# Patient Record
Sex: MALE | Race: WHITE | NOT HISPANIC OR LATINO | Employment: OTHER | URBAN - METROPOLITAN AREA
[De-identification: names, ages, dates, MRNs, and addresses within clinical notes are randomized per-mention and may not be internally consistent; named-entity substitution may affect disease eponyms.]

---

## 2017-04-21 ENCOUNTER — PRE VISIT (OUTPATIENT)
Dept: CARDIOLOGY | Facility: CLINIC | Age: 67
End: 2017-04-21

## 2017-04-21 NOTE — TELEPHONE ENCOUNTER
6 month follow up for HTN and cardiomypathy. ECHO sched 4-24-17.    Chart prep complete. All requested testing/labs completed.  Arabella Davis CMA.

## 2017-04-24 ENCOUNTER — OFFICE VISIT (OUTPATIENT)
Dept: URGENT CARE | Facility: URGENT CARE | Age: 67
End: 2017-04-24
Payer: COMMERCIAL

## 2017-04-24 ENCOUNTER — RADIANT APPOINTMENT (OUTPATIENT)
Dept: CARDIOLOGY | Facility: CLINIC | Age: 67
End: 2017-04-24
Attending: INTERNAL MEDICINE
Payer: COMMERCIAL

## 2017-04-24 VITALS
BODY MASS INDEX: 38.37 KG/M2 | DIASTOLIC BLOOD PRESSURE: 93 MMHG | TEMPERATURE: 98.7 F | SYSTOLIC BLOOD PRESSURE: 137 MMHG | HEART RATE: 87 BPM | OXYGEN SATURATION: 95 % | WEIGHT: 245 LBS

## 2017-04-24 DIAGNOSIS — I42.9 CARDIOMYOPATHY (H): ICD-10-CM

## 2017-04-24 DIAGNOSIS — J20.9 ACUTE BRONCHITIS WITH SYMPTOMS > 10 DAYS: Primary | ICD-10-CM

## 2017-04-24 PROCEDURE — 99213 OFFICE O/P EST LOW 20 MIN: CPT | Performed by: FAMILY MEDICINE

## 2017-04-24 PROCEDURE — 93306 TTE W/DOPPLER COMPLETE: CPT | Performed by: INTERNAL MEDICINE

## 2017-04-24 PROCEDURE — 40000264 ZZHC STATISTIC IV PUSH SINGLE INITIAL SUBSTANCE: Performed by: INTERNAL MEDICINE

## 2017-04-24 RX ORDER — AZITHROMYCIN 250 MG/1
TABLET, FILM COATED ORAL
Qty: 6 TABLET | Refills: 0 | Status: SHIPPED | OUTPATIENT
Start: 2017-04-24 | End: 2017-05-10

## 2017-04-24 RX ORDER — METHYLPREDNISOLONE 4 MG
TABLET, DOSE PACK ORAL
Qty: 21 TABLET | Refills: 0 | Status: SHIPPED | OUTPATIENT
Start: 2017-04-24 | End: 2017-05-10

## 2017-04-24 RX ADMIN — Medication 3 ML: at 16:30

## 2017-04-24 NOTE — PATIENT INSTRUCTIONS
Bronchitis, Antibiotic Treatment (Adult)    Bronchitis is an infection of the air passages (bronchial tubes) in your lungs. It often occurs when you have a cold. This illness is contagious during the first few days and is spread through the air by coughing and sneezing, or by direct contact (touching the sick person and then touching your own eyes, nose, or mouth).  Symptoms of bronchitis include cough with mucus (phlegm) and low-grade fever. Bronchitis usually lasts 7 to 14 days. Mild cases can be treated with simple home remedies. More severe infection is treated with an antibiotic.  Home care  Follow these guidelines when caring for yourself at home:    If your symptoms are severe, rest at home for the first 2 to 3 days. When you go back to your usual activities, don't let yourself get too tired.    Do not smoke. Also avoid being exposed to secondhand smoke.    You may use over-the-counter medicines to control fever or pain, unless another medicine was prescribed. (Note: If you have chronic liver or kidney disease or have ever had a stomach ulcer or gastrointestinal bleeding, talk with your healthcare provider before using these medicines. Also talk to your provider if you are taking medicine to prevent blood clots.) Aspirin should never be given to anyone younger than 18 years of age who is ill with a viral infection or fever. It may cause severe liver or brain damage.    Your appetite may be poor, so a light diet is fine. Avoid dehydration by drinking 6 to 8 glasses of fluids per day (such as water, soft drinks, sports drinks, juices, tea, or soup). Extra fluids will help loosen secretions in the nose and lungs.    Over-the-counter cough, cold, and sore-throat medicines will not shorten the length of the illness, but they may be helpful to reduce symptoms. (Note: Do not use decongestants if you have high blood pressure.)    Finish all antibiotic medicine. Do this even if you are feeling better after only a  few days.  Follow-up care  Follow up with your healthcare provider, or as advised. If you had an X-ray or ECG (electrocardiogram), a specialist will review it. You will be notified of any new findings that may affect your care.  Note: If you are age 65 or older, or if you have a chronic lung disease or condition that affects your immune system, or you smoke, talk to your healthcare provider about having pneumococcal vaccinations and a yearly influenza vaccination (flu shot).  When to seek medical advice  Call your healthcare provider right away if any of these occur:    Fever of 100.4 F (38 C) or higher    Coughing up increased amounts of colored sputum    Weakness, drowsiness, headache, facial pain, ear pain, or a stiff neck   Call 911, or get immediate medical care  Contact emergency services right away if any of these occur.    Coughing up blood    Worsening weakness, drowsiness, headache, or stiff neck    Trouble breathing, wheezing, or pain with breathing    9792-0820 [employer]. [address] [place] [address], [place] [address]. All rights reserved. This information is not intended as a substitute for professional medical care. Always follow your healthcare professional's instructions.

## 2017-04-24 NOTE — NURSING NOTE
Patient presents today for resting echo ordered by MD.     Echo Tech provided patient education about resting echo. IV started in right hand.   IV start documented in  [employer].  Echo technician completed resting echo. Patient monitored according to Optison protocol. Data transferred to [facility] for final interpretation through [employer].     Optison medication:  Amount used 3ml Optison mixed with 6ml Saline - [id number].  6ml Wasted.   After completion of resting echo, IV removed.    Patient education provided about cardiology interpretation and primary provider will be notified of results.    Deidra Nickerson RDCS

## 2017-04-24 NOTE — PROGRESS NOTES
SUBJECTIVE:   Ricky Brown is a 66 year old male presenting with a chief complaint of cough  and sputum.  Onset of symptoms was 2 week(s) ago.  Course of illness is same.    Severity moderate  Current and Associated symptoms: cough   Treatment measures tried include OTC meds.  Predisposing factors include None.    Also c/o wheezing.    Past Medical History:   Diagnosis Date     Arthritis      BCC (basal cell carcinoma)     right shoulder      Cardiomyopathy (H)      Colon adenomas 9/20/11     ED (erectile dysfunction)      HTN (hypertension)      Obesity      JOSE JUAN (obstructive sleep apnea)      Current Outpatient Prescriptions   Medication Sig Dispense Refill     diphenoxylate-atropine (LOMOTIL) 2.5-0.025 MG per tablet Take 1 tablet by mouth daily as needed for diarrhea Patient only takes 1 tablet as needed 30 tablet 5     baclofen (LIORESAL) 10 MG tablet Take 1/2 tab (5 mg) three times daily for 7 days, then  Take 1 tab (10 mg) three times daily for 7 days, then  Take 1 and 1/2 tab (15 mg) three times daily. 116 tablet 1     metoprolol (TOPROL XL) 50 MG 24 hr tablet Take 1 tablet (50 mg) by mouth daily 90 tablet 3     atorvastatin (LIPITOR) 10 MG tablet Take 1 tablet (10 mg) by mouth daily 90 tablet 3     losartan (COZAAR) 25 MG tablet Take 1 tablet (25 mg) by mouth 2 times daily 180 tablet 3     ipratropium (ATROVENT) 0.06 % nasal spray Spray 2 sprays into both nostrils 4 times daily as needed for rhinitis Refill when requested 3 Box 11     aspirin 81 MG tablet Take 1 tablet (81 mg) by mouth daily 90 tablet 3     sildenafil (VIAGRA) 50 MG tablet Take 0.5 tablets by mouth daily as needed for erectile dysfunction. Take 30 min to 4 hours before intercourse.  Never use with nitroglycerin, terazosin or doxazosin. 12 tablet 11     Calcium Carbonate Antacid (TUMS CALCIUM FOR LIFE BONE PO) Take  by mouth.       Multiple Vitamin (MULTIVITAMINS PO) Take  by mouth daily.       Social History   Substance Use Topics      Smoking status: Former Smoker     Packs/day: 0.50     Years: 2.00     Types: Cigarettes     Quit date: 12/12/1996     Smokeless tobacco: Never Used     Alcohol use 5.0 oz/week      Comment: 1-2 seth/ gin per 5-6 night per week       ROS:  Review of systems negative except as stated above.    OBJECTIVE  :BP (!) 137/93  Pulse 87  Temp 98.7  F (37.1  C) (Oral)  Wt 245 lb (111.1 kg)  SpO2 95%  BMI 38.37 kg/m2  GENERAL APPEARANCE: healthy, alert and no distress  EYES: EOMI,  PERRL, conjunctiva clear  HENT: ear canals and TM's normal.  Nose and mouth without ulcers, erythema or lesions  NECK: supple, nontender, no lymphadenopathy  RESP: lungs clear to auscultation - no rales, rhonchi or wheezes  CV: regular rates and rhythm, normal S1 S2, no murmur noted  NEURO: Normal strength and tone, sensory exam grossly normal,  normal speech and mentation  SKIN: no suspicious lesions or rashes    ASSESSMENT:  Bronchitis    PLAN:  Rx abx, steroids  See orders in Epic

## 2017-04-24 NOTE — NURSING NOTE
"Chief Complaint   Patient presents with     Cough     Started about 2 weeks ago. Coughing out bloody mucus. No runny nose. stomache, body ache, confusion.       Initial BP (!) 137/93  Pulse 87  Temp 98.7  F (37.1  C) (Oral)  Wt 245 lb (111.1 kg)  SpO2 95%  BMI 38.37 kg/m2 Estimated body mass index is 38.37 kg/(m^2) as calculated from the following:    Height as of 11/29/16: 5' 7\" (1.702 m).    Weight as of this encounter: 245 lb (111.1 kg).  Medication Reconciliation: complete   Iraida Carrillo MA        "

## 2017-04-24 NOTE — MR AVS SNAPSHOT
After Visit Summary   4/24/2017    Ricky Brown    MRN: [id number]           Patient Information     Date Of Birth          1950        Visit Information        Provider Department      4/24/2017 12:25 PM Suman Joyner MD [facility]        Today's Diagnoses     Acute bronchitis with symptoms > 10 days    -  1      Care Instructions      Bronchitis, Antibiotic Treatment (Adult)    Bronchitis is an infection of the air passages (bronchial tubes) in your lungs. It often occurs when you have a cold. This illness is contagious during the first few days and is spread through the air by coughing and sneezing, or by direct contact (touching the sick person and then touching your own eyes, nose, or mouth).  Symptoms of bronchitis include cough with mucus (phlegm) and low-grade fever. Bronchitis usually lasts 7 to 14 days. Mild cases can be treated with simple home remedies. More severe infection is treated with an antibiotic.  Home care  Follow these guidelines when caring for yourself at home:    If your symptoms are severe, rest at home for the first 2 to 3 days. When you go back to your usual activities, don't let yourself get too tired.    Do not smoke. Also avoid being exposed to secondhand smoke.    You may use over-the-counter medicines to control fever or pain, unless another medicine was prescribed. (Note: If you have chronic liver or kidney disease or have ever had a stomach ulcer or gastrointestinal bleeding, talk with your healthcare provider before using these medicines. Also talk to your provider if you are taking medicine to prevent blood clots.) Aspirin should never be given to anyone younger than 18 years of age who is ill with a viral infection or fever. It may cause severe liver or brain damage.    Your appetite may be poor, so a light diet is fine. Avoid dehydration by drinking 6 to 8 glasses of fluids per day (such as water, soft drinks, sports  drinks, juices, tea, or soup). Extra fluids will help loosen secretions in the nose and lungs.    Over-the-counter cough, cold, and sore-throat medicines will not shorten the length of the illness, but they may be helpful to reduce symptoms. (Note: Do not use decongestants if you have high blood pressure.)    Finish all antibiotic medicine. Do this even if you are feeling better after only a few days.  Follow-up care  Follow up with your healthcare provider, or as advised. If you had an X-ray or ECG (electrocardiogram), a specialist will review it. You will be notified of any new findings that may affect your care.  Note: If you are age 65 or older, or if you have a chronic lung disease or condition that affects your immune system, or you smoke, talk to your healthcare provider about having pneumococcal vaccinations and a yearly influenza vaccination (flu shot).  When to seek medical advice  Call your healthcare provider right away if any of these occur:    Fever of 100.4 F (38 C) or higher    Coughing up increased amounts of colored sputum    Weakness, drowsiness, headache, facial pain, ear pain, or a stiff neck   Call 911, or get immediate medical care  Contact emergency services right away if any of these occur.    Coughing up blood    Worsening weakness, drowsiness, headache, or stiff neck    Trouble breathing, wheezing, or pain with breathing    [id number] [employer] [address] [place] [address]. All rights reserved. This information is not intended as a substitute for professional medical care. Always follow your healthcare professional's instructions.              Follow-ups after your visit        Follow-up notes from your care team     Return if symptoms worsen or fail to improve.      Your next 10 appointments already scheduled     Apr 24, 2017  4:00 PM CDT   Ech Complete with [id number]   [facility] [facility]    [address] [place] [address]  [address] [facility]  [place] [address]   [phone]           1.  Please bring or wear a comfortable two-piece outfit. 2.  You may eat, drink and take your normal medicines. 3.  For any questions that cannot be answered, please contact the ordering physician            Apr 26, 2017  8:00 AM CDT   Return Visit with ZEN Bustillo MD   [facility] ([facility])    [address] [address]   [phone]              Who to contact     If you have questions or need follow up information about today's clinic visit or your schedule please contact [facility] directly at [phone].  Normal or non-critical lab and imaging results will be communicated to you by MyChart, letter or phone within 4 business days after the clinic has received the results. If you do not hear from us within 7 days, please contact the clinic through [facility] or phone. If you have a critical or abnormal lab result, we will notify you by phone as soon as possible.  Submit refill requests through [facility] or call your pharmacy and they will forward the refill request to us. Please allow 3 business days for your refill to be completed.          Additional Information About Your Visit        [facility] Information     [facility] gives you secure access to your electronic health record. If you see a primary care provider, you can also send messages to your care team and make appointments. If you have questions, please call your primary care clinic.  If you do not have a primary care provider, please call [phone] and they will assist you.        Care EveryWhere ID     This is your Care EveryWhere ID. This could be used by other organizations to access your [facility] medical records  [id number]        Your Vitals Were     Pulse Temperature Pulse Oximetry BMI (Body Mass Index)          87 98.7  F (37.1  C) (Oral) 95% 38.37 kg/m2         Blood Pressure  from Last 3 Encounters:   04/24/17 (!) 137/93   10/26/16 (!) 160/95   08/18/16 124/78    Weight from Last 3 Encounters:   04/24/17 245 lb (111.1 kg)   11/29/16 249 lb 9.6 oz (113.2 kg)   10/26/16 239 lb (108.4 kg)              Today, you had the following     No orders found for display         Today's Medication Changes          These changes are accurate as of: 4/24/17  1:14 PM.  If you have any questions, ask your nurse or doctor.               Start taking these medicines.        Dose/Directions    azithromycin 250 MG tablet   Commonly known as:  ZITHROMAX   Used for:  Acute bronchitis with symptoms > 10 days   Started by:  Suman Joyner MD        Two tablets first day, then one tablet daily for four days.   Quantity:  6 tablet   Refills:  0       methylPREDNISolone 4 MG tablet   Commonly known as:  MEDROL DOSEPAK   Used for:  Acute bronchitis with symptoms > 10 days   Started by:  Suman Joyner MD        Follow package instructions   Quantity:  21 tablet   Refills:  0            Where to get your medicines      These medications were sent to [facility] [address] [place] [address], [facility] [address]     Phone:  [phone]     azithromycin 250 MG tablet    methylPREDNISolone 4 MG tablet                Primary Care Provider Office Phone # Fax #    Armen Chavez [phone]       [address] [address]        Thank you!     Thank you for choosing [facility]  for your care. Our goal is always to provide you with excellent care. Hearing back from our patients is one way we can continue to improve our services. Please take a few minutes to complete the written survey that you may receive in the mail after your visit with us. Thank you!             Your Updated Medication List - Protect others around you: Learn how to safely use, store and throw away  your medicines at www.disposemymeds.org.          This list is accurate as of: 4/24/17  1:14 PM.  Always use your most recent med list.                   Brand Name Dispense Instructions for use    aspirin 81 MG tablet     90 tablet    Take 1 tablet (81 mg) by mouth daily       atorvastatin 10 MG tablet    LIPITOR    90 tablet    Take 1 tablet (10 mg) by mouth daily       azithromycin 250 MG tablet    ZITHROMAX    6 tablet    Two tablets first day, then one tablet daily for four days.       baclofen 10 MG tablet    LIORESAL    116 tablet    Take 1/2 tab (5 mg) three times daily for 7 days, then Take 1 tab (10 mg) three times daily for 7 days, then Take 1 and 1/2 tab (15 mg) three times daily.       diphenoxylate-atropine 2.5-0.025 MG per tablet    LOMOTIL    30 tablet    Take 1 tablet by mouth daily as needed for diarrhea Patient only takes 1 tablet as needed       ipratropium 0.06 % spray    ATROVENT    3 Box    Spray 2 sprays into both nostrils 4 times daily as needed for rhinitis Refill when requested       losartan 25 MG tablet    COZAAR    180 tablet    Take 1 tablet (25 mg) by mouth 2 times daily       methylPREDNISolone 4 MG tablet    MEDROL DOSEPAK    21 tablet    Follow package instructions       metoprolol 50 MG 24 hr tablet    TOPROL XL    90 tablet    Take 1 tablet (50 mg) by mouth daily       MULTIVITAMINS PO      Take  by mouth daily.       sildenafil 50 MG cap/tab    REVATIO/VIAGRA    12 tablet    Take 0.5 tablets by mouth daily as needed for erectile dysfunction. Take 30 min to 4 hours before intercourse.  Never use with nitroglycerin, terazosin or doxazosin.       TUMS CALCIUM FOR LIFE BONE PO      Take  by mouth.

## 2017-04-26 ENCOUNTER — OFFICE VISIT (OUTPATIENT)
Dept: CARDIOLOGY | Facility: CLINIC | Age: 67
End: 2017-04-26
Payer: COMMERCIAL

## 2017-04-26 VITALS
WEIGHT: 252 LBS | HEART RATE: 88 BPM | OXYGEN SATURATION: 95 % | BODY MASS INDEX: 39.55 KG/M2 | RESPIRATION RATE: 18 BRPM | DIASTOLIC BLOOD PRESSURE: 83 MMHG | HEIGHT: 67 IN | SYSTOLIC BLOOD PRESSURE: 154 MMHG

## 2017-04-26 DIAGNOSIS — I42.8 NON-ISCHEMIC CARDIOMYOPATHY (H): Primary | ICD-10-CM

## 2017-04-26 PROCEDURE — 99214 OFFICE O/P EST MOD 30 MIN: CPT | Performed by: INTERNAL MEDICINE

## 2017-04-26 RX ORDER — LOSARTAN POTASSIUM 50 MG/1
50 TABLET ORAL DAILY
Qty: 90 TABLET | Refills: 3 | Status: SHIPPED | OUTPATIENT
Start: 2017-04-26 | End: 2017-09-19

## 2017-04-26 RX ORDER — LOSARTAN POTASSIUM 50 MG/1
50 TABLET ORAL 2 TIMES DAILY
Qty: 180 TABLET | Refills: 3 | Status: SHIPPED | OUTPATIENT
Start: 2017-04-26 | End: 2017-04-26

## 2017-04-26 NOTE — LETTER
"4/26/2017      RE: Ricky Brown  [address]  [facility] [address]       Dear Colleague,    Thank you for the opportunity to participate in the care of your patient, Ricky Brown, at the [facility] HEART AT [facility]. Please see a copy of my visit note below.       SUBJECTIVE:  Ricky Brown is a 66 year old male who presents for follow up.  Non ischemic cardiomyopathy. EF ~45-50%. HTN+On  Statin.  No complaints. On stroid and antibiotics for bronchitis.      Patient Active Problem List    Diagnosis Date Noted     Hyperlipidemia LDL goal <70 08/18/2016     Priority: Medium     overwieght BMI > 35 08/18/2016     Priority: Medium     Essential hypertension with goal blood pressure less than 140/90 08/10/2016     Priority: Medium     Cramp of limb 04/14/2015     Priority: Medium     Iritis, os 05/03/2014     Priority: Medium     Disorder of bursae and tendons in shoulder region 04/18/2014     Priority: Medium     Problem list name updated by automated process. Provider to review       Other postprocedural status(V45.89) 04/18/2014     Priority: Medium     RCT (rotator cuff tear) 02/12/2014     Priority: Medium     Near syncope 02/10/2014     Priority: Medium     Obstructive sleep apnea 12/11/2013     Priority: Medium     Cardiomyopathy (H) 05/21/2013     Priority: Medium     BCC (basal cell carcinoma)      Priority: Medium     right shoulder        JOSE JUAN (obstructive sleep apnea)-severe (AHI 32)      Priority: Medium     Indications for Polysomnography 6/28/2013: The patient is a 62 y year old Male who is 5' 6\" and weighs 230.0 lbs.  His BMI equals 37.4.  The patients Sutherland sleepiness scale was 4.0 and neck size was 19.5.  A diagnostic polysomnogram was performed to evaluate for re-evaluation of JOSE JUAN after development of a cardiomyopathy.  After 123.0 minutes of sleep time the patient exhibited sufficient " respiratory events qualifying him for a CPAP trial which was then initiated.      Polysomnogram Data:  A full night polysomnogram was performed recording the standard physiologic parameters including EEG, EOG, EMG, EKG, nasal and oral airflow.  Respiratory parameters of chest and abdominal movements are recorded with respiratory inductance plethysmography.  Oxygen saturation was recorded by pulse oximetry.      Diagnostic PSG  Sleep Architecture:  The total recording time of the diagnostic portion of the study was 142.1 minutes.  The total sleep time was 123.0 minutes.  During the diagnostic portion of the study the sleep latency was 4.8 minutes.  REM latency was N/A.  Sleep Efficiency was 86.6%.  Wake after sleep onset was 10.5.   The patient spent 13.0% of total sleep time in Stage N1, 81.3% in Stage N2, 5.7% in Stages N3 and 0.0% in REM.  No REM sleep noted.       Respiration:     Sustained Sleep Associated Hypoventilation -was not monitored.    Sleep Associated Hypoxemia - was present.  Baseline oxygen saturation was 95.5%.  The lowest oxygen saturation was 85.0%.  Snoring was reported as loud.     Events - During the diagnostic portion of the study, the polysomnogram revealed a presence of 20 obstructive, 0 central, and 0 mixed apneas resulting in an Apnea index of 9.8 events per hour.  There were 45 hypopneas resulting in a Hypopnea index of 22.0 events per hour.  The combined Apnea/Hypopnea Index was 31.7 events per hour.  The REM AHI was N/A.  The RERA index was 26.3 per hour.   The RDI was 58.    26.3 31.7     Treatment PSG  Sleep Architecture:  At 12:08 am the patient was placed on CPAP treatment and was titrated at pressures ranging from 5 cm/H20 up to 13 cm/H20.  The total recording time of the treatment portion of the study was 380.1 minutes.  The total sleep time was 264.5 minutes.  During the treatment portion of the study the sleep latency was 89.0 minutes.  REM latency was 55.5 minutes.  Sleep  Efficiency was 69.6%.  Sleep Maintenance Efficiency was 91.0%.  Total wake time was 116.0 minutes for a total wake percentage of 8.8%. the patient spent 9.1% of total sleep time in Stage N1, 41.0% in Stage N2, 13.6% in Stages N3 and N, and 36.3% in REM.       Respiration:  The optimal pressure was 13.0 with an AHI of 0 including REM lateral.    Movement Activity:      Limb - During the diagnostic portion of the study, there were 80 limb movements recorded.  Of this total, 57 were classified as PLMs.  Of the PLMs, 2 were associated with arousals.  The Limb Movement index was 39.0 per hour while the PLM index was 27.8 per hour.  During the treatment portion of the study, there were 67 limb movements recorded.  Of this total, 49 were classified as PLMs.  Of the PLMs, 7 were associated with arousals.  The Limb Movement index was 15.2 per hour while the PLM index was 11.1 per hour.     Behavior - none    Bruxism - none    Seizure - none      CARDIAC SUMMARY:   No cardiac arrhythmias noted.       Assessment:    Severe JOSE JUAN (AHI 32) with adequate positive airway pressure titration.  Recommendations:    CPAP pressure 13 cmH2O with clinical follow-up within 3 - 4 weeks including compliance measures.    If symptoms not controlled, consider full night titration study.     Advise regarding the risks of drowsy driving    Suggest optimizing sleep hygiene and avoiding sleep deprivation    Weight management  Dopaminergic therapy should be used for management of restless leg syndrome (RLS) and not based on the presence of periodic limb movements alone.       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  Current Outpatient Prescriptions   Medication Sig     azithromycin (ZITHROMAX) 250 MG tablet Two tablets first day, then one tablet daily for four days.     methylPREDNISolone (MEDROL DOSEPAK) 4 MG tablet Follow package instructions     diphenoxylate-atropine (LOMOTIL) 2.5-0.025 MG per tablet Take 1 tablet by mouth daily as needed for diarrhea Patient only takes 1 tablet as needed     baclofen (LIORESAL) 10 MG tablet Take 1/2 tab (5 mg) three times daily for 7 days, then  Take 1 tab (10 mg) three times daily for 7 days, then  Take 1 and 1/2 tab (15 mg) three times daily.     metoprolol (TOPROL XL) 50 MG 24 hr tablet Take 1 tablet (50 mg) by mouth daily     atorvastatin (LIPITOR) 10 MG tablet Take 1 tablet (10 mg) by mouth daily     losartan (COZAAR) 25 MG tablet Take 1 tablet (25 mg) by mouth 2 times daily     ipratropium (ATROVENT) 0.06 % nasal spray Spray 2 sprays into both nostrils 4 times daily as needed for rhinitis Refill when requested     aspirin 81 MG tablet Take 1 tablet (81 mg) by mouth daily     sildenafil (VIAGRA) 50 MG tablet Take 0.5 tablets by mouth daily as needed for erectile dysfunction. Take 30 min to 4 hours before intercourse.  Never use with nitroglycerin, terazosin or doxazosin.     Calcium Carbonate Antacid (TUMS CALCIUM FOR LIFE BONE PO) Take  by mouth.     Multiple Vitamin (MULTIVITAMINS PO) Take  by mouth daily.     No current facility-administered medications for this visit.      Past Medical History:   Diagnosis Date     Arthritis      BCC (basal cell carcinoma)     right shoulder      Cardiomyopathy (H)      Colon adenomas 9/20/11     ED (erectile dysfunction)      HTN (hypertension)      Obesity      JOSE JUAN (obstructive sleep apnea)      Past Surgical History:   Procedure Laterality Date     ARTHROSCOPY SHOULDER BICEPS TENODESIS REPAIR  2/27/2014    Procedure: ARTHROSCOPY SHOULDER BICEPS  TENODESIS REPAIR;;  Surgeon: Michael Hagen MD;  [facility]     ARTHROSCOPY SHOULDER ROTATOR CUFF REPAIR  2/27/2014    Procedure: ARTHROSCOPY SHOULDER ROTATOR CUFF REPAIR;  Right Shoulder Arthroscopic Rotator Cuff Repair,  Subacromial Decompression, Biceps Tenodesis, Distal Clavicle Excision   ;  Surgeon: Michael Hagen MD;  Location: [facility]     BIOPSY       CARDIAC SURGERY       COLONOSCOPY       EXCHANGE INTRAOCULAR LENS IMPLANT  2005    left eye - first, recentered PCL with iris fixation; then IOLX with ACL     EXTRACAPSULAR CATARACT EXTRATION WITH INTRAOCULAR LENS IMPLANT  2005    both eyes (elsewhere)     TURBINOPLASTY  12/11/2013    Procedure: TURBINOPLASTY;;  Surgeon: Lisset Parsons MD;  [facility]     UVULOPALATOPHARYNGOPLASTY  12/11/2013    Procedure: UVULOPALATOPHARYNGOPLASTY;  Uvulopalatopharyngoplasty, Turbiante Reduction;  Surgeon: Lisset Parsons MD;  Location: [facility] OR     Allergies   Allergen Reactions     Lisinopril Cough     Social History     Social History     Marital status:      Spouse name: Kyung     Number of children: 0     Years of education: 14     Occupational History     industrial electronics  Self     Social History Main Topics     Smoking status: Former Smoker     Packs/day: 0.50     Years: 2.00     Types: Cigarettes     Quit date: 12/12/1996     Smokeless tobacco: Never Used     Alcohol use 5.0 oz/week      Comment: 1-2 seth/ gin per 5-6 night per week     Drug use: No     Sexual activity: Yes     Partners: Female     Birth control/ protection: Male Surgical     Other Topics Concern     Parent/Sibling W/ Cabg, Mi Or Angioplasty Before 65f 55m? No     Social History Narrative     Family History   Problem Relation Age of Onset     DIABETES Father      CEREBROVASCULAR DISEASE Father      Obesity Father      HEART DISEASE Father      C.A.D. No family hx of      CANCER No family hx of      Lipids Sister      Coronary Artery Disease  "Father      Hypertension Father           REVIEW OF SYSTEMS:  General: negative, fever, chills, night sweats  Skin: negative, acne, rash and scaling  Eyes: negative, double vision, eye pain and photophobia  Ears/Nose/Throat: negative, nasal congestion and purulent rhinorrhea  Respiratory: No dyspnea on exertion, No cough, No hemoptysis and negative  Cardiovascular: negative, palpitations, tachycardia, irregular heart beat, chest pain, exertional chest pain or pressure, paroxysmal nocturnal dyspnea, dyspnea on exertion and orthopnea         OBJECTIVE:  Blood pressure 154/83, pulse 88, resp. rate 18, height 1.702 m (5' 7\"), weight 114.3 kg (252 lb), SpO2 95 %.  General Appearance: alert, active and no distress  Head: Normocephalic. No masses, lesions, tenderness or abnormalities  Eyes: conjuctiva clear, PERRL, EOM intact  Ears: External ears normal. Canals clear. TM's normal.  Nose: Nares normal  Mouth: normal  Neck: Supple, no cervical adenopathy, no thyromegaly  Lungs: clear to auscultation  Cardiac: regular rate and rhythm, normal S1 and S2, no murmur       ASSESSMENT/PLAN:  Non ischemic cardiomyopathy,probably HTN.  No complaints.  Reviewed recent echo. EF 45-50%. Unchanges. Mod LVE by ESV of 96ml/ Was 106 before. Normal LVIDd. Result discussed with patiient.  BP elevated. Patient reluctant to take meds as he passed ot once due to ?low BP.  Will increase Cozaar to 50mg daily.  Per orders.   Return to Clinic 1yr with echo.    Please do not hesitate to contact me if you have any questions/concerns.     Sincerely,     ZEN Bustillo MD    "

## 2017-04-26 NOTE — NURSING NOTE
Cardiac Testing: Patient given instructions regarding  echocardiogram (in 1 year prior to f/u - April 2018). Discussed purpose, preparation, procedure and when to expect results reported back to the patient. Patient demonstrated understanding of this information and agreed to call with further questions or concerns.    Med Reconcile: Reviewed and verified all current medications with the patient. The updated medication list was printed and given to the patient.    Return Appointment: Patient given instructions regarding scheduling next clinic visit, in 1 year (April 2018) with echo prior. Patient demonstrated understanding of this information and agreed to call with further questions or concerns.    Medication Change: Patient was educated regarding prescribed medication change, increasing Cozaar to 50mg BID, including discussion of the indication, administration, side effects, and when to report to MD or RN. Patient demonstrated understanding of this information and agreed to call with further questions or concerns.    Patient stated he understood all health information given and agreed to call with further questions or concerns.    Sancho Graham RN

## 2017-04-26 NOTE — PATIENT INSTRUCTIONS
1. Please increase your Cozaar to 50mg/day.    2. Dr. ERNESTO Amaral would like you to return for a cardiac follow up in 1 year  (April 2018) with an echo prior.  We will contact you regarding your appointment when the time draws closer or you may call [phone] to arrange an appointment.  Mean while, if you should have any questions or concerns regarding your heart health, please contact us.  Thank you for choosing [facility] for your care.    [facility] Physicians Cardiology - [place] Location    If you have any questions regarding your visit please contact your care team:     Cardiology  Telephone Number   Sancho De La Rosa  Cardiology RN's   Graciela Sharma MA [phone]    After hours: [phone]   For scheduling appts:     [phone] or  [phone]    After hours: [phone]   For the Device Clinic (Pacemakers and ICD's)  RN's :  Vandana King   During business hours: [phone]  After business hours:  [phone]- select option 4 and ask for job code [id number].     Normal test result notifications will be released via [employer] or mailed within 7 business days.  All other test results, will be communicated via telephone once reviewed by your cardiologist.    If you need a medication refill please contact your pharmacy.  Please allow 3 business days for your refill to be completed.    As always, thank you for trusting us with your health care needs!

## 2017-04-26 NOTE — MR AVS SNAPSHOT
After Visit Summary   4/26/2017    Ricky Brown    MRN: [id number]           Patient Information     Date Of Birth          1950        Visit Information        Provider Department      4/26/2017 8:00 AM ZEN Bustillo MD [facility] PHYSICIANS HEART AT [facility]        Today's Diagnoses     Cardiomyopathy (H)          Care Instructions    1. Please increase your Cozaar to 50mg/twice a day.    2. Dr. ERNESTO Amaral would like you to return for a cardiac follow up in 1 year  (April 2018) with an echo prior.  We will contact you regarding your appointment when the time draws closer or you may call [phone] to arrange an appointment.  Mean while, if you should have any questions or concerns regarding your heart health, please contact us.  Thank you for choosing [facility] for your care.    [facility] Physicians Cardiology - [place] Location    If you have any questions regarding your visit please contact your care team:     Cardiology  Telephone Number   Sancho De La Rosa  Cardiology RN's   Graciela Sharma MA [phone]    After hours: [phone]   For scheduling appts:     [phone] or  [phone]    After hours: [phone]   For the Device Clinic (Pacemakers and ICD's)  RN's :  Vandana King   During business hours: [phone]  After business hours:  [phone]- select option 4 and ask for job code [id number].     Normal test result notifications will be released via [employer] or mailed within 7 business days.  All other test results, will be communicated via telephone once reviewed by your cardiologist.    If you need a medication refill please contact your pharmacy.  Please allow 3 business days for your refill to be completed.    As always, thank you for trusting us with your health care needs!                Follow-ups after your visit        Who to contact     If you have questions or need follow up information about  "today's clinic visit or your schedule please contact [facility] PHYSICIANS HEART AT [facility] directly at [phone].  Normal or non-critical lab and imaging results will be communicated to you by [employer], letter or phone within 4 business days after the clinic has received the results. If you do not hear from us within 7 days, please contact the clinic through [employer] or phone. If you have a critical or abnormal lab result, we will notify you by phone as soon as possible.  Submit refill requests through [employer] or call your pharmacy and they will forward the refill request to us. Please allow 3 business days for your refill to be completed.          Additional Information About Your Visit        [employer] Information     [employer] gives you secure access to your electronic health record. If you see a primary care provider, you can also send messages to your care team and make appointments. If you have questions, please call your primary care clinic.  If you do not have a primary care provider, please call [phone] and they will assist you.        Care EveryWhere ID     This is your Care EveryWhere ID. This could be used by other organizations to access your [facility] medical records  [id number]        Your Vitals Were     Pulse Respirations Height Pulse Oximetry BMI (Body Mass Index)       88 18 1.702 m (5' 7\") 95% 39.47 kg/m2        Blood Pressure from Last 3 Encounters:   04/26/17 154/83   04/24/17 (!) 137/93   10/26/16 (!) 160/95    Weight from Last 3 Encounters:   04/26/17 114.3 kg (252 lb)   04/24/17 111.1 kg (245 lb)   11/29/16 113.2 kg (249 lb 9.6 oz)              Today, you had the following     No orders found for display         Today's Medication Changes          These changes are accurate as of: 4/26/17  8:23 AM.  If you have any questions, ask your nurse or doctor.               These medicines have changed or have updated prescriptions.        Dose/Directions    losartan 50 MG " tablet   Commonly known as:  COZAAR   This may have changed:    - medication strength  - how much to take   Used for:  Cardiomyopathy (H)        Dose:  50 mg   Take 1 tablet (50 mg) by mouth 2 times daily   Quantity:  180 tablet   Refills:  3            Where to get your medicines      These medications were sent to [facility] [address] [facility], [place] - [address]  [address], [facility] [address]     Phone:  [phone]     losartan 50 MG tablet                Primary Care Provider Office Phone # Fax #    Armen Chavez [phone]       [facility] [address] [address]        Thank you!     Thank you for choosing [facility] PHYSICIANS HEART AT [facility]  for your care. Our goal is always to provide you with excellent care. Hearing back from our patients is one way we can continue to improve our services. Please take a few minutes to complete the written survey that you may receive in the mail after your visit with us. Thank you!             Your Updated Medication List - Protect others around you: Learn how to safely use, store and throw away your medicines at www.disposemymeds.org.          This list is accurate as of: 4/26/17  8:23 AM.  Always use your most recent med list.                   Brand Name Dispense Instructions for use    aspirin 81 MG tablet     90 tablet    Take 1 tablet (81 mg) by mouth daily       atorvastatin 10 MG tablet    LIPITOR    90 tablet    Take 1 tablet (10 mg) by mouth daily       azithromycin 250 MG tablet    ZITHROMAX    6 tablet    Two tablets first day, then one tablet daily for four days.       baclofen 10 MG tablet    LIORESAL    116 tablet    Take 1/2 tab (5 mg) three times daily for 7 days, then Take 1 tab (10 mg) three times daily for 7 days, then Take 1 and 1/2 tab (15 mg) three times daily.       diphenoxylate-atropine 2.5-0.025 MG per tablet    LOMOTIL    30 tablet     Take 1 tablet by mouth daily as needed for diarrhea Patient only takes 1 tablet as needed       ipratropium 0.06 % spray    ATROVENT    3 Box    Spray 2 sprays into both nostrils 4 times daily as needed for rhinitis Refill when requested       losartan 50 MG tablet    COZAAR    180 tablet    Take 1 tablet (50 mg) by mouth 2 times daily       methylPREDNISolone 4 MG tablet    MEDROL DOSEPAK    21 tablet    Follow package instructions       metoprolol 50 MG 24 hr tablet    TOPROL XL    90 tablet    Take 1 tablet (50 mg) by mouth daily       MULTIVITAMINS PO      Take  by mouth daily.       sildenafil 50 MG cap/tab    REVATIO/VIAGRA    12 tablet    Take 0.5 tablets by mouth daily as needed for erectile dysfunction. Take 30 min to 4 hours before intercourse.  Never use with nitroglycerin, terazosin or doxazosin.       TUMS CALCIUM FOR LIFE BONE PO      Take  by mouth.

## 2017-04-26 NOTE — PROGRESS NOTES
"   SUBJECTIVE:  Ricky Brown is a 66 year old male who presents for follow up.  Non ischemic cardiomyopathy. EF ~45-50%. HTN+On  Statin.  No complaints. On stroid and antibiotics for bronchitis.      Patient Active Problem List    Diagnosis Date Noted     Hyperlipidemia LDL goal <70 08/18/2016     Priority: Medium     overwieght BMI > 35 08/18/2016     Priority: Medium     Essential hypertension with goal blood pressure less than 140/90 08/10/2016     Priority: Medium     Cramp of limb 04/14/2015     Priority: Medium     Iritis, os 05/03/2014     Priority: Medium     Disorder of bursae and tendons in shoulder region 04/18/2014     Priority: Medium     Problem list name updated by automated process. Provider to review       Other postprocedural status(V45.89) 04/18/2014     Priority: Medium     RCT (rotator cuff tear) 02/12/2014     Priority: Medium     Near syncope 02/10/2014     Priority: Medium     Obstructive sleep apnea 12/11/2013     Priority: Medium     Cardiomyopathy (H) 05/21/2013     Priority: Medium     BCC (basal cell carcinoma)      Priority: Medium     right shoulder        JOSE JUAN (obstructive sleep apnea)-severe (AHI 32)      Priority: Medium     Indications for Polysomnography 6/28/2013: The patient is a 62 y year old Male who is 5' 6\" and weighs 230.0 lbs.  His BMI equals 37.4.  The patients Randlett sleepiness scale was 4.0 and neck size was 19.5.  A diagnostic polysomnogram was performed to evaluate for re-evaluation of JOSE JUAN after development of a cardiomyopathy.  After 123.0 minutes of sleep time the patient exhibited sufficient respiratory events qualifying him for a CPAP trial which was then initiated.      Polysomnogram Data:  A full night polysomnogram was performed recording the standard physiologic parameters including EEG, EOG, EMG, EKG, nasal and oral airflow.  Respiratory parameters of chest and abdominal movements are recorded with respiratory inductance plethysmography.  Oxygen " saturation was recorded by pulse oximetry.      Diagnostic PSG  Sleep Architecture:  The total recording time of the diagnostic portion of the study was 142.1 minutes.  The total sleep time was 123.0 minutes.  During the diagnostic portion of the study the sleep latency was 4.8 minutes.  REM latency was N/A.  Sleep Efficiency was 86.6%.  Wake after sleep onset was 10.5.   The patient spent 13.0% of total sleep time in Stage N1, 81.3% in Stage N2, 5.7% in Stages N3 and 0.0% in REM.  No REM sleep noted.       Respiration:     Sustained Sleep Associated Hypoventilation -was not monitored.    Sleep Associated Hypoxemia - was present.  Baseline oxygen saturation was 95.5%.  The lowest oxygen saturation was 85.0%.  Snoring was reported as loud.     Events - During the diagnostic portion of the study, the polysomnogram revealed a presence of 20 obstructive, 0 central, and 0 mixed apneas resulting in an Apnea index of 9.8 events per hour.  There were 45 hypopneas resulting in a Hypopnea index of 22.0 events per hour.  The combined Apnea/Hypopnea Index was 31.7 events per hour.  The REM AHI was N/A.  The RERA index was 26.3 per hour.   The RDI was 58.    26.3 31.7     Treatment PSG  Sleep Architecture:  At 12:08 am the patient was placed on CPAP treatment and was titrated at pressures ranging from 5 cm/H20 up to 13 cm/H20.  The total recording time of the treatment portion of the study was 380.1 minutes.  The total sleep time was 264.5 minutes.  During the treatment portion of the study the sleep latency was 89.0 minutes.  REM latency was 55.5 minutes.  Sleep Efficiency was 69.6%.  Sleep Maintenance Efficiency was 91.0%.  Total wake time was 116.0 minutes for a total wake percentage of 8.8%. the patient spent 9.1% of total sleep time in Stage N1, 41.0% in Stage N2, 13.6% in Stages N3 and N, and 36.3% in REM.       Respiration:  The optimal pressure was 13.0 with an AHI of 0 including REM lateral.    Movement Activity:       Limb - During the diagnostic portion of the study, there were 80 limb movements recorded.  Of this total, 57 were classified as PLMs.  Of the PLMs, 2 were associated with arousals.  The Limb Movement index was 39.0 per hour while the PLM index was 27.8 per hour.  During the treatment portion of the study, there were 67 limb movements recorded.  Of this total, 49 were classified as PLMs.  Of the PLMs, 7 were associated with arousals.  The Limb Movement index was 15.2 per hour while the PLM index was 11.1 per hour.     Behavior - none    Bruxism - none    Seizure - none      CARDIAC SUMMARY:   No cardiac arrhythmias noted.       Assessment:    Severe JOSE JUAN (AHI 32) with adequate positive airway pressure titration.  Recommendations:    CPAP pressure 13 cmH2O with clinical follow-up within 3 - 4 weeks including compliance measures.    If symptoms not controlled, consider full night titration study.     Advise regarding the risks of drowsy driving    Suggest optimizing sleep hygiene and avoiding sleep deprivation    Weight management  Dopaminergic therapy should be used for management of restless leg syndrome (RLS) and not based on the presence of periodic limb movements alone.       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  Current Outpatient Prescriptions   Medication Sig     azithromycin (ZITHROMAX) 250 MG tablet Two tablets first day, then one tablet daily for four days.     methylPREDNISolone (MEDROL DOSEPAK) 4 MG tablet Follow package instructions     diphenoxylate-atropine (LOMOTIL)  2.5-0.025 MG per tablet Take 1 tablet by mouth daily as needed for diarrhea Patient only takes 1 tablet as needed     baclofen (LIORESAL) 10 MG tablet Take 1/2 tab (5 mg) three times daily for 7 days, then  Take 1 tab (10 mg) three times daily for 7 days, then  Take 1 and 1/2 tab (15 mg) three times daily.     metoprolol (TOPROL XL) 50 MG 24 hr tablet Take 1 tablet (50 mg) by mouth daily     atorvastatin (LIPITOR) 10 MG tablet Take 1 tablet (10 mg) by mouth daily     losartan (COZAAR) 25 MG tablet Take 1 tablet (25 mg) by mouth 2 times daily     ipratropium (ATROVENT) 0.06 % nasal spray Spray 2 sprays into both nostrils 4 times daily as needed for rhinitis Refill when requested     aspirin 81 MG tablet Take 1 tablet (81 mg) by mouth daily     sildenafil (VIAGRA) 50 MG tablet Take 0.5 tablets by mouth daily as needed for erectile dysfunction. Take 30 min to 4 hours before intercourse.  Never use with nitroglycerin, terazosin or doxazosin.     Calcium Carbonate Antacid (TUMS CALCIUM FOR LIFE BONE PO) Take  by mouth.     Multiple Vitamin (MULTIVITAMINS PO) Take  by mouth daily.     No current facility-administered medications for this visit.      Past Medical History:   Diagnosis Date     Arthritis      BCC (basal cell carcinoma)     right shoulder      Cardiomyopathy (H)      Colon adenomas 9/20/11     ED (erectile dysfunction)      HTN (hypertension)      Obesity      JOSE JUAN (obstructive sleep apnea)      Past Surgical History:   Procedure Laterality Date     ARTHROSCOPY SHOULDER BICEPS TENODESIS REPAIR  2/27/2014    Procedure: ARTHROSCOPY SHOULDER BICEPS TENODESIS REPAIR;;  Surgeon: Michael Hagen MD;  [facility]     ARTHROSCOPY SHOULDER ROTATOR CUFF REPAIR  2/27/2014    Procedure: ARTHROSCOPY SHOULDER ROTATOR CUFF REPAIR;  Right Shoulder Arthroscopic Rotator Cuff Repair,  Subacromial Decompression, Biceps Tenodesis, Distal Clavicle Excision   ;  Surgeon: Michael Hagen MD;  Location: [facility]      BIOPSY       CARDIAC SURGERY       COLONOSCOPY       EXCHANGE INTRAOCULAR LENS IMPLANT  2005    left eye - first, recentered PCL with iris fixation; then IOLX with ACL     EXTRACAPSULAR CATARACT EXTRATION WITH INTRAOCULAR LENS IMPLANT  2005    both eyes (elsewhere)     TURBINOPLASTY  12/11/2013    Procedure: TURBINOPLASTY;;  Surgeon: Lisset Parsons MD;  Location: [facility]     UVULOPALATOPHARYNGOPLASTY  12/11/2013    Procedure: UVULOPALATOPHARYNGOPLASTY;  Uvulopalatopharyngoplasty, Turbiante Reduction;  Surgeon: Lisset Parsons MD;  Location: [facility] OR     Allergies   Allergen Reactions     Lisinopril Cough     Social History     Social History     Marital status:      Spouse name: Kyung     Number of children: 0     Years of education: 14     Occupational History     industrial electronics  Self     Social History Main Topics     Smoking status: Former Smoker     Packs/day: 0.50     Years: 2.00     Types: Cigarettes     Quit date: 12/12/1996     Smokeless tobacco: Never Used     Alcohol use 5.0 oz/week      Comment: 1-2 seth/ gin per 5-6 night per week     Drug use: No     Sexual activity: Yes     Partners: Female     Birth control/ protection: Male Surgical     Other Topics Concern     Parent/Sibling W/ Cabg, Mi Or Angioplasty Before 65f 55m? No     Social History Narrative     Family History   Problem Relation Age of Onset     DIABETES Father      CEREBROVASCULAR DISEASE Father      Obesity Father      HEART DISEASE Father      C.A.D. No family hx of      CANCER No family hx of      Lipids Sister      Coronary Artery Disease Father      Hypertension Father           REVIEW OF SYSTEMS:  General: negative, fever, chills, night sweats  Skin: negative, acne, rash and scaling  Eyes: negative, double vision, eye pain and photophobia  Ears/Nose/Throat: negative, nasal congestion and purulent rhinorrhea  Respiratory: No dyspnea on exertion, No cough, No hemoptysis and negative  Cardiovascular:  "negative, palpitations, tachycardia, irregular heart beat, chest pain, exertional chest pain or pressure, paroxysmal nocturnal dyspnea, dyspnea on exertion and orthopnea         OBJECTIVE:  Blood pressure 154/83, pulse 88, resp. rate 18, height 1.702 m (5' 7\"), weight 114.3 kg (252 lb), SpO2 95 %.  General Appearance: alert, active and no distress  Head: Normocephalic. No masses, lesions, tenderness or abnormalities  Eyes: conjuctiva clear, PERRL, EOM intact  Ears: External ears normal. Canals clear. TM's normal.  Nose: Nares normal  Mouth: normal  Neck: Supple, no cervical adenopathy, no thyromegaly  Lungs: clear to auscultation  Cardiac: regular rate and rhythm, normal S1 and S2, no murmur       ASSESSMENT/PLAN:  Non ischemic cardiomyopathy,probably HTN.  No complaints.  Reviewed recent echo. EF 45-50%. Unchanges. Mod LVE by ESV of 96ml/ Was 106 before. Normal LVIDd. Result discussed with patiient.  BP elevated. Patient reluctant to take meds as he passed ot once due to ?low BP.  Will increase Cozaar to 50mg daily.  Per orders.   Return to Clinic 1yr with echo.  "

## 2017-05-09 ENCOUNTER — DOCUMENTATION ONLY (OUTPATIENT)
Dept: LAB | Facility: CLINIC | Age: 67
End: 2017-05-09

## 2017-05-09 DIAGNOSIS — I10 ESSENTIAL HYPERTENSION WITH GOAL BLOOD PRESSURE LESS THAN 140/90: ICD-10-CM

## 2017-05-09 DIAGNOSIS — I42.9 CARDIOMYOPATHY (H): Primary | ICD-10-CM

## 2017-05-09 NOTE — PROGRESS NOTES
This patient has a future lab only appointment on 05/23/2017 and needs orders. Please sign the pended labs taken from the overdue  and make any needed changes. Thanks chinyere

## 2017-05-10 ENCOUNTER — OFFICE VISIT (OUTPATIENT)
Dept: FAMILY MEDICINE | Facility: CLINIC | Age: 67
End: 2017-05-10
Payer: COMMERCIAL

## 2017-05-10 ENCOUNTER — RADIANT APPOINTMENT (OUTPATIENT)
Dept: GENERAL RADIOLOGY | Facility: CLINIC | Age: 67
End: 2017-05-10
Attending: FAMILY MEDICINE
Payer: COMMERCIAL

## 2017-05-10 VITALS
DIASTOLIC BLOOD PRESSURE: 80 MMHG | SYSTOLIC BLOOD PRESSURE: 126 MMHG | TEMPERATURE: 98.7 F | BODY MASS INDEX: 38.31 KG/M2 | OXYGEN SATURATION: 96 % | HEART RATE: 76 BPM | WEIGHT: 244.1 LBS | HEIGHT: 67 IN

## 2017-05-10 DIAGNOSIS — I10 ESSENTIAL HYPERTENSION WITH GOAL BLOOD PRESSURE LESS THAN 140/90: ICD-10-CM

## 2017-05-10 DIAGNOSIS — R05.9 COUGH: Primary | ICD-10-CM

## 2017-05-10 DIAGNOSIS — R25.2 CRAMP OF LIMB: ICD-10-CM

## 2017-05-10 DIAGNOSIS — I42.9 CARDIOMYOPATHY (H): ICD-10-CM

## 2017-05-10 DIAGNOSIS — E78.5 HYPERLIPIDEMIA LDL GOAL <70: ICD-10-CM

## 2017-05-10 DIAGNOSIS — R05.9 COUGH: ICD-10-CM

## 2017-05-10 LAB
ALT SERPL W P-5'-P-CCNC: 38 U/L (ref 0–70)
ANION GAP SERPL CALCULATED.3IONS-SCNC: 8 MMOL/L (ref 3–14)
BUN SERPL-MCNC: 19 MG/DL (ref 7–30)
CALCIUM SERPL-MCNC: 8.8 MG/DL (ref 8.5–10.1)
CHLORIDE SERPL-SCNC: 110 MMOL/L (ref 94–109)
CHOLEST SERPL-MCNC: 116 MG/DL
CO2 SERPL-SCNC: 25 MMOL/L (ref 20–32)
CREAT SERPL-MCNC: 0.98 MG/DL (ref 0.66–1.25)
GFR SERPL CREATININE-BSD FRML MDRD: 77 ML/MIN/1.7M2
GLUCOSE SERPL-MCNC: 118 MG/DL (ref 70–99)
HDLC SERPL-MCNC: 41 MG/DL
LDLC SERPL CALC-MCNC: 22 MG/DL
NONHDLC SERPL-MCNC: 75 MG/DL
POTASSIUM SERPL-SCNC: 4.3 MMOL/L (ref 3.4–5.3)
SODIUM SERPL-SCNC: 143 MMOL/L (ref 133–144)
TRIGL SERPL-MCNC: 267 MG/DL

## 2017-05-10 PROCEDURE — 84460 ALANINE AMINO (ALT) (SGPT): CPT | Performed by: FAMILY MEDICINE

## 2017-05-10 PROCEDURE — 80048 BASIC METABOLIC PNL TOTAL CA: CPT | Performed by: FAMILY MEDICINE

## 2017-05-10 PROCEDURE — 99214 OFFICE O/P EST MOD 30 MIN: CPT | Performed by: FAMILY MEDICINE

## 2017-05-10 PROCEDURE — 71020 XR CHEST 2 VW: CPT

## 2017-05-10 PROCEDURE — 36415 COLL VENOUS BLD VENIPUNCTURE: CPT | Performed by: FAMILY MEDICINE

## 2017-05-10 PROCEDURE — 80061 LIPID PANEL: CPT | Performed by: FAMILY MEDICINE

## 2017-05-10 RX ORDER — PREDNISONE 20 MG/1
TABLET ORAL
Qty: 11 TABLET | Refills: 0 | Status: SHIPPED | OUTPATIENT
Start: 2017-05-10 | End: 2017-05-24

## 2017-05-10 NOTE — MR AVS SNAPSHOT
After Visit Summary   5/10/2017    Ricky Brown    MRN: [id number]           Patient Information     Date Of Birth          1950        Visit Information        Provider Department      5/10/2017 10:00 AM Sheridan Danielle MD [facility]        Today's Diagnoses     Cough    -  1    Cramp of limb        Hyperlipidemia LDL goal <70        Cardiomyopathy (H)        Essential hypertension with goal blood pressure less than 140/90           Follow-ups after your visit        Your next 10 appointments already scheduled     May 24, 2017  2:00 PM CDT   PHYSICAL with Armen Chavez MD   [facility] ([facility]    [address] [place] [id number] [phone]              Who to contact     If you have questions or need follow up information about today's clinic visit or your schedule please contact [facility] directly at [phone].  Normal or non-critical lab and imaging results will be communicated to you by MyChart, letter or phone within 4 business days after the clinic has received the results. If you do not hear from us within 7 days, please contact the clinic through [facility] or phone. If you have a critical or abnormal lab result, we will notify you by phone as soon as possible.  Submit refill requests through [employer] or call your pharmacy and they will forward the refill request to us. Please allow 3 business days for your refill to be completed.          Additional Information About Your Visit        MyChart Information     [employer] gives you secure access to your electronic health record. If you see a primary care provider, you can also send messages to your care team and make appointments. If you have questions, please call your primary care clinic.  If you do not have a primary care provider, please call [phone] and they will assist you.        Care EveryWhere ID     This is  "your Care EveryWhere ID. This could be used by other organizations to access your [facility] medical records  [id number]        Your Vitals Were     Pulse Temperature Height Pulse Oximetry BMI (Body Mass Index)       76 98.7  F (37.1  C) (Oral) 5' 7\" (1.702 m) 96% 38.23 kg/m2        Blood Pressure from Last 3 Encounters:   05/10/17 126/80   04/26/17 154/83   04/24/17 (!) 137/93    Weight from Last 3 Encounters:   05/10/17 244 lb 1.6 oz (110.7 kg)   04/26/17 252 lb (114.3 kg)   04/24/17 245 lb (111.1 kg)              We Performed the Following     **ALT FUTURE anytime     BASIC METABOLIC PANEL     Lipid panel reflex to direct LDL          Today's Medication Changes          These changes are accurate as of: 5/10/17 11:59 PM.  If you have any questions, ask your nurse or doctor.               Start taking these medicines.        Dose/Directions    predniSONE 20 MG tablet   Commonly known as:  DELTASONE   Used for:  Cough   Started by:  Sheridan Danielle MD        2 tabs (40 mg) daily x 3 days, 1 tab (20 mg) daily x 3 days, then 1/2 tab (10 mg) x 3 days.   Quantity:  11 tablet   Refills:  0            Where to get your medicines      These medications were sent to [facility] [address].  [address] [place] [address], [place] [address]     Phone:  [phone]     predniSONE 20 MG tablet                Primary Care Provider Office Phone # Fax #    Armen Chavez [phone]       [place] [id number]        Thank you!     Thank you for choosing [facility]  for your care. Our goal is always to provide you with excellent care. Hearing back from our patients is one way we can continue to improve our services. Please take a few minutes to complete the written survey that you may receive in the mail after your visit with us. Thank you!             Your Updated Medication List - " Protect others around you: Learn how to safely use, store and throw away your medicines at www.disposemymeds.org.          This list is accurate as of: 5/10/17 11:59 PM.  Always use your most recent med list.                   Brand Name Dispense Instructions for use    aspirin 81 MG tablet     90 tablet    Take 1 tablet (81 mg) by mouth daily       atorvastatin 10 MG tablet    LIPITOR    90 tablet    Take 1 tablet (10 mg) by mouth daily       baclofen 10 MG tablet    LIORESAL    116 tablet    Take 1/2 tab (5 mg) three times daily for 7 days, then Take 1 tab (10 mg) three times daily for 7 days, then Take 1 and 1/2 tab (15 mg) three times daily.       diphenoxylate-atropine 2.5-0.025 MG per tablet    LOMOTIL    30 tablet    Take 1 tablet by mouth daily as needed for diarrhea Patient only takes 1 tablet as needed       ipratropium 0.06 % spray    ATROVENT    3 Box    Spray 2 sprays into both nostrils 4 times daily as needed for rhinitis Refill when requested       losartan 50 MG tablet    COZAAR    90 tablet    Take 1 tablet (50 mg) by mouth daily       metoprolol 50 MG 24 hr tablet    TOPROL XL    90 tablet    Take 1 tablet (50 mg) by mouth daily       MULTIVITAMINS PO      Take  by mouth daily.       predniSONE 20 MG tablet    DELTASONE    11 tablet    2 tabs (40 mg) daily x 3 days, 1 tab (20 mg) daily x 3 days, then 1/2 tab (10 mg) x 3 days.       sildenafil 50 MG cap/tab    REVATIO/VIAGRA    12 tablet    Take 0.5 tablets by mouth daily as needed for erectile dysfunction. Take 30 min to 4 hours before intercourse.  Never use with nitroglycerin, terazosin or doxazosin.       TUMS CALCIUM FOR LIFE BONE PO      Take  by mouth.

## 2017-05-10 NOTE — PROGRESS NOTES
"  SUBJECTIVE:                                                    Ricky Brown is a 66 year old male who presents to clinic today for the following health issues:    ENT Symptoms             Symptoms: cc Present Absent Comment   Fever/Chills   X    Fatigue  X     Muscle Aches  X     Eye Irritation  X     Sneezing   X    Nasal Galo/Drg  X     Sinus Pressure/Pain  X     Loss of smell   X    Dental pain   X    Sore Throat  X     Swollen Glands   X    Ear Pain/Fullness   X    Cough  X     Wheeze  X     Chest Pain  X     Shortness of breath  X     Rash   X    Other         Symptom duration:  about 2 weeks ago   Symptom severity:  severe   Treatments tried:  z pack and prednisone   Contacts:  none   Additional ENT comments:    Seen in urgent care on 4/24     Diagnosed with bronchitis - put on z pack and prednisone     Reports that his symptoms did improve on medication    However, continues to have a cough - can get so bad that he becomes out of breath, field of vision narrows, and feels like he could pass out as well as tingling feeling up and down his arms and legs    Cardiology    Saw cardiology 4/26/17    Increased Cozaar    Muscle Cramping     Onset: about a week    Description:   Location: \"all over \" severe in ribs, worse on right side  Character: Cramping    Intensity: severe    Progression of Symptoms: worse    Accompanying Signs & Symptoms:  Other symptoms:    History:   Previous similar pain: YES - with dehydration. However, he feels well hydrated over the course of these symptoms.     Precipitating factors:   Trauma or overuse: no     Alleviating factors:  Improved by: nothing       Therapies Tried and outcome: none      Problem list and histories reviewed & adjusted, as indicated.  Additional history: as documented    Patient Active Problem List   Diagnosis     HL (hearing loss)     Erectile dysfunction     Keratoglobus, ou     Pseudophakia, PCL, od; ACL, os     Posterior vitreous detachment, od     " Epiretinal membrane, mild, od     Advanced directives, counseling/discussion     HDL deficiency     JOSE JUAN (obstructive sleep apnea)-severe (AHI 32)     BCC (basal cell carcinoma)     Cardiomyopathy (H)     Obstructive sleep apnea     Near syncope     RCT (rotator cuff tear)     Disorder of bursae and tendons in shoulder region     Other postprocedural status(V45.89)     Iritis, os     Cramp of limb     Essential hypertension with goal blood pressure less than 140/90     Hyperlipidemia LDL goal <70     overwieght BMI > 35     Past Surgical History:   Procedure Laterality Date     ARTHROSCOPY SHOULDER BICEPS TENODESIS REPAIR  2/27/2014    Procedure: ARTHROSCOPY SHOULDER BICEPS TENODESIS REPAIR;;  Surgeon: Michael Hagen MD;  [facility]     ARTHROSCOPY SHOULDER ROTATOR CUFF REPAIR  2/27/2014    Procedure: ARTHROSCOPY SHOULDER ROTATOR CUFF REPAIR;  Right Shoulder Arthroscopic Rotator Cuff Repair,  Subacromial Decompression, Biceps Tenodesis, Distal Clavicle Excision   ;  Surgeon: Michael Hagen MD;  [facility]     BIOPSY       CARDIAC SURGERY       COLONOSCOPY       EXCHANGE INTRAOCULAR LENS IMPLANT  2005    left eye - first, recentered PCL with iris fixation; then IOLX with ACL     EXTRACAPSULAR CATARACT EXTRATION WITH INTRAOCULAR LENS IMPLANT  2005    both eyes (elsewhere)     TURBINOPLASTY  12/11/2013    Procedure: TURBINOPLASTY;;  Surgeon: Lisset Parsons MD;  Location:  OR     UVULOPALATOPHARYNGOPLASTY  12/11/2013    Procedure: UVULOPALATOPHARYNGOPLASTY;  Uvulopalatopharyngoplasty, Turbiante Reduction;  Surgeon: Lisset Parsons MD;  Location:  OR       Social History   Substance Use Topics     Smoking status: Former Smoker     Packs/day: 0.50     Years: 2.00     Types: Cigarettes     Quit date: 12/12/1996     Smokeless tobacco: Never Used     Alcohol use 5.0 oz/week      Comment: 1-2 seth/ gin per 5-6 night per week     Family History   Problem Relation Age of  "Onset     DIABETES Father      CEREBROVASCULAR DISEASE Father      Obesity Father      HEART DISEASE Father      Coronary Artery Disease Father      Hypertension Father      Lipids Sister      C.A.D. No family hx of      CANCER No family hx of          BP Readings from Last 3 Encounters:   05/10/17 126/80   04/26/17 154/83   04/24/17 (!) 137/93    Wt Readings from Last 3 Encounters:   05/10/17 244 lb 1.6 oz (110.7 kg)   04/26/17 252 lb (114.3 kg)   04/24/17 245 lb (111.1 kg)        Reviewed and updated as needed this visit by clinical staff  Tobacco  Allergies  Med Hx  Surg Hx  Fam Hx  Soc Hx      Reviewed and updated as needed this visit by Provider         ROS:  Constitutional, cardiovascular, pulmonary, GI, , neuro, skin, endocrine and psych systems are negative, except as otherwise noted.  Musculoskeletal - see above.  HEENT - see above.    This document serves as a record of the services and decisions personally performed and made by Sheridan Danielle MD. It was created on her/his behalf by Jennifer Robles, a trained medical scribe. The creation of this document is based the provider's statements to the medical scribe.  Jennifer Robles May 10, 2017 10:07 AM    OBJECTIVE:                                                    /80 (BP Location: Right arm, Patient Position: Chair, Cuff Size: Adult Large)  Pulse 76  Temp 98.7  F (37.1  C) (Oral)  Ht 5' 7\" (1.702 m)  Wt 244 lb 1.6 oz (110.7 kg)  SpO2 96%  BMI 38.23 kg/m2  Body mass index is 38.23 kg/(m^2).  GENERAL: healthy, alert and no distress, coughing.  Talking in full sentences, no respiratory distress  HENT: ear canals and TM's normal, nose and mouth without ulcers or lesions  NECK: no adenopathy, no asymmetry, masses, or scars and thyroid normal to palpation  RESP: lungs clear to auscultation - no rales, rhonchi or wheezes  CV: regular rate and rhythm, normal S1 S2, no S3 or S4, no murmur, click or rub, no peripheral edema and peripheral pulses " strong  MS: rib pain on right, anterior chest, and lateral, no gross musculoskeletal defects noted, no edema  PSYCH: mentation appears normal, affect normal/bright    Diagnostic Test Results:  No results found for this or any previous visit (from the past 24 hour(s)).     ASSESSMENT/PLAN:                                                      Rciky was seen today for musculoskeletal problem.    Diagnoses and all orders for this visit:    Cough  -     XR Chest 2 Views; Future  -     predniSONE (DELTASONE) 20 MG tablet; 2 tabs (40 mg) daily x 3 days, 1 tab (20 mg) daily x 3 days, then 1/2 tab (10 mg) x 3 days.    I don't believe there is any further need for antibiotics  Will try slower taper of prednisone  No wheezing on exam so didn't prescribe bronchdilator    Common side effects of medications prescribed at this visit were discussed with the patient. Severe side effects, including current applicable black box warnings, were discussed. We discussed options for dealing with these possible side effects and allergic reactions, based on their severity.    Reviewed signs and symptoms of when to be seen sooner due to worsening or no improvement.  Is seeing PCP in a few weeks.    Cramp of limb  -     BASIC METABOLIC PANEL  Unclear etiology.  He has appt with PCP in a few weeks    Hyperlipidemia LDL goal <70  -     Lipid panel reflex to direct LDL    Cardiomyopathy (H)  -     **ALT FUTURE anytime  -     Cancel: **Basic metabolic panel FUTURE anytime  Followed by cardiology, had recent increase in losartan    Essential hypertension with goal blood pressure less than 140/90  -     BASIC METABOLIC PANEL  -     **ALT FUTURE anytime  -     Cancel: **Basic metabolic panel FUTURE anytime  Chronic, stable, well controlled, continue current medication, refill done if needed      Sheridan Danielle MD  [facility]    This document serves as a record of the services and decisions personally performed and made by  Sheridan Danielle MD. It was created on her behalf by Jennifer Robles, a trained medical scribe. The creation of this document is based the provider's statements to the medical scribe.  Jennifer Robles May 10, 2017 10:06 AM

## 2017-05-10 NOTE — NURSING NOTE
"Chief Complaint   Patient presents with     Musculoskeletal Problem     muscle cramps       Initial /80 (BP Location: Right arm, Patient Position: Chair, Cuff Size: Adult Large)  Temp 98.7  F (37.1  C) (Oral)  Ht 5' 7\" (1.702 m)  Wt 244 lb 1.6 oz (110.7 kg)  BMI 38.23 kg/m2 Estimated body mass index is 38.23 kg/(m^2) as calculated from the following:    Height as of this encounter: 5' 7\" (1.702 m).    Weight as of this encounter: 244 lb 1.6 oz (110.7 kg).  Medication Reconciliation: complete    "

## 2017-05-24 ENCOUNTER — OFFICE VISIT (OUTPATIENT)
Dept: FAMILY MEDICINE | Facility: CLINIC | Age: 67
End: 2017-05-24
Payer: COMMERCIAL

## 2017-05-24 VITALS
HEART RATE: 89 BPM | DIASTOLIC BLOOD PRESSURE: 78 MMHG | SYSTOLIC BLOOD PRESSURE: 132 MMHG | BODY MASS INDEX: 38.16 KG/M2 | TEMPERATURE: 99 F | WEIGHT: 243.13 LBS | HEIGHT: 67 IN | OXYGEN SATURATION: 95 %

## 2017-05-24 DIAGNOSIS — R73.9 ELEVATED BLOOD SUGAR: ICD-10-CM

## 2017-05-24 DIAGNOSIS — K21.9 GASTROESOPHAGEAL REFLUX DISEASE WITHOUT ESOPHAGITIS: Primary | ICD-10-CM

## 2017-05-24 DIAGNOSIS — R05.9 COUGH: ICD-10-CM

## 2017-05-24 DIAGNOSIS — J31.0 CHRONIC RHINITIS: ICD-10-CM

## 2017-05-24 LAB — HBA1C MFR BLD: 5.7 % (ref 4.3–6)

## 2017-05-24 PROCEDURE — 36415 COLL VENOUS BLD VENIPUNCTURE: CPT | Performed by: FAMILY MEDICINE

## 2017-05-24 PROCEDURE — 99213 OFFICE O/P EST LOW 20 MIN: CPT | Performed by: FAMILY MEDICINE

## 2017-05-24 PROCEDURE — 83036 HEMOGLOBIN GLYCOSYLATED A1C: CPT | Performed by: FAMILY MEDICINE

## 2017-05-24 RX ORDER — FLUTICASONE PROPIONATE 50 MCG
1-2 SPRAY, SUSPENSION (ML) NASAL DAILY
Qty: 3 BOTTLE | Refills: 3 | COMMUNITY
Start: 2017-05-24 | End: 2018-04-11

## 2017-05-24 NOTE — MR AVS SNAPSHOT
After Visit Summary   5/24/2017    Ricky Brown    MRN: [id number]           Patient Information     Date Of Birth          1950        Visit Information        Provider Department      5/24/2017 2:00 PM Armen Chavez MD [facility]        Today's Diagnoses     Gastroesophageal reflux disease without esophagitis    -  1    Cough        Chronic rhinitis        Elevated blood sugar          Care Instructions    Use Flonase 1 puff in each nostril twice a day           Follow-ups after your visit        Who to contact     If you have questions or need follow up information about today's clinic visit or your schedule please contact [facility] directly at [phone].  Normal or non-critical lab and imaging results will be communicated to you by MyChart, letter or phone within 4 business days after the clinic has received the results. If you do not hear from us within 7 days, please contact the clinic through [employer] or phone. If you have a critical or abnormal lab result, we will notify you by phone as soon as possible.  Submit refill requests through [employer] or call your pharmacy and they will forward the refill request to us. Please allow 3 business days for your refill to be completed.          Additional Information About Your Visit        MyChart Information     [employer] gives you secure access to your electronic health record. If you see a primary care provider, you can also send messages to your care team and make appointments. If you have questions, please call your primary care clinic.  If you do not have a primary care provider, please call [phone] and they will assist you.        Care EveryWhere ID     This is your Care EveryWhere ID. This could be used by other organizations to access your [facility] medical records  [id number]        Your Vitals Were     Pulse Temperature Height Pulse Oximetry BMI (Body Mass Index)       89 99  F  "(37.2  C) (Oral) 5' 7\" (1.702 m) 95% 38.08 kg/m2        Blood Pressure from Last 3 Encounters:   05/24/17 132/78   05/10/17 126/80   04/26/17 154/83    Weight from Last 3 Encounters:   05/24/17 243 lb 2 oz (110.3 kg)   05/10/17 244 lb 1.6 oz (110.7 kg)   04/26/17 252 lb (114.3 kg)              We Performed the Following     Hemoglobin A1c          Today's Medication Changes          These changes are accurate as of: 5/24/17  3:09 PM.  If you have any questions, ask your nurse or doctor.               Start taking these medicines.        Dose/Directions    omeprazole 20 MG CR capsule   Commonly known as:  priLOSEC   Used for:  Gastroesophageal reflux disease without esophagitis   Started by:  Armen Chavez MD        Dose:  20 mg   Take 1 capsule (20 mg) by mouth 2 times daily   Quantity:  120 capsule   Refills:  3         Stop taking these medicines if you haven't already. Please contact your care team if you have questions.     baclofen 10 MG tablet   Commonly known as:  LIORESAL   Stopped by:  Armen Chavez MD                Where to get your medicines      These medications were sent to [facility] [address] [place] [id number]     Phone:  [phone]     omeprazole 20 MG CR capsule                Primary Care Provider Office Phone # Fax #    Armen Chavez [phone]       [address] [address]        Thank you!     Thank you for choosing [facility]  for your care. Our goal is always to provide you with excellent care. Hearing back from our patients is one way we can continue to improve our services. Please take a few minutes to complete the written survey that you may receive in the mail after your visit with us. Thank you!             Your Updated Medication List - Protect others around you: Learn how to safely use, store and throw " away your medicines at www.disposemymeds.org.          This list is accurate as of: 5/24/17  3:09 PM.  Always use your most recent med list.                   Brand Name Dispense Instructions for use    aspirin 81 MG tablet     90 tablet    Take 1 tablet (81 mg) by mouth daily       atorvastatin 10 MG tablet    LIPITOR    90 tablet    Take 1 tablet (10 mg) by mouth daily       diphenoxylate-atropine 2.5-0.025 MG per tablet    LOMOTIL    30 tablet    Take 1 tablet by mouth daily as needed for diarrhea Patient only takes 1 tablet as needed       fluticasone 50 MCG/ACT spray    FLONASE    3 Bottle    Spray 1-2 sprays into both nostrils daily       ipratropium 0.06 % spray    ATROVENT    3 Box    Spray 2 sprays into both nostrils 4 times daily as needed for rhinitis Refill when requested       losartan 50 MG tablet    COZAAR    90 tablet    Take 1 tablet (50 mg) by mouth daily       metoprolol 50 MG 24 hr tablet    TOPROL XL    90 tablet    Take 1 tablet (50 mg) by mouth daily       MULTIVITAMINS PO      Take  by mouth daily.       omeprazole 20 MG CR capsule    priLOSEC    120 capsule    Take 1 capsule (20 mg) by mouth 2 times daily       sildenafil 50 MG cap/tab    REVATIO/VIAGRA    12 tablet    Take 0.5 tablets by mouth daily as needed for erectile dysfunction. Take 30 min to 4 hours before intercourse.  Never use with nitroglycerin, terazosin or doxazosin.       TUMS CALCIUM FOR LIFE BONE PO      Take  by mouth.

## 2017-05-24 NOTE — NURSING NOTE
"Chief Complaint   Patient presents with     Cough     follow up     Derm Problem     2 bumps; 1 on back of neck and 1 on right side of face       Initial Temp 99  F (37.2  C) (Oral)  Wt 243 lb 2 oz (110.3 kg)  BMI 38.08 kg/m2 Estimated body mass index is 38.08 kg/(m^2) as calculated from the following:    Height as of 5/10/17: 5' 7\" (1.702 m).    Weight as of this encounter: 243 lb 2 oz (110.3 kg).  Medication Reconciliation: complete   Shari Becerra CMA      "

## 2017-05-24 NOTE — PROGRESS NOTES
SUBJECTIVE:                                                    Ricky Brown is a 66 year old male who presents to clinic today for the following health issues:    Chronic cough. Reviewed ENT notes dated 11/29/2016.  History of long-sanding chronic non-productive cough. He notes he had an extensive work-up at [facility] several years and was ultimately diagnosed with neurogenic cough due to lack of obvious cause to ailment. Underwent turbinate reduction and UPPP in 2013. Seen by ENT on 11/29/2016. Hypersensitive reflex appear to be contributory to patient's chronic cough. He was recommended baclofen for hypersensitive reflex but patient didn't tolerate medication. More recently, his cough was treated as an airway infection and started on antibiotics, but this didn't bring about any improvement. Patient had also developed some right inframammary tenderness that had transpired due to constant cough. Discomfort has now greatly improved after he found out that sleeping on his right lateral side ameliorates condition. He notes reflux symptoms twice a month and some voice hoarseness/muffled voice in the mornings. Feels cough worsens when he has nasal congestion, and use of nasal spray has helped.     Nonischemic cardiopathy. Reviewed cardiology notes dated 4/26/2017. EF is unchanged at 45-50% and cozaar was increased to 50mg daily.     Past/recent records reviewed and discussed for -- medications and labs dated 5/10 (mildy elevated triglycerides and elevated blood sugars at 118).    Problem list and histories reviewed & adjusted, as indicated.  Additional history: as documented    Labs reviewed in EPIC    Reviewed and updated as needed this visit by clinical staff       Reviewed and updated as needed this visit by Provider         ROS:  Constitutional, HEENT, cardiovascular, pulmonary, GI, , musculoskeletal, neuro, skin, endocrine and psych systems are negative, except as otherwise noted.    This document  "serves as a record of the services and decisions personally performed and made by Joey Chavez MD. It was created on their behalf by Dav Box, a trained medical scribe. The creation of this document is based the provider's statements to the medical scribe.  Dav Box May 24, 2017 3:52 PM         OBJECTIVE:                                                    /78 (BP Location: Right arm, Cuff Size: Adult Large)  Pulse 89  Temp 99  F (37.2  C) (Oral)  Ht 1.702 m (5' 7\")  Wt 110.3 kg (243 lb 2 oz)  SpO2 95%  BMI 38.08 kg/m2  Body mass index is 38.08 kg/(m^2).  GENERAL: healthy, alert and no distress  HENT: ear canals and TM's normal, mouth without ulcers or lesions -- Mild swelling of nasal passages bilaterally with mild nasal congestion   NECK: no adenopathy, no asymmetry, masses, or scars and thyroid normal to palpation  RESP: lungs clear to auscultation - no rales, rhonchi or wheezes  CV: regular rate and rhythm, normal S1 S2, no S3 or S4, no murmur, click or rub, no peripheral edema   MS: no gross musculoskeletal defects noted, no edema  SKIN: no suspicious lesions or rashes  PSYCH: mentation appears normal, affect normal/bright    Diagnostic Test Results:  none      ASSESSMENT/PLAN:                                                    (K21.9) Gastroesophageal reflux disease without esophagitis  (primary encounter diagnosis)  Comment: reflux symptoms possible causative agents of chronic cough - we'll treat as such.   Plan: omeprazole (PRILOSEC) 20 MG CR capsule        -Follow up in a month to recheck symptoms. Upper endoscopy may be necessary.    (R05) Cough  Comment: believed to be due to hypersensitive reflexes (gag reflex), but possibility of reflux symptoms precipitating cough shouldn't be discounted.   Plan: see plans above    (J31.0) Chronic rhinitis  Comment: using Flonase for nasal congestion  Plan: fluticasone (FLONASE) 50 MCG/ACT spray            (R73.9) Elevated blood sugar  Comment: " previous labs showed elevated glucose levels at 118.   Plan: Hemoglobin A1c        -Follow up, pending results    Patient Instructions     Use Flonase 1 puff in each nostril twice a day     Orders Placed This Encounter     Hemoglobin A1c     Last Lab Result: Hemoglobin A1C (%)       Date                     Value                 08/18/2016               5.6              ----------     fluticasone (FLONASE) 50 MCG/ACT spray     Sig: Spray 1-2 sprays into both nostrils daily     Dispense:  3 Bottle     Refill:  3     omeprazole (PRILOSEC) 20 MG CR capsule     Sig: Take 1 capsule (20 mg) by mouth 2 times daily     Dispense:  120 capsule     Refill:  3       The information in this document, created by the medical scribe for me, accurately reflects the services I personally performed and the decisions made by me. I have reviewed and approved this document for accuracy prior to leaving the patient care area.    Armen Chavez MD, MD  [facility]

## 2017-05-24 NOTE — PATIENT INSTRUCTIONS
Use Flonase 1 puff in each nostril twice a day     Orders Placed This Encounter     Hemoglobin A1c     Last Lab Result: Hemoglobin A1C (%)       Date                     Value                 08/18/2016               5.6              ----------     fluticasone (FLONASE) 50 MCG/ACT spray     Sig: Spray 1-2 sprays into both nostrils daily     Dispense:  3 Bottle     Refill:  3     omeprazole (PRILOSEC) 20 MG CR capsule     Sig: Take 1 capsule (20 mg) by mouth 2 times daily     Dispense:  120 capsule     Refill:  3

## 2017-06-22 ENCOUNTER — OFFICE VISIT (OUTPATIENT)
Dept: FAMILY MEDICINE | Facility: CLINIC | Age: 67
End: 2017-06-22
Payer: COMMERCIAL

## 2017-06-22 VITALS
SYSTOLIC BLOOD PRESSURE: 138 MMHG | TEMPERATURE: 98.1 F | OXYGEN SATURATION: 95 % | DIASTOLIC BLOOD PRESSURE: 78 MMHG | WEIGHT: 248.38 LBS | HEART RATE: 84 BPM | HEIGHT: 67 IN | BODY MASS INDEX: 38.98 KG/M2

## 2017-06-22 DIAGNOSIS — D22.9 ATYPICAL NEVI: ICD-10-CM

## 2017-06-22 DIAGNOSIS — J39.2 HYPERACTIVE GAG REFLEX: ICD-10-CM

## 2017-06-22 DIAGNOSIS — L72.0 EPIDERMAL CYST: ICD-10-CM

## 2017-06-22 DIAGNOSIS — R05.9 COUGH: Primary | ICD-10-CM

## 2017-06-22 PROCEDURE — 99213 OFFICE O/P EST LOW 20 MIN: CPT | Performed by: FAMILY MEDICINE

## 2017-06-22 NOTE — PROGRESS NOTES
SUBJECTIVE:                                                    Ricky Brown is a 66 year old male who presents to clinic today for the following health issues:  This patient is accompanied in the office by his wife.      Follow-up of cough - The patient has a history of a longstanding chronic non-productive cough and a more recently noted hypersensitive reflex. One month ago, he was started on PPI therapy for possible reflux component. He has not noticed any significant improvement since starting omeprazole. The patient's wife notes that she has noticed some improvement in the frequency of his cough. He continues to experience coughing fits where he feels he may pass out. He does note a sensation in his throat that seems to trigger his cough. He reports he recently figured out that this became more apparent when he was brushing his teeth with a certain toothpaste that he now believes was possibly . He no longer has been gagging when brushing his teeth since discontinuing use of this toothpaste. Upon reflection, he thinks that his chronic cough began after he began treatment for sleep apnea.       Cyst on neck - Patient reports the cyst on his back is about the same since it was last evaluated. Size fluctuates. He reports he occasionally pokes at the cyst.      Past/recent records also reviewed and discussed for - Nonischemic cardiomyopathy - Cardiology recommended repeat echo in 1 year.        Problem list and histories reviewed & adjusted, as indicated.  Additional history: as documented      Reviewed and updated as needed this visit by clinical staff  Tobacco  Allergies  Med Hx  Surg Hx  Fam Hx  Soc Hx      Reviewed and updated as needed this visit by Provider         ROS:  SKIN: POSITIVE for cyst on neck as above and multiple moles on back.    Constitutional, HEENT, cardiovascular, pulmonary, gi and neuro systems are negative, except as otherwise noted.        This document serves as a record  "of the services and decisions personally performed and made by Sidney Chavez MD. It was created on his behalf by Vero Krause, a trained medical scribe. The creation of this document is based the provider's statements to the medical scribe. 3:38 PM June 22, 2017    OBJECTIVE:                                                    /78 (BP Location: Right arm, Cuff Size: Adult Large)  Pulse 84  Temp 98.1  F (36.7  C) (Oral)  Ht 5' 7\" (1.702 m)  Wt 248 lb 6 oz (112.7 kg)  SpO2 95%  BMI 38.9 kg/m2  Body mass index is 38.9 kg/(m^2).     GENERAL: healthy, alert and no distress  EYES: Eyes grossly normal to inspection, conjunctivae and sclerae normal  NECK: no adenopathy  RESP: lungs clear to auscultation - no rales, rhonchi or wheezes  CV: regular rate and rhythm, normal S1 S2, no S3 or S4, no murmur, click or rub, no peripheral edema and peripheral pulses strong  MS: no gross musculoskeletal defects noted, no edema  SKIN: 4 mm epidermal cyst at the nape of the neck. 5 mm hyperpigmented lesion on mid back. Numerous seborrheic keratoses on back.  NEURO: Normal strength and tone, mentation intact and speech normal  PSYCH: mentation appears normal, affect normal/bright       ASSESSMENT/PLAN:                                                      (R05) Cough  (primary encounter diagnosis)  Comment: Unclear etiology. Chronic. Extensive workup negative. Will refer to ENT for further evaluation.  Plan: OTOLARYNGOLOGY REFERRAL    (J39.2) Hyperactive gag reflex  Comment: Possible trigger for cough.  Plan: OTOLARYNGOLOGY REFERRAL    (L72.0) Epidermal cyst  Comment: Patient to return to clinic for removal.    (D22.9) Atypical nevi  Comment: Patient to return to clinic for removal.       There are no Patient Instructions on file for this visit.    The information in this document, created by a scribe for me, accurately reflects the services I personally performed and the decisions made by me. I have reviewed and approved this " document for accuracy.  3:57 PM June 22, 2017    Armen Chavez MD, MD  [facility]

## 2017-06-22 NOTE — NURSING NOTE
"Chief Complaint   Patient presents with     Cough     follow up     Derm Problem     follow up on cyst on back of neck       Initial There were no vitals taken for this visit. Estimated body mass index is 38.08 kg/(m^2) as calculated from the following:    Height as of 5/24/17: 5' 7\" (1.702 m).    Weight as of 5/24/17: 243 lb 2 oz (110.3 kg).  Medication Reconciliation: complete   Shari Becerra CMA      "

## 2017-06-22 NOTE — MR AVS SNAPSHOT
After Visit Summary   6/22/2017    Ricky Brown    MRN: [id number]           Patient Information     Date Of Birth          1950        Visit Information        Provider Department      6/22/2017 3:20 PM Armen Chavez MD [facility]        Today's Diagnoses     Cough    -  1    Hyperactive gag reflex        Epidermal cyst        Atypical nevi           Follow-ups after your visit        Additional Services     OTOLARYNGOLOGY REFERRAL       Your provider has referred you to: [facility]: [facility] [phone]   [email or web address]    Please be aware that coverage of these services is subject to the terms and limitations of your health insurance plan.  Call member services at your health plan with any benefit or coverage questions.      Please bring the following with you to your appointment:    (1) Any X-Rays, CTs or MRIs which have been performed.  Contact the facility where they were done to arrange for  prior to your scheduled appointment.   (2) List of current medications  (3) This referral request   (4) Any documents/labs given to you for this referral                  Who to contact     If you have questions or need follow up information about today's clinic visit or your schedule please contact [facility] directly at [phone].  Normal or non-critical lab and imaging results will be communicated to you by MyChart, letter or phone within 4 business days after the clinic has received the results. If you do not hear from us within 7 days, please contact the clinic through MyChart or phone. If you have a critical or abnormal lab result, we will notify you by phone as soon as possible.  Submit refill requests through [employer] or call your pharmacy and they will forward the refill request to us. Please allow 3 business days for your refill to be completed.          Additional Information About  "Your Visit        MyChart Information     [employer] gives you secure access to your electronic health record. If you see a primary care provider, you can also send messages to your care team and make appointments. If you have questions, please call your primary care clinic.  If you do not have a primary care provider, please call [phone] and they will assist you.        Care EveryWhere ID     This is your Care EveryWhere ID. This could be used by other organizations to access your [place] medical records  [id number]        Your Vitals Were     Pulse Temperature Height Pulse Oximetry BMI (Body Mass Index)       84 98.1  F (36.7  C) (Oral) 5' 7\" (1.702 m) 95% 38.9 kg/m2        Blood Pressure from Last 3 Encounters:   06/22/17 138/78   05/24/17 132/78   05/10/17 126/80    Weight from Last 3 Encounters:   06/22/17 248 lb 6 oz (112.7 kg)   05/24/17 243 lb 2 oz (110.3 kg)   05/10/17 244 lb 1.6 oz (110.7 kg)              We Performed the Following     OTOLARYNGOLOGY REFERRAL        Primary Care Provider Office Phone # Fax #    Armen Chavez [phone]       [place] [address] [facility] [address]        Equal Access to Services     NIKHIL ABBOTT : Hadii aad ku hadasho Soomaali, waaxda luqadaha, qaybta kaalmada adeegyada, waxay idiin hayaan flip phillips . So [facility] [phone].    ATENCIÓN: Si habla español, tiene a hope disposición servicios gratuitos de asistencia lingüística. Llame al [phone].    We comply with applicable federal civil rights laws and [place] laws. We do not discriminate on the basis of race, color, national origin, age, disability sex, sexual orientation or gender identity.            Thank you!     Thank you for choosing [facility]  for your care. Our goal is always to provide you with excellent care. Hearing back from our patients is one way we can continue to improve our services. Please take a few minutes " to complete the written survey that you may receive in the mail after your visit with us. Thank you!             Your Updated Medication List - Protect others around you: Learn how to safely use, store and throw away your medicines at www.disposemymeds.org.          This list is accurate as of: 6/22/17  3:54 PM.  Always use your most recent med list.                   Brand Name Dispense Instructions for use Diagnosis    aspirin 81 MG tablet     90 tablet    Take 1 tablet (81 mg) by mouth daily    Cardiomyopathy, idiopathic (H)       atorvastatin 10 MG tablet    LIPITOR    90 tablet    Take 1 tablet (10 mg) by mouth daily    Cardiomyopathy (H)       diphenoxylate-atropine 2.5-0.025 MG per tablet    LOMOTIL    30 tablet    Take 1 tablet by mouth daily as needed for diarrhea Patient only takes 1 tablet as needed    Loose stools       fluticasone 50 MCG/ACT spray    FLONASE    3 Bottle    Spray 1-2 sprays into both nostrils daily    Chronic rhinitis       ipratropium 0.06 % spray    ATROVENT    3 Box    Spray 2 sprays into both nostrils 4 times daily as needed for rhinitis Refill when requested    Cough       losartan 50 MG tablet    COZAAR    90 tablet    Take 1 tablet (50 mg) by mouth daily    Non-ischemic cardiomyopathy (H)       metoprolol 50 MG 24 hr tablet    TOPROL XL    90 tablet    Take 1 tablet (50 mg) by mouth daily    Essential hypertension with goal blood pressure less than 140/90, Cardiomyopathy (H)       MULTIVITAMINS PO      Take  by mouth daily.    Routine general medical examination at a health care facility, Erectile dysfunction, Rectal itching, HTN (hypertension)       omeprazole 20 MG CR capsule    priLOSEC    120 capsule    Take 1 capsule (20 mg) by mouth 2 times daily    Gastroesophageal reflux disease without esophagitis       sildenafil 50 MG cap/tab    REVATIO/VIAGRA    12 tablet    Take 0.5 tablets by mouth daily as needed for erectile dysfunction. Take 30 min to 4 hours before intercourse.   Never use with nitroglycerin, terazosin or doxazosin.    Erectile dysfunction       TUMS CALCIUM FOR LIFE BONE PO      Take  by mouth.

## 2017-07-05 ENCOUNTER — OFFICE VISIT (OUTPATIENT)
Dept: OTOLARYNGOLOGY | Facility: CLINIC | Age: 67
End: 2017-07-05
Payer: COMMERCIAL

## 2017-07-05 DIAGNOSIS — J39.2 HYPERACTIVE GAG REFLEX: ICD-10-CM

## 2017-07-05 DIAGNOSIS — R05.3 CHRONIC COUGH: Primary | ICD-10-CM

## 2017-07-05 PROCEDURE — 99213 OFFICE O/P EST LOW 20 MIN: CPT | Performed by: OTOLARYNGOLOGY

## 2017-07-05 NOTE — PATIENT INSTRUCTIONS
General Scheduling Information  To schedule your CT/MRI scan, please contact [place] [facility] at [phone]   [address] [place] [address]    To schedule your Surgery, please contact our Specialty Schedulers at [phone]    ENT Clinic Locations Clinic Hours Telephone Number     [facility]  [address] [place] [address]   Tuesday:       8:00am -- 4:00pm    Wednesday:  8:00am - 4:00pm   To schedule an appointment with   Dr. Parson,   please contact our   Specialty Scheduling Department at:     [phone]       [facility] Kaiser  [address] [place] [address]   Friday:          8:00am - 4:00pm         Urgent Care Locations Clinic Hours Telephone Numbers     [facility] Richards  [address] [place] [address]     Monday-Friday:     11:00pm - 9:00pm    Saturday-Sunday:  9:00am - 5:00pm   [phone]     [facility] Kaiser  [address] [place] [address]     Monday-Friday:      5:00pm - 9:00pm     Saturday-Sunday:  9:00am - 5:00pm   [phone]

## 2017-07-05 NOTE — MR AVS SNAPSHOT
After Visit Summary   7/5/2017    Ricky Brown    MRN: [id number]           Patient Information     Date Of Birth          1950        Visit Information        Provider Department      7/5/2017 8:30 AM Huseyin Parson MD [facility]        Today's Diagnoses     Chronic cough    -  1    Hyperactive gag reflex          Care Instructions    General Scheduling Information  To schedule your CT/MRI scan, please contact [place] [facility] at [phone]   [address] [place] [address]    To schedule your Surgery, please contact our Specialty Schedulers at [phone]    ENT Clinic Locations Clinic Hours Telephone Number     [place] [facility]  [address]  [place] [address]   Tuesday:       8:00am -- 4:00pm    Wednesday:  8:00am - 4:00pm   To schedule an appointment with   Dr. Parson,   please contact our   Specialty Scheduling Department at:     [phone]       [facility]  [address] [place] [address]   Friday:          8:00am - 4:00pm         Urgent Care Locations Clinic Hours Telephone Numbers     [facility]  [address] [place] [address]     Monday-Friday:     11:00pm - 9:00pm    Saturday-Sunday:  9:00am - 5:00pm   [phone]     [facility]  [address] [place] [address]     Monday-Friday:      5:00pm - 9:00pm     Saturday-Sunday:  9:00am - 5:00pm   [phone]               Follow-ups after your visit        Additional Services     SPEECH THERAPY REFERRAL       *This therapy referral will be filtered to a centralized scheduling office at [facility] and the patient will receive a call to schedule an appointment at a [place] location most convenient for them. *     [facility] provides Speech Therapy evaluation and treatment and many specialty services across the [place] system.  If requesting a specialty program, please choose from the list  "below.  If you have not heard from the scheduling office within 2 business days, please call [phone] for all locations, with the exception of [place], please call [phone].       Treatment: Evaluation & Treatment  Speech Treatment Diagnosis: chronic neurogenic cough and prominent gag reflex.   Special Instructions: voice and throat therapy  Special Programs: Voice     Please be aware that coverage of these services is subject to the terms and limitations of your health insurance plan.  Call member services at your health plan with any benefit or coverage questions.      **Note to Provider:  If you are referring outside of [place] for the therapy appointment, please list the name of the location in the \"special instructions\" above, print the referral and give to the patient to schedule the appointment.                  Your next 10 appointments already scheduled     Jul 13, 2017 12:00 PM CDT   Office Visit with [facility]avez MD   [facility]    [address] [place] [id number] [phone]           Bring a current list of meds and any records pertaining to this visit.  For Physicals, please bring immunization records and any forms needing to be filled out.  Please arrive 10 minutes early to complete paperwork.              Who to contact     If you have questions or need follow up information about today's clinic visit or your schedule please contact [facility] directly at [phone].  Normal or non-critical lab and imaging results will be communicated to you by MyChart, letter or phone within 4 business days after the clinic has received the results. If you do not hear from us within 7 days, please contact the clinic through MyChart or phone. If you have a critical or abnormal lab result, we will notify you by phone as soon as possible.  Submit refill requests through [employer] or call your pharmacy and they will " forward the refill request to us. Please allow 3 business days for your refill to be completed.          Additional Information About Your Visit        [employer] Information     [employer] gives you secure access to your electronic health record. If you see a primary care provider, you can also send messages to your care team and make appointments. If you have questions, please call your primary care clinic.  If you do not have a primary care provider, please call [phone] and they will assist you.        Care EveryWhere ID     This is your Care EveryWhere ID. This could be used by other organizations to access your [place] medical records  [id number]         Blood Pressure from Last 3 Encounters:   06/22/17 138/78   05/24/17 132/78   05/10/17 126/80    Weight from Last 3 Encounters:   06/22/17 112.7 kg (248 lb 6 oz)   05/24/17 110.3 kg (243 lb 2 oz)   05/10/17 110.7 kg (244 lb 1.6 oz)              We Performed the Following     SPEECH THERAPY REFERRAL        Primary Care Provider Office Phone # Fax #    Armen Chavez [phone]       [address] [address]        Equal Access to Services     NIKHIL ABBOTT : Hadii steven carter hadasho Soomaali, waaxda luqadaha, qaybta kaalmada adeegyada, scottie galindo. So [facility] [phone].    ATENCIÓN: Si habla español, tiene a hope disposición servicios gratuitos de asistencia lingüística. Josefa al [phone].    We comply with applicable federal civil rights laws and [place] laws. We do not discriminate on the basis of race, color, national origin, age, disability sex, sexual orientation or gender identity.            Thank you!     Thank you for choosing [facility]  for your care. Our goal is always to provide you with excellent care. Hearing back from our patients is one way we can continue to improve our services. Please take a few minutes to complete the written  survey that you may receive in the mail after your visit with us. Thank you!             Your Updated Medication List - Protect others around you: Learn how to safely use, store and throw away your medicines at www.disposemymeds.org.          This list is accurate as of: 7/5/17  9:03 AM.  Always use your most recent med list.                   Brand Name Dispense Instructions for use Diagnosis    aspirin 81 MG tablet     90 tablet    Take 1 tablet (81 mg) by mouth daily    Cardiomyopathy, idiopathic (H)       atorvastatin 10 MG tablet    LIPITOR    90 tablet    Take 1 tablet (10 mg) by mouth daily    Cardiomyopathy (H)       diphenoxylate-atropine 2.5-0.025 MG per tablet    LOMOTIL    30 tablet    Take 1 tablet by mouth daily as needed for diarrhea Patient only takes 1 tablet as needed    Loose stools       fluticasone 50 MCG/ACT spray    FLONASE    3 Bottle    Spray 1-2 sprays into both nostrils daily    Chronic rhinitis       ipratropium 0.06 % spray    ATROVENT    3 Box    Spray 2 sprays into both nostrils 4 times daily as needed for rhinitis Refill when requested    Cough       losartan 50 MG tablet    COZAAR    90 tablet    Take 1 tablet (50 mg) by mouth daily    Non-ischemic cardiomyopathy (H)       metoprolol 50 MG 24 hr tablet    TOPROL XL    90 tablet    Take 1 tablet (50 mg) by mouth daily    Essential hypertension with goal blood pressure less than 140/90, Cardiomyopathy (H)       MULTIVITAMINS PO      Take  by mouth daily.    Routine general medical examination at a health care facility, Erectile dysfunction, Rectal itching, HTN (hypertension)       omeprazole 20 MG CR capsule    priLOSEC    120 capsule    Take 1 capsule (20 mg) by mouth 2 times daily    Gastroesophageal reflux disease without esophagitis       sildenafil 50 MG cap/tab    REVATIO/VIAGRA    12 tablet    Take 0.5 tablets by mouth daily as needed for erectile dysfunction. Take 30 min to 4 hours before intercourse.  Never use with  nitroglycerin, terazosin or doxazosin.    Erectile dysfunction       TUMS CALCIUM FOR LIFE BONE PO      Take  by mouth.

## 2017-07-05 NOTE — PROGRESS NOTES
Chief Complaint - chronic cough rehceck    History of Present Illness - Ricky Brown is a 66 year old male who returns with a long-standing history of chronic cough and throat irritation. I saw him a year ago or more. At that time he had an extensive work-up including at the [facility] in [place]. He has had a bronchoscopy, CT face and chest, EGD, without obvious cause of the cough. Therefore, he was diagnosed with neurogenic cough. He also notes some hoarseness. The patient describes the throat as intermittently irritated with a cough, and hoarseness, but otherwise no aphonia. Allergy testing negative. He has had a UPPP and turbinate reduction by Dr. Parsons at the [facility] in 2013. However, he never went back for a post-op sleep study. The patient denies any recent intubations or throat procedures. No pain, hemoptysis, but some globus, or fever. No lumps in the head or neck. Nonsmoker. The patient notes only rare reflux symptoms, and he has tried reflux medication without help. He uses 3 nasal sprays. He has tried elavil and baclofen. However, the baclofen made him sleepy. He still has the cough. He said a certain toothpaste was making him gag. He swallows fine. He is overweight. He just feels there is something wrong with his throat. Gravely voice in the morning. Has coughing fits still.       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 NEC     Iritis, os     Cramp of limb     Essential hypertension with  goal blood pressure less than 140/90     Hyperlipidemia LDL goal <70     overwieght BMI > 35       Current Medications -   Current Outpatient Prescriptions:      fluticasone (FLONASE) 50 MCG/ACT spray, Spray 1-2 sprays into both nostrils daily, Disp: 3 Bottle, Rfl: 3     omeprazole (PRILOSEC) 20 MG CR capsule, Take 1 capsule (20 mg) by mouth 2 times daily, Disp: 120 capsule, Rfl: 3     losartan (COZAAR) 50 MG tablet, Take 1 tablet (50 mg) by mouth daily, Disp: 90 tablet, Rfl: 3     diphenoxylate-atropine (LOMOTIL) 2.5-0.025 MG per tablet, Take 1 tablet by mouth daily as needed for diarrhea Patient only takes 1 tablet as needed, Disp: 30 tablet, Rfl: 5     metoprolol (TOPROL XL) 50 MG 24 hr tablet, Take 1 tablet (50 mg) by mouth daily, Disp: 90 tablet, Rfl: 3     atorvastatin (LIPITOR) 10 MG tablet, Take 1 tablet (10 mg) by mouth daily, Disp: 90 tablet, Rfl: 3     ipratropium (ATROVENT) 0.06 % nasal spray, Spray 2 sprays into both nostrils 4 times daily as needed for rhinitis Refill when requested, Disp: 3 Box, Rfl: 11     aspirin 81 MG tablet, Take 1 tablet (81 mg) by mouth daily, Disp: 90 tablet, Rfl: 3     sildenafil (VIAGRA) 50 MG tablet, Take 0.5 tablets by mouth daily as needed for erectile dysfunction. Take 30 min to 4 hours before intercourse.  Never use with nitroglycerin, terazosin or doxazosin., Disp: 12 tablet, Rfl: 11     Calcium Carbonate Antacid (TUMS CALCIUM FOR LIFE BONE PO), Take  by mouth., Disp: , Rfl:      Multiple Vitamin (MULTIVITAMINS PO), Take  by mouth daily., Disp: , Rfl:     Allergies -   Allergies   Allergen Reactions     Lisinopril Cough       Social History -   History     Social History     Marital Status:      Spouse Name: Kyung     Number of Children: 0     Years of Education: 14     Occupational History     industrial electronics  Self     Social History Main Topics     Smoking status: Former Smoker -- 0.50 packs/day for 2 years     Types: Cigarettes     Quit date: 12/12/1996      Smokeless tobacco: Never Used     Alcohol Use: 5.0 oz/week      Comment: 1-2 seth/ gin per 5-6 night per week     Drug Use: No     Sexual Activity:     Partners: Female     Birth Control/ Protection: Surgical     Other Topics Concern     Parent/Sibling W/ Cabg, Mi Or Angioplasty Before 65f 55m? No     Social History Narrative       Family History -   Family History   Problem Relation Age of Onset     DIABETES Father      CEREBROVASCULAR DISEASE Father      Obesity Father      HEART DISEASE Father      Coronary Artery Disease Father      Hypertension Father      Lipids Sister      C.A.D. No family hx of      CANCER No family hx of        Review of Systems - As per HPI and PMHx, uses CPAP for JOSE JUAN, otherwise 7 system review of the head and neck negative.    Physical Exam  General - The patient is nontoxic. Obese.  Alert and oriented to person and place, answers questions and cooperates with examination appropriately.   Head and Face - Normocephalic and atraumatic, with no gross asymmetry noted of the contour of the facial features.  The facial nerve is intact, with strong symmetric movements.  Voice and Breathing - The patient was breathing comfortably without the use of accessory muscles. There was no wheezing, stridor, or stertor.  The patients voice was mostly normal. Not overly raspy or hoarse.  Eyes - Extraocular movements intact.  Sclera were not icteric or injected, conjunctiva were pink and moist.  Mouth - Examination of the oral cavity showed pink, healthy oral mucosa. No lesions or ulcerations noted.  The tongue was mobile and midline, and the dentition were in good condition.    Throat - The walls of the oropharynx were smooth, pink, moist, symmetric, and had no lesions or ulcerations.  The tonsillar pillars and soft palate were symmetric with a scar on his soft palate from his UPPP.  Interestingly, he still had a uvula and it was midline on elevation.   Neck -  Palpation of the occipital, submental,  submandibular, internal jugular chain, and supraclavicular nodes did not demonstrate any abnormal lymph nodes or masses. Palpation of the thyroid was soft and smooth, with no nodules or goiter appreciated.  The trachea was mobile and midline. No pain of the anterior neck.  Nose - External contour is symmetric, no gross deflection or scars. He has external nasal valve collapse. Nasal mucosa is pink and moist with no abnormal mucus.  The septum was midline and non-obstructive, turbinates somewhat congested.  No polyps, masses, or purulence noted on examination.    A/P - Ricky Brown is a 66 year old male with chronic cough but no evidence of infection, inflammation, or neoplasm on exam to explain the symptoms. Prior endoscopy was negative, and extensive work-up at [facility] in the past has been negative. Overall with his extensive negative work-up and hyper cough and gag reflexes, I believe this is hypersensitive reflexes. He has failed baclofen and elavil, reflux medication. I recommend speech and voice therapy to try and work on desensitization of his hyperactive cough and gag reflex.         Dr. Huseyin Parson  Otolaryngology  [facility]

## 2017-07-07 ENCOUNTER — ALLIED HEALTH/NURSE VISIT (OUTPATIENT)
Dept: FAMILY MEDICINE | Facility: CLINIC | Age: 67
End: 2017-07-07
Payer: COMMERCIAL

## 2017-07-07 VITALS — DIASTOLIC BLOOD PRESSURE: 88 MMHG | SYSTOLIC BLOOD PRESSURE: 132 MMHG

## 2017-07-07 DIAGNOSIS — Z01.30 BP CHECK: Primary | ICD-10-CM

## 2017-07-07 PROCEDURE — 99207 ZZC NO CHARGE NURSE ONLY: CPT | Performed by: FAMILY MEDICINE

## 2017-07-07 NOTE — PROGRESS NOTES
Ricky Brown is enrolled/participating in the retail pharmacy Blood Pressure Goals Achievement Program (BPGAP).  Ricky Brown was evaluated at [facility] on July 7, 2017 at which time his blood pressure was:    BP Readings from Last 3 Encounters:   07/07/17 132/88   06/22/17 138/78   05/24/17 132/78     Reviewed lifestyle modifications for blood pressure control and reduction: including making healthy food choices, managing weight, getting regular exercise, smoking cessation, reducing alcohol consumption, monitoring blood pressure regularly.     Ricky Brown is not experiencing symptoms.    Follow-Up: BP is at goal of < 150/90 mmHg (patient 60+ years of age without diabetes).  Recommended follow-up at pharmacy in 6 months.     Recommendation to Provider: none    Ricky Brown was evaluated for enrollment into the PGEN study today.    Patient eligible for enrollment:  No  Patient interested in enrollment:  No    Completed by: Walter Carlton, PharmD  [facility] Pharmacist    [facility] pharmacist on behalf of: [facility].

## 2017-07-07 NOTE — MR AVS SNAPSHOT
After Visit Summary   7/7/2017    Ricky Brown    MRN: [id number]           Patient Information     Date Of Birth          1950        Visit Information        Provider Department      7/7/2017 6:01 PM Armen Chavez MD [facility]        Today's Diagnoses     BP check    -  1       Follow-ups after your visit        Your next 10 appointments already scheduled     Jul 13, 2017 12:00 PM CDT   [facility]   [facility]    [address] [facility] [address] [phone]           Bring a current list of meds and any records pertaining to this visit.  For Physicals, please bring immunization records and any forms needing to be filled out.  Please arrive 10 minutes early to complete paperwork.            Aug 04, 2017  8:30 AM CDT   Voice Eval with Allison E Alpers, SLP   [facility] ([facility])    [address] [facility] [address] [phone]              Who to contact     If you have questions or need follow up information about today's clinic visit or your schedule please contact [facility] directly at [phone].  Normal or non-critical lab and imaging results will be communicated to you by [employer], letter or phone within 4 business days after the clinic has received the results. If you do not hear from us within 7 days, please contact the clinic through [employer] or phone. If you have a critical or abnormal lab result, we will notify you by phone as soon as possible.  Submit refill requests through [employer] or call your pharmacy and they will forward the refill request to us. Please allow 3 business days for your refill to be completed.          Additional Information About Your Visit        [employer] Information     [employer] gives you secure access to your electronic health record. If you see a primary care  provider, you can also send messages to your care team and make appointments. If you have questions, please call your primary care clinic.  If you do not have a primary care provider, please call [phone] and they will assist you.        Care EveryWhere ID     This is your Care EveryWhere ID. This could be used by other organizations to access your [place] medical records  [id number]         Blood Pressure from Last 3 Encounters:   07/07/17 132/88   06/22/17 138/78   05/24/17 132/78    Weight from Last 3 Encounters:   06/22/17 248 lb 6 oz (112.7 kg)   05/24/17 243 lb 2 oz (110.3 kg)   05/10/17 244 lb 1.6 oz (110.7 kg)              Today, you had the following     No orders found for display       Primary Care Provider Office Phone # Fax #    Armen Wei Chavez [phone]       [address] [address]        Equal Access to Services     NIKHIL ABBOTT : Hadii aad ku hadasho Soomaali, waaxda luqadaha, qaybta kaalmada adeegyada, waxay idiin hayaan vincenzoeg claribel phillips . So [facility] [phone].    ATENCIÓN: Si habla español, tiene a hope disposición servicios gratuitos de asistencia lingüística. [facility] [phone].    We comply with applicable federal civil rights laws and [place] laws. We do not discriminate on the basis of race, color, national origin, age, disability sex, sexual orientation or gender identity.            Thank you!     Thank you for choosing [facility]  for your care. Our goal is always to provide you with excellent care. Hearing back from our patients is one way we can continue to improve our services. Please take a few minutes to complete the written survey that you may receive in the mail after your visit with us. Thank you!             Your Updated Medication List - Protect others around you: Learn how to safely use, store and throw away your medicines at www.disposemymeds.org.          This list is  accurate as of: 7/7/17  6:03 PM.  Always use your most recent med list.                   Brand Name Dispense Instructions for use Diagnosis    aspirin 81 MG tablet     90 tablet    Take 1 tablet (81 mg) by mouth daily    Cardiomyopathy, idiopathic (H)       atorvastatin 10 MG tablet    LIPITOR    90 tablet    Take 1 tablet (10 mg) by mouth daily    Cardiomyopathy (H)       diphenoxylate-atropine 2.5-0.025 MG per tablet    LOMOTIL    30 tablet    Take 1 tablet by mouth daily as needed for diarrhea Patient only takes 1 tablet as needed    Loose stools       fluticasone 50 MCG/ACT spray    FLONASE    3 Bottle    Spray 1-2 sprays into both nostrils daily    Chronic rhinitis       ipratropium 0.06 % spray    ATROVENT    3 Box    Spray 2 sprays into both nostrils 4 times daily as needed for rhinitis Refill when requested    Cough       losartan 50 MG tablet    COZAAR    90 tablet    Take 1 tablet (50 mg) by mouth daily    Non-ischemic cardiomyopathy (H)       metoprolol 50 MG 24 hr tablet    TOPROL XL    90 tablet    Take 1 tablet (50 mg) by mouth daily    Essential hypertension with goal blood pressure less than 140/90, Cardiomyopathy (H)       MULTIVITAMINS PO      Take  by mouth daily.    Routine general medical examination at a health care facility, Erectile dysfunction, Rectal itching, HTN (hypertension)       omeprazole 20 MG CR capsule    priLOSEC    120 capsule    Take 1 capsule (20 mg) by mouth 2 times daily    Gastroesophageal reflux disease without esophagitis       sildenafil 50 MG cap/tab    REVATIO/VIAGRA    12 tablet    Take 0.5 tablets by mouth daily as needed for erectile dysfunction. Take 30 min to 4 hours before intercourse.  Never use with nitroglycerin, terazosin or doxazosin.    Erectile dysfunction       TUMS CALCIUM FOR LIFE BONE PO      Take  by mouth.

## 2017-07-13 ENCOUNTER — OFFICE VISIT (OUTPATIENT)
Dept: FAMILY MEDICINE | Facility: CLINIC | Age: 67
End: 2017-07-13
Payer: COMMERCIAL

## 2017-07-13 VITALS
SYSTOLIC BLOOD PRESSURE: 130 MMHG | BODY MASS INDEX: 38.61 KG/M2 | TEMPERATURE: 97.7 F | HEART RATE: 76 BPM | HEIGHT: 67 IN | WEIGHT: 246 LBS | DIASTOLIC BLOOD PRESSURE: 84 MMHG

## 2017-07-13 DIAGNOSIS — L91.8 SKIN TAG: ICD-10-CM

## 2017-07-13 DIAGNOSIS — E78.5 HYPERLIPIDEMIA LDL GOAL <70: ICD-10-CM

## 2017-07-13 DIAGNOSIS — D17.30 LIPOMA OF SKIN AND SUBCUTANEOUS TISSUE: Primary | ICD-10-CM

## 2017-07-13 DIAGNOSIS — L82.1 SEBORRHEIC KERATOSIS: ICD-10-CM

## 2017-07-13 PROCEDURE — 11200 RMVL SKIN TAGS UP TO&INC 15: CPT | Mod: 59 | Performed by: FAMILY MEDICINE

## 2017-07-13 PROCEDURE — 17110 DESTRUCTION B9 LES UP TO 14: CPT | Performed by: FAMILY MEDICINE

## 2017-07-13 PROCEDURE — 11400 EXC TR-EXT B9+MARG 0.5 CM<: CPT | Mod: 59 | Performed by: FAMILY MEDICINE

## 2017-07-13 RX ORDER — LIDOCAINE HYDROCHLORIDE AND EPINEPHRINE 10; 10 MG/ML; UG/ML
3 INJECTION, SOLUTION INFILTRATION; PERINEURAL ONCE
Qty: 3 ML | Refills: 0 | OUTPATIENT
Start: 2017-07-13 | End: 2017-07-13

## 2017-07-13 RX ORDER — ATORVASTATIN CALCIUM 10 MG/1
10 TABLET, FILM COATED ORAL DAILY
Qty: 90 TABLET | Refills: 3 | Status: SHIPPED | OUTPATIENT
Start: 2017-07-13 | End: 2017-09-19

## 2017-07-13 NOTE — MR AVS SNAPSHOT
After Visit Summary   7/13/2017    Ricky Brown    MRN: [id number]           Patient Information     Date Of Birth          1950        Visit Information        Provider Department      7/13/2017 12:00 PM Armen Chavez MD [facility]        Today's Diagnoses     Sebaceous cyst    -  1    Cardiomyopathy (H)           Follow-ups after your visit        Your next 10 appointments already scheduled     Aug 04, 2017  8:30 AM CDT   Voice Eval with Allison E Alpers, SLP   [place] [facility] [place]    [address] [facility] [address] [phone]              Who to contact     If you have questions or need follow up information about today's clinic visit or your schedule please contact [facility] [place] [facility] directly at [phone].  Normal or non-critical lab and imaging results will be communicated to you by MyChart, letter or phone within 4 business days after the clinic has received the results. If you do not hear from us within 7 days, please contact the clinic through MyChart or phone. If you have a critical or abnormal lab result, we will notify you by phone as soon as possible.  Submit refill requests through [employer] or call your pharmacy and they will forward the refill request to us. Please allow 3 business days for your refill to be completed.          Additional Information About Your Visit        MyChart Information     [employer] gives you secure access to your electronic health record. If you see a primary care provider, you can also send messages to your care team and make appointments. If you have questions, please call your primary care clinic.  If you do not have a primary care provider, please call [phone] and they will assist you.        Care EveryWhere ID     This is your Care EveryWhere ID. This could be used by other organizations to access your [place] medical records  [id number]        Your  "Vitals Were     Pulse Temperature Height BMI (Body Mass Index)          76 97.7  F (36.5  C) (Oral) 5' 7\" (1.702 m) 38.53 kg/m2         Blood Pressure from Last 3 Encounters:   07/13/17 130/84   07/07/17 132/88   06/22/17 138/78    Weight from Last 3 Encounters:   07/13/17 246 lb (111.6 kg)   06/22/17 248 lb 6 oz (112.7 kg)   05/24/17 243 lb 2 oz (110.3 kg)              Today, you had the following     No orders found for display       Primary Care Provider Office Phone # Fax #    Armen Chavez [phone]       [address] [address]        Equal Access to Services     NIKHIL ABBOTT : Hadii steven carter hadasho Soomaali, waaxda luqadaha, qaybta kaalmada adeegyada, scottie galindo. So [facility] [phone].    ATENCIÓN: Si habla español, tiene a hope disposición servicios gratuitos de asistencia lingüística. Josefa al [phone].    We comply with applicable federal civil rights laws and [place] laws. We do not discriminate on the basis of race, color, national origin, age, disability sex, sexual orientation or gender identity.            Thank you!     Thank you for choosing [facility]  for your care. Our goal is always to provide you with excellent care. Hearing back from our patients is one way we can continue to improve our services. Please take a few minutes to complete the written survey that you may receive in the mail after your visit with us. Thank you!             Your Updated Medication List - Protect others around you: Learn how to safely use, store and throw away your medicines at www.disposemymeds.org.          This list is accurate as of: 7/13/17 12:52 PM.  Always use your most recent med list.                   Brand Name Dispense Instructions for use Diagnosis    aspirin 81 MG tablet     90 tablet    Take 1 tablet (81 mg) by mouth daily    Cardiomyopathy, idiopathic (H)       atorvastatin 10 " MG tablet    LIPITOR    90 tablet    Take 1 tablet (10 mg) by mouth daily    Cardiomyopathy (H)       diphenoxylate-atropine 2.5-0.025 MG per tablet    LOMOTIL    30 tablet    Take 1 tablet by mouth daily as needed for diarrhea Patient only takes 1 tablet as needed    Loose stools       fluticasone 50 MCG/ACT spray    FLONASE    3 Bottle    Spray 1-2 sprays into both nostrils daily    Chronic rhinitis       ipratropium 0.06 % spray    ATROVENT    3 Box    Spray 2 sprays into both nostrils 4 times daily as needed for rhinitis Refill when requested    Cough       losartan 50 MG tablet    COZAAR    90 tablet    Take 1 tablet (50 mg) by mouth daily    Non-ischemic cardiomyopathy (H)       metoprolol 50 MG 24 hr tablet    TOPROL XL    90 tablet    Take 1 tablet (50 mg) by mouth daily    Essential hypertension with goal blood pressure less than 140/90, Cardiomyopathy (H)       MULTIVITAMINS PO      Take  by mouth daily.    Routine general medical examination at a health care facility, Erectile dysfunction, Rectal itching, HTN (hypertension)       omeprazole 20 MG CR capsule    priLOSEC    120 capsule    Take 1 capsule (20 mg) by mouth 2 times daily    Gastroesophageal reflux disease without esophagitis       sildenafil 50 MG cap/tab    REVATIO/VIAGRA    12 tablet    Take 0.5 tablets by mouth daily as needed for erectile dysfunction. Take 30 min to 4 hours before intercourse.  Never use with nitroglycerin, terazosin or doxazosin.    Erectile dysfunction       TUMS CALCIUM FOR LIFE BONE PO      Take  by mouth.

## 2017-07-13 NOTE — NURSING NOTE
"Chief Complaint   Patient presents with     Lesion Removal     on back        Initial /84  Pulse 76  Temp 97.7  F (36.5  C) (Oral)  Ht 5' 7\" (1.702 m)  Wt 246 lb (111.6 kg)  BMI 38.53 kg/m2 Estimated body mass index is 38.53 kg/(m^2) as calculated from the following:    Height as of this encounter: 5' 7\" (1.702 m).    Weight as of this encounter: 246 lb (111.6 kg).  Medication Reconciliation: complete    "

## 2017-07-13 NOTE — PROGRESS NOTES
SUBJECTIVE:                                                    Ricky Brown is a 66 year old male who presents to clinic today for the following health issues:      * patient is here for mole removal on upper back at junction of back and neck    He has a cyst/lipoma upper back  Skin tag central back  7 1cm seb. Keratosis on back.       Needs refill of his lipitor    Problem list and histories reviewed & adjusted, as indicated.  Additional history: as documented    BP Readings from Last 3 Encounters:   07/13/17 130/84   07/07/17 132/88   06/22/17 138/78    Wt Readings from Last 3 Encounters:   07/13/17 246 lb (111.6 kg)   06/22/17 248 lb 6 oz (112.7 kg)   05/24/17 243 lb 2 oz (110.3 kg)                    Reviewed and updated as needed this visit by clinical staff  Tobacco  Allergies  Meds  Med Hx  Surg Hx  Fam Hx  Soc Hx      Reviewed and updated as needed this visit by Provider           OBJECTIVE:   Patient appears well. Vitals are normal.  Skin: see above. All documented     Cyst on neck is 5mm in size    After informed consent was obtained, using Betadine for cleansing and 1% Lidocaine with epinephrine for anesthetic, with sterile technique, 5mm  elliptical excision in total was performed. Lipoma found and removed in its entirety. Closed with 3-40 Ehilon sutures.  Antibiotic dressing is applied, and wound care instructions provided.  Be alert for any signs of cutaneous infection. The procedure was well tolerated without complications. Follow up: Return for suture removal in 5 days..      Skin tab- 4 mm. Injected with novocain with epi. Shave excision with 15 blade. Silver nitrate used for hemostasis.      Seb keratosis # 7 all 1 cm in size treated with liquid nitorgen X2      ASSESSMENT:     Initially thought to be Sebaceous cyst but upon removal found to be lipoma. Excised. No margins needed.   lipoma, skin tag Seb Keratosis    PLAN:         (D17.30) Lipoma of skin and subcutaneous tissue   (primary encounter diagnosis)  Comment: removed  Plan: follow up for removal of 2 4- sutures     (L82.1) Seborrheic keratosis  Comment:   Plan: follow up as needed    (L91.8) Skin tag  Comment:   Plan: follow up as needed.     (E78.5) Hyperlipidemia LDL goal <70  Comment: needs refillPlan: atorvastatin (LIPITOR) 10 MG tablet        refilled

## 2017-07-17 ENCOUNTER — ALLIED HEALTH/NURSE VISIT (OUTPATIENT)
Dept: NURSING | Facility: CLINIC | Age: 67
End: 2017-07-17
Payer: COMMERCIAL

## 2017-07-17 DIAGNOSIS — Z48.02 VISIT FOR SUTURE REMOVAL: Primary | ICD-10-CM

## 2017-07-17 PROCEDURE — 99207 ZZC NO CHARGE NURSE ONLY: CPT

## 2017-07-17 NOTE — PATIENT INSTRUCTIONS
What should I watch for?   Watch the wound for signs of infection over the next few days. See your healthcare provider or go to the emergency department right away if:     The skin is becoming redder or more painful.     The wound becomes swollen.     You have red streaks from the wound.     Pus is draining from the wound.     The wound does not heal.     fever

## 2017-07-17 NOTE — MR AVS SNAPSHOT
After Visit Summary   7/17/2017    Ricky Brown    MRN: [id number]           Patient Information     Date Of Birth          1950        Visit Information        Provider Department      7/17/2017 2:00 PM NE RN [facility]        Today's Diagnoses     Visit for suture removal    -  1      Care Instructions    What should I watch for?   Watch the wound for signs of infection over the next few days. See your healthcare provider or go to the emergency department right away if:     The skin is becoming redder or more painful.     The wound becomes swollen.     You have red streaks from the wound.     Pus is draining from the wound.     The wound does not heal.     fever          Follow-ups after your visit        Your next 10 appointments already scheduled     Aug 04, 2017  8:30 AM CDT   Voice Eval with Allison E Alpers, SLP   [place] [facility] ([facility])    [address] [facility] [address] [phone]              Who to contact     If you have questions or need follow up information about today's clinic visit or your schedule please contact [facility] directly at [phone].  Normal or non-critical lab and imaging results will be communicated to you by MyChart, letter or phone within 4 business days after the clinic has received the results. If you do not hear from us within 7 days, please contact the clinic through [employer] or phone. If you have a critical or abnormal lab result, we will notify you by phone as soon as possible.  Submit refill requests through [employer] or call your pharmacy and they will forward the refill request to us. Please allow 3 business days for your refill to be completed.          Additional Information About Your Visit        MyChart Information     [employer] gives you secure access to your electronic health record. If you see a primary care provider, you can also send messages to your care team  and make appointments. If you have questions, please call your primary care clinic.  If you do not have a primary care provider, please call [phone] and they will assist you.        Care EveryWhere ID     This is your Care EveryWhere ID. This could be used by other organizations to access your [place] medical records  [id number]         Blood Pressure from Last 3 Encounters:   07/13/17 130/84   07/07/17 132/88   06/22/17 138/78    Weight from Last 3 Encounters:   07/13/17 246 lb (111.6 kg)   06/22/17 248 lb 6 oz (112.7 kg)   05/24/17 243 lb 2 oz (110.3 kg)              Today, you had the following     No orders found for display       Primary Care Provider Office Phone # Fax #    Armen Chvaez [phone]       [facility] [address] [place] [address]        Equal Access to Services     NIKHIL ABBOTT : Hadii aad ku hadasho Soomaali, waaxda luqadaha, qaybta kaalmada adeegyada, waxay jigarin hayprettyn flip phillips . So [facility] [phone].    ATENCIÓN: Si habla español, tiene a hope disposición servicios gratuitos de asistencia lingüística. Llame al [phone].    We comply with applicable federal civil rights laws and [place] laws. We do not discriminate on the basis of race, color, national origin, age, disability sex, sexual orientation or gender identity.            Thank you!     Thank you for choosing [facility]  for your care. Our goal is always to provide you with excellent care. Hearing back from our patients is one way we can continue to improve our services. Please take a few minutes to complete the written survey that you may receive in the mail after your visit with us. Thank you!             Your Updated Medication List - Protect others around you: Learn how to safely use, store and throw away your medicines at www.disposemymeds.org.          This list is accurate as of: 7/17/17  2:20 PM.  Always use your most recent  med list.                   Brand Name Dispense Instructions for use Diagnosis    aspirin 81 MG tablet     90 tablet    Take 1 tablet (81 mg) by mouth daily    Cardiomyopathy, idiopathic (H)       atorvastatin 10 MG tablet    LIPITOR    90 tablet    Take 1 tablet (10 mg) by mouth daily    Hyperlipidemia LDL goal <70       diphenoxylate-atropine 2.5-0.025 MG per tablet    LOMOTIL    30 tablet    Take 1 tablet by mouth daily as needed for diarrhea Patient only takes 1 tablet as needed    Loose stools       fluticasone 50 MCG/ACT spray    FLONASE    3 Bottle    Spray 1-2 sprays into both nostrils daily    Chronic rhinitis       ipratropium 0.06 % spray    ATROVENT    3 Box    Spray 2 sprays into both nostrils 4 times daily as needed for rhinitis Refill when requested    Cough       losartan 50 MG tablet    COZAAR    90 tablet    Take 1 tablet (50 mg) by mouth daily    Non-ischemic cardiomyopathy (H)       metoprolol 50 MG 24 hr tablet    TOPROL XL    90 tablet    Take 1 tablet (50 mg) by mouth daily    Essential hypertension with goal blood pressure less than 140/90, Cardiomyopathy (H)       MULTIVITAMINS PO      Take  by mouth daily.    Routine general medical examination at a health care facility, Erectile dysfunction, Rectal itching, HTN (hypertension)       omeprazole 20 MG CR capsule    priLOSEC    120 capsule    Take 1 capsule (20 mg) by mouth 2 times daily    Gastroesophageal reflux disease without esophagitis       sildenafil 50 MG cap/tab    REVATIO/VIAGRA    12 tablet    Take 0.5 tablets by mouth daily as needed for erectile dysfunction. Take 30 min to 4 hours before intercourse.  Never use with nitroglycerin, terazosin or doxazosin.    Erectile dysfunction       TUMS CALCIUM FOR LIFE BONE PO      Take  by mouth.

## 2017-07-17 NOTE — PROGRESS NOTES
Ricky presents to the clinic today for  removal of sutures.  The patient has had the sutures in place for 4 days.    There has been no history of infection or drainage.    O: 3 sutures are seen located on the upper back.  The wound is healing well with no signs of infection.    Tetanus status is up to date.    A: Suture removal.    P:  All sutures were easily removed today.  Routine wound care discussed.  The patient will follow up as needed.  Jaja Yin,Clinic Rn  [place]

## 2017-08-04 ENCOUNTER — HOSPITAL ENCOUNTER (OUTPATIENT)
Dept: SPEECH THERAPY | Facility: CLINIC | Age: 67
Setting detail: THERAPIES SERIES
End: 2017-08-04
Attending: OTOLARYNGOLOGY
Payer: COMMERCIAL

## 2017-08-04 PROCEDURE — 92524 BEHAVRAL QUALIT ANALYS VOICE: CPT | Mod: GN | Performed by: STUDENT IN AN ORGANIZED HEALTH CARE EDUCATION/TRAINING PROGRAM

## 2017-08-04 PROCEDURE — 92507 TX SP LANG VOICE COMM INDIV: CPT | Mod: GN | Performed by: STUDENT IN AN ORGANIZED HEALTH CARE EDUCATION/TRAINING PROGRAM

## 2017-08-04 PROCEDURE — 40000251 ZZH STATISTIC VOICE CENTER VISIT: Performed by: STUDENT IN AN ORGANIZED HEALTH CARE EDUCATION/TRAINING PROGRAM

## 2017-08-04 NOTE — DISCHARGE INSTRUCTIONS
Strategies to Manage Irritable Larynx Syndrome   8/4/2017    Manage any triggers for your cough/throat clear  o Common triggers include:  - Acid reflux  - Post nasal drainage  - Allergens (e.g. tree, mold, pollen, pet dander)  - Cigarette or other kinds of smoke  - Odors (e.g. strong perfume, hairspray)  - Harsh chemicals/  - Food sensitivities  - Strong emotions (e.g. anxiety, stress)  - Cold air  - Humid air  - Hyperfunction of the muscles of the vocal mechanism    Reduce laryngeal irritation by maintaining adequate hydration  o Systemic hydration  - Drink adequate fluids (urine color should be pale yellow)  o Topical (surface) hydration  - Salivary gland stimulation    Sip hydrating fluids throughout the day    Suck on hard candy or cough drops without menthol    Chew gum   - Humidify your environment (especially at night)  - Steam inhalation  - Guaifenesin (e.g. Mucinex) to thin thickened secretions    Reduce laryngeal hypersensitivity by reducing hyperfunction of the laryngeal musculature  o Massage and stretches  o Efficient patterns of voice use    Use alternative behaviors to coughing (* designates strategies that work to clear mucus)  o *Swallow hard  o *Sip water (hot, cold, carbonated, flavored, etc.)  o *Suck on ice chips  o *Gargle  o Gentle hums  o *Gentle, whispered  eh eh eh  (non-irritating throat clear)  o Bite tongue or cheek (but not too hard!)  o Massage under chin    Use breathing strategies to arrest cough  o Inhalation: short, repeated sniffs through the nose OR short, repeated  sips  in through rounded lips  o Exhale on sustained  sh

## 2017-08-15 NOTE — PROGRESS NOTES
"[facility] OP Voice/Cough Evaluation       08/04/17 0830   General Information   Type Of Visit Initial   Start Of Care Date 08/04/17   Referring Physician Huseyin Parson MD  (ENT)   Orders Evaluate And Treat   Medical Diagnosis Chronic cough, Hyperactive gag reflex   Onset Of Illness/injury Or Date Of Surgery 07/05/17  (order date)   Precautions/Limitations no known precautions/limitations   Hearing Pt does have hearing loss, but hearing is adequate for the evaluation   Surgical/Medical history reviewed Yes   Pertinent History Of Current Problem 67yo male presenting with 1-2 year Hx of chronic cough and hyperactive gag reflex.  Cough began without an obvious inciting incident, improved for awhile, and has since worsened again.  Pt describes cough as \"severe\" and forceful.  While each episode is only 2-3 coughs in duration, the intensity is such that he feels lightheaded.  He feels that the cough is debilitating.  Cough is dry.  He has not noticed any obvious triggers for the cough; while perfumes/strong smells trigger the cough, he notes that he has always been sensitive to these smells.  Pt has undergone extensive testing, including a workup at the [facility], without obvious cause for the cough.  He uses nasal sprays for sinus congestion, and notes that this helps to reduce the cough somewhat.  Pt reports no change in cough symptoms with reflux medication.  He has also tried several medications for hypertension, but he has not noticed any obvious correlation with the cough.  Pt has JOSE JUAN and had a UPPP and turbinate reduction in 2013, but has not followed up with sleeping difficulties.  He notes that certain toothpastes will make him gag now, when he previously did not have a strong gag reflex.  PMH significant for HL, JOSE JUAN, HTN, HLD, and cardiomyopathy.   Prior Level of Functioning Same problem relapsing/remitting.   Patient Role/employment History Employed   General Observations Pt reports that he has " "sinus congestion this morning.   Patient/family Goals To reduce the cough as much as possible   Personal Rating / Voice Use Rating   Describe the amount of voice use required for your job Moderate   Describe the intensity of voice use required for your job Moderate   Describe the amount of typical non-work voice use Moderate   Describe the intensity of typical non-work voice use Moderate   Evaluation Results   Voice Observations COUGH/THROAT CLEAR: Intermittent forceful coughing throughout session.  Each episode is 2-3 seconds in duration.  Cough is triggered by deep inspiration and massage of the throat.  Cough is dry and locus of cough is consistent with upper airway.   Voice Profile during conversation, 1 min monologue and paragraph reading   Voice Quality Scratchy   Voice quality comments Mild intermittent roughness with conversational speech today.  Pt reports that his voice quality is frequently \"deeper and gravelly\" in the mornings.   Voice quality severity rating continuum (1=Severe, 7=WNL) 6   Breath control Tense   Breath Control comments Cued deep inspiration triggers a cough.  Deep inhalation characterized by abdominal/thoracic muscle use patterns with clavicular elevation.   Breath control severity rating continuum (1=Severe, 7=WNL) 6   Voice Use / Effort Pinched / squeezed larynx   Voice Use / Effort comments Massage of throat triggers a cough.   Voice use / Effort severity rating continuum (1=Severe, 7=WNL) 6   Resonance Hyponasal   Resonance severity rating continuum (1=Severe, 7=WNL) 6   Resonance comments Hyponasality due to nasal congestion.   Comments Mild-moderate intermittent dry cough that is thought to be neurogenic in nature.  Cough is triggered by deep inspiration, massage of the throat, and perfume/strong smells.  Pt also demonstrating mild dysphonia characterized by roughness.   Videostroboscopy / Endoscopy   Other observations Prior laryngeal endoscopy negative per ENT report.   General " Therapy Interventions   Planned Therapy Interventions Voice   Voice Throat clearing elimination plan;Relaxed breath sequence techniques;Breath flow to sound flow;No larynx effort practice  (Cough suppression techniques)   Impressions and Recommendations   Communication Diagnosis Chronic cough   Summary Mr. Brown presents with mild-moderate intermittent dry cough that is thought to be neurogenic in nature.  Cough is triggered by deep inspiration, massage of the throat, and perfume/strong smells.  Pt also demonstrating mild dysphonia characterized by roughness.  Pt feels that the cough is debilitating, and he is worried that he will pass out because of the cough.  Given negative testing and normal laryngeal endoscopy, cough is likely accounted for by irritable larynx syndrome/neurogenic cough.   Recommendations A course of skilled speech therapy is warranted in order to reduce the cough and laryngeal hypersensitivity/irritation in order to improve patient comfort and quality of life.   Frequency and Duration Three sessions every other week with two monthly follow-ups.   Prognosis  Good with intervention   Risks and Benefits of Treatment have been explained. Yes   Patient & /or Caregiver  in agreement with plan of care Yes   Patient Education SLP provided education regarding irritable larynx syndrome/chronic cough causes, triggers, and treatment.  Therapy initiated today.   Educational Assessment   Barriers to Learning No barriers   Preferred Learning Style Listening;Reading;Demonstration;Pictures / Video   Voice Goals   Voice Goals 1;2   Voice Goal 1   Goal Identifier Cough   Goal Description In order to improve patient comfort and quality of life, patient will report a week of typical activities with no more than 2 instances of daily coughing, each episode lasting no more than 2 seconds in duration.   Target Date 11/04/17   Voice Goal 2   Goal Identifier Hygiene   Goal Description Patient will learn, demonstrate,  and implement use of at least 3 vocal hygiene strategies (e.g. hydration, cough/throat clear reduction), to promote reduced laryngeal irritation.   Target Date 11/04/17   Total Session Time   Total Session Time 50   Total Evaluation Time 25       Thank you for the referral of this patient.    Allison Alpers, B.A. (music), M.A., CCC-SLP  Speech-Language Pathologist  Certificate of Vocology  [facility]  [phone]

## 2017-08-15 NOTE — ADDENDUM NOTE
Encounter addended by: Alpers, Allison E, SLP on: 8/15/2017  2:56 PM<BR>     Actions taken: Flowsheet accepted

## 2017-08-15 NOTE — ADDENDUM NOTE
Encounter addended by: Alpers, Allison E, SLP on: 8/15/2017  4:11 PM<BR>     Actions taken: Sign clinical note, Flowsheet accepted

## 2017-08-25 ENCOUNTER — HOSPITAL ENCOUNTER (OUTPATIENT)
Dept: SPEECH THERAPY | Facility: CLINIC | Age: 67
Setting detail: THERAPIES SERIES
End: 2017-08-25
Attending: OTOLARYNGOLOGY
Payer: COMMERCIAL

## 2017-08-25 PROCEDURE — 40000251 ZZH STATISTIC VOICE CENTER VISIT: Performed by: STUDENT IN AN ORGANIZED HEALTH CARE EDUCATION/TRAINING PROGRAM

## 2017-08-25 PROCEDURE — 92507 TX SP LANG VOICE COMM INDIV: CPT | Mod: GN | Performed by: STUDENT IN AN ORGANIZED HEALTH CARE EDUCATION/TRAINING PROGRAM

## 2017-08-25 NOTE — DISCHARGE INSTRUCTIONS
Voice Therapy 8/25/2017    Breathing to prevent and/or relax out of a cough  o Diaphragmatic breathing pattern  - Abdomen inflates when you breathe in  - Breathe in through nose and out through mouth (preferable) or in through rounded/pursed lips  o Three main patterns:  - Even in and even out, slowing the rate of breathing  - 2:4 (in on a count of 2, out on a count of 4) or 2:5  - Box breathing    Work on slowing your rate of breathing by doing box breathing                       Breathe on equal count for all sides of the box (e.g. count of 4)  o Take sips of water as necessary to make sure throat doesn t get too dry    Use saline irrigation right before nasal meds to make the meds more effective  o Look into trying Mucinex (guaifenesin) to thin secretions  o You might also want to schedule a follow-up with Dr. Parsons about snoring/sinus symptoms

## 2017-09-06 ENCOUNTER — OFFICE VISIT (OUTPATIENT)
Dept: OPHTHALMOLOGY | Facility: CLINIC | Age: 67
End: 2017-09-06
Payer: COMMERCIAL

## 2017-09-06 DIAGNOSIS — H43.811 POSTERIOR VITREOUS DETACHMENT, RIGHT: ICD-10-CM

## 2017-09-06 DIAGNOSIS — H52.223 REGULAR ASTIGMATISM OF BOTH EYES: ICD-10-CM

## 2017-09-06 DIAGNOSIS — Z96.1 PSEUDOPHAKIA: Primary | ICD-10-CM

## 2017-09-06 DIAGNOSIS — H35.371 MACULAR PUCKERING OF RETINA, RIGHT: ICD-10-CM

## 2017-09-06 DIAGNOSIS — H52.4 PRESBYOPIA: ICD-10-CM

## 2017-09-06 PROCEDURE — 92015 DETERMINE REFRACTIVE STATE: CPT | Performed by: STUDENT IN AN ORGANIZED HEALTH CARE EDUCATION/TRAINING PROGRAM

## 2017-09-06 PROCEDURE — 92004 COMPRE OPH EXAM NEW PT 1/>: CPT | Performed by: STUDENT IN AN ORGANIZED HEALTH CARE EDUCATION/TRAINING PROGRAM

## 2017-09-06 ASSESSMENT — EXTERNAL EXAM - LEFT EYE: OS_EXAM: NORMAL

## 2017-09-06 ASSESSMENT — REFRACTION_WEARINGRX
SPECS_TYPE: PAL
OS_AXIS: 142
OD_CYLINDER: +1.75
OS_ADD: +2.75
OS_SPHERE: -0.75
OD_AXIS: 104
OS_CYLINDER: +1.25
OD_SPHERE: PLANO
OD_ADD: +2.75

## 2017-09-06 ASSESSMENT — REFRACTION_MANIFEST
OD_ADD: +2.50
OD_SPHERE: +0.50
OS_SPHERE: -0.75
OD_AXIS: 108
OS_ADD: +2.50
OD_CYLINDER: +1.75
OS_CYLINDER: +1.25
OS_AXIS: 140

## 2017-09-06 ASSESSMENT — VISUAL ACUITY
OS_CC: 20/20
METHOD: SNELLEN - LINEAR
OD_CC+: -1
CORRECTION_TYPE: GLASSES
OS_BAT_MED: 20/40-1
OS_BAT_HIGH: 20/70
OD_BAT_LOW: 20/25-2
OS_BAT_LOW: 20/25-1
OD_CC: 20/25
OD_BAT_HIGH: 20/70-1
OD_BAT_MED: 20/40
OS_CC+: -2

## 2017-09-06 ASSESSMENT — TONOMETRY
IOP_METHOD: APPLANATION
OS_IOP_MMHG: 11
OD_IOP_MMHG: 11

## 2017-09-06 ASSESSMENT — CONF VISUAL FIELD
METHOD: COUNTING FINGERS
OD_NORMAL: 1
OS_NORMAL: 1

## 2017-09-06 ASSESSMENT — SLIT LAMP EXAM - LIDS
COMMENTS: NORMAL
COMMENTS: NORMAL

## 2017-09-06 ASSESSMENT — EXTERNAL EXAM - RIGHT EYE: OD_EXAM: NORMAL

## 2017-09-06 ASSESSMENT — CUP TO DISC RATIO
OD_RATIO: 0.1
OS_RATIO: 0.1

## 2017-09-06 NOTE — MR AVS SNAPSHOT
After Visit Summary   9/6/2017    Ricky Brown    MRN: [id number]           Patient Information     Date Of Birth          1950        Visit Information        Provider Department      9/6/2017 2:30 PM Michelle Garcia MD [facility]        Today's Diagnoses     Pseudophakia, PCL, od; ACL, os    -  1    Posterior vitreous detachment, right        Macular puckering of retina, right        Regular astigmatism of both eyes        Presbyopia          Care Instructions    Continue same glasses    Michelle Garcia MD  [phone]            Follow-ups after your visit        Follow-up notes from your care team     Return in about 1 year (around 9/6/2018) for Complete Exam.      Who to contact     If you have questions or need follow up information about today's clinic visit or your schedule please contact [facility] directly at [phone].  Normal or non-critical lab and imaging results will be communicated to you by MyChart, letter or phone within 4 business days after the clinic has received the results. If you do not hear from us within 7 days, please contact the clinic through [employer] or phone. If you have a critical or abnormal lab result, we will notify you by phone as soon as possible.  Submit refill requests through [employer] or call your pharmacy and they will forward the refill request to us. Please allow 3 business days for your refill to be completed.          Additional Information About Your Visit        MyChart Information     [employer] gives you secure access to your electronic health record. If you see a primary care provider, you can also send messages to your care team and make appointments. If you have questions, please call your primary care clinic.  If you do not have a primary care provider, please call [phone] and they will assist you.        Care EveryWhere ID     This is your Care EveryWhere ID. This could be used by other  organizations to access your [facility] medical records  [id number]         Blood Pressure from Last 3 Encounters:   07/13/17 130/84   07/07/17 132/88   06/22/17 138/78    Weight from Last 3 Encounters:   07/13/17 111.6 kg (246 lb)   06/22/17 112.7 kg (248 lb 6 oz)   05/24/17 110.3 kg (243 lb 2 oz)              We Performed the Following     EYE EXAM (SIMPLE-NONBILLABLE)     REFRACTIVE STATUS        Primary Care Provider Office Phone # Fax #    Armen Chavez [phone]       [address] [address]        Equal Access to Services     NIKHIL ABBOTT : Hadii steven Rebollar, waaxda eliel, qaybta kaalmada ronaldo, scottie galindo. So [facility] [phone].    ATENCIÓN: Si habla español, tiene a hope disposición servicios gratuitos de asistencia lingüística. Llame al [phone].    We comply with applicable federal civil rights laws and [place] laws. We do not discriminate on the basis of race, color, national origin, age, disability sex, sexual orientation or gender identity.            Thank you!     Thank you for choosing [facility]  for your care. Our goal is always to provide you with excellent care. Hearing back from our patients is one way we can continue to improve our services. Please take a few minutes to complete the written survey that you may receive in the mail after your visit with us. Thank you!             Your Updated Medication List - Protect others around you: Learn how to safely use, store and throw away your medicines at www.disposemymeds.org.          This list is accurate as of: 9/6/17  4:00 PM.  Always use your most recent med list.                   Brand Name Dispense Instructions for use Diagnosis    aspirin 81 MG tablet     90 tablet    Take 1 tablet (81 mg) by mouth daily    Cardiomyopathy, idiopathic (H)       atorvastatin 10 MG tablet    LIPITOR    90 tablet    Take 1 tablet (10 mg) by mouth  daily    Hyperlipidemia LDL goal <70       diphenoxylate-atropine 2.5-0.025 MG per tablet    LOMOTIL    30 tablet    Take 1 tablet by mouth daily as needed for diarrhea Patient only takes 1 tablet as needed    Loose stools       fluticasone 50 MCG/ACT spray    FLONASE    3 Bottle    Spray 1-2 sprays into both nostrils daily    Chronic rhinitis       ipratropium 0.06 % spray    ATROVENT    3 Box    Spray 2 sprays into both nostrils 4 times daily as needed for rhinitis Refill when requested    Cough       losartan 50 MG tablet    COZAAR    90 tablet    Take 1 tablet (50 mg) by mouth daily    Non-ischemic cardiomyopathy (H)       metoprolol 50 MG 24 hr tablet    TOPROL XL    90 tablet    Take 1 tablet (50 mg) by mouth daily    Essential hypertension with goal blood pressure less than 140/90, Cardiomyopathy (H)       MULTIVITAMINS PO      Take  by mouth daily.    Routine general medical examination at a health care facility, Erectile dysfunction, Rectal itching, HTN (hypertension)       sildenafil 50 MG cap/tab    REVATIO/VIAGRA    12 tablet    Take 0.5 tablets by mouth daily as needed for erectile dysfunction. Take 30 min to 4 hours before intercourse.  Never use with nitroglycerin, terazosin or doxazosin.    Erectile dysfunction       TUMS CALCIUM FOR LIFE BONE PO      Take  by mouth.

## 2017-09-06 NOTE — PROGRESS NOTES
Current Eye Medications:  none     Subjective:  Complete eye exam. Patient is having to squint when sun is out, because of glare. Patient also has trouble driving at night because of glare. Zero pain both eyes, little fatigued left eye.     Objective:  See Ophthalmology Exam.      Assessment:  Ricky Brown is a 66 year old male who presents with:   Encounter Diagnoses   Name Primary?     Pseudophakia, PCL, od; ACL, os      Posterior vitreous detachment, right      Macular puckering of retina, right        Plan:  Continue same glasses    Michelle Garcia MD  [phone]

## 2017-09-17 ENCOUNTER — HEALTH MAINTENANCE LETTER (OUTPATIENT)
Age: 67
End: 2017-09-17

## 2017-09-19 ENCOUNTER — TELEPHONE (OUTPATIENT)
Dept: FAMILY MEDICINE | Facility: CLINIC | Age: 67
End: 2017-09-19

## 2017-09-19 DIAGNOSIS — I42.9 CARDIOMYOPATHY (H): ICD-10-CM

## 2017-09-19 DIAGNOSIS — I42.8 NON-ISCHEMIC CARDIOMYOPATHY (H): ICD-10-CM

## 2017-09-19 DIAGNOSIS — E78.5 HYPERLIPIDEMIA LDL GOAL <70: ICD-10-CM

## 2017-09-19 DIAGNOSIS — I10 ESSENTIAL HYPERTENSION WITH GOAL BLOOD PRESSURE LESS THAN 140/90: ICD-10-CM

## 2017-09-19 RX ORDER — METOPROLOL SUCCINATE 50 MG/1
50 TABLET, EXTENDED RELEASE ORAL DAILY
Qty: 90 TABLET | Refills: 1 | Status: SHIPPED | OUTPATIENT
Start: 2017-09-19 | End: 2018-04-03

## 2017-09-19 RX ORDER — ATORVASTATIN CALCIUM 10 MG/1
10 TABLET, FILM COATED ORAL DAILY
Qty: 90 TABLET | Refills: 2 | Status: SHIPPED | OUTPATIENT
Start: 2017-09-19 | End: 2018-04-11

## 2017-09-19 RX ORDER — LOSARTAN POTASSIUM 50 MG/1
50 TABLET ORAL DAILY
Qty: 90 TABLET | Refills: 1 | Status: SHIPPED | OUTPATIENT
Start: 2017-09-19 | End: 2018-04-11

## 2017-09-19 NOTE — TELEPHONE ENCOUNTER
[facility] message received, patient is upset as his insurance through [facility] will only fill one month at a time. He will go to our [place] pharmacy to fill them. Sent scripts there now.  Dianne Becerra RN

## 2017-09-19 NOTE — TELEPHONE ENCOUNTER
Reason for Call:  Medication or medication refill:    Do you use a [facility]?  Name of the pharmacy and phone number for the current request:  [facility]    Name of the medication requested: losartan (COZAAR) 50 MG tablet, metoprolol (TOPROL XL) 50 MG 24 hr tablet, and atorvastatin (LIPITOR) 10 MG tablet    Other request: patient states his medications used to be filled 3 Months at a time and now he is only getting 30 days worth. Patient is completely out of some medications.    Can we leave a detailed message on this number? YES    Phone number patient can be reached at: [phone]    Best Time: any    Call taken on 9/19/2017 at 12:24 PM by Maddy Carvajal

## 2017-09-19 NOTE — TELEPHONE ENCOUNTER
In reviewing patient's medication list, we have ordered 90 days with 3 refills. Called to let patient know this, and he will call his pharmacy. He will call back if he has any issues.    Dandre Penn RN

## 2017-10-01 ENCOUNTER — HOSPITAL ENCOUNTER (EMERGENCY)
Facility: CLINIC | Age: 67
Discharge: HOME | End: 2017-10-01
Attending: EMERGENCY MEDICINE | Admitting: EMERGENCY MEDICINE
Payer: COMMERCIAL

## 2017-10-01 ENCOUNTER — OFFICE VISIT (OUTPATIENT)
Dept: URGENT CARE | Facility: URGENT CARE | Age: 67
End: 2017-10-01

## 2017-10-01 VITALS
SYSTOLIC BLOOD PRESSURE: 162 MMHG | BODY MASS INDEX: 39 KG/M2 | DIASTOLIC BLOOD PRESSURE: 92 MMHG | WEIGHT: 249 LBS | OXYGEN SATURATION: 98 % | RESPIRATION RATE: 16 BRPM | HEART RATE: 91 BPM | TEMPERATURE: 98.4 F

## 2017-10-01 VITALS
HEART RATE: 107 BPM | WEIGHT: 248.6 LBS | OXYGEN SATURATION: 94 % | TEMPERATURE: 98.4 F | SYSTOLIC BLOOD PRESSURE: 148 MMHG | BODY MASS INDEX: 38.94 KG/M2 | DIASTOLIC BLOOD PRESSURE: 82 MMHG

## 2017-10-01 DIAGNOSIS — S61.319A LACERATION OF NAIL BED OF FINGER, INITIAL ENCOUNTER: Primary | ICD-10-CM
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
## 2017-10-01 DIAGNOSIS — S61.313A LACERATION OF LEFT MIDDLE FINGER WITHOUT FOREIGN BODY WITH DAMAGE TO NAIL, INITIAL ENCOUNTER: ICD-10-CM

## 2017-10-01 PROCEDURE — 99281 EMR DPT VST MAYX REQ PHY/QHP: CPT | Mod: Z6 | Performed by: EMERGENCY MEDICINE

## 2017-10-01 PROCEDURE — 99281 EMR DPT VST MAYX REQ PHY/QHP: CPT | Performed by: EMERGENCY MEDICINE

## 2017-10-01 ASSESSMENT — ENCOUNTER SYMPTOMS: WOUND: 1

## 2017-10-01 NOTE — PROGRESS NOTES
SUBJECTIVE:   Left 3rd finger nail plate laceration. Send to ER for repair.       Clifford De Luna MD  [facility]

## 2017-10-01 NOTE — NURSING NOTE
"Chief Complaint   Patient presents with     Laceration       Initial /82 (BP Location: Left arm, Patient Position: Sitting, Cuff Size: Adult Large)  Pulse 107  Temp 98.4  F (36.9  C) (Oral)  Wt 248 lb 9.6 oz (112.8 kg)  SpO2 94%  BMI 38.94 kg/m2 Estimated body mass index is 38.94 kg/(m^2) as calculated from the following:    Height as of 7/13/17: 5' 7\" (1.702 m).    Weight as of this encounter: 248 lb 9.6 oz (112.8 kg).  Medication Reconciliation: complete   Silvestre HAMILTON      "

## 2017-10-01 NOTE — ED AVS SNAPSHOT
[facility], Emergency Department    [address] [address]    Phone:  [phone]    Fax:  [phone]                                       Ricky Brown   MRN: [id number]    Department:  [facility], Emergency Department   Date of Visit:  10/1/2017           Patient Information     Date Of Birth          1950        Your diagnoses for this visit were:     Laceration of left middle finger without foreign body with damage to nail, initial encounter        You were seen by Faraz Anglin MD.      Follow-up Information     Follow up with Armen Chavez MD.    Specialty:  Family Practice    Why:  As needed    Contact information:    [address] [facility] [address]  [phone]          Discharge Instructions          * LACERATION (All Closures)  A laceration is a cut through the skin. This will usually require stitches (sutures) or staples if it is deep. Minor cuts may be treated with a tape closure ( Steri-Strips ) or Dermabond skin glue.       HOME CARE:  PAIN MEDICINE: You may use acetaminophen (Tylenol) 650-1000 mg every 6 hours or ibuprofen (Motrin, Advil) 600 mg every 6-8 hours with food to control pain, if you are able to take these medicines. [NOTE: If you have chronic liver or kidney disease or ever had a stomach ulcer or GI bleeding, talk with your doctor before using these medicines.]  EXTREMITY, FACE or TRUNK WOUNDS:    Keep the wound clean and dry. If a bandage was applied and it becomes wet or dirty, replace it. Otherwise, leave it in place for the first 24 hours.    If stitches or staples were used, clean the wound daily. Protect the wound from sunlight and tanning lamps.    After removing the bandage, wash the area with soap and water. Use a wet cotton swab (Q tip) to loosen and remove any blood or crust that forms.    After cleaning, apply a thin layer of Polysporin or Bacitracin ointment. This will keep the wound clean and make it easier to  remove the stitches or staples. Reapply a fresh bandage.    You may remove the bandage to shower as usual after the first 24 hours, but do not soak the area in water (no swimming) until the stitches or staples are removed.    If Steri-Strips were used, keep the area clean and dry. If it becomes wet, blot it dry with a towel. It is okay to take a brief shower, but avoid scrubbing the area.    If Dermabond skin adhesive was used, do not scratch, rub or pick at the adhesive film. Do not place tape directly over the film. Do not apply liquid, ointment or creams to the wound while the film is in place. Do not clean the wound with peroxide and do not apply ointments. Avoid activities that cause heavy sweating until the film has fallen off. Protect the wound from prolonged exposure to sunlight or tanning lamps. You may shower as usual but do not soak the wound in water (no baths or swimming). The film will fall off by itself in 5-10 days.  SCALP WOUNDS: During the first two days, you may carefully rinse your hair in the shower to remove blood, glass or dirt particles. After two days, you may shower and shampoo your hair normally. Do not soak your scalp in the tub or go swimming until the stitches or staples have been removed.  MOUTH WOUNDS: Eat soft foods to reduce pain. If the cut is inside of your mouth, clean by rinsing after each meal and at bedtime with a mixture of equal parts water and Hydrogen Peroxide (do not swallow!). Or, you can use a cotton swab to directly apply Hydrogen Peroxide onto the cut.  After the wound is done healing, use sunscreen over the area whenever exposed for the next 6 minths to avoid a darker scar.     FOLLOW UP: Most skin wounds heal within ten days. Mouth and facial wounds heal within five days. However, even with proper treatment, a wound infection may sometimes occur. Therefore, you should check the wound daily for signs of infection listed below.  Stitches should be removed from the  face within five days; stitches and staples should be removed from other parts of the body within 7-10 days. Unless you are told to come back to the emergency room, you may have your doctor or urgent care remove the stitches. If dissolving stitches were used in the mouth, these will fall out or dissolve without the need for removal. If tape closures ( Steri-Strips ) were used, remove them yourself if they have not fallen off after 7 days. If Dermabond skin glue was used, the film will fall off by itself in 5-10 days.      GET PROMPT MEDICAL ATTENTION  if any of the following occur:    Increasing pain in the wound    Redness, swelling or pus coming from the wound    Fever over 101 F (38.3 C) oral    If stitches or staples come apart or fall out or if Steri-Strips fall off before seven days    If the wound edges re-open    Bleeding not controlled by direct pressure    [address] [place] [address]. All rights reserved. This information is not intended as a substitute for professional medical care. Always follow your healthcare professional's instructions.      Old Laceration: Not Sutured  A laceration is a cut through the skin. This will usually require stitches if it is deep. However, if a laceration remains open for too long, the risk of infection increases. In your case, too much time has passed before coming for treatment. The danger of infection from suturing at this time is too high. That is why your wound was not sutured.  If the wound is spread open, it will heal by filling in from the bottom and sides. A wound that is not sutured may take 1 to 4 weeks to heal, depending on the size of the opening. A visible scar will probably occur. You can discuss revision of the scar with your healthcare provider at a later time.  Home care  The following guidelines will help you care for your laceration at home:    Keep the wound clean and dry. If a bandage was applied and it becomes  wet or dirty, replace it. Otherwise, leave it in place for the first 24 hours, then change it once a day or as directed.    Clean the wound daily:    After removing any bandage, wash the area with soap and water. Use a wet cotton swab to loosen and remove any blood or crust that forms.    Talk with your doctor before applying any antibiotic ointment to the wound. Reapply a fresh bandage.    You may remove the bandage to shower as usual after the first 24 hours, but do not soak the area in water (no tub baths or swimming) for the next five days. Avoid activities that may reinjure your wound.    The healthcare provider may prescribe an antibiotic cream or ointment to prevent infection. Do not stop using this medication until you have finished the prescribed course or the provider tells you to stop.    The healthcare provider may also prescribe medications for pain. Follow instructions for taking these medications.    Check the wound daily for signs of infection listed below.  Follow-up care  Follow up with your healthcare provider as advised.  When to seek medical advice  Call your healthcare provider right away if any of these occur:    Wound bleeding not controlled by direct pressure    Signs of infection, including increasing pain in the wound, increasing wound redness or swelling, or pus or bad odor coming from the wound    Fever of 100.4 F (38. C) or higher or as directed by your healthcare provider    Wound edges re-open    Wound changes colors    Numbness around the wound     Decreased movement around the injured area  Date Last Reviewed: 6/10/2015    9357-2018 [employer]. [address] [place] [address]. All rights reserved. This information is not intended as a substitute for professional medical care. Always follow your healthcare professional's instructions.          24 Hour Appointment Hotline       To make an appointment at any [facility] call [phone]  ([phone]). If you don't have a family doctor or clinic, we will help you find one. [facility] are conveniently located to serve the needs of you and your family.             Review of your medicines      Our records show that you are taking the medicines listed below. If these are incorrect, please call your family doctor or clinic.        Dose / Directions Last dose taken    aspirin 81 MG tablet   Dose:  81 mg   Quantity:  90 tablet        Take 1 tablet (81 mg) by mouth daily   Refills:  3        atorvastatin 10 MG tablet   Commonly known as:  LIPITOR   Dose:  10 mg   Quantity:  90 tablet        Take 1 tablet (10 mg) by mouth daily   Refills:  2        diphenoxylate-atropine 2.5-0.025 MG per tablet   Commonly known as:  LOMOTIL   Dose:  1 tablet   Quantity:  30 tablet        Take 1 tablet by mouth daily as needed for diarrhea Patient only takes 1 tablet as needed   Refills:  5        fluticasone 50 MCG/ACT spray   Commonly known as:  FLONASE   Dose:  1-2 spray   Quantity:  3 Bottle        Spray 1-2 sprays into both nostrils daily   Refills:  3        ipratropium 0.06 % spray   Commonly known as:  ATROVENT   Dose:  2 spray   Quantity:  3 Box        Spray 2 sprays into both nostrils 4 times daily as needed for rhinitis Refill when requested   Refills:  11        losartan 50 MG tablet   Commonly known as:  COZAAR   Dose:  50 mg   Quantity:  90 tablet        Take 1 tablet (50 mg) by mouth daily   Refills:  1        metoprolol 50 MG 24 hr tablet   Commonly known as:  TOPROL XL   Dose:  50 mg   Quantity:  90 tablet        Take 1 tablet (50 mg) by mouth daily   Refills:  1        MULTIVITAMINS PO        Take  by mouth daily.   Refills:  0        sildenafil 50 MG tablet   Commonly known as:  VIAGRA   Dose:  25 mg   Quantity:  12 tablet        Take 0.5 tablets by mouth daily as needed for erectile dysfunction. Take 30 min to 4 hours before intercourse.  Never use with nitroglycerin, terazosin or doxazosin.    Refills:  11        TUMS CALCIUM FOR LIFE BONE PO        Take  by mouth.   Refills:  0                Orders Needing Specimen Collection     None      Pending Results     No orders found from 9/29/2017 to 10/2/2017.            Pending Culture Results     No orders found from 9/29/2017 to 10/2/2017.            Pending Results Instructions     If you had any lab results that were not finalized at the time of your Discharge, you can call the ED Lab Result RN at [phone]. You will be contacted by this team for any positive Lab results or changes in treatment. The nurses are available 7 days a week from 10A to 6:30P.  You can leave a message 24 hours per day and they will return your call.        Thank you for choosing [facility]       Thank you for choosing [facility] for your care. Our goal is always to provide you with excellent care. Hearing back from our patients is one way we can continue to improve our services. Please take a few minutes to complete the written survey that you may receive in the mail after you visit with us. Thank you!        [employer] Information     [facility] gives you secure access to your electronic health record. If you see a primary care provider, you can also send messages to your care team and make appointments. If you have questions, please call your primary care clinic.  If you do not have a primary care provider, please call [phone] and they will assist you.        Care EveryWhere ID     This is your Care EveryWhere ID. This could be used by other organizations to access your [facility] medical records  [id number]        Equal Access to Services     NIKHIL ABBOTT AH: Hadii steven Rebollar, wamarcelinada eliel, qaybta kaalmada adescottie more. So [facility] [phone].    ATENCIÓN: Si habla español, tiene a hope disposición servicios gratuitos de asistencia lingüística. Llame al [phone].    We comply with applicable federal civil rights laws and  [place] laws. We do not discriminate on the basis of race, color, national origin, age, disability, sex, sexual orientation, or gender identity.            After Visit Summary       This is your record. Keep this with you and show to your community pharmacist(s) and doctor(s) at your next visit.

## 2017-10-01 NOTE — MR AVS SNAPSHOT
After Visit Summary   10/1/2017    Ricky Brown    MRN: [id number]           Patient Information     Date Of Birth          1950        Visit Information        Provider Department      10/1/2017 10:35 AM Clifford De Luna MD [facility]        Today's Diagnoses     Laceration of nail bed of finger, initial encounter    -  1       Follow-ups after your visit        Who to contact     If you have questions or need follow up information about today's clinic visit or your schedule please contact [facility] directly at [phone].  Normal or non-critical lab and imaging results will be communicated to you by [employer], letter or phone within 4 business days after the clinic has received the results. If you do not hear from us within 7 days, please contact the clinic through [employer] or phone. If you have a critical or abnormal lab result, we will notify you by phone as soon as possible.  Submit refill requests through [facility] [employer] or call your pharmacy and they will forward the refill request to us. Please allow 3 business days for your refill to be completed.          Additional Information About Your Visit        MyChart Information     [employer] gives you secure access to your electronic health record. If you see a primary care provider, you can also send messages to your care team and make appointments. If you have questions, please call your primary care clinic.  If you do not have a primary care provider, please call [phone] and they will assist you.        Care EveryWhere ID     This is your Care EveryWhere ID. This could be used by other organizations to access your [facility] medical records  [id number]        Your Vitals Were     Pulse Temperature Pulse Oximetry BMI (Body Mass Index)          107 98.4  F (36.9  C) (Oral) 94% 38.94 kg/m2         Blood Pressure from Last 3 Encounters:   10/01/17 148/82   07/13/17 130/84   07/07/17 132/88    Weight  from Last 3 Encounters:   10/01/17 248 lb 9.6 oz (112.8 kg)   07/13/17 246 lb (111.6 kg)   06/22/17 248 lb 6 oz (112.7 kg)              Today, you had the following     No orders found for display       Primary Care Provider Office Phone # Fax #    Armen Wei Chavez [phone] [address] [facility] [address]        Equal Access to Services     NIKHIL ABBOTT : Hadii aad ku hadasho Soomaali, waaxda luqadaha, qaybta kaalmada adeegyada, waxay idiin hayaan adeeg corbyarasalazar la'june . So [facility] [phone].    ATENCIÓN: Si habla español, tiene a hope disposición servicios gratuitos de asistencia lingüística. [facility] [phone].    We comply with applicable federal civil rights laws and [place] laws. We do not discriminate on the basis of race, color, national origin, age, disability, sex, sexual orientation, or gender identity.            Thank you!     Thank you for choosing [facility]  for your care. Our goal is always to provide you with excellent care. Hearing back from our patients is one way we can continue to improve our services. Please take a few minutes to complete the written survey that you may receive in the mail after your visit with us. Thank you!             Your Updated Medication List - Protect others around you: Learn how to safely use, store and throw away your medicines at www.disposemymeds.org.          This list is accurate as of: 10/1/17 11:02 AM.  Always use your most recent med list.                   Brand Name Dispense Instructions for use Diagnosis    aspirin 81 MG tablet     90 tablet    Take 1 tablet (81 mg) by mouth daily    Cardiomyopathy, idiopathic (H)       atorvastatin 10 MG tablet    LIPITOR    90 tablet    Take 1 tablet (10 mg) by mouth daily    Hyperlipidemia LDL goal <70       diphenoxylate-atropine 2.5-0.025 MG per tablet    LOMOTIL    30 tablet    Take 1 tablet by mouth daily as needed for diarrhea Patient only takes 1  tablet as needed    Loose stools       fluticasone 50 MCG/ACT spray    FLONASE    3 Bottle    Spray 1-2 sprays into both nostrils daily    Chronic rhinitis       ipratropium 0.06 % spray    ATROVENT    3 Box    Spray 2 sprays into both nostrils 4 times daily as needed for rhinitis Refill when requested    Cough       losartan 50 MG tablet    COZAAR    90 tablet    Take 1 tablet (50 mg) by mouth daily    Non-ischemic cardiomyopathy (H)       metoprolol 50 MG 24 hr tablet    TOPROL XL    90 tablet    Take 1 tablet (50 mg) by mouth daily    Essential hypertension with goal blood pressure less than 140/90, Cardiomyopathy (H)       MULTIVITAMINS PO      Take  by mouth daily.    Routine general medical examination at a health care facility, Erectile dysfunction, Rectal itching, HTN (hypertension)       sildenafil 50 MG tablet    VIAGRA    12 tablet    Take 0.5 tablets by mouth daily as needed for erectile dysfunction. Take 30 min to 4 hours before intercourse.  Never use with nitroglycerin, terazosin or doxazosin.    Erectile dysfunction       TUMS CALCIUM FOR LIFE BONE PO      Take  by mouth.

## 2017-10-01 NOTE — ED AVS SNAPSHOT
[facility] [place], Emergency Department    [address]    [facility] [address]    Phone:  [phone]    Fax:  [phone]                                       Ricky Brown   MRN: [id number]    Department:  [facility], Emergency Department   Date of Visit:  10/1/2017           After Visit Summary Signature Page     I have received my discharge instructions, and my questions have been answered. I have discussed any challenges I see with this plan with the nurse or doctor.    ..........................................................................................................................................  Patient/Patient Representative Signature      ..........................................................................................................................................  Patient Representative Print Name and Relationship to Patient    ..................................................               ................................................  Date                                            Time    ..........................................................................................................................................  Reviewed by Signature/Title    ...................................................              ..............................................  Date                                                            Time

## 2017-10-01 NOTE — ED PROVIDER NOTES
History     Chief Complaint   Patient presents with     Hand Injury     pt cut middle finger of left hand while cutting food     HPI  Ricky Brown is a 66 year old, left-handed male who presents with a hand injury. The patient reports that he was using a mandoline to cut vegetables, around 4:10 PM yesterday, when he accidentally slipped and cut vertically through his left middle fingernail. He did not clean out the cut. He was evaluated for this in an urgent care this morning, and was referred here to the ED for further evaluation. He reports his tetanus is up-to-date; per [place] Immunization Information Connection, patient's last tetanus immunization was on 3/28/17.     Past Medical History:   Diagnosis Date     Arthritis      BCC (basal cell carcinoma)     right shoulder      Cardiomyopathy (H)      Colon adenomas 9/20/11     ED (erectile dysfunction)      HTN (hypertension)      Obesity      JOSE JUAN (obstructive sleep apnea)        Past Surgical History:   Procedure Laterality Date     ARTHROSCOPY SHOULDER BICEPS TENODESIS REPAIR  2/27/2014    Procedure: ARTHROSCOPY SHOULDER BICEPS TENODESIS REPAIR;;  Surgeon: Michael Hagen MD;  [facility]     ARTHROSCOPY SHOULDER ROTATOR CUFF REPAIR  2/27/2014    Procedure: ARTHROSCOPY SHOULDER ROTATOR CUFF REPAIR;  Right Shoulder Arthroscopic Rotator Cuff Repair,  Subacromial Decompression, Biceps Tenodesis, Distal Clavicle Excision   ;  Surgeon: Michael Hagen MD;  Location: [facility]     BIOPSY       CARDIAC SURGERY       COLONOSCOPY       EXCHANGE INTRAOCULAR LENS IMPLANT  2005    left eye - first, recentered PCL with iris fixation; then IOLX with ACL     EXTRACAPSULAR CATARACT EXTRATION WITH INTRAOCULAR LENS IMPLANT  2005    both eyes (elsewhere)     TURBINOPLASTY  12/11/2013    Procedure: TURBINOPLASTY;;  Surgeon: Lisste Parsons MD;  Location:  OR     UVULOPALATOPHARYNGOPLASTY  12/11/2013    Procedure: UVULOPALATOPHARYNGOPLASTY;   Uvulopalatopharyngoplasty, Turbiante Reduction;  Surgeon: Lisset Parsons MD;  Location: [facility] OR       Family History   Problem Relation Age of Onset     DIABETES Father      CEREBROVASCULAR DISEASE Father      Obesity Father      HEART DISEASE Father      Coronary Artery Disease Father      Hypertension Father      Lipids Sister      C.A.D. No family hx of      CANCER No family hx of      Glaucoma No family hx of      Macular Degeneration No family hx of        Social History   Substance Use Topics     Smoking status: Former Smoker     Packs/day: 0.50     Years: 2.00     Types: Cigarettes     Quit date: 12/12/1996     Smokeless tobacco: Never Used     Alcohol use 5.0 oz/week      Comment: 1-2 seth/ gin per 5-6 night per week     No current facility-administered medications for this encounter.      Current Outpatient Prescriptions   Medication     atorvastatin (LIPITOR) 10 MG tablet     losartan (COZAAR) 50 MG tablet     metoprolol (TOPROL XL) 50 MG 24 hr tablet     ipratropium (ATROVENT) 0.06 % nasal spray     aspirin 81 MG tablet     Calcium Carbonate Antacid (TUMS CALCIUM FOR LIFE BONE PO)     Multiple Vitamin (MULTIVITAMINS PO)     fluticasone (FLONASE) 50 MCG/ACT spray     diphenoxylate-atropine (LOMOTIL) 2.5-0.025 MG per tablet     sildenafil (VIAGRA) 50 MG tablet        Allergies   Allergen Reactions     Lisinopril Cough        I have reviewed the Medications, Allergies, Past Medical and Surgical History, and Social History in the Epic system.    Review of Systems   Skin: Positive for wound (left middle finger).   All other systems reviewed and are negative.      Physical Exam   BP: (!) 162/92  Pulse: 91  Temp: 98.4  F (36.9  C)  Resp: 16  Weight: 112.9 kg (249 lb)  SpO2: 98 %  Physical Exam   Musculoskeletal:        Left hand: He exhibits laceration (distal dorsal left middle finger). Normal sensation noted. Normal strength noted.       ED Course     ED Course     Procedures     12:16 PM  The  patient was seen and examined by Dr. Anglin in Room 8.               Labs Ordered and Resulted from Time of ED Arrival Up to the Time of Departure from the ED - No data to display         Assessments & Plan (with Medical Decision Making)   66-year-old male presents 21 hours after sustaining a laceration to distal left middle finger.  This appears to extend into the nail.  The wound was thoroughly cleaned and given the length of time that has passed since he injury, we discussed risks for infection if attempted to repair at this point.  We will cover with tube gauze and have patient recheck with primary physician in the next several days.    I have reviewed the nursing notes.    I have reviewed the findings, diagnosis, plan and need for follow up with the patient.    New Prescriptions    No medications on file       Final diagnoses:   Laceration of left middle finger without foreign body with damage to nail, initial encounter     I, Elaina Sagastume, am serving as a trained medical scribe to document services personally performed by Alex Anglin MD, based on the provider's statements to me.   IAlex MD, was physically present and have reviewed and verified the accuracy of this note documented by Elaina Sagastume.     10/1/2017   [facility], EMERGENCY DEPARTMENT     Faraz Anglin MD  10/01/17 [id number]

## 2017-11-21 ENCOUNTER — OFFICE VISIT (OUTPATIENT)
Dept: FAMILY MEDICINE | Facility: CLINIC | Age: 67
End: 2017-11-21
Payer: COMMERCIAL

## 2017-11-21 VITALS
OXYGEN SATURATION: 93 % | WEIGHT: 244 LBS | DIASTOLIC BLOOD PRESSURE: 80 MMHG | SYSTOLIC BLOOD PRESSURE: 142 MMHG | TEMPERATURE: 99.4 F | BODY MASS INDEX: 38.22 KG/M2 | HEART RATE: 86 BPM

## 2017-11-21 DIAGNOSIS — Z91.81 AT RISK FOR FALLING: ICD-10-CM

## 2017-11-21 DIAGNOSIS — J20.9 ACUTE BRONCHITIS, UNSPECIFIED ORGANISM: Primary | ICD-10-CM

## 2017-11-21 DIAGNOSIS — Z23 NEED FOR PROPHYLACTIC VACCINATION AGAINST STREPTOCOCCUS PNEUMONIAE (PNEUMOCOCCUS): ICD-10-CM

## 2017-11-21 DIAGNOSIS — Z12.11 SCREEN FOR COLON CANCER: ICD-10-CM

## 2017-11-21 DIAGNOSIS — R07.0 THROAT PAIN: ICD-10-CM

## 2017-11-21 LAB
DEPRECATED S PYO AG THROAT QL EIA: NORMAL
SPECIMEN SOURCE: NORMAL

## 2017-11-21 PROCEDURE — 99213 OFFICE O/P EST LOW 20 MIN: CPT | Performed by: FAMILY MEDICINE

## 2017-11-21 PROCEDURE — 87081 CULTURE SCREEN ONLY: CPT | Performed by: FAMILY MEDICINE

## 2017-11-21 PROCEDURE — 87880 STREP A ASSAY W/OPTIC: CPT | Performed by: FAMILY MEDICINE

## 2017-11-21 RX ORDER — CODEINE PHOSPHATE AND GUAIFENESIN 10; 100 MG/5ML; MG/5ML
1 SOLUTION ORAL EVERY 6 HOURS PRN
Qty: 120 ML | Refills: 0 | Status: SHIPPED | OUTPATIENT
Start: 2017-11-21 | End: 2018-04-11

## 2017-11-21 RX ORDER — AMOXICILLIN 875 MG
875 TABLET ORAL 2 TIMES DAILY
Qty: 20 TABLET | Refills: 0 | Status: SHIPPED | OUTPATIENT
Start: 2017-11-21 | End: 2017-12-04

## 2017-11-21 NOTE — MR AVS SNAPSHOT
After Visit Summary   11/21/2017    Ricky Brown    MRN: [id number]           Patient Information     Date Of Birth          1950        Visit Information        Provider Department      11/21/2017 2:20 PM Luca Baig MD [facility]        Today's Diagnoses     Acute bronchitis, unspecified organism    -  1    Throat pain        Screen for colon cancer        At risk for falling        Need for prophylactic vaccination against Streptococcus pneumoniae (pneumococcus)          Care Instructions    [facility]    If you have any questions regarding to your visit please contact your care team:       Team Purple:   Clinic Hours Telephone Number   Dr. Tosha Vega   7am-7pm  Monday - Thursday   7am-5pm  Fridays  [phone]  (Appointment scheduling available 24/7)    Questions about your Visit?   Team Line:  [phone]   [facility] [place] [facility] - 11am-9pm Monday-Friday Saturday-Sunday- 9am-5pm   [place] - 5pm-9pm Monday-Friday Saturday-Sunday- 9am-5pm  [phone] - [place]   [phone] - [place]       What options do I have for visits at the clinic other than the traditional office visit?  To expand how we care for you, many of our providers are utilizing electronic visits (e-visits) and telephone visits, when medically appropriate, for interactions with their patients rather than a visit in the clinic.   We also offer nurse visits for many medical concerns. Just like any other service, we will bill your insurance company for this type of visit based on time spent on the phone with your provider. Not all insurance companies cover these visits. Please check with your medical insurance if this type of visit is covered. You will be responsible for any charges that are not paid by your insurance.      E-visits via [employer]:  generally incur a $35.00  fee.  Telephone visits:  Time spent on the phone: *charged based on time that is spent on the phone in increments of 10 minutes. Estimated cost:   5-10 mins $30.00   11-20 mins. $59.00   21-30 mins. $85.00     Use "[employer]"hart (secure email communication and access to your chart) to send your primary care provider a message or make an appointment. Ask someone on your Team how to sign up for "[employer]"hart.  For a Price Quote for your services, please call our [place] Line at [phone].  As always, Thank you for trusting us with your health care needs!    Discharge by ARTURO HAYDEN             Follow-ups after your visit        Who to contact     If you have questions or need follow up information about today's clinic visit or your schedule please contact [facility] directly at [phone].  Normal or non-critical lab and imaging results will be communicated to you by MyChart, letter or phone within 4 business days after the clinic has received the results. If you do not hear from us within 7 days, please contact the clinic through "[employer]"hart or phone. If you have a critical or abnormal lab result, we will notify you by phone as soon as possible.  Submit refill requests through [employer] or call your pharmacy and they will forward the refill request to us. Please allow 3 business days for your refill to be completed.          Additional Information About Your Visit        MyChart Information     [employer] gives you secure access to your electronic health record. If you see a primary care provider, you can also send messages to your care team and make appointments. If you have questions, please call your primary care clinic.  If you do not have a primary care provider, please call [phone] and they will assist you.        Care EveryWhere ID     This is your Care EveryWhere ID. This could be used by other organizations to access your [facility] medical records  [id number]        Your Vitals Were     Pulse  Temperature Pulse Oximetry BMI (Body Mass Index)          86 99.4  F (37.4  C) (Oral) 93% 38.22 kg/m2         Blood Pressure from Last 3 Encounters:   11/21/17 142/80   10/01/17 (!) 162/92   10/01/17 148/82    Weight from Last 3 Encounters:   11/21/17 244 lb (110.7 kg)   10/01/17 249 lb (112.9 kg)   10/01/17 248 lb 9.6 oz (112.8 kg)              We Performed the Following     Beta strep group A culture     Rapid strep screen          Today's Medication Changes          These changes are accurate as of: 11/21/17  2:46 PM.  If you have any questions, ask your nurse or doctor.               Start taking these medicines.        Dose/Directions    amoxicillin 875 MG tablet   Commonly known as:  AMOXIL   Used for:  Acute bronchitis, unspecified organism   Started by:  Luca Baig MD        Dose:  875 mg   Take 1 tablet (875 mg) by mouth 2 times daily   Quantity:  20 tablet   Refills:  0       guaiFENesin-codeine 100-10 MG/5ML Soln solution   Commonly known as:  ROBITUSSIN AC   Used for:  Acute bronchitis, unspecified organism   Started by:  Luca Baig MD        Dose:  1 tsp.   Take 5 mLs by mouth every 6 hours as needed for cough   Quantity:  120 mL   Refills:  0            Where to get your medicines      These medications were sent to [id number] [address] [address]     Phone:  [phone]     amoxicillin 875 MG tablet         Some of these will need a paper prescription and others can be bought over the counter.  Ask your nurse if you have questions.     Bring a paper prescription for each of these medications     guaiFENesin-codeine 100-10 MG/5ML Soln solution                Primary Care Provider Office Phone # Fax #    Armen Chavez [phone]       [facility] [address]        Equal Access to Services     NIKHIL ABBOTT AH: Taisha Rebollar, bhavani julian, amador peralta  scottie higginsdavin damonaaxena ah. Brenda [facility] [phone].    ATENCIÓN: Si negro tran, tiene a hope disposición servicios gratuitos de asistencia lingüística. Josefa al [phone].    We comply with applicable federal civil rights laws and [place] laws. We do not discriminate on the basis of race, color, national origin, age, disability, sex, sexual orientation, or gender identity.            Thank you!     Thank you for choosing [facility]  for your care. Our goal is always to provide you with excellent care. Hearing back from our patients is one way we can continue to improve our services. Please take a few minutes to complete the written survey that you may receive in the mail after your visit with us. Thank you!             Your Updated Medication List - Protect others around you: Learn how to safely use, store and throw away your medicines at www.disposemymeds.org.          This list is accurate as of: 11/21/17  2:46 PM.  Always use your most recent med list.                   Brand Name Dispense Instructions for use Diagnosis    amoxicillin 875 MG tablet    AMOXIL    20 tablet    Take 1 tablet (875 mg) by mouth 2 times daily    Acute bronchitis, unspecified organism       aspirin 81 MG tablet     90 tablet    Take 1 tablet (81 mg) by mouth daily    Cardiomyopathy, idiopathic (H)       atorvastatin 10 MG tablet    LIPITOR    90 tablet    Take 1 tablet (10 mg) by mouth daily    Hyperlipidemia LDL goal <70       diphenoxylate-atropine 2.5-0.025 MG per tablet    LOMOTIL    30 tablet    Take 1 tablet by mouth daily as needed for diarrhea Patient only takes 1 tablet as needed    Loose stools       fluticasone 50 MCG/ACT spray    FLONASE    3 Bottle    Spray 1-2 sprays into both nostrils daily    Chronic rhinitis       guaiFENesin-codeine 100-10 MG/5ML Soln solution    ROBITUSSIN AC    120 mL    Take 5 mLs by mouth every 6 hours as needed for cough    Acute bronchitis, unspecified  organism       ipratropium 0.06 % spray    ATROVENT    3 Box    Spray 2 sprays into both nostrils 4 times daily as needed for rhinitis Refill when requested    Cough       losartan 50 MG tablet    COZAAR    90 tablet    Take 1 tablet (50 mg) by mouth daily    Non-ischemic cardiomyopathy (H)       metoprolol 50 MG 24 hr tablet    TOPROL XL    90 tablet    Take 1 tablet (50 mg) by mouth daily    Essential hypertension with goal blood pressure less than 140/90, Cardiomyopathy (H)       MULTIVITAMINS PO      Take  by mouth daily.    Routine general medical examination at a health care facility, Erectile dysfunction, Rectal itching, HTN (hypertension)       sildenafil 50 MG tablet    VIAGRA    12 tablet    Take 0.5 tablets by mouth daily as needed for erectile dysfunction. Take 30 min to 4 hours before intercourse.  Never use with nitroglycerin, terazosin or doxazosin.    Erectile dysfunction       TUMS CALCIUM FOR LIFE BONE PO      Take  by mouth.

## 2017-11-21 NOTE — PROGRESS NOTES
SUBJECTIVE:   Ricky Brown is a 67 year old male who presents to clinic today for the following health issues:    ENT Symptoms             Symptoms: cc Present Absent Comment   Fever/Chills  x     Fatigue  x     Muscle Aches  x     Eye Irritation  x     Sneezing  x     Nasal Galo/Drg  x     Sinus Pressure/Pain  x     Loss of smell   x    Dental pain  x  Not pain but scummier    Sore Throat  x     Swollen Glands  x     Ear Pain/Fullness   x    Cough  x  Mucus with some blood    Wheeze  x     Chest Pain       Shortness of breath  x     Rash       Other         Symptom duration:  4-5 days    Symptom severity:  moderate    Treatments tried:  None   Contacts:  Wife      Wife treated with antibiotic for bronchitis and feeling better.    BMI:    Wt Readings from Last 4 Encounters:   11/21/17 244 lb (110.7 kg)   10/01/17 249 lb (112.9 kg)   10/01/17 248 lb 9.6 oz (112.8 kg)   07/13/17 246 lb (111.6 kg)     HCM:   HFAP, FIT, CBC, PCV, BMP.     Problem list and histories reviewed & adjusted, as indicated.  Additional history: as documented    Patient Active Problem List   Diagnosis     HL (hearing loss)     Erectile dysfunction     Keratoglobus, ou     Pseudophakia, PCL, od; ACL, os     Posterior vitreous detachment, od     Epiretinal membrane, mild, od     Advanced directives, counseling/discussion     HDL deficiency     JOSE JUAN (obstructive sleep apnea)-severe (AHI 32)     BCC (basal cell carcinoma)     Cardiomyopathy (H)     Obstructive sleep apnea     Near syncope     RCT (rotator cuff tear)     Disorder of bursae and tendons in shoulder region     Other postprocedural status(V45.89)     Iritis, os     Cramp of limb     Essential hypertension with goal blood pressure less than 140/90     Hyperlipidemia LDL goal <70     overwieght BMI > 35     Visit for suture removal     Past Surgical History:   Procedure Laterality Date     ARTHROSCOPY SHOULDER BICEPS TENODESIS REPAIR  2/27/2014    Procedure: ARTHROSCOPY SHOULDER  BICEPS TENODESIS REPAIR;;  Surgeon: Michael Hagen MD;  [facility]     ARTHROSCOPY SHOULDER ROTATOR CUFF REPAIR  2/27/2014    Procedure: ARTHROSCOPY SHOULDER ROTATOR CUFF REPAIR;  Right Shoulder Arthroscopic Rotator Cuff Repair,  Subacromial Decompression, Biceps Tenodesis, Distal Clavicle Excision   ;  Surgeon: Michael Hagen MD;  [facility]     BIOPSY       CARDIAC SURGERY       COLONOSCOPY       EXCHANGE INTRAOCULAR LENS IMPLANT  2005    left eye - first, recentered PCL with iris fixation; then IOLX with ACL     EXTRACAPSULAR CATARACT EXTRATION WITH INTRAOCULAR LENS IMPLANT  2005    both eyes (elsewhere)     TURBINOPLASTY  12/11/2013    Procedure: TURBINOPLASTY;;  Surgeon: Lisset Parsons MD;  Location:  OR     UVULOPALATOPHARYNGOPLASTY  12/11/2013    Procedure: UVULOPALATOPHARYNGOPLASTY;  Uvulopalatopharyngoplasty, Turbiante Reduction;  Surgeon: Lisset Parsons MD;  Location:  OR       Social History   Substance Use Topics     Smoking status: Former Smoker     Packs/day: 0.50     Years: 2.00     Types: Cigarettes     Quit date: 12/12/1996     Smokeless tobacco: Never Used     Alcohol use 5.0 oz/week      Comment: 1-2 seth/ gin per 5-6 night per week     Family History   Problem Relation Age of Onset     DIABETES Father      CEREBROVASCULAR DISEASE Father      Obesity Father      HEART DISEASE Father      Coronary Artery Disease Father      Hypertension Father      Lipids Sister      C.A.D. No family hx of      CANCER No family hx of      Glaucoma No family hx of      Macular Degeneration No family hx of          Reviewed and updated as needed this visit by clinical staff  ROS:  Constitutional, cardiovascular, gi and gu systems are negative, except as otherwise noted.      OBJECTIVE:   /80  Pulse 86  Temp 99.4  F (37.4  C) (Oral)  Wt 244 lb (110.7 kg)  SpO2 93%  BMI 38.22 kg/m2  Body mass index is 38.22 kg/(m^2).  GENERAL: healthy, alert and no  distress  NECK: no adenopathy and thyroid normal to palpation  RESP: lungs clear to auscultation - no rales, rhonchi or wheezes  CV: regular rate and rhythm, no murmur, click or rub, no edema.  ABDOMEN: soft, nontender, no masses and bowel sounds normal  MS: no gross musculoskeletal defects noted, no edema    Diagnostic Test Results:  none     ASSESSMENT/PLAN:     (J20.9) Acute bronchitis, unspecified organism  (primary encounter diagnosis)  Comment: Due to exposure and wife improving on antibiotic, discussed doing and antibiotic and a cough suppressant.  Plan: amoxicillin (AMOXIL) 875 MG tablet,         guaiFENesin-codeine (ROBITUSSIN AC) 100-10         MG/5ML SOLN solutio    (R07.0) Throat pain  Comment: RSS negative  Plan: Rapid strep screen, Beta strep group A culture    (Z68.38) BMI 38.0-38.9,adult  Comment: Counseled to make better food choices, exercise as tolerated, and lose weight.     (Z12.11) Screen for colon cancer  (Z91.81) At risk for falling  (Z23) Need for prophylactic vaccination against Streptococcus pneumoniae (pneumococcus)  Comment: Will follow up for physical    Follow up in 1 month or sooner with concerns    Luca Baig MD  [facility]

## 2017-11-21 NOTE — PATIENT INSTRUCTIONS
[facility]    If you have any questions regarding to your visit please contact your care team:       Team Purple:   Clinic Hours Telephone Number   Dr. Tosha Vega   7am-7pm  Monday - Thursday   7am-5pm  Fridays  [phone]  (Appointment scheduling available 24/7)    Questions about your Visit?   Team Line:  [phone]   Urgent Care - [place] and [facility] - 11am-9pm Monday-Friday Saturday-Sunday- 9am-5pm   [place] - 5pm-9pm Monday-Friday Saturday-Sunday- 9am-5pm  [phone] - [facility]  [phone] - [place]       What options do I have for visits at the clinic other than the traditional office visit?  To expand how we care for you, many of our providers are utilizing electronic visits (e-visits) and telephone visits, when medically appropriate, for interactions with their patients rather than a visit in the clinic.   We also offer nurse visits for many medical concerns. Just like any other service, we will bill your insurance company for this type of visit based on time spent on the phone with your provider. Not all insurance companies cover these visits. Please check with your medical insurance if this type of visit is covered. You will be responsible for any charges that are not paid by your insurance.      E-visits via [employer]:  generally incur a $35.00 fee.  Telephone visits:  Time spent on the phone: *charged based on time that is spent on the phone in increments of 10 minutes. Estimated cost:   5-10 mins $30.00   11-20 mins. $59.00   21-30 mins. $85.00     Use [employer] (secure email communication and access to your chart) to send your primary care provider a message or make an appointment. Ask someone on your Team how to sign up for [employer].  For a Price Quote for your services, please call our [employer] Line at [phone].  As always, Thank you for trusting us with your health care  needs!    Discharge by ARTURO HAYDEN

## 2017-11-21 NOTE — NURSING NOTE
"Chief Complaint   Patient presents with     URI       Initial /80  Pulse 86  Temp 99.4  F (37.4  C) (Oral)  Wt 244 lb (110.7 kg)  SpO2 93%  BMI 38.22 kg/m2 Estimated body mass index is 38.22 kg/(m^2) as calculated from the following:    Height as of 7/13/17: 5' 7\" (1.702 m).    Weight as of this encounter: 244 lb (110.7 kg).  Medication Reconciliation: complete     Federica Calvert MA       "

## 2017-11-22 LAB
BACTERIA SPEC CULT: NORMAL
SPECIMEN SOURCE: NORMAL

## 2017-12-04 ENCOUNTER — RADIANT APPOINTMENT (OUTPATIENT)
Dept: GENERAL RADIOLOGY | Facility: CLINIC | Age: 67
End: 2017-12-04
Attending: NURSE PRACTITIONER
Payer: COMMERCIAL

## 2017-12-04 ENCOUNTER — OFFICE VISIT (OUTPATIENT)
Dept: FAMILY MEDICINE | Facility: CLINIC | Age: 67
End: 2017-12-04
Payer: COMMERCIAL

## 2017-12-04 VITALS
HEART RATE: 89 BPM | BODY MASS INDEX: 38.15 KG/M2 | DIASTOLIC BLOOD PRESSURE: 90 MMHG | TEMPERATURE: 98.4 F | SYSTOLIC BLOOD PRESSURE: 144 MMHG | OXYGEN SATURATION: 93 % | WEIGHT: 243.6 LBS

## 2017-12-04 DIAGNOSIS — M25.571 PAIN IN JOINT, ANKLE AND FOOT, RIGHT: ICD-10-CM

## 2017-12-04 DIAGNOSIS — J20.9 ACUTE BRONCHITIS, UNSPECIFIED ORGANISM: ICD-10-CM

## 2017-12-04 DIAGNOSIS — S82.899A AVULSION FRACTURE OF ANKLE: Primary | ICD-10-CM

## 2017-12-04 DIAGNOSIS — M25.571 PAIN IN JOINT, ANKLE AND FOOT, RIGHT: Primary | ICD-10-CM

## 2017-12-04 PROCEDURE — 73610 X-RAY EXAM OF ANKLE: CPT | Mod: RT

## 2017-12-04 PROCEDURE — 99213 OFFICE O/P EST LOW 20 MIN: CPT | Performed by: NURSE PRACTITIONER

## 2017-12-04 RX ORDER — IPRATROPIUM BROMIDE 42 UG/1
2 SPRAY, METERED NASAL 4 TIMES DAILY PRN
Qty: 3 BOX | Refills: 11 | Status: CANCELLED | OUTPATIENT
Start: 2017-12-04

## 2017-12-04 ASSESSMENT — PAIN SCALES - GENERAL: PAINLEVEL: SEVERE PAIN (7)

## 2017-12-04 NOTE — NURSING NOTE
"Chief Complaint   Patient presents with     RECHECK     bronchitis     Musculoskeletal Problem     right ankle pain x3 days, twisted it. Cramping.       Initial /90 (BP Location: Left arm, Cuff Size: Adult Regular)  Pulse 89  Temp 98.4  F (36.9  C) (Oral)  Wt 243 lb 9.6 oz (110.5 kg)  SpO2 93%  BMI 38.15 kg/m2 Estimated body mass index is 38.15 kg/(m^2) as calculated from the following:    Height as of 7/13/17: 5' 7\" (1.702 m).    Weight as of this encounter: 243 lb 9.6 oz (110.5 kg).  Medication Reconciliation: complete     Stefania Carrera CMA      "

## 2017-12-04 NOTE — MR AVS SNAPSHOT
After Visit Summary   12/4/2017    Ricky Brown    MRN: [id number]           Patient Information     Date Of Birth          1950        Visit Information        Provider Department      12/4/2017 2:00 PM Thelma Helms APRN [facility]        Today's Diagnoses     Pain in joint, ankle and foot, right    -  1    Acute bronchitis, unspecified organism          Care Instructions      Treating Ankle Sprains  Treatment will depend on how bad your sprain is. For a severe sprain, healing may take 3 months or more.  Right after your injury: Use R.I.C.E.    Rest: At first, keep weight off the ankle as much as you can. You may be given crutches to help you walk without putting weight on the ankle.    Ice: Put an ice pack on the ankle for 15 minutes. Remove the pack and wait at least 30 minutes. Repeat for up to 3 days. This helps reduce swelling.    Compression: To reduce swelling and keep the joint stable, you may need to wrap the ankle with an elastic bandage. For more severe sprains, you may need an ankle brace or a cast.    Elevation: To reduce swelling, keep your ankle raised above your heart when you sit or lie down.  Medicine  Your healthcare provider may suggest oral non-steroidal anti-inflammatory medicine (NSAIDs), such as ibuprofen. This relieves the pain and helps reduce any swelling. Be sure to take your medicine as directed.  Contrast baths  After 3 days, soak your ankle in warm water for 30 seconds, then in cool water for 30 seconds. Go back and forth for 5 minutes. Doing this every 2 hours will help keep the swelling down.  Exercises    After about 2 to 3 weeks, you may be given exercises to strengthen the ligaments and muscles in the ankle. Doing these exercises will help prevent another ankle sprain. Exercises may include standing on your toes and then on your heels and doing ankle curls.  Ankle curls    Sit on the edge of a sturdy table or lie on your  back.    Pull your toes toward you. Then point them away from you. Repeat for 2 to 3 minutes.   Date Last Reviewed: 9/28/2015 2000-2017 [employer]. [address] [place] [address], [place] [address]. All rights reserved. This information is not intended as a substitute for professional medical care. Always follow your healthcare professional's instructions.      [facility]    If you have any questions regarding to your visit please contact your care team:     Team Pink:   Clinic Hours Telephone Number   Internal Medicine:  Dr. Laura Helms NP       7am-7pm  Monday - Thursday   7am-5pm  Fridays  [phone]  (Appointment scheduling available 24/7)    Questions about your visit?  Team Line  [phone]   Urgent Care - [place] and [facility] - 11am-9pm Monday-Friday Saturday-Sunday- 9am-5pm   [place] - 5pm-9pm Monday-Friday Saturday-Sunday- 9am-5pm  [phone] - [facility]  [phone] - [place]       What options do I have for visits at the clinic other than the traditional office visit?  To expand how we care for you, many of our providers are utilizing electronic visits (e-visits) and telephone visits, when medically appropriate, for interactions with their patients rather than a visit in the clinic.   We also offer nurse visits for many medical concerns. Just like any other service, we will bill your insurance company for this type of visit based on time spent on the phone with your provider. Not all insurance companies cover these visits. Please check with your medical insurance if this type of visit is covered. You will be responsible for any charges that are not paid by your insurance.      E-visits via [employer]:  generally incur a $35.00 fee.  Telephone visits:  Time spent on the phone: *charged based on time that is spent on the phone in increments of 10 minutes. Estimated cost:   5-10 mins $30.00   11-20 mins. $59.00    21-30 mins. $85.00   Use [employer] (secure email communication and access to your chart) to send your primary care provider a message or make an appointment. Ask someone on your Team how to sign up for [employer].    For a Price Quote for your services, please call our Consumer Price Line at [phone].    As always, Thank you for trusting us with your health care needs!    Alysia Torres MA            Follow-ups after your visit        Future tests that were ordered for you today     Open Future Orders        Priority Expected Expires Ordered    XR Ankle Right G/E 3 Views Routine 12/4/2017 12/4/2018 12/4/2017            Who to contact     If you have questions or need follow up information about today's clinic visit or your schedule please contact [facility] directly at [phone].  Normal or non-critical lab and imaging results will be communicated to you by [employer], letter or phone within 4 business days after the clinic has received the results. If you do not hear from us within 7 days, please contact the clinic through [employer] or phone. If you have a critical or abnormal lab result, we will notify you by phone as soon as possible.  Submit refill requests through [employer] or call your pharmacy and they will forward the refill request to us. Please allow 3 business days for your refill to be completed.          Additional Information About Your Visit        [employer] Information     [employer] gives you secure access to your electronic health record. If you see a primary care provider, you can also send messages to your care team and make appointments. If you have questions, please call your primary care clinic.  If you do not have a primary care provider, please call [phone] and they will assist you.        Care EveryWhere ID     This is your Care EveryWhere ID. This could be used by other organizations to access your [place] medical records  [id number]        Your Vitals Were     Pulse  Temperature Pulse Oximetry BMI (Body Mass Index)          89 98.4  F (36.9  C) (Oral) 93% 38.15 kg/m2         Blood Pressure from Last 3 Encounters:   12/04/17 144/90   11/21/17 142/80   10/01/17 (!) 162/92    Weight from Last 3 Encounters:   12/04/17 243 lb 9.6 oz (110.5 kg)   11/21/17 244 lb (110.7 kg)   10/01/17 249 lb (112.9 kg)                 Today's Medication Changes          These changes are accurate as of: 12/4/17  2:38 PM.  If you have any questions, ask your nurse or doctor.               Start taking these medicines.        Dose/Directions    order for DME   Used for:  Pain in joint, ankle and foot, right   Started by:  Thelma Helms APRN CNP        Equipment being ordered: Air splint, ace wrap   Quantity:  1 each   Refills:  0            Where to get your medicines      Some of these will need a paper prescription and others can be bought over the counter.  Ask your nurse if you have questions.     Bring a paper prescription for each of these medications     order for DME                Primary Care Provider Office Phone # Fax #    Armen Chavez [phone]       [address] [place] [address]        Equal Access to Services     NIKHIL ABBOTT AH: Hadii steven ku hadasho Soomaali, waaxda luqadaha, qaybta kaalmada adeegyada, waxay idiin hayjune galindo. So [facility] [phone].    ATENCIÓN: Si habla español, tiene a hope disposición servicios gratuitos de asistencia lingüística. Llame al [phone].    We comply with applicable federal civil rights laws and [place] laws. We do not discriminate on the basis of race, color, national origin, age, disability, sex, sexual orientation, or gender identity.            Thank you!     Thank you for choosing [facility]  for your care. Our goal is always to provide you with excellent care. Hearing back from our patients is one way we can continue to improve our services. Please take a few minutes  to complete the written survey that you may receive in the mail after your visit with us. Thank you!             Your Updated Medication List - Protect others around you: Learn how to safely use, store and throw away your medicines at www.disposemymeds.org.          This list is accurate as of: 12/4/17  2:38 PM.  Always use your most recent med list.                   Brand Name Dispense Instructions for use Diagnosis    aspirin 81 MG tablet     90 tablet    Take 1 tablet (81 mg) by mouth daily    Cardiomyopathy, idiopathic (H)       atorvastatin 10 MG tablet    LIPITOR    90 tablet    Take 1 tablet (10 mg) by mouth daily    Hyperlipidemia LDL goal <70       diphenoxylate-atropine 2.5-0.025 MG per tablet    LOMOTIL    30 tablet    Take 1 tablet by mouth daily as needed for diarrhea Patient only takes 1 tablet as needed    Loose stools       fluticasone 50 MCG/ACT spray    FLONASE    3 Bottle    Spray 1-2 sprays into both nostrils daily    Chronic rhinitis       guaiFENesin-codeine 100-10 MG/5ML Soln solution    ROBITUSSIN AC    120 mL    Take 5 mLs by mouth every 6 hours as needed for cough    Acute bronchitis, unspecified organism       ipratropium 0.06 % spray    ATROVENT    3 Box    Spray 2 sprays into both nostrils 4 times daily as needed for rhinitis Refill when requested    Cough       losartan 50 MG tablet    COZAAR    90 tablet    Take 1 tablet (50 mg) by mouth daily    Non-ischemic cardiomyopathy (H)       metoprolol 50 MG 24 hr tablet    TOPROL XL    90 tablet    Take 1 tablet (50 mg) by mouth daily    Essential hypertension with goal blood pressure less than 140/90, Cardiomyopathy (H)       MULTIVITAMINS PO      Take  by mouth daily.    Routine general medical examination at a health care facility, Erectile dysfunction, Rectal itching, HTN (hypertension)       order for DME     1 each    Equipment being ordered: Air splint, ace wrap    Pain in joint, ankle and foot, right       TUMS CALCIUM FOR LIFE BONE  PO      Take  by mouth.

## 2017-12-04 NOTE — PROGRESS NOTES
Please call patient-    His x-ray shows a possible small avulsion fracture near his right medial malleolus of uncertain age.  Given his pain, I would like him to follow-up with podiatry within the next 1-2 days.  I also would like him to avoid weight bearing on his foot and would like him to use crutches if able.  Please order if he does not have them at home.    Thanks,  Thelma Helms, CNP

## 2017-12-04 NOTE — PATIENT INSTRUCTIONS
Treating Ankle Sprains  Treatment will depend on how bad your sprain is. For a severe sprain, healing may take 3 months or more.  Right after your injury: Use R.I.C.E.    Rest: At first, keep weight off the ankle as much as you can. You may be given crutches to help you walk without putting weight on the ankle.    Ice: Put an ice pack on the ankle for 15 minutes. Remove the pack and wait at least 30 minutes. Repeat for up to 3 days. This helps reduce swelling.    Compression: To reduce swelling and keep the joint stable, you may need to wrap the ankle with an elastic bandage. For more severe sprains, you may need an ankle brace or a cast.    Elevation: To reduce swelling, keep your ankle raised above your heart when you sit or lie down.  Medicine  Your healthcare provider may suggest oral non-steroidal anti-inflammatory medicine (NSAIDs), such as ibuprofen. This relieves the pain and helps reduce any swelling. Be sure to take your medicine as directed.  Contrast baths  After 3 days, soak your ankle in warm water for 30 seconds, then in cool water for 30 seconds. Go back and forth for 5 minutes. Doing this every 2 hours will help keep the swelling down.  Exercises    After about 2 to 3 weeks, you may be given exercises to strengthen the ligaments and muscles in the ankle. Doing these exercises will help prevent another ankle sprain. Exercises may include standing on your toes and then on your heels and doing ankle curls.  Ankle curls    Sit on the edge of a sturdy table or lie on your back.    Pull your toes toward you. Then point them away from you. Repeat for 2 to 3 minutes.   Date Last Reviewed: 9/28/2015 2000-2017 [employer]. [address] [place] [address]. All rights reserved. This information is not intended as a substitute for professional medical care. Always follow your healthcare professional's instructions.      [facility]    If you have any questions regarding  to your visit please contact your care team:     Team Pink:   Clinic Hours Telephone Number   Internal Medicine:  Dr. Laura Helms NP       7am-7pm  Monday - Thursday   7am-5pm  Fridays  [phone]  (Appointment scheduling available 24/7)    Questions about your visit?  Team Line  [phone]   Urgent Care - [place] [facility] - 11am-9pm Monday-Friday Saturday-Sunday- 9am-5pm   [place] - 5pm-9pm Monday-Friday Saturday-Sunday- 9am-5pm  [phone] - [facility]  [phone] - [place]       What options do I have for visits at the clinic other than the traditional office visit?  To expand how we care for you, many of our providers are utilizing electronic visits (e-visits) and telephone visits, when medically appropriate, for interactions with their patients rather than a visit in the clinic.   We also offer nurse visits for many medical concerns. Just like any other service, we will bill your insurance company for this type of visit based on time spent on the phone with your provider. Not all insurance companies cover these visits. Please check with your medical insurance if this type of visit is covered. You will be responsible for any charges that are not paid by your insurance.      E-visits via [employer]:  generally incur a $35.00 fee.  Telephone visits:  Time spent on the phone: *charged based on time that is spent on the phone in increments of 10 minutes. Estimated cost:   5-10 mins $30.00   11-20 mins. $59.00   21-30 mins. $85.00   Use [employer] (secure email communication and access to your chart) to send your primary care provider a message or make an appointment. Ask someone on your Team how to sign up for [employer].    For a Price Quote for your services, please call our Consumer Price Line at [phone].    As always, Thank you for trusting us with your health care needs!    Alysia Torres MA

## 2017-12-04 NOTE — PROGRESS NOTES
SUBJECTIVE:   Ricky Brown is a 67 year old male who presents to clinic today for the following health issues:    Chief Complaint   Patient presents with     RECHECK     bronchitis     Musculoskeletal Problem     right ankle pain x3 days, twisted it. Cramping.       Joint Pain    Onset: x3 days    Description:   Location: right ankle  Character: Sharp    Intensity: 7/10    Progression of Symptoms: worse    Accompanying Signs & Symptoms:  Other symptoms: tingling and swelling    History:   Previous similar pain: no       Precipitating factors:   Trauma or overuse: YES    Alleviating factors:  Improved by: rest/inactivity    Therapies Tried and outcome: see above. Helps a little    Patient complains of pain to his right dorsal foot near his medial mallelolus.  He denies specific injury, but thinks he may have twisted his ankle.  He notes mild swelling as well.  He complains of pain with walking.    Patient was diagnosed with bronchitis several weeks ago and completed course of antibiotics.  He has a history of chronic cough with near syncope.  He has had extensive workup in the past both at [facility] and locally and is thought to have had a sensitive cough and gag reflex.  He wants to ensure his bronchitis is gone as not to exacerbate this.  He denies any symptoms at this time.        Problem list and histories reviewed & adjusted, as indicated.  Additional history: as documented    Patient Active Problem List   Diagnosis     HL (hearing loss)     Erectile dysfunction     Keratoglobus, ou     Pseudophakia, PCL, od; ACL, os     Posterior vitreous detachment, od     Epiretinal membrane, mild, od     Advanced directives, counseling/discussion     HDL deficiency     JOSE JUAN (obstructive sleep apnea)-severe (AHI 32)     BCC (basal cell carcinoma)     Cardiomyopathy (H)     Obstructive sleep apnea     Near syncope     RCT (rotator cuff tear)     Disorder of bursae and tendons in shoulder region     Other postprocedural  status(V45.89)     Iritis, os     Cramp of limb     Essential hypertension with goal blood pressure less than 140/90     Hyperlipidemia LDL goal <70     overwieght BMI > 35     Visit for suture removal     Past Surgical History:   Procedure Laterality Date     ARTHROSCOPY SHOULDER BICEPS TENODESIS REPAIR  2/27/2014    Procedure: ARTHROSCOPY SHOULDER BICEPS TENODESIS REPAIR;;  Surgeon: Michael Hagen MD;  [facility]     ARTHROSCOPY SHOULDER ROTATOR CUFF REPAIR  2/27/2014    Procedure: ARTHROSCOPY SHOULDER ROTATOR CUFF REPAIR;  Right Shoulder Arthroscopic Rotator Cuff Repair,  Subacromial Decompression, Biceps Tenodesis, Distal Clavicle Excision   ;  Surgeon: Michael Hagen MD;  [facility]     BIOPSY       CARDIAC SURGERY       COLONOSCOPY       EXCHANGE INTRAOCULAR LENS IMPLANT  2005    left eye - first, recentered PCL with iris fixation; then IOLX with ACL     EXTRACAPSULAR CATARACT EXTRATION WITH INTRAOCULAR LENS IMPLANT  2005    both eyes (elsewhere)     TURBINOPLASTY  12/11/2013    Procedure: TURBINOPLASTY;;  Surgeon: Lisset Parsons MD;  Location:  OR     UVULOPALATOPHARYNGOPLASTY  12/11/2013    Procedure: UVULOPALATOPHARYNGOPLASTY;  Uvulopalatopharyngoplasty, Turbiante Reduction;  Surgeon: Lisset Parsons MD;  Location:  OR       Social History   Substance Use Topics     Smoking status: Former Smoker     Packs/day: 0.50     Years: 2.00     Types: Cigarettes     Quit date: 12/12/1996     Smokeless tobacco: Never Used     Alcohol use 5.0 oz/week      Comment: 1-2 seth/ gin per 5-6 night per week     Family History   Problem Relation Age of Onset     DIABETES Father      CEREBROVASCULAR DISEASE Father      Obesity Father      HEART DISEASE Father      Coronary Artery Disease Father      Hypertension Father      Lipids Sister      C.A.D. No family hx of      CANCER No family hx of      Glaucoma No family hx of      Macular Degeneration No family hx of           Current Outpatient Prescriptions   Medication Sig Dispense Refill     order for DME Equipment being ordered: Air splint, ace wrap 1 each 0     guaiFENesin-codeine (ROBITUSSIN AC) 100-10 MG/5ML SOLN solution Take 5 mLs by mouth every 6 hours as needed for cough 120 mL 0     atorvastatin (LIPITOR) 10 MG tablet Take 1 tablet (10 mg) by mouth daily 90 tablet 2     losartan (COZAAR) 50 MG tablet Take 1 tablet (50 mg) by mouth daily 90 tablet 1     metoprolol (TOPROL XL) 50 MG 24 hr tablet Take 1 tablet (50 mg) by mouth daily 90 tablet 1     fluticasone (FLONASE) 50 MCG/ACT spray Spray 1-2 sprays into both nostrils daily 3 Bottle 3     diphenoxylate-atropine (LOMOTIL) 2.5-0.025 MG per tablet Take 1 tablet by mouth daily as needed for diarrhea Patient only takes 1 tablet as needed 30 tablet 5     ipratropium (ATROVENT) 0.06 % nasal spray Spray 2 sprays into both nostrils 4 times daily as needed for rhinitis Refill when requested 3 Box 11     aspirin 81 MG tablet Take 1 tablet (81 mg) by mouth daily 90 tablet 3     Calcium Carbonate Antacid (TUMS CALCIUM FOR LIFE BONE PO) Take  by mouth.       Multiple Vitamin (MULTIVITAMINS PO) Take  by mouth daily.       order for DME Equipment being ordered: crutches 1 each 0     Allergies   Allergen Reactions     Lisinopril Cough     BP Readings from Last 3 Encounters:   12/04/17 144/90   11/21/17 142/80   10/01/17 (!) 162/92    Wt Readings from Last 3 Encounters:   12/04/17 243 lb 9.6 oz (110.5 kg)   11/21/17 244 lb (110.7 kg)   10/01/17 249 lb (112.9 kg)                  Labs reviewed in EPIC        Reviewed and updated as needed this visit by clinical staffTobacco  Allergies  Meds  Med Hx  Surg Hx  Fam Hx  Soc Hx      Reviewed and updated as needed this visit by Provider         ROS:  Constitutional, HEENT, cardiovascular, pulmonary, gi and gu systems are negative, except as otherwise noted.      OBJECTIVE:   /90 (BP Location: Left arm, Cuff Size: Adult Regular)   Pulse 89  Temp 98.4  F (36.9  C) (Oral)  Wt 243 lb 9.6 oz (110.5 kg)  SpO2 93%  BMI 38.15 kg/m2  Body mass index is 38.15 kg/(m^2).  GENERAL: healthy, alert and no distress  RESP: lungs clear to auscultation - no rales, rhonchi or wheezes  CV: regular rate and rhythm, normal S1 S2, no S3 or S4, no murmur, click or rub, no peripheral edema and peripheral pulses strong  MS: Right foot: tenderness to dorsal foot anterior to medial malleolus, pain present with range of motion, mild edema present.  Negative Talar tilt, negative anterior/posterior drawer test.    Diagnostic Test Results:  pending    ASSESSMENT/PLAN:     1. Pain in joint, ankle and foot, right  Possible spray, rule out fracture.  - XR Ankle Right G/E 3 Views; Future  - order for DME; Equipment being ordered: Air splint, ace wrap  Dispense: 1 each; Refill: 0    2. Acute bronchitis, unspecified organism  Resolved.      FUTURE APPOINTMENTS:       - Follow-up for annual visit or as needed    MANDO Caruso CNP  [facility]

## 2017-12-05 ENCOUNTER — OFFICE VISIT (OUTPATIENT)
Dept: PODIATRY | Facility: CLINIC | Age: 67
End: 2017-12-05
Payer: COMMERCIAL

## 2017-12-05 ENCOUNTER — RADIANT APPOINTMENT (OUTPATIENT)
Dept: GENERAL RADIOLOGY | Facility: CLINIC | Age: 67
End: 2017-12-05
Attending: PODIATRIST
Payer: COMMERCIAL

## 2017-12-05 VITALS — HEART RATE: 90 BPM | BODY MASS INDEX: 38.06 KG/M2 | WEIGHT: 243 LBS | OXYGEN SATURATION: 94 %

## 2017-12-05 DIAGNOSIS — M79.671 RIGHT FOOT PAIN: Primary | ICD-10-CM

## 2017-12-05 DIAGNOSIS — M79.671 RIGHT FOOT PAIN: ICD-10-CM

## 2017-12-05 PROCEDURE — 73630 X-RAY EXAM OF FOOT: CPT | Mod: RT

## 2017-12-05 PROCEDURE — 99213 OFFICE O/P EST LOW 20 MIN: CPT | Performed by: PODIATRIST

## 2017-12-05 NOTE — LETTER
12/5/2017         RE: Ricky Brown  [address]  [facility] [address]        Dear Colleague,    Thank you for referring your patient, Ricky Brown, to the [facility]. Please see a copy of my visit note below.    S:  Patient seen in consult from Thelma Helms and complains of foot pain.  Points to dorsum of right navicular.  Has had this for 5 days.  Describes it as a burning pain.  Aggrevated by activity and relieved by rest.  Has edema.  No obvious trauma.  Denies  weakness, ecchymosis, numbness,     ROS:  A 10-point review of systems was performed and is positive for that noted in the HPI and as seen below.  All other areas are negative.        Allergies   Allergen Reactions     Lisinopril Cough       Current Outpatient Prescriptions   Medication Sig Dispense Refill     order for DME Equipment being ordered: Air splint, ace wrap 1 each 0     order for DME Equipment being ordered: crutches 1 each 0     guaiFENesin-codeine (ROBITUSSIN AC) 100-10 MG/5ML SOLN solution Take 5 mLs by mouth every 6 hours as needed for cough 120 mL 0     atorvastatin (LIPITOR) 10 MG tablet Take 1 tablet (10 mg) by mouth daily 90 tablet 2     losartan (COZAAR) 50 MG tablet Take 1 tablet (50 mg) by mouth daily 90 tablet 1     metoprolol (TOPROL XL) 50 MG 24 hr tablet Take 1 tablet (50 mg) by mouth daily 90 tablet 1     fluticasone (FLONASE) 50 MCG/ACT spray Spray 1-2 sprays into both nostrils daily 3 Bottle 3     diphenoxylate-atropine (LOMOTIL) 2.5-0.025 MG per tablet Take 1 tablet by mouth daily as needed for diarrhea Patient only takes 1 tablet as needed 30 tablet 5     ipratropium (ATROVENT) 0.06 % nasal spray Spray 2 sprays into both nostrils 4 times daily as needed for rhinitis Refill when requested 3 Box 11     aspirin 81 MG tablet Take 1 tablet (81 mg) by mouth daily 90 tablet 3     Calcium Carbonate Antacid (TUMS CALCIUM FOR LIFE BONE PO) Take  by mouth.       Multiple Vitamin  (MULTIVITAMINS PO) Take  by mouth daily.         Patient Active Problem List   Diagnosis     HL (hearing loss)     Erectile dysfunction     Keratoglobus, ou     Pseudophakia, PCL, od; ACL, os     Posterior vitreous detachment, od     Epiretinal membrane, mild, od     Advanced directives, counseling/discussion     HDL deficiency     JOSE JUAN (obstructive sleep apnea)-severe (AHI 32)     BCC (basal cell carcinoma)     Cardiomyopathy (H)     Obstructive sleep apnea     Near syncope     RCT (rotator cuff tear)     Disorder of bursae and tendons in shoulder region     Other postprocedural status(V45.89)     Iritis, os     Cramp of limb     Essential hypertension with goal blood pressure less than 140/90     Hyperlipidemia LDL goal <70     overwieght BMI > 35     Visit for suture removal       Past Medical History:   Diagnosis Date     Arthritis      BCC (basal cell carcinoma)     right shoulder      Cardiomyopathy (H)      Colon adenomas 9/20/11     ED (erectile dysfunction)      HTN (hypertension)      Obesity      JOSE JUAN (obstructive sleep apnea)        Past Surgical History:   Procedure Laterality Date     ARTHROSCOPY SHOULDER BICEPS TENODESIS REPAIR  2/27/2014    Procedure: ARTHROSCOPY SHOULDER BICEPS TENODESIS REPAIR;;  Surgeon: Michael Hagen MD;  [facility]     ARTHROSCOPY SHOULDER ROTATOR CUFF REPAIR  2/27/2014    Procedure: ARTHROSCOPY SHOULDER ROTATOR CUFF REPAIR;  Right Shoulder Arthroscopic Rotator Cuff Repair,  Subacromial Decompression, Biceps Tenodesis, Distal Clavicle Excision   ;  Surgeon: Michael Hagen MD;  [facility]     BIOPSY       CARDIAC SURGERY       COLONOSCOPY       EXCHANGE INTRAOCULAR LENS IMPLANT  2005    left eye - first, recentered PCL with iris fixation; then IOLX with ACL     EXTRACAPSULAR CATARACT EXTRATION WITH INTRAOCULAR LENS IMPLANT  2005    both eyes (elsewhere)     TURBINOPLASTY  12/11/2013    Procedure: TURBINOPLASTY;;  Surgeon: Lisset Parsons,  MD;  [facility]     UVULOPALATOPHARYNGOPLASTY  12/11/2013    Procedure: UVULOPALATOPHARYNGOPLASTY;  Uvulopalatopharyngoplasty, Turbiante Reduction;  Surgeon: Lisset Parsons MD;  [facility]: [facility]       Family History   Problem Relation Age of Onset     DIABETES Father      CEREBROVASCULAR DISEASE Father      Obesity Father      HEART DISEASE Father      Coronary Artery Disease Father      Hypertension Father      Lipids Sister      C.A.D. No family hx of      CANCER No family hx of      Glaucoma No family hx of      Macular Degeneration No family hx of        Social History   Substance Use Topics     Smoking status: Former Smoker     Packs/day: 0.50     Years: 2.00     Types: Cigarettes     Quit date: 12/12/1996     Smokeless tobacco: Never Used     Alcohol use 5.0 oz/week      Comment: 1-2 seth/ gin per 5-6 night per week         O: Pulse 90  Wt 243 lb (110.2 kg)  SpO2 94%  BMI 38.06 kg/m2.      Constitutional/ general:  Pt is in no apparent distress, appears well-nourished.  Cooperative with history and physical exam.  Seen with his wife.     Psych:  The patient answered questions appropriately.  Normal affect.  Seems to have reasonable expectations, in terms of treatment.     Eyes:  Visual scanning/ tracking without deficit.     Ears:  Response to auditory stimuli is normal.  negative hearing aid devices.  Auricles in proper alignment.     Lymphatic:  Popliteal lymph nodes not enlarged.     Lungs:  Non labored breathing, non labored speech. No cough.  No audible wheezing. Even, quiet breathing.       Vascular:  positive pedal pulses bilaterally for both the DP and PT arteries.  CFT < 3 sec.  positive ankle edema.  positive pedal hair growth.    Neuro:  Alert and oriented x 3. Coordinated gait.  Light touch sensation is intact to the L4, L5, S1 distributions. No obvious deficits.  No evidence of neurological-based weakness, spasticity, or contracture in the lower extremities.      Derm: Normal  texture and turgor.  No erythema, ecchymosis, or cyanosis.      Musculoskeletal:  Normal arch with weightbearing.  No forefoot or rear foot deformities noted.  MS 5/5 all compartments.  Normal ROM all fore foot and rearfoot joints.  No equinus.  Pain with palpation and ROM of the right dorsum of navicular.  Slight edema.  No pain with stressing any muscle compartments.  No pain palpation any tendons.  No erythema or ecchymosis or masses noted.  Xrays normal, slight bossing    A:  Right navicular pain suspicious for stress fracture    P:  X-rays taken today.  Explained pain over navicular and may have stress fracture here.  Will dispense cam walker today.  Patient to wear this at all times while walking.  When not walking patient will take this off and do ROM to prevent blood clot and joint stiffness.  Patient will not sleep with this on.  Will get mri to confirm diagnosis and call with results.  Thank you for allowing me participate in the care of this patient.        Anant Peraza DPM, FACFAS         Again, thank you for allowing me to participate in the care of your patient.        Sincerely,        Anant Peraza DPM

## 2017-12-05 NOTE — PROGRESS NOTES
S:  Patient seen in consult from Thelma Helms and complains of foot pain.  Points to dorsum of right navicular.  Has had this for 5 days.  Describes it as a burning pain.  Aggrevated by activity and relieved by rest.  Has edema.  No obvious trauma.  Denies  weakness, ecchymosis, numbness,     ROS:  A 10-point review of systems was performed and is positive for that noted in the HPI and as seen below.  All other areas are negative.        Allergies   Allergen Reactions     Lisinopril Cough       Current Outpatient Prescriptions   Medication Sig Dispense Refill     order for DME Equipment being ordered: Air splint, ace wrap 1 each 0     order for DME Equipment being ordered: crutches 1 each 0     guaiFENesin-codeine (ROBITUSSIN AC) 100-10 MG/5ML SOLN solution Take 5 mLs by mouth every 6 hours as needed for cough 120 mL 0     atorvastatin (LIPITOR) 10 MG tablet Take 1 tablet (10 mg) by mouth daily 90 tablet 2     losartan (COZAAR) 50 MG tablet Take 1 tablet (50 mg) by mouth daily 90 tablet 1     metoprolol (TOPROL XL) 50 MG 24 hr tablet Take 1 tablet (50 mg) by mouth daily 90 tablet 1     fluticasone (FLONASE) 50 MCG/ACT spray Spray 1-2 sprays into both nostrils daily 3 Bottle 3     diphenoxylate-atropine (LOMOTIL) 2.5-0.025 MG per tablet Take 1 tablet by mouth daily as needed for diarrhea Patient only takes 1 tablet as needed 30 tablet 5     ipratropium (ATROVENT) 0.06 % nasal spray Spray 2 sprays into both nostrils 4 times daily as needed for rhinitis Refill when requested 3 Box 11     aspirin 81 MG tablet Take 1 tablet (81 mg) by mouth daily 90 tablet 3     Calcium Carbonate Antacid (TUMS CALCIUM FOR LIFE BONE PO) Take  by mouth.       Multiple Vitamin (MULTIVITAMINS PO) Take  by mouth daily.         Patient Active Problem List   Diagnosis     HL (hearing loss)     Erectile dysfunction     Keratoglobus, ou     Pseudophakia, PCL, od; ACL, os     Posterior vitreous detachment, od     Epiretinal membrane, mild, od      Advanced directives, counseling/discussion     HDL deficiency     JOSE JUAN (obstructive sleep apnea)-severe (AHI 32)     BCC (basal cell carcinoma)     Cardiomyopathy (H)     Obstructive sleep apnea     Near syncope     RCT (rotator cuff tear)     Disorder of bursae and tendons in shoulder region     Other postprocedural status(V45.89)     Iritis, os     Cramp of limb     Essential hypertension with goal blood pressure less than 140/90     Hyperlipidemia LDL goal <70     overwieght BMI > 35     Visit for suture removal       Past Medical History:   Diagnosis Date     Arthritis      BCC (basal cell carcinoma)     right shoulder      Cardiomyopathy (H)      Colon adenomas 9/20/11     ED (erectile dysfunction)      HTN (hypertension)      Obesity      JOSE JUAN (obstructive sleep apnea)        Past Surgical History:   Procedure Laterality Date     ARTHROSCOPY SHOULDER BICEPS TENODESIS REPAIR  2/27/2014    Procedure: ARTHROSCOPY SHOULDER BICEPS TENODESIS REPAIR;;  Surgeon: Michael Hagen MD;  [facility]     ARTHROSCOPY SHOULDER ROTATOR CUFF REPAIR  2/27/2014    Procedure: ARTHROSCOPY SHOULDER ROTATOR CUFF REPAIR;  Right Shoulder Arthroscopic Rotator Cuff Repair,  Subacromial Decompression, Biceps Tenodesis, Distal Clavicle Excision   ;  Surgeon: Michael Hagen MD;  [facility]     BIOPSY       CARDIAC SURGERY       COLONOSCOPY       EXCHANGE INTRAOCULAR LENS IMPLANT  2005    left eye - first, recentered PCL with iris fixation; then IOLX with ACL     EXTRACAPSULAR CATARACT EXTRATION WITH INTRAOCULAR LENS IMPLANT  2005    both eyes (elsewhere)     TURBINOPLASTY  12/11/2013    Procedure: TURBINOPLASTY;;  Surgeon: Lisset Parsons MD;  Location:  OR     UVULOPALATOPHARYNGOPLASTY  12/11/2013    Procedure: UVULOPALATOPHARYNGOPLASTY;  Uvulopalatopharyngoplasty, Turbiante Reduction;  Surgeon: Lisset Parsons MD;  Location:  OR       Family History   Problem Relation Age of Onset      DIABETES Father      CEREBROVASCULAR DISEASE Father      Obesity Father      HEART DISEASE Father      Coronary Artery Disease Father      Hypertension Father      Lipids Sister      C.A.D. No family hx of      CANCER No family hx of      Glaucoma No family hx of      Macular Degeneration No family hx of        Social History   Substance Use Topics     Smoking status: Former Smoker     Packs/day: 0.50     Years: 2.00     Types: Cigarettes     Quit date: 12/12/1996     Smokeless tobacco: Never Used     Alcohol use 5.0 oz/week      Comment: 1-2 seth/ gin per 5-6 night per week         O: Pulse 90  Wt 243 lb (110.2 kg)  SpO2 94%  BMI 38.06 kg/m2.      Constitutional/ general:  Pt is in no apparent distress, appears well-nourished.  Cooperative with history and physical exam.  Seen with his wife.     Psych:  The patient answered questions appropriately.  Normal affect.  Seems to have reasonable expectations, in terms of treatment.     Eyes:  Visual scanning/ tracking without deficit.     Ears:  Response to auditory stimuli is normal.  negative hearing aid devices.  Auricles in proper alignment.     Lymphatic:  Popliteal lymph nodes not enlarged.     Lungs:  Non labored breathing, non labored speech. No cough.  No audible wheezing. Even, quiet breathing.       Vascular:  positive pedal pulses bilaterally for both the DP and PT arteries.  CFT < 3 sec.  positive ankle edema.  positive pedal hair growth.    Neuro:  Alert and oriented x 3. Coordinated gait.  Light touch sensation is intact to the L4, L5, S1 distributions. No obvious deficits.  No evidence of neurological-based weakness, spasticity, or contracture in the lower extremities.      Derm: Normal texture and turgor.  No erythema, ecchymosis, or cyanosis.      Musculoskeletal:  Normal arch with weightbearing.  No forefoot or rear foot deformities noted.  MS 5/5 all compartments.  Normal ROM all fore foot and rearfoot joints.  No equinus.  Pain with  palpation and ROM of the right dorsum of navicular.  Slight edema.  No pain with stressing any muscle compartments.  No pain palpation any tendons.  No erythema or ecchymosis or masses noted.  Xrays normal, slight bossing    A:  Right navicular pain suspicious for stress fracture    P:  X-rays taken today.  Explained pain over navicular and may have stress fracture here.  Will dispense cam walker today.  Patient to wear this at all times while walking.  When not walking patient will take this off and do ROM to prevent blood clot and joint stiffness.  Patient will not sleep with this on.  Will get mri to confirm diagnosis and call with results.  Thank you for allowing me participate in the care of this patient.        Anant Peraza DPM, FACFAS

## 2017-12-05 NOTE — NURSING NOTE
"Chief Complaint   Patient presents with     Ankle Pain     right injuired 2 days ago no sure how it happened       Initial Pulse 90  Wt 243 lb (110.2 kg)  SpO2 94%  BMI 38.06 kg/m2 Estimated body mass index is 38.06 kg/(m^2) as calculated from the following:    Height as of 7/13/17: 5' 7\" (1.702 m).    Weight as of this encounter: 243 lb (110.2 kg).  Medication Reconciliation: complete  "

## 2017-12-05 NOTE — PATIENT INSTRUCTIONS
We wish you continued good healing. If you have any questions or concerns, please do not hesitate to contact us at [phone]      Please remember to call and schedule a follow up appointment if one was recommended at your earliest convenience.   PODIATRY CLINIC HOURS  TELEPHONE NUMBER    Dr. Anant Peraza D.P.M [facility]    Clinics:  [facility]        Betina Clarke MA  Medical Assistant  Tuesday 1PM-6PM  [facility]  Wednesday 7AM-2PM  [place]  Thursday 10AM-6PM  [place] Friday 7AM-345PM  [place]  Specialty schedulers:   [phone] to make an appointment with any Specialty Provider.        Urgent Care locations:    [facility] Monday-Friday 5 pm - 9 pm. Saturday-Sunday 9 am -5pm    Monday-Friday 11 am - 9 pm Saturday 9 am - 5 pm     Monday-Sunday 12 noon-8PM [phone]     If you need a medication refill, please contact us you may need lab work and/or a follow up visit prior to your refill (i.e. Antifungal medications).    Cubitot (secure e-mail communication and access to your chart) to send a message or to make an appointment.    If MRI needed please call [facility] at [phone]        Weight management plan: Patient was referred to their PCP to discuss a diet and exercise plan.

## 2017-12-05 NOTE — MR AVS SNAPSHOT
After Visit Summary   12/5/2017    Ricky Brown    MRN: [id number]           Patient Information     Date Of Birth          1950        Visit Information        Provider Department      12/5/2017 2:15 PM Anant Peraza, SEBASTIAN [facility]        Today's Diagnoses     Right foot pain    -  1      Care Instructions    We wish you continued good healing. If you have any questions or concerns, please do not hesitate to contact us at [phone]      Please remember to call and schedule a follow up appointment if one was recommended at your earliest convenience.   PODIATRY CLINIC HOURS  TELEPHONE NUMBER    Dr. Anant MORINPJEWELL FAC FAS    Clinics:  [facility]        Betina Clarke MA  Medical Assistant  Tuesday 1PM-6PM  [facility]  Wednesday 7AM-2PM  [place]  Thursday 10AM-6PM  [place]  Friday 7AM-345PM  [place]  Specialty schedulers:   [phone] to make an appointment with any Specialty Provider.        Urgent Care locations:    [facility] Monday-Friday 5 pm - 9 pm. Saturday-Sunday 9 am -5pm    Monday-Friday 11 am - 9 pm Saturday 9 am - 5 pm     Monday-Sunday 12 noon-8PM [phone]     If you need a medication refill, please contact us you may need lab work and/or a follow up visit prior to your refill (i.e. Antifungal medications).    [email or web address] (secure e-mail communication and access to your chart) to send a message or to make an appointment.    If MRI needed please call [facility] at [phone]        Weight management plan: Patient was referred to their PCP to discuss a diet and exercise plan.            Follow-ups after your visit        Future tests that were ordered for you today     Open Future Orders        Priority Expected Expires Ordered    MR Foot Right w/o & w Contrast Routine  12/5/2018 12/5/2017             Who to contact     If you have questions or need follow up information about today's clinic visit or your schedule please contact [facility] directly at [phone].  Normal or non-critical lab and imaging results will be communicated to you by MyChart, letter or phone within 4 business days after the clinic has received the results. If you do not hear from us within 7 days, please contact the clinic through MyChart or phone. If you have a critical or abnormal lab result, we will notify you by phone as soon as possible.  Submit refill requests through Easy-Point or call your pharmacy and they will forward the refill request to us. Please allow 3 business days for your refill to be completed.          Additional Information About Your Visit        RocketBoltharEnjoyor Information     Easy-Point gives you secure access to your electronic health record. If you see a primary care provider, you can also send messages to your care team and make appointments. If you have questions, please call your primary care clinic.  If you do not have a primary care provider, please call [phone] and they will assist you.        Care EveryWhere ID     This is your Care EveryWhere ID. This could be used by other organizations to access your [place] medical records  [id number]        Your Vitals Were     Pulse Pulse Oximetry BMI (Body Mass Index)             90 94% 38.06 kg/m2          Blood Pressure from Last 3 Encounters:   12/04/17 144/90   11/21/17 142/80   10/01/17 (!) 162/92    Weight from Last 3 Encounters:   12/05/17 243 lb (110.2 kg)   12/04/17 243 lb 9.6 oz (110.5 kg)   11/21/17 244 lb (110.7 kg)               Primary Care Provider Office Phone # Fax #    Armen Chavez [phone] [address] [facility] [address]        Equal Access to Services     NIKHIL ABBOTT AH: Taisha Rebollar, wacarolina julian, qaybta kaalscottie rajan. So  [facility] [phone].    ATENCIÓN: Si negro tran, tiene a hope disposición servicios gratuitos de asistencia lingüística. Josefa laurent [phone].    We comply with applicable federal civil rights laws and [place] laws. We do not discriminate on the basis of race, color, national origin, age, disability, sex, sexual orientation, or gender identity.            Thank you!     [facility] you [facility]  [facility]. Our goal is always to provide you with excellent care. Hearing back from our patients is one way we can continue to improve our services. Please take a few minutes to complete the written survey that you may receive in the mail after your visit with us. Thank you!             Your Updated Medication List - Protect others around you: Learn how to safely use, store and throw away your medicines at www.disposemymeds.org.          This list is accurate as of: 12/5/17  3:17 PM.  Always use your most recent med list.                   Brand Name Dispense Instructions for use Diagnosis    aspirin 81 MG tablet     90 tablet    Take 1 tablet (81 mg) by mouth daily    Cardiomyopathy, idiopathic (H)       atorvastatin 10 MG tablet    LIPITOR    90 tablet    Take 1 tablet (10 mg) by mouth daily    Hyperlipidemia LDL goal <70       diphenoxylate-atropine 2.5-0.025 MG per tablet    LOMOTIL    30 tablet    Take 1 tablet by mouth daily as needed for diarrhea Patient only takes 1 tablet as needed    Loose stools       fluticasone 50 MCG/ACT spray    FLONASE    3 Bottle    Spray 1-2 sprays into both nostrils daily    Chronic rhinitis       guaiFENesin-codeine 100-10 MG/5ML Soln solution    ROBITUSSIN AC    120 mL    Take 5 mLs by mouth every 6 hours as needed for cough    Acute bronchitis, unspecified organism       ipratropium 0.06 % spray    ATROVENT    3 Box    Spray 2 sprays into both nostrils 4 times daily as needed for rhinitis Refill when requested    Cough       losartan 50 MG tablet    COZAAR     90 tablet    Take 1 tablet (50 mg) by mouth daily    Non-ischemic cardiomyopathy (H)       metoprolol 50 MG 24 hr tablet    TOPROL XL    90 tablet    Take 1 tablet (50 mg) by mouth daily    Essential hypertension with goal blood pressure less than 140/90, Cardiomyopathy (H)       MULTIVITAMINS PO      Take  by mouth daily.    Routine general medical examination at a health care facility, Erectile dysfunction, Rectal itching, HTN (hypertension)       * order for DME     1 each    Equipment being ordered: Air splint, ace wrap    Pain in joint, ankle and foot, right       * order for DME     1 each    Equipment being ordered: crutches    Pain in joint, ankle and foot, right, Avulsion fracture of ankle       TUMS CALCIUM FOR LIFE BONE PO      Take  by mouth.        * Notice:  This list has 2 medication(s) that are the same as other medications prescribed for you. Read the directions carefully, and ask your doctor or other care provider to review them with you.

## 2017-12-07 ENCOUNTER — HOSPITAL ENCOUNTER (OUTPATIENT)
Dept: MRI IMAGING | Facility: CLINIC | Age: 67
Discharge: HOME | End: 2017-12-07
Attending: PODIATRIST | Admitting: PODIATRIST
Payer: MEDICARE

## 2017-12-07 DIAGNOSIS — M79.671 RIGHT FOOT PAIN: ICD-10-CM

## 2017-12-07 PROCEDURE — 73718 MRI LOWER EXTREMITY W/O DYE: CPT | Mod: RT

## 2017-12-28 ENCOUNTER — OFFICE VISIT (OUTPATIENT)
Dept: PODIATRY | Facility: CLINIC | Age: 67
End: 2017-12-28
Payer: COMMERCIAL

## 2017-12-28 VITALS — HEART RATE: 86 BPM | OXYGEN SATURATION: 97 %

## 2017-12-28 DIAGNOSIS — M79.671 RIGHT FOOT PAIN: Primary | ICD-10-CM

## 2017-12-28 PROCEDURE — 99213 OFFICE O/P EST LOW 20 MIN: CPT | Performed by: PODIATRIST

## 2017-12-28 NOTE — NURSING NOTE
"Chief Complaint   Patient presents with     Follow Up For     Fracture     Right foot still painful in the AM       Initial Pulse 86  SpO2 97% Estimated body mass index is 38.06 kg/(m^2) as calculated from the following:    Height as of 7/13/17: 5' 7\" (1.702 m).    Weight as of 12/5/17: 243 lb (110.2 kg).  Medication Reconciliation: complete  "

## 2017-12-28 NOTE — PATIENT INSTRUCTIONS
We wish you continued good healing. If you have any questions or concerns, please do not hesitate to contact us at [phone]      Please remember to call and schedule a follow up appointment if one was recommended at your earliest convenience.   PODIATRY CLINIC HOURS  TELEPHONE NUMBER    Dr. Anant Peraza D.P.M [facility]    Clinics:  [facility]        Betina Clarke MA  Medical Assistant  Tuesday 1PM-6PM  [facility]  Wednesday 7AM-2PM  [place]  Thursday 10AM-6PM  [place] Friday 7AM-345PM  [place]  Specialty schedulers:   [phone] to make an appointment with any Specialty Provider.        Urgent Care locations:    [facility] Monday-Friday 5 pm - 9 pm. Saturday-Sunday 9 am -5pm    Monday-Friday 11 am - 9 pm Saturday 9 am - 5 pm     Monday-Sunday 12 noon-8PM [phone]     If you need a medication refill, please contact us you may need lab work and/or a follow up visit prior to your refill (i.e. Antifungal medications).    [employer]let (secure e-mail communication and access to your chart) to send a message or to make an appointment.    If MRI needed please call [facility] at [phone]        Weight management plan: Patient was referred to their PCP to discuss a diet and exercise plan.

## 2017-12-28 NOTE — PROGRESS NOTES
S:  12/5/17  Patient seen in consult from Thelma Helms and complains of foot pain.  Points to dorsum of right navicular.  Has had this for 5 days.  Describes it as a burning pain.  Aggrevated by activity and relieved by rest.  Has edema.  No obvious trauma.  Denies  weakness, ecchymosis, numbness,     12/28/17  Patient only wore cam walker for one day then stopped.  Better while he was wearing this.  His pain is unchanged.  He points to medial ankle.  Aggravated by activity and relieved by rest.  Works intermittently.      ROS:   Denies  weakness, edema, erythema, ecchymosis, numbness       Allergies   Allergen Reactions     Lisinopril Cough       Current Outpatient Prescriptions   Medication Sig Dispense Refill     order for DME Equipment being ordered: Air splint, ace wrap 1 each 0     order for DME Equipment being ordered: crutches 1 each 0     guaiFENesin-codeine (ROBITUSSIN AC) 100-10 MG/5ML SOLN solution Take 5 mLs by mouth every 6 hours as needed for cough 120 mL 0     atorvastatin (LIPITOR) 10 MG tablet Take 1 tablet (10 mg) by mouth daily 90 tablet 2     losartan (COZAAR) 50 MG tablet Take 1 tablet (50 mg) by mouth daily 90 tablet 1     metoprolol (TOPROL XL) 50 MG 24 hr tablet Take 1 tablet (50 mg) by mouth daily 90 tablet 1     fluticasone (FLONASE) 50 MCG/ACT spray Spray 1-2 sprays into both nostrils daily 3 Bottle 3     diphenoxylate-atropine (LOMOTIL) 2.5-0.025 MG per tablet Take 1 tablet by mouth daily as needed for diarrhea Patient only takes 1 tablet as needed 30 tablet 5     ipratropium (ATROVENT) 0.06 % nasal spray Spray 2 sprays into both nostrils 4 times daily as needed for rhinitis Refill when requested 3 Box 11     aspirin 81 MG tablet Take 1 tablet (81 mg) by mouth daily 90 tablet 3     Calcium Carbonate Antacid (TUMS CALCIUM FOR LIFE BONE PO) Take  by mouth.       Multiple Vitamin (MULTIVITAMINS PO) Take  by mouth daily.         Patient Active Problem List   Diagnosis     HL (hearing  loss)     Erectile dysfunction     Keratoglobus, ou     Pseudophakia, PCL, od; ACL, os     Posterior vitreous detachment, od     Epiretinal membrane, mild, od     Advanced directives, counseling/discussion     HDL deficiency     JOSE JUAN (obstructive sleep apnea)-severe (AHI 32)     BCC (basal cell carcinoma)     Cardiomyopathy (H)     Obstructive sleep apnea     Near syncope     RCT (rotator cuff tear)     Disorder of bursae and tendons in shoulder region     Other postprocedural status(V45.89)     Iritis, os     Cramp of limb     Essential hypertension with goal blood pressure less than 140/90     Hyperlipidemia LDL goal <70     overwieght BMI > 35     Visit for suture removal       Past Medical History:   Diagnosis Date     Arthritis      BCC (basal cell carcinoma)     right shoulder      Cardiomyopathy (H)      Colon adenomas 9/20/11     ED (erectile dysfunction)      HTN (hypertension)      Obesity      JOSE JUAN (obstructive sleep apnea)        Past Surgical History:   Procedure Laterality Date     ARTHROSCOPY SHOULDER BICEPS TENODESIS REPAIR  2/27/2014    Procedure: ARTHROSCOPY SHOULDER BICEPS TENODESIS REPAIR;;  Surgeon: Michael Hagen MD;  [facility]     ARTHROSCOPY SHOULDER ROTATOR CUFF REPAIR  2/27/2014    Procedure: ARTHROSCOPY SHOULDER ROTATOR CUFF REPAIR;  Right Shoulder Arthroscopic Rotator Cuff Repair,  Subacromial Decompression, Biceps Tenodesis, Distal Clavicle Excision   ;  Surgeon: Michael Hagen MD;  [facility]     BIOPSY       CARDIAC SURGERY       COLONOSCOPY       EXCHANGE INTRAOCULAR LENS IMPLANT  2005    left eye - first, recentered PCL with iris fixation; then IOLX with ACL     EXTRACAPSULAR CATARACT EXTRATION WITH INTRAOCULAR LENS IMPLANT  2005    both eyes (elsewhere)     TURBINOPLASTY  12/11/2013    Procedure: TURBINOPLASTY;;  Surgeon: Lisset Parsons MD;  [facility]     UVULOPALATOPHARYNGOPLASTY  12/11/2013    Procedure: UVULOPALATOPHARYNGOPLASTY;   Uvulopalatopharyngoplasty, Turbiante Reduction;  Surgeon: Lisset Parsons MD;  [facility]       Family History   Problem Relation Age of Onset     DIABETES Father      CEREBROVASCULAR DISEASE Father      Obesity Father      HEART DISEASE Father      Coronary Artery Disease Father      Hypertension Father      Lipids Sister      C.A.D. No family hx of      CANCER No family hx of      Glaucoma No family hx of      Macular Degeneration No family hx of        Social History   Substance Use Topics     Smoking status: Former Smoker     Packs/day: 0.50     Years: 2.00     Types: Cigarettes     Quit date: 12/12/1996     Smokeless tobacco: Never Used     Alcohol use 5.0 oz/week      Comment: 1-2 seth/ gin per 5-6 night per week         O: Pulse 86  SpO2 97%.      Constitutional/ general:  Pt is in no apparent distress, appears well-nourished.  Cooperative with history and physical exam.  Seen with his wife.     Psych:  The patient answered questions appropriately.  Normal affect.  Seems to have reasonable expectations, in terms of treatment.     Eyes:  Visual scanning/ tracking without deficit.     Ears:  Response to auditory stimuli is normal.  negative hearing aid devices.  Auricles in proper alignment.     Lymphatic:  Popliteal lymph nodes not enlarged.     Lungs:  Non labored breathing, non labored speech. No cough.  No audible wheezing. Even, quiet breathing.       Vascular:  positive pedal pulses bilaterally for both the DP and PT arteries.  CFT < 3 sec.  positive ankle edema.  positive pedal hair growth.    Neuro:  Alert and oriented x 3. Coordinated gait.  Light touch sensation is intact to the L4, L5, S1 distributions. No obvious deficits.  No evidence of neurological-based weakness, spasticity, or contracture in the lower extremities.      Derm: Normal texture and turgor.  No erythema, ecchymosis, or cyanosis.      Musculoskeletal:  Normal arch with weightbearing.  No forefoot or rear foot  deformities noted.  MS 5/5 all compartments.  Normal ROM all fore foot and rearfoot joints.  No equinus.  Pain with palpation at ankle joint tibialis anterior tendon medial and soft tissue medial to this.  No erythema or ecchymosis or masses noted.  No pain medial navicular.     MR FOOT RIGHT WITHOUT CONTRAST   12/7/2017 10:10 AM      HISTORY:  Right rearfoot pain, suspect navicular stress fracture.  Right foot pain.     TECHNIQUE:  Sagittal, long axis, and short axis T1 and inversion  recovery pulse sequences.     FINDINGS:  There is mild-to-moderate plantar fasciitis with thickening  and intrasubstance intermediate signal intensity in the proximal  centimeter of the plantar fascia. Moderate adjacent calcaneal spurring  is noted with minimal if any associated marrow edema. There is a  nonspecific tibiotalar/subtalar joint effusion. Mild subchondral  marrow edema is noted in the medial talar dome, likely degenerative.  No nilo osteochondral lesion is demonstrated. Marrow signal within  the hind and mid foot otherwise appears within normal limits. First  metatarsal head degenerative spurring is noted with mild first  metatarsophalangeal joint effusion. Marrow signal within the hallux  sesamoids appears within normal limits. Flexor and extensor tendons  appear within normal limits within the mid and forefoot. Alignment at  the tarsometatarsal joints appears within normal limits.         IMPRESSION:  1. Mild-or-moderate plantar fasciitis.  2. Medial talar dome mild subchondral marrow edema, likely  degenerative. No nilo osteochondral lesion is visible. Nonspecific  tibiotalar/subtalar joint effusion is noted.  3. First metatarsophalangeal joint degenerative arthrosis.  4. Accessory navicular ossification is incidentally noted. There is no  associated marrow edema and the posterior tibial tendon appears within  normal limits.    A:  Right anterior medial ankle pain    P:  Again discussed mri results.  Pain over  tibialis and soft tissue anterior.  Will try cam walker at all times while walking as this could be from soft tissue overuse.  Return to clinic 2-3 weeks to ensure this is getting better.        Anant Peraza DPM, FACFAS

## 2017-12-28 NOTE — MR AVS SNAPSHOT
After Visit Summary   12/28/2017    Ricky Brown    MRN: [id number]           Patient Information     Date Of Birth          1950        Visit Information        Provider Department      12/28/2017 1:30 PM Anant Peraza, [facility]        Today's Diagnoses     Right foot pain    -  1      Care Instructions    We wish you continued good healing. If you have any questions or concerns, please do not hesitate to contact us at [phone]      Please remember to call and schedule a follow up appointment if one was recommended at your earliest convenience.   PODIATRY CLINIC HOURS  TELEPHONE NUMBER    Dr. Anant Peraza D.P.M FAC FAS    Clinics:  [facility]        Betina Clarke MA  Medical Assistant  Tuesday 1PM-6PM  [place]  Wednesday 7AM-2PM  [place]  Thursday 10AM-6PM  [place]  Friday 7AM-345PM  [place]  Specialty schedulers:   [phone] to make an appointment with any Specialty Provider.        Urgent Care locations:    [facility] Monday-Friday 5 pm - 9 pm. Saturday-Sunday 9 am -5pm    Monday-Friday 11 am - 9 pm Saturday 9 am - 5 pm     Monday-Sunday 12 noon-8PM [phone]     If you need a medication refill, please contact us you may need lab work and/or a follow up visit prior to your refill (i.e. Antifungal medications).    [employer] (secure e-mail communication and access to your chart) to send a message or to make an appointment.    If MRI needed please call [facility] at [phone]        Weight management plan: Patient was referred to their PCP to discuss a diet and exercise plan.            Follow-ups after your visit        Who to contact     If you have questions or need follow up information about today's clinic visit or your schedule please contact [facility] directly at  [phone].  Normal or non-critical lab and imaging results will be communicated to you by MyChart, letter or phone within 4 business days after the clinic has received the results. If you do not hear from us within 7 days, please contact the clinic through [facility] [employer] or phone. If you have a critical or abnormal lab result, we will notify you by phone as soon as possible.  Submit refill requests through [employer] or call your pharmacy and they will forward the refill request to us. Please allow 3 business days for your refill to be completed.          Additional Information About Your Visit        [facility] [employer] Information     [employer] gives you secure access to your electronic health record. If you see a primary care provider, you can also send messages to your care team and make appointments. If you have questions, please call your primary care clinic.  If you do not have a primary care provider, please call [phone] and they will assist you.        Care EveryWhere ID     This is your Care EveryWhere ID. This could be used by other organizations to access your [place] medical records  [id number]        Your Vitals Were     Pulse Pulse Oximetry                86 97%           Blood Pressure from Last 3 Encounters:   12/04/17 144/90   11/21/17 142/80   10/01/17 (!) 162/92    Weight from Last 3 Encounters:   12/05/17 243 lb (110.2 kg)   12/04/17 243 lb 9.6 oz (110.5 kg)   11/21/17 244 lb (110.7 kg)              Today, you had the following     No orders found for display       Primary Care Provider Office Phone # Fax #    Armen Wei Chavez [phone]       [facility] [facility] [address]        Equal Access to Services     [facility] Hospital[facility] : Hadii steven Rebollar, wamarcelinada luvenancio, qaybta kaalscottie rajan. So [facility] [phone].    ATENCIÓN: Si habla español, tiene a hope disposición servicios gratuitos de asistencia lingüística. Llame al  [phone].    We comply with applicable federal civil rights laws and [place] laws. We do not discriminate on the basis of race, color, national origin, age, disability, sex, sexual orientation, or gender identity.            Thank you!     Thank you for choosing [facility]  for your care. Our goal is always to provide you with excellent care. Hearing back from our patients is one way we can continue to improve our services. Please take a few minutes to complete the written survey that you may receive in the mail after your visit with us. Thank you!             Your Updated Medication List - Protect others around you: Learn how to safely use, store and throw away your medicines at www.disposemymeds.org.          This list is accurate as of: 12/28/17 11:59 PM.  Always use your most recent med list.                   Brand Name Dispense Instructions for use Diagnosis    aspirin 81 MG tablet     90 tablet    Take 1 tablet (81 mg) by mouth daily    Cardiomyopathy, idiopathic (H)       atorvastatin 10 MG tablet    LIPITOR    90 tablet    Take 1 tablet (10 mg) by mouth daily    Hyperlipidemia LDL goal <70       diphenoxylate-atropine 2.5-0.025 MG per tablet    LOMOTIL    30 tablet    Take 1 tablet by mouth daily as needed for diarrhea Patient only takes 1 tablet as needed    Loose stools       fluticasone 50 MCG/ACT spray    FLONASE    3 Bottle    Spray 1-2 sprays into both nostrils daily    Chronic rhinitis       guaiFENesin-codeine 100-10 MG/5ML Soln solution    ROBITUSSIN AC    120 mL    Take 5 mLs by mouth every 6 hours as needed for cough    Acute bronchitis, unspecified organism       ipratropium 0.06 % spray    ATROVENT    3 Box    Spray 2 sprays into both nostrils 4 times daily as needed for rhinitis Refill when requested    Cough       losartan 50 MG tablet    COZAAR    90 tablet    Take 1 tablet (50 mg) by mouth daily    Non-ischemic cardiomyopathy (H)       metoprolol 50 MG 24 hr tablet     TOPROL XL    90 tablet    Take 1 tablet (50 mg) by mouth daily    Essential hypertension with goal blood pressure less than 140/90, Cardiomyopathy (H)       MULTIVITAMINS PO      Take  by mouth daily.    Routine general medical examination at a health care facility, Erectile dysfunction, Rectal itching, HTN (hypertension)       * order for DME     1 each    Equipment being ordered: Air splint, ace wrap    Pain in joint, ankle and foot, right       * order for DME     1 each    Equipment being ordered: crutches    Pain in joint, ankle and foot, right, Avulsion fracture of ankle       TUMS CALCIUM FOR LIFE BONE PO      Take  by mouth.        * Notice:  This list has 2 medication(s) that are the same as other medications prescribed for you. Read the directions carefully, and ask your doctor or other care provider to review them with you.

## 2017-12-28 NOTE — LETTER
12/28/2017         RE: Ricky Brown  [address]  [place] [address]        Dear Colleague,    Thank you for referring your patient, Ricky Brown, to the [facility]. Please see a copy of my visit note below.    S:  12/5/17  Patient seen in consult from Thelma Helms and complains of foot pain.  Points to dorsum of right navicular.  Has had this for 5 days.  Describes it as a burning pain.  Aggrevated by activity and relieved by rest.  Has edema.  No obvious trauma.  Denies  weakness, ecchymosis, numbness,     12/28/17  Patient only wore cam walker for one day then stopped.  Better while he was wearing this.  His pain is unchanged.  He points to medial ankle.  Aggravated by activity and relieved by rest.  Works intermittently.      ROS:   Denies  weakness, edema, erythema, ecchymosis, numbness       Allergies   Allergen Reactions     Lisinopril Cough       Current Outpatient Prescriptions   Medication Sig Dispense Refill     order for DME Equipment being ordered: Air splint, ace wrap 1 each 0     order for DME Equipment being ordered: crutches 1 each 0     guaiFENesin-codeine (ROBITUSSIN AC) 100-10 MG/5ML SOLN solution Take 5 mLs by mouth every 6 hours as needed for cough 120 mL 0     atorvastatin (LIPITOR) 10 MG tablet Take 1 tablet (10 mg) by mouth daily 90 tablet 2     losartan (COZAAR) 50 MG tablet Take 1 tablet (50 mg) by mouth daily 90 tablet 1     metoprolol (TOPROL XL) 50 MG 24 hr tablet Take 1 tablet (50 mg) by mouth daily 90 tablet 1     fluticasone (FLONASE) 50 MCG/ACT spray Spray 1-2 sprays into both nostrils daily 3 Bottle 3     diphenoxylate-atropine (LOMOTIL) 2.5-0.025 MG per tablet Take 1 tablet by mouth daily as needed for diarrhea Patient only takes 1 tablet as needed 30 tablet 5     ipratropium (ATROVENT) 0.06 % nasal spray Spray 2 sprays into both nostrils 4 times daily as needed for rhinitis Refill when requested 3 Box 11     aspirin 81 MG tablet  Take 1 tablet (81 mg) by mouth daily 90 tablet 3     Calcium Carbonate Antacid (TUMS CALCIUM FOR LIFE BONE PO) Take  by mouth.       Multiple Vitamin (MULTIVITAMINS PO) Take  by mouth daily.         Patient Active Problem List   Diagnosis     HL (hearing loss)     Erectile dysfunction     Keratoglobus, ou     Pseudophakia, PCL, od; ACL, os     Posterior vitreous detachment, od     Epiretinal membrane, mild, od     Advanced directives, counseling/discussion     HDL deficiency     JOSE JUAN (obstructive sleep apnea)-severe (AHI 32)     BCC (basal cell carcinoma)     Cardiomyopathy (H)     Obstructive sleep apnea     Near syncope     RCT (rotator cuff tear)     Disorder of bursae and tendons in shoulder region     Other postprocedural status(V45.89)     Iritis, os     Cramp of limb     Essential hypertension with goal blood pressure less than 140/90     Hyperlipidemia LDL goal <70     overwieght BMI > 35     Visit for suture removal       Past Medical History:   Diagnosis Date     Arthritis      BCC (basal cell carcinoma)     right shoulder      Cardiomyopathy (H)      Colon adenomas 9/20/11     ED (erectile dysfunction)      HTN (hypertension)      Obesity      JOSE JUAN (obstructive sleep apnea)        Past Surgical History:   Procedure Laterality Date     ARTHROSCOPY SHOULDER BICEPS TENODESIS REPAIR  2/27/2014    Procedure: ARTHROSCOPY SHOULDER BICEPS TENODESIS REPAIR;;  Surgeon: Michael Hagen MD;  [facility]     ARTHROSCOPY SHOULDER ROTATOR CUFF REPAIR  2/27/2014    Procedure: ARTHROSCOPY SHOULDER ROTATOR CUFF REPAIR;  Right Shoulder Arthroscopic Rotator Cuff Repair,  Subacromial Decompression, Biceps Tenodesis, Distal Clavicle Excision   ;  Surgeon: Michael Hagen MD;  [facility]     BIOPSY       CARDIAC SURGERY       COLONOSCOPY       EXCHANGE INTRAOCULAR LENS IMPLANT  2005    left eye - first, recentered PCL with iris fixation; then IOLX with ACL     EXTRACAPSULAR CATARACT EXTRATION WITH  INTRAOCULAR LENS IMPLANT  2005    both eyes (elsewhere)     TURBINOPLASTY  12/11/2013    Procedure: TURBINOPLASTY;;  Surgeon: Lisset Parsons MD;  [facility]     UVULOPALATOPHARYNGOPLASTY  12/11/2013    Procedure: UVULOPALATOPHARYNGOPLASTY;  Uvulopalatopharyngoplasty, Turbiante Reduction;  Surgeon: Lisset Parsons MD;  [facility]       Family History   Problem Relation Age of Onset     DIABETES Father      CEREBROVASCULAR DISEASE Father      Obesity Father      HEART DISEASE Father      Coronary Artery Disease Father      Hypertension Father      Lipids Sister      C.A.D. No family hx of      CANCER No family hx of      Glaucoma No family hx of      Macular Degeneration No family hx of        Social History   Substance Use Topics     Smoking status: Former Smoker     Packs/day: 0.50     Years: 2.00     Types: Cigarettes     Quit date: 12/12/1996     Smokeless tobacco: Never Used     Alcohol use 5.0 oz/week      Comment: 1-2 seth/ gin per 5-6 night per week         O: Pulse 86  SpO2 97%.      Constitutional/ general:  Pt is in no apparent distress, appears well-nourished.  Cooperative with history and physical exam.  Seen with his wife.     Psych:  The patient answered questions appropriately.  Normal affect.  Seems to have reasonable expectations, in terms of treatment.     Eyes:  Visual scanning/ tracking without deficit.     Ears:  Response to auditory stimuli is normal.  negative hearing aid devices.  Auricles in proper alignment.     Lymphatic:  Popliteal lymph nodes not enlarged.     Lungs:  Non labored breathing, non labored speech. No cough.  No audible wheezing. Even, quiet breathing.       Vascular:  positive pedal pulses bilaterally for both the DP and PT arteries.  CFT < 3 sec.  positive ankle edema.  positive pedal hair growth.    Neuro:  Alert and oriented x 3. Coordinated gait.  Light touch sensation is intact to the L4, L5, S1 distributions. No obvious deficits.  No  evidence of neurological-based weakness, spasticity, or contracture in the lower extremities.      Derm: Normal texture and turgor.  No erythema, ecchymosis, or cyanosis.      Musculoskeletal:  Normal arch with weightbearing.  No forefoot or rear foot deformities noted.  MS 5/5 all compartments.  Normal ROM all fore foot and rearfoot joints.  No equinus.  Pain with palpation at ankle joint tibialis anterior tendon medial and soft tissue medial to this.  No erythema or ecchymosis or masses noted.  No pain medial navicular.     MR FOOT RIGHT WITHOUT CONTRAST   12/7/2017 10:10 AM      HISTORY:  Right rearfoot pain, suspect navicular stress fracture.  Right foot pain.     TECHNIQUE:  Sagittal, long axis, and short axis T1 and inversion  recovery pulse sequences.     FINDINGS:  There is mild-to-moderate plantar fasciitis with thickening  and intrasubstance intermediate signal intensity in the proximal  centimeter of the plantar fascia. Moderate adjacent calcaneal spurring  is noted with minimal if any associated marrow edema. There is a  nonspecific tibiotalar/subtalar joint effusion. Mild subchondral  marrow edema is noted in the medial talar dome, likely degenerative.  No nilo osteochondral lesion is demonstrated. Marrow signal within  the hind and mid foot otherwise appears within normal limits. First  metatarsal head degenerative spurring is noted with mild first  metatarsophalangeal joint effusion. Marrow signal within the hallux  sesamoids appears within normal limits. Flexor and extensor tendons  appear within normal limits within the mid and forefoot. Alignment at  the tarsometatarsal joints appears within normal limits.         IMPRESSION:  1. Mild-or-moderate plantar fasciitis.  2. Medial talar dome mild subchondral marrow edema, likely  degenerative. No nilo osteochondral lesion is visible. Nonspecific  tibiotalar/subtalar joint effusion is noted.  3. First metatarsophalangeal joint degenerative  arthrosis.  4. Accessory navicular ossification is incidentally noted. There is no  associated marrow edema and the posterior tibial tendon appears within  normal limits.    A:  Right anterior medial ankle pain    P:  Again discussed mri results.  Pain over tibialis and soft tissue anterior.  Will try cam walker at all times while walking as this could be from soft tissue overuse.  Return to clinic 2-3 weeks to ensure this is getting better.        Anant Peraza DPM, FACFAS         Again, thank you for allowing me to participate in the care of your patient.        Sincerely,        Anant Peraza DPM

## 2018-01-11 ENCOUNTER — ALLIED HEALTH/NURSE VISIT (OUTPATIENT)
Dept: FAMILY MEDICINE | Facility: CLINIC | Age: 68
End: 2018-01-11
Payer: COMMERCIAL

## 2018-01-11 VITALS — DIASTOLIC BLOOD PRESSURE: 68 MMHG | HEART RATE: 82 BPM | SYSTOLIC BLOOD PRESSURE: 144 MMHG

## 2018-01-11 DIAGNOSIS — I10 ESSENTIAL HYPERTENSION WITH GOAL BLOOD PRESSURE LESS THAN 140/90: Primary | ICD-10-CM

## 2018-01-11 PROCEDURE — 99207 ZZC NO CHARGE NURSE ONLY: CPT | Performed by: FAMILY MEDICINE

## 2018-01-11 NOTE — MR AVS SNAPSHOT
After Visit Summary   1/11/2018    Ricky Brown    MRN: [id number]           Patient Information     Date Of Birth          1950        Visit Information        Provider Department      1/11/2018 1:10 PM Armen Chavez MD [facility]        Today's Diagnoses     Essential hypertension with goal blood pressure less than 140/90    -  1       Follow-ups after your visit        Who to contact     If you have questions or need follow up information about today's clinic visit or your schedule please contact [facility] directly at [phone].  Normal or non-critical lab and imaging results will be communicated to you by [employer], letter or phone within 4 business days after the clinic has received the results. If you do not hear from us within 7 days, please contact the clinic through [employer] or phone. If you have a critical or abnormal lab result, we will notify you by phone as soon as possible.  Submit refill requests through [employer] or call your pharmacy and they will forward the refill request to us. Please allow 3 business days for your refill to be completed.          Additional Information About Your Visit        MyChart Information     [employer] gives you secure access to your electronic health record. If you see a primary care provider, you can also send messages to your care team and make appointments. If you have questions, please call your primary care clinic.  If you do not have a primary care provider, please call [phone] and they will assist you.        Care EveryWhere ID     This is your Care EveryWhere ID. This could be used by other organizations to access your [facility] medical records  [id number]        Your Vitals Were     Pulse                   82            Blood Pressure from Last 3 Encounters:   01/11/18 144/68   12/04/17 144/90   11/21/17 142/80    Weight from Last 3 Encounters:   12/05/17 243 lb (110.2 kg)   12/04/17 243  lb 9.6 oz (110.5 kg)   11/21/17 244 lb (110.7 kg)              Today, you had the following     No orders found for display       Primary Care Provider Office Phone # Fax #    Armen Chavez [phone]       [address] [facility] [address]        Equal Access to Services     NIKHIL ABBOTT : Hadii aad ku hadasho Soomaali, waaxda luqadaha, qaybta kaalmada adeegyada, waxay jigarin hayaan adedavin tavarezjoysalazar phillips . So [facility] [phone].    ATENCIÓN: Si habla español, tiene a hope disposición servicios gratuitos de asistencia lingüística. Llame al [phone].    We comply with applicable federal civil rights laws and [place] laws. We do not discriminate on the basis of race, color, national origin, age, disability, sex, sexual orientation, or gender identity.            Thank you!     Thank you for choosing [facility]  for your care. Our goal is always to provide you with excellent care. Hearing back from our patients is one way we can continue to improve our services. Please take a few minutes to complete the written survey that you may receive in the mail after your visit with us. Thank you!             Your Updated Medication List - Protect others around you: Learn how to safely use, store and throw away your medicines at www.disposemymeds.org.          This list is accurate as of: 1/11/18 11:59 PM.  Always use your most recent med list.                   Brand Name Dispense Instructions for use Diagnosis    aspirin 81 MG tablet     90 tablet    Take 1 tablet (81 mg) by mouth daily    Cardiomyopathy, idiopathic (H)       atorvastatin 10 MG tablet    LIPITOR    90 tablet    Take 1 tablet (10 mg) by mouth daily    Hyperlipidemia LDL goal <70       diphenoxylate-atropine 2.5-0.025 MG per tablet    LOMOTIL    30 tablet    Take 1 tablet by mouth daily as needed for diarrhea Patient only takes 1 tablet as needed    Loose stools       fluticasone 50 MCG/ACT spray    FLONASE     3 Bottle    Spray 1-2 sprays into both nostrils daily    Chronic rhinitis       guaiFENesin-codeine 100-10 MG/5ML Soln solution    ROBITUSSIN AC    120 mL    Take 5 mLs by mouth every 6 hours as needed for cough    Acute bronchitis, unspecified organism       ipratropium 0.06 % spray    ATROVENT    3 Box    Spray 2 sprays into both nostrils 4 times daily as needed for rhinitis Refill when requested    Cough       losartan 50 MG tablet    COZAAR    90 tablet    Take 1 tablet (50 mg) by mouth daily    Non-ischemic cardiomyopathy (H)       metoprolol succinate 50 MG 24 hr tablet    TOPROL XL    90 tablet    Take 1 tablet (50 mg) by mouth daily    Essential hypertension with goal blood pressure less than 140/90, Cardiomyopathy (H)       MULTIVITAMINS PO      Take  by mouth daily.    Routine general medical examination at a health care facility, Erectile dysfunction, Rectal itching, HTN (hypertension)       * order for DME     1 each    Equipment being ordered: Air splint, ace wrap    Pain in joint, ankle and foot, right       * order for DME     1 each    Equipment being ordered: crutches    Pain in joint, ankle and foot, right, Avulsion fracture of ankle       TUMS CALCIUM FOR LIFE BONE PO      Take  by mouth.        * Notice:  This list has 2 medication(s) that are the same as other medications prescribed for you. Read the directions carefully, and ask your doctor or other care provider to review them with you.

## 2018-01-11 NOTE — PROGRESS NOTES
Ricky Brown is enrolled/participating in the retail pharmacy Blood Pressure Goals Achievement Program (BPGAP).  Ricky Brown was evaluated at [facility] on January 11, 2018 at which time his blood pressure was:    BP Readings from Last 3 Encounters:   01/11/18 144/68   12/04/17 144/90   11/21/17 142/80     Reviewed lifestyle modifications for blood pressure control and reduction: including making healthy food choices, managing weight, getting regular exercise, smoking cessation, reducing alcohol consumption, monitoring blood pressure regularly.     Ricky Brown is not experiencing symptoms.    Follow-Up: BP is not at goal of < 140/90mmHg (patient 18+ years of age with or without diabetes), Recommended follow-up in 1 month at the pharmacy. Routing to PCP as an FYI.    Recommendation to Provider: Tatiana blood pressure is close to goal but has been consistently above 140/90. Consider medication adjustment or goal adjustment to <150/90 based on patient age and absence of diabetes or CKD. I assessed medication adherence and per patient he is compliant (<1 missed per week).      Ricky Brown was evaluated for enrollment into the PGEN study today.    Patient eligible for enrollment:  No  Patient interested in enrollment:  No    Completed by:    Liz Garcia PharmD Candidate 2018  [facility]  [phone]    Deborah Stovall PharmD, [facility]  Pharmacist Manager   [facility]  [phone]

## 2018-01-11 NOTE — Clinical Note
Routing message to PCP for review -BP checked at pharmacy and noted to be above goal.  Ricky's blood pressure is close to goal but has been consistently above 140/90. Consider medication adjustment or goal adjustment to <150/90 based on patient age and absence of diabetes or CKD. I assessed medication adherence and per patient he is compliant (<1 missed per week).

## 2018-03-01 ENCOUNTER — OFFICE VISIT (OUTPATIENT)
Dept: PODIATRY | Facility: CLINIC | Age: 68
End: 2018-03-01
Payer: COMMERCIAL

## 2018-03-01 VITALS — OXYGEN SATURATION: 100 % | HEART RATE: 96 BPM

## 2018-03-01 DIAGNOSIS — M77.8 CAPSULITIS OF RIGHT FOOT: Primary | ICD-10-CM

## 2018-03-01 PROCEDURE — 20550 NJX 1 TENDON SHEATH/LIGAMENT: CPT | Mod: RT | Performed by: PODIATRIST

## 2018-03-01 PROCEDURE — 2894A VOIDCORRECT: CPT | Mod: 25 | Performed by: PODIATRIST

## 2018-03-01 NOTE — PROGRESS NOTES
S:  12/5/17  Patient seen in consult from Thelma Helms and complains of foot pain.  Points to dorsum of right navicular.  Has had this for 5 days.  Describes it as a burning pain.  Aggrevated by activity and relieved by rest.  Has edema.  No obvious trauma.  Denies  weakness, ecchymosis, numbness,     12/28/17  Patient only wore cam walker for one day then stopped.  Better while he was wearing this.  His pain is unchanged.  He points to medial ankle.  Aggravated by activity and relieved by rest.  Works intermittently.     3/1/18  Wore cam walker for 2 months and did not help.  Still points to area medial to TA tendon anterior ankle.         ROS:   Denies  weakness, edema, erythema, ecchymosis, numbness       Allergies   Allergen Reactions     Lisinopril Cough       Current Outpatient Prescriptions   Medication Sig Dispense Refill     guaiFENesin-codeine (ROBITUSSIN AC) 100-10 MG/5ML SOLN solution Take 5 mLs by mouth every 6 hours as needed for cough 120 mL 0     atorvastatin (LIPITOR) 10 MG tablet Take 1 tablet (10 mg) by mouth daily 90 tablet 2     losartan (COZAAR) 50 MG tablet Take 1 tablet (50 mg) by mouth daily 90 tablet 1     metoprolol (TOPROL XL) 50 MG 24 hr tablet Take 1 tablet (50 mg) by mouth daily 90 tablet 1     fluticasone (FLONASE) 50 MCG/ACT spray Spray 1-2 sprays into both nostrils daily 3 Bottle 3     diphenoxylate-atropine (LOMOTIL) 2.5-0.025 MG per tablet Take 1 tablet by mouth daily as needed for diarrhea Patient only takes 1 tablet as needed 30 tablet 5     ipratropium (ATROVENT) 0.06 % nasal spray Spray 2 sprays into both nostrils 4 times daily as needed for rhinitis Refill when requested 3 Box 11     aspirin 81 MG tablet Take 1 tablet (81 mg) by mouth daily 90 tablet 3     Calcium Carbonate Antacid (TUMS CALCIUM FOR LIFE BONE PO) Take  by mouth.       Multiple Vitamin (MULTIVITAMINS PO) Take  by mouth daily.       order for DME Equipment being ordered: Air splint, ace wrap (Patient not  taking: Reported on 3/1/2018) 1 each 0     order for DME Equipment being ordered: crutches (Patient not taking: Reported on 3/1/2018) 1 each 0       Patient Active Problem List   Diagnosis     HL (hearing loss)     Erectile dysfunction     Keratoglobus, ou     Pseudophakia, PCL, od; ACL, os     Posterior vitreous detachment, od     Epiretinal membrane, mild, od     Advanced directives, counseling/discussion     HDL deficiency     JOSE JUAN (obstructive sleep apnea)-severe (AHI 32)     BCC (basal cell carcinoma)     Cardiomyopathy (H)     Obstructive sleep apnea     Near syncope     RCT (rotator cuff tear)     Disorder of bursae and tendons in shoulder region     Other postprocedural status(V45.89)     Iritis, os     Cramp of limb     Essential hypertension with goal blood pressure less than 140/90     Hyperlipidemia LDL goal <70     overwieght BMI > 35     Visit for suture removal       Past Medical History:   Diagnosis Date     Arthritis      BCC (basal cell carcinoma)     right shoulder      Cardiomyopathy (H)      Colon adenomas 9/20/11     ED (erectile dysfunction)      HTN (hypertension)      Obesity      JOSE JUAN (obstructive sleep apnea)        Past Surgical History:   Procedure Laterality Date     ARTHROSCOPY SHOULDER BICEPS TENODESIS REPAIR  2/27/2014    Procedure: ARTHROSCOPY SHOULDER BICEPS TENODESIS REPAIR;;  Surgeon: Michael Hagen MD;  [facility]     ARTHROSCOPY SHOULDER ROTATOR CUFF REPAIR  2/27/2014    Procedure: ARTHROSCOPY SHOULDER ROTATOR CUFF REPAIR;  Right Shoulder Arthroscopic Rotator Cuff Repair,  Subacromial Decompression, Biceps Tenodesis, Distal Clavicle Excision   ;  Surgeon: Michael Hagen MD;  Location: [facility]     BIOPSY       CARDIAC SURGERY       COLONOSCOPY       EXCHANGE INTRAOCULAR LENS IMPLANT  2005    left eye - first, recentered PCL with iris fixation; then IOLX with ACL     EXTRACAPSULAR CATARACT EXTRATION WITH INTRAOCULAR LENS IMPLANT  2005    both eyes  (elsewhere)     TURBINOPLASTY  12/11/2013    Procedure: TURBINOPLASTY;;  Surgeon: Lisset Parsons MD;  [facility]     UVULOPALATOPHARYNGOPLASTY  12/11/2013    Procedure: UVULOPALATOPHARYNGOPLASTY;  Uvulopalatopharyngoplasty, Turbiante Reduction;  Surgeon: Lisset Parsons MD;  Location: [facility] OR       Family History   Problem Relation Age of Onset     DIABETES Father      CEREBROVASCULAR DISEASE Father      Obesity Father      HEART DISEASE Father      Coronary Artery Disease Father      Hypertension Father      Lipids Sister      C.A.D. No family hx of      CANCER No family hx of      Glaucoma No family hx of      Macular Degeneration No family hx of        Social History   Substance Use Topics     Smoking status: Former Smoker     Packs/day: 0.50     Years: 2.00     Types: Cigarettes     Quit date: 12/12/1996     Smokeless tobacco: Never Used     Alcohol use 5.0 oz/week      Comment: 1-2 seth/ gin per 5-6 night per week         O: Pulse 96  SpO2 100%.      Constitutional/ general:  Pt is in no apparent distress, appears well-nourished.  Cooperative with history and physical exam.  Seen with his wife.     Psych:  The patient answered questions appropriately.  Normal affect.  Seems to have reasonable expectations, in terms of treatment.     Eyes:  Visual scanning/ tracking without deficit.     Ears:  Response to auditory stimuli is normal.  negative hearing aid devices.  Auricles in proper alignment.     Lymphatic:  Popliteal lymph nodes not enlarged.     Lungs:  Non labored breathing, non labored speech. No cough.  No audible wheezing. Even, quiet breathing.       Vascular:  positive pedal pulses bilaterally for both the DP and PT arteries.  CFT < 3 sec.  positive ankle edema.  positive pedal hair growth.    Neuro:  Alert and oriented x 3. Coordinated gait.  Light touch sensation is intact to the L4, L5, S1 distributions. No obvious deficits.  No evidence of neurological-based weakness,  spasticity, or contracture in the lower extremities.      Derm: Normal texture and turgor.  No erythema, ecchymosis, or cyanosis.      Musculoskeletal:  Normal arch with weightbearing.  No forefoot or rear foot deformities noted.  MS 5/5 all compartments.  Normal ROM all fore foot and rearfoot joints.  No equinus.  Pain with palpation at ankle joint medial to tibialis anterior tendon.  No erythema or ecchymosis or masses noted.  No pain medial navicular.     MR FOOT RIGHT WITHOUT CONTRAST   12/7/2017 10:10 AM      HISTORY:  Right rearfoot pain, suspect navicular stress fracture.  Right foot pain.     TECHNIQUE:  Sagittal, long axis, and short axis T1 and inversion  recovery pulse sequences.     FINDINGS:  There is mild-to-moderate plantar fasciitis with thickening  and intrasubstance intermediate signal intensity in the proximal  centimeter of the plantar fascia. Moderate adjacent calcaneal spurring  is noted with minimal if any associated marrow edema. There is a  nonspecific tibiotalar/subtalar joint effusion. Mild subchondral  marrow edema is noted in the medial talar dome, likely degenerative.  No nilo osteochondral lesion is demonstrated. Marrow signal within  the hind and mid foot otherwise appears within normal limits. First  metatarsal head degenerative spurring is noted with mild first  metatarsophalangeal joint effusion. Marrow signal within the hallux  sesamoids appears within normal limits. Flexor and extensor tendons  appear within normal limits within the mid and forefoot. Alignment at  the tarsometatarsal joints appears within normal limits.         IMPRESSION:  1. Mild-or-moderate plantar fasciitis.  2. Medial talar dome mild subchondral marrow edema, likely  degenerative. No nilo osteochondral lesion is visible. Nonspecific  tibiotalar/subtalar joint effusion is noted.  3. First metatarsophalangeal joint degenerative arthrosis.  4. Accessory navicular ossification is incidentally noted. There is  no  associated marrow edema and the posterior tibial tendon appears within  normal limits.    A:  Right anterior medial ankle pain    P:  Again discussed mri results.  Patient  may have anterior ankle joint capsulitis.  Risks complications, and efficacy of cortisone injection discussed.    After written consent, sterile prep, injected anterior medial ankle with 1 cc 1% lidocaine plain and 2 cc kenalog 10 mg.  Return to clinic prn.          Anant Peraza DPM, FACFAS

## 2018-03-01 NOTE — PATIENT INSTRUCTIONS
Weight management plan: Patient was referred to their PCP to discuss a diet and exercise plan.      We wish you continued good healing. If you have any questions or concerns, please do not hesitate to contact us at [phone]    Please remember to call and schedule a follow up appointment if one was recommended at your earliest convenience.   PODIATRY CLINIC HOURS  TELEPHONE NUMBER    Dr. Anant Peraza D.P.M [facility]    Clinics:  [facility]    [facility]   Tuesday 1PM-6PM  [place]  Wednesday 7AM-2PM  [facility]  Thursday 10AM-6PM  [place]  Friday 7AM-3PM  [place]  Specialty schedulers:   [phone] to make an appointment with any Specialty Provider.        Urgent Care locations:    [facility] Monday-Friday 5 pm - 9 pm. Saturday-Sunday 9 am -5pm    Monday-Friday 11 am - 9 pm Saturday 9 am - 5 pm     Monday-Sunday 12 noon-8PM [phone]     If you need a medication refill, please contact us you may need lab work and/or a follow up visit prior to your refill (i.e. Antifungal medications).    [employer] (secure e-mail communication and access to your chart) to send a message or to make an appointment.    If MRI needed please call [place] [facility] at [phone]

## 2018-03-01 NOTE — LETTER
3/1/2018         RE: Ricky Brown  [address]  [facility] [address]        Dear Colleague,    Thank you for referring your patient, Ricky Brown, to the [facility]. Please see a copy of my visit note below.    S:  12/5/17  Patient seen in consult from Thelma Helms and complains of foot pain.  Points to dorsum of right navicular.  Has had this for 5 days.  Describes it as a burning pain.  Aggrevated by activity and relieved by rest.  Has edema.  No obvious trauma.  Denies  weakness, ecchymosis, numbness,     12/28/17  Patient only wore cam walker for one day then stopped.  Better while he was wearing this.  His pain is unchanged.  He points to medial ankle.  Aggravated by activity and relieved by rest.  Works intermittently.     3/1/18  Wore cam walker for 2 months and did not help.  Still points to area medial to TA tendon anterior ankle.         ROS:   Denies  weakness, edema, erythema, ecchymosis, numbness       Allergies   Allergen Reactions     Lisinopril Cough       Current Outpatient Prescriptions   Medication Sig Dispense Refill     guaiFENesin-codeine (ROBITUSSIN AC) 100-10 MG/5ML SOLN solution Take 5 mLs by mouth every 6 hours as needed for cough 120 mL 0     atorvastatin (LIPITOR) 10 MG tablet Take 1 tablet (10 mg) by mouth daily 90 tablet 2     losartan (COZAAR) 50 MG tablet Take 1 tablet (50 mg) by mouth daily 90 tablet 1     metoprolol (TOPROL XL) 50 MG 24 hr tablet Take 1 tablet (50 mg) by mouth daily 90 tablet 1     fluticasone (FLONASE) 50 MCG/ACT spray Spray 1-2 sprays into both nostrils daily 3 Bottle 3     diphenoxylate-atropine (LOMOTIL) 2.5-0.025 MG per tablet Take 1 tablet by mouth daily as needed for diarrhea Patient only takes 1 tablet as needed 30 tablet 5     ipratropium (ATROVENT) 0.06 % nasal spray Spray 2 sprays into both nostrils 4 times daily as needed for rhinitis Refill when requested 3 Box 11     aspirin 81 MG tablet Take 1 tablet (81  mg) by mouth daily 90 tablet 3     Calcium Carbonate Antacid (TUMS CALCIUM FOR LIFE BONE PO) Take  by mouth.       Multiple Vitamin (MULTIVITAMINS PO) Take  by mouth daily.       order for DME Equipment being ordered: Air splint, ace wrap (Patient not taking: Reported on 3/1/2018) 1 each 0     order for DME Equipment being ordered: crutches (Patient not taking: Reported on 3/1/2018) 1 each 0       Patient Active Problem List   Diagnosis     HL (hearing loss)     Erectile dysfunction     Keratoglobus, ou     Pseudophakia, PCL, od; ACL, os     Posterior vitreous detachment, od     Epiretinal membrane, mild, od     Advanced directives, counseling/discussion     HDL deficiency     JOSE JUAN (obstructive sleep apnea)-severe (AHI 32)     BCC (basal cell carcinoma)     Cardiomyopathy (H)     Obstructive sleep apnea     Near syncope     RCT (rotator cuff tear)     Disorder of bursae and tendons in shoulder region     Other postprocedural status(V45.89)     Iritis, os     Cramp of limb     Essential hypertension with goal blood pressure less than 140/90     Hyperlipidemia LDL goal <70     overwieght BMI > 35     Visit for suture removal       Past Medical History:   Diagnosis Date     Arthritis      BCC (basal cell carcinoma)     right shoulder      Cardiomyopathy (H)      Colon adenomas 9/20/11     ED (erectile dysfunction)      HTN (hypertension)      Obesity      JOSE JUAN (obstructive sleep apnea)        Past Surgical History:   Procedure Laterality Date     ARTHROSCOPY SHOULDER BICEPS TENODESIS REPAIR  2/27/2014    Procedure: ARTHROSCOPY SHOULDER BICEPS TENODESIS REPAIR;;  Surgeon: Michael Hagen MD;  [facility]     ARTHROSCOPY SHOULDER ROTATOR CUFF REPAIR  2/27/2014    Procedure: ARTHROSCOPY SHOULDER ROTATOR CUFF REPAIR;  Right Shoulder Arthroscopic Rotator Cuff Repair,  Subacromial Decompression, Biceps Tenodesis, Distal Clavicle Excision   ;  Surgeon: Michael Hagen MD;  [facility]     BIOPSY        CARDIAC SURGERY       COLONOSCOPY       EXCHANGE INTRAOCULAR LENS IMPLANT  2005    left eye - first, recentered PCL with iris fixation; then IOLX with ACL     EXTRACAPSULAR CATARACT EXTRATION WITH INTRAOCULAR LENS IMPLANT  2005    both eyes (elsewhere)     TURBINOPLASTY  12/11/2013    Procedure: TURBINOPLASTY;;  Surgeon: Lisset Parsons MD;  Location:  OR     UVULOPALATOPHARYNGOPLASTY  12/11/2013    Procedure: UVULOPALATOPHARYNGOPLASTY;  Uvulopalatopharyngoplasty, Turbiante Reduction;  Surgeon: Lisset Parsons MD;  Location:  OR       Family History   Problem Relation Age of Onset     DIABETES Father      CEREBROVASCULAR DISEASE Father      Obesity Father      HEART DISEASE Father      Coronary Artery Disease Father      Hypertension Father      Lipids Sister      C.A.D. No family hx of      CANCER No family hx of      Glaucoma No family hx of      Macular Degeneration No family hx of        Social History   Substance Use Topics     Smoking status: Former Smoker     Packs/day: 0.50     Years: 2.00     Types: Cigarettes     Quit date: 12/12/1996     Smokeless tobacco: Never Used     Alcohol use 5.0 oz/week      Comment: 1-2 seth/ gin per 5-6 night per week         O: Pulse 96  SpO2 100%.      Constitutional/ general:  Pt is in no apparent distress, appears well-nourished.  Cooperative with history and physical exam.  Seen with his wife.     Psych:  The patient answered questions appropriately.  Normal affect.  Seems to have reasonable expectations, in terms of treatment.     Eyes:  Visual scanning/ tracking without deficit.     Ears:  Response to auditory stimuli is normal.  negative hearing aid devices.  Auricles in proper alignment.     Lymphatic:  Popliteal lymph nodes not enlarged.     Lungs:  Non labored breathing, non labored speech. No cough.  No audible wheezing. Even, quiet breathing.       Vascular:  positive pedal pulses bilaterally for both the DP and PT arteries.  CFT < 3  sec.  positive ankle edema.  positive pedal hair growth.    Neuro:  Alert and oriented x 3. Coordinated gait.  Light touch sensation is intact to the L4, L5, S1 distributions. No obvious deficits.  No evidence of neurological-based weakness, spasticity, or contracture in the lower extremities.      Derm: Normal texture and turgor.  No erythema, ecchymosis, or cyanosis.      Musculoskeletal:  Normal arch with weightbearing.  No forefoot or rear foot deformities noted.  MS 5/5 all compartments.  Normal ROM all fore foot and rearfoot joints.  No equinus.  Pain with palpation at ankle joint medial to tibialis anterior tendon.  No erythema or ecchymosis or masses noted.  No pain medial navicular.     MR FOOT RIGHT WITHOUT CONTRAST   12/7/2017 10:10 AM      HISTORY:  Right rearfoot pain, suspect navicular stress fracture.  Right foot pain.     TECHNIQUE:  Sagittal, long axis, and short axis T1 and inversion  recovery pulse sequences.     FINDINGS:  There is mild-to-moderate plantar fasciitis with thickening  and intrasubstance intermediate signal intensity in the proximal  centimeter of the plantar fascia. Moderate adjacent calcaneal spurring  is noted with minimal if any associated marrow edema. There is a  nonspecific tibiotalar/subtalar joint effusion. Mild subchondral  marrow edema is noted in the medial talar dome, likely degenerative.  No nilo osteochondral lesion is demonstrated. Marrow signal within  the hind and mid foot otherwise appears within normal limits. First  metatarsal head degenerative spurring is noted with mild first  metatarsophalangeal joint effusion. Marrow signal within the hallux  sesamoids appears within normal limits. Flexor and extensor tendons  appear within normal limits within the mid and forefoot. Alignment at  the tarsometatarsal joints appears within normal limits.         IMPRESSION:  1. Mild-or-moderate plantar fasciitis.  2. Medial talar dome mild subchondral marrow edema,  likely  degenerative. No nilo osteochondral lesion is visible. Nonspecific  tibiotalar/subtalar joint effusion is noted.  3. First metatarsophalangeal joint degenerative arthrosis.  4. Accessory navicular ossification is incidentally noted. There is no  associated marrow edema and the posterior tibial tendon appears within  normal limits.    A:  Right anterior medial ankle pain    P:  Again discussed mri results.  Patient  may have anterior ankle joint capsulitis.  Risks complications, and efficacy of cortisone injection discussed.    After written consent, sterile prep, injected anterior medial ankle with 1 cc 1% lidocaine plain and 2 cc kenalog 10 mg.  Return to clinic prn.          Anant Peraza DPM, FACFAS         Again, thank you for allowing me to participate in the care of your patient.        Sincerely,        Anant Peraza DPM

## 2018-03-01 NOTE — MR AVS SNAPSHOT
After Visit Summary   3/1/2018    Ricky Brown    MRN: [id number]           Patient Information     Date Of Birth          1950        Visit Information        Provider Department      3/1/2018 1:30 PM Anant Peraza, [facility]        Today's Diagnoses     Capsulitis of right foot    -  1      Care Instructions    Weight management plan: Patient was referred to their PCP to discuss a diet and exercise plan.      We wish you continued good healing. If you have any questions or concerns, please do not hesitate to contact us at [phone]    Please remember to call and schedule a follow up appointment if one was recommended at your earliest convenience.   PODIATRY CLINIC HOURS  TELEPHONE NUMBER    Dr. Anant MORINPJEWELL FAC FAS    Clinics:  [facility]    [facility]   Tuesday 1PM-6PM  [place]  Wednesday 7AM-2PM  [place]  Thursday 10AM-6PM  [place]  Friday 7AM-3PM  [place]  Specialty schedulers:   [phone] to make an appointment with any Specialty Provider.        Urgent Care locations:    [facility] Monday-Friday 5 pm - 9 pm. Saturday-Sunday 9 am -5pm    Monday-Friday 11 am - 9 pm Saturday 9 am - 5 pm     Monday-Sunday 12 noon-8PM [phone]     If you need a medication refill, please contact us you may need lab work and/or a follow up visit prior to your refill (i.e. Antifungal medications).    [employer] (secure e-mail communication and access to your chart) to send a message or to make an appointment.    If MRI needed please call Tramaine Murry at [phone]                  Follow-ups after your visit        Who to contact     If you have questions or need follow up information about today's clinic visit or your schedule please contact [facility] directly at [phone].  Normal or  non-critical lab and imaging results will be communicated to you by MyChart, letter or phone within 4 business days after the clinic has received the results. If you do not hear from us within 7 days, please contact the clinic through [employer] or phone. If you have a critical or abnormal lab result, we will notify you by phone as soon as possible.  Submit refill requests through [facility] or call your pharmacy and they will forward the refill request to us. Please allow 3 business days for your refill to be completed.          Additional Information About Your Visit        [facility] Information     [facility] gives you secure access to your electronic health record. If you see a primary care provider, you can also send messages to your care team and make appointments. If you have questions, please call your primary care clinic.  If you do not have a primary care provider, please call [phone] and they will assist you.        Care EveryWhere ID     This is your Care EveryWhere ID. This could be used by other organizations to access your [facility] medical records  [id number]        Your Vitals Were     Pulse Pulse Oximetry                96 100%           Blood Pressure from Last 3 Encounters:   01/11/18 144/68   12/04/17 144/90   11/21/17 142/80    Weight from Last 3 Encounters:   12/05/17 243 lb (110.2 kg)   12/04/17 243 lb 9.6 oz (110.5 kg)   11/21/17 244 lb (110.7 kg)              We Performed the Following     INJECTION SINGLE TENDON SHEATH/LIGAMENT     TRIAMCINOLONE ACET INJ NOS        Primary Care Provider Office Phone # Fax #    Armen Chavez [phone] [address] [address]        Equal Access to Services     [facility]: Hadii steven carter hadasho Sojacob, waaxda luqadaha, qaybta kaalmascottie rodriguez. So [facility] [phone].    ATENCIÓN: Si habla español, tiene a hope disposición servicios gratuitos de asistencia lingüística.  Josefa laurent [phone].    We comply with applicable federal civil rights laws and [place] laws. We do not discriminate on the basis of race, color, national origin, age, disability, sex, sexual orientation, or gender identity.            Thank you!     Thank you for choosing [facility]  for your care. Our goal is always to provide you with excellent care. Hearing back from our patients is one way we can continue to improve our services. Please take a few minutes to complete the written survey that you may receive in the mail after your visit with us. Thank you!             Your Updated Medication List - Protect others around you: Learn how to safely use, store and throw away your medicines at www.disposemymeds.org.          This list is accurate as of 3/1/18  2:06 PM.  Always use your most recent med list.                   Brand Name Dispense Instructions for use Diagnosis    aspirin 81 MG tablet     90 tablet    Take 1 tablet (81 mg) by mouth daily    Cardiomyopathy, idiopathic (H)       atorvastatin 10 MG tablet    LIPITOR    90 tablet    Take 1 tablet (10 mg) by mouth daily    Hyperlipidemia LDL goal <70       diphenoxylate-atropine 2.5-0.025 MG per tablet    LOMOTIL    30 tablet    Take 1 tablet by mouth daily as needed for diarrhea Patient only takes 1 tablet as needed    Loose stools       fluticasone 50 MCG/ACT spray    FLONASE    3 Bottle    Spray 1-2 sprays into both nostrils daily    Chronic rhinitis       guaiFENesin-codeine 100-10 MG/5ML Soln solution    ROBITUSSIN AC    120 mL    Take 5 mLs by mouth every 6 hours as needed for cough    Acute bronchitis, unspecified organism       ipratropium 0.06 % spray    ATROVENT    3 Box    Spray 2 sprays into both nostrils 4 times daily as needed for rhinitis Refill when requested    Cough       losartan 50 MG tablet    COZAAR    90 tablet    Take 1 tablet (50 mg) by mouth daily    Non-ischemic cardiomyopathy (H)       metoprolol succinate 50 MG 24  hr tablet    TOPROL XL    90 tablet    Take 1 tablet (50 mg) by mouth daily    Essential hypertension with goal blood pressure less than 140/90, Cardiomyopathy (H)       MULTIVITAMINS PO      Take  by mouth daily.    Routine general medical examination at a health care facility, Erectile dysfunction, Rectal itching, HTN (hypertension)       * order for DME     1 each    Equipment being ordered: Air splint, ace wrap    Pain in joint, ankle and foot, right       * order for DME     1 each    Equipment being ordered: crutches    Pain in joint, ankle and foot, right, Avulsion fracture of ankle       TUMS CALCIUM FOR LIFE BONE PO      Take  by mouth.        * Notice:  This list has 2 medication(s) that are the same as other medications prescribed for you. Read the directions carefully, and ask your doctor or other care provider to review them with you.

## 2018-03-28 DIAGNOSIS — I42.9 CARDIOMYOPATHY (H): Primary | ICD-10-CM

## 2018-04-03 DIAGNOSIS — I42.9 CARDIOMYOPATHY, UNSPECIFIED TYPE (H): Primary | ICD-10-CM

## 2018-04-03 DIAGNOSIS — I10 ESSENTIAL HYPERTENSION WITH GOAL BLOOD PRESSURE LESS THAN 140/90: ICD-10-CM

## 2018-04-03 RX ORDER — METOPROLOL SUCCINATE 50 MG/1
TABLET, EXTENDED RELEASE ORAL
Qty: 90 TABLET | Refills: 3 | Status: SHIPPED | OUTPATIENT
Start: 2018-04-03 | End: 2019-03-18

## 2018-04-10 ASSESSMENT — ACTIVITIES OF DAILY LIVING (ADL)
CURRENT_FUNCTION: NO ASSISTANCE NEEDED
I_NEED_ASSISTANCE_FOR_THE_FOLLOWING_DAILY_ACTIVITIES:: NO ASSISTANCE IS NEEDED

## 2018-04-11 ENCOUNTER — OFFICE VISIT (OUTPATIENT)
Dept: FAMILY MEDICINE | Facility: CLINIC | Age: 68
End: 2018-04-11
Payer: COMMERCIAL

## 2018-04-11 VITALS
HEIGHT: 66 IN | BODY MASS INDEX: 38.57 KG/M2 | HEART RATE: 76 BPM | TEMPERATURE: 98.6 F | DIASTOLIC BLOOD PRESSURE: 74 MMHG | SYSTOLIC BLOOD PRESSURE: 136 MMHG | WEIGHT: 240 LBS

## 2018-04-11 DIAGNOSIS — Z00.00 ROUTINE HISTORY AND PHYSICAL EXAMINATION OF ADULT: Primary | ICD-10-CM

## 2018-04-11 DIAGNOSIS — Z23 NEED FOR SHINGLES VACCINE: ICD-10-CM

## 2018-04-11 DIAGNOSIS — E78.5 HYPERLIPIDEMIA LDL GOAL <70: ICD-10-CM

## 2018-04-11 DIAGNOSIS — Z23 NEED FOR PROPHYLACTIC VACCINATION AGAINST STREPTOCOCCUS PNEUMONIAE (PNEUMOCOCCUS): ICD-10-CM

## 2018-04-11 DIAGNOSIS — Z12.5 SPECIAL SCREENING FOR MALIGNANT NEOPLASM OF PROSTATE: ICD-10-CM

## 2018-04-11 DIAGNOSIS — Z86.0100 HISTORY OF COLONIC POLYPS: ICD-10-CM

## 2018-04-11 DIAGNOSIS — E66.01 MORBID OBESITY DUE TO EXCESS CALORIES (H): ICD-10-CM

## 2018-04-11 DIAGNOSIS — R19.5 LOOSE STOOLS: ICD-10-CM

## 2018-04-11 DIAGNOSIS — I42.8 NON-ISCHEMIC CARDIOMYOPATHY (H): ICD-10-CM

## 2018-04-11 PROCEDURE — 84460 ALANINE AMINO (ALT) (SGPT): CPT | Performed by: FAMILY MEDICINE

## 2018-04-11 PROCEDURE — 80061 LIPID PANEL: CPT | Performed by: FAMILY MEDICINE

## 2018-04-11 PROCEDURE — G0009 ADMIN PNEUMOCOCCAL VACCINE: HCPCS | Performed by: FAMILY MEDICINE

## 2018-04-11 PROCEDURE — 36415 COLL VENOUS BLD VENIPUNCTURE: CPT | Performed by: FAMILY MEDICINE

## 2018-04-11 PROCEDURE — 90670 PCV13 VACCINE IM: CPT | Performed by: FAMILY MEDICINE

## 2018-04-11 PROCEDURE — G0103 PSA SCREENING: HCPCS | Performed by: FAMILY MEDICINE

## 2018-04-11 PROCEDURE — 90750 HZV VACC RECOMBINANT IM: CPT | Performed by: FAMILY MEDICINE

## 2018-04-11 PROCEDURE — 90472 IMMUNIZATION ADMIN EACH ADD: CPT | Performed by: FAMILY MEDICINE

## 2018-04-11 PROCEDURE — 80048 BASIC METABOLIC PNL TOTAL CA: CPT | Performed by: FAMILY MEDICINE

## 2018-04-11 PROCEDURE — G0402 INITIAL PREVENTIVE EXAM: HCPCS | Performed by: FAMILY MEDICINE

## 2018-04-11 RX ORDER — LOSARTAN POTASSIUM 50 MG/1
50 TABLET ORAL DAILY
Qty: 90 TABLET | Refills: 3 | Status: SHIPPED | OUTPATIENT
Start: 2018-04-11 | End: 2019-03-18

## 2018-04-11 RX ORDER — ATORVASTATIN CALCIUM 10 MG/1
10 TABLET, FILM COATED ORAL DAILY
Qty: 90 TABLET | Refills: 3 | Status: SHIPPED | OUTPATIENT
Start: 2018-04-11 | End: 2019-03-18

## 2018-04-11 RX ORDER — DIPHENOXYLATE HCL/ATROPINE 2.5-.025MG
1 TABLET ORAL DAILY PRN
Qty: 30 TABLET | Refills: 5 | Status: SHIPPED | OUTPATIENT
Start: 2018-04-11 | End: 2021-01-21

## 2018-04-11 ASSESSMENT — ACTIVITIES OF DAILY LIVING (ADL): CURRENT_FUNCTION: NO ASSISTANCE NEEDED

## 2018-04-11 NOTE — NURSING NOTE
Prior to injection verified patient identity using patient's name and date of birth.  Screening Questionnaire for Adult Immunization    Are you sick today?   No   Do you have allergies to medications, food, a vaccine component or latex?   No   Have you ever had a serious reaction after receiving a vaccination?   No   Do you have a long-term health problem with heart disease, lung disease, asthma, kidney disease, metabolic disease (e.g. diabetes), anemia, or other blood disorder?   No   Do you have cancer, leukemia, HIV/AIDS, or any other immune system problem?   No   In the past 3 months, have you taken medications that affect  your immune system, such as prednisone, other steroids, or anticancer drugs; drugs for the treatment of rheumatoid arthritis, Crohn s disease, or psoriasis; or have you had radiation treatments?   No   Have you had a seizure, or a brain or other nervous system problem?   No   During the past year, have you received a transfusion of blood or blood     products, or been given immune (gamma) globulin or antiviral drug?   No   For women: Are you pregnant or is there a chance you could become        pregnant during the next month?   No   Have you received any vaccinations in the past 4 weeks?   No     Immunization questionnaire answers were all negative.        Per orders of Dr. Chavez, injection of Shingrix and PCV13 given by Freya Lopes. Patient instructed to remain in clinic for 15 minutes afterwards, and to report any adverse reaction to me immediately.       Screening performed by Freya Lopes on 4/11/2018 at 3:20 PM.

## 2018-04-11 NOTE — MR AVS SNAPSHOT
After Visit Summary   4/11/2018    Ricky Brown    MRN: [id number]           Patient Information     Date Of Birth          1950        Visit Information        Provider Department      4/11/2018 2:00 PM Armen Chavez MD [facility]        Today's Diagnoses     Routine history and physical examination of adult    -  1    Need for prophylactic vaccination against Streptococcus pneumoniae (pneumococcus)        Non-ischemic cardiomyopathy (H)        Loose stools        Need for shingles vaccine        History of colonic polyps        Hyperlipidemia LDL goal <70        Special screening for malignant neoplasm of prostate          Care Instructions      Preventive Health Recommendations:       Male Ages 65 and over    Yearly exam:             See your health care provider every year in order to  o   Review health changes.   o   Discuss preventive care.    o   Review your medicines if your doctor has prescribed any.    Talk with your health care provider about whether you should have a test to screen for prostate cancer (PSA).    Every 3 years, have a diabetes test (fasting glucose). If you are at risk for diabetes, you should have this test more often.    Every 5 years, have a cholesterol test. Have this test more often if you are at risk for high cholesterol or heart disease.     Every 10 years, have a colonoscopy. Or, have a yearly FIT test (stool test). These exams will check for colon cancer.    Talk to with your health care provider about screening for Abdominal Aortic Aneurysm if you have a family history of AAA or have a history of smoking.  Shots:     Get a flu shot each year.     Get a tetanus shot every 10 years.     Talk to your doctor about your pneumonia vaccines. There are now two you should receive - Pneumovax (PPSV 23) and Prevnar (PCV 13).    Talk to your doctor about a shingles vaccine.     Talk to your doctor about the hepatitis B  vaccine.  Nutrition:     Eat at least 5 servings of fruits and vegetables each day.     Eat whole-grain bread, whole-wheat pasta and brown rice instead of white grains and rice.     Talk to your doctor about Calcium and Vitamin D.   Lifestyle    Exercise for at least 150 minutes a week (30 minutes a day, 5 days a week). This will help you control your weight and prevent disease.     Limit alcohol to one drink per day.     No smoking.     Wear sunscreen to prevent skin cancer.     See your dentist every six months for an exam and cleaning.     See your eye doctor every 1 to 2 years to screen for conditions such as glaucoma, macular degeneration and cataracts.    Preventive Health Recommendations:       Male Ages 65 and over    Yearly exam:             See your health care provider every year in order to  o   Review health changes.   o   Discuss preventive care.    o   Review your medicines if your doctor has prescribed any.    Talk with your health care provider about whether you should have a test to screen for prostate cancer (PSA).    Every 3 years, have a diabetes test (fasting glucose). If you are at risk for diabetes, you should have this test more often.    Every 5 years, have a cholesterol test. Have this test more often if you are at risk for high cholesterol or heart disease.     Every 10 years, have a colonoscopy. Or, have a yearly FIT test (stool test). These exams will check for colon cancer.    Talk to with your health care provider about screening for Abdominal Aortic Aneurysm if you have a family history of AAA or have a history of smoking.  Shots:     Get a flu shot each year.     Get a tetanus shot every 10 years.     Talk to your doctor about your pneumonia vaccines. There are now two you should receive - Pneumovax (PPSV 23) and Prevnar (PCV 13).    Talk to your doctor about a shingles vaccine.     Talk to your doctor about the hepatitis B vaccine.  Nutrition:     Eat at least 5 servings of fruits  and vegetables each day.     Eat whole-grain bread, whole-wheat pasta and brown rice instead of white grains and rice.     Talk to your doctor about Calcium and Vitamin D.   Lifestyle    Exercise for at least 150 minutes a week (30 minutes a day, 5 days a week). This will help you control your weight and prevent disease.     Limit alcohol to one drink per day.     No smoking.     Wear sunscreen to prevent skin cancer.     See your dentist every six months for an exam and cleaning.     See your eye doctor every 1 to 2 years to screen for conditions such as glaucoma, macular degeneration and cataracts.    Preventive Health Recommendations:       Male Ages 65 and over    Yearly exam:             See your health care provider every year in order to  o   Review health changes.   o   Discuss preventive care.    o   Review your medicines if your doctor has prescribed any.    Talk with your health care provider about whether you should have a test to screen for prostate cancer (PSA).    Every 3 years, have a diabetes test (fasting glucose). If you are at risk for diabetes, you should have this test more often.    Every 5 years, have a cholesterol test. Have this test more often if you are at risk for high cholesterol or heart disease.     Every 10 years, have a colonoscopy. Or, have a yearly FIT test (stool test). These exams will check for colon cancer.    Talk to with your health care provider about screening for Abdominal Aortic Aneurysm if you have a family history of AAA or have a history of smoking.  Shots:     Get a flu shot each year.     Get a tetanus shot every 10 years.     Talk to your doctor about your pneumonia vaccines. There are now two you should receive - Pneumovax (PPSV 23) and Prevnar (PCV 13).    Talk to your doctor about a shingles vaccine.     Talk to your doctor about the hepatitis B vaccine.  Nutrition:     Eat at least 5 servings of fruits and vegetables each day.     Eat whole-grain bread,  whole-wheat pasta and brown rice instead of white grains and rice.     Talk to your doctor about Calcium and Vitamin D.   Lifestyle    Exercise for at least 150 minutes a week (30 minutes a day, 5 days a week). This will help you control your weight and prevent disease.     Limit alcohol to one drink per day.     No smoking.     Wear sunscreen to prevent skin cancer.     See your dentist every six months for an exam and cleaning.     See your eye doctor every 1 to 2 years to screen for conditions such as glaucoma, macular degeneration and cataracts.    For colonoscopy:   [facility]  Appointments: [phone]  [facility]  Floor 4  [address] [place] [address]                                     Dietary Approaches to Stop Hypertension (The DASH Diet)   What is hypertension?   Hypertension is blood pressure that keeps being higher than normal. Blood pressure is the force of blood against artery walls as the heart pumps blood through the body. Blood pressure can be unhealthy if it is above 120/80. The higher your blood pressure, the greater the health risk.   High blood pressure can be controlled if you take these steps:   Maintain a healthy weight.   Are physically active.   Follow a healthy eating plan, which includes foods that do not have a lot of salt and sodium.   Do not drink a lot of alcohol.   Diet affects high blood pressure. Following the DASH diet and reducing the amount of sodium in your diet will help lower your blood pressure. It will also help prevent high blood pressure.   What is the DASH diet?   Dietary Approaches to Stop Hypertension (DASH) is a diet that is low in saturated fat, cholesterol, and total fat. It emphasizes fruits, vegetables, and low-fat dairy foods. The DASH diet also includes whole-grain products, fish, poultry, and nuts. It encourages fewer servings of red meat, sweets, and sugar-containing beverages. It is rich in magnesium, potassium,  and calcium, as well as protein and fiber.   How do I get started on the DASH diet?   The DASH diet requires no special foods and has no hard-to-follow recipes. Start by seeing how DASH compares with your current eating habits.   The DASH eating plan illustrated below is based on a diet of 2,000 calories a day. Your healthcare provider or a dietitian can help you determine how many calories a day you need. Most adults need somewhere between 1600 and 2800 calories a day. Serving sizes for different foods vary from 1/2 cup to 1 and 1/4 cups. Check product nutrition labels for serving sizes and the number of calories per serving.                      Number of        Examples of  Food Group      servings         serving size  ----------------------------------------------------------------    Grains and      7 to 8 per day   1 slice of bread  grain products                   1 cup ready-to-eat cold cereal                                   1/2 cup cooked rice, pasta,                                   or cereal    Vegetables      4 to 5 per day   1 cup raw leafy vegetable                                   1/2 cup cooked vegetable                                   6 ounces (oz) vegetable juice      Fruits          4 to 5 per day   1 medium fruit                                   1/4 cup dried fruit                                   1/2 cup fresh, frozen, or                                   canned fruit                                   6 oz fruit juice      Low-fat or      2 to 3 per day   8 oz milk  fat-free                         1 cup yogurt  dairy foods                      1 and 1/2 oz cheese    Lean meats,  poultry,        2 or fewer per   3 oz cooked lean meat,  or fish         day              skinless poultry, or fish    Nuts, seeds,    4 to 5 per week  1/3 cup or 1 and 1/2 oz nuts  and dry beans                    1 tablespoon or 1/2 oz seeds                                   1/2 cup cooked dry beans    Fats  and oils   2 to 3 per day   1 teaspoon soft margarine                                   1 tablespoon low-fat mayonnaise                                   2 tablespoons light salad                                   dressing                                   1 teaspoon vegetable oil    Sweets          5 per week       1 tablespoon sugar                                   1 tablespoon jelly or jam                                   1/2 oz jelly beans                                   8 oz lemonade  ----------------------------------------------------------------  Make changes gradually. Here are some suggestions that might help:   If you now eat 1 or 2 servings of vegetables a day, add a serving at lunch and another at dinner.   If you have not been eating fruit regularly, or have only juice at breakfast, add a serving to your meals or have it as a snack.   Drink milk or water with lunch or dinner instead of soda, sugar-sweetened tea, or alcohol. Choose low-fat (1%) or fat-free (nonfat) dairy products so that you are eating fewer calories and less saturated fat, total fat, and cholesterol.   Read food labels on margarines and salad dressings to choose products lowest in fat.   If you now eat large portions of meat, slowly cut back--by a half or a third at each meal. Limit meat to 6 ounces a day (two 3-ounce servings). Three to 4 ounces is about the size of a deck of cards.   Have 2 or more meatless meals each week. Increase servings of vegetables, rice, pasta, and beans in all meals. Try casseroles, pasta, and stir-banks dishes, which have less meat and more vegetables, grains, and beans.   Use fruits canned in their own juice. Fresh fruits require little or no preparation. Dried fruits are a good choice to carry with you or to have ready in the car.   Try these snacks ideas: unsalted pretzels or nuts mixed with raisins, lucas crackers, low-fat and fat-free yogurt or frozen yogurt, popcorn with no salt or butter added,  and raw vegetables.   Choose whole-grain foods to get more nutrients, including minerals and fiber. For example, choose whole-wheat bread, whole-grain cereals, or brown rice.   Use fresh, frozen, or no-salt-added canned vegetables.   Remember to also reduce the salt and sodium in your diet. Try to have no more than 2000 milligrams (mg) of sodium per day, with a goal of further reducing the sodium to 1500 mg per day. Three important ways to reduce sodium are:   Eat food products with reduced-sodium or no salt added.   Use less salt when you prepare foods and do not add salt to your food at the table.   Read food labels. Aim for foods that contain less than 5% of the daily value of sodium.   The DASH eating plan is not designed for weight loss. But it contains many lower-calorie foods, such as fruits and vegetables. You can make it lower in calories by replacing high-calorie foods with more fruits and vegetables. Some ideas to increase fruits and vegetables and decrease calories include:   Eat a medium apple instead of 4 shortbread cookies. You'll save 80 calories.   Eat 1/4 cup of dried apricots instead of a 2-ounce bag of pork rinds. You'll save 230 calories.   Have a hamburger that's 3 ounces instead of 6 ounces. Add a 1/2 cup serving of carrots and a 1/2 cup serving of spinach. You'll save more than 200 calories.   Instead of 5 ounces of chicken, have a stir banks with 2 ounces of chicken and 1 and 1/2 cups of raw vegetables. Use just a small amount of vegetable oil. You'll save 50 calories.   Have a 1/2 cup serving of low-fat frozen yogurt instead of a 1-and-1/2-ounce chocolate bar. You'll save about 110 calories.   Use low-fat or fat-free condiments, such as fat-free salad dressings.   Eat smaller portions. Cut back gradually.   Use food labels to compare fat and calorie content in packaged foods. Items marked low fat or fat free may be lower in fat but not lower in calories than their regular versions.   Limit  "foods with lots of added sugar, such as pies, flavored yogurts, candy bars, ice cream, sherbet, regular soft drinks, and fruit drinks.   Drink water or club soda instead of cola or other soda drinks.     For more information, see the National Heart, Lung, and Blood [place] Web site at: http://www.nhlbi.nih.gov/health/public/heart/hbp/dash/.       -consider CoQ10 supplement    -consider weight watchers    -keep a diary of your foods    -Book: Prevent & Reverse Heart Disease    Weight loss program at [facility]  Dr Nesha Helm  [facility]  [phone]    Ways to improve your cholesterol...    1- Eats less saturated fats (including avoiding \"trans\" fats).    2 - Eat more unsaturated fats  - found in vegetables, grains, and tree nuts.   Also by replacing butter with canola oil or olive oil.    3 - Eat more nuts.   1-2 ounces (a small handful) of almonds, walnuts, hazelnuts or pecans once a  day in place of other less healthy snacks.    4 - Eat more high fiber foods - vegetables and whole grains including oat bran, oats, beans, peas, and flax seed.    5 - Eat more fish - such as salmon, tuna, mackerel, and sardines.  1 or 2 six ounce servings per week is a healthy replacement for other proteins.    6 - Exercise for at least 120 minutes per week - which is equal to 30 minutes 4 days per week.      [facility]   Discharged by : Freya ZAPATA Certified Medical Assistant (AAMA)   Paper scripts provided to patient : 1   If you have any questions regarding to your visit please contact your care team:     Team Silver              Clinic Hours Telephone Number     Dr. Joey Walker PA-C   7am-7pm  Monday - Thursday   7am-5pm  Fridays  [phone]   (Appointment scheduling available 24/7)     RN Line  [phone] option 2     Urgent Care - [facility] - 11am-9pm Monday-Friday Saturday-Sunday- 9am-5pm "     [place] -   5pm-9pm Monday-Friday Saturday-Sunday- 9am-5pm    [phone] - [place]    [phone] - [place]     For a Price Quote for your services, please call our Consumer Price Line at [phone].     What options do I have for visits at the clinic other than the traditional office visit?     To expand how we care for you, many of our providers are utilizing electronic visits (e-visits) and telephone visits, when medically appropriate, for interactions with their patients rather than a visit in the clinic. We also offer nurse visits for many medical concerns. Just like any other service, we will bill your insurance company for this type of visit based on time spent on the phone with your provider. Not all insurance companies cover these visits. Please check with your medical insurance if this type of visit is covered. You will be responsible for any charges that are not paid by your insurance.   E-visits via [employer]: generally incur a $35.00 fee.     Telephone visits:   Time spent on the phone: *charged based on time that is spent on the phone in increments of 10 minutes. Estimated cost:   5-10 mins $30.00   11-20 mins. $59.00   21-30 mins. $85.00     Use [employer] (secure email communication and access to your chart) to send your primary care provider a message or make an appointment. Ask someone on your Team how to sign up for [employer].     As always, Thank you for trusting us with your health care needs!      [facility]:    Scheduling Appointments  [facility]  Call: [phone]    [facility]  Call: [phone]    [facility]  Call: [phone]    For Gastroenterology referrals   [facility]   Clinics and Surgery Center, 4th Floor   [address] [place] [address]   Appointments: [phone]    WHERE TO GO FOR CARE?  Clinic    Make an appointment if you:       Are sick  (cold, cough, flu, sore throat, earache or in pain).       Have a small injury (sprain, small cut, burn or broken bone).       Need a physical exam, Pap smear, vaccine or prescription refill.       Have questions about your health or medicines.    To reach us:      Call [phone] ([phone]). Open 24 hours every day. (For counseling services, call [phone].)    Log into [employer] at [employer]. (Visit [email or web address] to create an account.) Hospital emergency room    An emergency is a serious or life- threatening problem that must be treated right away.    Call 911 or get to the hospital if you have:      Very bad or sudden:            - Chest pain or pressure         - Bleeding         - Head or belly pain         - Dizziness or trouble seeing, walking or                          Speaking      Problems breathing      Blood in your vomit or you are coughing up blood      A major injury (knocked out, loss of a finger or limb, rape, broken bone protruding from skin)    A mental health crisis. (Or call the Mental Health Crisis line at 1-188.333.8194 or Suicide Prevention Hotline at 1-578.471.9057.)    Open 24 hours every day. You don't need an appointment.     Urgent care    Visit urgent care for sickness or small injuries when the clinic is closed. You don't need an appointment. To check hours or find an urgent care near you, visit [email or web address]. Online care    Get online care from [facility] for more than 70 common problems, like colds, allergies and infections. Open 24 hours every day at:   [email or web address]   Need help deciding?    For advice about where to be seen, you may call your clinic and ask to speak with a nurse. We're here for you 24 hours every day.         If you are deaf or hard of hearing, please let us know. We provide many free services including sign language interpreters, oral interpreters, TTYs, telephone amplifiers, note takers and written materials.                Follow-ups after your visit        Additional Services     GASTROENTEROLOGY ADULT REF PROCEDURE ONLY [facility] [phone]       Last Lab Result: Creatinine (mg/dL)       Date                     Value                 05/10/2017               0.98             ----------  Body mass index is 38.45 kg/(m^2).     Needed:  No  Language:  English    Patient will be contacted to schedule procedure.     Please be aware that coverage of these services is subject to the terms and limitations of your health insurance plan.  Call member services at your health plan with any benefit or coverage questions.  Any procedures must be performed at a [facility] facility OR coordinated by your clinic's referral office.    Please bring the following with you to your appointment:    (1) Any X-Rays, CTs or MRIs which have been performed.  Contact the facility where they were done to arrange for  prior to your scheduled appointment.    (2) List of current medications   (3) This referral request   (4) Any documents/labs given to you for this referral                  Your next 10 appointments already scheduled     Apr 19, 2018  1:00 PM CDT   Ech Complete with [id number]   [facility] ([facility])    [address] [place] [id number] [phone]           1. Please bring or wear a comfortable two-piece outfit. 2. You may eat, drink and take your normal medicines. 3. For any questions that cannot be answered, please contact the ordering physician            Apr 19, 2018  2:00 PM CDT   Return Visit with ZEN Bustillo MD   [facility] ([facility])    [address] [place] [address] [id number] [phone]              Who to contact     If you have questions or need follow up information about today's clinic visit or your schedule please contact [facility]  "directly at [phone].  Normal or non-critical lab and imaging results will be communicated to you by [email or web address], letter or phone within 4 business days after the clinic has received the results. If you do not hear from us within 7 days, please contact the clinic through [email or web address] or phone. If you have a critical or abnormal lab result, we will notify you by phone as soon as possible.  Submit refill requests through [employer] or call your pharmacy and they will forward the refill request to us. Please allow 3 business days for your refill to be completed.          Additional Information About Your Visit        [email or web address] Information     [employer] gives you secure access to your electronic health record. If you see a primary care provider, you can also send messages to your care team and make appointments. If you have questions, please call your primary care clinic.  If you do not have a primary care provider, please call [phone] and they will assist you.        Care EveryWhere ID     This is your Care EveryWhere ID. This could be used by other organizations to access your [facility] medical records  [id number]        Your Vitals Were     Pulse Temperature Height BMI (Body Mass Index)          76 98.6  F (37  C) (Oral) 5' 6.25\" (1.683 m) 38.45 kg/m2         Blood Pressure from Last 3 Encounters:   04/11/18 136/74   01/11/18 144/68   12/04/17 144/90    Weight from Last 3 Encounters:   04/11/18 240 lb (108.9 kg)   12/05/17 243 lb (110.2 kg)   12/04/17 243 lb 9.6 oz (110.5 kg)              We Performed the Following     ALT     BASIC METABOLIC PANEL     GASTROENTEROLOGY ADULT REF PROCEDURE ONLY [facility] [phone]     Lipid panel reflex to direct LDL Fasting     Pneumococcal vaccine 13 valent PCV13 IM (Prevnar) [97454]     PSA, screen     ZOSTER VACCINE RECOMBINANT ADJUVANTED IM NJX          Where to get your medicines      These medications were sent to [facility] [address] [place] [address] " 10  [facility] [address]     Phone:  [phone]     atorvastatin 10 MG tablet    losartan 50 MG tablet         Some of these will need a paper prescription and others can be bought over the counter.  Ask your nurse if you have questions.     Bring a paper prescription for each of these medications     diphenoxylate-atropine 2.5-0.025 MG per tablet          Primary Care Provider Office Phone # Fax #    Armen Chavez [phone]       [facility] [facility] [place] [address]        Equal Access to Services     NIKHIL ABBOTT : Hadii aad ku hadasho Soomaali, waaxda luqadaha, qaybta kaalmada adeegyada, waxdeo phillips . So [facility] [phone].    ATENCIÓN: Si habla español, tiene a hope disposición servicios gratuitos de asistencia lingüística. Josefa al [phone].    We comply with applicable federal civil rights laws and [place] laws. We do not discriminate on the basis of race, color, national origin, age, disability, sex, sexual orientation, or gender identity.            Thank you!     Thank you for choosing [facility]  for your care. Our goal is always to provide you with excellent care. Hearing back from our patients is one way we can continue to improve our services. Please take a few minutes to complete the written survey that you may receive in the mail after your visit with us. Thank you!             Your Updated Medication List - Protect others around you: Learn how to safely use, store and throw away your medicines at www.disposemymeds.org.          This list is accurate as of 4/11/18  3:08 PM.  Always use your most recent med list.                   Brand Name Dispense Instructions for use Diagnosis    aspirin 81 MG tablet     90 tablet    Take 1 tablet (81 mg) by mouth daily    Cardiomyopathy, idiopathic (H)       atorvastatin 10 MG tablet    LIPITOR    90 tablet    Take 1 tablet (10 mg) by mouth daily     Hyperlipidemia LDL goal <70       diphenoxylate-atropine 2.5-0.025 MG per tablet    LOMOTIL    30 tablet    Take 1 tablet by mouth daily as needed for diarrhea Patient only takes 1 tablet as needed    Loose stools       losartan 50 MG tablet    COZAAR    90 tablet    Take 1 tablet (50 mg) by mouth daily    Non-ischemic cardiomyopathy (H)       metoprolol succinate 50 MG 24 hr tablet    TOPROL-XL    90 tablet    TAKE 1 TABLET  BY MOUTH DAILY    Essential hypertension with goal blood pressure less than 140/90, Cardiomyopathy, unspecified type (H)       MULTIVITAMINS PO      Take  by mouth daily.    Routine general medical examination at a health care facility, Erectile dysfunction, Rectal itching, HTN (hypertension)       TUMS CALCIUM FOR LIFE BONE PO      Take  by mouth.

## 2018-04-11 NOTE — PROGRESS NOTES
"SUBJECTIVE:   Ricky Brown is a 67 year old male who presents for Preventive Visit.      Are you in the first 12 months of your Medicare coverage?  Yes,  Visual Acuity:  Right Eye: 20/25   Left Eye: 20/25  Both Eyes: 20/32    Physical   Annual:     Getting at least 3 servings of Calcium per day::  Yes    Bi-annual eye exam::  Yes    Dental care twice a year::  Yes    Sleep apnea or symptoms of sleep apnea::  Excessive snoring    Diet::  Regular (no restrictions)    Frequency of exercise::  1 day/week    Duration of exercise::  N/A    Taking medications regularly::  Yes    Medication side effects::  None    Additional concerns today::  YES    Ability to successfully perform activities of daily living: no assistance needed  Home Safety:  Throw rugs in the hallway  Hearing Impairment: difficulty following a conversation in a noisy restaurant or crowded room and need to ask people to speak up or repeat themselves        Fall risk:       COGNITIVE SCREEN  1) Repeat 3 items (Banana, Sunrise, Chair)    2) Clock draw: NORMAL  3) 3 item recall: Recalls 2 objects   Results: NORMAL clock, 1-2 items recalled: COGNITIVE IMPAIRMENT LESS LIKELY    Mini-CogTM Copyright S Elyssa. Licensed by the author for use in [facility]; reprinted with permission (loyda@[facility]). All rights reserved.         Patient has been doing some reading into articles that propound the idea that blood pressure medications are \"not good\". Countless medical journals support the need for antihypertensives in the context of HTN for good management of condition as poorly controlled HTN has been proven to put individuals at higher risk of CVA, vascular disease, CAD, etc. Additionally, with his known history of cardiomyopathy with an EF at 45-50%, there are medical indications that ACEi and beta blocker, independent of blood pressure, help improve long-term outcomes of individuals. In the patient's particular case, it's clear that benefits " outweigh the risks, which essentially negates the information put forth by the questionable articles. Similarly, implementation of statin-therapy should be assessed on a by-case basis, and based on patient's history he would certainly benefit from continued statin therapy. Adding supplements such as CoQ10 are also beneficial.    Furthermore, patient inquired about strategies to help with weight loss. We explored the relation between caloric consumption and caloric expenditure as the key component towards weight loss. Dietary goals should be based on small changes that he can easily abide by to allow for good adherence. Salt restricted diets could help with weight loss and decrease SVR for good control of HTN - DASH diet was briefly outlined. Substitution of carbonated drinks for water and developing a system that will keep him accountable, such as weight chun or use of technology (iOS/Android apps), can all be conducive to weight loss. In terms of exercise, I recommended a daily brisk walk and at least 40 minutes of aerobic exercise on a weekly basis. Instructed patient to check the book Prevent&Reverse Heart Disease for better insight into evidence-based diets that help with weight loss. If interested, he could also check the weight loss program at [place] with Dr. Nesha Helm.        Urinary symptoms. Reports intermittent sudden urgency and low urine output. Recalls that he had developed sudden sense of urgency a couple of days ago and was only able to urinate approx 2 oz. I reviewed variability of guidelines for prostate cancer screening with PSAs as well as false positive rate and low sensitivity of PSA testing.     Past/recent records reviewed and discussed for -- immunizations (shingles vaccine).      Reviewed and updated as needed this visit by clinical staff  Tobacco  Allergies  Meds  Med Hx  Surg Hx  Fam Hx  Soc Hx        Reviewed and updated as needed this visit by Provider        Social History    Substance Use Topics     Smoking status: Former Smoker     Packs/day: 0.50     Years: 2.00     Types: Cigarettes     Quit date: 12/12/1996     Smokeless tobacco: Never Used     Alcohol use 5.0 oz/week      Comment: Evening Marie       Alcohol Use 4/10/2018   If you drink alcohol do you typically have greater than 3 drinks per day OR greater than 7 drinks per week? No               Today's PHQ-2 Score:   PHQ-2 ( 1999 Pfizer) 4/10/2018   Q1: Little interest or pleasure in doing things 1   Q2: Feeling down, depressed or hopeless 0   PHQ-2 Score 1   Q1: Little interest or pleasure in doing things Several days   Q2: Feeling down, depressed or hopeless Not at all   PHQ-2 Score 1       Do you feel safe in your environment - Yes    Do you have a Health Care Directive?: Yes: Advance Directive has been received and scanned.    Current providers sharing in care for this patient include:   Patient Care Team:  Armen Chavez MD as PCP - General (Family Practice)    The following health maintenance items are reviewed in Epic and correct as of today:  Health Maintenance   Topic Date Due     HF ACTION PLAN Q3 YR  1950     COLONOSCOPY Q5 YR  09/20/2016     ADVANCE DIRECTIVE PLANNING Q5 YRS  03/28/2017     CBC Q1 YR  08/18/2017     PNEUMOCOCCAL (1 of 2 - PCV13) 11/08/2017     AORTIC ANEURYSM SCREENING (SYSTEM ASSIGNED)  11/08/2017     BMP Q6 MOS  11/10/2017     ALT Q1 YR  05/10/2018     LIPID MONITORING Q1 YEAR  05/10/2018     INFLUENZA VACCINE (SYSTEM ASSIGNED)  09/01/2018     EYE EXAM Q1 YEAR  09/06/2018     FALL RISK ASSESSMENT  11/21/2018     PNEUMO 5YR BOOST DUE AFTER AGE 65 (NO IB MSG) (2) 04/14/2020     TETANUS IMMUNIZATION (SYSTEM ASSIGNED)  03/28/2022     LIPID MONITORING Q5 YEARS  05/10/2022     HEPATITIS C SCREENING  Completed     Labs reviewed in EPIC  BP Readings from Last 3 Encounters:   04/11/18 136/74   01/11/18 144/68   12/04/17 144/90    Wt Readings from Last 3 Encounters:   04/11/18 108.9 kg (240  lb)   12/05/17 110.2 kg (243 lb)   12/04/17 110.5 kg (243 lb 9.6 oz)                  Patient Active Problem List   Diagnosis     HL (hearing loss)     Erectile dysfunction     Keratoglobus, ou     Pseudophakia, PCL, od; ACL, os     Posterior vitreous detachment, od     Epiretinal membrane, mild, od     Advanced directives, counseling/discussion     HDL deficiency     JOSE JUAN (obstructive sleep apnea)-severe (AHI 32)     BCC (basal cell carcinoma)     Cardiomyopathy (H)     Obstructive sleep apnea     Near syncope     RCT (rotator cuff tear)     Disorder of bursae and tendons in shoulder region     Other postprocedural status(V45.89)     Iritis, os     Cramp of limb     Essential hypertension with goal blood pressure less than 140/90     Hyperlipidemia LDL goal <70     overwieght BMI > 35     Visit for suture removal     Past Surgical History:   Procedure Laterality Date     ARTHROSCOPY SHOULDER BICEPS TENODESIS REPAIR  2/27/2014    Procedure: ARTHROSCOPY SHOULDER BICEPS TENODESIS REPAIR;;  Surgeon: Michael Hagen MD;  [facility]     ARTHROSCOPY SHOULDER ROTATOR CUFF REPAIR  2/27/2014    Procedure: ARTHROSCOPY SHOULDER ROTATOR CUFF REPAIR;  Right Shoulder Arthroscopic Rotator Cuff Repair,  Subacromial Decompression, Biceps Tenodesis, Distal Clavicle Excision   ;  Surgeon: Michael Hagen MD;  Location[facility]     BIOPSY       CARDIAC SURGERY       COLONOSCOPY       EXCHANGE INTRAOCULAR LENS IMPLANT  2005    left eye - first, recentered PCL with iris fixation; then IOLX with ACL     EXTRACAPSULAR CATARACT EXTRATION WITH INTRAOCULAR LENS IMPLANT  2005    both eyes (elsewhere)     TURBINOPLASTY  12/11/2013    Procedure: TURBINOPLASTY;;  Surgeon: Lisset Parsons MD;  Location:  OR     UVULOPALATOPHARYNGOPLASTY  12/11/2013    Procedure: UVULOPALATOPHARYNGOPLASTY;  Uvulopalatopharyngoplasty, Turbiante Reduction;  Surgeon: Lisset Parsons MD;  Location:  OR       Social History    Substance Use Topics     Smoking status: Former Smoker     Packs/day: 0.50     Years: 2.00     Types: Cigarettes     Quit date: 12/12/1996     Smokeless tobacco: Never Used     Alcohol use 5.0 oz/week      Comment: Evening Marie     Family History   Problem Relation Age of Onset     DIABETES Father      Old age Diabetes     CEREBROVASCULAR DISEASE Father      Obesity Father      HEART DISEASE Father      Coronary Artery Disease Father      Hypertension Father      Lipids Sister      C.A.D. No family hx of      CANCER No family hx of      Glaucoma No family hx of      Macular Degeneration No family hx of          Current Outpatient Prescriptions   Medication Sig Dispense Refill     losartan (COZAAR) 50 MG tablet Take 1 tablet (50 mg) by mouth daily 90 tablet 3     diphenoxylate-atropine (LOMOTIL) 2.5-0.025 MG per tablet Take 1 tablet by mouth daily as needed for diarrhea Patient only takes 1 tablet as needed 30 tablet 5     atorvastatin (LIPITOR) 10 MG tablet Take 1 tablet (10 mg) by mouth daily 90 tablet 3     metoprolol succinate (TOPROL-XL) 50 MG 24 hr tablet TAKE 1 TABLET  BY MOUTH DAILY 90 tablet 3     aspirin 81 MG tablet Take 1 tablet (81 mg) by mouth daily 90 tablet 3     Calcium Carbonate Antacid (TUMS CALCIUM FOR LIFE BONE PO) Take  by mouth.       Multiple Vitamin (MULTIVITAMINS PO) Take  by mouth daily.       [DISCONTINUED] atorvastatin (LIPITOR) 10 MG tablet Take 1 tablet (10 mg) by mouth daily 90 tablet 2     [DISCONTINUED] losartan (COZAAR) 50 MG tablet Take 1 tablet (50 mg) by mouth daily 90 tablet 1     Allergies   Allergen Reactions     Lisinopril Cough     Recent Labs   Lab Test  05/24/17   1513  05/10/17   1038  08/18/16   1256  08/10/16   0934  11/06/15   2150  04/14/15   1058   05/16/13   1406   A1C  5.7   --   5.6   --    --    --    --    --    LDL   --   22   --   26   --   18   < >   --    HDL   --   41   --   37*   --   39*   < >   --    TRIG   --   267*   --   202*   --   241*   < >    "--    ALT   --   38   --    --   50  38   < >  78*   CR   --   0.98   --   0.84  1.05  0.89   < >  0.86   GFRESTIMATED   --   77   --   >90  Non  GFR Calc    72  87   < >  >90   GFRESTBLACK   --   >90   GFR Calc     --   >90   GFR Calc    87  >90   GFR Calc     < >  >90   POTASSIUM   --   4.3   --   3.9  3.9  4.2   < >  3.8   TSH   --    --   1.75   --    --    --    --   1.47    < > = values in this interval not displayed.            Review of Systems  Constitutional, HEENT, cardiovascular, pulmonary, GI, , musculoskeletal, neuro, skin, endocrine and psych systems are negative, except as otherwise noted.    This document serves as a record of the services and decisions personally performed and made by Joey Chavez MD. It was created on their behalf by Dav Box, a trained medical scribe. The creation of this document is based the provider's statements to the medical scribe.  Dav Box April 11, 2018 3:20 PM       OBJECTIVE:   /74 (BP Location: Left arm, Cuff Size: Adult Large)  Pulse 76  Temp 98.6  F (37  C) (Oral)  Ht 5' 6.25\" (1.683 m)  Wt 240 lb (108.9 kg)  BMI 38.45 kg/m2 Estimated body mass index is 38.45 kg/(m^2) as calculated from the following:    Height as of this encounter: 5' 6.25\" (1.683 m).    Weight as of this encounter: 240 lb (108.9 kg).  Physical Exam  GENERAL: overweight, alert and no distress  EYES: Eyes grossly normal to inspection, PERRL and conjunctivae and sclerae normal  HENT: ear canals and TM's normal, nose and mouth without ulcers or lesions  NECK: no adenopathy, no asymmetry, masses, or scars and thyroid normal to palpation  RESP: lungs clear to auscultation - no rales, rhonchi or wheezes  CV: regular rate and rhythm, normal S1 S2, no S3 or S4, no murmur, click or rub, no peripheral edema and peripheral pulses strong  ABDOMEN: soft, nontender, no hepatosplenomegaly, no masses and bowel sounds normal  MS: no " gross musculoskeletal defects noted, no edema  SKIN: no suspicious lesions or rashes  NEURO: Normal strength and tone, mentation intact and speech normal  PSYCH: mentation appears normal, affect normal/bright  LYMPH: no cervical, supraclavicular, axillary, or inguinal adenopathy    ASSESSMENT / PLAN:   (Z00.00) Routine history and physical examination of adult  (primary encounter diagnosis)  Comment: screening recommendations related to health maintenance were discussed.   Plan:     (Z23) Need for prophylactic vaccination against Streptococcus pneumoniae (pneumococcus)  Comment: given to patient today  Plan: Pneumococcal vaccine 13 valent PCV13 IM         (Prevnar) [71802]            (I42.8) Non-ischemic cardiomyopathy (H)  Comment: stable.   Plan: ALT, BASIC METABOLIC PANEL, Lipid panel reflex         to direct LDL Fasting, losartan (COZAAR) 50 MG         tablet        -continue present medications    (R19.5) Loose stools  Comment: chronic difficulties, stable.   Plan: diphenoxylate-atropine (LOMOTIL) 2.5-0.025 MG         per tablet        -medications refilled, continue present medications    (Z23) Need for shingles vaccine  Comment: given to patient today  Plan: ZOSTER VACCINE RECOMBINANT ADJUVANTED IM NJX            (Z86.010) History of colonic polyps  Comment: colonoscopy in 2011 showed adenomatous polyps, patient due for repeat.   Plan: GASTROENTEROLOGY ADULT REF PROCEDURE ONLY [facility] [phone]            (E78.5) Hyperlipidemia LDL goal <70  Comment: stable  Plan: atorvastatin (LIPITOR) 10 MG tablet        -medications refilled, continue present medications    (Z12.5) Special screening for malignant neoplasm of prostate  Comment: PSA in 2016 WNL. Description of gradual development of urinary dysfunction symptoms. PSA will be rechecked today.    Plan: PSA, screen        -follow up, pending results. Consideration for referral to urology if labs are deranged.         End of Life  "Planning:  Patient currently has an advanced directive: Yes.  Practitioner is supportive of decision.    COUNSELING:  Reviewed preventive health counseling, as reflected in patient instructions       Regular exercise       Healthy diet/nutrition       Colon cancer screening       Prostate cancer screening        Estimated body mass index is 38.45 kg/(m^2) as calculated from the following:    Height as of this encounter: 5' 6.25\" (1.683 m).    Weight as of this encounter: 240 lb (108.9 kg).  Weight management plan: Discussed healthy diet and exercise guidelines and patient will follow up in 12 months in clinic to re-evaluate.   reports that he quit smoking about 21 years ago. His smoking use included Cigarettes. He has a 1.00 pack-year smoking history. He has never used smokeless tobacco.      Appropriate preventive services were discussed with this patient, including applicable screening as appropriate for cardiovascular disease, diabetes, osteopenia/osteoporosis, and glaucoma.  As appropriate for age/gender, discussed screening for colorectal cancer, prostate cancer, breast cancer, and cervical cancer. Checklist reviewing preventive services available has been given to the patient.    Reviewed patients plan of care and provided an AVS. The Basic Care Plan (routine screening as documented in Health Maintenance) for Ricky meets the Care Plan requirement. This Care Plan has been established and reviewed with the Patient.    Counseling Resources:  ATP IV Guidelines  Pooled Cohorts Equation Calculator  Breast Cancer Risk Calculator  FRAX Risk Assessment  ICSI Preventive Guidelines  Dietary Guidelines for Americans, 2010  USDA's MyPlate  ASA Prophylaxis  Lung CA Screening    Length of visit was 60 minutes with more than 50 percent of that time used for discussing medical concerns, and educating patient on weight loss, medications, and current conditions.     Armen Chavez MD, MD  [facility]" CASH  Answers for HPI/ROS submitted by the patient on 4/10/2018   PHQ-2 Score: 1

## 2018-04-11 NOTE — PROGRESS NOTES
SUBJECTIVE:   CC: Ricky Brown is an 67 year old male who presents for preventative health visit.     Physical   Annual:     Getting at least 3 servings of Calcium per day::  Yes    Bi-annual eye exam::  Yes    Dental care twice a year::  Yes    Sleep apnea or symptoms of sleep apnea::  Excessive snoring    Diet::  Regular (no restrictions)    Frequency of exercise::  1 day/week    Duration of exercise::  N/A    Taking medications regularly::  Yes    Medication side effects::  None    Additional concerns today::  YES    Ability to successfully perform activities of daily living: no assistance needed  Home Safety:  Throw rugs in the hallway  Hearing Impairment: difficulty following a conversation in a noisy restaurant or crowded room and need to ask people to speak up or repeat themselves    {Outside tests to abstract? :830273}    {additional problems to add (Optional):661811}    Today's PHQ-2 Score:   PHQ-2 ( 1999 Pfizer) 4/10/2018   Q1: Little interest or pleasure in doing things 1   Q2: Feeling down, depressed or hopeless 0   PHQ-2 Score 1   Q1: Little interest or pleasure in doing things Several days   Q2: Feeling down, depressed or hopeless Not at all   PHQ-2 Score 1       Abuse: Current or Past(Physical, Sexual or Emotional)- No  Do you feel safe in your environment - Yes    Social History   Substance Use Topics     Smoking status: Former Smoker     Packs/day: 0.50     Years: 2.00     Types: Cigarettes     Quit date: 12/12/1996     Smokeless tobacco: Never Used     Alcohol use 5.0 oz/week      Comment: 1-2 seth/ gin per 5-6 night per week     Alcohol Use 4/10/2018   If you drink alcohol do you typically have greater than 3 drinks per day OR greater than 7 drinks per week? No   {add AUDIT responses (Optional) (A score of 7 for adult men is an indication of hazardous drinking; a score of 8 or more is an indication of an alcohol use disorder.  A score of 7 or more for adult women is an indication of  "hazardous drinking or an alchohol use disorder):617748}    Last PSA:   PSA   Date Value Ref Range Status   08/10/2016 1.74 0 - 4 ug/L Final     Comment:     Assay Method:  Chemiluminescence using Siemens Vista analyzer       Reviewed orders with patient. Reviewed health maintenance and updated orders accordingly - Yes  {Chronicprobdata (Optional):022789}    Reviewed and updated as needed this visit by clinical staff         Reviewed and updated as needed this visit by Provider        {HISTORY OPTIONS (Optional):023724}    Review of Systems  {MALE ROS-adult preventive care package:609105::\"C: NEGATIVE for fever, chills, change in weight\",\"I: NEGATIVE for worrisome rashes, moles or lesions\",\"E: NEGATIVE for vision changes or irritation\",\"ENT: NEGATIVE for ear, mouth and throat problems\",\"R: NEGATIVE for significant cough or SOB\",\"CV: NEGATIVE for chest pain, palpitations or peripheral edema\",\"GI: NEGATIVE for nausea, abdominal pain, heartburn, or change in bowel habits\",\" male: negative for dysuria, hematuria, decreased urinary stream, erectile dysfunction, urethral discharge\",\"M: NEGATIVE for significant arthralgias or myalgia\",\"N: NEGATIVE for weakness, dizziness or paresthesias\",\"P: NEGATIVE for changes in mood or affect\"}    OBJECTIVE:   There were no vitals taken for this visit.    Physical Exam  {Exam Choices:681839}    ASSESSMENT/PLAN:   {Diag Picklist:516020}    COUNSELING:   {MALE COUNSELING MESSAGES:134727::\"Reviewed preventive health counseling, as reflected in patient instructions\"}    {BP Counseling- Complete if BP >= 120/80  (Optional):740082}     reports that he quit smoking about 21 years ago. His smoking use included Cigarettes. He has a 1.00 pack-year smoking history. He has never used smokeless tobacco.  {Tobacco Cessation -- Complete if patient is a smoker (Optional):345471}  Estimated body mass index is 38.06 kg/(m^2) as calculated from the following:    Height as of 7/13/17: 5' 7\" (1.702 m).    " Weight as of 12/5/17: 243 lb (110.2 kg).   {Weight Management Plan (ACO) Complete if BMI is abnormal-  Ages 18-64  BMI >24.9.  Age 65+ with BMI <23 or >30 (Optional):887305}    Counseling Resources:  ATP IV Guidelines  Pooled Cohorts Equation Calculator  FRAX Risk Assessment  ICSI Preventive Guidelines  Dietary Guidelines for Americans, 2010  USDA's MyPlate  ASA Prophylaxis  Lung CA Screening    Armen Chavez MD, MD  [facility]  Answers for HPI/ROS submitted by the patient on 4/10/2018   PHQ-2 Score: 1

## 2018-04-11 NOTE — PATIENT INSTRUCTIONS
Preventive Health Recommendations:       Male Ages 65 and over    Yearly exam:             See your health care provider every year in order to  o   Review health changes.   o   Discuss preventive care.    o   Review your medicines if your doctor has prescribed any.    Talk with your health care provider about whether you should have a test to screen for prostate cancer (PSA).    Every 3 years, have a diabetes test (fasting glucose). If you are at risk for diabetes, you should have this test more often.    Every 5 years, have a cholesterol test. Have this test more often if you are at risk for high cholesterol or heart disease.     Every 10 years, have a colonoscopy. Or, have a yearly FIT test (stool test). These exams will check for colon cancer.    Talk to with your health care provider about screening for Abdominal Aortic Aneurysm if you have a family history of AAA or have a history of smoking.  Shots:     Get a flu shot each year.     Get a tetanus shot every 10 years.     Talk to your doctor about your pneumonia vaccines. There are now two you should receive - Pneumovax (PPSV 23) and Prevnar (PCV 13).    Talk to your doctor about a shingles vaccine.     Talk to your doctor about the hepatitis B vaccine.  Nutrition:     Eat at least 5 servings of fruits and vegetables each day.     Eat whole-grain bread, whole-wheat pasta and brown rice instead of white grains and rice.     Talk to your doctor about Calcium and Vitamin D.   Lifestyle    Exercise for at least 150 minutes a week (30 minutes a day, 5 days a week). This will help you control your weight and prevent disease.     Limit alcohol to one drink per day.     No smoking.     Wear sunscreen to prevent skin cancer.     See your dentist every six months for an exam and cleaning.     See your eye doctor every 1 to 2 years to screen for conditions such as glaucoma, macular degeneration and cataracts.    Preventive Health Recommendations:       Male Ages 65 and  over    Yearly exam:             See your health care provider every year in order to  o   Review health changes.   o   Discuss preventive care.    o   Review your medicines if your doctor has prescribed any.    Talk with your health care provider about whether you should have a test to screen for prostate cancer (PSA).    Every 3 years, have a diabetes test (fasting glucose). If you are at risk for diabetes, you should have this test more often.    Every 5 years, have a cholesterol test. Have this test more often if you are at risk for high cholesterol or heart disease.     Every 10 years, have a colonoscopy. Or, have a yearly FIT test (stool test). These exams will check for colon cancer.    Talk to with your health care provider about screening for Abdominal Aortic Aneurysm if you have a family history of AAA or have a history of smoking.  Shots:     Get a flu shot each year.     Get a tetanus shot every 10 years.     Talk to your doctor about your pneumonia vaccines. There are now two you should receive - Pneumovax (PPSV 23) and Prevnar (PCV 13).    Talk to your doctor about a shingles vaccine.     Talk to your doctor about the hepatitis B vaccine.  Nutrition:     Eat at least 5 servings of fruits and vegetables each day.     Eat whole-grain bread, whole-wheat pasta and brown rice instead of white grains and rice.     Talk to your doctor about Calcium and Vitamin D.   Lifestyle    Exercise for at least 150 minutes a week (30 minutes a day, 5 days a week). This will help you control your weight and prevent disease.     Limit alcohol to one drink per day.     No smoking.     Wear sunscreen to prevent skin cancer.     See your dentist every six months for an exam and cleaning.     See your eye doctor every 1 to 2 years to screen for conditions such as glaucoma, macular degeneration and cataracts.    Preventive Health Recommendations:       Male Ages 65 and over    Yearly exam:             See your health care  provider every year in order to  o   Review health changes.   o   Discuss preventive care.    o   Review your medicines if your doctor has prescribed any.    Talk with your health care provider about whether you should have a test to screen for prostate cancer (PSA).    Every 3 years, have a diabetes test (fasting glucose). If you are at risk for diabetes, you should have this test more often.    Every 5 years, have a cholesterol test. Have this test more often if you are at risk for high cholesterol or heart disease.     Every 10 years, have a colonoscopy. Or, have a yearly FIT test (stool test). These exams will check for colon cancer.    Talk to with your health care provider about screening for Abdominal Aortic Aneurysm if you have a family history of AAA or have a history of smoking.  Shots:     Get a flu shot each year.     Get a tetanus shot every 10 years.     Talk to your doctor about your pneumonia vaccines. There are now two you should receive - Pneumovax (PPSV 23) and Prevnar (PCV 13).    Talk to your doctor about a shingles vaccine.     Talk to your doctor about the hepatitis B vaccine.  Nutrition:     Eat at least 5 servings of fruits and vegetables each day.     Eat whole-grain bread, whole-wheat pasta and brown rice instead of white grains and rice.     Talk to your doctor about Calcium and Vitamin D.   Lifestyle    Exercise for at least 150 minutes a week (30 minutes a day, 5 days a week). This will help you control your weight and prevent disease.     Limit alcohol to one drink per day.     No smoking.     Wear sunscreen to prevent skin cancer.     See your dentist every six months for an exam and cleaning.     See your eye doctor every 1 to 2 years to screen for conditions such as glaucoma, macular degeneration and cataracts.    For colonoscopy:   [facility]  Appointments: [phone]  [facility]  Floor [address]  [address] [place] [address]                                      Dietary Approaches to Stop Hypertension (The DASH Diet)   What is hypertension?   Hypertension is blood pressure that keeps being higher than normal. Blood pressure is the force of blood against artery walls as the heart pumps blood through the body. Blood pressure can be unhealthy if it is above 120/80. The higher your blood pressure, the greater the health risk.   High blood pressure can be controlled if you take these steps:   Maintain a healthy weight.   Are physically active.   Follow a healthy eating plan, which includes foods that do not have a lot of salt and sodium.   Do not drink a lot of alcohol.   Diet affects high blood pressure. Following the DASH diet and reducing the amount of sodium in your diet will help lower your blood pressure. It will also help prevent high blood pressure.   What is the DASH diet?   Dietary Approaches to Stop Hypertension (DASH) is a diet that is low in saturated fat, cholesterol, and total fat. It emphasizes fruits, vegetables, and low-fat dairy foods. The DASH diet also includes whole-grain products, fish, poultry, and nuts. It encourages fewer servings of red meat, sweets, and sugar-containing beverages. It is rich in magnesium, potassium, and calcium, as well as protein and fiber.   How do I get started on the DASH diet?   The DASH diet requires no special foods and has no hard-to-follow recipes. Start by seeing how DASH compares with your current eating habits.   The DASH eating plan illustrated below is based on a diet of 2,000 calories a day. Your healthcare provider or a dietitian can help you determine how many calories a day you need. Most adults need somewhere between 1600 and 2800 calories a day. Serving sizes for different foods vary from 1/2 cup to 1 and 1/4 cups. Check product nutrition labels for serving sizes and the number of calories per serving.                      Number of        Examples of  Food Group      servings         serving  size  ----------------------------------------------------------------    Grains and      7 to 8 per day   1 slice of bread  grain products                   1 cup ready-to-eat cold cereal                                   1/2 cup cooked rice, pasta,                                   or cereal    Vegetables      4 to 5 per day   1 cup raw leafy vegetable                                   1/2 cup cooked vegetable                                   6 ounces (oz) vegetable juice      Fruits          4 to 5 per day   1 medium fruit                                   1/4 cup dried fruit                                   1/2 cup fresh, frozen, or                                   canned fruit                                   6 oz fruit juice      Low-fat or      2 to 3 per day   8 oz milk  fat-free                         1 cup yogurt  dairy foods                      1 and 1/2 oz cheese    Lean meats,  poultry,        2 or fewer per   3 oz cooked lean meat,  or fish         day              skinless poultry, or fish    Nuts, seeds,    4 to 5 per week  1/3 cup or 1 and 1/2 oz nuts  and dry beans                    1 tablespoon or 1/2 oz seeds                                   1/2 cup cooked dry beans    Fats and oils   2 to 3 per day   1 teaspoon soft margarine                                   1 tablespoon low-fat mayonnaise                                   2 tablespoons light salad                                   dressing                                   1 teaspoon vegetable oil    Sweets          5 per week       1 tablespoon sugar                                   1 tablespoon jelly or jam                                   1/2 oz jelly beans                                   8 oz lemonade  ----------------------------------------------------------------  Make changes gradually. Here are some suggestions that might help:   If you now eat 1 or 2 servings of vegetables a day, add a serving at lunch and another at  dinner.   If you have not been eating fruit regularly, or have only juice at breakfast, add a serving to your meals or have it as a snack.   Drink milk or water with lunch or dinner instead of soda, sugar-sweetened tea, or alcohol. Choose low-fat (1%) or fat-free (nonfat) dairy products so that you are eating fewer calories and less saturated fat, total fat, and cholesterol.   Read food labels on margarines and salad dressings to choose products lowest in fat.   If you now eat large portions of meat, slowly cut back--by a half or a third at each meal. Limit meat to 6 ounces a day (two 3-ounce servings). Three to 4 ounces is about the size of a deck of cards.   Have 2 or more meatless meals each week. Increase servings of vegetables, rice, pasta, and beans in all meals. Try casseroles, pasta, and stir-banks dishes, which have less meat and more vegetables, grains, and beans.   Use fruits canned in their own juice. Fresh fruits require little or no preparation. Dried fruits are a good choice to carry with you or to have ready in the car.   Try these snacks ideas: unsalted pretzels or nuts mixed with raisins, lucas crackers, low-fat and fat-free yogurt or frozen yogurt, popcorn with no salt or butter added, and raw vegetables.   Choose whole-grain foods to get more nutrients, including minerals and fiber. For example, choose whole-wheat bread, whole-grain cereals, or brown rice.   Use fresh, frozen, or no-salt-added canned vegetables.   Remember to also reduce the salt and sodium in your diet. Try to have no more than 2000 milligrams (mg) of sodium per day, with a goal of further reducing the sodium to 1500 mg per day. Three important ways to reduce sodium are:   Eat food products with reduced-sodium or no salt added.   Use less salt when you prepare foods and do not add salt to your food at the table.   Read food labels. Aim for foods that contain less than 5% of the daily value of sodium.   The DASH eating plan is not  "designed for weight loss. But it contains many lower-calorie foods, such as fruits and vegetables. You can make it lower in calories by replacing high-calorie foods with more fruits and vegetables. Some ideas to increase fruits and vegetables and decrease calories include:   Eat a medium apple instead of 4 shortbread cookies. You'll save 80 calories.   Eat 1/4 cup of dried apricots instead of a 2-ounce bag of pork rinds. You'll save 230 calories.   Have a hamburger that's 3 ounces instead of 6 ounces. Add a 1/2 cup serving of carrots and a 1/2 cup serving of spinach. You'll save more than 200 calories.   Instead of 5 ounces of chicken, have a stir banks with 2 ounces of chicken and 1 and 1/2 cups of raw vegetables. Use just a small amount of vegetable oil. You'll save 50 calories.   Have a 1/2 cup serving of low-fat frozen yogurt instead of a 1-and-1/2-ounce chocolate bar. You'll save about 110 calories.   Use low-fat or fat-free condiments, such as fat-free salad dressings.   Eat smaller portions. Cut back gradually.   Use food labels to compare fat and calorie content in packaged foods. Items marked low fat or fat free may be lower in fat but not lower in calories than their regular versions.   Limit foods with lots of added sugar, such as pies, flavored yogurts, candy bars, ice cream, sherbet, regular soft drinks, and fruit drinks.   Drink water or club soda instead of cola or other soda drinks.     For more information, see the National Heart, Lung, and Blood [place] Web site at: http://www.nhlbi.nih.gov/health/public/heart/hbp/dash/.       -consider CoQ10 supplement    -consider weight watchers    -keep a diary of your foods    -Book: Prevent & Reverse Heart Disease    Weight loss program at [facility]  Dr Nesha Helm  [facility]  [phone]    Ways to improve your cholesterol...    1- Eats less saturated fats (including avoiding \"trans\" fats).    2 - Eat more unsaturated fats  - found in " vegetables, grains, and tree nuts.   Also by replacing butter with canola oil or olive oil.    3 - Eat more nuts.   1-2 ounces (a small handful) of almonds, walnuts, hazelnuts or pecans once a  day in place of other less healthy snacks.    4 - Eat more high fiber foods - vegetables and whole grains including oat bran, oats, beans, peas, and flax seed.    5 - Eat more fish - such as salmon, tuna, mackerel, and sardines.  1 or 2 six ounce servings per week is a healthy replacement for other proteins.    6 - Exercise for at least 120 minutes per week - which is equal to 30 minutes 4 days per week.      [facility]   Discharged by : Freya ZAPATA Certified Medical Assistant (AAMA)   Paper scripts provided to patient : 1   If you have any questions regarding to your visit please contact your care team:     Team [facility]              Clinic Hours Telephone Number     Dr. Joey Walker PA-C   7am-7pm  Monday - Thursday   7am-5pm  Fridays  [phone]   (Appointment scheduling available 24/7)     RN Line  [phone] option 2     Urgent Care - [place] [facility] - 11am-9pm Monday-Friday Saturday-Sunday- 9am-5pm     [place] -   5pm-9pm Monday-Friday Saturday-Sunday- 9am-5pm    [phone] - Arlette Richards    [phone] - [place]     For a Price Quote for your services, please call our Consumer Price Line at [phone].     What options do I have for visits at the clinic other than the traditional office visit?     To expand how we care for you, many of our providers are utilizing electronic visits (e-visits) and telephone visits, when medically appropriate, for interactions with their patients rather than a visit in the clinic. We also offer nurse visits for many medical concerns. Just like any other service, we will bill your insurance company for this type of visit based on time spent on the phone with your provider. Not all  insurance companies cover these visits. Please check with your medical insurance if this type of visit is covered. You will be responsible for any charges that are not paid by your insurance.   E-visits via [employer]: generally incur a $35.00 fee.     Telephone visits:   Time spent on the phone: *charged based on time that is spent on the phone in increments of 10 minutes. Estimated cost:   5-10 mins $30.00   11-20 mins. $59.00   21-30 mins. $85.00     Use EPS (secure email communication and access to your chart) to send your primary care provider a message or make an appointment. Ask someone on your Team how to sign up for EPS.     As always, Thank you for trusting us with your health care needs!      [facility]:    Scheduling Appointments  [facility]  Call: [phone]    [facility]  Call: [phone]    [facility]  Call: [phone]    For Gastroenterology referrals   [facility]   Clinics and Surgery Center, 4th Floor   [address] [place] [address]   Appointments: [phone]    WHERE TO GO FOR CARE?  Clinic    Make an appointment if you:       Are sick (cold, cough, flu, sore throat, earache or in pain).       Have a small injury (sprain, small cut, burn or broken bone).       Need a physical exam, Pap smear, vaccine or prescription refill.       Have questions about your health or medicines.    To reach us:      Call [phone] ([phone]). Open 24 hours every day. (For counseling services, call [phone].)    Log into EPS at [email or web address]. (Visit [email or web address] to create an account.) Hospital emergency room    An emergency is a serious or life- threatening problem that must be treated right away.    Call 660 or get to the hospital if you have:      Very bad or sudden:            - Chest pain or pressure         - Bleeding         - Head or belly  pain         - Dizziness or trouble seeing, walking or                          Speaking      Problems breathing      Blood in your vomit or you are coughing up blood      A major injury (knocked out, loss of a finger or limb, rape, broken bone protruding from skin)    A mental health crisis. (Or call the Mental Health Crisis line at 1-362.455.8970 or Suicide Prevention Hotline at 1-642.281.8503.)    Open 24 hours every day. You don't need an appointment.     Urgent care    Visit urgent care for sickness or small injuries when the clinic is closed. You don't need an appointment. To check hours or find an urgent care near you, visit [email or web address]. Online care    Get online care from [facility] for more than 70 common problems, like colds, allergies and infections. Open 24 hours every day at:   [email or web address]   Need help deciding?    For advice about where to be seen, you may call your clinic and ask to speak with a nurse. We're here for you 24 hours every day.         If you are deaf or hard of hearing, please let us know. We provide many free services including sign language interpreters, oral interpreters, TTYs, telephone amplifiers, note takers and written materials.

## 2018-04-12 LAB
ALT SERPL W P-5'-P-CCNC: 27 U/L (ref 0–70)
ANION GAP SERPL CALCULATED.3IONS-SCNC: 6 MMOL/L (ref 3–14)
BUN SERPL-MCNC: 19 MG/DL (ref 7–30)
CALCIUM SERPL-MCNC: 8.9 MG/DL (ref 8.5–10.1)
CHLORIDE SERPL-SCNC: 108 MMOL/L (ref 94–109)
CHOLEST SERPL-MCNC: 145 MG/DL
CO2 SERPL-SCNC: 27 MMOL/L (ref 20–32)
CREAT SERPL-MCNC: 0.86 MG/DL (ref 0.66–1.25)
GFR SERPL CREATININE-BSD FRML MDRD: 90 ML/MIN/1.7M2
GLUCOSE SERPL-MCNC: 105 MG/DL (ref 70–99)
HDLC SERPL-MCNC: 36 MG/DL
LDLC SERPL CALC-MCNC: 38 MG/DL
NONHDLC SERPL-MCNC: 109 MG/DL
POTASSIUM SERPL-SCNC: 4.4 MMOL/L (ref 3.4–5.3)
PSA SERPL-ACNC: 0.31 UG/L (ref 0–4)
SODIUM SERPL-SCNC: 141 MMOL/L (ref 133–144)
TRIGL SERPL-MCNC: 353 MG/DL

## 2018-04-19 ENCOUNTER — RADIANT APPOINTMENT (OUTPATIENT)
Dept: CARDIOLOGY | Facility: CLINIC | Age: 68
End: 2018-04-19
Attending: INTERNAL MEDICINE
Payer: COMMERCIAL

## 2018-04-19 ENCOUNTER — OFFICE VISIT (OUTPATIENT)
Dept: CARDIOLOGY | Facility: CLINIC | Age: 68
End: 2018-04-19
Payer: COMMERCIAL

## 2018-04-19 VITALS — HEART RATE: 75 BPM | DIASTOLIC BLOOD PRESSURE: 85 MMHG | OXYGEN SATURATION: 94 % | SYSTOLIC BLOOD PRESSURE: 136 MMHG

## 2018-04-19 DIAGNOSIS — I42.8 NON-ISCHEMIC CARDIOMYOPATHY (H): Primary | ICD-10-CM

## 2018-04-19 DIAGNOSIS — I42.9 CARDIOMYOPATHY (H): ICD-10-CM

## 2018-04-19 PROCEDURE — 99213 OFFICE O/P EST LOW 20 MIN: CPT | Performed by: INTERNAL MEDICINE

## 2018-04-19 PROCEDURE — 40000264 ZZHC STATISTIC IV PUSH SINGLE INITIAL SUBSTANCE: Performed by: INTERNAL MEDICINE

## 2018-04-19 PROCEDURE — 93306 TTE W/DOPPLER COMPLETE: CPT | Performed by: INTERNAL MEDICINE

## 2018-04-19 RX ADMIN — Medication 3 ML: at 15:15

## 2018-04-19 NOTE — NURSING NOTE
Med Reconcile: Reviewed and verified all current medications with the patient. The updated medication list was printed and given to the patient.  The patient has been instructed about heart failure (HF) management AT THIS TIME HE IS REFUSING TO RESTART HIS CARDIAC MEDICATIONS.  WRITER WENT INTO DETAIL ABOUT THE IMPORTANCE OF CONTINUING THE CARDIAC MEDICATION REGIMEN TO MAKE SURE THE HEART PUMPING FUNCTION DOES NOT INCREASE.  AGAIN PATIENT IS REFUSING AT THIS TIME.  HE IS FOLLOWING A CARDIOLOGIST ON THE  INTERNET.      Weight Monitoring: Monitor the weight at least every other day or as ordered by MD    Worsening symptoms of HF: Monitor symptoms of worsening HF such as     weight gain of 2# or more per day or  5# in a week    shortness of breath    leg/ankle swelling    chest pain/ discomfort    decreased energy    fatigue or other symptoms as specified by MD    Call the MD when worsening symptom(s) occur    Medications:  All medications were reviewed upon discharge    Return Appointment: Patient given instructions regarding scheduling next clinic visit. Patient demonstrated understanding of this information and agreed to call with further questions or concerns.  [facility]  Patient stated he understood all health information given and agreed to call with further questions or concerns.

## 2018-04-19 NOTE — PATIENT INSTRUCTIONS
Thank you for coming to the [facility] [place] [facility]; please note the following instructions:    1. Follow up with [facility].        If you have any questions regarding your visit please contact your care team:     Cardiology  Telephone Number   Temitope BARBOSA, RN  Sancho ROY, RN   Graciela RENEE, ALFONSO STEVENSON, OLU STYLES MA   [phone]    *After hours: [phone]   For scheduling appts:     [phone] or    [phone] (select option 1)    *After hours: [phone]     For the Device Clinic (Pacemakers and ICD's)  RN's :  Vandana King   During business hours: [phone]    *After business hours:  [phone] (select option 4)      Normal test result notifications will be released via [employer] or mailed within 7 business days.  All other test results, will be communicated via telephone once reviewed by your cardiologist.    If you need a medication refill please contact your pharmacy.  Please allow 3 business days for your refill to be completed.    As always, thank you for trusting us with your health care needs!

## 2018-04-19 NOTE — MR AVS SNAPSHOT
After Visit Summary   4/19/2018    Ricky Brown    MRN: [id number]           Patient Information     Date Of Birth          1950        Visit Information        Provider Department      4/19/2018 2:00 PM ZEN Bustillo MD [facility]YSIC[facility] AT [facility]        Care Instructions    Thank you for coming to the [facility]; please note the following instructions:    1. Follow up with [facility] clinic.        If you have any questions regarding your visit please contact your care team:     Cardiology  Telephone Number   Temitope BARBOSA, RN  Sancho ROY, RN   Graciela RENEE, RMA  Susy STEVENSON, OLU STYLES, MA   [phone]    *After hours: [phone]   For scheduling appts:     [phone] or    [phone] (select option 1)    *After hours: [phone]     For the Device Clinic (Pacemakers and ICD's)  RN's :  Vandana King   During business hours: [phone]    *After business hours:  [phone] (select option 4)      Normal test result notifications will be released via [employer] or mailed within 7 business days.  All other test results, will be communicated via telephone once reviewed by your cardiologist.    If you need a medication refill please contact your pharmacy.  Please allow 3 business days for your refill to be completed.    As always, thank you for trusting us with your health care needs!            Follow-ups after your visit        Your next 10 appointments already scheduled     May 10, 2018  2:00 PM CDT   [facility]    [address] [place] [id number] [phone]           Bring a current list of meds and any records pertaining to this visit. For Physicals, please bring immunization records and any forms needing to be filled out. Please arrive 10 minutes early to complete paperwork.              Who to  contact     If you have questions or need follow up information about today's clinic visit or your schedule please contact [facility] PHYSICIANS HEART AT [facility] directly at [phone].  Normal or non-critical lab and imaging results will be communicated to you by MyChart, letter or phone within 4 business days after the clinic has received the results. If you do not hear from us within 7 days, please contact the clinic through MyChart or phone. If you have a critical or abnormal lab result, we will notify you by phone as soon as possible.  Submit refill requests through [employer] or call your pharmacy and they will forward the refill request to us. Please allow 3 business days for your refill to be completed.          Additional Information About Your Visit        [employer] Information     [employer] gives you secure access to your electronic health record. If you see a primary care provider, you can also send messages to your care team and make appointments. If you have questions, please call your primary care clinic.  If you do not have a primary care provider, please call [phone] and they will assist you.        Care EveryWhere ID     This is your Care EveryWhere ID. This could be used by other organizations to access your [place] medical records  [id number]        Your Vitals Were     Pulse Pulse Oximetry                75 94%           Blood Pressure from Last 3 Encounters:   04/19/18 136/85   04/11/18 136/74   01/11/18 144/68    Weight from Last 3 Encounters:   04/11/18 108.9 kg (240 lb)   12/05/17 110.2 kg (243 lb)   12/04/17 110.5 kg (243 lb 9.6 oz)              Today, you had the following     No orders found for display       Primary Care Provider Office Phone # Fax #    Armen Chavez [phone] [address] [address]        Equal Access to Services     NIKHIL ABBOTT : bhavani Chatterjee, amador peralta  scottie higginsdavin damonaan ah. So [facility] [phone].    ATENCIÓN: Si negro tran, tiene a hope disposición servicios gratuitos de asistencia lingüística. Josefa al [phone].    We comply with applicable federal civil rights laws and [place] laws. We do not discriminate on the basis of race, color, national origin, age, disability, sex, sexual orientation, or gender identity.            Thank you!     Thank you for choosing [facility] PHYSICIANS HEART AT [facility]  for your care. Our goal is always to provide you with excellent care. Hearing back from our patients is one way we can continue to improve our services. Please take a few minutes to complete the written survey that you may receive in the mail after your visit with us. Thank you!             Your Updated Medication List - Protect others around you: Learn how to safely use, store and throw away your medicines at www.disposemymeds.org.          This list is accurate as of 4/19/18  2:40 PM.  Always use your most recent med list.                   Brand Name Dispense Instructions for use Diagnosis    aspirin 81 MG tablet     90 tablet    Take 1 tablet (81 mg) by mouth daily    Cardiomyopathy, idiopathic (H)       atorvastatin 10 MG tablet    LIPITOR    90 tablet    Take 1 tablet (10 mg) by mouth daily    Hyperlipidemia LDL goal <70       diphenoxylate-atropine 2.5-0.025 MG per tablet    LOMOTIL    30 tablet    Take 1 tablet by mouth daily as needed for diarrhea Patient only takes 1 tablet as needed    Loose stools       losartan 50 MG tablet    COZAAR    90 tablet    Take 1 tablet (50 mg) by mouth daily    Non-ischemic cardiomyopathy (H)       metoprolol succinate 50 MG 24 hr tablet    TOPROL-XL    90 tablet    TAKE 1 TABLET  BY MOUTH DAILY    Essential hypertension with goal blood pressure less than 140/90, Cardiomyopathy, unspecified type (H)       MULTIVITAMINS PO      Take  by mouth daily.    Routine general  medical examination at a health care facility, Erectile dysfunction, Rectal itching, HTN (hypertension)       TUMS CALCIUM FOR LIFE BONE PO      Take  by mouth.

## 2018-04-19 NOTE — NURSING NOTE
"Chief Complaint   Patient presents with     RECHECK     Yearly follow up for Non-ischemic cardiomyopathy, Echo 4/19/18- dc.        Initial /85 (BP Location: Right arm, Patient Position: Chair, Cuff Size: Adult Large)  Pulse 75  SpO2 94% Estimated body mass index is 38.45 kg/(m^2) as calculated from the following:    Height as of 4/11/18: 1.683 m (5' 6.25\").    Weight as of 4/11/18: 108.9 kg (240 lb)..  BP completed using cuff size: kayla Sandy MA    "

## 2018-04-19 NOTE — PROGRESS NOTES
"   SUBJECTIVE:  Ricky Brown is a 67 year old male who presents for follow up.  Non ischemic cardiomyopathy. Normal cath 2013. Last EF `45%. LVESV 90ml.  HTN+. On statin.    No cardiac complaints.    WATCHED TV AND ONE LADY EXPLAINED EVERYTHING ABOUT LIPDS AND HOW GOOD THEY ARE AND SUGGESTED BREATHING EXERCISE. SO HE STOPPED ALL HIS MEDICATIONS AND FEELS VERY GOOD.    Patient Active Problem List    Diagnosis Date Noted     Visit for suture removal 07/17/2017     Priority: Medium     Hyperlipidemia LDL goal <70 08/18/2016     Priority: Medium     overwieght BMI > 35 08/18/2016     Priority: Medium     Essential hypertension with goal blood pressure less than 140/90 08/10/2016     Priority: Medium     Cramp of limb 04/14/2015     Priority: Medium     Iritis, os 05/03/2014     Priority: Medium     Disorder of bursae and tendons in shoulder region 04/18/2014     Priority: Medium     Problem list name updated by automated process. Provider to review       Other postprocedural status(V45.89) 04/18/2014     Priority: Medium     RCT (rotator cuff tear) 02/12/2014     Priority: Medium     Near syncope 02/10/2014     Priority: Medium     Obstructive sleep apnea 12/11/2013     Priority: Medium     Cardiomyopathy (H) 05/21/2013     Priority: Medium     BCC (basal cell carcinoma)      Priority: Medium     right shoulder        JOSE JUAN (obstructive sleep apnea)-severe (AHI 32)      Priority: Medium     Indications for Polysomnography 6/28/2013: The patient is a 62 y year old Male who is 5' 6\" and weighs 230.0 lbs.  His BMI equals 37.4.  The patients [place] sleepiness scale was 4.0 and neck size was 19.5.  A diagnostic polysomnogram was performed to evaluate for re-evaluation of JOSE JUAN after development of a cardiomyopathy.  After 123.0 minutes of sleep time the patient exhibited sufficient respiratory events qualifying him for a CPAP trial which was then initiated.      Polysomnogram Data:  A full night polysomnogram was " performed recording the standard physiologic parameters including EEG, EOG, EMG, EKG, nasal and oral airflow.  Respiratory parameters of chest and abdominal movements are recorded with respiratory inductance plethysmography.  Oxygen saturation was recorded by pulse oximetry.      Diagnostic PSG  Sleep Architecture:  The total recording time of the diagnostic portion of the study was 142.1 minutes.  The total sleep time was 123.0 minutes.  During the diagnostic portion of the study the sleep latency was 4.8 minutes.  REM latency was N/A.  Sleep Efficiency was 86.6%.  Wake after sleep onset was 10.5.   The patient spent 13.0% of total sleep time in Stage N1, 81.3% in Stage N2, 5.7% in Stages N3 and 0.0% in REM.  No REM sleep noted.       Respiration:     Sustained Sleep Associated Hypoventilation -was not monitored.    Sleep Associated Hypoxemia - was present.  Baseline oxygen saturation was 95.5%.  The lowest oxygen saturation was 85.0%.  Snoring was reported as loud.     Events - During the diagnostic portion of the study, the polysomnogram revealed a presence of 20 obstructive, 0 central, and 0 mixed apneas resulting in an Apnea index of 9.8 events per hour.  There were 45 hypopneas resulting in a Hypopnea index of 22.0 events per hour.  The combined Apnea/Hypopnea Index was 31.7 events per hour.  The REM AHI was N/A.  The RERA index was 26.3 per hour.   The RDI was 58.    26.3 31.7     Treatment PSG  Sleep Architecture:  At 12:08 am the patient was placed on CPAP treatment and was titrated at pressures ranging from 5 cm/H20 up to 13 cm/H20.  The total recording time of the treatment portion of the study was 380.1 minutes.  The total sleep time was 264.5 minutes.  During the treatment portion of the study the sleep latency was 89.0 minutes.  REM latency was 55.5 minutes.  Sleep Efficiency was 69.6%.  Sleep Maintenance Efficiency was 91.0%.  Total wake time was 116.0 minutes for a total wake percentage of 8.8%. the  patient spent 9.1% of total sleep time in Stage N1, 41.0% in Stage N2, 13.6% in Stages N3 and N, and 36.3% in REM.       Respiration:  The optimal pressure was 13.0 with an AHI of 0 including REM lateral.    Movement Activity:      Limb - During the diagnostic portion of the study, there were 80 limb movements recorded.  Of this total, 57 were classified as PLMs.  Of the PLMs, 2 were associated with arousals.  The Limb Movement index was 39.0 per hour while the PLM index was 27.8 per hour.  During the treatment portion of the study, there were 67 limb movements recorded.  Of this total, 49 were classified as PLMs.  Of the PLMs, 7 were associated with arousals.  The Limb Movement index was 15.2 per hour while the PLM index was 11.1 per hour.     Behavior - none    Bruxism - none    Seizure - none      CARDIAC SUMMARY:   No cardiac arrhythmias noted.       Assessment:    Severe JOSE JUAN (AHI 32) with adequate positive airway pressure titration.  Recommendations:    CPAP pressure 13 cmH2O with clinical follow-up within 3 - 4 weeks including compliance measures.    If symptoms not controlled, consider full night titration study.     Advise regarding the risks of drowsy driving    Suggest optimizing sleep hygiene and avoiding sleep deprivation    Weight management  Dopaminergic therapy should be used for management of restless leg syndrome (RLS) and not based on the presence of periodic limb movements alone.       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  Current Outpatient Prescriptions   Medication  Sig     Calcium Carbonate Antacid (TUMS CALCIUM FOR LIFE BONE PO) Take  by mouth.     diphenoxylate-atropine (LOMOTIL) 2.5-0.025 MG per tablet Take 1 tablet by mouth daily as needed for diarrhea Patient only takes 1 tablet as needed     Multiple Vitamin (MULTIVITAMINS PO) Take  by mouth daily.     aspirin 81 MG tablet Take 1 tablet (81 mg) by mouth daily (Patient not taking: Reported on 4/19/2018)     atorvastatin (LIPITOR) 10 MG tablet Take 1 tablet (10 mg) by mouth daily (Patient not taking: Reported on 4/19/2018)     losartan (COZAAR) 50 MG tablet Take 1 tablet (50 mg) by mouth daily (Patient not taking: Reported on 4/19/2018)     metoprolol succinate (TOPROL-XL) 50 MG 24 hr tablet TAKE 1 TABLET  BY MOUTH DAILY (Patient not taking: Reported on 4/19/2018)     No current facility-administered medications for this visit.      Past Medical History:   Diagnosis Date     Arthritis      BCC (basal cell carcinoma)     right shoulder      Cardiomyopathy (H)      Colon adenomas 9/20/11     ED (erectile dysfunction)      HTN (hypertension)      Obesity      JOSE JUAN (obstructive sleep apnea)      Past Surgical History:   Procedure Laterality Date     ARTHROSCOPY SHOULDER BICEPS TENODESIS REPAIR  2/27/2014    Procedure: ARTHROSCOPY SHOULDER BICEPS TENODESIS REPAIR;;  Surgeon: Michael Hagen MD;  [facility]     ARTHROSCOPY SHOULDER ROTATOR CUFF REPAIR  2/27/2014    Procedure: ARTHROSCOPY SHOULDER ROTATOR CUFF REPAIR;  Right Shoulder Arthroscopic Rotator Cuff Repair,  Subacromial Decompression, Biceps Tenodesis, Distal Clavicle Excision   ;  Surgeon: Michael Hagen MD;  Location: [facility]     BIOPSY       CARDIAC SURGERY       COLONOSCOPY       EXCHANGE INTRAOCULAR LENS IMPLANT  2005    left eye - first, recentered PCL with iris fixation; then IOLX with ACL     EXTRACAPSULAR CATARACT EXTRATION WITH INTRAOCULAR LENS IMPLANT  2005    both eyes (elsewhere)     TURBINOPLASTY  12/11/2013    Procedure:  TURBINOPLASTY;;  Surgeon: Lisset Parsons MD;  Location: [facility]     UVULOPALATOPHARYNGOPLASTY  12/11/2013    Procedure: UVULOPALATOPHARYNGOPLASTY;  Uvulopalatopharyngoplasty, Turbiante Reduction;  Surgeon: Lisset Parsons MD;  Location: [facility] OR     Allergies   Allergen Reactions     Lisinopril Cough     Social History     Social History     Marital status:      Spouse name: Kyung     Number of children: 0     Years of education: 14     Occupational History     industrial electronics  Self     Social History Main Topics     Smoking status: Former Smoker     Packs/day: 0.50     Years: 2.00     Types: Cigarettes     Quit date: 12/12/1996     Smokeless tobacco: Never Used     Alcohol use 5.0 oz/week      Comment: Evening Marie     Drug use: No     Sexual activity: Yes     Partners: Female     Birth control/ protection: Male Surgical      Comment: 2006 vasectomy     Other Topics Concern     Parent/Sibling W/ Cabg, Mi Or Angioplasty Before 65f 55m? No     Social History Narrative     Family History   Problem Relation Age of Onset     DIABETES Father      Old age Diabetes     CEREBROVASCULAR DISEASE Father      Obesity Father      HEART DISEASE Father      Coronary Artery Disease Father      Hypertension Father      Lipids Sister      C.A.D. No family hx of      CANCER No family hx of      Glaucoma No family hx of      Macular Degeneration No family hx of           REVIEW OF SYSTEMS:  General: negative, fever, chills, night sweats  Skin: negative, acne, rash and scaling  Eyes: negative, double vision, eye pain and photophobia  Ears/Nose/Throat: negative, nasal congestion and purulent rhinorrhea  Respiratory: No dyspnea on exertion, No cough, No hemoptysis and negative  Cardiovascular: negative, palpitations, tachycardia, irregular heart beat, chest pain, exertional chest pain or pressure, paroxysmal nocturnal dyspnea, dyspnea on exertion and orthopnea       OBJECTIVE:  Blood pressure 136/85, pulse  75, SpO2 94 %.  General Appearance: alert and no distress  Head: Normocephalic. No masses, lesions, tenderness or abnormalities  Eyes: conjuctiva clear, PERRL, EOM intact  Ears: External ears normal. Canals clear. TM's normal.  Nose: Nares normal  Mouth: normal  Neck: Supple, no cervical adenopathy, no thyromegaly  Lungs: clear to auscultation  Cardiac: regular rate and rhythm, normal S1 and S2, no murmur         ASSESSMENT/PLAN:  Non ischemic dilated cardiomyopathy.  No symptoms.  Stopped all medications as per advise by a lady in TV. Off meds for 1 week. Started breathing exercise. Feeling very good.  Discussed the danger of not being on meds. He understand the risk and want to continue with current breathing exercise.  Reviewed echo done today. EF remain unchanged at ~45%Discussed labs and copy to pt at clinic visit. Discussed with patient.  Per orders.   Return to Clinic No follow up arranged.

## 2018-04-19 NOTE — LETTER
"4/19/2018      RE: Ricky Brown  [address]  [place] [facility] [address]       Dear Colleague,    Thank you for the opportunity to participate in the care of your patient, Ricky Brown, at the [facility] AT [facility]. Please see a copy of my visit note below.       SUBJECTIVE:  Ricky Brown is a 67 year old male who presents for follow up.  Non ischemic cardiomyopathy. Normal cath 2013. Last EF `45%. LVESV 90ml.  HTN+. On statin.    No cardiac complaints.    WATCHED TV AND ONE LADY EXPLAINED EVERYTHING ABOUT LIPDS AND HOW GOOD THEY ARE AND SUGGESTED BREATHING EXERCISE. SO HE STOPPED ALL HIS MEDICATIONS AND FEELS VERY GOOD.    Patient Active Problem List    Diagnosis Date Noted     Visit for suture removal 07/17/2017     Priority: Medium     Hyperlipidemia LDL goal <70 08/18/2016     Priority: Medium     overwieght BMI > 35 08/18/2016     Priority: Medium     Essential hypertension with goal blood pressure less than 140/90 08/10/2016     Priority: Medium     Cramp of limb 04/14/2015     Priority: Medium     Iritis, os 05/03/2014     Priority: Medium     Disorder of bursae and tendons in shoulder region 04/18/2014     Priority: Medium     Problem list name updated by automated process. Provider to review       Other postprocedural status(V45.89) 04/18/2014     Priority: Medium     RCT (rotator cuff tear) 02/12/2014     Priority: Medium     Near syncope 02/10/2014     Priority: Medium     Obstructive sleep apnea 12/11/2013     Priority: Medium     Cardiomyopathy (H) 05/21/2013     Priority: Medium     BCC (basal cell carcinoma)      Priority: Medium     right shoulder        JOSE JUAN (obstructive sleep apnea)-severe (AHI 32)      Priority: Medium     Indications for Polysomnography 6/28/2013: The patient is a 62 y year old Male who is 5' 6\" and weighs 230.0 lbs.  His BMI equals 37.4.  The patients [place] " sleepiness scale was 4.0 and neck size was 19.5.  A diagnostic polysomnogram was performed to evaluate for re-evaluation of JOSE JUAN after development of a cardiomyopathy.  After 123.0 minutes of sleep time the patient exhibited sufficient respiratory events qualifying him for a CPAP trial which was then initiated.      Polysomnogram Data:  A full night polysomnogram was performed recording the standard physiologic parameters including EEG, EOG, EMG, EKG, nasal and oral airflow.  Respiratory parameters of chest and abdominal movements are recorded with respiratory inductance plethysmography.  Oxygen saturation was recorded by pulse oximetry.      Diagnostic PSG  Sleep Architecture:  The total recording time of the diagnostic portion of the study was 142.1 minutes.  The total sleep time was 123.0 minutes.  During the diagnostic portion of the study the sleep latency was 4.8 minutes.  REM latency was N/A.  Sleep Efficiency was 86.6%.  Wake after sleep onset was 10.5.   The patient spent 13.0% of total sleep time in Stage N1, 81.3% in Stage N2, 5.7% in Stages N3 and 0.0% in REM.  No REM sleep noted.       Respiration:     Sustained Sleep Associated Hypoventilation -was not monitored.    Sleep Associated Hypoxemia - was present.  Baseline oxygen saturation was 95.5%.  The lowest oxygen saturation was 85.0%.  Snoring was reported as loud.     Events - During the diagnostic portion of the study, the polysomnogram revealed a presence of 20 obstructive, 0 central, and 0 mixed apneas resulting in an Apnea index of 9.8 events per hour.  There were 45 hypopneas resulting in a Hypopnea index of 22.0 events per hour.  The combined Apnea/Hypopnea Index was 31.7 events per hour.  The REM AHI was N/A.  The RERA index was 26.3 per hour.   The RDI was 58.    26.3 31.7     Treatment PSG  Sleep Architecture:  At 12:08 am the patient was placed on CPAP treatment and was titrated at pressures ranging from 5 cm/H20 up to 13 cm/H20.  The total  recording time of the treatment portion of the study was 380.1 minutes.  The total sleep time was 264.5 minutes.  During the treatment portion of the study the sleep latency was 89.0 minutes.  REM latency was 55.5 minutes.  Sleep Efficiency was 69.6%.  Sleep Maintenance Efficiency was 91.0%.  Total wake time was 116.0 minutes for a total wake percentage of 8.8%. the patient spent 9.1% of total sleep time in Stage N1, 41.0% in Stage N2, 13.6% in Stages N3 and N, and 36.3% in REM.       Respiration:  The optimal pressure was 13.0 with an AHI of 0 including REM lateral.    Movement Activity:      Limb - During the diagnostic portion of the study, there were 80 limb movements recorded.  Of this total, 57 were classified as PLMs.  Of the PLMs, 2 were associated with arousals.  The Limb Movement index was 39.0 per hour while the PLM index was 27.8 per hour.  During the treatment portion of the study, there were 67 limb movements recorded.  Of this total, 49 were classified as PLMs.  Of the PLMs, 7 were associated with arousals.  The Limb Movement index was 15.2 per hour while the PLM index was 11.1 per hour.     Behavior - none    Bruxism - none    Seizure - none      CARDIAC SUMMARY:   No cardiac arrhythmias noted.       Assessment:    Severe JOSE JUAN (AHI 32) with adequate positive airway pressure titration.  Recommendations:    CPAP pressure 13 cmH2O with clinical follow-up within 3 - 4 weeks including compliance measures.    If symptoms not controlled, consider full night titration study.     Advise regarding the risks of drowsy driving    Suggest optimizing sleep hygiene and avoiding sleep deprivation    Weight management  Dopaminergic therapy should be used for management of restless leg syndrome (RLS) and not based on the presence of periodic limb movements alone.       Advanced directives, counseling/discussion 03/28/2012     Priority: Low     HDL deficiency 03/28/2012     Priority: Low     Keratoglobus, ou 10/14/2011      Priority: Low     Pseudophakia, PCL, od; ACL, os 10/14/2011     Priority: Low     Posterior vitreous detachment, od 10/14/2011     Priority: Low     Epiretinal membrane, mild, od 10/14/2011     Priority: Low     HL (hearing loss) 04/01/2011     Priority: Low     Erectile dysfunction 04/01/2011     Priority: Low    .  Current Outpatient Prescriptions   Medication Sig     Calcium Carbonate Antacid (TUMS CALCIUM FOR LIFE BONE PO) Take  by mouth.     diphenoxylate-atropine (LOMOTIL) 2.5-0.025 MG per tablet Take 1 tablet by mouth daily as needed for diarrhea Patient only takes 1 tablet as needed     Multiple Vitamin (MULTIVITAMINS PO) Take  by mouth daily.     aspirin 81 MG tablet Take 1 tablet (81 mg) by mouth daily (Patient not taking: Reported on 4/19/2018)     atorvastatin (LIPITOR) 10 MG tablet Take 1 tablet (10 mg) by mouth daily (Patient not taking: Reported on 4/19/2018)     losartan (COZAAR) 50 MG tablet Take 1 tablet (50 mg) by mouth daily (Patient not taking: Reported on 4/19/2018)     metoprolol succinate (TOPROL-XL) 50 MG 24 hr tablet TAKE 1 TABLET  BY MOUTH DAILY (Patient not taking: Reported on 4/19/2018)     No current facility-administered medications for this visit.      Past Medical History:   Diagnosis Date     Arthritis      BCC (basal cell carcinoma)     right shoulder      Cardiomyopathy (H)      Colon adenomas 9/20/11     ED (erectile dysfunction)      HTN (hypertension)      Obesity      JOSE JUAN (obstructive sleep apnea)      Past Surgical History:   Procedure Laterality Date     ARTHROSCOPY SHOULDER BICEPS TENODESIS REPAIR  2/27/2014    Procedure: ARTHROSCOPY SHOULDER BICEPS TENODESIS REPAIR;;  Surgeon: Michael Hagen MD;  [facility]     ARTHROSCOPY SHOULDER ROTATOR CUFF REPAIR  2/27/2014    Procedure: ARTHROSCOPY SHOULDER ROTATOR CUFF REPAIR;  Right Shoulder Arthroscopic Rotator Cuff Repair,  Subacromial Decompression, Biceps Tenodesis, Distal Clavicle Excision   ;  Surgeon:  Michael Hagen MD;  Location: [facility]     BIOPSY       CARDIAC SURGERY       COLONOSCOPY       EXCHANGE INTRAOCULAR LENS IMPLANT  2005    left eye - first, recentered PCL with iris fixation; then IOLX with ACL     EXTRACAPSULAR CATARACT EXTRATION WITH INTRAOCULAR LENS IMPLANT  2005    both eyes (elsewhere)     TURBINOPLASTY  12/11/2013    Procedure: TURBINOPLASTY;;  Surgeon: Lisset Parsons MD;  [facility]     UVULOPALATOPHARYNGOPLASTY  12/11/2013    Procedure: UVULOPALATOPHARYNGOPLASTY;  Uvulopalatopharyngoplasty, Turbiante Reduction;  Surgeon: Lisset Parsons MD;  Location: [facility] OR     Allergies   Allergen Reactions     Lisinopril Cough     Social History     Social History     Marital status:      Spouse name: Kyung     Number of children: 0     Years of education: 14     Occupational History     industrial electronics  Self     Social History Main Topics     Smoking status: Former Smoker     Packs/day: 0.50     Years: 2.00     Types: Cigarettes     Quit date: 12/12/1996     Smokeless tobacco: Never Used     Alcohol use 5.0 oz/week      Comment: Evening Marie     Drug use: No     Sexual activity: Yes     Partners: Female     Birth control/ protection: Male Surgical      Comment: 2006 vasectomy     Other Topics Concern     Parent/Sibling W/ Cabg, Mi Or Angioplasty Before 65f 55m? No     Social History Narrative     Family History   Problem Relation Age of Onset     DIABETES Father      Old age Diabetes     CEREBROVASCULAR DISEASE Father      Obesity Father      HEART DISEASE Father      Coronary Artery Disease Father      Hypertension Father      Lipids Sister      C.A.D. No family hx of      CANCER No family hx of      Glaucoma No family hx of      Macular Degeneration No family hx of           REVIEW OF SYSTEMS:  General: negative, fever, chills, night sweats  Skin: negative, acne, rash and scaling  Eyes: negative, double vision, eye pain and photophobia  Ears/Nose/Throat:  negative, nasal congestion and purulent rhinorrhea  Respiratory: No dyspnea on exertion, No cough, No hemoptysis and negative  Cardiovascular: negative, palpitations, tachycardia, irregular heart beat, chest pain, exertional chest pain or pressure, paroxysmal nocturnal dyspnea, dyspnea on exertion and orthopnea       OBJECTIVE:  Blood pressure 136/85, pulse 75, SpO2 94 %.  General Appearance: alert and no distress  Head: Normocephalic. No masses, lesions, tenderness or abnormalities  Eyes: conjuctiva clear, PERRL, EOM intact  Ears: External ears normal. Canals clear. TM's normal.  Nose: Nares normal  Mouth: normal  Neck: Supple, no cervical adenopathy, no thyromegaly  Lungs: clear to auscultation  Cardiac: regular rate and rhythm, normal S1 and S2, no murmur         ASSESSMENT/PLAN:  Non ischemic dilated cardiomyopathy.  No symptoms.  Stopped all medications as per advise by a lady in TV. Off meds for 1 week. Started breathing exercise. Feeling very good.  Discussed the danger of not being on meds. He understand the risk and want to continue with current breathing exercise.  Reviewed echo done today. EF remain unchanged at ~45%Discussed labs and copy to pt at clinic visit. Discussed with patient.  Per orders.   Return to Clinic No follow up arranged.    Please do not hesitate to contact me if you have any questions/concerns.     Sincerely,     ZEN Bustillo MD

## 2018-04-23 ENCOUNTER — TELEPHONE (OUTPATIENT)
Dept: CARDIOLOGY | Facility: CLINIC | Age: 68
End: 2018-04-23

## 2018-04-23 NOTE — TELEPHONE ENCOUNTER
Patient called and states that he decided to go back on his cardiac medications for his non ischemic dilated cardiomyopathy.  Writer explained that this is good news.  Advised to get set up with [facility] (heart failure) clinic for closer monitoring.  Patient declined at this time and will call clinic when ready to set this up-he wants to discuss this with his primary care provider first.    Temitope Peng RN  Care Coordinator  [facility]  [phone]

## 2018-05-09 ENCOUNTER — RADIANT APPOINTMENT (OUTPATIENT)
Dept: GENERAL RADIOLOGY | Facility: CLINIC | Age: 68
End: 2018-05-09
Attending: FAMILY MEDICINE
Payer: COMMERCIAL

## 2018-05-09 ENCOUNTER — OFFICE VISIT (OUTPATIENT)
Dept: FAMILY MEDICINE | Facility: CLINIC | Age: 68
End: 2018-05-09
Payer: COMMERCIAL

## 2018-05-09 VITALS
SYSTOLIC BLOOD PRESSURE: 134 MMHG | HEIGHT: 66 IN | OXYGEN SATURATION: 95 % | BODY MASS INDEX: 38.57 KG/M2 | HEART RATE: 64 BPM | TEMPERATURE: 98 F | DIASTOLIC BLOOD PRESSURE: 80 MMHG | WEIGHT: 240 LBS

## 2018-05-09 DIAGNOSIS — S20.211A CONTUSION OF RIGHT CHEST WALL, INITIAL ENCOUNTER: Primary | ICD-10-CM
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
## 2018-05-09 LAB
ALBUMIN UR-MCNC: NEGATIVE MG/DL
APPEARANCE UR: CLEAR
BILIRUB UR QL STRIP: NEGATIVE
COLOR UR AUTO: YELLOW
ERYTHROCYTE [DISTWIDTH] IN BLOOD BY AUTOMATED COUNT: 14.2 % (ref 10–15)
GLUCOSE UR STRIP-MCNC: NEGATIVE MG/DL
HCT VFR BLD AUTO: 41.7 % (ref 40–53)
HGB BLD-MCNC: 13.9 G/DL (ref 13.3–17.7)
HGB UR QL STRIP: NEGATIVE
KETONES UR STRIP-MCNC: NEGATIVE MG/DL
LEUKOCYTE ESTERASE UR QL STRIP: NEGATIVE
MCH RBC QN AUTO: 29.8 PG (ref 26.5–33)
MCHC RBC AUTO-ENTMCNC: 33.3 G/DL (ref 31.5–36.5)
MCV RBC AUTO: 90 FL (ref 78–100)
NITRATE UR QL: NEGATIVE
PH UR STRIP: 5.5 PH (ref 5–7)
PLATELET # BLD AUTO: 260 10E9/L (ref 150–450)
RBC # BLD AUTO: 4.66 10E12/L (ref 4.4–5.9)
SOURCE: NORMAL
SP GR UR STRIP: >1.03 (ref 1–1.03)
UROBILINOGEN UR STRIP-ACNC: 0.2 EU/DL (ref 0.2–1)
WBC # BLD AUTO: 6.7 10E9/L (ref 4–11)

## 2018-05-09 PROCEDURE — 85027 COMPLETE CBC AUTOMATED: CPT | Performed by: FAMILY MEDICINE

## 2018-05-09 PROCEDURE — 71046 X-RAY EXAM CHEST 2 VIEWS: CPT

## 2018-05-09 PROCEDURE — 99213 OFFICE O/P EST LOW 20 MIN: CPT | Performed by: FAMILY MEDICINE

## 2018-05-09 PROCEDURE — 81003 URINALYSIS AUTO W/O SCOPE: CPT | Performed by: FAMILY MEDICINE

## 2018-05-09 PROCEDURE — 36415 COLL VENOUS BLD VENIPUNCTURE: CPT | Performed by: FAMILY MEDICINE

## 2018-05-09 ASSESSMENT — PAIN SCALES - GENERAL: PAINLEVEL: SEVERE PAIN (6)

## 2018-05-09 NOTE — PATIENT INSTRUCTIONS
[facility]    If you have any questions regarding to your visit please contact your care team:       Team Red:   Clinic Hours Telephone Number   Dr. Lashawn Wood, NP   7am-7pm  Monday - Thursday   7am-5pm  Fridays  [phone]  (Appointment scheduling available 24/7)    Questions about your recent visit?   Team Line  [phone]   Urgent Care - [place] and [facility] - 11am-9pm Monday-Friday Saturday-Sunday- 9am-5pm   [place] - 5pm-9pm Monday-Friday Saturday-Sunday- 9am-5pm  [phone] - [place]  [phone] - [place]       What options do I have for a visit other than an office visit? We offer electronic visits (e-visits) and telephone visits, when medically appropriate.  Please check with your medical insurance to see if these types of visits are covered, as you will be responsible for any charges that are not paid by your insurance.      You can use [employer] (secure electronic communication) to access to your chart, send your primary care provider a message, or make an appointment. Ask a team member how to get started.     For a price quote for your services, please call our Consumer Price Line at [phone] or our Imaging Cost estimation line at [phone] (for imaging tests).      Discharged by Paula Bhat MA.
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
## 2018-05-10 ENCOUNTER — OFFICE VISIT (OUTPATIENT)
Dept: FAMILY MEDICINE | Facility: CLINIC | Age: 68
End: 2018-05-10
Payer: COMMERCIAL

## 2018-05-10 VITALS
TEMPERATURE: 98.9 F | HEART RATE: 64 BPM | HEIGHT: 66 IN | BODY MASS INDEX: 38.57 KG/M2 | WEIGHT: 240 LBS | SYSTOLIC BLOOD PRESSURE: 128 MMHG | DIASTOLIC BLOOD PRESSURE: 84 MMHG

## 2018-05-10 DIAGNOSIS — I42.9 CARDIOMYOPATHY, UNSPECIFIED TYPE (H): Primary | ICD-10-CM
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
## 2018-05-10 DIAGNOSIS — L98.9 SKIN LESION: ICD-10-CM

## 2018-05-10 PROCEDURE — 99214 OFFICE O/P EST MOD 30 MIN: CPT | Performed by: FAMILY MEDICINE
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
## 2018-05-10 NOTE — PATIENT INSTRUCTIONS
For joint pain:  -tumeric and glucosamine chondroitin   -tylenol extra strength 2 tabs three times a day. Max dose a day of 3000 mg.     Follow up for skin biopsies     Continue present medications

## 2018-05-10 NOTE — MR AVS SNAPSHOT
"              After Visit Summary   5/10/2018    Ricky Brown    MRN: [id number]           Patient Information     Date Of Birth          1950        Visit Information        Provider Department      5/10/2018 2:00 PM Armen Cahvez MD [facility]        Care Instructions    For joint pain:  -tumeric and glucosamine chondroitin   -tylenol extra strength 2 tabs three times a day. Max dose a day of 3000 mg.           Follow-ups after your visit        Who to contact     If you have questions or need follow up information about today's clinic visit or your schedule please contact [facility] directly at [phone].  Normal or non-critical lab and imaging results will be communicated to you by MyChart, letter or phone within 4 business days after the clinic has received the results. If you do not hear from us within 7 days, please contact the clinic through Reble[employer] or phone. If you have a critical or abnormal lab result, we will notify you by phone as soon as possible.  Submit refill requests through Repp[employer] or call your pharmacy and they will forward the refill request to us. Please allow 3 business days for your refill to be completed.          Additional Information About Your Visit        MyChart Information     Re[employer] gives you secure access to your electronic health record. If you see a primary care provider, you can also send messages to your care team and make appointments. If you have questions, please call your primary care clinic.  If you do not have a primary care provider, please call [phone] and they will assist you.        Care EveryWhere ID     This is your Care EveryWhere ID. This could be used by other organizations to access your [place] medical records  [id number]        Your Vitals Were     Pulse Temperature Height BMI (Body Mass Index)          64 98.9  F (37.2  C) (Oral) 5' 6.25\" (1.683 m) 38.45 kg/m2         Blood Pressure from " Last 3 Encounters:   05/10/18 128/84   05/09/18 134/80   04/19/18 136/85    Weight from Last 3 Encounters:   05/10/18 240 lb (108.9 kg)   05/09/18 240 lb (108.9 kg)   04/11/18 240 lb (108.9 kg)              Today, you had the following     No orders found for display       Primary Care Provider Office Phone # Fax #    Armen Chavez [phone] [address] [facility] [address]        Equal Access to Services     [facility]: Hadii steven ku hadasho Soomaali, waaxda luqadaha, qaybta kaalmada adeegyaevon, scottie phillips . So [facility] [phone].    ATENCIÓN: Si habla español, tiene a hope disposición servicios gratuitos de asistencia lingüística. [facility] [phone].    We comply with applicable federal civil rights laws and [place] laws. We do not discriminate on the basis of race, color, national origin, age, disability, sex, sexual orientation, or gender identity.            Thank you!     Thank you for choosing [facility]  for your care. Our goal is always to provide you with excellent care. Hearing back from our patients is one way we can continue to improve our services. Please take a few minutes to complete the written survey that you may receive in the mail after your visit with us. Thank you!             Your Updated Medication List - Protect others around you: Learn how to safely use, store and throw away your medicines at www.disposemymeds.org.          This list is accurate as of 5/10/18  2:56 PM.  Always use your most recent med list.                   Brand Name Dispense Instructions for use Diagnosis    aspirin 81 MG tablet     90 tablet    Take 1 tablet (81 mg) by mouth daily    Cardiomyopathy, idiopathic (H)       atorvastatin 10 MG tablet    LIPITOR    90 tablet    Take 1 tablet (10 mg) by mouth daily    Hyperlipidemia LDL goal <70       diphenoxylate-atropine 2.5-0.025 MG per tablet    LOMOTIL    30 tablet    Take 1  tablet by mouth daily as needed for diarrhea Patient only takes 1 tablet as needed    Loose stools       losartan 50 MG tablet    COZAAR    90 tablet    Take 1 tablet (50 mg) by mouth daily    Non-ischemic cardiomyopathy (H)       metoprolol succinate 50 MG 24 hr tablet    TOPROL-XL    90 tablet    TAKE 1 TABLET  BY MOUTH DAILY    Essential hypertension with goal blood pressure less than 140/90, Cardiomyopathy, unspecified type (H)       MULTIVITAMINS PO      Take  by mouth daily.    Routine general medical examination at a health care facility, Erectile dysfunction, Rectal itching, HTN (hypertension)       TUMS CALCIUM FOR LIFE BONE PO      Take  by mouth.

## 2018-06-07 ENCOUNTER — OFFICE VISIT (OUTPATIENT)
Dept: FAMILY MEDICINE | Facility: CLINIC | Age: 68
End: 2018-06-07
Payer: COMMERCIAL

## 2018-06-07 ENCOUNTER — TELEPHONE (OUTPATIENT)
Dept: FAMILY MEDICINE | Facility: CLINIC | Age: 68
End: 2018-06-07

## 2018-06-07 ENCOUNTER — APPOINTMENT (OUTPATIENT)
Dept: FAMILY MEDICINE | Facility: CLINIC | Age: 68
End: 2018-06-07
Payer: COMMERCIAL

## 2018-06-07 VITALS
DIASTOLIC BLOOD PRESSURE: 74 MMHG | HEIGHT: 66 IN | BODY MASS INDEX: 38.89 KG/M2 | TEMPERATURE: 98.6 F | OXYGEN SATURATION: 95 % | SYSTOLIC BLOOD PRESSURE: 119 MMHG | WEIGHT: 242 LBS | HEART RATE: 82 BPM

## 2018-06-07 DIAGNOSIS — D22.9 ATYPICAL MOLE: Primary | ICD-10-CM

## 2018-06-07 DIAGNOSIS — L82.0 INFLAMED SEBORRHEIC KERATOSIS: ICD-10-CM

## 2018-06-07 PROCEDURE — 17110 DESTRUCTION B9 LES UP TO 14: CPT | Mod: 59 | Performed by: FAMILY MEDICINE

## 2018-06-07 PROCEDURE — 88305 TISSUE EXAM BY PATHOLOGIST: CPT | Performed by: FAMILY MEDICINE

## 2018-06-07 PROCEDURE — 99207 ZZC FOR CODING REVIEW: CPT | Performed by: FAMILY MEDICINE

## 2018-06-07 PROCEDURE — 11310 SHAVE SKIN LESION 0.5 CM/<: CPT | Mod: 59 | Performed by: FAMILY MEDICINE

## 2018-06-07 PROCEDURE — 11100 HC BIOPSY SKIN/SUBQ/MUC MEM, SINGLE LESION: CPT | Performed by: FAMILY MEDICINE

## 2018-06-07 PROCEDURE — 11101 HC BIOPSY SKIN/SUBQ/MUC MEM, EACH ADDTL LESION: CPT | Performed by: FAMILY MEDICINE

## 2018-06-07 PROCEDURE — 88342 IMHCHEM/IMCYTCHM 1ST ANTB: CPT | Performed by: FAMILY MEDICINE

## 2018-06-07 NOTE — PROGRESS NOTES
SUBJECTIVE:   Ricky Brown is a 67 year old male who presents to clinic today for the following health issues:      Patient is here today to have a mole(2) removed from his back.  Also to have a biopsy done on spot near left eye.    SUBJECTIVE:   Ricky Brown is a 67 year old male who presents for lesion removal. We have already discussed this procedure, including option of not performing surgery, technique of surgery and potential for scarring at a recent visit.    OBJECTIVE:   Patient appears well. Vitals are normal.  Skin: 5 mm mole on mid back hyperpigmented. 4 mm yperpigmented lesion on left cheek. Thickened lesion on center of chest 1 cm in size    ASSESSMENT:   atypical nevi- mid back(6mm) and right cheek 4m and chest 1 cm    3- 1 cm seb keratosis noted on back.     PLAN:   Treated Seb keratosis with liquid nitrogen and scraping to remove debris. No pathology sent    After informed consent was obtained, using Betadine for cleansing and 1% Lidocaine with epinephrine for anesthetic, with sterile technique, punch biopsy size 4 mm of chest lesion and 6mm on back. Shave excision performed on left cheek. Antibiotic dressing is applied, and wound care instructions provided.  Be alert for any signs of cutaneous infection. The procedure was well tolerated without complications. Follow up: The specimen is labelled and sent to pathology for evaluation..           (D22.9) Atypical mole  (primary encounter diagnosis)  Comment: biopsied  Plan: Surgical pathology exam, BIOPSY SKIN/SUBQ/MUC         MEM, SINGLE LESION, BIOPSY SKIN/SUBQ/MUC MEM,         EACH ADDTL LESION        *    (L82.0) Inflamed seborrheic keratosis  Comment: 3 on back treated with liquid nitrogen  Plan: follow up prn

## 2018-06-07 NOTE — TELEPHONE ENCOUNTER
Reason for Call:  Other     Detailed comments: Bryant called about the patients appointments today he has one at 4:45 pm and one at 6:20 pm she would like to know if both appointments are needed     Phone Number Patient can be reached at: Other phone number:  [phone]    Best Time: any    Can we leave a detailed message on this number? YES    Call taken on 6/7/2018 at 8:49 AM by Alexus Guzman

## 2018-06-07 NOTE — MR AVS SNAPSHOT
"              After Visit Summary   6/7/2018    Ricky Brown    MRN: [id number]           Patient Information     Date Of Birth          1950        Visit Information        Provider Department      6/7/2018 6:20 PM Armen Chavez MD [facility]        Today's Diagnoses     Atypical mole    -  1    Inflamed seborrheic keratosis           Follow-ups after your visit        Who to contact     If you have questions or need follow up information about today's clinic visit or your schedule please contact [facility] directly at [phone].  Normal or non-critical lab and imaging results will be communicated to you by Gamervisionhart, letter or phone within 4 business days after the clinic has received the results. If you do not hear from us within 7 days, please contact the clinic through Gamervisionhart or phone. If you have a critical or abnormal lab result, we will notify you by phone as soon as possible.  Submit refill requests through [employer] or call your pharmacy and they will forward the refill request to us. Please allow 3 business days for your refill to be completed.          Additional Information About Your Visit        MyChart Information     [employer] gives you secure access to your electronic health record. If you see a primary care provider, you can also send messages to your care team and make appointments. If you have questions, please call your primary care clinic.  If you do not have a primary care provider, please call [phone] and they will assist you.        Care EveryWhere ID     This is your Care EveryWhere ID. This could be used by other organizations to access your [facility] medical records  [id number]        Your Vitals Were     Pulse Temperature Height Pulse Oximetry BMI (Body Mass Index)       82 98.6  F (37  C) (Oral) 5' 6.25\" (1.683 m) 95% 38.77 kg/m2        Blood Pressure from Last 3 Encounters:   06/07/18 119/74   05/10/18 128/84   05/09/18 " 134/80    Weight from Last 3 Encounters:   06/07/18 242 lb (109.8 kg)   05/10/18 240 lb (108.9 kg)   05/09/18 240 lb (108.9 kg)              We Performed the Following     BIOPSY SKIN/SUBQ/MUC MEM, EACH ADDTL LESION     BIOPSY SKIN/SUBQ/MUC MEM, SINGLE LESION     Surgical pathology exam        Primary Care Provider Office Phone # Fax #    Armen Chavez [phone]       [address] [facility] [address]        Equal Access to Services     SARAH ABBOTT : Hadii aad ku hadasho Soomaali, waaxda luqadaha, qaybta kaalmada adeegyada, waxay jigarin hayjune phillips . So [facility] [phone].    ATENCIÓN: Si habla español, tiene a hope disposición servicios gratuitos de asistencia lingüística. Llame al [phone].    We comply with applicable federal civil rights laws and [place] laws. We do not discriminate on the basis of race, color, national origin, age, disability, sex, sexual orientation, or gender identity.            Thank you!     Thank you for choosing [facility]  for your care. Our goal is always to provide you with excellent care. Hearing back from our patients is one way we can continue to improve our services. Please take a few minutes to complete the written survey that you may receive in the mail after your visit with us. Thank you!             Your Updated Medication List - Protect others around you: Learn how to safely use, store and throw away your medicines at www.disposemymeds.org.          This list is accurate as of 6/7/18 11:59 PM.  Always use your most recent med list.                   Brand Name Dispense Instructions for use Diagnosis    aspirin 81 MG tablet     90 tablet    Take 1 tablet (81 mg) by mouth daily    Cardiomyopathy, idiopathic (H)       atorvastatin 10 MG tablet    LIPITOR    90 tablet    Take 1 tablet (10 mg) by mouth daily    Hyperlipidemia LDL goal <70       diphenoxylate-atropine 2.5-0.025 MG per tablet    LOMOTIL    30  tablet    Take 1 tablet by mouth daily as needed for diarrhea Patient only takes 1 tablet as needed    Loose stools       losartan 50 MG tablet    COZAAR    90 tablet    Take 1 tablet (50 mg) by mouth daily    Non-ischemic cardiomyopathy (H)       metoprolol succinate 50 MG 24 hr tablet    TOPROL-XL    90 tablet    TAKE 1 TABLET  BY MOUTH DAILY    Essential hypertension with goal blood pressure less than 140/90, Cardiomyopathy, unspecified type (H)       MULTIVITAMINS PO      Take  by mouth daily.    Routine general medical examination at a health care facility, Erectile dysfunction, Rectal itching, HTN (hypertension)       TUMS CALCIUM FOR LIFE BONE PO      Take  by mouth.

## 2018-06-12 ENCOUNTER — TRANSFERRED RECORDS (OUTPATIENT)
Dept: HEALTH INFORMATION MANAGEMENT | Facility: CLINIC | Age: 68
End: 2018-06-12

## 2018-06-13 ENCOUNTER — HOSPITAL PATHOLOGY (OUTPATIENT)
Dept: OTHER | Facility: CLINIC | Age: 68
End: 2018-06-13

## 2018-06-17 LAB — COPATH REPORT: NORMAL

## 2018-06-18 LAB — COPATH REPORT: NORMAL

## 2018-06-19 ENCOUNTER — TELEPHONE (OUTPATIENT)
Dept: FAMILY MEDICINE | Facility: CLINIC | Age: 68
End: 2018-06-19

## 2018-08-22 ENCOUNTER — OFFICE VISIT (OUTPATIENT)
Dept: FAMILY MEDICINE | Facility: CLINIC | Age: 68
End: 2018-08-22
Payer: COMMERCIAL

## 2018-08-22 VITALS
WEIGHT: 238 LBS | SYSTOLIC BLOOD PRESSURE: 136 MMHG | TEMPERATURE: 98.4 F | HEART RATE: 76 BPM | DIASTOLIC BLOOD PRESSURE: 82 MMHG | HEIGHT: 66 IN | BODY MASS INDEX: 38.25 KG/M2 | OXYGEN SATURATION: 95 %

## 2018-08-22 DIAGNOSIS — R61 GENERALIZED HYPERHIDROSIS: Primary | ICD-10-CM

## 2018-08-22 LAB — HBA1C MFR BLD: 5.4 % (ref 0–5.6)

## 2018-08-22 PROCEDURE — 84443 ASSAY THYROID STIM HORMONE: CPT | Performed by: FAMILY MEDICINE

## 2018-08-22 PROCEDURE — 99213 OFFICE O/P EST LOW 20 MIN: CPT | Performed by: FAMILY MEDICINE

## 2018-08-22 PROCEDURE — 36415 COLL VENOUS BLD VENIPUNCTURE: CPT | Performed by: FAMILY MEDICINE

## 2018-08-22 PROCEDURE — 83036 HEMOGLOBIN GLYCOSYLATED A1C: CPT | Performed by: FAMILY MEDICINE

## 2018-08-22 NOTE — MR AVS SNAPSHOT
"              After Visit Summary   8/22/2018    Ricky Brown    MRN: [id number]           Patient Information     Date Of Birth          1950        Visit Information        Provider Department      8/22/2018 2:00 PM Armen Chavez MD [facility] [place]        Today's Diagnoses     Generalized hyperhidrosis    -  1       Follow-ups after your visit        Who to contact     If you have questions or need follow up information about today's clinic visit or your schedule please contact [facility] directly at [phone].  Normal or non-critical lab and imaging results will be communicated to you by Pixellehart, letter or phone within 4 business days after the clinic has received the results. If you do not hear from us within 7 days, please contact the clinic through [employer] or phone. If you have a critical or abnormal lab result, we will notify you by phone as soon as possible.  Submit refill requests through [email or web address] or call your pharmacy and they will forward the refill request to us. Please allow 3 business days for your refill to be completed.          Additional Information About Your Visit        MyChart Information     [email or web address] gives you secure access to your electronic health record. If you see a primary care provider, you can also send messages to your care team and make appointments. If you have questions, please call your primary care clinic.  If you do not have a primary care provider, please call [phone] and they will assist you.        Care EveryWhere ID     This is your Care EveryWhere ID. This could be used by other organizations to access your [facility] medical records  [id number]        Your Vitals Were     Pulse Temperature Height Pulse Oximetry BMI (Body Mass Index)       76 98.4  F (36.9  C) (Oral) 5' 6.25\" (1.683 m) 95% 38.12 kg/m2        Blood Pressure from Last 3 Encounters:   08/22/18 136/82   06/07/18 119/74   05/10/18 128/84    Weight from " Last 3 Encounters:   08/22/18 238 lb (108 kg)   06/07/18 242 lb (109.8 kg)   05/10/18 240 lb (108.9 kg)              Today, you had the following     No orders found for display       Primary Care Provider Office Phone # Fax #    Armen Chavez [phone]       [address] [facility] [address]        Equal Access to Services     NIKHIL ABBOTT : Hadii aad ku hadasho Soomaali, waaxda luqadaha, qaybta kaalmada adeegyada, waxay idiin hayaan adeeg corbyjoysalazar phillips . So [facility] [phone].    ATENCIÓN: Si carlinla espluz, tiene a hope disposición servicios gratuitos de asistencia lingüística. Llame al [phone].    We comply with applicable federal civil rights laws and [place] laws. We do not discriminate on the basis of race, color, national origin, age, disability, sex, sexual orientation, or gender identity.            Thank you!     Thank you for choosing [facility]  for your care. Our goal is always to provide you with excellent care. Hearing back from our patients is one way we can continue to improve our services. Please take a few minutes to complete the written survey that you may receive in the mail after your visit with us. Thank you!             Your Updated Medication List - Protect others around you: Learn how to safely use, store and throw away your medicines at www.disposemymeds.org.          This list is accurate as of 8/22/18  2:19 PM.  Always use your most recent med list.                   Brand Name Dispense Instructions for use Diagnosis    aspirin 81 MG tablet     90 tablet    Take 1 tablet (81 mg) by mouth daily    Cardiomyopathy, idiopathic (H)       atorvastatin 10 MG tablet    LIPITOR    90 tablet    Take 1 tablet (10 mg) by mouth daily    Hyperlipidemia LDL goal <70       diphenoxylate-atropine 2.5-0.025 MG per tablet    LOMOTIL    30 tablet    Take 1 tablet by mouth daily as needed for diarrhea Patient only takes 1 tablet as needed    Loose  stools       losartan 50 MG tablet    COZAAR    90 tablet    Take 1 tablet (50 mg) by mouth daily    Non-ischemic cardiomyopathy (H)       metoprolol succinate 50 MG 24 hr tablet    TOPROL-XL    90 tablet    TAKE 1 TABLET  BY MOUTH DAILY    Essential hypertension with goal blood pressure less than 140/90, Cardiomyopathy, unspecified type (H)       MULTIVITAMINS PO      Take  by mouth daily.    Routine general medical examination at a health care facility, Erectile dysfunction, Rectal itching, HTN (hypertension)       TUMS CALCIUM FOR LIFE BONE PO      Take  by mouth.

## 2018-08-22 NOTE — PROGRESS NOTES
"  SUBJECTIVE:   Ricky Brown is a 67 year old male who presents to clinic today for the following health issues:      Concern - sweating  Onset: \"it's been a while\"    Description:   Has been having issues with sweating a lot and believes it might be triggered by when he forgets to take his blood pressure medication.    Patient said his dad was diabetic and would like to discuss this.      Sweating. Pt complains of diaphoresis occurring from top of head creating  PT denies diaphoresis in conjunction with eating, high temperature/humidity, or strenuous activity. Pt states that diaphoresis occurs more frequently when HTN medication is forgotten and onsets both abruptly and slowly. Pt admits to waking up wet in sweat, hot flashes, experiencing lightheadedness, and dizziness while sweating. Pt experiences occasional diarrhea once every other week, but denies N/V. Pt is overweight, but weight has been stable.         Lab Results   Component Value Date    A1C 5.7 05/24/2017    A1C 5.6 08/18/2016             Problem list and histories reviewed & adjusted, as indicated.  Additional history: as documented    Patient Active Problem List   Diagnosis     HL (hearing loss)     Erectile dysfunction     Keratoglobus, ou     Pseudophakia, PCL, od; ACL, os     Posterior vitreous detachment, od     Epiretinal membrane, mild, od     Advanced directives, counseling/discussion     HDL deficiency     JOSE JUAN (obstructive sleep apnea)-severe (AHI 32)     BCC (basal cell carcinoma)     Cardiomyopathy (H)     Obstructive sleep apnea     Near syncope     RCT (rotator cuff tear)     Disorder of bursae and tendons in shoulder region     Other postprocedural status(V45.89)     Iritis, os     Cramp of limb     Essential hypertension with goal blood pressure less than 140/90     Hyperlipidemia LDL goal <70     overwieght BMI > 35     Visit for suture removal     Past Surgical History:   Procedure Laterality Date     ARTHROSCOPY SHOULDER BICEPS " TENODESIS REPAIR  2/27/2014    Procedure: ARTHROSCOPY SHOULDER BICEPS TENODESIS REPAIR;;  Surgeon: Michael Hagen MD;  [facility]     ARTHROSCOPY SHOULDER ROTATOR CUFF REPAIR  2/27/2014    Procedure: ARTHROSCOPY SHOULDER ROTATOR CUFF REPAIR;  Right Shoulder Arthroscopic Rotator Cuff Repair,  Subacromial Decompression, Biceps Tenodesis, Distal Clavicle Excision   ;  Surgeon: Michael Hagen MD;  Location: [facility]     BIOPSY       CARDIAC SURGERY       COLONOSCOPY       EXCHANGE INTRAOCULAR LENS IMPLANT  2005    left eye - first, recentered PCL with iris fixation; then IOLX with ACL     EXTRACAPSULAR CATARACT EXTRATION WITH INTRAOCULAR LENS IMPLANT  2005    both eyes (elsewhere)     TURBINOPLASTY  12/11/2013    Procedure: TURBINOPLASTY;;  Surgeon: Lisset Parsons MD;  Location:  OR     UVULOPALATOPHARYNGOPLASTY  12/11/2013    Procedure: UVULOPALATOPHARYNGOPLASTY;  Uvulopalatopharyngoplasty, Turbiante Reduction;  Surgeon: Lisset Parsons MD;  Location:  OR       Social History   Substance Use Topics     Smoking status: Former Smoker     Packs/day: 0.50     Years: 2.00     Types: Cigarettes     Quit date: 12/12/1996     Smokeless tobacco: Never Used     Alcohol use 5.0 oz/week      Comment: Evening Marie     Family History   Problem Relation Age of Onset     Diabetes Father      Old age Diabetes     Cerebrovascular Disease Father      Obesity Father      HEART DISEASE Father      Coronary Artery Disease Father      Hypertension Father      Lipids Sister      C.A.D. No family hx of      Cancer No family hx of      Glaucoma No family hx of      Macular Degeneration No family hx of          Current Outpatient Prescriptions   Medication Sig Dispense Refill     aspirin 81 MG tablet Take 1 tablet (81 mg) by mouth daily 90 tablet 3     atorvastatin (LIPITOR) 10 MG tablet Take 1 tablet (10 mg) by mouth daily 90 tablet 3     Calcium Carbonate Antacid (TUMS CALCIUM FOR LIFE BONE  PO) Take  by mouth.       diphenoxylate-atropine (LOMOTIL) 2.5-0.025 MG per tablet Take 1 tablet by mouth daily as needed for diarrhea Patient only takes 1 tablet as needed 30 tablet 5     losartan (COZAAR) 50 MG tablet Take 1 tablet (50 mg) by mouth daily 90 tablet 3     metoprolol succinate (TOPROL-XL) 50 MG 24 hr tablet TAKE 1 TABLET  BY MOUTH DAILY 90 tablet 3     Multiple Vitamin (MULTIVITAMINS PO) Take  by mouth daily.       Allergies   Allergen Reactions     Lisinopril Cough     Recent Labs   Lab Test  04/11/18   1520  05/24/17   1513  05/10/17   1038  08/18/16   1256  08/10/16   0934  11/06/15   2150   05/16/13   1406   A1C   --   5.7   --   5.6   --    --    --    --    LDL  38   --   22   --   26   --    < >   --    HDL  36*   --   41   --   37*   --    < >   --    TRIG  353*   --   267*   --   202*   --    < >   --    ALT  27   --   38   --    --   50   < >  78*   CR  0.86   --   0.98   --   0.84  1.05   < >  0.86   GFRESTIMATED  90   --   77   --   >90  Non  GFR Calc    72   < >  >90   GFRESTBLACK  >90   --   >90   GFR Calc     --   >90   GFR Calc    87   < >  >90   POTASSIUM  4.4   --   4.3   --   3.9  3.9   < >  3.8   TSH   --    --    --   1.75   --    --    --   1.47    < > = values in this interval not displayed.      BP Readings from Last 3 Encounters:   08/22/18 154/81   06/07/18 119/74   05/10/18 128/84    Wt Readings from Last 3 Encounters:   08/22/18 108 kg (238 lb)   06/07/18 109.8 kg (242 lb)   05/10/18 108.9 kg (240 lb)                  Labs reviewed in EPIC    Reviewed and updated as needed this visit by clinical staff       Reviewed and updated as needed this visit by Provider         ROS:  Constitutional, HEENT, cardiovascular, pulmonary, GI, , musculoskeletal, neuro, skin, endocrine and psych systems are negative, except as otherwise noted.     This document serves as a record of the services and decisions personally performed and made by  "Joey Chavez MD. It was created on their behalf by Gonzalez Vidal, a trained medical scribe. The creation of this document is based the provider's statements to the medical scribe.  Gonzalez Vidal August 22, 2018 2:18 PM         OBJECTIVE:     /81 (BP Location: Left arm, Cuff Size: Adult Large)  Pulse 76  Temp 98.4  F (36.9  C) (Oral)  Ht 1.683 m (5' 6.25\")  Wt 108 kg (238 lb)  SpO2 95%  BMI 38.12 kg/m2  Body mass index is 38.12 kg/(m^2).  GENERAL: Overweight, alert and no distress  RESP: lungs clear to auscultation - no rales, rhonchi or wheezes  CV: regular rate and rhythm, normal S1 S2, no S3 or S4, no murmur, click or rub, no peripheral edema and peripheral pulses strong  SKIN: no suspicious lesions or rashes  PSYCH: mentation appears normal, affect normal/bright  ABD: No Splenomegaly, no tenderness, no rebound, no guarding  NECK: No thyromegaly  Lynph: no adenopathy, splenomegaly  Psych normal affect  Neuro: no focal deficits     Diagnostic Test Results:  none     ASSESSMENT/PLAN:   (R61) Generalized hyperhidrosis  (primary encounter diagnosis)  Comment: Intermittent episodes of hyperhidrosis, not correlated with obvious extrinsic factors. No side effects of sweating with current medications. Number of recent tests display normal results, despite concurrence of symptoms. Chest XR was normal on 5/09/2018. BG was elevated at 105 mg/dl on 4/11/2018 therefore we will check A1c today to rule out diabetic process and by extension hypoglycemic events. TSH will be checked to rule out thyroid disorder.  Plan: TSH with free T4 reflex, Hemoglobin A1c        -Follow-up pending results        The information in this document, created by the medical scribe for me, accurately reflects the services I personally performed and the decisions made by me. I have reviewed and approved this document for accuracy prior to leaving the patient care area.    Armen Chavez MD, MD  [facility]  "

## 2018-08-23 LAB — TSH SERPL DL<=0.005 MIU/L-ACNC: 1.29 MU/L (ref 0.4–4)

## 2018-10-15 ENCOUNTER — OFFICE VISIT (OUTPATIENT)
Dept: FAMILY MEDICINE | Facility: CLINIC | Age: 68
End: 2018-10-15
Payer: COMMERCIAL

## 2018-10-15 ENCOUNTER — RADIANT APPOINTMENT (OUTPATIENT)
Dept: ULTRASOUND IMAGING | Facility: CLINIC | Age: 68
End: 2018-10-15
Attending: FAMILY MEDICINE
Payer: COMMERCIAL

## 2018-10-15 VITALS
SYSTOLIC BLOOD PRESSURE: 120 MMHG | TEMPERATURE: 97.1 F | WEIGHT: 236.6 LBS | HEIGHT: 66 IN | DIASTOLIC BLOOD PRESSURE: 76 MMHG | BODY MASS INDEX: 38.02 KG/M2 | RESPIRATION RATE: 14 BRPM | HEART RATE: 90 BPM | OXYGEN SATURATION: 93 %

## 2018-10-15 DIAGNOSIS — N50.89 SCROTAL SWELLING: ICD-10-CM

## 2018-10-15 DIAGNOSIS — N50.89 SCROTAL SWELLING: Primary | ICD-10-CM

## 2018-10-15 DIAGNOSIS — Z23 NEED FOR PROPHYLACTIC VACCINATION AND INOCULATION AGAINST INFLUENZA: ICD-10-CM

## 2018-10-15 LAB
ALBUMIN UR-MCNC: NEGATIVE MG/DL
APPEARANCE UR: CLEAR
BILIRUB UR QL STRIP: NEGATIVE
COLOR UR AUTO: YELLOW
GLUCOSE UR STRIP-MCNC: NEGATIVE MG/DL
HGB UR QL STRIP: NEGATIVE
KETONES UR STRIP-MCNC: NEGATIVE MG/DL
LEUKOCYTE ESTERASE UR QL STRIP: NEGATIVE
NITRATE UR QL: NEGATIVE
PH UR STRIP: 5.5 PH (ref 5–7)
SOURCE: NORMAL
SP GR UR STRIP: >1.03 (ref 1–1.03)
UROBILINOGEN UR STRIP-ACNC: 0.2 EU/DL (ref 0.2–1)

## 2018-10-15 PROCEDURE — 93976 VASCULAR STUDY: CPT

## 2018-10-15 PROCEDURE — 76870 US EXAM SCROTUM: CPT

## 2018-10-15 PROCEDURE — G0008 ADMIN INFLUENZA VIRUS VAC: HCPCS | Performed by: FAMILY MEDICINE

## 2018-10-15 PROCEDURE — 99213 OFFICE O/P EST LOW 20 MIN: CPT | Mod: 25 | Performed by: FAMILY MEDICINE

## 2018-10-15 PROCEDURE — 90662 IIV NO PRSV INCREASED AG IM: CPT | Performed by: FAMILY MEDICINE

## 2018-10-15 PROCEDURE — 81003 URINALYSIS AUTO W/O SCOPE: CPT | Performed by: FAMILY MEDICINE

## 2018-10-15 NOTE — PROGRESS NOTES
SUBJECTIVE:   Ricky Brown is a 67 year old male who presents to clinic today for the following health issues:    Chief Complaint   Patient presents with     Flu Shot     Swelling     Right Testicle has been swollen      Rt Scrotal Swelling x 2 days   Been working on his deck might whacked or   A little tender  Unsure of cause   Has had history of similar symptoms; some kind of infection.     Problem list and histories reviewed & adjusted, as indicated.  Additional history: as documented    Patient Active Problem List   Diagnosis     HL (hearing loss)     Erectile dysfunction     Keratoglobus, ou     Pseudophakia, PCL, od; ACL, os     Posterior vitreous detachment, od     Epiretinal membrane, mild, od     Advanced directives, counseling/discussion     HDL deficiency     JOSE JUAN (obstructive sleep apnea)-severe (AHI 32)     BCC (basal cell carcinoma)     Cardiomyopathy (H)     Obstructive sleep apnea     Near syncope     RCT (rotator cuff tear)     Disorder of bursae and tendons in shoulder region     Other postprocedural status(V45.89)     Iritis, os     Cramp of limb     Essential hypertension with goal blood pressure less than 140/90     Hyperlipidemia LDL goal <70     overwieght BMI > 35     Visit for suture removal     Past Surgical History:   Procedure Laterality Date     ARTHROSCOPY SHOULDER BICEPS TENODESIS REPAIR  2/27/2014    Procedure: ARTHROSCOPY SHOULDER BICEPS TENODESIS REPAIR;;  Surgeon: Michael Hagen MD;  [facility]     ARTHROSCOPY SHOULDER ROTATOR CUFF REPAIR  2/27/2014    Procedure: ARTHROSCOPY SHOULDER ROTATOR CUFF REPAIR;  Right Shoulder Arthroscopic Rotator Cuff Repair,  Subacromial Decompression, Biceps Tenodesis, Distal Clavicle Excision   ;  Surgeon: Michael Hagen MD;  [facility]     BIOPSY       CARDIAC SURGERY       COLONOSCOPY       EXCHANGE INTRAOCULAR LENS IMPLANT  2005    left eye - first, recentered PCL with iris fixation; then IOLX with ACL      "EXTRACAPSULAR CATARACT EXTRATION WITH INTRAOCULAR LENS IMPLANT  2005    both eyes (elsewhere)     TURBINOPLASTY  12/11/2013    Procedure: TURBINOPLASTY;;  Surgeon: Lisset Parsons MD;  Location:  OR     UVULOPALATOPHARYNGOPLASTY  12/11/2013    Procedure: UVULOPALATOPHARYNGOPLASTY;  Uvulopalatopharyngoplasty, Turbiante Reduction;  Surgeon: Lisset Parsons MD;  Location:  OR       Social History   Substance Use Topics     Smoking status: Former Smoker     Packs/day: 0.50     Years: 2.00     Types: Cigarettes     Quit date: 12/12/1996     Smokeless tobacco: Never Used     Alcohol use 5.0 oz/week      Comment: Evening Marie     Family History   Problem Relation Age of Onset     Diabetes Father      Old age Diabetes     Cerebrovascular Disease Father      Obesity Father      HEART DISEASE Father      Coronary Artery Disease Father      Hypertension Father      Lipids Sister      C.A.D. No family hx of      Cancer No family hx of      Glaucoma No family hx of      Macular Degeneration No family hx of          Tobacco  Allergies  Meds  Med Hx  Surg Hx  Fam Hx  Soc Hx      Reviewed and updated as needed this visit by Provider    ROS:  Constitutional, HEENT, cardiovascular, pulmonaryi and gi systems are negative, except as otherwise noted.    OBJECTIVE:     /76 (BP Location: Left arm, Patient Position: Chair, Cuff Size: Adult Regular)  Pulse 90  Temp 97.1  F (36.2  C) (Oral)  Resp 14  Ht 5' 6.25\" (1.683 m)  Wt 236 lb 9.6 oz (107.3 kg)  SpO2 93%  BMI 37.9 kg/m2  Body mass index is 37.9 kg/(m^2).  GENERAL: healthy, alert and no distress  RESP: lungs clear to auscultation - no rales, rhonchi or wheezes  CV: regular rate and rhythm, normal S1 S2, no S3 or S4, no murmur, click or rub  ABDOMEN: soft, obese, nontender, no masses and bowel sounds normal   (male):   Scrotum:  MS: no gross musculoskeletal defects noted, no edema    Diagnostic Test Results:  Results for orders placed or " performed in visit on 10/15/18   UA reflex to Microscopic and Culture   Result Value Ref Range    Color Urine Yellow     Appearance Urine Clear     Glucose Urine Negative NEG^Negative mg/dL    Bilirubin Urine Negative NEG^Negative    Ketones Urine Negative NEG^Negative mg/dL    Specific Gravity Urine >1.030 1.003 - 1.035    Blood Urine Negative NEG^Negative    pH Urine 5.5 5.0 - 7.0 pH    Protein Albumin Urine Negative NEG^Negative mg/dL    Urobilinogen Urine 0.2 0.2 - 1.0 EU/dL    Nitrite Urine Negative NEG^Negative    Leukocyte Esterase Urine Negative NEG^Negative    Source Midstream Urine      ASSESSMENT/PLAN:     (N50.89) Scrotal swelling: rt  (primary encounter diagnosis)  Comment: Differentials: Hydrocele, varicocele, epididymitis. UA unremarkable. Evaluate with US   Plan: UA reflex to Microscopic and Culture, US         Testicular & Scrotum w Doppler Ltd    (Z23) Need for prophylactic vaccination and inoculation against influenza  Plan: FLU VACCINE, INCREASED ANTIGEN, PRESV FREE, AGE        65+ [58149], Vaccine Administration, Initial         [[id number]]    Luca Baig MD  [facility]

## 2018-10-15 NOTE — PROGRESS NOTES

## 2018-10-15 NOTE — PATIENT INSTRUCTIONS
[facility]    If you have any questions regarding to your visit please contact your care team:       Team Purple:   Clinic Hours Telephone Number   Dr. Tosha Vega   7am-7pm  Monday - Thursday   7am-5pm  Fridays  [phone]  (Appointment scheduling available 24/7)   Urgent Care - [place] and [facility] - 11am-9pm Monday-Friday Saturday-Sunday- 9am-5pm   [place] - 5pm-9pm Monday-Friday Saturday-Sunday- 9am-5pm  [phone] - [place]  [phone] - [place]       What options do I have for a visit other than an office visit? We offer electronic visits (e-visits) and telephone visits, when medically appropriate.  Please check with your medical insurance to see if these types of visits are covered, as you will be responsible for any charges that are not paid by your insurance.      You can use AppNeta (secure electronic communication) to access to your chart, send your primary care provider a message, or make an appointment. Ask a team member how to get started.     For a price quote for your services, please call our Consumer Price Line at [phone] or our Imaging Cost estimation line at [phone] (for imaging tests).    Vignesh Zaman

## 2018-10-15 NOTE — MR AVS SNAPSHOT
After Visit Summary   10/15/2018    Ricky Brown    MRN: [id number]           Patient Information     Date Of Birth          1950        Visit Information        Provider Department      10/15/2018 12:00 PM Luca Baig MD [facility]        Today's Diagnoses     Scrotal swelling: rt    -  1      Care Instructions    [facility]    If you have any questions regarding to your visit please contact your care team:       Team Purple:   Clinic Hours Telephone Number   Dr. Tosha Vega   7am-7pm  Monday - Thursday   7am-5pm  Fridays  [phone]  (Appointment scheduling available 24/7)   Urgent Care - [place] - 11am-9pm Monday-Friday Saturday-Sunday- 9am-5pm   [place] - 5pm-9pm Monday-Friday Saturday-Sunday- 9am-5pm  [phone] - [place]  [phone] - [place]       What options do I have for a visit other than an office visit? We offer electronic visits (e-visits) and telephone visits, when medically appropriate.  Please check with your medical insurance to see if these types of visits are covered, as you will be responsible for any charges that are not paid by your insurance.      You can use cisimple (secure electronic communication) to access to your chart, send your primary care provider a message, or make an appointment. Ask a team member how to get started.     For a price quote for your services, please call our Consumer Price Line at [phone] or our Imaging Cost estimation line at [phone] (for imaging tests).    Vignesh Zaman          Follow-ups after your visit        Future tests that were ordered for you today     Open Future Orders        Priority Expected Expires Ordered    US Testicular & Scrotum w Doppler Ltd Routine  10/15/2019 10/15/2018            Who to contact     If you have questions or need follow up information about today's clinic  "visit or your schedule please contact [facility] directly at [phone].  Normal or non-critical lab and imaging results will be communicated to you by MyChart, letter or phone within 4 business days after the clinic has received the results. If you do not hear from us within 7 days, please contact the clinic through [employer] [employer] or phone. If you have a critical or abnormal lab result, we will notify you by phone as soon as possible.  Submit refill requests through Kamego or call your pharmacy and they will forward the refill request to us. Please allow 3 business days for your refill to be completed.          Additional Information About Your Visit        [employer] Information     Kamego gives you secure access to your electronic health record. If you see a primary care provider, you can also send messages to your care team and make appointments. If you have questions, please call your primary care clinic.  If you do not have a primary care provider, please call [phone] and they will assist you.        Care EveryWhere ID     This is your Care EveryWhere ID. This could be used by other organizations to access your [place] medical records  [id number]        Your Vitals Were     Pulse Temperature Respirations Height Pulse Oximetry BMI (Body Mass Index)    90 97.1  F (36.2  C) (Oral) 14 5' 6.25\" (1.683 m) 93% 37.9 kg/m2       Blood Pressure from Last 3 Encounters:   10/15/18 120/76   08/22/18 136/82   06/07/18 119/74    Weight from Last 3 Encounters:   10/15/18 236 lb 9.6 oz (107.3 kg)   08/22/18 238 lb (108 kg)   06/07/18 242 lb (109.8 kg)              We Performed the Following     UA reflex to Microscopic and Culture        Primary Care Provider Office Phone # Fax #    Armen Chavez [phone] [address] [facility] [address]        Equal Access to Services     NIKHIL ABBOTT AH: Taisha Rebollar, bhavani julian, qaybta scottie infante " rowena loyadavin geiger'aan ah. So [facility] [phone].    ATENCIÓN: Si negro tran, tiene a hope disposición servicios gratuitos de asistencia lingüística. Josefa al [phone].    We comply with applicable federal civil rights laws and [place] laws. We do not discriminate on the basis of race, color, national origin, age, disability, sex, sexual orientation, or gender identity.            Thank you!     Thank you for choosing [facility]  for your care. Our goal is always to provide you with excellent care. Hearing back from our patients is one way we can continue to improve our services. Please take a few minutes to complete the written survey that you may receive in the mail after your visit with us. Thank you!             Your Updated Medication List - Protect others around you: Learn how to safely use, store and throw away your medicines at www.disposemymeds.org.          This list is accurate as of 10/15/18 12:08 PM.  Always use your most recent med list.                   Brand Name Dispense Instructions for use Diagnosis    aspirin 81 MG tablet     90 tablet    Take 1 tablet (81 mg) by mouth daily    Cardiomyopathy, idiopathic (H)       atorvastatin 10 MG tablet    LIPITOR    90 tablet    Take 1 tablet (10 mg) by mouth daily    Hyperlipidemia LDL goal <70       diphenoxylate-atropine 2.5-0.025 MG per tablet    LOMOTIL    30 tablet    Take 1 tablet by mouth daily as needed for diarrhea Patient only takes 1 tablet as needed    Loose stools       losartan 50 MG tablet    COZAAR    90 tablet    Take 1 tablet (50 mg) by mouth daily    Non-ischemic cardiomyopathy (H)       metoprolol succinate 50 MG 24 hr tablet    TOPROL-XL    90 tablet    TAKE 1 TABLET  BY MOUTH DAILY    Essential hypertension with goal blood pressure less than 140/90, Cardiomyopathy, unspecified type (H)       MULTIVITAMINS PO      Take  by mouth daily.    Routine general medical examination at a health care facility,  Erectile dysfunction, Rectal itching, HTN (hypertension)       TUMS CALCIUM FOR LIFE BONE PO      Take  by mouth.

## 2018-10-15 NOTE — NURSING NOTE
"Chief Complaint   Patient presents with     Flu Shot     Swelling     Right Testicle has been swollen      Initial /76 (BP Location: Left arm, Patient Position: Chair, Cuff Size: Adult Regular)  Pulse 90  Temp 97.1  F (36.2  C) (Oral)  Resp 14  Ht 5' 6.25\" (1.683 m)  Wt 236 lb 9.6 oz (107.3 kg)  SpO2 93%  BMI 37.9 kg/m2 Estimated body mass index is 37.9 kg/(m^2) as calculated from the following:    Height as of this encounter: 5' 6.25\" (1.683 m).    Weight as of this encounter: 236 lb 9.6 oz (107.3 kg).  BP completed using cuff size: patricia Zaamn  "

## 2018-11-01 ENCOUNTER — OFFICE VISIT (OUTPATIENT)
Dept: URGENT CARE | Facility: URGENT CARE | Age: 68
End: 2018-11-01
Payer: COMMERCIAL

## 2018-11-01 VITALS
BODY MASS INDEX: 38.45 KG/M2 | HEART RATE: 70 BPM | DIASTOLIC BLOOD PRESSURE: 68 MMHG | OXYGEN SATURATION: 97 % | RESPIRATION RATE: 18 BRPM | SYSTOLIC BLOOD PRESSURE: 118 MMHG | WEIGHT: 240 LBS

## 2018-11-01 DIAGNOSIS — S61.022A LACERATION OF LEFT THUMB WITH FOREIGN BODY WITHOUT DAMAGE TO NAIL, INITIAL ENCOUNTER: Primary | ICD-10-CM

## 2018-11-01 PROCEDURE — 90715 TDAP VACCINE 7 YRS/> IM: CPT | Performed by: PHYSICIAN ASSISTANT

## 2018-11-01 PROCEDURE — 99214 OFFICE O/P EST MOD 30 MIN: CPT | Mod: 25 | Performed by: PHYSICIAN ASSISTANT

## 2018-11-01 PROCEDURE — 90471 IMMUNIZATION ADMIN: CPT | Performed by: PHYSICIAN ASSISTANT

## 2018-11-01 PROCEDURE — 12002 RPR S/N/AX/GEN/TRNK2.6-7.5CM: CPT | Performed by: PHYSICIAN ASSISTANT

## 2018-11-01 RX ORDER — HYDROCODONE BITARTRATE AND ACETAMINOPHEN 5; 325 MG/1; MG/1
1 TABLET ORAL EVERY 4 HOURS PRN
Qty: 15 TABLET | Refills: 0 | Status: SHIPPED | OUTPATIENT
Start: 2018-11-01 | End: 2019-03-28

## 2018-11-01 ASSESSMENT — ENCOUNTER SYMPTOMS
ABDOMINAL PAIN: 0
MUSCULOSKELETAL NEGATIVE: 1
EYE REDNESS: 0
WEAKNESS: 0
WHEEZING: 0
GASTROINTESTINAL NEGATIVE: 1
DYSURIA: 0
CONSTITUTIONAL NEGATIVE: 1
COUGH: 0
RESPIRATORY NEGATIVE: 1
POLYDIPSIA: 0
EYES NEGATIVE: 1
HEMATURIA: 0
MYALGIAS: 0
DIARRHEA: 0
EYE DISCHARGE: 0
SORE THROAT: 0
CARDIOVASCULAR NEGATIVE: 1
PALPITATIONS: 0
RHINORRHEA: 0
CHILLS: 0
ENDOCRINE NEGATIVE: 1
HEADACHES: 0
FEVER: 0
FREQUENCY: 0
NEUROLOGICAL NEGATIVE: 1
DIAPHORESIS: 0
DIZZINESS: 0
LIGHT-HEADEDNESS: 0
EYE ITCHING: 0
NAUSEA: 0
VOMITING: 0
CHEST TIGHTNESS: 0
SHORTNESS OF BREATH: 0
ADENOPATHY: 0
WOUND: 1

## 2018-11-01 NOTE — PROGRESS NOTES
Chief Complaint:     No chief complaint on file.         HPI: Ricky Brown is an 67 year old male that presents to clinic after cutting self on the L thumb with a table saw. He denies numbness of the thumb. He denies dysfunction of the thumb. Patient denies any loss of strength to the thumb Patient is not up to date on tetanus.     Review of Systems:    Review of Systems   Constitutional: Negative.  Negative for chills, diaphoresis and fever.   HENT: Negative.  Negative for congestion, ear pain, rhinorrhea and sore throat.    Eyes: Negative.  Negative for discharge, redness and itching.   Respiratory: Negative.  Negative for cough, chest tightness, shortness of breath and wheezing.    Cardiovascular: Negative.  Negative for chest pain and palpitations.   Gastrointestinal: Negative.  Negative for abdominal pain, diarrhea, nausea and vomiting.   Endocrine: Negative.  Negative for polydipsia and polyuria.   Genitourinary: Negative for dysuria, frequency, hematuria and urgency.   Musculoskeletal: Negative.  Negative for myalgias.   Skin: Positive for wound. Negative for rash.   Allergic/Immunologic: Negative for immunocompromised state.   Neurological: Negative.  Negative for dizziness, weakness, light-headedness and headaches.   Hematological: Negative for adenopathy.         Family History   Family History   Problem Relation Age of Onset     Diabetes Father      Old age Diabetes     Cerebrovascular Disease Father      Obesity Father      HEART DISEASE Father      Coronary Artery Disease Father      Hypertension Father      Lipids Sister      C.A.D. No family hx of      Cancer No family hx of      Glaucoma No family hx of      Macular Degeneration No family hx of         Problem history  Patient Active Problem List   Diagnosis     HL (hearing loss)     Erectile dysfunction     Keratoglobus, ou     Pseudophakia, PCL, od; ACL, os     Posterior vitreous detachment, od     Epiretinal membrane, mild, od     Advanced  directives, counseling/discussion     HDL deficiency     JOSE JUAN (obstructive sleep apnea)-severe (AHI 32)     BCC (basal cell carcinoma)     Cardiomyopathy (H)     Obstructive sleep apnea     Near syncope     RCT (rotator cuff tear)     Disorder of bursae and tendons in shoulder region     Other postprocedural status(V45.89)     Iritis, os     Cramp of limb     Essential hypertension with goal blood pressure less than 140/90     Hyperlipidemia LDL goal <70     overwieght BMI > 35     Visit for suture removal        Allergies  Allergies   Allergen Reactions     Lisinopril Cough        Social History  Social History     Social History     Marital status:      Spouse name: Kyung     Number of children: 0     Years of education: 14     Occupational History     industrial electronics  Self     Social History Main Topics     Smoking status: Former Smoker     Packs/day: 0.50     Years: 2.00     Types: Cigarettes     Quit date: 12/12/1996     Smokeless tobacco: Never Used     Alcohol use 5.0 oz/week      Comment: Evening Marie     Drug use: No     Sexual activity: Yes     Partners: Female     Birth control/ protection: Male Surgical      Comment: 2006 vasectomy     Other Topics Concern     Parent/Sibling W/ Cabg, Mi Or Angioplasty Before 65f 55m? No     Social History Narrative        Current Meds    Current Outpatient Prescriptions:      aspirin 81 MG tablet, Take 1 tablet (81 mg) by mouth daily, Disp: 90 tablet, Rfl: 3     atorvastatin (LIPITOR) 10 MG tablet, Take 1 tablet (10 mg) by mouth daily, Disp: 90 tablet, Rfl: 3     Calcium Carbonate Antacid (TUMS CALCIUM FOR LIFE BONE PO), Take  by mouth., Disp: , Rfl:      diphenoxylate-atropine (LOMOTIL) 2.5-0.025 MG per tablet, Take 1 tablet by mouth daily as needed for diarrhea Patient only takes 1 tablet as needed, Disp: 30 tablet, Rfl: 5     losartan (COZAAR) 50 MG tablet, Take 1 tablet (50 mg) by mouth daily, Disp: 90 tablet, Rfl: 3     metoprolol succinate  (TOPROL-XL) 50 MG 24 hr tablet, TAKE 1 TABLET  BY MOUTH DAILY, Disp: 90 tablet, Rfl: 3     Multiple Vitamin (MULTIVITAMINS PO), Take  by mouth daily., Disp: , Rfl:   No current facility-administered medications for this visit.     Facility-Administered Medications Ordered in Other Visits:      sodium chloride (PF) 0.9% PF flush 10 mL, 10 mL, Intracatheter, Once, ZEN Bustillo MD       Vitals reviewed by Iftikhar Ch  There were no vitals taken for this visit.    Physical Exam:    Physical Exam   Constitutional: He appears well-developed and well-nourished. No distress.   HENT:   Head: Normocephalic and atraumatic.   Right Ear: Tympanic membrane and external ear normal.   Left Ear: Tympanic membrane and external ear normal.   Mouth/Throat: Oropharynx is clear and moist.   Eyes: EOM are normal. Pupils are equal, round, and reactive to light.   Neck: Normal range of motion. Neck supple.   Cardiovascular: Normal rate, regular rhythm, normal heart sounds and intact distal pulses.  Exam reveals no gallop and no friction rub.    No murmur heard.  Pulmonary/Chest: Effort normal and breath sounds normal. No respiratory distress.   Abdominal: Soft. Bowel sounds are normal. He exhibits no distension. There is no tenderness.   Musculoskeletal:        Hands:  4 cm laceration deep in nature to the distal portion of the L thumb.  There is contamination and distal nail loss.  The wound is very ragged in nature.  Full range of motion of thumb.  Thumb is neurological intact.   Lymphadenopathy:     He has no cervical adenopathy.   Neurological: He is alert. No cranial nerve deficit.   Skin: Skin is warm and dry. No rash noted. He is not diaphoretic.   Psychiatric: He has a normal mood and affect.   Nursing note and vitals reviewed.        Medical Decision Making:  Laceration no evidence of neurovascular injury. The wound will be closed using sutures.     Assessment:     (S61.022A) Laceration of left thumb with foreign body  without damage to nail, initial encounter  (primary encounter diagnosis)  Plan: TDAP, IM (10 - 64 YRS) - Adacel         Plan:    Imaging of the injured area for foreign body or fracture was not  Indicated    Wound closed per procedure note below.    Patient given tetanus booster in clinic.  With amount of damage and contamination, Rx for Augmentin tonight.  Rx for Vicodin #15 for pain.  Ice and elevate the thumb.    Wound was cleaned with sterile saline and surgiscrub.  Antibiotic ointment and sterile dressing applied in clinic.     Discussed home wound care and need for follow up for Suture removal in 7 days. Return to  with increased swelling, pain, redness, pus or fevers.  Patient will follow up with me in clinic in 3 days for recheck.    Patient was discharged in stable condition.  Patient verbalized understanding and agreed with this plan.      Procedure Note - Wound Repair:  Procedure performed by Jose Ch.     Anesthesia was obtained with 1% plain lidocaine via digital block.  The wound, located on the L thumb, measured 4 cm and was ragged edges, contaminated, skin loss is significant with damage to the nail.  The level of complexity was: complex (debridment, scar revision or flaps) .  The neurovascular exam was normal.  It was cleaned with surgiscrub, irrigated with normal saline and explored.  The wound was closed using 5-0 Ethylon and 4-0 vicryl to repair through nail bed interrupted.    Greater than 45 minutes was needed for the repair of this wound due to contamination, and approximation of the wound with skin loss.    Iftikhar Ch 5:05 PM

## 2018-11-01 NOTE — MR AVS SNAPSHOT
After Visit Summary   11/1/2018    Ricky Brown    MRN: [id number]           Patient Information     Date Of Birth          1950        Visit Information        Provider Department      11/1/2018 5:05 PM Iftikhar Ch PA-C [facility]        Today's Diagnoses     Laceration of left thumb with foreign body without damage to nail, initial encounter    -  1       Follow-ups after your visit        Who to contact     If you have questions or need follow up information about today's clinic visit or your schedule please contact [facility] directly at [phone].  Normal or non-critical lab and imaging results will be communicated to you by MyChart, letter or phone within 4 business days after the clinic has received the results. If you do not hear from us within 7 days, please contact the clinic through Smart Pipehart or phone. If you have a critical or abnormal lab result, we will notify you by phone as soon as possible.  Submit refill requests through Fiber Options or call your pharmacy and they will forward the refill request to us. Please allow 3 business days for your refill to be completed.          Additional Information About Your Visit        MyChart Information     Fiber Options gives you secure access to your electronic health record. If you see a primary care provider, you can also send messages to your care team and make appointments. If you have questions, please call your primary care clinic.  If you do not have a primary care provider, please call [phone] and they will assist you.        Care EveryWhere ID     This is your Care EveryWhere ID. This could be used by other organizations to access your [place] medical records  [id number]        Your Vitals Were     Pulse Respirations Pulse Oximetry BMI (Body Mass Index)          70 18 97% 38.45 kg/m2         Blood Pressure from Last 3 Encounters:   11/01/18 118/68   10/15/18 120/76   08/22/18 136/82     Weight from Last 3 Encounters:   11/01/18 240 lb (108.9 kg)   10/15/18 236 lb 9.6 oz (107.3 kg)   08/22/18 238 lb (108 kg)              We Performed the Following     TDAP, IM (10 - 64 YRS) - Adacel          Today's Medication Changes          These changes are accurate as of 11/1/18  6:03 PM.  If you have any questions, ask your nurse or doctor.               Start taking these medicines.        Dose/Directions    amoxicillin-clavulanate 875-125 MG per tablet   Commonly known as:  AUGMENTIN   Used for:  Laceration of left thumb with foreign body without damage to nail, initial encounter   Started by:  Iftikhar Ch PA-C        Dose:  1 tablet   Take 1 tablet by mouth 2 times daily for 7 days   Quantity:  14 tablet   Refills:  0       HYDROcodone-acetaminophen 5-325 MG per tablet   Commonly known as:  NORCO   Used for:  Laceration of left thumb with foreign body without damage to nail, initial encounter   Started by:  Iftikhar Ch PA-C        Dose:  1 tablet   Take 1 tablet by mouth every 4 hours as needed for pain   Quantity:  15 tablet   Refills:  0            Where to get your medicines      These medications were sent to [facility] [address] [place] [address], [place] - [address] [address], [facility] [address]     Phone:  [phone]     amoxicillin-clavulanate 875-125 MG per tablet         Some of these will need a paper prescription and others can be bought over the counter.  Ask your nurse if you have questions.     Bring a paper prescription for each of these medications     HYDROcodone-acetaminophen 5-325 MG per tablet               Information about OPIOIDS     PRESCRIPTION OPIOIDS: WHAT YOU NEED TO KNOW   We gave you an opioid (narcotic) pain medicine. It is important to manage your pain, but opioids are not always the best choice. You should first try all the other options your care team gave you. Take this medicine for as short a time (and as few doses) as  possible.    Some activities can increase your pain, such as bandage changes or therapy sessions. It may help to take your pain medicine 30 to 60 minutes before these activities. Reduce your stress by getting enough sleep, working on hobbies you enjoy and practicing relaxation or meditation. Talk to your care team about ways to manage your pain beyond prescription opioids.    These medicines have risks:    DO NOT drive when on new or higher doses of pain medicine. These medicines can affect your alertness and reaction times, and you could be arrested for driving under the influence (DUI). If you need to use opioids long-term, talk to your care team about driving.    DO NOT operate heavy machinery    DO NOT do any other dangerous activities while taking these medicines.    DO NOT drink any alcohol while taking these medicines.     If the opioid prescribed includes acetaminophen, DO NOT take with any other medicines that contain acetaminophen. Read all labels carefully. Look for the word  acetaminophen  or  Tylenol.  Ask your pharmacist if you have questions or are unsure.    You can get addicted to pain medicines, especially if you have a history of addiction (chemical, alcohol or substance dependence). Talk to your care team about ways to reduce this risk.    All opioids tend to cause constipation. Drink plenty of water and eat foods that have a lot of fiber, such as fruits, vegetables, prune juice, apple juice and high-fiber cereal. Take a laxative (Miralax, milk of magnesia, Colace, Senna) if you don t move your bowels at least every other day. Other side effects include upset stomach, sleepiness, dizziness, throwing up, tolerance (needing more of the medicine to have the same effect), physical dependence and slowed breathing.    Store your pills in a secure place, locked if possible. We will not replace any lost or stolen medicine. If you don t finish your medicine, please throw away (dispose) as directed by your  pharmacist. The [place] Pollution [employer] has more information about safe disposal: https://www.pca.[facility].[place].us/living-green/managing-unwanted-medications         Primary Care Provider Office Phone # Fax #    Armen Chavez [phone] [address] [facility] [address]        Equal Access to Services     TRACIModoc Medical CenterNURIS : Hadii aad ku hadasho Soomaali, waaxda luqadaha, qaybta kaalmada adeegyada, waxay idiin hayaan adeeg corbyjoysalazar lamitchell . So [facility] [phone].    ATENCIÓN: Si habla español, tiene a hope disposición servicios gratuitos de asistencia lingüística. Josefa al [phone].    We comply with applicable federal civil rights laws and [place] laws. We do not discriminate on the basis of race, color, national origin, age, disability, sex, sexual orientation, or gender identity.            Thank you!     Thank you for choosing [facility]  for your care. Our goal is always to provide you with excellent care. Hearing back from our patients is one way we can continue to improve our services. Please take a few minutes to complete the written survey that you may receive in the mail after your visit with us. Thank you!             Your Updated Medication List - Protect others around you: Learn how to safely use, store and throw away your medicines at www.disposemymeds.org.          This list is accurate as of 11/1/18  6:03 PM.  Always use your most recent med list.                   Brand Name Dispense Instructions for use Diagnosis    amoxicillin-clavulanate 875-125 MG per tablet    AUGMENTIN    14 tablet    Take 1 tablet by mouth 2 times daily for 7 days    Laceration of left thumb with foreign body without damage to nail, initial encounter       aspirin 81 MG tablet     90 tablet    Take 1 tablet (81 mg) by mouth daily    Cardiomyopathy, idiopathic (H)       atorvastatin 10 MG tablet    LIPITOR    90 tablet    Take 1 tablet (10 mg) by mouth daily     Hyperlipidemia LDL goal <70       diphenoxylate-atropine 2.5-0.025 MG per tablet    LOMOTIL    30 tablet    Take 1 tablet by mouth daily as needed for diarrhea Patient only takes 1 tablet as needed    Loose stools       HYDROcodone-acetaminophen 5-325 MG per tablet    NORCO    15 tablet    Take 1 tablet by mouth every 4 hours as needed for pain    Laceration of left thumb with foreign body without damage to nail, initial encounter       losartan 50 MG tablet    COZAAR    90 tablet    Take 1 tablet (50 mg) by mouth daily    Non-ischemic cardiomyopathy (H)       metoprolol succinate 50 MG 24 hr tablet    TOPROL-XL    90 tablet    TAKE 1 TABLET  BY MOUTH DAILY    Essential hypertension with goal blood pressure less than 140/90, Cardiomyopathy, unspecified type (H)       MULTIVITAMINS PO      Take  by mouth daily.    Routine general medical examination at a health care facility, Erectile dysfunction, Rectal itching, HTN (hypertension)       TUMS CALCIUM FOR LIFE BONE PO      Take  by mouth.

## 2018-11-09 ENCOUNTER — ALLIED HEALTH/NURSE VISIT (OUTPATIENT)
Dept: FAMILY MEDICINE | Facility: CLINIC | Age: 68
End: 2018-11-09
Payer: COMMERCIAL

## 2018-11-09 VITALS — SYSTOLIC BLOOD PRESSURE: 132 MMHG | HEART RATE: 73 BPM | DIASTOLIC BLOOD PRESSURE: 72 MMHG

## 2018-11-09 DIAGNOSIS — I10 ESSENTIAL HYPERTENSION WITH GOAL BLOOD PRESSURE LESS THAN 140/90: Primary | ICD-10-CM

## 2018-11-09 PROCEDURE — 99207 ZZC NO CHARGE NURSE ONLY: CPT | Performed by: FAMILY MEDICINE

## 2018-11-09 NOTE — MR AVS SNAPSHOT
After Visit Summary   11/9/2018    Ricky Brown    MRN: [id number]           Patient Information     Date Of Birth          1950        Visit Information        Provider Department      11/9/2018 10:57 AM Armen Chavez MD [facility]        Today's Diagnoses     Essential hypertension with goal blood pressure less than 140/90    -  1       Follow-ups after your visit        Who to contact     If you have questions or need follow up information about today's clinic visit or your schedule please contact [facility] directly at [phone].  Normal or non-critical lab and imaging results will be communicated to you by Pagidohart, letter or phone within 4 business days after the clinic has received the results. If you do not hear from us within 7 days, please contact the clinic through Pagidohart or phone. If you have a critical or abnormal lab result, we will notify you by phone as soon as possible.  Submit refill requests through trippiece or call your pharmacy and they will forward the refill request to us. Please allow 3 business days for your refill to be completed.          Additional Information About Your Visit        MyChart Information     trippiece gives you secure access to your electronic health record. If you see a primary care provider, you can also send messages to your care team and make appointments. If you have questions, please call your primary care clinic.  If you do not have a primary care provider, please call [phone] and they will assist you.        Care EveryWhere ID     This is your Care EveryWhere ID. This could be used by other organizations to access your [place] medical records  [id number]        Your Vitals Were     Pulse                   73            Blood Pressure from Last 3 Encounters:   11/09/18 132/72   11/01/18 118/68   10/15/18 120/76    Weight from Last 3 Encounters:   11/01/18 240 lb (108.9 kg)   10/15/18  236 lb 9.6 oz (107.3 kg)   08/22/18 238 lb (108 kg)              Today, you had the following     No orders found for display       Primary Care Provider Office Phone # Fax #    Armen Chavez [phone]       [address] [facility] [address]        Equal Access to Services     NIKHIL ABBOTT : Hadii aad ku hadasho Soomaali, waaxda luqadaha, qaybta kaalmada adeegyada, waxay jigarin hayaan adedavin tavarezjoysalazar phillips . So [facility] [phone].    ATENCIÓN: Si habla español, tiene a hope disposición servicios gratuitos de asistencia lingüística. Josefa al [phone].    We comply with applicable federal civil rights laws and [place] laws. We do not discriminate on the basis of race, color, national origin, age, disability, sex, sexual orientation, or gender identity.            Thank you!     Thank you for choosing [facility]  for your care. Our goal is always to provide you with excellent care. Hearing back from our patients is one way we can continue to improve our services. Please take a few minutes to complete the written survey that you may receive in the mail after your visit with us. Thank you!             Your Updated Medication List - Protect others around you: Learn how to safely use, store and throw away your medicines at www.disposemymeds.org.          This list is accurate as of 11/9/18 11:00 AM.  Always use your most recent med list.                   Brand Name Dispense Instructions for use Diagnosis    aspirin 81 MG tablet     90 tablet    Take 1 tablet (81 mg) by mouth daily    Cardiomyopathy, idiopathic (H)       atorvastatin 10 MG tablet    LIPITOR    90 tablet    Take 1 tablet (10 mg) by mouth daily    Hyperlipidemia LDL goal <70       diphenoxylate-atropine 2.5-0.025 MG per tablet    LOMOTIL    30 tablet    Take 1 tablet by mouth daily as needed for diarrhea Patient only takes 1 tablet as needed    Loose stools       HYDROcodone-acetaminophen 5-325 MG per  tablet    NORCO    15 tablet    Take 1 tablet by mouth every 4 hours as needed for pain    Laceration of left thumb with foreign body without damage to nail, initial encounter       losartan 50 MG tablet    COZAAR    90 tablet    Take 1 tablet (50 mg) by mouth daily    Non-ischemic cardiomyopathy (H)       metoprolol succinate 50 MG 24 hr tablet    TOPROL-XL    90 tablet    TAKE 1 TABLET  BY MOUTH DAILY    Essential hypertension with goal blood pressure less than 140/90, Cardiomyopathy, unspecified type (H)       MULTIVITAMINS PO      Take  by mouth daily.    Routine general medical examination at a health care facility, Erectile dysfunction, Rectal itching, HTN (hypertension)       TUMS CALCIUM FOR LIFE BONE PO      Take  by mouth.

## 2018-11-09 NOTE — PROGRESS NOTES
Ricky Brown is enrolled/participating in the retail pharmacy Blood Pressure Goals Achievement Program (BPGAP).  Ricky Brown was evaluated at [facility] on November 9, 2018 at which time his blood pressure was:    BP Readings from Last 3 Encounters:   11/09/18 132/72   11/01/18 118/68   10/15/18 120/76     Reviewed lifestyle modifications for blood pressure control and reduction: including making healthy food choices, managing weight, getting regular exercise, smoking cessation, reducing alcohol consumption, monitoring blood pressure regularly.     iRcky Brown is not experiencing symptoms.    Follow-Up: BP is at goal of < 140/90mmHg (patient 18+ years of age with or without diabetes).  Recommended follow-up at pharmacy in 6 months.     Recommendation to Provider: None    Ricky Brown was evaluated for enrollment into the PGEN study today.    PLEASE INITIATE ENROLLMENT DISCUSSION WITH HTN PTS  1) Between 30-80 years old                                                                                                               2) BMI between 19-50                                                                                                        3) BP ?140/90 AND ?170/110 patients aged 30-59         BP ?150/90 AND ?170/110 non-diabetic patients aged 60-80       BP ?140/90 AND ?170/110 diabetic patients aged 60-80  4) Additional requirements for uncontrolled HTN patients:        Pt on only 1 class of medication  5) EXCLUDE patient if confirmation of:                  ? Cardiac disease                  ? Chronic Kidney Disease                  ? Pregnancy/Breastfeeding                  ? Secondary Hypertension/Pre-eclampsia                                 ? Vascular disease    Patient eligible for enrollment:  No  Patient interested in enrollment:  Unknown    This note completed by:     Deborah Stovall PharmD, [facility]  Pharmacist Manager   [facility]  Pharmacy  [phone]

## 2018-11-13 ENCOUNTER — OFFICE VISIT (OUTPATIENT)
Dept: URGENT CARE | Facility: URGENT CARE | Age: 68
End: 2018-11-13
Payer: COMMERCIAL

## 2018-11-13 VITALS
WEIGHT: 234 LBS | DIASTOLIC BLOOD PRESSURE: 100 MMHG | RESPIRATION RATE: 16 BRPM | BODY MASS INDEX: 37.48 KG/M2 | HEART RATE: 94 BPM | OXYGEN SATURATION: 95 % | SYSTOLIC BLOOD PRESSURE: 147 MMHG | TEMPERATURE: 98.2 F

## 2018-11-13 DIAGNOSIS — Z51.89 VISIT FOR WOUND CHECK: ICD-10-CM

## 2018-11-13 DIAGNOSIS — S61.002D OPEN WOUND OF LEFT THUMB, SUBSEQUENT ENCOUNTER: Primary | ICD-10-CM

## 2018-11-13 PROCEDURE — 99213 OFFICE O/P EST LOW 20 MIN: CPT | Performed by: PHYSICIAN ASSISTANT

## 2018-11-13 ASSESSMENT — ENCOUNTER SYMPTOMS
CHILLS: 0
CONSTITUTIONAL NEGATIVE: 1
CARDIOVASCULAR NEGATIVE: 1
LIGHT-HEADEDNESS: 0
MYALGIAS: 0
PALPITATIONS: 0
VOMITING: 0
DIZZINESS: 0
HEADACHES: 0
POLYDIPSIA: 0
NAUSEA: 0
DYSURIA: 0
WHEEZING: 0
EYE ITCHING: 0
CHEST TIGHTNESS: 0
SORE THROAT: 0
WEAKNESS: 0
RESPIRATORY NEGATIVE: 1
SHORTNESS OF BREATH: 0
FREQUENCY: 0
COUGH: 0
FEVER: 0
NEUROLOGICAL NEGATIVE: 1
EYES NEGATIVE: 1
GASTROINTESTINAL NEGATIVE: 1
EYE REDNESS: 0
MUSCULOSKELETAL NEGATIVE: 1
DIARRHEA: 0
DIAPHORESIS: 0
ABDOMINAL PAIN: 0
ADENOPATHY: 0
RHINORRHEA: 0
HEMATURIA: 0
ENDOCRINE NEGATIVE: 1
EYE DISCHARGE: 0
WOUND: 1

## 2018-11-13 NOTE — MR AVS SNAPSHOT
After Visit Summary   11/13/2018    Ricky Brown    MRN: [id number]           Patient Information     Date Of Birth          1950        Visit Information        Provider Department      11/13/2018 12:35 PM Iftikhar Ch PA-C [facility]        Today's Diagnoses     Open wound of left thumb, subsequent encounter    -  1    Visit for wound check           Follow-ups after your visit        Who to contact     If you have questions or need follow up information about today's clinic visit or your schedule please contact [facility] directly at [phone].  Normal or non-critical lab and imaging results will be communicated to you by MyChart, letter or phone within 4 business days after the clinic has received the results. If you do not hear from us within 7 days, please contact the clinic through PlayCrafterhart or phone. If you have a critical or abnormal lab result, we will notify you by phone as soon as possible.  Submit refill requests through 3D Systems or call your pharmacy and they will forward the refill request to us. Please allow 3 business days for your refill to be completed.          Additional Information About Your Visit        MyChart Information     3D Systems gives you secure access to your electronic health record. If you see a primary care provider, you can also send messages to your care team and make appointments. If you have questions, please call your primary care clinic.  If you do not have a primary care provider, please call [phone] and they will assist you.        Care EveryWhere ID     This is your Care EveryWhere ID. This could be used by other organizations to access your [facility] medical records  [id number]        Your Vitals Were     Pulse Temperature Respirations Pulse Oximetry BMI (Body Mass Index)       94 98.2  F (36.8  C) (Oral) 16 95% 37.48 kg/m2        Blood Pressure from Last 3 Encounters:   11/13/18 (!) 147/100    11/12/18 152/87   11/09/18 132/72    Weight from Last 3 Encounters:   11/13/18 234 lb (106.1 kg)   11/12/18 240 lb (108.9 kg)   11/01/18 240 lb (108.9 kg)              Today, you had the following     No orders found for display       Primary Care Provider Office Phone # Fax #    Armen Chavez [phone]       [address] [place] [address]        Equal Access to Services     NIKHIL ABBOTT : Hadii aad ku hadasho Soomaali, waaxda luqadaha, qaybta kaalmada adeegyada, waxay idiin hayaan adedavin phillips . So [facility] [phone].    ATENCIÓN: Si habla español, tiene a hope disposición servicios gratuitos de asistencia lingüística. [facility] [phone].    We comply with applicable federal civil rights laws and [place] laws. We do not discriminate on the basis of race, color, national origin, age, disability, sex, sexual orientation, or gender identity.            Thank you!     Thank you for choosing [facility]  for your care. Our goal is always to provide you with excellent care. Hearing back from our patients is one way we can continue to improve our services. Please take a few minutes to complete the written survey that you may receive in the mail after your visit with us. Thank you!             Your Updated Medication List - Protect others around you: Learn how to safely use, store and throw away your medicines at www.disposemymeds.org.          This list is accurate as of 11/13/18  1:02 PM.  Always use your most recent med list.                   Brand Name Dispense Instructions for use Diagnosis    aspirin 81 MG tablet     90 tablet    Take 1 tablet (81 mg) by mouth daily    Cardiomyopathy, idiopathic (H)       atorvastatin 10 MG tablet    LIPITOR    90 tablet    Take 1 tablet (10 mg) by mouth daily    Hyperlipidemia LDL goal <70       diphenoxylate-atropine 2.5-0.025 MG per tablet    LOMOTIL    30 tablet    Take 1 tablet by mouth daily as needed for  diarrhea Patient only takes 1 tablet as needed    Loose stools       HYDROcodone-acetaminophen 5-325 MG per tablet    NORCO    15 tablet    Take 1 tablet by mouth every 4 hours as needed for pain    Laceration of left thumb with foreign body without damage to nail, initial encounter       losartan 50 MG tablet    COZAAR    90 tablet    Take 1 tablet (50 mg) by mouth daily    Non-ischemic cardiomyopathy (H)       metoprolol succinate 50 MG 24 hr tablet    TOPROL-XL    90 tablet    TAKE 1 TABLET  BY MOUTH DAILY    Essential hypertension with goal blood pressure less than 140/90, Cardiomyopathy, unspecified type (H)       MULTIVITAMINS PO      Take  by mouth daily.    Routine general medical examination at a health care facility, Erectile dysfunction, Rectal itching, HTN (hypertension)       TUMS CALCIUM FOR LIFE BONE PO      Take  by mouth.

## 2018-11-13 NOTE — PROGRESS NOTES
Chief Complaint:    Chief Complaint   Patient presents with     Suture Removal     Patient is here to check up on thumb/stitches       HPI: Ricky Brown is an 68 year old male who presents for recheck of L thumb laceration.  Patient had sutures placed 12 days ago in the L thumb.  Wound is healing well.  No signs of infection at this time.  His pain is well controlled.  No pain medication needed at this time.      ROS:      Review of Systems   Constitutional: Negative.  Negative for chills, diaphoresis and fever.   HENT: Negative.  Negative for congestion, ear pain, rhinorrhea and sore throat.    Eyes: Negative.  Negative for discharge, redness and itching.   Respiratory: Negative.  Negative for cough, chest tightness, shortness of breath and wheezing.    Cardiovascular: Negative.  Negative for chest pain and palpitations.   Gastrointestinal: Negative.  Negative for abdominal pain, diarrhea, nausea and vomiting.   Endocrine: Negative.  Negative for polydipsia and polyuria.   Genitourinary: Negative for dysuria, frequency, hematuria and urgency.   Musculoskeletal: Negative.  Negative for myalgias.   Skin: Positive for wound. Negative for rash.   Allergic/Immunologic: Negative for immunocompromised state.   Neurological: Negative.  Negative for dizziness, weakness, light-headedness and headaches.   Hematological: Negative for adenopathy.        Family History   Family History   Problem Relation Age of Onset     Diabetes Father      Old age Diabetes     Cerebrovascular Disease Father      Obesity Father      HEART DISEASE Father      Coronary Artery Disease Father      Hypertension Father      Lipids Sister      C.A.D. No family hx of      Cancer No family hx of      Glaucoma No family hx of      Macular Degeneration No family hx of        Social History  Social History     Social History     Marital status:      Spouse name: Kyung     Number of children: 0     Years of education: 14     Occupational  History     industrial electronics  Self     Social History Main Topics     Smoking status: Former Smoker     Packs/day: 0.50     Years: 2.00     Types: Cigarettes     Quit date: 12/12/1996     Smokeless tobacco: Never Used     Alcohol use 5.0 oz/week      Comment: Evening Marie     Drug use: No     Sexual activity: Yes     Partners: Female     Birth control/ protection: Male Surgical      Comment: 2006 vasectomy     Other Topics Concern     Parent/Sibling W/ Cabg, Mi Or Angioplasty Before 65f 55m? No     Social History Narrative        Surgical History:  Past Surgical History:   Procedure Laterality Date     ARTHROSCOPY SHOULDER BICEPS TENODESIS REPAIR  2/27/2014    Procedure: ARTHROSCOPY SHOULDER BICEPS TENODESIS REPAIR;;  Surgeon: Michael Hagen MD;  [facility]     ARTHROSCOPY SHOULDER ROTATOR CUFF REPAIR  2/27/2014    Procedure: ARTHROSCOPY SHOULDER ROTATOR CUFF REPAIR;  Right Shoulder Arthroscopic Rotator Cuff Repair,  Subacromial Decompression, Biceps Tenodesis, Distal Clavicle Excision   ;  Surgeon: Michael Hagen MD;  Location: [facility]     BIOPSY       CARDIAC SURGERY       COLONOSCOPY       EXCHANGE INTRAOCULAR LENS IMPLANT  2005    left eye - first, recentered PCL with iris fixation; then IOLX with ACL     EXTRACAPSULAR CATARACT EXTRATION WITH INTRAOCULAR LENS IMPLANT  2005    both eyes (elsewhere)     TURBINOPLASTY  12/11/2013    Procedure: TURBINOPLASTY;;  Surgeon: Lisset Parsons MD;  Location:  OR     UVULOPALATOPHARYNGOPLASTY  12/11/2013    Procedure: UVULOPALATOPHARYNGOPLASTY;  Uvulopalatopharyngoplasty, [place]e Reduction;  Surgeon: Lisset Parsons MD;  Location:  OR        Problem List:  Patient Active Problem List   Diagnosis     HL (hearing loss)     Erectile dysfunction     Keratoglobus, ou     Pseudophakia, PCL, od; ACL, os     Posterior vitreous detachment, od     Epiretinal membrane, mild, od     Advanced directives, counseling/discussion      HDL deficiency     JOSE JUAN (obstructive sleep apnea)-severe (AHI 32)     BCC (basal cell carcinoma)     Cardiomyopathy (H)     Obstructive sleep apnea     Near syncope     RCT (rotator cuff tear)     Disorder of bursae and tendons in shoulder region     Other postprocedural status(V45.89)     Iritis, os     Cramp of limb     Essential hypertension with goal blood pressure less than 140/90     Hyperlipidemia LDL goal <70     overwieght BMI > 35     Visit for suture removal        Allergies:  Allergies   Allergen Reactions     Lisinopril Cough        Current Meds:    Current Outpatient Prescriptions:      aspirin 81 MG tablet, Take 1 tablet (81 mg) by mouth daily, Disp: 90 tablet, Rfl: 3     atorvastatin (LIPITOR) 10 MG tablet, Take 1 tablet (10 mg) by mouth daily, Disp: 90 tablet, Rfl: 3     Calcium Carbonate Antacid (TUMS CALCIUM FOR LIFE BONE PO), Take  by mouth., Disp: , Rfl:      diphenoxylate-atropine (LOMOTIL) 2.5-0.025 MG per tablet, Take 1 tablet by mouth daily as needed for diarrhea Patient only takes 1 tablet as needed, Disp: 30 tablet, Rfl: 5     HYDROcodone-acetaminophen (NORCO) 5-325 MG per tablet, Take 1 tablet by mouth every 4 hours as needed for pain, Disp: 15 tablet, Rfl: 0     losartan (COZAAR) 50 MG tablet, Take 1 tablet (50 mg) by mouth daily, Disp: 90 tablet, Rfl: 3     metoprolol succinate (TOPROL-XL) 50 MG 24 hr tablet, TAKE 1 TABLET  BY MOUTH DAILY, Disp: 90 tablet, Rfl: 3     Multiple Vitamin (MULTIVITAMINS PO), Take  by mouth daily., Disp: , Rfl:   No current facility-administered medications for this visit.     Facility-Administered Medications Ordered in Other Visits:      sodium chloride (PF) 0.9% PF flush 10 mL, 10 mL, Intracatheter, Once, ZEN Bustillo MD     PHYSICAL EXAM:     Vital signs noted and reviewed by Iftikhar Ch  BP (!) 147/100 (BP Location: Left arm, Patient Position: Chair, Cuff Size: Adult Regular)  Pulse 94  Temp 98.2  F (36.8  C) (Oral)  Resp 16  Wt 234 lb  (106.1 kg)  SpO2 95%  BMI 37.48 kg/m2     PEFR:    Physical Exam   Constitutional: He appears well-developed and well-nourished. No distress.   HENT:   Head: Normocephalic and atraumatic.   Right Ear: Tympanic membrane and external ear normal.   Left Ear: Tympanic membrane and external ear normal.   Mouth/Throat: Oropharynx is clear and moist.   Eyes: EOM are normal. Pupils are equal, round, and reactive to light.   Neck: Normal range of motion. Neck supple.   Cardiovascular: Normal rate, regular rhythm, normal heart sounds and intact distal pulses.  Exam reveals no gallop and no friction rub.    No murmur heard.  Pulmonary/Chest: Effort normal and breath sounds normal. No respiratory distress.   Abdominal: Soft. Bowel sounds are normal. He exhibits no distension. There is no tenderness.   Musculoskeletal:   Thumb healing well.  Full range of motion.  No erythema, swelling, or discharge.     Lymphadenopathy:     He has no cervical adenopathy.   Neurological: He is alert. No cranial nerve deficit.   Skin: Skin is warm and dry. No rash noted. He is not diaphoretic.   Psychiatric: He has a normal mood and affect.   Nursing note and vitals reviewed.       ASSESSMENT:     1. Open wound of left thumb, subsequent encounter    2. Visit for wound check         PLAN:     Ethilon sutures removed today by me without complication.  Start soaks 3 times per day for the next 2 weeks.  Discussed sings and symptoms of infection with instructions to return.  He will follow up here in 2 weeks PRN.  Worrisome symptoms discussed with instructions to go to the ED.  Patient verbalized understanding and agreed with this plan.     Iftikhar Ch  11/13/2018, 12:45 PM

## 2019-01-08 ENCOUNTER — OFFICE VISIT (OUTPATIENT)
Dept: URGENT CARE | Facility: URGENT CARE | Age: 69
End: 2019-01-08
Payer: MEDICARE

## 2019-01-08 VITALS
WEIGHT: 240 LBS | BODY MASS INDEX: 38.45 KG/M2 | SYSTOLIC BLOOD PRESSURE: 155 MMHG | TEMPERATURE: 98.2 F | RESPIRATION RATE: 16 BRPM | OXYGEN SATURATION: 95 % | HEART RATE: 80 BPM | DIASTOLIC BLOOD PRESSURE: 92 MMHG

## 2019-01-08 DIAGNOSIS — J02.0 ACUTE STREPTOCOCCAL PHARYNGITIS: ICD-10-CM

## 2019-01-08 DIAGNOSIS — J02.9 SORE THROAT: Primary | ICD-10-CM

## 2019-01-08 LAB
DEPRECATED S PYO AG THROAT QL EIA: ABNORMAL
SPECIMEN SOURCE: ABNORMAL

## 2019-01-08 PROCEDURE — 87880 STREP A ASSAY W/OPTIC: CPT | Performed by: NURSE PRACTITIONER

## 2019-01-08 PROCEDURE — 99213 OFFICE O/P EST LOW 20 MIN: CPT | Performed by: NURSE PRACTITIONER

## 2019-01-08 RX ORDER — PENICILLIN V POTASSIUM 500 MG/1
500 TABLET, FILM COATED ORAL 2 TIMES DAILY
Qty: 20 TABLET | Refills: 0 | Status: SHIPPED | OUTPATIENT
Start: 2019-01-08 | End: 2019-02-21

## 2019-01-08 ASSESSMENT — ENCOUNTER SYMPTOMS
SORE THROAT: 1
COUGH: 1

## 2019-01-08 ASSESSMENT — PAIN SCALES - GENERAL: PAINLEVEL: EXTREME PAIN (8)

## 2019-01-08 NOTE — PATIENT INSTRUCTIONS
Patient Education     Pharyngitis: Strep (Confirmed)    You have had a positive test for strep throat. Strep throat is a contagious illness. It is spread by coughing, kissing or by touching others after touching your mouth or nose. Symptoms include throat pain that is worse with swallowing, aching all over, headache, and fever. It is treated with antibiotic medicine. This should help you start to feel better in 1 to 2 days.  Home care    Rest at home. Drink plenty of fluids to you won't get dehydrated.    No work or school for the first 2 days of taking the antibiotics. After this time, you will not be contagious. You can then return to school or work if you are feeling better.     Take antibiotic medicine for the full 10 days, even if you feel better. This is very important to ensure the infection is treated. It is also important to prevent medicine-resistant germs from developing. If you were given an antibiotic shot, you don't need any more antibiotics.    You may use acetaminophen or ibuprofen to control pain or fever, unless another medicine was prescribed for this. Talk with your healthcare provider before taking these medicines if you have chronic liver or kidney disease. Also talk with your healthcare provider if you have had a stomach ulcer or GI bleeding.    Throat lozenges or sprays help reduce pain. Gargling with warm saltwater will also reduce throat pain. Dissolve 1/2 teaspoon of salt in 1 glass of warm water. This may be useful just before meals.     Soft foods are OK. Don't eat salty or spicy foods.  Follow-up care  Follow up with your healthcare provider or our staff if you don't get better over the next week.  When to seek medical advice  Call your healthcare provider right away if any of these occur:    Fever of 100.4 F (38 C) or higher, or as directed by your healthcare provider    New or worsening ear pain, sinus pain, or headache    Painful lumps in the back of neck    Stiff neck    Lymph  nodes getting larger or becoming soft in the middle    You can't swallow liquids or you can't open your mouth wide because of throat pain    Signs of dehydration. These include very dark urine or no urine, sunken eyes, and dizziness.    Trouble breathing or noisy breathing    Muffled voice    Rash  Prevention  Here are steps you can take to help prevent an infection:    Keep good hand washing habits.    Don t have close contact with people who have sore throats, colds, or other upper respiratory infections.    Don t smoke, and stay away from secondhand smoke.  Date Last Reviewed: 11/1/2017 2000-2018 [employer]. [address] [place] [address], [place] [address]. All rights reserved. This information is not intended as a substitute for professional medical care. Always follow your healthcare professional's instructions.

## 2019-01-08 NOTE — PROGRESS NOTES
SUBJECTIVE:   Ricky Brown is a 68 year old male presenting with a chief complaint of   Chief Complaint   Patient presents with     URI       He is an established patient of [facility].    URI Adult    Onset of symptoms was 2 day(s) ago.  Course of illness is worsening.    Severity moderate  Current and Associated symptoms: cough - productive and sore throat  Treatment measures tried include Antihistamine.  Predisposing factors include None.        Review of Systems   HENT: Positive for sore throat.    Respiratory: Positive for cough.    All other systems reviewed and are negative.      Past Medical History:   Diagnosis Date     Arthritis      BCC (basal cell carcinoma)     right shoulder      Cardiomyopathy (H)      Colon adenomas 9/20/11     ED (erectile dysfunction)      HTN (hypertension)      Obesity      JOSE JUAN (obstructive sleep apnea)      Family History   Problem Relation Age of Onset     Diabetes Father         Old age Diabetes     Cerebrovascular Disease Father      Obesity Father      Heart Disease Father      Coronary Artery Disease Father      Hypertension Father      Lipids Sister      C.A.D. No family hx of      Cancer No family hx of      Glaucoma No family hx of      Macular Degeneration No family hx of      Current Outpatient Medications   Medication Sig Dispense Refill     aspirin 81 MG tablet Take 1 tablet (81 mg) by mouth daily 90 tablet 3     atorvastatin (LIPITOR) 10 MG tablet Take 1 tablet (10 mg) by mouth daily 90 tablet 3     Calcium Carbonate Antacid (TUMS CALCIUM FOR LIFE BONE PO) Take  by mouth.       diphenoxylate-atropine (LOMOTIL) 2.5-0.025 MG per tablet Take 1 tablet by mouth daily as needed for diarrhea Patient only takes 1 tablet as needed 30 tablet 5     HYDROcodone-acetaminophen (NORCO) 5-325 MG per tablet Take 1 tablet by mouth every 4 hours as needed for pain 15 tablet 0     losartan (COZAAR) 50 MG tablet Take 1 tablet (50 mg) by mouth daily 90 tablet 3     metoprolol  succinate (TOPROL-XL) 50 MG 24 hr tablet TAKE 1 TABLET  BY MOUTH DAILY 90 tablet 3     Multiple Vitamin (MULTIVITAMINS PO) Take  by mouth daily.       penicillin V (VEETID) 500 MG tablet Take 1 tablet (500 mg) by mouth 2 times daily for 10 days 20 tablet 0     Social History     Tobacco Use     Smoking status: Former Smoker     Packs/day: 0.50     Years: 2.00     Pack years: 1.00     Types: Cigarettes     Last attempt to quit: 1996     Years since quittin.0     Smokeless tobacco: Never Used   Substance Use Topics     Alcohol use: Yes     Alcohol/week: 5.0 oz     Comment: Evening Marie       OBJECTIVE  BP (!) 155/92 (BP Location: Left arm, Patient Position: Sitting, Cuff Size: Adult Regular)   Pulse 80   Temp 98.2  F (36.8  C) (Oral)   Resp 16   Wt 108.9 kg (240 lb)   SpO2 95%   BMI 38.45 kg/m      Physical Exam   HENT:   Mouth/Throat: Uvula is midline. Oropharyngeal exudate and posterior oropharyngeal erythema present. Tonsils are 1+ on the right. Tonsils are 1+ on the left. Tonsillar exudate.   Cardiovascular: Normal rate, S1 normal, S2 normal and normal heart sounds.   Pulmonary/Chest: Effort normal and breath sounds normal.   Neurological: He is alert.       Labs:  Results for orders placed or performed in visit on 19 (from the past 24 hour(s))   Strep, Rapid Screen   Result Value Ref Range    Specimen Description Throat     Rapid Strep A Screen (A)      POSITIVE: Group A Streptococcal antigen detected by immunoassay.     ASSESSMENT:      ICD-10-CM    1. Sore throat J02.9 Strep, Rapid Screen   2. Acute streptococcal pharyngitis J02.0 penicillin V (VEETID) 500 MG tablet        PLAN:  I discussed lab results with the patient.  I recommend follow up with PCP in 3 days or sooner if symptoms are getting worse  Side effects of medications discussed  Over the counter medications discussed  All questions are answered and patient is in agreement with treatment plan  Cate Dahl  [facility], [place]  Family Nurse  [facility]ardo

## 2019-02-21 ENCOUNTER — OFFICE VISIT (OUTPATIENT)
Dept: FAMILY MEDICINE | Facility: CLINIC | Age: 69
End: 2019-02-21
Payer: MEDICARE

## 2019-02-21 VITALS
DIASTOLIC BLOOD PRESSURE: 82 MMHG | WEIGHT: 242.8 LBS | HEIGHT: 67 IN | OXYGEN SATURATION: 96 % | SYSTOLIC BLOOD PRESSURE: 138 MMHG | BODY MASS INDEX: 38.11 KG/M2 | HEART RATE: 61 BPM | TEMPERATURE: 98.2 F | RESPIRATION RATE: 15 BRPM

## 2019-02-21 DIAGNOSIS — R00.1 BRADYCARDIA: Primary | ICD-10-CM

## 2019-02-21 DIAGNOSIS — R53.83 FATIGUE, UNSPECIFIED TYPE: ICD-10-CM

## 2019-02-21 DIAGNOSIS — R41.3 MEMORY LOSS: ICD-10-CM

## 2019-02-21 DIAGNOSIS — E66.01 MORBID OBESITY DUE TO EXCESS CALORIES (H): ICD-10-CM

## 2019-02-21 DIAGNOSIS — I42.9 CARDIOMYOPATHY, UNSPECIFIED TYPE (H): ICD-10-CM

## 2019-02-21 LAB
ALBUMIN UR-MCNC: NEGATIVE MG/DL
APPEARANCE UR: CLEAR
BILIRUB UR QL STRIP: NEGATIVE
COLOR UR AUTO: YELLOW
ERYTHROCYTE [DISTWIDTH] IN BLOOD BY AUTOMATED COUNT: 14.4 % (ref 10–15)
GLUCOSE UR STRIP-MCNC: NEGATIVE MG/DL
HCT VFR BLD AUTO: 45.9 % (ref 40–53)
HGB BLD-MCNC: 14.8 G/DL (ref 13.3–17.7)
HGB UR QL STRIP: ABNORMAL
KETONES UR STRIP-MCNC: NEGATIVE MG/DL
LEUKOCYTE ESTERASE UR QL STRIP: NEGATIVE
MCH RBC QN AUTO: 29.1 PG (ref 26.5–33)
MCHC RBC AUTO-ENTMCNC: 32.2 G/DL (ref 31.5–36.5)
MCV RBC AUTO: 90 FL (ref 78–100)
NITRATE UR QL: NEGATIVE
NON-SQ EPI CELLS #/AREA URNS LPF: NORMAL /LPF
PH UR STRIP: 5.5 PH (ref 5–7)
PLATELET # BLD AUTO: 283 10E9/L (ref 150–450)
RBC # BLD AUTO: 5.09 10E12/L (ref 4.4–5.9)
RBC #/AREA URNS AUTO: NORMAL /HPF
SOURCE: ABNORMAL
SP GR UR STRIP: >1.03 (ref 1–1.03)
UROBILINOGEN UR STRIP-ACNC: 0.2 EU/DL (ref 0.2–1)
WBC # BLD AUTO: 7.8 10E9/L (ref 4–11)
WBC #/AREA URNS AUTO: NORMAL /HPF

## 2019-02-21 PROCEDURE — 81001 URINALYSIS AUTO W/SCOPE: CPT | Performed by: FAMILY MEDICINE

## 2019-02-21 PROCEDURE — 80053 COMPREHEN METABOLIC PANEL: CPT | Performed by: FAMILY MEDICINE

## 2019-02-21 PROCEDURE — 99214 OFFICE O/P EST MOD 30 MIN: CPT | Performed by: FAMILY MEDICINE

## 2019-02-21 PROCEDURE — 84443 ASSAY THYROID STIM HORMONE: CPT | Performed by: FAMILY MEDICINE

## 2019-02-21 PROCEDURE — 36415 COLL VENOUS BLD VENIPUNCTURE: CPT | Performed by: FAMILY MEDICINE

## 2019-02-21 PROCEDURE — 93000 ELECTROCARDIOGRAM COMPLETE: CPT | Performed by: FAMILY MEDICINE

## 2019-02-21 PROCEDURE — 85027 COMPLETE CBC AUTOMATED: CPT | Performed by: FAMILY MEDICINE

## 2019-02-21 ASSESSMENT — PAIN SCALES - GENERAL: PAINLEVEL: NO PAIN (0)

## 2019-02-21 ASSESSMENT — MIFFLIN-ST. JEOR: SCORE: 1832.02

## 2019-02-21 NOTE — PROGRESS NOTES
SUBJECTIVE:   Ricky Brown is a 68 year old male who presents to clinic today for the following health issues:    Episodes of Low heart rate,     Will forget things at time, Like focus on one thing at a time.  Family has noted changes also. Noted changes slowly over the past year    Ringing in ears is more noticeable  Turning up the TV      Noting more fatigue. Hx of cardiomyopathy. Is suppose to get ECHO this spring for surveillance    He has a monitor on his watch that checks his pulse. He has noted intermittent pulses to 48 but it is when he is sleeping. Asymptomatic. Usual pulses otherwise are 60-80 and will go above 100 when active. He is on a B blocker and is compliant with his medications.        Problem list and histories reviewed & adjusted, as indicated.  Additional history: as documented    BP Readings from Last 3 Encounters:   02/21/19 138/82   01/08/19 (!) 155/92   11/13/18 (!) 147/100    Wt Readings from Last 3 Encounters:   02/21/19 110.1 kg (242 lb 12.8 oz)   01/08/19 108.9 kg (240 lb)   11/13/18 106.1 kg (234 lb)                    Reviewed and updated as needed this visit by clinical staff       Reviewed and updated as needed this visit by Provider         ROS:  CONSTITUTIONAL:NEGATIVE for fever, chills, change in weight and POSITIVE  for fatigue  ENT/MOUTH: NEGATIVE for ear, mouth and throat problems  RESP:POSITIVE for SOB/dyspnea mild with activity and NEGATIVE for cough-non productive, cough-productive   CV: NEGATIVE for chest pain, palpitations or peripheral edema  GI: NEGATIVE for nausea, abdominal pain, heartburn, or change in bowel habits  MUSCULOSKELETAL: NEGATIVE for significant arthralgias or myalgia  NEURO: NEGATIVE for weakness, dizziness or paresthesias and syncope  ENDOCRINE: NEGATIVE for temperature intolerance, skin/hair changes  PSYCHIATRIC: NEGATIVE for changes in mood or affect and POSITIVE forimpaired memory    OBJECTIVE:     /82 (BP Location: Right arm, Patient  "Position: Chair, Cuff Size: Adult Large)   Pulse 61   Temp 98.2  F (36.8  C) (Oral)   Resp 15   Ht 1.689 m (5' 6.5\")   Wt 110.1 kg (242 lb 12.8 oz)   SpO2 96%   BMI 38.60 kg/m    Body mass index is 38.6 kg/m .   GENERAL: healthy, alert and no distress  NECK: no adenopathy, no asymmetry, masses, or scars and thyroid normal to palpation  RESP: lungs clear to auscultation - no rales, rhonchi or wheezes  CV: regular rate and rhythm, normal S1 S2, no S3 or S4, no murmur, click or rub, no peripheral edema and peripheral pulses strong  ABDOMEN: soft, nontender, no hepatosplenomegaly, no masses and bowel sounds normal  MS: no gross musculoskeletal defects noted, no edema  NEURO: Normal strength and tone, mentation intact and speech normal  PSYCH: mentation appears normal, affect normal/bright  LYMPH: no cervical, supraclavicular, axillary, or inguinal adenopathy    Diagnostic Test Results:    EKG sinus shelley cardia at 55- asymptomatic. No acute changes    Results for orders placed or performed in visit on 02/21/19 (from the past 24 hour(s))   CBC with platelets   Result Value Ref [facility]    WBC 7.8 4.0 - 11.0 10e9/L    RBC Count 5.09 4.4 - 5.9 10e12/L    Hemoglobin 14.8 13.3 - 17.7 g/dL    Hematocrit 45.9 40.0 - 53.0 %    MCV 90 78 - 100 fl    MCH 29.1 26.5 - 33.0 pg    MCHC 32.2 31.5 - 36.5 g/dL    RDW 14.4 10.0 - 15.0 %    Platelet Count 283 150 - 450 10e9/L   *UA reflex to Microscopic and Culture ([place] and [facility] (except [place] and [place])   Result Value Ref [facility]    Color Urine Yellow     Appearance Urine Clear     Glucose Urine Negative NEG^Negative mg/dL    Bilirubin Urine Negative NEG^Negative    Ketones Urine Negative NEG^Negative mg/dL    Specific Gravity Urine >1.030 1.003 - 1.035    Blood Urine Trace (A) NEG^Negative    pH Urine 5.5 5.0 - 7.0 pH    Protein Albumin Urine Negative NEG^Negative mg/dL    Urobilinogen Urine 0.2 0.2 - 1.0 EU/dL    Nitrite Urine Negative NEG^Negative    Leukocyte " Esterase Urine Negative NEG^Negative    Source Midstream Urine    Urine Microscopic   Result Value Ref [facility]    WBC Urine 0 - 5 OTO5^0 - 5 /HPF    RBC Urine O - 2 OTO2^O - 2 /HPF    Squamous Epithelial /LPF Urine Few FEW^Few /LPF       ASSESSMENT:       PLAN:   (R00.1) Bradycardia  (primary encounter diagnosis)  Comment: EKG overall WNL rate at rest was 55 but no acute changes  Plan: EKG 12-lead complete w/read - Clinics, CARDIAC         ECHO ([facility]), CARDIOLOGY EVAL ADULT REFERRAL,         Urine Microscopic       asymptomatic at home. Will get ECHO consider Zio patch but overall normal variations. Consider lowering dose of B Blocker but will review ECHO first    (R53.83) Fatigue, unspecified type  Comment:   Plan: Comprehensive metabolic panel (BMP + Alb, Alk         Phos, ALT, AST, Total. Bili, TP), TSH with free        T4 reflex, *UA reflex to Microscopic and         Culture ([facility] and [facility] (except         [place] and [place]), CBC with platelets,         CANCELED: BASIC METABOLIC PANEL            (R41.3) Memory loss  Comment: noted by family and patient over the past year  Plan: Comprehensive metabolic panel (BMP + Alb, Alk         Phos, ALT, AST, Total. Bili, TP), TSH with free        T4 reflex, NEUROPSYCHOLOGY REFERRAL, CANCELED:         BASIC METABOLIC PANEL            (E66.01) Morbid obesity due to excess calories (H)  Comment: reviewed need to control weight  Plan: he will work on diet    (I42.9) Cardiomyopathy, unspecified type (H)  Comment: history of low EF which improved with improvement of his sleep apnea  Plan: EKG 12-lead complete w/read - Clinics, CARDIAC         ECHO ([facility]), CARDIOLOGY EVAL ADULT REFERRAL        Follow up pending result          Patient Instructions       Follow up 1 month for recheck    Orders Placed This Encounter     CARDIAC ECHO ([facility])     Standing Status:   Future     Standing Expiration Date:   6/1/2019     Comprehensive metabolic panel (BMP + Alb, Alk Phos,  ALT, AST, Total. Bili, TP)     TSH with free T4 reflex     Last Lab Result: TSH (mU/L)       Date                     Value                 08/22/2018               1.29             ----------     *UA reflex to Microscopic and Culture ([place] and [facility] (except [place] and [place])     CBC with platelets     Last Lab Result: Hemoglobin (g/dL)       Date                     Value                 05/09/2018               13.9             ----------     NEUROPSYCHOLOGY REFERRAL     Referral Type:   Mental Health Outpatient     Number of Visits Requested:   1     CARDIOLOGY EVAL ADULT REFERRAL     Referral Type:   CV Cardio consult     Number of Visits Requested:   1     Michelle/Tramaine  imaging and cardiac stress test scheduling  [phone]    [facility]     Discharged by : Salena Raman CMA    If you have any questions regarding your visit please contact your care team:     Team Gold                Clinic Hours Telephone Number     Dr. Armen Perez, [facility]   7am-7pm  Monday - Thursday   7am-5pm  Fridays  [phone]   (Appointment scheduling available 24/7)     RN Line  [phone] option 2     Urgent Care - [place] and [place] [facility] - 11am-9pm Monday-Friday Saturday-Sunday- 9am-5pm     [place] -   5pm-9pm Monday-Friday Saturday-Sunday- 9am-5pm    [phone] - [place]    [phone] - [place]     For a Price Quote for your services, please call our Consumer Price Line at [phone].     What options do I have for visits at the clinic other than the traditional office visit?     To expand how we care for you, many of our providers are utilizing electronic visits (e-visits) and telephone visits, when medically appropriate, for interactions with their patients rather than a visit in the clinic. We also offer nurse visits for many medical concerns. Just like any other service, we will bill your insurance company for  this type of visit based on time spent on the phone with your provider. Not all insurance companies cover these visits. Please check with your medical insurance if this type of visit is covered. You will be responsible for any charges that are not paid by your insurance.     E-visits via Mission Marketshart: generally incur a $45.00 fee.     Telephone visits:  Time spent on the phone: *charged based on time that is spent on the phone in increments of 10 minutes. Estimated cost:   5-10 mins $30.00   11-20 mins. $59.00   21-30 mins. $85.00       Use DebtFolio (secure email communication and access to your chart) to send your primary care provider a message or make an appointment. Ask someone on your Team how to sign up for DebtFolio.     As always, Thank you for trusting us with your health care needs!      [facility]:    Scheduling Appointments  [facility]  Call: [phone]    [facility]  Call: [phone]    [facility]  Call: [phone]    For Gastroenterology referrals   [facility]   Clinics and Surgery Center, 4th Floor   [address] [place] [address]   Appointments: [phone]    WHERE TO GO FOR CARE?    Clinic    Make an appointment if you:       Are sick (cold, cough, flu, sore throat, earache or in pain).       Have a small injury (sprain, small cut, burn or broken bone).       Need a physical exam, Pap smear, vaccine or prescription refill.       Have questions about your health or medicines.    To reach us:      Call [phone] ([phone]). Open 24 hours every day. (For counseling services, call [phone].)    Log into DebtFolio at [email or web address]. (Visit [email or web address] to create an account.) Hospital emergency room    An emergency is a serious or life- threatening problem that must be treated right away.    Call 338 or get to the hospital if you have:      Very bad or  sudden:            - Chest pain or pressure         - Bleeding         - Head or belly pain         - Dizziness or trouble seeing, walking or                          Speaking      Problems breathing      Blood in your vomit or you are coughing up blood      A major injury (knocked out, loss of a finger or limb, rape, broken bone protruding from skin)    A mental health crisis. (Or call the Mental Health Crisis line at 1-139.358.3217 or Suicide Prevention Hotline at 1-284.640.1947.)    Open 24 hours every day. You don't need an appointment.     Urgent care    Visit urgent care for sickness or small injuries when the clinic is closed. You don't need an appointment. To check hours or find an urgent care near you, visit [email or web address]. Online care    Get online care from [facility] for more than 70 common problems, like colds, allergies and infections. Open 24 hours every day at:   [email or web address]   Need help deciding?    For advice about where to be seen, you may call your clinic and ask to speak with a nurse. We're here for you 24 hours every day.         If you are deaf or hard of hearing, please let us know. We provide many free services including sign language interpreters, oral interpreters, TTYs, telephone amplifiers, note takers and written materials.                   Armen Chavez MD, MD  [facility]

## 2019-02-21 NOTE — PATIENT INSTRUCTIONS
Follow up 1 month for recheck    Orders Placed This Encounter     CARDIAC ECHO ([facility])     Standing Status:   Future     Standing Expiration Date:   6/1/2019     Comprehensive metabolic panel (BMP + Alb, Alk Phos, ALT, AST, Total. Bili, TP)     TSH with free T4 reflex     Last Lab Result: TSH (mU/L)       Date                     Value                 08/22/2018               1.29             ----------     *UA reflex to Microscopic and Culture ([place] and [facility] (except [place] and [place])     CBC with platelets     Last Lab Result: Hemoglobin (g/dL)       Date                     Value                 05/09/2018               13.9             ----------     NEUROPSYCHOLOGY REFERRAL     Referral Type:   Mental Health Outpatient     Number of Visits Requested:   1     CARDIOLOGY EVAL ADULT REFERRAL     Referral Type:   CV Cardio consult     Number of Visits Requested:   1     Michelle/Tramaine  imaging and cardiac stress test scheduling  [phone]    [facility]     Discharged by : Salena Raman CMA    If you have any questions regarding your visit please contact your care team:     Team Gold                Clinic Hours Telephone Number     Dr. Armen Perez, CNP   7am-7pm  Monday - Thursday   7am-5pm  Fridays  [phone]   (Appointment scheduling available 24/7)     RN Line  [phone] option 2     Urgent Care - [place] and [place] - 11am-9pm Monday-Friday Saturday-Sunday- 9am-5pm     [place] -   5pm-9pm Monday-Friday Saturday-Sunday- 9am-5pm    [phone] - [place]    [phone] - [place]     For a Price Quote for your services, please call our Consumer Price Line at [phone].     What options do I have for visits at the clinic other than the traditional office visit?     To expand how we care for you, many of our providers are utilizing electronic visits (e-visits) and telephone visits, when  medically appropriate, for interactions with their patients rather than a visit in the clinic. We also offer nurse visits for many medical concerns. Just like any other service, we will bill your insurance company for this type of visit based on time spent on the phone with your provider. Not all insurance companies cover these visits. Please check with your medical insurance if this type of visit is covered. You will be responsible for any charges that are not paid by your insurance.     E-visits via MASS-ACTIVE Techgrouphart: generally incur a $45.00 fee.     Telephone visits:  Time spent on the phone: *charged based on time that is spent on the phone in increments of 10 minutes. Estimated cost:   5-10 mins $30.00   11-20 mins. $59.00   21-30 mins. $85.00       Use Troppin (secure email communication and access to your chart) to send your primary care provider a message or make an appointment. Ask someone on your Team how to sign up for Troppin.     As always, Thank you for trusting us with your health care needs!      [facility]:    Scheduling Appointments  [place] [place]  Call: [phone]    [facility]  Call: [phone]    [facility]  Call: [phone]    For Gastroenterology referrals   [facility] Gastroenterology   Clinics and Surgery Center, 4th Floor   [address] [place] [id number]   Appointments: [phone]    WHERE TO GO FOR CARE?    Clinic    Make an appointment if you:       Are sick (cold, cough, flu, sore throat, earache or in pain).       Have a small injury (sprain, small cut, burn or broken bone).       Need a physical exam, Pap smear, vaccine or prescription refill.       Have questions about your health or medicines.    To reach us:      Call [phone] ([phone]). Open 24 hours every day. (For counseling services, call [phone].)    Log into Troppin at [email or web address]. (Visit  [email or web address] to create an account.) Hospital emergency room    An emergency is a serious or life- threatening problem that must be treated right away.    Call 911 or get to the hospital if you have:      Very bad or sudden:            - Chest pain or pressure         - Bleeding         - Head or belly pain         - Dizziness or trouble seeing, walking or                          Speaking      Problems breathing      Blood in your vomit or you are coughing up blood      A major injury (knocked out, loss of a finger or limb, rape, broken bone protruding from skin)    A mental health crisis. (Or call the Mental Health Crisis line at 1-588.294.5441 or Suicide Prevention Hotline at 1-406.511.7188.)    Open 24 hours every day. You don't need an appointment.     Urgent care    Visit urgent care for sickness or small injuries when the clinic is closed. You don't need an appointment. To check hours or find an urgent care near you, visit [email or web address]. Online care    Get online care from [facility] for more than 70 common problems, like colds, allergies and infections. Open 24 hours every day at:   [email or web address]   Need help deciding?    For advice about where to be seen, you may call your clinic and ask to speak with a nurse. We're here for you 24 hours every day.         If you are deaf or hard of hearing, please let us know. We provide many free services including sign language interpreters, oral interpreters, TTYs, telephone amplifiers, note takers and written materials.

## 2019-02-22 LAB
ALBUMIN SERPL-MCNC: 4.5 G/DL (ref 3.4–5)
ALP SERPL-CCNC: 68 U/L (ref 40–150)
ALT SERPL W P-5'-P-CCNC: 37 U/L (ref 0–70)
ANION GAP SERPL CALCULATED.3IONS-SCNC: 8 MMOL/L (ref 3–14)
AST SERPL W P-5'-P-CCNC: 27 U/L (ref 0–45)
BILIRUB SERPL-MCNC: 0.4 MG/DL (ref 0.2–1.3)
BUN SERPL-MCNC: 20 MG/DL (ref 7–30)
CALCIUM SERPL-MCNC: 9.5 MG/DL (ref 8.5–10.1)
CHLORIDE SERPL-SCNC: 107 MMOL/L (ref 94–109)
CO2 SERPL-SCNC: 24 MMOL/L (ref 20–32)
CREAT SERPL-MCNC: 0.95 MG/DL (ref 0.66–1.25)
GFR SERPL CREATININE-BSD FRML MDRD: 82 ML/MIN/{1.73_M2}
GLUCOSE SERPL-MCNC: 91 MG/DL (ref 70–99)
POTASSIUM SERPL-SCNC: 4.1 MMOL/L (ref 3.4–5.3)
PROT SERPL-MCNC: 8.1 G/DL (ref 6.8–8.8)
SODIUM SERPL-SCNC: 139 MMOL/L (ref 133–144)
TSH SERPL DL<=0.005 MIU/L-ACNC: 1.38 MU/L (ref 0.4–4)

## 2019-02-25 ENCOUNTER — TELEPHONE (OUTPATIENT)
Dept: FAMILY MEDICINE | Facility: CLINIC | Age: 69
End: 2019-02-25

## 2019-02-25 DIAGNOSIS — I42.9 CARDIOMYOPATHY, UNSPECIFIED TYPE (H): Primary | ICD-10-CM

## 2019-02-26 ENCOUNTER — PRE VISIT (OUTPATIENT)
Dept: CARDIOLOGY | Facility: CLINIC | Age: 69
End: 2019-02-26

## 2019-02-26 NOTE — TELEPHONE ENCOUNTER
[employer] message sent to patient with referral order information.    Thank you,  Yudy BARBOSA    NE Team Melissa

## 2019-02-26 NOTE — TELEPHONE ENCOUNTER
Called and left message for patient asking him to call back and complete pre vist phone call.   Also needs to be scheduled for echo per message below from Dr Mansfield.. There is a current order in chart from his PCP.    Skip Mansfield MD [place], Erin ZAPATA RN             Hi Yuni,     Can we please get an echo for AI on this bin with results ready before we see him next week?

## 2019-02-26 NOTE — TELEPHONE ENCOUNTER
Pt called and states he is at work if he can get a call back tomorrow during the day to complete the pre-visit.

## 2019-03-01 NOTE — TELEPHONE ENCOUNTER
Spoke to Kyung, she states every time she tries to schedule ECHO she is being told by scheduling they are not able to see order. I spoke to Imaging, they state the way the order was put it is different from anything they have seen before. And order does not show in registration part. She suggest that if it is the same type of echo that was done in the past to look a the type of order that was placed in the past and order it the same way/type that way apt can be schedule right from the order in registration.   attempted to reach Kyung to let her know, but unable to speak to her.     Routing to Dr. Chavez to review on Monday.    Thank you,  Yudy BARBOSA    NE Team Melissa

## 2019-03-01 NOTE — TELEPHONE ENCOUNTER
Please call the patients wife about the order for the stress test she is not able to look at his my chart. [phone] Kyung

## 2019-03-01 NOTE — TELEPHONE ENCOUNTER
PREVISIT INFORMATION                                                    Ricky Brown scheduled for future visit at [facility] specialty clinics.    Patient is scheduled to see Vern on March 18 th  Reason for visit: bradycardia, cardiomyopathy  Referring provider Dr Chavez  Has patient seen previous specialist? Yes.  Name of provider Dr Amaral, Clinic/Facility [facility].   Medical Records:  Available in chart.  Patient was previously seen at a [place] or [facility] facility.    REVIEW                                                      New patient packet mailed to patient: No  Medication reconciliation complete: Yes      Current Outpatient Medications   Medication Sig Dispense Refill     aspirin 81 MG tablet Take 1 tablet (81 mg) by mouth daily 90 tablet 3     atorvastatin (LIPITOR) 10 MG tablet Take 1 tablet (10 mg) by mouth daily 90 tablet 3     Calcium Carbonate Antacid (TUMS CALCIUM FOR LIFE BONE PO) Take  by mouth.       diphenoxylate-atropine (LOMOTIL) 2.5-0.025 MG per tablet Take 1 tablet by mouth daily as needed for diarrhea Patient only takes 1 tablet as needed 30 tablet 5     HYDROcodone-acetaminophen (NORCO) 5-325 MG per tablet Take 1 tablet by mouth every 4 hours as needed for pain (Patient not taking: Reported on 2/21/2019) 15 tablet 0     losartan (COZAAR) 50 MG tablet Take 1 tablet (50 mg) by mouth daily 90 tablet 3     metoprolol succinate (TOPROL-XL) 50 MG 24 hr tablet TAKE 1 TABLET  BY MOUTH DAILY 90 tablet 3     Multiple Vitamin (MULTIVITAMINS PO) Take  by mouth daily.         Allergies: Lisinopril        PLAN/FOLLOW-UP NEEDED                                                      Previsit review complete.  Patient will see provider at future scheduled appointment.     Patient Reminders Given:  Clinic location reviewed.   Connected with Michelle scheduling to set up echo. Rescheduled appointment with Dr Porras, they have seen him before .     Erin FORBES  Neftali

## 2019-03-04 NOTE — TELEPHONE ENCOUNTER
New order placed however I do not see this under cardiology orders. May need to check with radiology to make sure they can acces it.    Joey Chavez MD

## 2019-03-04 NOTE — TELEPHONE ENCOUNTER
Spoke to Kyung, she has already set up apt for Echo.    Thank you,  Yudy BARBOSA    NE Team Melissa

## 2019-03-11 ENCOUNTER — ANCILLARY PROCEDURE (OUTPATIENT)
Dept: CARDIOLOGY | Facility: CLINIC | Age: 69
End: 2019-03-11
Attending: FAMILY MEDICINE
Payer: MEDICARE

## 2019-03-11 DIAGNOSIS — I42.9 CARDIOMYOPATHY, UNSPECIFIED TYPE (H): ICD-10-CM

## 2019-03-11 PROCEDURE — 93306 TTE W/DOPPLER COMPLETE: CPT | Mod: GC | Performed by: INTERNAL MEDICINE

## 2019-03-11 PROCEDURE — 40000264 ZZHC STATISTIC IV PUSH SINGLE INITIAL SUBSTANCE: Performed by: INTERNAL MEDICINE

## 2019-03-11 RX ADMIN — Medication 7 ML: at 10:15

## 2019-03-13 ENCOUNTER — OFFICE VISIT (OUTPATIENT)
Dept: NEUROPSYCHOLOGY | Facility: CLINIC | Age: 69
End: 2019-03-13
Payer: MEDICARE

## 2019-03-13 DIAGNOSIS — F09 COGNITIVE DISORDER: Primary | ICD-10-CM

## 2019-03-13 DIAGNOSIS — G47.33 OSA (OBSTRUCTIVE SLEEP APNEA): ICD-10-CM

## 2019-03-13 NOTE — PROGRESS NOTES
The patient was seen for neuropsychological evaluation at the request of Armen Chavez for the purposes of diagnostic clarification and treatment planning. 107 minutes of test administration and scoring were provided by this writer, Irina Morris.  Please see Dr. Gema Mercer's report for a full interpretation of the findings.

## 2019-03-17 NOTE — PROGRESS NOTES
Service Date: 03/13/2019      NEUROPSYCHOLOGICAL EVALUATION      RELEVANT HISTORY AND REASON FOR REFERRAL:  Ricky Brown is a 68-year-old  white man having more difficulty with memory, organization and multitasking, prompting his primary care physician, Dr. Armen Chavez, to refer him for this neuropsychological evaluation.      The sixth of seven children and the only boy in the family, he was born in [place], [place] and grew up in that area, reared by his parents.  His father, of [place] ancestry, worked in a factory and was very handy around the house and taught the patient mechanical skills.  His mother was a homemaker of [place] descent.  Mr. Brown finished high school plus a year of trade school with an emphasis on electronics.  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  He and his wife also have a rental property and he handles all of the maintenance there.  He first  at age 40 and  12 years later.  He has been  to his current, second spouse for 11 years and lives with her in a [facility] home.  He has no children.      BEHAVIORAL OBSERVATIONS AND CLINICAL INTERVIEW FINDINGS:  He arrived on time, unaccompanied, presenting as an obese, older gentleman of fair complexion with long, bushy sideburns with a bald cyr.  He wore a tight fitting gray long-sleeved shirt with 20 multicolored pens in his two shirt pocket (the colors signify different electronic wire codes).  He wore blue slacks and eyeglasses as well.  Mood was jovial and he was quite gregarious and talkative, quick to volunteer interesting life stories.      He is frustrated by some cognitive struggles.  He feels less sharp and quick in figuring  "out mechanical problems.  Ordinarily he is very gifted in that department.  \"I'm beginning to think my brain is full and leaking out.\"  He forgot that a water meter at their rental home was changed out and upgraded four months ago.  Ordinarily, he would have been present for this service call, but was not on that occasion.  His wife remembered the service; he did not.  He sometimes forgets to let the dog in from outside.     His wife provided a two-page typed letter, outlining some of the history and family concerns.  She and the family have noticed that he is less able to multitask.  If he is in the middle of the project, he cannot stop to answer a simple yes or no question, which frustrates his wife.  He seems to have difficulty remembering events of the past, such as well-publicized news events.  He struggles to create and remember user names and passwords for his business bank account and several times has had to go to the bank to reset his password.  He also gets frustrated operating his cell phone.  Interpersonally, he has become more easily frustrated and less talkative than usual.  The family suspects a hearing impairment may be partly to blame for that.      Around the house he does most of the cooking and handles the bookkeeping for his business.  His wife does most of the housework and their domestic record keeping and bill paying.  He remains adept at keeping both their home and the rental property in good working order.      Per his reports, there is no history of head traumas with loss of consciousness or neurological/cognitive sequelae, stroke, central nervous system viruses or infections, seizures or any other recognized brain diseases.      General health is mentionable for a \"noisy\" heart valve detected in 2014, hypertension, hypercholesterolemia and sleep apnea.  The latter has been treated with a tonsilectomy/uvulaectomy, and got a CPAP, but doesn't tolerate it well.  He has also had cataracts " removed with multiple lens replacements.      There is no history of depression, anxiety or any other emotional disturbance.      Typically he has 8 cocktails a week.  He had one DWI in  and was court ordered to take alcohol education classes.  He reports that was an anomaly, as he does not ordinarily drink to excess.  He has not been a tobacco or drug use.      His father had cerebrovascular disease and diabetes and  of a heart condition at age 70.  His mother  of a CVA at 85.  There is a paternal cousin with Parkinson's disease.  There is no definite family history of dementia.      During testing, he was friendly and outgoing and had no trouble understanding or following instructions.  Slight word searching was noticeable on one verbal task, and he was a little impulsive on another test, but overall he worked carefully and was sufficiently alert and attentive.  Results are considered technically valid.      NEUROPSYCHOLOGICAL FINDINGS:  Select intellectual abilities were assessed with subtests from the Wechsler Adult Intelligence Scale-IV.  Speeded graphomotor learning (Coding) was below average.  He was accurate but a little slow on this timed transcription task.  Word knowledge and expressive communicability (Vocabulary) and auditory attention span on a digit sequence learning exercise (Digit Span) were average.  He could repeat up to seven digits in the forward direction and four in the reverse order.  Visuospatial processing and constructional abilities (Block Design) were superior.      Immediate memory for two story passages from the Wechsler Memory Scale-Revised was actually superior with 25 of 50 story elements recalled immediately after presentation.  Retention of the stories 30 minutes later was well above average (80% retention).  Immediate memory for material (four designs from the WMS) was above average if not superior, with perfect retention of the material 30 minutes later.  All four  figures were recognized when presented in multiple-choice format.      Executive abilities were grossly intact.  Performance on a simple numerical sequencing exercise (Trail Making Test, Part A), requiring sustained attention, was solidly average with one error.  Performance on a more complex sequencing exercise (Trail Making Test, Part B), requiring divided attention (alternating between number and letter sequences), was also average for speed with  one error.  Verbal associative fluency on the Controlled Oral Word Association Test (generating words beginning with target letters) was low average.  Nonverbal planning and foresight, as demonstrated on a maze solving exercise (Razers Maze Test), were average to high average.  Inductive reasoning on a one-deck version of the [place] Card Sorting Test was borderline impaired with just one category abstracted.  He was a bit impulsive on this task and somewhat slow to abandon defective problem solving strategies.      Finger tapping speeds was average for the left (dominant) hand and high average for the right hand, with comparable speeds for both hands.  Fine motor speed and dexterity (Grooved Pegboard) was below average to mildly slowed for the left hand (he points out he has poor proprioception in the left thumb, as he's missing part of the tip of that thumb from an old injury) and average for the right hand.  A variety of brief exercises requiring sustained attention and cognitive flexibility (Test of Sustained Attention and Tracking) were completed quickly with just two errors.  Semantic fluency (rapid naming of fruits, vegetables and animals), was solidly average and confrontation naming on the [place] Naming Test was intact with 59/60 pictured items quickly, accurately named.      CONCLUSIONS AND RECOMMENDATIONS:  This 68-year-old man has been having mild cognitive struggles.  Memory is a little unreliable, and he is less adept at making mechanical and electronic  repairs, something he has done all his life.  Medical history is significant for obesity and sleep apnea; he does not tolerate the CPAP well.  Otherwise, the psychiatric and neurological histories are unremarkable.      The test findings are largely within normal limits, aside from mild difficulty on one executive task requiring inductive reasoning, which is borderline impaired.  He was a bit hasty and impulsive on this particular test, which may have lowered the score.  This was an isolated finding, as other executive abilities, including divided attention and nonverbal planning, were average.  He was a little slow, in the below average range, on a timed transcription task requiring graphomotor learning.  But short and long-term retention of story passages and figural material was actually above average to superior.  Word knowledge and auditory attention span were average, nonverbal reasoning abilities superior.  He was reasonably fast on timed tasks requiring sustained and divided attention.  Psychomotor speeds were grossly intact except for mild slowing of the left hand on a dexterity task, probably due to peripheral factors (he is missing the tip edge of his left thumb with diminished proprioception).  Expressive and receptive language abilities, including speeded word retrieval and confrontation naming, were within normal limits.  There were no signs of visual misperception or hemispatial visual neglect on any of the visually demanding tasks.      The test findings generally contraindicate acquired brain disease.  The difficulty with inductive reasoning raises the possibility of subtle executive impairment, possibly implying mild frontal lobe dysfunction.  But this is rather speculative as there were no other executive impairments.  More likely Mr. Brown is experiencing subtle cognitive inefficiencies of a benign, functional sort.  This may be related to inattentiveness, missed information due to hearing  impairment and possibly reduced alertness secondary to sleep apnea.  These findings will serve as a baseline for future comparisons should there be indications of further decline, greater than expected with normal ageing.  In the meantime, Mr. Brown appears to be functioning reasonably well and there are no indications of an emerging dementia.      Gema Mercer PsyD   Licensed Psychologist   Board Certified in Clinical Neuropsychology/[facility]        DIAGNOSTIC IMPRESSION:  Cognitive disorder and sleep apnea.  This evaluation required 4 hours of neuropsychologist time spent on the interview, record review, test selection and interpretation, data integration and report preparation (1 hour, CPT code 00991, 1 hour, CPT code 93140 and 2 hours of CPT code 65134) and an additional 2 hours of psychometrist time spent on face-to-face testing and scoring (30 minutes, CPT code 16373, 90 minutes of CPT code 80075).         JOHANN CACERES D             D: 2019   T: 2019   MT: nh      Name:     LOPEZ BROWN   MRN:      [id number]        Account:      [id number]   :      1950           Service Date: 2019      Document: [id number]

## 2019-03-17 NOTE — PROGRESS NOTES
CARDIOLOGY CONSULTATION    Referring Provider:  Armen Chavez  Primary Care Provider:   Armen Chavez  Indication for Consultation:  Nonischemic Cardiomyopathy    HPI: Ricky Brown is a 68 year old male who returns for ongoing for evaluation of nonischemic cardiomyopathy.  I have not seen him since the initial consultation and diagnosis (2013).    The patient's risk factor profile is: (+) HTN, (-) diabetes, (+) hyperlipidemia, (+) remote tobacco use [1 pack per month x 5 yrs, quit 1996], (+) family Hx CAD [paternal, not early onset].     The patient has had a series of cardiovascular studies in the past few years to evaluate cardiomyopathy:  ECHO (5/21/13) for evaluation of the fatigue. It showed mild-moderate LV dilation, LVEF 35-40%, moderate AI, PASP could not be evaluated, no pericardial effusion, and mild aortic valve sclerosis. The LVIDd is 4.8 cm and the LVIDs is 3.5 cm. The stress ECHO component was canceled    CARDIAC MRA (05/28/13):  Normal left-ventricular size and mildly decreased global LV systolic function (LVEF 47%). Mild global hypokinesis, no regional wall- motion abnormalities. Normal right-ventricular size with [normal right-ventricular systolic function (RVEF 54%). Normal resting perfusion study. There was no late gadolinium enhancement to suggest prior infarct, inflammation, or infiltration. Mild aortic regurgitation.    RHC & CORONARY ANGIO (5/28/13): Normal hemodynamic study. Mild CAD (<25%, diffuse).     It has been 5 years since I have seen him.  He has been followed by a cardiologist at [facility].    The patient denies chest pain.  He denies dyspnea at rest.  He notes TOM with walking multiple blocks if he walks at a fast pace.  At a slow pace, he is not limited by TOM.  He notes TOM with walking more than one flight of stairs.  The patient denies PND, orthopnea, pedal edema.  He denies palpitations, lightheadedness, and syncope.      He previously used CPAP but  had difficulty wearing the mask at night and could not use the nasal CPAP alone.  He was swallowing air, developing additional problems.  He had a uvuloplasty with improvement and it temporarily resulted in reduction in snoring.    His BP has been running 140s-150s / 80s (higher than prior report).    He was Lasix but that was stopped.  He is now on ASA, Lipitor, Toprol XL 50 qd, and Losartan 50 mg every day.      PAST MEDICAL HISTORY:  Past Medical History:   Diagnosis Date     Arthritis      BCC (basal cell carcinoma)     right shoulder      Cardiomyopathy (H)      Colon adenomas 9/20/11     ED (erectile dysfunction)      HTN (hypertension)      Obesity      JOSE JUAN (obstructive sleep apnea)        CURRENT MEDICATIONS:  Current Outpatient Medications   Medication Sig Dispense Refill     aspirin 81 MG tablet Take 1 tablet (81 mg) by mouth daily 90 tablet 3     atorvastatin (LIPITOR) 10 MG tablet Take 1 tablet (10 mg) by mouth daily 90 tablet 3     Calcium Carbonate Antacid (TUMS CALCIUM FOR LIFE BONE PO) Take  by mouth.       diphenoxylate-atropine (LOMOTIL) 2.5-0.025 MG per tablet Take 1 tablet by mouth daily as needed for diarrhea Patient only takes 1 tablet as needed 30 tablet 5     losartan (COZAAR) 50 MG tablet Take 1 tablet (50 mg) by mouth daily 90 tablet 3     metoprolol succinate (TOPROL-XL) 50 MG 24 hr tablet TAKE 1 TABLET  BY MOUTH DAILY 90 tablet 3     Multiple Vitamin (MULTIVITAMINS PO) Take  by mouth daily.       HYDROcodone-acetaminophen (NORCO) 5-325 MG per tablet Take 1 tablet by mouth every 4 hours as needed for pain (Patient not taking: Reported on 3/18/2019) 15 tablet 0       PAST SURGICAL HISTORY:  Past Surgical History:   Procedure Laterality Date     ARTHROSCOPY SHOULDER BICEPS TENODESIS REPAIR  2/27/2014    Procedure: ARTHROSCOPY SHOULDER BICEPS TENODESIS REPAIR;;  Surgeon: Michael Hagen MD;  [facility]     ARTHROSCOPY SHOULDER ROTATOR CUFF REPAIR  2/27/2014    Procedure:  ARTHROSCOPY SHOULDER ROTATOR CUFF REPAIR;  Right Shoulder Arthroscopic Rotator Cuff Repair,  Subacromial Decompression, Biceps Tenodesis, Distal Clavicle Excision   ;  Surgeon: Michael Hagen MD;  Location: [facility]     BIOPSY       CARDIAC SURGERY       COLONOSCOPY       EXCHANGE INTRAOCULAR LENS IMPLANT      left eye - first, recentered PCL with iris fixation; then IOLX with ACL     EXTRACAPSULAR CATARACT EXTRATION WITH INTRAOCULAR LENS IMPLANT      both eyes (elsewhere)     TURBINOPLASTY  2013    Procedure: TURBINOPLASTY;;  Surgeon: Lisset Parsons MD;  Location: [facility]     UVULOPALATOPHARYNGOPLASTY  2013    Procedure: UVULOPALATOPHARYNGOPLASTY;  Uvulopalatopharyngoplasty, Turbiante Reduction;  Surgeon: Lisset Parsons MD;  Location: [facility] OR       ALLERGIES  Lisinopril    FAMILY HX:  Family History   Problem Relation Age of Onset     Diabetes Father         Old age Diabetes     Cerebrovascular Disease Father      Obesity Father      Heart Disease Father      Coronary Artery Disease Father      Hypertension Father      Lipids Sister      C.A.D. No family hx of      Cancer No family hx of      Glaucoma No family hx of      Macular Degeneration No family hx of        SOCIAL HX:  Social History     Socioeconomic History     Marital status:      Spouse name: Kyung     Number of children: 0     Years of education: 14     Highest education level: Not on file   Occupational History     Occupation: industrial electronics      Employer: SELF   Social Needs     Financial resource strain: Not on file     Food insecurity:     Worry: Not on file     Inability: Not on file     Transportation needs:     Medical: Not on file     Non-medical: Not on file   Tobacco Use     Smoking status: Former Smoker     Packs/day: 0.50     Years: 2.00     Pack years: 1.00     Types: Cigarettes     Last attempt to quit: 1996     Years since quittin.2     Smokeless tobacco: Never Used    Substance and Sexual Activity     Alcohol use: Yes     Alcohol/week: 5.0 oz     Comment: Evening Marie     Drug use: No     Sexual activity: Yes     Partners: Female     Birth control/protection: Male Surgical     Comment: 2006 vasectomy   Lifestyle     Physical activity:     Days per week: Not on file     Minutes per session: Not on file     Stress: Not on file   Relationships     Social connections:     Talks on phone: Not on file     Gets together: Not on file     Attends Gnosticist service: Not on file     Active member of club or organization: Not on file     Attends meetings of clubs or organizations: Not on file     Relationship status: Not on file     Intimate partner violence:     Fear of current or ex partner: Not on file     Emotionally abused: Not on file     Physically abused: Not on file     Forced sexual activity: Not on file   Other Topics Concern     Parent/sibling w/ CABG, MI or angioplasty before 65F 55M? No   Social History Narrative     Not on file       ROS:  Constitutional: No fever, chills, or sweats. No weight gain/loss.   HEENT: No visual disturbance, ear ache, epistaxis, sore throat.   Allergies/Immunologic: Negative.   Respiratory: No cough, hemoptysis.   Cardiovascular: As per HPI.   GI: No nausea, vomiting, hematemesis, melena, or hematochezia.   : No urinary frequency, dysuria, or hematuria.   Integument: No rash.   Psychiatric: No anxiety / depression.   Neuro: No speech disturbance, focal sensory or motor deficit.   Endocrinology: No polyuria / polyphagia.   Musculoskeletal: No myalgia.    VITAL SIGNS:  /80 (BP Location: Left arm, Patient Position: Chair, Cuff Size: Adult Regular)   Pulse 74   Wt 112.9 kg (248 lb 14.4 oz)   SpO2 96%   BMI 39.57 kg/m    Body mass index is 39.57 kg/m .  Wt Readings from Last 2 Encounters:   02/21/19 110.1 kg (242 lb 12.8 oz)   01/08/19 108.9 kg (240 lb)       PHYSICAL EXAM  Ricky Brown is a 68 year old male.in no acute distress.   HEENT: Eyes Nonicteric.  Neck: JVP normal.  Carotids +3/3 bilaterally without bruits.  Lungs: CTA.  Cor: RRR. Normal S1 and S2.  No murmur, rub, or gallop.  PMI in Lf 5th ICS.  Abd: Soft, nontender, nondistended.  NABS.  No pulsatile mass.  Extremities: No C/C/E.  Pulses +3/3 symmetric in upper and lower extremities.  Neuro: Grossly intact.  Psych: A&O x 3.  Skin: No rash.    LABS  Recent Labs   Lab Test 19  1622 18  1640   WBC 7.8 6.7   HGB 14.8 13.9   HCT 45.9 41.7    260     Recent Labs   Lab Test 19  1622 18  1520    141   POTASSIUM 4.1 4.4   CHLORIDE 107 108   CO2 24 27   GLC 91 105*   BUN 20 19   CR 0.95 0.86   CATHERINE 9.5 8.9     Recent Labs   Lab Test 18  1520 05/10/17  1038  04/14/15  1058 14  0752   CHOL 145 116   < > 105 144   HDL 36* 41   < > 39* 31*   LDL 38 22   < > 18 77   TRIG 353* 267*   < > 241* 180*   CHOLHDLRATIO  --   --   --  2.7 4.6   NHDL 109 75   < >  --   --     < > = values in this interval not displayed.        EK19  SB at 51.  Normal intervals and axes.  Flattened T waves inferiorly.    ECHO: 3/4/19  Mildly (EF 45-50%) reduced left ventricular function is present.   Mild to mderate left ventricular dilation is present.  Right ventricular function, chamber size, wall motion, and thickness are normal.  Mild left atrial enlargement is present.  Mild to moderate aortic insufficiency is present.  The inferior vena cava was normal in size with preserved respiratory variability.  No pericardial effusion is present.  Compared to prior TTE dated 18, there has not been significant change.    STRESS TEST:  None    CARDIAC MRI:  13  I.  Reasons for Referral:  62-year-old male] referred to cardiac MRI to assess for cardiac function and severity of aortic regurgitation.     II. Comparison: None available     III. Impressions:  1.  Normal left-ventricular size (LVEDV 185 ml, LVEDVI 82 ml/m2) and mildly decreased global LV systolic function  (LVEF 47%). Mild global hypokinesis, there were no regional wall- motion abnormalities.  2.  Normal right-ventricular size with [normal right-ventricular systolic function (RVEF 54%).  3. Normal resting perfusion study.  There was no late gadolinium enhancement to suggest prior infarct, inflammation, or infiltration.  4. Mild aortic regurgitation with a regurgitant fraction of 8% by velocity encoded flow quantification method (aortic regurgitant volume is 7 ml per heart beat). AI regurgitant fraction by volumetric analysis is 11% with a regurgitant volume of 10 ml per heartbeat.  Velocity encoded flow quantification of abdominal aorta does not reveal any evidence of total diastolic flow reversal, mild early diastolic flow reversal is noted.     CARDIAC CATH: 5/28/13  Summary of Findings:  Hemodynamic study:  RA 8/8/7  RV 32, EDP 10  PA 30/18/21  PCW 13/10/10  Xavi CO 7.2  Xavi CI 3.4  PA sat 71%  Hgb 15 g/dL    Coronary angiogram:  LM: Angiographically normal.  LAD: type 2 vessel with luminal irregularities. There is one large caliber diagonal branch.   LCX: gives rise to one large OM branch and a moderate caliber PL branch. There are luminal irregularities throughout.   RCA (dominant) gives rise to PL branches and supplies PDA. There are mild irregularities throughout.     Impression:  1. Normal hemodynamic study.  2. No angiographic evidence of obstructive coronary artery disease.    Plan:  1. Continued medical management for cardiovascular risk factor optimization.  2. Consider alternative causes of fatigue (ie obstructive sleep apnea).     NICS:  2/10/14  IMPRESSION: Impression: Unremarkable ultrasonographic evaluation of the bilateral carotid arteries. Unremarkable vertebral arteries with antegrade flow.       ASSESSMENT AND PLAN:   1. Nonischemic cardiomyopathy.  - LVEF 40-45%  - ACC/AHA Stage C.  - NYHA Class II-III.  - History of noncompliance but currently taking his Rx.  - No indication for AICD at this  time.  - No evidence of volume overload, defer on diuretic.  - Continue Losartan 50 every day and Toprol XL 50 mg every day.    2. Aortic insufficiency.  - Mild-moderate  - Repeat ECHO in 1 year.    3. Coronary artery disease.  - Minimal CAD by coronary angiography (2014)  - ASA and statin    4. R/O Vascular Disease  - Age > 65 and prior tobacco use.  - Recommend abdominal US to rule out AAA      FOLLOW UP:  1 year    ECHO:  (3/16/2020)  Mildly reduced left ventricular function with EF 45-50%.  Right ventricular function, chamber size, wall motion, and thickness are normal.  Mild to moderate aortic insufficiency is present.  IVC diameter <2.1 cm collapsing >50% with respiratory variation suggests a normal RA pressure of 3 mmHg.  No pericardial effusion is present.   Compared to prior TTE dated 3/11/19, there has not been significant change.    ADDENDUM (3/17/2020): Repeat ECHO in 2 years.    Jose Porras MD    Divisions of Cardiology  [place], [place]    CC  Patient Care Team:  Deyvi Chavez MD as PCP - General (Family Practice)  Deyvi Chavez MD as Assigned PCP  DEYVI CHAVEZ

## 2019-03-18 ENCOUNTER — OFFICE VISIT (OUTPATIENT)
Dept: CARDIOLOGY | Facility: CLINIC | Age: 69
End: 2019-03-18
Attending: FAMILY MEDICINE
Payer: MEDICARE

## 2019-03-18 VITALS
DIASTOLIC BLOOD PRESSURE: 80 MMHG | OXYGEN SATURATION: 96 % | HEART RATE: 74 BPM | SYSTOLIC BLOOD PRESSURE: 133 MMHG | BODY MASS INDEX: 39.57 KG/M2 | WEIGHT: 248.9 LBS

## 2019-03-18 DIAGNOSIS — I42.9 CARDIOMYOPATHY, UNSPECIFIED TYPE (H): ICD-10-CM

## 2019-03-18 DIAGNOSIS — I42.8 NON-ISCHEMIC CARDIOMYOPATHY (H): ICD-10-CM

## 2019-03-18 DIAGNOSIS — I71.40 ABDOMINAL AORTIC ANEURYSM (AAA) WITHOUT RUPTURE (H): Primary | ICD-10-CM

## 2019-03-18 DIAGNOSIS — I10 ESSENTIAL HYPERTENSION WITH GOAL BLOOD PRESSURE LESS THAN 140/90: ICD-10-CM

## 2019-03-18 DIAGNOSIS — E78.5 HYPERLIPIDEMIA LDL GOAL <70: ICD-10-CM

## 2019-03-18 PROCEDURE — 99204 OFFICE O/P NEW MOD 45 MIN: CPT | Performed by: INTERNAL MEDICINE

## 2019-03-18 RX ORDER — ATORVASTATIN CALCIUM 10 MG/1
10 TABLET, FILM COATED ORAL DAILY
Qty: 90 TABLET | Refills: 3 | Status: SHIPPED | OUTPATIENT
Start: 2019-03-18 | End: 2019-11-21

## 2019-03-18 RX ORDER — LOSARTAN POTASSIUM 50 MG/1
50 TABLET ORAL DAILY
Qty: 90 TABLET | Refills: 3 | Status: SHIPPED | OUTPATIENT
Start: 2019-03-18 | End: 2019-03-26

## 2019-03-18 RX ORDER — METOPROLOL SUCCINATE 50 MG/1
50 TABLET, EXTENDED RELEASE ORAL DAILY
Qty: 90 TABLET | Refills: 3 | Status: SHIPPED | OUTPATIENT
Start: 2019-03-18 | End: 2019-11-21

## 2019-03-18 ASSESSMENT — PAIN SCALES - GENERAL: PAINLEVEL: NO PAIN (0)

## 2019-03-18 NOTE — PATIENT INSTRUCTIONS
It was a pleasure to see you in the cardiology clinic today.    If you have any questions, you can reach my nurse, Erin Lindsay, at [phone].     Note the new medications: None    Stop the following medications: None    The results from today include: None    Tests ordered today: None    Need core clinic within the next few weeks to 1 month.     I would like you to follow up with Dr. Porras in one year.    Sincerely,      Jose Porras MD     [facility]      Patient Education     Living with Cardiomyopathy  Your doctor will outline a treatment plan to help you live better with cardiomyopathy. This will stop your condition from getting worse and possibly causing serious problems for your heart and lungs. Be sure to follow your healthcare provider's instructions. You can also make some lifestyle changes that will help your heart.     Weigh yourself daily and write down your results.   Follow your treatment plan  Be sure to visit your healthcare provider regularly. Mention any problems you are having with your treatment plan. Be honest if you are not doing something your provider has suggested. He or she may be able to make some changes to help your plan work better for you. Not following your provider's advice could result in a serious or life-threatening complication. You would need to stay in the hospital.  Balance activity and rest  Having cardiomyopathy may mean you get tired more quickly because your heart doesn't work as well as it should. But this shouldn t keep you from being active. In fact, being active may help you feel better. Talk with your healthcare provider about how much activity is right for you.  Take steps to help your heart    Stop smoking. Smoking damages your heart muscle and blood vessels. It t also causes changes to your lungs that can make it more difficult to breathe and for your lungs to work. Smoking reduces the oxygen in your blood. Having less  oxygen in your blood will cause your heart to work harder and beat faster. This can cause a heart attack if your heart can't handle this extra work. This kind of heart attack is known as an acute myocardial infarction (AMI).    Lose any excess weight. The more extra pounds you have, the harder your heart has to work to pump blood through your body. Extra weight can also raise your risk for high blood pressure and diabetes. These diseases can further damage your blood vessels and heart.    Don't drink alcohol. Drinking alcohol may make your cardiomyopathy worse. Alcohol breaks down the heart tissue. This affects how well your heart pumps. This can be very serious in people with alcoholism.    Eat less salt. Salt is the main source of sodium in our diet. Too much sodium can make the symptoms of cardiomyopathy worse. Salt causes your body to retain water. This extra fluid makes your heart work harder. Your healthcare provider may tell you to limit how much sodium you have to to less than 1,500 mg a day. That s about half a teaspoon of salt.  Keep track of your weight  Rapid weight gain may mean that you are retaining fluid. This is one of the signs of heart failure. Keeping track of your weight helps you notice this weight gain early and prevent further damage to your heart. To keep track of your weight:    Weigh yourself at the same time each day, after you urinate. Wear the same thing each time. Write down your weight each day.    Don t stop weighing yourself. If you forget one day, weigh again the next morning.    Call your healthcare provider if you gain more than 2 pounds in 1 day, more than 5 pounds in 1 week, or whatever weight gain you were told to report by your provider.  Call your healthcare provider  Contact your healthcare provider if you:     Faint or have dizzy spells    Notice new symptoms from your medicine    Have a new onset of coughing. This is especially true if the sputum is frothy or  foamy.    Have trouble breathing, especially if it occurs while at rest or lying down. Or if you have trouble breathing during exercise or even while walking short distances.    Get tired faster    Begin urinating less often    Find that your feet or ankles swell more than usual. Or if you have swelling in your legs or abdomen, or if the veins in your neck stick out more than usual. You may notice it is harder to get your shoes or pants because of swelling and bloating.    Have tightness or pain in your chest, arm, jaw, or back  Date Last Reviewed: 2/1/2017 2000-2018 [employer]. [address] [place] [address], [place] [address]. All rights reserved. This information is not intended as a substitute for professional medical care. Always follow your healthcare professional's instructions.           Patient Education     Medications for Cardiomyopathy     Use a pillbox to make keeping track of medications easier.     Medicines can help you to both feel better and stay as healthy as you can. Take your medicines exactly as instructed. Never stop taking them or change dosage unless told to by your doctor, even if you feel better. Be honest with your healthcare provider if you are not taking medicines as prescribed. Often, a medicine plan can be tailored to your preferences. If you don't share this information with your doctor, you may end up being admitted to the hospital for a serious condition.  Common medicines  Your healthcare provider may prescribe one or more of the following:    ACE inhibitors help blood flow more easily by relaxing blood vessels and lowering blood pressure. This lets the heart pump more blood without doing more work. This is especially useful if you also have diabetes because it protects the kidneys from further damage.    Anticoagulants help prevent blood clots, which can occur when blood pools in the heart from impaired pumping. You may need routine blood tests while taking these  medicines to see how well they are working, have dose adjustments, and to see if they may be interacting with foods or medicines. Your healthcare team will give you full instructions based on what medicine you are prescribed.    Antiarrhythmics may be used to control a fast or irregular heartbeat.    Beta-blockers slow the heart rate and lower blood pressure, which lessens the work the heart has to do. They may also help keep the heartbeat regular and enhance the pumping function of the heart.    Calcium channel blockers dilate blood vessels, lowering blood pressure and slowing the heart rate. This can also decrease the work the heart has to perform.    Diuretics help rid the body of excess fluid. Having less fluid to pump makes a heart s job easier. Getting rid of extra water can also help reduce swelling, bloating, and shortness of breath. These drugs are among the most important in treatment of heart failure as they help control the balance of fluid with the heart's changing ability to pump. Never skip or miss these medicines unless your doctor says it's OK.    Digitalis and digoxin help the heart pump with more strength. This helps the heart pump more blood with each beat. Digitalis may also keep the heartbeat regular.  Tips for taking your medicines    Make taking your medicines part of your daily routine.    Take your medicine at the same time or times each day. Make it a habit.    Read and follow the directions on the prescription label.    Don t run out of medicine. Order more medicine when you have a 1 to 2 week supply of pills left. Let your healthcare provider know if you are having trouble filling your prescriptions.    Make sure you understand any interaction your medicines may have with foods, supplements, or new prescriptions. Talk with your pharmacist and other providers for information.    Check with your healthcare provider on over-the-counter medicines. Some may have a lot of sodium, which can  cause fluid retention and worsening symptoms. Common cough medicines can increase blood pressure, heart rate, and strain on your heart.  Coping with side effects  Some of the medications you take may cause side effects. Side effects may include nausea, dry cough, dizziness, muscle cramps, or changes in your heartbeat. If you have any of these or other symptoms that bother you after starting a medication, tell your doctor right away. Your doctor may be able to adjust your dosage or give you a different medication. Never stop taking your medication or change your dose on your own.  Date Last Reviewed: 1/1/2017 2000-2018 [employer]. [address] [place] [id number]. All rights reserved. This information is not intended as a substitute for professional medical care. Always follow your healthcare professional's instructions.           Patient Education     Understanding Aortic Valve Regurgitation  Aortic valve regurgitation is when the aortic valve leaks. The aortic valve is one of the heart s 4 valves. It is on the left side of the heart. It sits between the lower chamber (ventricle) and the large blood vessel that sends blood to the body (aorta).  What is the aortic valve?  Valves in the heart help blood flow through the heart in the right direction. Normally the aortic valve keeps blood flowing from the left ventricle to the aorta. When the heart squeezes, the valve is pushed open to let blood flow out of the heart. When the heart relaxes, the valve closes and prevents blood from flowing backwards into the heart. With aortic valve regurgitation, some blood leaks back into the left ventricle. This results in less forward blood flow to the body as well as a volume overload in the heart itself. This can quickly lead to high pressures in the heart and lungs resulting in shortness of breath. Over time, the volume overload makes the heart work harder to pump blood and can result in heart failure.  This may occur with  moderate to severe aortic valve regurgitation.  There are 2 types of aortic valve regurgitation:    Acute aortic valve regurgitation. The valve suddenly becomes leaky. The heart doesn t have time to get used to the leak in the valve. Sudden severe aortic valve regurgitation is a medical emergency. Symptoms often start suddenly and are severe.    Chronic aortic valve regurgitation. The valve becomes leaky over time. The heart has time to get used to the leak therefore symptoms may be more progressive and subtle.  What causes aortic valve regurgitation?  The causes of aortic valve regurgitation can include:    Weakening of the valve from aging    High blood pressure    Valve defects at birth (congenital)    Infections, such as an infection of the heart lining (infective endocarditis)    Valve damage from untreated strep infections such as strep throat (rheumatic heart disease)    Problems with the aorta such as a tear in the vessel wall called dissection    Injuries to the chest  Symptoms of aortic valve regurgitation  You may not have any symptoms if you have mild aortic regurgitation. If your condition is more severe or if it gets worse over time, you may have symptoms such as:    Shortness of breath with exercise or activity, or when lying down    Chest pain    Tiredness (fatigue)    Feeling the heart beat faster or harder (palpitations)    Swelling in your legs, belly (abdomen), and the veins in your neck  Sudden severe aortic valve regurgitation is a medical emergency. Call 911 or get medical help right away. Signs and symptoms may include:    Pale skin, loss of consciousness, fast breathing, and fast heart beat (symptoms of shock)    Severe dizziness    Severe shortness of breath    Chest pain    Abnormal heart rhythm (arrhythmia)  Diagnosing aortic valve regurgitation  Your healthcare provider will ask you about your medical history and symptoms. He or she will examine you. The exam will  include listening to your heart with a stethoscope.  This will let he or she hear if you have an abnormal heart sound (heart murmur). You may also have diagnostic tests such as:    Electrocardiography (ECG). Sticky pads are put on your chest. Wires are attached and connected to a machine. The machine checks your heart rhythm.    Chest X-ray. This test uses a small amount of radiation to make pictures of your heart and lungs. This is done to check for any changes such as an enlarged heart or fluid in the lungs.    Transthoracic echocardiography (TTE).  Sound waves are used to make pictures of your heart. The doctor moves a wand called a transducer across your chest over the heart.    Transesophageal echocardiography (CH[place]RIKA). This is another way of using sound waves to make pictures of your heart. The doctor passes a transducer down your esophagus. This type of test may be used when certain pictures of the heart are needed. For example, it may be used to check for problems with the aorta.    CT scan. This test uses a series of X-rays and a computer to create detailed pictures of your heart and aorta.    Cardiac MRI (CMR). Magnets, radio waves, and a computer are used to make detailed pictures of your heart and aorta.    Cardiac catheterization (heart cath), aortography, or coronary angiography. These invasive tests may be needed if other test results are not clear. They may also be done if surgery is planned.    Exercise stress test. During this test, an ECG is done while you exercise. This checks how your heart reacts to stress. Pictures of the heart may be taken during this test.  Date Last Reviewed: 6/1/2016 2000-2018 [employer]. [address] [place] [address], [place] [address]. All rights reserved. This information is not intended as a substitute for professional medical care. Always follow your healthcare professional's instructions.           Patient Education     Treatment for Aortic Valve  Regurgitation  Aortic valve regurgitation is when the aortic valve leaks. The aortic valve is on the left side of the heart and sits between the left lower chamber (ventricle) and the large blood vessel that sends blood to the body (aorta).  Types of treatment  Treatment depends on the cause of your condition and how severe it is.  Mild to moderate aortic valve regurgitation with no symptoms and normal heart function and size may be treated with:    Regular monitoring. This includes regular checkups and testing.    Risk factor management. Aggressive management of conditions that can cause aortic valve disease, such as high blood pressure, is recommended. Certain blood pressure medicines such as ACE inhibitors or calcium channel blockers may be prescribed.  Severe aortic valve regurgitation especially if symptoms are present, heart function is reduced, or the heart is enlarged is usually treated with surgery.    Surgery. Most often the aortic valve will be replaced with either a mechanical or tissue valve depending on your age and other conditions. In rare cases, the valve may be repaired instead of replaced. Part of the aortic root may also be replaced with a graft if needed.    Medicines. For some people who are not candidates for surgery, medicines such as ACE inhibitors or calcium channel blockers may be used to relieve some of the pressure on the heart. If heart failure is present, medicines such as diuretics are used to prevent fluid retention. For those who get a mechanical aortic valve, a blood thinner called warfarin will be prescribed to prevent the valve from developing blood clots. Those who have any type of valve replacement are advised to take antibiotics before certain procedures to reduce the risk of heart valve infection.  Acute severe aortic valve regurgitation is a medical emergency. Surgery is often done right away.  An infection of the heart valves can cause acute mild valve regurgitation. You  may only need antibiotic medicine for the infection.  Possible complications of aortic valve regurgitation  Possible complications of aortic valve regurgitation can include:    Heart failure    Bulging or weakening of the aorta (aortic aneurysm)    Bacterial infection of the heart valves    Problems from valve replacement surgery    Sudden death  To reduce the risk of complications, you may be given medicines such as:    Blood thinners to prevent blood clots    Antibiotics before some medical and dental procedures to prevent infections    Medicines to help the heart pump  Living with aortic valve regurgitation  See your healthcare provider for regular checkups. Call right away if your symptoms change.  Make sure to:    Watch for symptoms when you exercise. Early symptoms may be noticed during exercise or activity.    Talk with your healthcare provider about exercise and physical activity.    Tell all of your healthcare providers including your dentist about your condition.    Eat a low-salt, heart-healthy diet. This is to lower blood pressure and reduce the stress on your heart.    Avoid caffeine and alcohol to reduce the risk of arrhythmias.    Use a cholesterol-lowering medicine if prescribed.    Stop smoking. Talk with your healthcare provider if you need help to stop.  Preventing aortic valve regurgitation  There are some things that you can do to help prevent aortic valve regurgitation, such as:    Managing high blood pressure with lifestyle and medicine    Having a sore throat checked for strep bacteria    Taking a full course of antibiotic medicine for any strep infection exactly as your healthcare provider tells you to     When to call your healthcare provider  If you notice your symptoms gradually getting worse, call your healthcare provider. He or she may recommend a change in medicines or possibly surgery.  Have someone call 911 right away if you have:    Paler than normal skin    Sudden shortness of  breath    A fast or abnormal heartbeat    Fast breathing    Severe shortness of breath    Chest pain    Severe dizziness    Loss of consciousness   Date Last Reviewed: 6/1/2016 2000-2018 [employer]. [address] [place] [address], [place] [address]. All rights reserved. This information is not intended as a substitute for professional medical care. Always follow your healthcare professional's instructions.

## 2019-03-18 NOTE — NURSING NOTE
Ricky Brown's goals for this visit include: New  He requests these members of his care team be copied on today's visit information: Yes    PCP: Armen Chavez    Referring Provider:  Armen Chavez MD  [address] [place] [address]    /80 (BP Location: Left arm, Patient Position: Chair, Cuff Size: Adult Regular)   Pulse 74   Wt 112.9 kg (248 lb 14.4 oz)   SpO2 96%   BMI 39.57 kg/m      Do you need any medication refills at today's visit?Yes, Atorvastatin    Corinne Castro CMA on 3/18/2019 at 2:35 PM

## 2019-03-19 ENCOUNTER — ANCILLARY PROCEDURE (OUTPATIENT)
Dept: ULTRASOUND IMAGING | Facility: CLINIC | Age: 69
End: 2019-03-19
Attending: INTERNAL MEDICINE
Payer: MEDICARE

## 2019-03-19 DIAGNOSIS — I42.9 CARDIOMYOPATHY (H): Primary | ICD-10-CM

## 2019-03-19 DIAGNOSIS — I71.40 ABDOMINAL AORTIC ANEURYSM (AAA) WITHOUT RUPTURE (H): ICD-10-CM

## 2019-03-19 PROCEDURE — 76775 US EXAM ABDO BACK WALL LIM: CPT | Performed by: STUDENT IN AN ORGANIZED HEALTH CARE EDUCATION/TRAINING PROGRAM

## 2019-03-19 NOTE — PROGRESS NOTES
[employer] ADULT INTELLIGENCE SCALE-IV CONTROLLED WORD ASSOCIATION TEST                           Standard Score     Vocabulary        11      Score  34    Digit Span        9        Block Design      15     PORTEUS MAZE TEST   Coding       7          Test Age    15.5    [facility] MEMORY SCALES  Test Quotient        111        Logical Mem Immediate  12/13  TRAIL MAKING TEST   Logical Mem 30 Minute  10/10     Visual Repr Immediate    13    Time Errors   Visual Repr 30     13   A    36    1    30 Minute Recognition      4   B  105    1        TEST OF SUSTAINED ATTENTION & TRACKING PSYCHOMOTOR TESTS        Total Time     63    Right Left    Total Errors    2   Finger Tapping  47   46     Grooved Pegboard  80    99     SEMANTIC FLUENCY TEST                                        Drops: 0                Drops: 0       Score  43  BOSTON NAMING TEST       [employer] CARD SORTING TEST - 1 deck Score  59            # of categories   1

## 2019-03-25 ENCOUNTER — OFFICE VISIT (OUTPATIENT)
Dept: CARDIOLOGY | Facility: CLINIC | Age: 69
End: 2019-03-25
Payer: MEDICARE

## 2019-03-25 VITALS
SYSTOLIC BLOOD PRESSURE: 138 MMHG | HEART RATE: 99 BPM | OXYGEN SATURATION: 96 % | DIASTOLIC BLOOD PRESSURE: 92 MMHG | WEIGHT: 238 LBS | BODY MASS INDEX: 37.84 KG/M2

## 2019-03-25 DIAGNOSIS — I42.9 CARDIOMYOPATHY (H): ICD-10-CM

## 2019-03-25 DIAGNOSIS — I42.8 NON-ISCHEMIC CARDIOMYOPATHY (H): ICD-10-CM

## 2019-03-25 DIAGNOSIS — E66.01 CLASS 2 SEVERE OBESITY DUE TO EXCESS CALORIES WITH SERIOUS COMORBIDITY IN ADULT, UNSPECIFIED BMI (H): ICD-10-CM

## 2019-03-25 DIAGNOSIS — E66.812 CLASS 2 SEVERE OBESITY DUE TO EXCESS CALORIES WITH SERIOUS COMORBIDITY IN ADULT, UNSPECIFIED BMI (H): ICD-10-CM

## 2019-03-25 DIAGNOSIS — I10 ESSENTIAL HYPERTENSION WITH GOAL BLOOD PRESSURE LESS THAN 140/90: ICD-10-CM

## 2019-03-25 DIAGNOSIS — E78.5 HYPERLIPIDEMIA LDL GOAL <70: ICD-10-CM

## 2019-03-25 DIAGNOSIS — I50.33 ACUTE ON CHRONIC DIASTOLIC HEART FAILURE (H): Primary | ICD-10-CM

## 2019-03-25 LAB
ANION GAP SERPL CALCULATED.3IONS-SCNC: 9 MMOL/L (ref 3–14)
BUN SERPL-MCNC: 18 MG/DL (ref 7–30)
CALCIUM SERPL-MCNC: 8.8 MG/DL (ref 8.5–10.1)
CHLORIDE SERPL-SCNC: 109 MMOL/L (ref 94–109)
CO2 SERPL-SCNC: 22 MMOL/L (ref 20–32)
CREAT SERPL-MCNC: 0.79 MG/DL (ref 0.66–1.25)
GFR SERPL CREATININE-BSD FRML MDRD: >90 ML/MIN/{1.73_M2}
GLUCOSE SERPL-MCNC: 115 MG/DL (ref 70–99)
POTASSIUM SERPL-SCNC: 3.9 MMOL/L (ref 3.4–5.3)
SODIUM SERPL-SCNC: 140 MMOL/L (ref 133–144)

## 2019-03-25 PROCEDURE — 80048 BASIC METABOLIC PNL TOTAL CA: CPT | Performed by: NURSE PRACTITIONER

## 2019-03-25 PROCEDURE — 99215 OFFICE O/P EST HI 40 MIN: CPT | Performed by: NURSE PRACTITIONER

## 2019-03-25 PROCEDURE — 36415 COLL VENOUS BLD VENIPUNCTURE: CPT | Performed by: NURSE PRACTITIONER

## 2019-03-25 NOTE — PROGRESS NOTES
HPI: Ricky is a 68 year old gentleman with a past medical history of ICM with an EF of 35-40%, now 45-50%, noncompliance, CAD, HTN, and hyperlipidemia who was referred by Dr. Porras for HF management.     Ricky is able to complete his activities of daily living at his own pace without difficulties.  He notices that if he is out walking with his wife and the dog at a quicker pace, he will get short of breath and fatigue easily.  He denies any shortness of breath at rest.  He is able to lie flat in bed.  He denies any PND, orthopnea, chest pain, or palpitations.  His weight has been ranging from 233.9 pounds up to 249.9 pounds.  He is not following his sodium or fluid restrictions.  He does have documented sleep apnea but is not able to wear CPAP per his report.    His blood pressure has been more elevated.  He has not been taking his blood pressure at home.  He does periodically take his blood pressure at [employer].  His last blood pressure was 188/108.  He states that when he does take his home blood pressure readings, his blood pressure is typically 10 points higher than what it registers in the clinic.     PAST MEDICAL HISTORY:  Past Medical History:   Diagnosis Date     Arthritis      BCC (basal cell carcinoma)     right shoulder      Cardiomyopathy (H)      Colon adenomas 9/20/11     Coronary artery disease      ED (erectile dysfunction)      HTN (hypertension)      Obesity      JOSE JUAN (obstructive sleep apnea)        FAMILY HISTORY:  Family History   Problem Relation Age of Onset     Diabetes Father         Old age Diabetes     Cerebrovascular Disease Father      Obesity Father      Heart Disease Father      Coronary Artery Disease Father      Hypertension Father      Lipids Sister      C.A.D. No family hx of      Cancer No family hx of      Glaucoma No family hx of      Macular Degeneration No family hx of        SOCIAL HISTORY:  Social History     Socioeconomic History     Marital status:       Spouse name: Kyung     Number of children: 0     Years of education: 14     Highest education level: Not on file   Occupational History     Occupation: industrial electronics      Employer: SELF   Social Needs     Financial resource strain: Not on file     Food insecurity:     Worry: Not on file     Inability: Not on file     Transportation needs:     Medical: Not on file     Non-medical: Not on file   Tobacco Use     Smoking status: Former Smoker     Packs/day: 0.50     Years: 2.00     Pack years: 1.00     Types: Cigarettes     Last attempt to quit: 1996     Years since quittin.2     Smokeless tobacco: Never Used   Substance and Sexual Activity     Alcohol use: Yes     Alcohol/week: 5.0 oz     Comment: Evening Marie     Drug use: No     Sexual activity: Yes     Partners: Female     Birth control/protection: Male Surgical     Comment:  vasectomy   Lifestyle     Physical activity:     Days per week: Not on file     Minutes per session: Not on file     Stress: Not on file   Relationships     Social connections:     Talks on phone: Not on file     Gets together: Not on file     Attends Jainism service: Not on file     Active member of club or organization: Not on file     Attends meetings of clubs or organizations: Not on file     Relationship status: Not on file     Intimate partner violence:     Fear of current or ex partner: Not on file     Emotionally abused: Not on file     Physically abused: Not on file     Forced sexual activity: Not on file   Other Topics Concern     Parent/sibling w/ CABG, MI or angioplasty before 65F 55M? No   Social History Narrative     Not on file       CURRENT MEDICATIONS:  Current Outpatient Medications   Medication Sig Dispense Refill     aspirin 81 MG tablet Take 1 tablet (81 mg) by mouth daily 90 tablet 3     atorvastatin (LIPITOR) 10 MG tablet Take 1 tablet (10 mg) by mouth daily 90 tablet 3     Calcium Carbonate Antacid (TUMS CALCIUM FOR LIFE BONE PO) Take  by  mouth.       diphenoxylate-atropine (LOMOTIL) 2.5-0.025 MG per tablet Take 1 tablet by mouth daily as needed for diarrhea Patient only takes 1 tablet as needed 30 tablet 5     losartan (COZAAR) 50 MG tablet Take 1 tablet (50 mg) by mouth daily 90 tablet 3     metoprolol succinate ER (TOPROL-XL) 50 MG 24 hr tablet Take 1 tablet (50 mg) by mouth daily 90 tablet 3     Multiple Vitamin (MULTIVITAMINS PO) Take  by mouth daily.       HYDROcodone-acetaminophen (NORCO) 5-325 MG per tablet Take 1 tablet by mouth every 4 hours as needed for pain (Patient not taking: Reported on 3/18/2019) 15 tablet 0       ROS:  Constitutional: Denies fever, chills, or diaphoresis.  +weight loss.   ENT: +cough. Denies visual disturbance, hearing loss, epistaxis, vertigo.    Respiratory:  See HPI.     Cardiovascular: As per HPI.   GI: Denies nausea, vomiting, diarrhea, hematemesis, melena, or hematochezia.   : Denies urinary frequency, dysuria, or hematuria.   Integument: Negative for bruising, rash, or pruritis.  Psychiatric: Negative for anxiety, depression, sleep disturbance, or mood changes.   Neuro: Negative for headaches, syncope, numbness or tingling.   Endocrinology:  +night sweats,     Musculoskeletal: +joint pain.       EXAM:  BP (!) 138/92   Pulse 99   Wt 108 kg (238 lb)   SpO2 96%   BMI 37.84 kg/m    General: alert, articulate, and in no acute distress.  HEENT: normocephalic, atraumatic, anicteric sclera, EOMI, mucosa moist, no cyanosis.   Neck: neck supple.  No adenopathy, masses, or carotid bruits.  JVP 11 cm with +HJR  Heart: regular rhythm, normal S1/S2, no murmur, gallop, rub.  Precordium quiet with normal PMI.     Lungs: clear, no rales, ronchi, or wheezing.  No accessory muscle use, respirations unlabored.   Abdomen: soft, non-tender, bowel sounds present, no hepatomegaly  Extremities: 1+ pitting edema.   L>R.  No cyanosis.  Extremities warm.  2+ pedal pulses.   Neurological: alert and oriented x 3.  normal speech and  affect, no gross motor deficits  Skin:  No ecchymoses, rashes, or clubbing.    Labs:  CBC RESULTS:  Lab Results   Component Value Date    WBC 7.8 2019    RBC 5.09 2019    HGB 14.8 2019    HCT 45.9 2019    MCV 90 2019    MCH 29.1 2019    MCHC 32.2 2019    RDW 14.4 2019     2019       CMP RESULTS:  Lab Results   Component Value Date     2019    POTASSIUM 4.1 2019    CHLORIDE 107 2019    CO2 24 2019    ANIONGAP 8 2019    GLC 91 2019    BUN 20 2019    CR 0.95 2019    GFRESTIMATED 82 2019    GFRESTBLACK >90 2019    CATHERINE 9.5 2019    BILITOTAL 0.4 2019    ALBUMIN 4.5 2019    ALKPHOS 68 2019    ALT 37 2019    AST 27 2019        INR RESULTS:  No results found for: INR    No components found for: CK  Lab Results   Component Value Date    MAG 2.2 2014     Lab Results   Component Value Date    NTBNP 228 (H) 2013     @BRIEFLABR (dig)@    Most recent echocardiogram:  Recent Results (from the past 8760 hour(s))   Echo complete with definity    Narrative    [id number]  [id number]  [id number]  [id number]^ERICA^K^P           [facility], Echocardiography Laboratory  [address] [place] [address]        Name: LOPEZ EMRIDA  MRN: [id number]  : 1950  Study Date: 2018 01:11 PM  Age: 67 yrs  Gender: Male  Patient Location: [facility]  Reason For Study: , Cardiomyopathy (H)  Ordering Physician: ZEN MALDONADO  Referring Physician: ZEN MALDONADO  Performed By: Lali Todd RDCS     BSA: 2.2 m2  Height: 66 in  Weight: 240 lb  HR: 91  BP: 136/80 mmHg  _____________________________________________________________________________  __        Procedure  Echocardiogram with two-dimensional, color and spectral Doppler performed.  Contrast Definity. Definity (NDC #33412-380-95) given intravenously. Patient  was given 3ml  mixture of 1.5ml Definity and 8.5ml saline. 7 ml wasted. IV  start location R Hand .  _____________________________________________________________________________  __        Interpretation Summary     Mildly (EF 45-50%) reduced left ventricular function is present. Biplane  traced at 45 %. Mild left ventricular dilation is present. Mild diffuse  hypokinesis is present.  The right ventricle is normal size.  Global right ventricular function is normal.  Moderate aortic insufficiency is present. Two seperate jets. Difficult to  quantify.  The inferior vena cava was normal in size with preserved respiratory  variability.  This study was compared with the study from 4/24/2017  Aortic insufficiency appear to have mildly worsened. CHANDRIKA is recommended to  better evaluate AI and aortic valve.  _____________________________________________________________________________  __        Left Ventricle  Mild left ventricular dilation is present. Mild concentric wall thickening  consistent with left ventricular hypertrophy is present. Mildly (EF 45-50%)  reduced left ventricular function is present. Grade I or early diastolic  dysfunction. Mild diffuse hypokinesis is present.     Right Ventricle  The right ventricle is normal size. Global right ventricular function is  normal.     Atria  Both atria appear normal.     Mitral Valve  The mitral valve is normal. Trace mitral insufficiency is present.        Aortic Valve  Mild aortic valve sclerosis is present. The aortic valve is tricuspid. PHT is  534 msec. Mild to moderate aortic insufficiency is present.     Tricuspid Valve  Trace tricuspid insufficiency is present. The peak velocity of the tricuspid  regurgitant jet is not obtainable. Pulmonary artery systolic pressure cannot  be assessed.     Pulmonic Valve  The pulmonic valve is normal. Trace to mild pulmonic insufficiency is present.  PAT is 70 msec.     Vessels  The aorta root is normal. The inferior vena cava was normal in size  with  preserved respiratory variability. Estimated mean right atrial pressure is 3  mmHg.     Pericardium  No pericardial effusion is present.        Compared to Previous Study  This study was compared with the study from 2017 . Aortic insufficiency  has worsened.  _____________________________________________________________________________  __  MMode/2D Measurements & Calculations  IVSd: 1.4 cm     LVIDd: 5.3 cm  LVIDs: 3.6 cm  LVPWd: 1.2 cm  FS: 31.3 %  LV mass(C)d: 277.6 grams  LV mass(C)dI: 128.4 grams/m2  Ao root diam: 3.5 cm  LA dimension: 4.3 cm  asc Aorta Diam: 3.2 cm  LA/Ao: 1.2  LVOT diam: 2.8 cm  LVOT area: 6.2 cm2  LA Volume (BP): 44.0 ml  LA Volume Index (BP): 20.4 ml/m2  RWT: 0.46           Doppler Measurements & Calculations  MV E max lorenzo: 56.3 cm/sec  MV A max lorenzo: 79.7 cm/sec  MV E/A: 0.71  MV dec time: 0.17 sec  Ao V2 max: 168.5 cm/sec  Ao max P.0 mmHg  BROOKE(V,D): 3.9 cm2  AI P1/2t: 434.0 msec  LV V1 max P.5 mmHg  LV V1 max: 105.7 cm/sec  LV V1 VTI: 21.1 cm  CO(LVOT): 8.6 l/min  CI(LVOT): 4.0 l/min/m2  SV(LVOT): 131.7 ml  SI(LVOT): 61.0 ml/m2  PA V2 max: 156.4 cm/sec  PA max P.8 mmHg  PA acc time: 0.06 sec  PI end-d lorenzo: 126.5 cm/sec  PI max lorenzo: 257.4 cm/sec  PI max P.5 mmHg  Pulm Sys Lorenzo: 56.6 cm/sec  Pulm [place] Lorenzo: 32.9 cm/sec  Pulm S/D: 1.7  AV Lorenzo Ratio (DI): 0.63  E/E' av.6  Lateral E/e': 7.9  Medial E/e': 11.3              _____________________________________________________________________________  __        Report approved by: Kathy JACOME 2018 05:22 PM            Assessment and Plan:    In summary, Ricky is a 68-year-old gentleman with ischemic cardiomyopathy with reduced ejection fraction, now improved, who appears hypervolemic today and hypertensive today with a blood pressure of 138/92.  Unfortunately he did not get his labs drawn until this morning.  His lab results are pending.  If his lab results look okay, I will plan on increasing his  losartan and adding a diuretic as well.  He will return to the [facility] clinic in 1 month.    1.  Chronic systolic heart failure secondary to ischemic cardiomyopathy.  Stage C  NYHA Class IIIA  ACEi/ARB: yes, losartan 50 mg daily.  BB: yes, Toprol-XL 50 mg daily.  Aldosterone antagonist: no.  Discuss initiating at next visit.  SCD prophylaxis: does not meet criteria for implant  Fluid status: hypervolemic  Anticoagulation: None.  Antiplatelet:  ASA dose   Sleep apnea: Documented sleep apnea.  Is not able to tolerate CPAP per his report.  NSAID use:  Contraindicated.  Avoid use.  Remote Monitoring: None.    2.  CAD: Nonobstructive coronary artery disease.  Continue losartan, metoprolol, aspirin, and atorvastatin.    3.  Hypertension: Suboptimal control at 138/92.  Awaiting lab results.  If his kidney function and potassium are normal, will increase his losartan to 75 mg daily.      5.  Hyperlipidemia: Last lipid panel 4/11/18: Total cholesterol 145, triglycerides 353, HDL 36, LDL 38.  Continue atorvastatin 10 mg daily.  Recommend repeating lipid panel at follow-up visit with Dr. Porras.    6.  Obesity:  BMI 37.84.  Nutrition referral as outlined above.      7.  Follow-up: [facility] clinic in 1 month.      40 minutes spent face to face with patient with >50% in counseling, education, and coordination of care      Teresa GILMORE, MANDI, NOEL [facility] clinic

## 2019-03-25 NOTE — NURSING NOTE
Diet: Patient instructed regarding a heart healthy diet, including discussion of reduced fat and sodium intake. Patient demonstrated understanding of this information and agreed to call with further questions or concerns.  Labs: Patient was given results of the laboratory testing obtained today. Patient demonstrated understanding of this information and agreed to call with further questions or concerns.   Return Appointment: Patient given instructions regarding scheduling next clinic visit. Patient demonstrated understanding of this information and agreed to call with further questions or concerns.  Patient stated he understood all health information given and agreed to call with further questions or concerns.   Will await lab results and call patient with further recommendations.     Lisset Cedillo RN

## 2019-03-25 NOTE — NURSING NOTE
"Chief Complaint   Patient presents with     Heart Failure     new CORE; 68 year old male with NICM presents for initial visit. Pt reports feeling tired alot.       Initial BP (!) 164/108 (BP Location: Right arm, Patient Position: Chair, Cuff Size: Adult Regular)   Pulse 99   Wt 108 kg (238 lb)   SpO2 96%   BMI 37.84 kg/m   Estimated body mass index is 37.84 kg/m  as calculated from the following:    Height as of 2/21/19: 1.689 m (5' 6.5\").    Weight as of this encounter: 108 kg (238 lb)..  BP completed using cuff size: regular    Teresa Sandy MA    "

## 2019-03-25 NOTE — PATIENT INSTRUCTIONS
"You were seen today in the Cardiovascular Clinic with the [facility].     Cardiology Providers you saw during your visit: Teresa GILMORE CNP      Follow up and medication changes:  1.  We will call you with your labs results and adjust medications if possible  2. Nutrition referral placed.   3. Follow up in [facility] in one month      Please limit your fluid intake to 2 L (68 ounces) daily.  2 Liters a day = 8.5 cups, or 68 ounces.  Please limit your salt intake to 2 grams a day or less.     If you gain 2# in 24 hours or 5# in one week call Lisset Cedillo RN so we can adjust your medications as needed over the phone.     Please feel free to call me with any questions or concerns.       Lisset Cedillo RN CHFN  [facility]  Cardiology Care Coordinator-Heart Failure Clinic     Questions and [phone].   First press #1 for the [place] and then press #3 for \"Medical Questions\" to reach us Cardiology Nurses.      On Call Cardiologist for after hours or on weekends: [phone]   option #4 and ask to speak to the on-call Cardiologist. Inform them you are a [facility]/heart failure patient at the [facility].           If you need a medication refill please contact your pharmacy.  Please allow 3 business days for your refill to be completed.  _______________________________________________________  [facility]IN[facility] Cardiomyopathy, Optimization, Rehabilitation, Education   The [facility] CLINIC is a heart failure specialty clinic within the [facility] where you will work with specialized nurse practitioners dedicated to helping patients with heart failure carefully adjust medications, receive education, and learn who and when to call if symptoms develop. They specialize in helping you better understand your condition, slow the progression of your disease, improve the length and quality of your life, help you detect " future heart problems before they become life threatening, and avoid hospitalizations.  As always, thank you for trusting us with your health care needs!

## 2019-03-25 NOTE — LETTER
3/25/2019      RE: Ricky Brown  [address]  [facility] [address]       Dear Colleague,    Thank you for the opportunity to participate in the care of your patient, Ricky Brown, at the [facility] HEART AT [facility] at [facility]. Please see a copy of my visit note below.      HPI: Ricky is a 68 year old gentleman with a past medical history of ICM with an EF of 35-40%, now 45-50%, noncompliance, CAD, HTN, and hyperlipidemia who was referred by Dr. Porras for HF management.     Ricky is able to complete his activities of daily living at his own pace without difficulties.  He notices that if he is out walking with his wife and the dog at a quicker pace, he will get short of breath and fatigue easily.  He denies any shortness of breath at rest.  He is able to lie flat in bed.  He denies any PND, orthopnea, chest pain, or palpitations.  His weight has been ranging from 233.9 pounds up to 249.9 pounds.  He is not following his sodium or fluid restrictions.  He does have documented sleep apnea but is not able to wear CPAP per his report.    His blood pressure has been more elevated.  He has not been taking his blood pressure at home.  He does periodically take his blood pressure at [employer].  His last blood pressure was 188/108.  He states that when he does take his home blood pressure readings, his blood pressure is typically 10 points higher than what it registers in the clinic.     PAST MEDICAL HISTORY:  Past Medical History:   Diagnosis Date     Arthritis      BCC (basal cell carcinoma)     right shoulder      Cardiomyopathy (H)      Colon adenomas 9/20/11     Coronary artery disease      ED (erectile dysfunction)      HTN (hypertension)      Obesity      JOSE JUAN (obstructive sleep apnea)        FAMILY HISTORY:  Family History   Problem Relation Age of Onset     Diabetes Father         Old age Diabetes     Cerebrovascular  Disease Father      Obesity Father      Heart Disease Father      Coronary Artery Disease Father      Hypertension Father      Lipids Sister      C.A.D. No family hx of      Cancer No family hx of      Glaucoma No family hx of      Macular Degeneration No family hx of        SOCIAL HISTORY:  Social History     Socioeconomic History     Marital status:      Spouse name: Kyung     Number of children: 0     Years of education: 14     Highest education level: Not on file   Occupational History     Occupation: industrial electronics      Employer: SELF   Social Needs     Financial resource strain: Not on file     Food insecurity:     Worry: Not on file     Inability: Not on file     Transportation needs:     Medical: Not on file     Non-medical: Not on file   Tobacco Use     Smoking status: Former Smoker     Packs/day: 0.50     Years: 2.00     Pack years: 1.00     Types: Cigarettes     Last attempt to quit: 1996     Years since quittin.2     Smokeless tobacco: Never Used   Substance and Sexual Activity     Alcohol use: Yes     Alcohol/week: 5.0 oz     Comment: Evening Marie     Drug use: No     Sexual activity: Yes     Partners: Female     Birth control/protection: Male Surgical     Comment:  vasectomy   Lifestyle     Physical activity:     Days per week: Not on file     Minutes per session: Not on file     Stress: Not on file   Relationships     Social connections:     Talks on phone: Not on file     Gets together: Not on file     Attends Quaker service: Not on file     Active member of club or organization: Not on file     Attends meetings of clubs or organizations: Not on file     Relationship status: Not on file     Intimate partner violence:     Fear of current or ex partner: Not on file     Emotionally abused: Not on file     Physically abused: Not on file     Forced sexual activity: Not on file   Other Topics Concern     Parent/sibling w/ CABG, MI or angioplasty before 65F 55M? No    Social History Narrative     Not on file       CURRENT MEDICATIONS:  Current Outpatient Medications   Medication Sig Dispense Refill     aspirin 81 MG tablet Take 1 tablet (81 mg) by mouth daily 90 tablet 3     atorvastatin (LIPITOR) 10 MG tablet Take 1 tablet (10 mg) by mouth daily 90 tablet 3     Calcium Carbonate Antacid (TUMS CALCIUM FOR LIFE BONE PO) Take  by mouth.       diphenoxylate-atropine (LOMOTIL) 2.5-0.025 MG per tablet Take 1 tablet by mouth daily as needed for diarrhea Patient only takes 1 tablet as needed 30 tablet 5     losartan (COZAAR) 50 MG tablet Take 1 tablet (50 mg) by mouth daily 90 tablet 3     metoprolol succinate ER (TOPROL-XL) 50 MG 24 hr tablet Take 1 tablet (50 mg) by mouth daily 90 tablet 3     Multiple Vitamin (MULTIVITAMINS PO) Take  by mouth daily.       HYDROcodone-acetaminophen (NORCO) 5-325 MG per tablet Take 1 tablet by mouth every 4 hours as needed for pain (Patient not taking: Reported on 3/18/2019) 15 tablet 0       ROS:  Constitutional: Denies fever, chills, or diaphoresis.  +weight loss.   ENT: +cough. Denies visual disturbance, hearing loss, epistaxis, vertigo.    Respiratory:  See HPI.     Cardiovascular: As per HPI.   GI: Denies nausea, vomiting, diarrhea, hematemesis, melena, or hematochezia.   : Denies urinary frequency, dysuria, or hematuria.   Integument: Negative for bruising, rash, or pruritis.  Psychiatric: Negative for anxiety, depression, sleep disturbance, or mood changes.   Neuro: Negative for headaches, syncope, numbness or tingling.   Endocrinology:  +night sweats,     Musculoskeletal: +joint pain.       EXAM:  BP (!) 138/92   Pulse 99   Wt 108 kg (238 lb)   SpO2 96%   BMI 37.84 kg/m     General: alert, articulate, and in no acute distress.  HEENT: normocephalic, atraumatic, anicteric sclera, EOMI, mucosa moist, no cyanosis.   Neck: neck supple.  No adenopathy, masses, or carotid bruits.  JVP 11 cm with +HJR  Heart: regular rhythm, normal S1/S2, no  murmur, gallop, rub.  Precordium quiet with normal PMI.     Lungs: clear, no rales, ronchi, or wheezing.  No accessory muscle use, respirations unlabored.   Abdomen: soft, non-tender, bowel sounds present, no hepatomegaly  Extremities: 1+ pitting edema.   L>R.  No cyanosis.  Extremities warm.  2+ pedal pulses.   Neurological: alert and oriented x 3.  normal speech and affect, no gross motor deficits  Skin:  No ecchymoses, rashes, or clubbing.    Labs:  CBC RESULTS:  Lab Results   Component Value Date    WBC 7.8 2019    RBC 5.09 2019    HGB 14.8 2019    HCT 45.9 2019    MCV 90 2019    MCH 29.1 2019    MCHC 32.2 2019    RDW 14.4 2019     2019       CMP RESULTS:  Lab Results   Component Value Date     2019    POTASSIUM 4.1 2019    CHLORIDE 107 2019    CO2 24 2019    ANIONGAP 8 2019    GLC 91 2019    BUN 20 2019    CR 0.95 2019    GFRESTIMATED 82 2019    GFRESTBLACK >90 2019    CATHERINE 9.5 2019    BILITOTAL 0.4 2019    ALBUMIN 4.5 2019    ALKPHOS 68 2019    ALT 37 2019    AST 27 2019        INR RESULTS:  No results found for: INR    No components found for: CK  Lab Results   Component Value Date    MAG 2.2 2014     Lab Results   Component Value Date    NTBNP 228 (H) 2013     @BRIEFLABR (dig)@    Most recent echocardiogram:  Recent Results (from the past 8760 hour(s))   Echo complete with definity    Narrative    [id number]  [id number]  [id number]  [id number]^MADHUSOODANAN^K^P           [facility], Echocardiography Laboratory  [address] [place] [id number]        Name: LOPEZ MERIDA  MRN: [id number]  : 1950  Study Date: 2018 01:11 PM  Age: 67 yrs  Gender: Male  Patient Location: [facility]  Reason For Study: , Cardiomyopathy (H)  Ordering Physician: ZEN MALDONADO  Referring Physician: ZEN MALDONADO  By: Lali AMBER Todd     BSA: 2.2 m2  Height: 66 in  Weight: 240 lb  HR: 91  BP: 136/80 mmHg  _____________________________________________________________________________  __        Procedure  Echocardiogram with two-dimensional, color and spectral Doppler performed.  Contrast Definity. Definity (NDC #43751-149-92) given intravenously. Patient  was given 3ml mixture of 1.5ml Definity and 8.5ml saline. 7 ml wasted. IV  start location R Hand .  _____________________________________________________________________________  __        Interpretation Summary     Mildly (EF 45-50%) reduced left ventricular function is present. Biplane  traced at 45 %. Mild left ventricular dilation is present. Mild diffuse  hypokinesis is present.  The right ventricle is normal size.  Global right ventricular function is normal.  Moderate aortic insufficiency is present. Two seperate jets. Difficult to  quantify.  The inferior vena cava was normal in size with preserved respiratory  variability.  This study was compared with the study from 4/24/2017  Aortic insufficiency appear to have mildly worsened. CHANDRIKA is recommended to  better evaluate AI and aortic valve.  _____________________________________________________________________________  __        Left Ventricle  Mild left ventricular dilation is present. Mild concentric wall thickening  consistent with left ventricular hypertrophy is present. Mildly (EF 45-50%)  reduced left ventricular function is present. Grade I or early diastolic  dysfunction. Mild diffuse hypokinesis is present.     Right Ventricle  The right ventricle is normal size. Global right ventricular function is  normal.     Atria  Both atria appear normal.     Mitral Valve  The mitral valve is normal. Trace mitral insufficiency is present.        Aortic Valve  Mild aortic valve sclerosis is present. The aortic valve is tricuspid. PHT is  534 msec. Mild to moderate aortic insufficiency is present.     Tricuspid  Valve  Trace tricuspid insufficiency is present. The peak velocity of the tricuspid  regurgitant jet is not obtainable. Pulmonary artery systolic pressure cannot  be assessed.     Pulmonic Valve  The pulmonic valve is normal. Trace to mild pulmonic insufficiency is present.  PAT is 70 msec.     Vessels  The aorta root is normal. The inferior vena cava was normal in size with  preserved respiratory variability. Estimated mean right atrial pressure is 3  mmHg.     Pericardium  No pericardial effusion is present.        Compared to Previous Study  This study was compared with the study from 2017 . Aortic insufficiency  has worsened.  _____________________________________________________________________________  __  MMode/2D Measurements & Calculations  IVSd: 1.4 cm     LVIDd: 5.3 cm  LVIDs: 3.6 cm  LVPWd: 1.2 cm  FS: 31.3 %  LV mass(C)d: 277.6 grams  LV mass(C)dI: 128.4 grams/m2  Ao root diam: 3.5 cm  LA dimension: 4.3 cm  asc Aorta Diam: 3.2 cm  LA/Ao: 1.2  LVOT diam: 2.8 cm  LVOT area: 6.2 cm2  LA Volume (BP): 44.0 ml  LA Volume Index (BP): 20.4 ml/m2  RWT: 0.46           Doppler Measurements & Calculations  MV E max lorenzo: 56.3 cm/sec  MV A max lorenzo: 79.7 cm/sec  MV E/A: 0.71  MV dec time: 0.17 sec  Ao V2 max: 168.5 cm/sec  Ao max P.0 mmHg  BROOKE(V,D): 3.9 cm2  AI P1/2t: 434.0 msec  LV V1 max P.5 mmHg  LV V1 max: 105.7 cm/sec  LV V1 VTI: 21.1 cm  CO(LVOT): 8.6 l/min  CI(LVOT): 4.0 l/min/m2  SV(LVOT): 131.7 ml  SI(LVOT): 61.0 ml/m2  PA V2 max: 156.4 cm/sec  PA max P.8 mmHg  PA acc time: 0.06 sec  PI end-d lorenzo: 126.5 cm/sec  PI max lorenzo: 257.4 cm/sec  PI max P.5 mmHg  Pulm Sys Lorenzo: 56.6 cm/sec  Pulm [place] Lorenzo: 32.9 cm/sec  Pulm S/D: 1.7  AV Lorenzo Ratio (DI): 0.63  E/E' av.6  Lateral E/e': 7.9  Medial E/e': 11.3              _____________________________________________________________________________  __        Report approved by: Kathy JACOME 2018 05:22 PM            Assessment and Plan:     In summary, Ricky is a 68-year-old gentleman with ischemic cardiomyopathy with reduced ejection fraction, now improved, who appears hypervolemic today and hypertensive today with a blood pressure of 138/92.  Unfortunately he did not get his labs drawn until this morning.  His lab results are pending.  If his lab results look okay, I will plan on increasing his losartan and adding a diuretic as well.  He will return to the [facility] in 1 month.    1.  Chronic systolic heart failure secondary to ischemic cardiomyopathy.  Stage C  NYHA Class IIIA  ACEi/ARB: yes, losartan 50 mg daily.  BB: yes, Toprol-XL 50 mg daily.  Aldosterone antagonist: no.  Discuss initiating at next visit.  SCD prophylaxis: does not meet criteria for implant  Fluid status: hypervolemic  Anticoagulation: None.  Antiplatelet:  ASA dose   Sleep apnea: Documented sleep apnea.  Is not able to tolerate CPAP per his report.  NSAID use:  Contraindicated.  Avoid use.  Remote Monitoring: None.    2.  CAD: Nonobstructive coronary artery disease.  Continue losartan, metoprolol, aspirin, and atorvastatin.    3.  Hypertension: Suboptimal control at 138/92.  Awaiting lab results.  If his kidney function and potassium are normal, will increase his losartan to 75 mg daily.      5.  Hyperlipidemia: Last lipid panel 4/11/18: Total cholesterol 145, triglycerides 353, HDL 36, LDL 38.  Continue atorvastatin 10 mg daily.  Recommend repeating lipid panel at follow-up visit with Dr. Porras.    6.  Obesity:  BMI 37.84.  Nutrition referral as outlined above.      7.  Follow-up: [facility] in 1 month.      40 minutes spent face to face with patient with >50% in counseling, education, and coordination of care      Teresa GILMORE, MANDI, NOEL [facility]

## 2019-03-26 RX ORDER — LOSARTAN POTASSIUM 50 MG/1
75 TABLET ORAL DAILY
Qty: 60 TABLET | Refills: 3 | Status: SHIPPED | OUTPATIENT
Start: 2019-03-26 | End: 2019-04-25

## 2019-03-26 RX ORDER — FUROSEMIDE 20 MG
20 TABLET ORAL DAILY
Qty: 30 TABLET | Refills: 3 | Status: SHIPPED | OUTPATIENT
Start: 2019-03-26 | End: 2019-03-26

## 2019-03-26 RX ORDER — POTASSIUM CHLORIDE 1500 MG/1
20 TABLET, EXTENDED RELEASE ORAL DAILY
Qty: 30 TABLET | Refills: 3 | Status: SHIPPED | OUTPATIENT
Start: 2019-03-26 | End: 2019-11-21

## 2019-03-26 RX ORDER — FUROSEMIDE 20 MG
20 TABLET ORAL 2 TIMES DAILY
Qty: 60 TABLET | Refills: 3 | Status: SHIPPED | OUTPATIENT
Start: 2019-03-26 | End: 2019-04-25

## 2019-03-28 ENCOUNTER — OFFICE VISIT (OUTPATIENT)
Dept: FAMILY MEDICINE | Facility: CLINIC | Age: 69
End: 2019-03-28
Payer: MEDICARE

## 2019-03-28 VITALS — SYSTOLIC BLOOD PRESSURE: 130 MMHG | DIASTOLIC BLOOD PRESSURE: 74 MMHG

## 2019-03-28 DIAGNOSIS — I50.22 CHRONIC SYSTOLIC HEART FAILURE (H): Primary | ICD-10-CM

## 2019-03-28 DIAGNOSIS — I10 ESSENTIAL HYPERTENSION WITH GOAL BLOOD PRESSURE LESS THAN 140/90: ICD-10-CM

## 2019-03-28 DIAGNOSIS — R41.3 MEMORY CHANGE: ICD-10-CM

## 2019-03-28 DIAGNOSIS — G47.33 OBSTRUCTIVE SLEEP APNEA: ICD-10-CM

## 2019-03-28 DIAGNOSIS — R73.9 ELEVATED BLOOD SUGAR: ICD-10-CM

## 2019-03-28 PROCEDURE — 99214 OFFICE O/P EST MOD 30 MIN: CPT | Performed by: FAMILY MEDICINE

## 2019-03-28 NOTE — LETTER
My Heart Failure Action Plan   Name: Ricky Brown    YOB: 1950   Date: 3/28/2019    My doctor: Armen Chavez     [address] [address] [phone]  My Diagnosis: Systolic Heart Failure   My Ejection Fraction: 40% - 44%    My Exercise Goal: 30 minutes daily  .     My Weight Goal: 230-235  Wt Readings from Last 2 Encounters:   03/25/19 108 kg (238 lb)   03/18/19 112.9 kg (248 lb 14.4 oz)     Weigh yourself daily using the same scale. If you gain more than 2 pounds in 24 hours or 5 pounds in a week increase your diuretic to Lasix (Furosemide) 20 mg in afternoon along with am dose    My Diet Goal: 1,500 mg of sodium    Emergency Room Visits:    Our goal is to improve your quality of life and help you avoid a visit to the emergency room or hospital.  If we work together, we can achieve this goal. But, if you feel you need to call 911 or go to the emergency room, please do so.  If you go to the emergency room, please bring your list of medicines and your daily weight chart with you.       GREEN ZONE     Doing well today    Weight gained is no more than 2 pounds a day or 5 pounds a week.    No swelling in feet, ankles, legs or stomach.    No more swelling than usual.    No more trouble breathing than usual.    No change in my sleep.    No other problems. Actions:    I am doing fine.  I will take my medicine, follow my diet, see my doctor, exercise, and watch for symptoms.           YELLOW ZONE         Having a bad day or flare up    Weight gain of more than 2 pounds in one day or 5 pounds in one week.    New swelling in ankle, leg, knee or thigh.    Bloating in belly, pants feel tighter.    Swelling in hands or face.    Coughing or trouble breathing while walking or talking.    Harder to breathe last night.    Have trouble sleeping, wake up short of breath.    Much more tired than usual.    Not eating.    Pain in my chest or bad  leg cramps.    Feel weak or dizzy. Actions:    I need to take action and call my doctor or nurse today.                 RED ZONE         Need medical care now    Weight gain of 5 pounds overnight.    Chest pain or pressure that does not go away.    Feel less alert.    Wheezing or have trouble breathing when at rest.    Cannot sleep lying down.    Cannot take my water pill.    Pass out or faint. Actions:    I need to call my doctor or nurse now!    Call 911 if I have chest pain or cannot breathe.

## 2019-03-28 NOTE — PROGRESS NOTES
SUBJECTIVE:   Ricky Brown is a 68 year old male who presents to clinic today for the following health issues:    Follow up results for heart. Shed light on new diagnoses diagnoses. Talk about new medication  Cardiology note from 3/25:     In summary, Ricky is a 68-year-old gentleman with ischemic cardiomyopathy with reduced ejection fraction, now improved, who appears hypervolemic today and hypertensive today with a blood pressure of 138/92.  Unfortunately he did not get his labs drawn until this morning.  His lab results are pending.  If his lab results look okay, I will plan on increasing his losartan and adding a diuretic as well.  He will return to the [facility] in 1 month.    Patient's BP medications had previously been reduced after a syncopal episode but are now being increased again due to concerns about ischemic cardiomyopathy and reduced EF. Patient is not tolerating the diuretic well because he has been waking to urinate so frequently. He started it two days ago. His weight has been around 233 but can vary three pounds in one day. He was also started on potassium and wondering if he could just eat a daily banana instead. He is not planning on going back to the [facility] because he is miserable with the changes they have made.   He is concerned because his pulse varies from  in one day. It is only in the 40's when sleeping and elevated after walking.   Patient is trying to follow a low-salt diet. He does not eat a lot of processed foods and watches sodium content.     Blood sugar:  Patient's fasting blood sugar was 115 recently which his wife is worried about.     Problem list and histories reviewed & adjusted, as indicated.  Additional history: as documented    Patient Active Problem List   Diagnosis     HL (hearing loss)     Erectile dysfunction     Keratoglobus, ou     Pseudophakia, PCL, od; ACL, os     Posterior vitreous detachment, od     Epiretinal membrane, mild, od      Advanced directives, counseling/discussion     HDL deficiency     JOSE JUAN (obstructive sleep apnea)-severe (AHI 32)     BCC (basal cell carcinoma)     Cardiomyopathy (H)     Obstructive sleep apnea     Near syncope     RCT (rotator cuff tear)     Disorder of bursae and tendons in shoulder region     Other postprocedural status(V45.89)     Iritis, os     Cramp of limb     Essential hypertension with goal blood pressure less than 140/90     Hyperlipidemia LDL goal <70     overwieght BMI > 35     Visit for suture removal     Past Surgical History:   Procedure Laterality Date     ARTHROSCOPY SHOULDER BICEPS TENODESIS REPAIR  2014    Procedure: ARTHROSCOPY SHOULDER BICEPS TENODESIS REPAIR;;  Surgeon: Michael Hagen MD;  [facility]     ARTHROSCOPY SHOULDER ROTATOR CUFF REPAIR  2014    Procedure: ARTHROSCOPY SHOULDER ROTATOR CUFF REPAIR;  Right Shoulder Arthroscopic Rotator Cuff Repair,  Subacromial Decompression, Biceps Tenodesis, Distal Clavicle Excision   ;  Surgeon: Michael Hagen MD;  [facility]     BIOPSY       CARDIAC SURGERY       COLONOSCOPY       EXCHANGE INTRAOCULAR LENS IMPLANT      left eye - first, recentered PCL with iris fixation; then IOLX with ACL     EXTRACAPSULAR CATARACT EXTRATION WITH INTRAOCULAR LENS IMPLANT      both eyes (elsewhere)     TURBINOPLASTY  2013    Procedure: TURBINOPLASTY;;  Surgeon: Lisset Parsons MD;  Location:  OR     UVULOPALATOPHARYNGOPLASTY  2013    Procedure: UVULOPALATOPHARYNGOPLASTY;  Uvulopalatopharyngoplasty, Turbiante Reduction;  Surgeon: Lisset Parsons MD;  Location:  OR       Social History     Tobacco Use     Smoking status: Former Smoker     Packs/day: 0.50     Years: 2.00     Pack years: 1.00     Types: Cigarettes     Last attempt to quit: 1996     Years since quittin.3     Smokeless tobacco: Never Used   Substance Use Topics     Alcohol use: Yes     Alcohol/week: 5.0 oz      Comment: Sonya Salazar     Family History   Problem Relation Age of Onset     Diabetes Father         Old age Diabetes     Cerebrovascular Disease Father      Obesity Father      Heart Disease Father      Coronary Artery Disease Father      Hypertension Father      Lipids Sister      C.A.D. No family hx of      Cancer No family hx of      Glaucoma No family hx of      Macular Degeneration No family hx of          Current Outpatient Medications   Medication Sig Dispense Refill     aspirin 81 MG tablet Take 1 tablet (81 mg) by mouth daily 90 tablet 3     atorvastatin (LIPITOR) 10 MG tablet Take 1 tablet (10 mg) by mouth daily 90 tablet 3     Calcium Carbonate Antacid (TUMS CALCIUM FOR LIFE BONE PO) Take  by mouth.       diphenoxylate-atropine (LOMOTIL) 2.5-0.025 MG per tablet Take 1 tablet by mouth daily as needed for diarrhea Patient only takes 1 tablet as needed 30 tablet 5     furosemide (LASIX) 20 MG tablet Take 1 tablet (20 mg) by mouth 2 times daily 60 tablet 3     losartan (COZAAR) 50 MG tablet Take 1.5 tablets (75 mg) by mouth daily 60 tablet 3     metoprolol succinate ER (TOPROL-XL) 50 MG 24 hr tablet Take 1 tablet (50 mg) by mouth daily 90 tablet 3     Multiple Vitamin (MULTIVITAMINS PO) Take  by mouth daily.       potassium chloride ER (K-DUR/KLOR-CON M) 20 MEQ CR tablet Take 1 tablet (20 mEq) by mouth daily 30 tablet 3     Allergies   Allergen Reactions     Lisinopril Cough     Recent Labs   Lab Test 03/25/19  0853 02/21/19  1622 08/22/18  1505 04/11/18  1520 05/24/17  1513 05/10/17  1038 08/18/16  1256 08/10/16  0934   A1C  --   --  5.4  --  5.7  --  5.6  --    LDL  --   --   --  38  --  22  --  26   HDL  --   --   --  36*  --  41  --  37*   TRIG  --   --   --  353*  --  267*  --  202*   ALT  --  37  --  27  --  38  --   --    CR 0.79 0.95  --  0.86  --  0.98  --  0.84   GFRESTIMATED >90 82  --  90  --  77  --  >90  Non  GFR Calc     GFRESTBLACK >90 >90  --  >90  --  >90    GFR Calc    --  >90   GFR Calc     POTASSIUM 3.9 4.1  --  4.4  --  4.3  --  3.9   TSH  --  1.38 1.29  --   --   --  1.75  --       BP Readings from Last 3 Encounters:   03/28/19 130/74   03/25/19 (!) 138/92   03/18/19 133/80    Wt Readings from Last 3 Encounters:   03/25/19 108 kg (238 lb)   03/18/19 112.9 kg (248 lb 14.4 oz)   02/21/19 110.1 kg (242 lb 12.8 oz)                  Labs reviewed in EPIC    Reviewed and updated as needed this visit by clinical staff  Tobacco  Allergies  Meds       Reviewed and updated as needed this visit by Provider         ROS:  Constitutional, HEENT, cardiovascular, pulmonary, gi and gu systems are negative, except as otherwise noted.    This document serves as a record of the services and decisions personally performed by DEYVI CHOW. It was created on his/her behalf by Keith Rodney, a trained medical scribe. The creation of this document is based on the provider's statements to the medical scribe. Keith Rodney, March 28, 2019 3:05 PM  OBJECTIVE:     /74 (BP Location: Right arm, Patient Position: Sitting, Cuff Size: Adult Regular)   There is no height or weight on file to calculate BMI.  GENERAL: healthy, alert and no distress  PSYCH: mentation appears normal, affect normal/bright      3/11/19 ECHO:   Interpretation Summary     Mildly (EF 45-50%) reduced left ventricular function is present. Mild to  Moderate left ventricular dilation is present.  Right ventricular function, chamber size, wall motion, and thickness are  normal.  Mild left atrial enlargement is present.  Mild to moderate aortic insufficiency is present.  The inferior vena cava was normal in size with preserved respiratory  variability.  No pericardial effusion is present.  Compared to prior TTE dated 4/19/18, there has not been significant change.    3/19/19 US Abdominal Aorta:   IMPRESSION:  No evidence of an abdominal aortic aneurysm.    ASSESSMENT/PLAN:   (I50.22) Chronic  systolic heart failure (H)  (primary encounter diagnosis)  Comment:   Plan: Patient education CHF plan provided today. Patient's questions answered. Continue follow with [facility], continue current medications for now. Dry weight 230-235. HF action plan given. If weight controlled he can self adjust lasix.  (G47.33) Obstructive sleep apnea  Comment:   Plan: he is not tolerant of CPAP. Had surgery    (I10) Essential hypertension with goal blood pressure less than 140/90  Comment:   Plan: Medications increased by [facility] and furosemide started    (R41.3) Memory change  Comment: Memory evaluation was not indicative of dementia  Plan: Continue to monitor. Results discussed wit hpatient     (R73.9) Elevated blood sugar  Comment: Recent elevated fasting sugar of 115  Plan: **A1C FUTURE anytime, will do with next weeks lab draw           Patient Instructions   Take your Lasix (water pill) first thing after you wake up in the morning and then around 2pm. If you have a morning activity and you don't want to be going to the bathroom all the time, you can wait to take it after the activity.     Monitor your weight regularly.     Schedule your routine eye exam.    You need to keep taking your potassium pill for right now because one banana will not contain enough potassium.      The information in this document, created by the medical scribe for me, accurately reflects the services I personally performed and the decisions made by me. I have reviewed and approved this document for accuracy.   Armen Chavez MD, MD  [employer]

## 2019-03-28 NOTE — PATIENT INSTRUCTIONS
Take your Lasix (water pill) first thing after you wake up in the morning and then around 2pm. If you have a morning activity and you don't want to be going to the bathroom all the time, you can wait to take it after the activity.     Monitor your weight regularly.     Schedule your routine eye exam.    You need to keep taking your potassium pill for right now because one banana will not contain enough potassium.    Schedule a follow-up with me in Mid-May.     [facility]     Discharged by : Salena Raman CMA    If you have any questions regarding your visit please contact your care team:     Team Gold                Clinic Hours Telephone Number     Dr. Armen Perez, CNP   7am-7pm  Monday - Thursday   7am-5pm  Fridays  [phone]   (Appointment scheduling available 24/7)     RN Line  [phone] option 2     Urgent Care - [place] - 11am-9pm Monday-Friday Saturday-Sunday- 9am-5pm     [place] -   5pm-9pm Monday-Friday Saturday-Sunday- 9am-5pm    [phone] - [place]    [phone] - [place]     For a Price Quote for your services, please call our Consumer Price Line at [phone].     What options do I have for visits at the clinic other than the traditional office visit?     To expand how we care for you, many of our providers are utilizing electronic visits (e-visits) and telephone visits, when medically appropriate, for interactions with their patients rather than a visit in the clinic. We also offer nurse visits for many medical concerns. Just like any other service, we will bill your insurance company for this type of visit based on time spent on the phone with your provider. Not all insurance companies cover these visits. Please check with your medical insurance if this type of visit is covered. You will be responsible for any charges that are not paid by your insurance.     E-visits via [employer]:  generally incur a $45.00 fee.     Telephone visits:  Time spent on the phone: *charged based on time that is spent on the phone in increments of 10 minutes. Estimated cost:   5-10 mins $30.00   11-20 mins. $59.00   21-30 mins. $85.00       Use [employer] (secure email communication and access to your chart) to send your primary care provider a message or make an appointment. Ask someone on your Team how to sign up for [employer].     As always, Thank you for trusting us with your health care needs!      [facility]:    Scheduling Appointments  [facility]  Call: [phone]    [facility]  Call: [phone]    [facility]  Call: [phone]    For Gastroenterology referrals   [facility]   Clinics and Surgery Center, 4th Floor   [address] [place] [address]   Appointments: [phone]    WHERE TO GO FOR CARE?    Clinic    Make an appointment if you:       Are sick (cold, cough, flu, sore throat, earache or in pain).       Have a small injury (sprain, small cut, burn or broken bone).       Need a physical exam, Pap smear, vaccine or prescription refill.       Have questions about your health or medicines.    To reach us:      Call [phone] ([phone]). Open 24 hours every day. (For counseling services, call [phone].)    Log into [employer] at [email or web address]. (Visit [email or web address] to create an account.) Hospital emergency room    An emergency is a serious or life- threatening problem that must be treated right away.    Call 357 or get to the hospital if you have:      Very bad or sudden:            - Chest pain or pressure         - Bleeding         - Head or belly pain         - Dizziness or trouble seeing, walking or                          Speaking      Problems breathing      Blood in your vomit or you are coughing up blood      A major injury (knocked out, loss of a  finger or limb, rape, broken bone protruding from skin)    A mental health crisis. (Or call the Mental Health Crisis line at 1-267.880.9011 or Suicide Prevention Hotline at 1-321.793.4812.)    Open 24 hours every day. You don't need an appointment.     Urgent care    Visit urgent care for sickness or small injuries when the clinic is closed. You don't need an appointment. To check hours or find an urgent care near you, visit [email or web address]. Online care    Get online care from [facility] for more than 70 common problems, like colds, allergies and infections. Open 24 hours every day at:   [email or web address]   Need help deciding?    For advice about where to be seen, you may call your clinic and ask to speak with a nurse. We're here for you 24 hours every day.         If you are deaf or hard of hearing, please let us know. We provide many free services including sign language interpreters, oral interpreters, TTYs, telephone amplifiers, note takers and written materials.

## 2019-04-03 DIAGNOSIS — R73.9 ELEVATED BLOOD SUGAR: ICD-10-CM

## 2019-04-03 LAB
ANION GAP SERPL CALCULATED.3IONS-SCNC: 8 MMOL/L (ref 3–14)
BUN SERPL-MCNC: 22 MG/DL (ref 7–30)
CALCIUM SERPL-MCNC: 9.1 MG/DL (ref 8.5–10.1)
CHLORIDE SERPL-SCNC: 104 MMOL/L (ref 94–109)
CO2 SERPL-SCNC: 26 MMOL/L (ref 20–32)
CREAT SERPL-MCNC: 0.99 MG/DL (ref 0.66–1.25)
GFR SERPL CREATININE-BSD FRML MDRD: 79 ML/MIN/{1.73_M2}
GLUCOSE SERPL-MCNC: 138 MG/DL (ref 70–99)
HBA1C MFR BLD: 5.9 % (ref 0–5.6)
POTASSIUM SERPL-SCNC: 4.3 MMOL/L (ref 3.4–5.3)
SODIUM SERPL-SCNC: 138 MMOL/L (ref 133–144)

## 2019-04-03 PROCEDURE — 36415 COLL VENOUS BLD VENIPUNCTURE: CPT | Performed by: NURSE PRACTITIONER

## 2019-04-03 PROCEDURE — 83036 HEMOGLOBIN GLYCOSYLATED A1C: CPT | Performed by: FAMILY MEDICINE

## 2019-04-03 PROCEDURE — 80048 BASIC METABOLIC PNL TOTAL CA: CPT | Performed by: NURSE PRACTITIONER

## 2019-04-17 DIAGNOSIS — I42.8 OTHER CARDIOMYOPATHY (H): Primary | ICD-10-CM

## 2019-04-24 DIAGNOSIS — I42.8 OTHER CARDIOMYOPATHY (H): ICD-10-CM

## 2019-04-24 LAB
ANION GAP SERPL CALCULATED.3IONS-SCNC: 8 MMOL/L (ref 3–14)
BUN SERPL-MCNC: 25 MG/DL (ref 7–30)
CALCIUM SERPL-MCNC: 9.4 MG/DL (ref 8.5–10.1)
CHLORIDE SERPL-SCNC: 107 MMOL/L (ref 94–109)
CO2 SERPL-SCNC: 24 MMOL/L (ref 20–32)
CREAT SERPL-MCNC: 1.12 MG/DL (ref 0.66–1.25)
GFR SERPL CREATININE-BSD FRML MDRD: 68 ML/MIN/{1.73_M2}
GLUCOSE SERPL-MCNC: 114 MG/DL (ref 70–99)
POTASSIUM SERPL-SCNC: 3.9 MMOL/L (ref 3.4–5.3)
SODIUM SERPL-SCNC: 139 MMOL/L (ref 133–144)

## 2019-04-24 PROCEDURE — 36415 COLL VENOUS BLD VENIPUNCTURE: CPT | Performed by: NURSE PRACTITIONER

## 2019-04-24 PROCEDURE — 80048 BASIC METABOLIC PNL TOTAL CA: CPT | Performed by: NURSE PRACTITIONER

## 2019-04-25 ENCOUNTER — OFFICE VISIT (OUTPATIENT)
Dept: FAMILY MEDICINE | Facility: CLINIC | Age: 69
End: 2019-04-25
Payer: MEDICARE

## 2019-04-25 ENCOUNTER — OFFICE VISIT (OUTPATIENT)
Dept: CARDIOLOGY | Facility: CLINIC | Age: 69
End: 2019-04-25
Payer: MEDICARE

## 2019-04-25 VITALS — DIASTOLIC BLOOD PRESSURE: 76 MMHG | SYSTOLIC BLOOD PRESSURE: 96 MMHG | RESPIRATION RATE: 22 BRPM

## 2019-04-25 VITALS
OXYGEN SATURATION: 95 % | BODY MASS INDEX: 37.68 KG/M2 | DIASTOLIC BLOOD PRESSURE: 72 MMHG | HEART RATE: 80 BPM | WEIGHT: 237 LBS | SYSTOLIC BLOOD PRESSURE: 111 MMHG

## 2019-04-25 DIAGNOSIS — J02.0 STREPTOCOCCAL SORE THROAT: Primary | ICD-10-CM

## 2019-04-25 DIAGNOSIS — I42.8 NON-ISCHEMIC CARDIOMYOPATHY (H): ICD-10-CM

## 2019-04-25 DIAGNOSIS — I10 ESSENTIAL HYPERTENSION WITH GOAL BLOOD PRESSURE LESS THAN 140/90: ICD-10-CM

## 2019-04-25 DIAGNOSIS — E86.1 HYPOVOLEMIA: Primary | ICD-10-CM

## 2019-04-25 DIAGNOSIS — J20.9 ACUTE BRONCHITIS, UNSPECIFIED ORGANISM: ICD-10-CM

## 2019-04-25 DIAGNOSIS — R05.9 COUGH: ICD-10-CM

## 2019-04-25 LAB
DEPRECATED S PYO AG THROAT QL EIA: NORMAL
SPECIMEN SOURCE: NORMAL

## 2019-04-25 PROCEDURE — 87880 STREP A ASSAY W/OPTIC: CPT | Performed by: INTERNAL MEDICINE

## 2019-04-25 PROCEDURE — 87081 CULTURE SCREEN ONLY: CPT | Performed by: INTERNAL MEDICINE

## 2019-04-25 PROCEDURE — 99215 OFFICE O/P EST HI 40 MIN: CPT | Performed by: NURSE PRACTITIONER

## 2019-04-25 PROCEDURE — 99213 OFFICE O/P EST LOW 20 MIN: CPT | Performed by: INTERNAL MEDICINE

## 2019-04-25 RX ORDER — BENZONATATE 200 MG/1
200 CAPSULE ORAL 3 TIMES DAILY PRN
Qty: 20 CAPSULE | Refills: 0 | Status: SHIPPED | OUTPATIENT
Start: 2019-04-25 | End: 2019-05-16

## 2019-04-25 RX ORDER — FUROSEMIDE 20 MG
20 TABLET ORAL DAILY
Qty: 60 TABLET | Refills: 3 | Status: SHIPPED | OUTPATIENT
Start: 2019-04-25 | End: 2019-11-21

## 2019-04-25 RX ORDER — AZITHROMYCIN 250 MG/1
TABLET, FILM COATED ORAL
Qty: 6 TABLET | Refills: 0 | Status: SHIPPED | OUTPATIENT
Start: 2019-04-25 | End: 2019-05-16

## 2019-04-25 RX ORDER — LOSARTAN POTASSIUM 50 MG/1
75 TABLET ORAL DAILY
Qty: 60 TABLET | Refills: 3 | COMMUNITY
Start: 2019-04-25 | End: 2019-04-26

## 2019-04-25 NOTE — PROGRESS NOTES
HPI: Ricky is a 68 year old gentleman with a past medical history of ICM with an EF of 35-40%, now 45-50%, noncompliance, CAD, HTN, and hyperlipidemia who returns for routine follow up.     Ricky has developed an upper respiratory infection and cough a few days ago.  He had a near syncopal episode after experiencing a prolonged coughing spell a few days ago. He has had no further episodes since.  Denies any fevers.  His coughing occurs mostly when he is lying down.  He complains of a sore throat.  His weight is down 4 pounds overnight.     He is not happy taking all the medications that he has been prescribed.  He states he has a genetic make-up similar to his dad's.  His dad was put on a lot of medication and ended up having a severe stroke per his report.  Once his dad was taken off of all of his medications, he had the 5 best years of his life.  He feels that this same scenario is going to happen to him. He does not understand the rationale for having to increase his medications.    He does not like how frequently he has to use the bathroom with the Lasix.  It affects his quality of life.  He weighs himself regularly.  While his weight fluctuates between 3-5 pounds over night, he is able to self manage it.  He is not wearing his CPAP machine as he is not able to tolerate it.  He is not following his sodium or fluid restrictions.  He denies any PND, lower extremity edema, early satiety.    He continues to drink hard liquor nightly.  He is not interested in discontinuing at this time.     PAST MEDICAL HISTORY:  Past Medical History:   Diagnosis Date     Arthritis      BCC (basal cell carcinoma)     right shoulder      Cardiomyopathy (H)      Colon adenomas 9/20/11     Coronary artery disease      ED (erectile dysfunction)      HTN (hypertension)      Obesity      JOSE JUAN (obstructive sleep apnea)        FAMILY HISTORY:  Family History   Problem Relation Age of Onset     Diabetes Father         Old age Diabetes      Cerebrovascular Disease Father      Obesity Father      Heart Disease Father      Coronary Artery Disease Father      Hypertension Father      Lipids Sister      C.A.D. No family hx of      Cancer No family hx of      Glaucoma No family hx of      Macular Degeneration No family hx of        SOCIAL HISTORY:  Social History     Socioeconomic History     Marital status:      Spouse name: Kyung     Number of children: 0     Years of education: 14     Highest education level: Not on file   Occupational History     Occupation: industrial electronics      Employer: SELF   Social Needs     Financial resource strain: Not on file     Food insecurity:     Worry: Not on file     Inability: Not on file     Transportation needs:     Medical: Not on file     Non-medical: Not on file   Tobacco Use     Smoking status: Former Smoker     Packs/day: 0.50     Years: 2.00     Pack years: 1.00     Types: Cigarettes     Last attempt to quit: 1996     Years since quittin.2     Smokeless tobacco: Never Used   Substance and Sexual Activity     Alcohol use: Yes     Alcohol/week: 5.0 oz     Comment: Evening Marie     Drug use: No     Sexual activity: Yes     Partners: Female     Birth control/protection: Male Surgical     Comment: 2006 vasectomy   Lifestyle     Physical activity:     Days per week: Not on file     Minutes per session: Not on file     Stress: Not on file   Relationships     Social connections:     Talks on phone: Not on file     Gets together: Not on file     Attends [employer] service: Not on file     Active member of club or organization: Not on file     Attends meetings of clubs or organizations: Not on file     Relationship status: Not on file     Intimate partner violence:     Fear of current or ex partner: Not on file     Emotionally abused: Not on file     Physically abused: Not on file     Forced sexual activity: Not on file   Other Topics Concern     Parent/sibling w/ CABG, MI or angioplasty before 65F  55M? No   Social History Narrative     Not on file       CURRENT MEDICATIONS:  Current Outpatient Medications   Medication Sig Dispense Refill     aspirin 81 MG tablet Take 1 tablet (81 mg) by mouth daily 90 tablet 3     atorvastatin (LIPITOR) 10 MG tablet Take 1 tablet (10 mg) by mouth daily 90 tablet 3     Calcium Carbonate Antacid (TUMS CALCIUM FOR LIFE BONE PO) Take  by mouth.       diphenoxylate-atropine (LOMOTIL) 2.5-0.025 MG per tablet Take 1 tablet by mouth daily as needed for diarrhea Patient only takes 1 tablet as needed 30 tablet 5     furosemide (LASIX) 20 MG tablet Take 1 tablet (20 mg) by mouth 2 times daily 60 tablet 3     losartan (COZAAR) 50 MG tablet Take 1.5 tablets (75 mg) by mouth daily 60 tablet 3     metoprolol succinate ER (TOPROL-XL) 50 MG 24 hr tablet Take 1 tablet (50 mg) by mouth daily 90 tablet 3     Multiple Vitamin (MULTIVITAMINS PO) Take  by mouth daily.       potassium chloride ER (K-DUR/KLOR-CON M) 20 MEQ CR tablet Take 1 tablet (20 mEq) by mouth daily 30 tablet 3       ROS:  Constitutional: Denies fever, chills, or diaphoresis.  +weight loss.   ENT: +cough. Denies visual disturbance, hearing loss, epistaxis, vertigo.    Respiratory:  See HPI.     Cardiovascular: As per HPI.   GI: Denies nausea, vomiting, diarrhea, hematemesis, melena, or hematochezia.   : + urinary frequency.  No dysuria or hematuria.   Integument: Negative for bruising, rash, or pruritis.  Psychiatric: Negative for anxiety, depression, sleep disturbance, or mood changes.   Neuro: Negative for headaches, syncope, numbness or tingling.   Endocrinology:  +night sweats,     Musculoskeletal: +joint pain.       EXAM:  /72 (BP Location: Left arm, Patient Position: Sitting, Cuff Size: Adult Large)   Pulse 80   Wt 107.5 kg (237 lb)   SpO2 95%   BMI 37.68 kg/m    General: alert, articulate, and in no acute distress.  HEENT: normocephalic, atraumatic, anicteric sclera, EOMI, mucosa moist, no cyanosis.   Neck:  neck supple.  No adenopathy. JVP at the level of the clavicle.       Heart: regular rhythm, normal S1/S2, no murmur, gallop, rub.      Lungs: Coarse breath sounds. No rales or wheezing.  No accessory muscle use, respirations unlabored.   Abdomen: soft, non-tender, bowel sounds present, no hepatomegaly.    Extremities: No pitting edema. No cyanosis.  Extremities warm.  2+ pedal pulses.   Neurological: alert and oriented x 3.  normal speech and affect, no gross motor deficits  Skin:  Skin turgor dry with moderate tenting.  No ecchymoses, rashes, or clubbing.    Labs:  CBC RESULTS:  Lab Results   Component Value Date    WBC 7.8 02/21/2019    RBC 5.09 02/21/2019    HGB 14.8 02/21/2019    HCT 45.9 02/21/2019    MCV 90 02/21/2019    MCH 29.1 02/21/2019    MCHC 32.2 02/21/2019    RDW 14.4 02/21/2019     02/21/2019       CMP RESULTS:  Lab Results   Component Value Date     04/24/2019    POTASSIUM 3.9 04/24/2019    CHLORIDE 107 04/24/2019    CO2 24 04/24/2019    ANIONGAP 8 04/24/2019     (H) 04/24/2019    BUN 25 04/24/2019    CR 1.12 04/24/2019    GFRESTIMATED 68 04/24/2019    GFRESTBLACK 79 04/24/2019    CATHERINE 9.4 04/24/2019    BILITOTAL 0.4 02/21/2019    ALBUMIN 4.5 02/21/2019    ALKPHOS 68 02/21/2019    ALT 37 02/21/2019    AST 27 02/21/2019        INR RESULTS:  No results found for: INR    No components found for: CK  Lab Results   Component Value Date    MAG 2.2 02/11/2014     Lab Results   Component Value Date    NTBNP 228 (H) 08/28/2013     Most recent echocardiogram 3/11/19:  Interpretation Summary  Mildly (EF 45-50%) reduced left ventricular function is present. Mild to moderate left ventricular dilation is present. Right ventricular function, chamber size, wall motion, and thickness are normal.  Mild left atrial enlargement is present.  Mild to moderate aortic insufficiency is present.  The inferior vena cava was normal in size with preserved respiratory variability.  No pericardial effusion is  present.  Compared to prior TTE dated 4/19/18, there has not been significant change.    Assessment and Plan:    In summary, Ricky is a 68-year-old gentleman with ischemic cardiomyopathy with reduced ejection fraction, now improved, who appears hypovolemic today.  Since he is bothered by urinary frequency with Lasix and appears hypovolemic, I have decreased his Lasix back to 20 mg daily. I also increased his losartan to 100 mg daily for more afterload reduction.      I recommended that he abstain from any alcohol as it is a cardiac depressant.  He is not interested or motivated to quit at this time. I also reviewed with both he and his wife  the rationale for increasing his HF medication regimen.   He will repeat a BMP in 2 weeks and follow-up in the [facility] in 2 to 3 months.    1.  Chronic systolic heart failure secondary to ischemic cardiomyopathy.  Stage C  NYHA Class IIIA  ACEi/ARB: yes, losartan 100 mg daily.  BB: yes, Toprol-XL 50 mg daily.  Aldosterone antagonist: no.    SCD prophylaxis: does not meet criteria for implant  Fluid status: Hypovolemic  Anticoagulation: None.  Antiplatelet:  ASA dose 81 mg daily  Sleep apnea: Documented sleep apnea.  Is not able to tolerate CPAP per his report.  NSAID use:  Contraindicated.  Avoid use.  Remote Monitoring: None.    2.  CAD: Nonobstructive coronary artery disease.  Continue losartan, metoprolol, aspirin, and atorvastatin.    3. Hypertension: Controlled at 111/72. Continue Losartan and Toprol XL.    4. Follow-up: BMP 1-2 weeks.  [facility] in 2-3 months.      >40 minutes spent face to face with patient with >50% in counseling, education, and coordination of care      Teresa GILMORE, MANDI, CHFS[facility]ANT [place] [facility]

## 2019-04-25 NOTE — NURSING NOTE
"Chief Complaint   Patient presents with     Heart Failure     Return CORE; 68 year old male with NICM presents for follow up visit with labs prior. Per patient sob. at times, and dizy with cough at times, cough for 10 days       Initial /72 (BP Location: Left arm, Patient Position: Sitting, Cuff Size: Adult Large)   Pulse 80   Wt 107.5 kg (237 lb)   SpO2 95%   BMI 37.68 kg/m   Estimated body mass index is 37.68 kg/m  as calculated from the following:    Height as of 2/21/19: 1.689 m (5' 6.5\").    Weight as of this encounter: 107.5 kg (237 lb)..  BP completed using cuff size: large    Gabino Cifuentes L.P.N.  Patient here with another person, IPV. Screen not initiated. Gabino Cifuentes LPN.      "

## 2019-04-25 NOTE — LETTER
4/25/2019      RE: Ricky Brown  [address]  [facility] [address]       Dear Colleague,    Thank you for the opportunity to participate in the care of your patient, Ricky Brown, at the [facility] AT [facility]. Please see a copy of my visit note below.    HPI: Ricky is a 68 year old gentleman with a past medical history of ICM with an EF of 35-40%, now 45-50%, noncompliance, CAD, HTN, and hyperlipidemia who returns for routine follow up.     Ricky has developed an upper respiratory infection and cough a few days ago.  He had a near syncopal episode after experiencing a prolonged coughing spell a few days ago. He has had no further episodes since.  Denies any fevers.  His coughing occurs mostly when he is lying down.  He complains of a sore throat.  His weight is down 4 pounds overnight.     He is not happy taking all the medications that he has been prescribed.  He states he has a genetic make-up similar to his dad's.  His dad was put on a lot of medication and ended up having a severe stroke per his report.  Once his dad was taken off of all of his medications, he had the 5 best years of his life.  He feels that this same scenario is going to happen to him. He does not understand the rationale for having to increase his medications.    He does not like how frequently he has to use the bathroom with the Lasix.  It affects his quality of life.  He weighs himself regularly.  While his weight fluctuates between 3-5 pounds over night, he is able to self manage it.  He is not wearing his CPAP machine as he is not able to tolerate it.  He is not following his sodium or fluid restrictions.  He denies any PND, lower extremity edema, early satiety.    He continues to drink hard liquor nightly.  He is not interested in discontinuing at this time.     PAST MEDICAL HISTORY:  Past Medical History:   Diagnosis Date      Arthritis      BCC (basal cell carcinoma)     right shoulder      Cardiomyopathy (H)      Colon adenomas 11     Coronary artery disease      ED (erectile dysfunction)      HTN (hypertension)      Obesity      JOSE JUAN (obstructive sleep apnea)        FAMILY HISTORY:  Family History   Problem Relation Age of Onset     Diabetes Father         Old age Diabetes     Cerebrovascular Disease Father      Obesity Father      Heart Disease Father      Coronary Artery Disease Father      Hypertension Father      Lipids Sister      C.A.D. No family hx of      Cancer No family hx of      Glaucoma No family hx of      Macular Degeneration No family hx of        SOCIAL HISTORY:  Social History     Socioeconomic History     Marital status:      Spouse name: Kyung     Number of children: 0     Years of education: 14     Highest education level: Not on file   Occupational History     Occupation: industrial electronics      Employer: SELF   Social Needs     Financial resource strain: Not on file     Food insecurity:     Worry: Not on file     Inability: Not on file     Transportation needs:     Medical: Not on file     Non-medical: Not on file   Tobacco Use     Smoking status: Former Smoker     Packs/day: 0.50     Years: 2.00     Pack years: 1.00     Types: Cigarettes     Last attempt to quit: 1996     Years since quittin.2     Smokeless tobacco: Never Used   Substance and Sexual Activity     Alcohol use: Yes     Alcohol/week: 5.0 oz     Comment: Evening Marie     Drug use: No     Sexual activity: Yes     Partners: Female     Birth control/protection: Male Surgical     Comment: 2006 vasectomy   Lifestyle     Physical activity:     Days per week: Not on file     Minutes per session: Not on file     Stress: Not on file   Relationships     Social connections:     Talks on phone: Not on file     Gets together: Not on file     Attends Yazidi service: Not on file     Active member of club or organization: Not on  file     Attends meetings of clubs or organizations: Not on file     Relationship status: Not on file     Intimate partner violence:     Fear of current or ex partner: Not on file     Emotionally abused: Not on file     Physically abused: Not on file     Forced sexual activity: Not on file   Other Topics Concern     Parent/sibling w/ CABG, MI or angioplasty before 65F 55M? No   Social History Narrative     Not on file       CURRENT MEDICATIONS:  Current Outpatient Medications   Medication Sig Dispense Refill     aspirin 81 MG tablet Take 1 tablet (81 mg) by mouth daily 90 tablet 3     atorvastatin (LIPITOR) 10 MG tablet Take 1 tablet (10 mg) by mouth daily 90 tablet 3     Calcium Carbonate Antacid (TUMS CALCIUM FOR LIFE BONE PO) Take  by mouth.       diphenoxylate-atropine (LOMOTIL) 2.5-0.025 MG per tablet Take 1 tablet by mouth daily as needed for diarrhea Patient only takes 1 tablet as needed 30 tablet 5     furosemide (LASIX) 20 MG tablet Take 1 tablet (20 mg) by mouth 2 times daily 60 tablet 3     losartan (COZAAR) 50 MG tablet Take 1.5 tablets (75 mg) by mouth daily 60 tablet 3     metoprolol succinate ER (TOPROL-XL) 50 MG 24 hr tablet Take 1 tablet (50 mg) by mouth daily 90 tablet 3     Multiple Vitamin (MULTIVITAMINS PO) Take  by mouth daily.       potassium chloride ER (K-DUR/KLOR-CON M) 20 MEQ CR tablet Take 1 tablet (20 mEq) by mouth daily 30 tablet 3       ROS:  Constitutional: Denies fever, chills, or diaphoresis.  +weight loss.   ENT: +cough. Denies visual disturbance, hearing loss, epistaxis, vertigo.    Respiratory:  See HPI.     Cardiovascular: As per HPI.   GI: Denies nausea, vomiting, diarrhea, hematemesis, melena, or hematochezia.   : + urinary frequency.  No dysuria or hematuria.   Integument: Negative for bruising, rash, or pruritis.  Psychiatric: Negative for anxiety, depression, sleep disturbance, or mood changes.   Neuro: Negative for headaches, syncope, numbness or tingling.    Endocrinology:  +night sweats,     Musculoskeletal: +joint pain.       EXAM:  /72 (BP Location: Left arm, Patient Position: Sitting, Cuff Size: Adult Large)   Pulse 80   Wt 107.5 kg (237 lb)   SpO2 95%   BMI 37.68 kg/m     General: alert, articulate, and in no acute distress.  HEENT: normocephalic, atraumatic, anicteric sclera, EOMI, mucosa moist, no cyanosis.   Neck: neck supple.  No adenopathy. JVP at the level of the clavicle.       Heart: regular rhythm, normal S1/S2, no murmur, gallop, rub.      Lungs: Coarse breath sounds. No rales or wheezing.  No accessory muscle use, respirations unlabored.   Abdomen: soft, non-tender, bowel sounds present, no hepatomegaly.    Extremities: No pitting edema. No cyanosis.  Extremities warm.  2+ pedal pulses.   Neurological: alert and oriented x 3.  normal speech and affect, no gross motor deficits  Skin:  Skin turgor dry with moderate tenting.  No ecchymoses, rashes, or clubbing.    Labs:  CBC RESULTS:  Lab Results   Component Value Date    WBC 7.8 02/21/2019    RBC 5.09 02/21/2019    HGB 14.8 02/21/2019    HCT 45.9 02/21/2019    MCV 90 02/21/2019    MCH 29.1 02/21/2019    MCHC 32.2 02/21/2019    RDW 14.4 02/21/2019     02/21/2019       CMP RESULTS:  Lab Results   Component Value Date     04/24/2019    POTASSIUM 3.9 04/24/2019    CHLORIDE 107 04/24/2019    CO2 24 04/24/2019    ANIONGAP 8 04/24/2019     (H) 04/24/2019    BUN 25 04/24/2019    CR 1.12 04/24/2019    GFRESTIMATED 68 04/24/2019    GFRESTBLACK 79 04/24/2019    CATHERINE 9.4 04/24/2019    BILITOTAL 0.4 02/21/2019    ALBUMIN 4.5 02/21/2019    ALKPHOS 68 02/21/2019    ALT 37 02/21/2019    AST 27 02/21/2019        INR RESULTS:  No results found for: INR    No components found for: CK  Lab Results   Component Value Date    MAG 2.2 02/11/2014     Lab Results   Component Value Date    NTBNP 228 (H) 08/28/2013     Most recent echocardiogram 3/11/19:  Interpretation Summary  Mildly (EF 45-50%)  reduced left ventricular function is present. Mild to moderate left ventricular dilation is present. Right ventricular function, chamber size, wall motion, and thickness are normal.  Mild left atrial enlargement is present.  Mild to moderate aortic insufficiency is present.  The inferior vena cava was normal in size with preserved respiratory variability.  No pericardial effusion is present.  Compared to prior TTE dated 4/19/18, there has not been significant change.    Assessment and Plan:    In summary, Ricky is a 68-year-old gentleman with ischemic cardiomyopathy with reduced ejection fraction, now improved, who appears hypovolemic today.  Since he is bothered by urinary frequency with Lasix and appears hypovolemic, I have decreased his Lasix back to 20 mg daily. I also increased his losartan to 100 mg daily for more afterload reduction.      I recommended that he abstain from any alcohol as it is a cardiac depressant.  He is not interested or motivated to quit at this time. I also reviewed with both he and his wife  the rationale for increasing his HF medication regimen.   He will repeat a BMP in 2 weeks and follow-up in the CORE clinic in 2 to 3 months.    1.  Chronic systolic heart failure secondary to ischemic cardiomyopathy.  Stage C  NYHA Class IIIA  ACEi/ARB: yes, losartan 100 mg daily.  BB: yes, Toprol-XL 50 mg daily.  Aldosterone antagonist: no.    SCD prophylaxis: does not meet criteria for implant  Fluid status: Hypovolemic  Anticoagulation: None.  Antiplatelet:  ASA dose 81 mg daily  Sleep apnea: Documented sleep apnea.  Is not able to tolerate CPAP per his report.  NSAID use:  Contraindicated.  Avoid use.  Remote Monitoring: None.    2.  CAD: Nonobstructive coronary artery disease.  Continue losartan, metoprolol, aspirin, and atorvastatin.    3. Hypertension: Controlled at 111/72. Continue Losartan and Toprol XL.    4. Follow-up: BMP 1-2 weeks.  CORE in 2-3 months.      >40 minutes spent face to  face with patient with >50% in counseling, education, and coordination of care      Teresa GILMORE, CNP, CHFN  [facility]

## 2019-04-25 NOTE — PROGRESS NOTES
"  SUBJECTIVE:   Ricky Brown is a 68 year old male who presents to clinic today for the following   health issues:       Streptococcal sore throat  Acute bronchitis, unspecified organism  Cough     Ricky is present with his wife today.    Acute Bronchitis, Cough, Sore Throat  Patient reports symptoms of cough which have been present for 1 week. He says that he has been coughing so hard that he blacks out at times. He has clear secretions, a sore throat and gravely voice. Denies associated symptoms of fevers, aching pains, ear pain. His wife says that he recently got over a similar illness and that he often doesn't get sick with this frequency. He often gets bronchitis about once a year, but typically more towards the fall he claims. He notes no change in symptoms with elevation or position changes, but sleeps with a tall pillow. He also noted some pain on the upper right chest in the last few days.    He says that his weight often \"yo-yo's up and down.\" They say that on a recent vacation after not taking his diuretic for about 3 days he gained 9 lbs. He says that it takes a lot of energy to keep up with his wife and even occasionally has to rest when walking with the dog at his own pace. He is a non-smoker, no COPD diagnosis. He does have a diagnosis of cardiomyopathy.     Wt Readings from Last 5 Encounters:   04/25/19 107.5 kg (237 lb)   03/25/19 108 kg (238 lb)   03/18/19 112.9 kg (248 lb 14.4 oz)   02/21/19 110.1 kg (242 lb 12.8 oz)   01/08/19 108.9 kg (240 lb)     Additional history: as documented    Reviewed  and updated as needed this visit by clinical staff  Tobacco  Allergies  Meds  Med Hx  Surg Hx  Fam Hx  Soc Hx      Reviewed and updated as needed this visit by Provider       Patient Active Problem List   Diagnosis     HL (hearing loss)     Erectile dysfunction     Keratoglobus, ou     Pseudophakia, PCL, od; ACL, os     Posterior vitreous detachment, od     Epiretinal membrane, mild, od     " Advanced directives, counseling/discussion     HDL deficiency     JOSE JUAN (obstructive sleep apnea)-severe (AHI 32)     BCC (basal cell carcinoma)     Cardiomyopathy (H)     Obstructive sleep apnea     Near syncope     RCT (rotator cuff tear)     Disorder of bursae and tendons in shoulder region     Other postprocedural status(V45.89)     Iritis, os     Cramp of limb     Essential hypertension with goal blood pressure less than 140/90     Hyperlipidemia LDL goal <70     overwieght BMI > 35     Visit for suture removal     Past Surgical History:   Procedure Laterality Date     ARTHROSCOPY SHOULDER BICEPS TENODESIS REPAIR  2014    Procedure: ARTHROSCOPY SHOULDER BICEPS TENODESIS REPAIR;;  Surgeon: Michael Hagen MD;  [facility]     ARTHROSCOPY SHOULDER ROTATOR CUFF REPAIR  2014    Procedure: ARTHROSCOPY SHOULDER ROTATOR CUFF REPAIR;  Right Shoulder Arthroscopic Rotator Cuff Repair,  Subacromial Decompression, Biceps Tenodesis, Distal Clavicle Excision   ;  Surgeon: Michael Hagen MD;  Location: [facility]     BIOPSY       CARDIAC SURGERY       COLONOSCOPY       EXCHANGE INTRAOCULAR LENS IMPLANT      left eye - first, recentered PCL with iris fixation; then IOLX with ACL     EXTRACAPSULAR CATARACT EXTRATION WITH INTRAOCULAR LENS IMPLANT      both eyes (elsewhere)     TURBINOPLASTY  2013    Procedure: TURBINOPLASTY;;  Surgeon: Lisset Parsons MD;  Location:  OR     UVULOPALATOPHARYNGOPLASTY  2013    Procedure: UVULOPALATOPHARYNGOPLASTY;  Uvulopalatopharyngoplasty, Turbiante Reduction;  Surgeon: Lisset Parsons MD;  Location:  OR       Social History     Tobacco Use     Smoking status: Former Smoker     Packs/day: 0.50     Years: 2.00     Pack years: 1.00     Types: Cigarettes     Last attempt to quit: 1996     Years since quittin.3     Smokeless tobacco: Never Used   Substance Use Topics     Alcohol use: Yes     Alcohol/week: 5.0 oz      Comment: Sonya Salazar     Family History   Problem Relation Age of Onset     Diabetes Father         Old age Diabetes     Cerebrovascular Disease Father      Obesity Father      Heart Disease Father      Coronary Artery Disease Father      Hypertension Father      Lipids Sister      C.A.D. No family hx of      Cancer No family hx of      Glaucoma No family hx of      Macular Degeneration No family hx of          BP Readings from Last 3 Encounters:   04/25/19 96/76   04/25/19 111/72   03/28/19 130/74    Wt Readings from Last 3 Encounters:   04/25/19 107.5 kg (237 lb)   03/25/19 108 kg (238 lb)   03/18/19 112.9 kg (248 lb 14.4 oz)         ROS:  Constitutional, HEENT, cardiovascular, pulmonary, GI, , musculoskeletal, neuro, skin, endocrine and psych systems are negative, except as otherwise noted.    This document serves as a record of the services and decisions personally performed and made by Holland Blunt MD. It was created on his behalf by Jonathan Bauer, a trained medical scribe. The creation of this document is based on the provider's statements to the medical scribe.  Jonathan Bauer 3:36 PM April 25, 2019    OBJECTIVE:     BP 96/76   Resp 22   There is no height or weight on file to calculate BMI.  GENERAL: healthy, alert and no distress  EYES: Eyes grossly normal to inspection, PERRL and conjunctivae and sclerae normal  HENT: he has post oropharynx erythema, no whitish patches or exudates  NECK: no adenopathy, no asymmetry, masses, or scars and thyroid normal to palpation  RESP: lungs clear to auscultation - no rales, rhonchi or wheezes  SKIN: no suspicious lesions or rashes to visible skin   NEURO: Normal strength and tone, mentation intact and speech normal  PSYCH: mentation appears normal, affect normal/bright    Diagnostic Test Results:  No results found for this or any previous visit (from the past 24 hour(s)).    ASSESSMENT/PLAN:     (J02.0) Streptococcal sore throat  (primary encounter  diagnosis)  Comment: rapid strep test is negative   Plan: Rapid strep screen, Beta strep group A culture            (J20.9) Acute bronchitis, unspecified organism  Comment: I think this most likely a viral syndrome but I agreed that I would give a Hard copy prescription generated for azithromycin 5 day course , to be filled only if symptoms persistent without resolution after 3-4 more days   Plan: benzonatate (TESSALON) 200 MG capsule,         azithromycin (ZITHROMAX) 250 MG tablet            (R05) Cough  Comment: he gags on cough drops and we agreed to try Benzonatate [ tessalon perles ]   Plan: benzonatate (TESSALON) 200 MG capsule,         azithromycin (ZITHROMAX) 250 MG tablet              See Patient Instructions    The information in this document, created by the medical scribe for me, accurately reflects the services I personally performed and the decisions made by me. I have reviewed and approved this document for accuracy prior to leaving the patient care area.  April 25, 2019 3:36 PM    Holland Blunt MD  [facility]

## 2019-04-25 NOTE — PATIENT INSTRUCTIONS
1) We will take a posture of observation over the next 2-3 days and will avoid unnecessary antibiotic treatment. If you don't seem to be making improvements over this time period you should try the antibiotic medication.     2) Supportive measures reviewed ,    Rest   Push fluids   Acetaminophen or Naproxen [Aleve] / ADVIL (ibuprofen)  prn for fever and aches and pains  Guaifenesin [ mucinex ] can help for sinus congestion   Robitussin DM, 1-2 tsp q 4-6 hours can help with congestion / coughing   Sucrets or other throat lozenges can help for sore throat along with gargling with warm salt water   Gargling with warm saltwater can really help the sore throat symptoms , prn / as often as every hour

## 2019-04-25 NOTE — PATIENT INSTRUCTIONS
Take your medicines every day, as directed    Changes made today:  o Decrease Lasix to 20 mg daily.   o Increase Losartan to 100 mg daily. Monitor Your Weight and Symptoms    Contact us if you:      Gain 2 pounds in one day or 5 pounds in one week    Feel more short of breath    Notice more leg swelling    Feel lightheadeded Change your lifestyle    Limit Salt or Sodium:    2000 mg  Limit Fluids:    2000 mL or approximately 64 ounces  Eat a Heart Healthy Diet    Low in saturated fats  Stay Active:    Aim to move at least 150 minutes every  week       To Contact us  During Business Hours:  [phone], option # 1 (University)  Then option # 3 (medical questions)     After hours, weekends or holidays:   [phone], Option #4  Ask to speak to the On-Call Cardiologist. Inform them you are a CORE/heart failure patient at the [facility].   [employer] allows you to communicate directly with your heart team through secure messaging.    [employer] can be accessed any time on your phone, computer, or tablet.    If you need assistance, we'd be happy to help!       Keep your Heart Appointments:    1.  Repeat an electrolyte panel in 1-2 weeks.  2.  Follow up in CORE Clinic in   2-3 months.

## 2019-04-26 LAB
BACTERIA SPEC CULT: NORMAL
SPECIMEN SOURCE: NORMAL

## 2019-04-26 RX ORDER — LOSARTAN POTASSIUM 100 MG/1
100 TABLET ORAL DAILY
Qty: 90 TABLET | Refills: 3 | Status: SHIPPED | OUTPATIENT
Start: 2019-04-26 | End: 2019-06-17

## 2019-04-29 ENCOUNTER — MYC MEDICAL ADVICE (OUTPATIENT)
Dept: CARDIOLOGY | Facility: CLINIC | Age: 69
End: 2019-04-29

## 2019-04-29 NOTE — TELEPHONE ENCOUNTER
----- Message from Lisset Cedillo RN sent at 4/29/2019  2:00 PM CDT -----  Regarding: mail copy of AVS  Hi arnaudthomas,   I'm just working from home today or I would do this one, but he saw Teresa last week, do you mind mailing him a copy of his AVS? He walked out before we were done.   Thanks!  Zenobia

## 2019-05-01 SDOH — HEALTH STABILITY: MENTAL HEALTH: HOW OFTEN DO YOU HAVE A DRINK CONTAINING ALCOHOL?: 4 OR MORE TIMES A WEEK

## 2019-05-01 SDOH — HEALTH STABILITY: MENTAL HEALTH: HOW MANY DRINKS CONTAINING ALCOHOL DO YOU HAVE ON A TYPICAL DAY WHEN YOU ARE DRINKING?: NOT ASKED

## 2019-05-01 SDOH — HEALTH STABILITY: MENTAL HEALTH: HOW MANY STANDARD DRINKS CONTAINING ALCOHOL DO YOU HAVE ON A TYPICAL DAY?: NOT ASKED

## 2019-05-14 ENCOUNTER — OFFICE VISIT (OUTPATIENT)
Dept: OPHTHALMOLOGY | Facility: CLINIC | Age: 69
End: 2019-05-14
Payer: MEDICARE

## 2019-05-14 DIAGNOSIS — Z96.1 PSEUDOPHAKIA: ICD-10-CM

## 2019-05-14 DIAGNOSIS — H18.799 KERATOGLOBUS: ICD-10-CM

## 2019-05-14 DIAGNOSIS — H35.371 EPIRETINAL MEMBRANE (ERM) OF RIGHT EYE: Chronic | ICD-10-CM

## 2019-05-14 DIAGNOSIS — Z86.69 HISTORY OF IRITIS: ICD-10-CM

## 2019-05-14 DIAGNOSIS — Q15.8 MEGALOCORNEA: ICD-10-CM

## 2019-05-14 DIAGNOSIS — H43.811 POSTERIOR VITREOUS DETACHMENT OF RIGHT EYE: ICD-10-CM

## 2019-05-14 DIAGNOSIS — T85.22XA DISLOCATION OF LENS IMPLANT, INITIAL ENCOUNTER: Primary | ICD-10-CM

## 2019-05-14 DIAGNOSIS — H52.4 PRESBYOPIA: ICD-10-CM

## 2019-05-14 PROCEDURE — 92015 DETERMINE REFRACTIVE STATE: CPT | Mod: GY | Performed by: OPHTHALMOLOGY

## 2019-05-14 PROCEDURE — 92014 COMPRE OPH EXAM EST PT 1/>: CPT | Performed by: OPHTHALMOLOGY

## 2019-05-14 ASSESSMENT — REFRACTION_MANIFEST
OD_SPHERE: PLANO
OD_ADD: +2.75
OD_AXIS: 150
OS_ADD: +2.75
OD_CYLINDER: +1.75
OS_CYLINDER: +1.50
OS_AXIS: 135
OS_SPHERE: -1.00

## 2019-05-14 ASSESSMENT — TONOMETRY
OS_IOP_MMHG: 16
IOP_METHOD: APPLANATION
OD_IOP_MMHG: 15

## 2019-05-14 ASSESSMENT — CONF VISUAL FIELD
OS_NORMAL: 1
OD_INFERIOR_TEMPORAL_RESTRICTION: 3
METHOD: COUNTING FINGERS

## 2019-05-14 ASSESSMENT — VISUAL ACUITY
OD_CC+: -2
OS_CC: 20/20
OD_CC: 20/30
METHOD: SNELLEN - LINEAR

## 2019-05-14 ASSESSMENT — REFRACTION_WEARINGRX
OS_CYLINDER: +1.25
OD_AXIS: 104
OS_SPHERE: -0.75
OD_SPHERE: PLANO
OD_ADD: +2.75
OD_CYLINDER: +1.75
OS_AXIS: 142
SPECS_TYPE: PAL
OS_ADD: +2.75

## 2019-05-14 ASSESSMENT — SLIT LAMP EXAM - LIDS
COMMENTS: NORMAL
COMMENTS: NORMAL

## 2019-05-14 ASSESSMENT — CUP TO DISC RATIO
OS_RATIO: 0.1
OD_RATIO: 0.1

## 2019-05-14 ASSESSMENT — EXTERNAL EXAM - LEFT EYE: OS_EXAM: NORMAL

## 2019-05-14 ASSESSMENT — EXTERNAL EXAM - RIGHT EYE: OD_EXAM: NORMAL

## 2019-05-14 NOTE — PROGRESS NOTES
Current Eye Medications:  none     Subjective:  Complete eye exam. Vision is down a little distance and near both eyes for last 6 months. No eye pain or discomfort in either eye.   Borderline diabetic a couple of months ago.     Lab Results   Component Value Date    A1C 5.9 04/03/2019    A1C 5.4 08/22/2018    A1C 5.7 05/24/2017    A1C 5.6 08/18/2016        Objective:  See Ophthalmology Exam.       Assessment:  Worsening dislocation of PCL right eye in patient with hx of secondary ACL right eye (special order - 14.5 mm; possible keratoglobus or megalocornea).      ICD-10-CM    1. Dislocation of lens implant, od, initial encounter T85.22XA    2. Pseudophakia, PCL, od; ACL, os Z96.1    3. 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 Epiretinal membrane (ERM) of right eye H35.371    6. History of iritis, os Z86.69    7. Posterior vitreous detachment of right eye H43.811    8. Presbyopia H52.4         Plan:  Glasses Rx given - optional  Will check [facility] records regarding previous surgeries right eye.  Call patient after review to schedule referral for dislocated PCL right eye.  Jose Dewey M.D.  [phone]    NB: [facility] records reviewed:  Leonides 11/11/2004; [facility] 11/19/2004.

## 2019-05-14 NOTE — LETTER
5/14/2019         RE: Ricky Brown  [address]  [facility] [address]        Dear Colleague,    Thank you for referring your patient, Ricky Brown, to the [facility]. Please see a copy of my visit note below.     Current Eye Medications:  none     Subjective:  Complete eye exam. Vision is down a little distance and near both eyes for last 6 months. No eye pain or discomfort in either eye.   Borderline diabetic a couple of months ago.     Lab Results   Component Value Date    A1C 5.9 04/03/2019    A1C 5.4 08/22/2018    A1C 5.7 05/24/2017    A1C 5.6 08/18/2016        Objective:  See Ophthalmology Exam.       Assessment:  Worsening dislocation of PCL right eye in patient with hx of secondary ACL right eye (special order - 14.5 mm; possible keratoglobus or megalocornea).      ICD-10-CM    1. Dislocation of lens implant, od, initial encounter T85.22XA    2. Pseudophakia, PCL, od; ACL, os Z96.1    3. 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 Epiretinal membrane (ERM) of right eye H35.371    6. History of iritis, os Z86.69    7. Posterior vitreous detachment of right eye H43.811    8. Presbyopia H52.4         Plan:  Glasses Rx given - optional  Will check [facility] records regarding previous surgeries right eye.  Call patient after review to schedule referral for dislocated PCL right eye.  Jose Dewey M.D.  [phone]    NB: [facility] records reviewed:  Leonides 11/11/2004; [facility] 11/19/2004.           Again, thank you for allowing me to participate in the care of your patient.        Sincerely,        Jose Dewey MD

## 2019-05-16 ENCOUNTER — OFFICE VISIT (OUTPATIENT)
Dept: FAMILY MEDICINE | Facility: CLINIC | Age: 69
End: 2019-05-16
Payer: MEDICARE

## 2019-05-16 VITALS
SYSTOLIC BLOOD PRESSURE: 138 MMHG | HEIGHT: 67 IN | WEIGHT: 235.3 LBS | OXYGEN SATURATION: 96 % | HEART RATE: 93 BPM | DIASTOLIC BLOOD PRESSURE: 88 MMHG | RESPIRATION RATE: 22 BRPM | TEMPERATURE: 97.7 F | BODY MASS INDEX: 36.93 KG/M2

## 2019-05-16 DIAGNOSIS — I42.9 CARDIOMYOPATHY, UNSPECIFIED TYPE (H): Primary | ICD-10-CM

## 2019-05-16 DIAGNOSIS — I10 ESSENTIAL HYPERTENSION WITH GOAL BLOOD PRESSURE LESS THAN 140/90: ICD-10-CM

## 2019-05-16 PROCEDURE — 99214 OFFICE O/P EST MOD 30 MIN: CPT | Performed by: FAMILY MEDICINE

## 2019-05-16 ASSESSMENT — PAIN SCALES - GENERAL: PAINLEVEL: NO PAIN (0)

## 2019-05-16 ASSESSMENT — MIFFLIN-ST. JEOR: SCORE: 1798

## 2019-05-16 NOTE — PATIENT INSTRUCTIONS
You target blood pressure is lower than 140/90 when you are resting. An ideal reading is 130/80.     I recommend increasing Losartan to 50 mg. Preventing water retention is important to decreasing the strain on your heart.    Please continue to monitor your weight. Your current dry weight is about 235 lbs. If you notice that your weight begins to progress towards 240 lbs, you should take a water pill.

## 2019-05-16 NOTE — PROGRESS NOTES
"  SUBJECTIVE:   Ricky Brown is a 68 year old male who presents to clinic today for the following   health issues:    Hypertension Follow-up  Patient reports checking blood pressures often at home. His blood pressure machine at home does not match with clinic readings. His last cardiology visit was 04/25/19 with Teresa Morris NP. Also notes his smart watch registers a \"blip\" on his pulse.    Medications  Patient reports he has decreased losartan. His providers gradually increased his dose from 50 mg to 100 mg over 2 weeks. At the higher dose, the patient notes feeling tired due to inability to initiate sleep. He subsequently decreased losartan to 25 mg and notes improving sleep and energy.    He is not taking any diuretics. States, \"It would be a cold day in hell if I ever took water pills again.\" Notes he can do exercises to keep his blood pressure down.        Outpatient blood pressures are being checked at home.  Results are 130's to 150's over 80's to 100's   .    Low Salt Diet: low salt      Amount of exercise or physical activity: 6-7 days/week for an average of 45-60 minutes    Problems taking medications regularly: No    Medication side effects: using cozaar at 50mg     Diet: low salt and low fat/cholesterol      Musculoskeletal  Reports cramping in left lower extremity yesterday.         Additional history: as documented    Reviewed  and updated as needed this visit by clinical staff  Tobacco  Meds  Soc Hx        Reviewed and updated as needed this visit by Provider         Patient Active Problem List   Diagnosis     HL (hearing loss)     Erectile dysfunction     Keratoglobus, ou     Pseudophakia, PCL, od; ACL, os     Posterior vitreous detachment, od     Epiretinal membrane, mild, od     Advanced directives, counseling/discussion     HDL deficiency     JOSE JUAN (obstructive sleep apnea)-severe (AHI 32)     BCC (basal cell carcinoma)     Cardiomyopathy (H)     Obstructive sleep apnea     Near syncope "     RCT (rotator cuff tear)     Disorder of bursae and tendons in shoulder region     Other postprocedural status(V45.89)     Iritis, os     Cramp of limb     Essential hypertension with goal blood pressure less than 140/90     Hyperlipidemia LDL goal <70     overwieght BMI > 35     Visit for suture removal     Past Surgical History:   Procedure Laterality Date     ARTHROSCOPY SHOULDER BICEPS TENODESIS REPAIR  2014    Procedure: ARTHROSCOPY SHOULDER BICEPS TENODESIS REPAIR;;  Surgeon: Michael Hagen MD;  [facility]     ARTHROSCOPY SHOULDER ROTATOR CUFF REPAIR  2014    Procedure: ARTHROSCOPY SHOULDER ROTATOR CUFF REPAIR;  Right Shoulder Arthroscopic Rotator Cuff Repair,  Subacromial Decompression, Biceps Tenodesis, Distal Clavicle Excision   ;  Surgeon: Michael Hagen MD;  [facility]     BIOPSY       CARDIAC SURGERY       COLONOSCOPY       EXCHANGE INTRAOCULAR LENS IMPLANT      left eye - first, recentered PCL with iris fixation; then IOLX with ACL     EXTRACAPSULAR CATARACT EXTRATION WITH INTRAOCULAR LENS IMPLANT      both eyes (elsewhere)     TURBINOPLASTY  2013    Procedure: TURBINOPLASTY;;  Surgeon: Lisset Parsons MD;  Location:  OR     UVULOPALATOPHARYNGOPLASTY  2013    Procedure: UVULOPALATOPHARYNGOPLASTY;  Uvulopalatopharyngoplasty, Turbiante Reduction;  Surgeon: Lisset Parsons MD;  Location:  OR       Social History     Tobacco Use     Smoking status: Former Smoker     Packs/day: 0.50     Years: 2.00     Pack years: 1.00     Types: Cigarettes     Last attempt to quit: 1996     Years since quittin.4     Smokeless tobacco: Never Used   Substance Use Topics     Alcohol use: Yes     Alcohol/week: 5.0 oz     Frequency: 4 or more times a week     Comment: Evening Marie     Family History   Problem Relation Age of Onset     Diabetes Father         Old age Diabetes     Cerebrovascular Disease Father      Obesity Father       Heart Disease Father      Coronary Artery Disease Father      Hypertension Father      Lipids Sister      C.A.D. No family hx of      Cancer No family hx of      Glaucoma No family hx of      Macular Degeneration No family hx of          Current Outpatient Medications   Medication Sig Dispense Refill     aspirin 81 MG tablet Take 1 tablet (81 mg) by mouth daily 90 tablet 3     atorvastatin (LIPITOR) 10 MG tablet Take 1 tablet (10 mg) by mouth daily 90 tablet 3     Calcium Carbonate Antacid (TUMS CALCIUM FOR LIFE BONE PO) Take  by mouth.       diphenoxylate-atropine (LOMOTIL) 2.5-0.025 MG per tablet Take 1 tablet by mouth daily as needed for diarrhea Patient only takes 1 tablet as needed 30 tablet 5     losartan (COZAAR) 100 MG tablet Take 1 tablet (100 mg) by mouth daily (Patient taking differently: Take 50 mg by mouth daily ) 90 tablet 3     metoprolol succinate ER (TOPROL-XL) 50 MG 24 hr tablet Take 1 tablet (50 mg) by mouth daily 90 tablet 3     furosemide (LASIX) 20 MG tablet Take 1 tablet (20 mg) by mouth daily (Patient not taking: Reported on 5/16/2019) 60 tablet 3     Multiple Vitamin (MULTIVITAMINS PO) Take  by mouth daily.       potassium chloride ER (K-DUR/KLOR-CON M) 20 MEQ CR tablet Take 1 tablet (20 mEq) by mouth daily (Patient not taking: Reported on 5/16/2019) 30 tablet 3     Allergies   Allergen Reactions     Lisinopril Cough     Recent Labs   Lab Test 04/24/19  0901 04/03/19  1004 04/03/19  0958  02/21/19  1622 08/22/18  1505 04/11/18  1520 05/24/17  1513 05/10/17  1038  08/10/16  0934   A1C  --  5.9*  --   --   --  5.4  --  5.7  --    < >  --    LDL  --   --   --   --   --   --  38  --  22  --  26   HDL  --   --   --   --   --   --  36*  --  41  --  37*   TRIG  --   --   --   --   --   --  353*  --  267*  --  202*   ALT  --   --   --   --  37  --  27  --  38  --   --    CR 1.12  --  0.99   < > 0.95  --  0.86  --  0.98  --  0.84   GFRESTIMATED 68  --  79   < > 82  --  90  --  77  --  >90  Non  " GFR Calc     GFRESTBLACK 79  --  >90   < > >90  --  >90  --  >90  African American GFR Calc    --  >90   GFR Calc     POTASSIUM 3.9  --  4.3   < > 4.1  --  4.4  --  4.3  --  3.9   TSH  --   --   --   --  1.38 1.29  --   --   --    < >  --     < > = values in this interval not displayed.      BP Readings from Last 3 Encounters:   05/16/19 138/88   04/25/19 96/76   04/25/19 111/72    Wt Readings from Last 3 Encounters:   05/16/19 106.7 kg (235 lb 4.8 oz)   04/25/19 107.5 kg (237 lb)   03/25/19 108 kg (238 lb)         Labs reviewed in EPIC    ROS:  Constitutional, HEENT, cardiovascular, pulmonary, gi and gu systems are negative, except as otherwise noted.    This document serves as a record of the services and decisions personally performed and made by Armen Chavez MD. It was created on her behalf by Michelle Monaco, a trained medical scribe. The creation of this document is based on the provider's statements to the medical scribe.  Michelle Monaco 2:58 PM 05/16/2019     OBJECTIVE:     /88   Pulse 93   Temp 97.7  F (36.5  C) (Oral)   Resp 22   Ht 1.689 m (5' 6.5\")   Wt 106.7 kg (235 lb 4.8 oz)   SpO2 96%   BMI 37.41 kg/m    Body mass index is 37.41 kg/m .  GENERAL: healthy, alert and no distress  EYES: Eyes grossly normal to inspection, PERRL and conjunctivae and sclerae normal  HENT: ear canals and TM's normal, nose and mouth without ulcers or lesions  NECK: no adenopathy, no asymmetry, masses, or scars and thyroid normal to palpation  RESP: lungs clear to auscultation - no rales, rhonchi or wheezes  CV: regular rate and rhythm, normal S1 S2, no S3 or S4, no murmur, click or rub, no peripheral edema and peripheral pulses strong  ABDOMEN: soft, nontender, no hepatosplenomegaly, no masses and bowel sounds normal  MS: no gross musculoskeletal defects noted, no edema  NEURO: Normal strength and tone, mentation intact and speech normal  PSYCH: mentation appears normal, " affect normal/bright  LYMPH: no cervical, supraclavicular, axillary, or inguinal adenopathy    Diagnostic Test Results:  none     ASSESSMENT/PLAN:       (I42.9) Cardiomyopathy, unspecified type (H)  (primary encounter diagnosis)  Comment: patient did not tolerate 100 mg of Losartan. Is now taking 50mg. Long conversation about need to control blood pressure and volume. He is not taking lasix at this time. Weight has been stable. Advised close monitoring of weight. If weight increases quickly(2-5 pounds in 3-5 days) he needs to restart Lasix  Plan: Lipid panel reflex to direct LDL Fasting get next lab draws   Continue present medications   and restart lasix if any change in weight. He is not willing to try to increase his Losartan. Reviewed benefits of ACE/ARB and B blockers for his condition.             (I10) Essential hypertension with goal blood pressure less than 140/90  Comment: Patient reports taking 25 mg Losartan. Not taking diuretics  Plan: Lipid panel reflex to direct LDL Fasting        Advised patient to increase Losartan to 50 mg and monitor weight closely.      25 min spent with patient >50% in review and counseling on his cardiomyopathy.     Patient Instructions   You target blood pressure is lower than 140/90 when you are resting. An ideal reading is 130/80.        Please continue to monitor your weight. Your current dry weight is about 235 lbs. If you notice that your weight begins to progress towards 240 lbs, you should take a water pill.      The information in this document, created by the medical scribe, Michelle Monaco, for me, accurately reflects the services I personally performed and the decisions made by me. I have reviewed and approved this document for accuracy prior to leaving the patient care area.    Armen Chavez MD, MD  [facility]

## 2019-05-23 ENCOUNTER — TELEPHONE (OUTPATIENT)
Dept: OPHTHALMOLOGY | Facility: CLINIC | Age: 69
End: 2019-05-23

## 2019-05-23 NOTE — TELEPHONE ENCOUNTER
Called patient and informed him that Dr. Dewey was not in the office today.  He prefers I call his wife (Kyung) and discuss the situation.    Called Kynug (she was a previous [employer] employee), and told her that I would ask Dr. Dewey tomorrow about the course of action, then call her back.  She requests I call her home number if it's before 11am tomorrow, or her cell number if it's after 11am.

## 2019-05-23 NOTE — TELEPHONE ENCOUNTER
Reason for Call:  Other Referral    Detailed comments: patient called stating he has been waiting for a call from Dr. Anne about where to have an eye surgery done and with whom, Please call to discuss thank you.     Phone Number Patient can be reached at: Cell number on file:    Telephone Information:   Mobile [phone]       Best Time: any    Can we leave a detailed message on this number? YES    Call taken on 5/23/2019 at 9:15 AM by NURIS PARRA

## 2019-05-24 NOTE — TELEPHONE ENCOUNTER
Dr. Dewey spoke to Kyung today.  He is continuing to research the surgeon and will call her again with results.

## 2019-05-27 PROBLEM — T85.22XA DISLOCATION OF LENS IMPLANT: Status: ACTIVE | Noted: 2019-05-27

## 2019-05-27 PROBLEM — Q15.8: Status: ACTIVE | Noted: 2019-05-27

## 2019-05-27 PROBLEM — Z86.69 HISTORY OF IRITIS: Status: ACTIVE | Noted: 2019-05-27

## 2019-05-27 NOTE — PATIENT INSTRUCTIONS
Glasses Rx given - optional  Will check [facility] records regarding previous surgeries right eye.  Call patient after review to schedule referral for dislocated PCL right eye.  Jose Dewey M.D.  [phone]

## 2019-06-06 NOTE — TELEPHONE ENCOUNTER
Reason for Call:  Other call back    Detailed comments: Patients wife called checking to see if there are any updates in the process of finding a surgeon for patient, Please call Kyung back to discuss thank you.     Phone Number Patient can be reached at: Cell number on file:    Telephone Information:   Mobile [phone]       Best Time: any    Can we leave a detailed message on this number? YES    Call taken on 6/6/2019 at 3:46 PM by NURIS PARRA

## 2019-06-07 NOTE — TELEPHONE ENCOUNTER
Spoke with wife of patient, Kyung and advised of the appointment with Dr. Sierra at [facility] on 8/5/2019 at 1:10pm. Will send notes and referral.

## 2019-06-17 ENCOUNTER — OFFICE VISIT (OUTPATIENT)
Dept: FAMILY MEDICINE | Facility: CLINIC | Age: 69
End: 2019-06-17
Payer: MEDICARE

## 2019-06-17 VITALS
BODY MASS INDEX: 37.11 KG/M2 | WEIGHT: 236.44 LBS | SYSTOLIC BLOOD PRESSURE: 132 MMHG | HEIGHT: 67 IN | TEMPERATURE: 98.5 F | OXYGEN SATURATION: 97 % | HEART RATE: 80 BPM | DIASTOLIC BLOOD PRESSURE: 80 MMHG

## 2019-06-17 DIAGNOSIS — S83.422A SPRAIN OF LATERAL COLLATERAL LIGAMENT OF LEFT KNEE, INITIAL ENCOUNTER: ICD-10-CM

## 2019-06-17 DIAGNOSIS — I42.9 CARDIOMYOPATHY, UNSPECIFIED TYPE (H): Primary | ICD-10-CM

## 2019-06-17 DIAGNOSIS — I10 ESSENTIAL HYPERTENSION WITH GOAL BLOOD PRESSURE LESS THAN 140/90: ICD-10-CM

## 2019-06-17 PROBLEM — Z48.02 VISIT FOR SUTURE REMOVAL: Status: RESOLVED | Noted: 2017-07-17 | Resolved: 2019-06-17

## 2019-06-17 PROCEDURE — 99214 OFFICE O/P EST MOD 30 MIN: CPT | Performed by: FAMILY MEDICINE

## 2019-06-17 RX ORDER — LOSARTAN POTASSIUM 100 MG/1
50 TABLET ORAL DAILY
COMMUNITY
Start: 2019-06-17 | End: 2020-03-05

## 2019-06-17 ASSESSMENT — MIFFLIN-ST. JEOR: SCORE: 1803.16

## 2019-06-17 NOTE — PROGRESS NOTES
Subjective     Ricky Brown is a 68 year old male who presents to clinic today for the following health issues:    Patient hit his left knee about 2 weeks ago. Got better. Was helping move furniture and it hurt again after that.  Walking careful now. Hard to get up off lower spots. May want an Xray or order for an MRI. Open to Dr. Kwon thoughts.     Who should they be following for cardiology when Dr. Chavez is on leave?    Patient has taken his lasix about 3 times since visit last month. The first time he took it was rough but the next two times were not as difficult.     HPI   Hyperlipidemia Follow-Up      Are you having any of the following symptoms? (Select all that apply)  No complaints of shortness of breath, chest pain or pressure.  No increased sweating or nausea with activity.  No left-sided neck or arm pain.  No complaints of pain in calves when walking 1-2 blocks.    Are you regularly taking any medication or supplement to lower your cholesterol?   Yes- atorvastatin    Are you having muscle aches or other side effects that you think could be caused by your cholesterol lowering medication?  No      Hypertension Follow-up      Do you check your blood pressure regularly outside of the clinic? Not checking     Are you following a low salt diet? Yes    Are your blood pressures ever more than 140 on the top number (systolic) OR more     than 90 on the bottom number (diastolic), for example 140/90? Not checking        He has been adjusting his lasix depending on his weight. At good weight today. Review taking his potassium when he takes his lasix. Weight was up to 240 and had brisk response to the lasix.         BP Readings from Last 3 Encounters:   06/17/19 132/80   05/16/19 138/88   04/25/19 96/76    Wt Readings from Last 3 Encounters:   06/17/19 107.2 kg (236 lb 7 oz)   05/16/19 106.7 kg (235 lb 4.8 oz)   04/25/19 107.5 kg (237 lb)                    Reviewed and updated as needed this visit by  "Provider         Review of Systems   ROS COMP: Constitutional, HEENT, cardiovascular, pulmonary, gi and gu systems are negative, except as otherwise noted.      Objective    There were no vitals taken for this visit.  There is no height or weight on file to calculate BMI.  Physical Exam   GENERAL: alert and no distress  NECK: no adenopathy, no asymmetry, masses, or scars and thyroid normal to palpation  RESP: lungs clear to auscultation - no rales, rhonchi or wheezes  CV: regular rate and rhythm, normal S1 S2, no S3 or S4, no murmur, click or rub, no peripheral edema and peripheral pulses strong  ABDOMEN: soft, nontender, no hepatosplenomegaly, no masses and bowel sounds normal  MS: tender over lateral collateral liagament. No instability no effusions.     Diagnostic Test Results:  none         Assessment & Plan   Assessment      Plan  (I42.9) Cardiomyopathy, unspecified type (H)  (primary encounter diagnosis)  Comment: stable  Plan: continue to monitor daily weights. Dry weight 235. Adjust lasix as needed. He declines taking lasix daily    (I10) Essential hypertension with goal blood pressure less than 140/90  Comment: controlled  Plan:  Continue present medications      (S83.422A) Sprain of lateral collateral ligament of left knee, initial encounter  Comment:   Plan: reviewed ICE, consider neoprene sleeve. NSAID's sparingly due to cardiomyopathy and HTN    BMI:   Estimated body mass index is 37.59 kg/m  as calculated from the following:    Height as of this encounter: 1.689 m (5' 6.5\").    Weight as of this encounter: 107.2 kg (236 lb 7 oz).   Weight management plan: Discussed healthy diet and exercise guidelines        Patient Instructions     Continue present medications      Monitor weight    [facility]     Discharged by : Funmi SHAH MA    If you have any questions regarding your visit please contact your care team:     Team Melissa              Clinic Hours Telephone Number     Dr. Ayers " Scott Perez, CNP   7am-7pm  Monday - Thursday   7am-5pm  Fridays  [phone]   (Appointment scheduling available 24/7)     RN Line  [phone] option 2     Urgent Care - [place] - 11am-9pm Monday-Friday Saturday-Sunday- 9am-5pm     [place] -   5pm-9pm Monday-Friday Saturday-Sunday- 9am-5pm    [phone] - [place]    [phone] - [place]     For a Price Quote for your services, please call our Consumer Price Line at [phone].     What options do I have for visits at the clinic other than the traditional office visit?     To expand how we care for you, many of our providers are utilizing electronic visits (e-visits) and telephone visits, when medically appropriate, for interactions with their patients rather than a visit in the clinic. We also offer nurse visits for many medical concerns. Just like any other service, we will bill your insurance company for this type of visit based on time spent on the phone with your provider. Not all insurance companies cover these visits. Please check with your medical insurance if this type of visit is covered. You will be responsible for any charges that are not paid by your insurance.     E-visits via [employer]: generally incur a $45.00 fee.     Telephone visits:  Time spent on the phone: *charged based on time that is spent on the phone in increments of 10 minutes. Estimated cost:   5-10 mins $30.00   11-20 mins. $59.00   21-30 mins. $85.00       Use [employer] (secure email communication and access to your chart) to send your primary care provider a message or make an appointment. Ask someone on your Team how to sign up for [employer].     As always, Thank you for trusting us with your health care needs!      [facility]:    Scheduling Appointments  [place] [place]  Call: [phone]    [facility]  Call: [phone]    [facility]  [facility]  Call: [phone]    For Gastroenterology referrals   [facility]   Cl[facility] and [facility] Center, 4th Floor   [address] [place] [address]   Appointments: [phone]    WHERE TO GO FOR CARE?    Clinic    Make an appointment if you:       Are sick (cold, cough, flu, sore throat, earache or in pain).       Have a small injury (sprain, small cut, burn or broken bone).       Need a physical exam, Pap smear, vaccine or prescription refill.       Have questions about your health or medicines.    To reach us:      Call [phone] ([phone]). Open 24 hours every day. (For counseling services, call [phone].)    Log into [employer] at [place]. (Visit Word[employer] to create an account.) Hospital emergency room    An emergency is a serious or life- threatening problem that must be treated right away.    Call 801 or get to the hospital if you have:      Very bad or sudden:            - Chest pain or pressure         - Bleeding         - Head or belly pain         - Dizziness or trouble seeing, walking or                          Speaking      Problems breathing      Blood in your vomit or you are coughing up blood      A major injury (knocked out, loss of a finger or limb, rape, broken bone protruding from skin)    A mental health crisis. (Or call the Mental Health Crisis line at 1-911.474.4759 or Suicide Prevention Hotline at 1-130.329.4797.)    Open 24 hours every day. You don't need an appointment.     Urgent care    Visit urgent care for sickness or small injuries when the clinic is closed. You don't need an appointment. To check hours or find an urgent care near you, visit [email or web address]. Online care    Get online care from [facility] for more than 70 common problems, like colds, allergies and infections. Open 24 hours every day at:   [email or web address]   Need help deciding?    For advice about where to be seen, you may call your clinic and  ask to speak with a nurse. We're here for you 24 hours every day.         If you are deaf or hard of hearing, please let us know. We provide many free services including sign language interpreters, oral interpreters, TTYs, telephone amplifiers, note takers and written materials.                 Return in about 5 weeks (around 7/22/2019) for Review response to medications.    Armen Chavez MD, MD  [facility]

## 2019-06-17 NOTE — PATIENT INSTRUCTIONS
Continue present medications      Monitor weight    [facility]     Discharged by : Funmi SHAH MA    If you have any questions regarding your visit please contact your care team:     Team Melissa              Clinic Hours Telephone Number     Dr. Armen Perez, MANDI   7am-7pm  Monday - Thursday   7am-5pm  Fridays  [phone]   (Appointment scheduling available 24/7)     RN Line  [phone] option 2     Urgent [facility] - [place] - 11am-9pm Monday-Friday Saturday-Sunday- 9am-5pm     [place] -   5pm-9pm Monday-Friday Saturday-Sunday- 9am-5pm    [phone] - [place]    [phone] - [place]     For a Price Quote for your services, please call our [employer] Price Line at [phone].     What options do I have for visits at the clinic other than the traditional office visit?     To expand how we care for you, many of our providers are utilizing electronic visits (e-visits) and telephone visits, when medically appropriate, for interactions with their patients rather than a visit in the clinic. We also offer nurse visits for many medical concerns. Just like any other service, we will bill your insurance company for this type of visit based on time spent on the phone with your provider. Not all insurance companies cover these visits. Please check with your medical insurance if this type of visit is covered. You will be responsible for any charges that are not paid by your insurance.     E-visits via [employer]: generally incur a $45.00 fee.     Telephone visits:  Time spent on the phone: *charged based on time that is spent on the phone in increments of 10 minutes. Estimated cost:   5-10 mins $30.00   11-20 mins. $59.00   21-30 mins. $85.00       Use [employer] (secure email communication and access to your chart) to send your primary care provider a message or make an appointment. Ask someone on your Team how to sign up for  [employer].     As always, Thank you for trusting us with your health care needs!      [facility]:    Scheduling Appointments  [facility]  Call: [phone]    Elias Ko [facility]  Call: [phone]    [facility]  Call: [phone]    For Gastroenterology referrals   [facility]   Clinics and Surgery Center, 4th Floor   [address] [place] [address]   Appointments: [phone]    WHERE TO GO FOR CARE?    Clinic    Make an appointment if you:       Are sick (cold, cough, flu, sore throat, earache or in pain).       Have a small injury (sprain, small cut, burn or broken bone).       Need a physical exam, Pap smear, vaccine or prescription refill.       Have questions about your health or medicines.    To reach us:      Call [phone] ([phone]). Open 24 hours every day. (For counseling services, call [phone].)    Log into [employer] at [email or web address]. (Visit [email or web address] to create an account.) Hospital emergency room    An emergency is a serious or life- threatening problem that must be treated right away.    Call 369 or get to the hospital if you have:      Very bad or sudden:            - Chest pain or pressure         - Bleeding         - Head or belly pain         - Dizziness or trouble seeing, walking or                          Speaking      Problems breathing      Blood in your vomit or you are coughing up blood      A major injury (knocked out, loss of a finger or limb, rape, broken bone protruding from skin)    A mental health crisis. (Or call the Mental Health Crisis line at 1-650.764.5984 or Suicide Prevention Hotline at 1-660.575.9371.)    Open 24 hours every day. You don't need an appointment.     Urgent care    Visit urgent care for sickness or small injuries when the clinic is closed. You don't need an appointment. To check hours or find an urgent care near you,  visit [email or web address]. Online care    Get online care from [facility] for more than 70 common problems, like colds, allergies and infections. Open 24 hours every day at:   [email or web address]   Need help deciding?    For advice about where to be seen, you may call your clinic and ask to speak with a nurse. We're here for you 24 hours every day.         If you are deaf or hard of hearing, please let us know. We provide many free services including sign language interpreters, oral interpreters, TTYs, telephone amplifiers, note takers and written materials.

## 2019-07-23 DIAGNOSIS — I10 ESSENTIAL HYPERTENSION WITH GOAL BLOOD PRESSURE LESS THAN 140/90: ICD-10-CM

## 2019-07-23 DIAGNOSIS — I42.9 CARDIOMYOPATHY, UNSPECIFIED TYPE (H): ICD-10-CM

## 2019-07-23 PROCEDURE — 80061 LIPID PANEL: CPT | Performed by: FAMILY MEDICINE

## 2019-07-23 PROCEDURE — 80048 BASIC METABOLIC PNL TOTAL CA: CPT | Performed by: FAMILY MEDICINE

## 2019-07-23 PROCEDURE — 36415 COLL VENOUS BLD VENIPUNCTURE: CPT | Performed by: FAMILY MEDICINE

## 2019-07-24 ENCOUNTER — OFFICE VISIT (OUTPATIENT)
Dept: FAMILY MEDICINE | Facility: CLINIC | Age: 69
End: 2019-07-24
Payer: MEDICARE

## 2019-07-24 VITALS
BODY MASS INDEX: 36.74 KG/M2 | SYSTOLIC BLOOD PRESSURE: 138 MMHG | DIASTOLIC BLOOD PRESSURE: 82 MMHG | RESPIRATION RATE: 22 BRPM | HEART RATE: 89 BPM | HEIGHT: 67 IN | TEMPERATURE: 97.6 F | WEIGHT: 234.1 LBS | OXYGEN SATURATION: 94 %

## 2019-07-24 DIAGNOSIS — L98.9 SKIN LESION: ICD-10-CM

## 2019-07-24 DIAGNOSIS — R73.9 ELEVATED BLOOD SUGAR: ICD-10-CM

## 2019-07-24 DIAGNOSIS — I10 ESSENTIAL HYPERTENSION WITH GOAL BLOOD PRESSURE LESS THAN 140/90: Primary | ICD-10-CM

## 2019-07-24 DIAGNOSIS — E78.5 HYPERLIPIDEMIA LDL GOAL <70: ICD-10-CM

## 2019-07-24 DIAGNOSIS — E66.01 MORBID OBESITY DUE TO EXCESS CALORIES (H): ICD-10-CM

## 2019-07-24 DIAGNOSIS — M76.32 ILIOTIBIAL BAND SYNDROME, LEFT: ICD-10-CM

## 2019-07-24 DIAGNOSIS — I42.9 CARDIOMYOPATHY, UNSPECIFIED TYPE (H): ICD-10-CM

## 2019-07-24 LAB
CHOLEST SERPL-MCNC: 106 MG/DL
HDLC SERPL-MCNC: 52 MG/DL
LDLC SERPL CALC-MCNC: 16 MG/DL
NONHDLC SERPL-MCNC: 54 MG/DL
TRIGL SERPL-MCNC: 192 MG/DL

## 2019-07-24 PROCEDURE — 99214 OFFICE O/P EST MOD 30 MIN: CPT | Performed by: FAMILY MEDICINE

## 2019-07-24 ASSESSMENT — MIFFLIN-ST. JEOR: SCORE: 1792.56

## 2019-07-24 ASSESSMENT — PAIN SCALES - GENERAL: PAINLEVEL: NO PAIN (0)

## 2019-07-24 NOTE — PROGRESS NOTES
Subjective     Ricky Brown is a 68 year old male who presents to clinic today for the following health issues:  He is accompanied by his wife.      HPI     Hypertension Follow-up      Do you check your blood pressure regularly outside of the clinic? No     Are you following a low salt diet? Yes    Are your blood pressures ever more than 140 on the top number (systolic) OR more   than 90 on the bottom number (diastolic), for example 140/90? No       Left knee pain - sitting to standing causes pain         Amount of exercise or physical activity: 2-3 days/week for an average of 15-30 minutes    Problems taking medications regularly: No    Medication side effects: none    Diet: low salt    Left Knee Pain   Patient has pain when getting up from low seated chairs, or going up the stairs.     Skin Concern  Skin cancer on left shoulder many years ago. He is concerned about a spot on the side of his face. Would like to evaluate further.       Patient Active Problem List   Diagnosis     HL (hearing loss)     Erectile dysfunction     Keratoglobus, ou     Pseudophakia, PCL, od; ACL, os     Posterior vitreous detachment, od     Epiretinal membrane, mild, od     Advanced directives, counseling/discussion     JOSE JUAN (obstructive sleep apnea)-severe (AHI 32)     BCC (basal cell carcinoma)     Cardiomyopathy (H)     Near syncope     RCT (rotator cuff tear)     Disorder of bursae and tendons in shoulder region     Cramp of limb     Essential hypertension with goal blood pressure less than 140/90     Hyperlipidemia LDL goal <70     overwieght BMI > 35     Megalocornea     Dislocation of lens implant, od     History of iritis, os     Past Surgical History:   Procedure Laterality Date     ARTHROSCOPY SHOULDER BICEPS TENODESIS REPAIR  2/27/2014    Procedure: ARTHROSCOPY SHOULDER BICEPS TENODESIS REPAIR;;  Surgeon: Michael Hagen MD;  [facility]     ARTHROSCOPY SHOULDER ROTATOR CUFF REPAIR  2/27/2014    Procedure:  ARTHROSCOPY SHOULDER ROTATOR CUFF REPAIR;  Right Shoulder Arthroscopic Rotator Cuff Repair,  Subacromial Decompression, Biceps Tenodesis, Distal Clavicle Excision   ;  Surgeon: Michael Hagen MD;  [facility]     BIOPSY       CARDIAC SURGERY       COLONOSCOPY       EXCHANGE INTRAOCULAR LENS IMPLANT      left eye - first, recentered PCL with iris fixation; then IOLX with ACL     EXTRACAPSULAR CATARACT EXTRATION WITH INTRAOCULAR LENS IMPLANT      both eyes (elsewhere)     TURBINOPLASTY  2013    Procedure: TURBINOPLASTY;;  Surgeon: Lisset Parsons MD;  Location:  OR     UVULOPALATOPHARYNGOPLASTY  2013    Procedure: UVULOPALATOPHARYNGOPLASTY;  Uvulopalatopharyngoplasty, Turbiante Reduction;  Surgeon: Lisset Parsons MD;  Location:  OR       Social History     Tobacco Use     Smoking status: Former Smoker     Packs/day: 0.50     Years: 2.00     Pack years: 1.00     Types: Cigarettes     Last attempt to quit: 1996     Years since quittin.6     Smokeless tobacco: Never Used   Substance Use Topics     Alcohol use: Yes     Alcohol/week: 5.0 oz     Frequency: 4 or more times a week     Comment: Evening Marie     Family History   Problem Relation Age of Onset     Diabetes Father         Old age Diabetes     Cerebrovascular Disease Father      Obesity Father      Heart Disease Father      Coronary Artery Disease Father      Hypertension Father      Lipids Sister      C.A.D. No family hx of      Cancer No family hx of      Glaucoma No family hx of      Macular Degeneration No family hx of          Current Outpatient Medications   Medication Sig Dispense Refill     aspirin 81 MG tablet Take 1 tablet (81 mg) by mouth daily 90 tablet 3     atorvastatin (LIPITOR) 10 MG tablet Take 1 tablet (10 mg) by mouth daily 90 tablet 3     Calcium Carbonate Antacid (TUMS CALCIUM FOR LIFE BONE PO) Take  by mouth.       diphenoxylate-atropine (LOMOTIL) 2.5-0.025 MG per tablet  "Take 1 tablet by mouth daily as needed for diarrhea Patient only takes 1 tablet as needed 30 tablet 5     furosemide (LASIX) 20 MG tablet Take 1 tablet (20 mg) by mouth daily 60 tablet 3     losartan (COZAAR) 100 MG tablet Take 0.5 tablets (50 mg) by mouth daily       metoprolol succinate ER (TOPROL-XL) 50 MG 24 hr tablet Take 1 tablet (50 mg) by mouth daily 90 tablet 3     Multiple Vitamin (MULTIVITAMINS PO) Take  by mouth daily.       potassium chloride ER (K-DUR/KLOR-CON M) 20 MEQ CR tablet Take 1 tablet (20 mEq) by mouth daily 30 tablet 3     BP Readings from Last 3 Encounters:   07/24/19 138/82   06/17/19 132/80   05/16/19 138/88    Wt Readings from Last 3 Encounters:   07/24/19 106.2 kg (234 lb 1.6 oz)   06/17/19 107.2 kg (236 lb 7 oz)   05/16/19 106.7 kg (235 lb 4.8 oz)                    Reviewed and updated as needed this visit by Provider  This document serves as a record of the services and decisions personally performed and made by Armen Chavez MD. It was created on his behalf by Lorrie García, a trained medical scribe. The creation of this document is based the provider's statements to the medical scribe.  Lorrie García, July 24, 2019 2:57 PM            Review of Systems   ROS COMP: Constitutional, HEENT, cardiovascular, pulmonary, gi and gu systems are negative, except as otherwise noted.      Objective    /82 (BP Location: Right arm, Patient Position: Chair, Cuff Size: Adult Large)   Pulse 89   Temp 97.6  F (36.4  C) (Oral)   Resp 22   Ht 1.689 m (5' 6.5\")   Wt 106.2 kg (234 lb 1.6 oz)   SpO2 94%   BMI 37.22 kg/m    Body mass index is 37.22 kg/m .  Physical Exam   GENERAL: healthy, alert, no distress and over weight  NECK: no adenopathy, no asymmetry, masses, or scars and thyroid normal to palpation  RESP: lungs clear to auscultation - no rales, rhonchi or wheezes  CV: regular rate and rhythm, normal S1 S2, no S3 or S4, no murmur, click or rub, no peripheral edema and peripheral " "pulses strong  ABDOMEN: soft, nontender, no hepatosplenomegaly, no masses and bowel sounds normal  MS: tenderness to palpation of lateral collateral ligament and distal ITB bvand  SKIN: 4 mm lesion right cheek. Non healing ulceration    Diagnostic Test Results:  Labs reviewed in Epic        Assessment & Plan     (I10) Essential hypertension with goal blood pressure less than 140/90  (primary encounter diagnosis)  Comment: Controlled  Plan: Basic metabolic panel        Continue with current medications     (E78.5) Hyperlipidemia LDL goal <70  Comment: Controlled   Plan: Will continue to monitor    (I42.9) Cardiomyopathy, unspecified type (H)  Comment: Stable  Plan: Continue with present medications    (E66.01) overwieght BMI > 35  Comment:   Plan: he is losiing weight. Continue present diet and exercise changes    (R73.9) Elevated blood sugar  Comment:   Plan: Hemoglobin A1c            (M76.32) Iliotibial band syndrome, left  Comment:   Plan: reviewed exercises and icing. If not improved consider PT    (L98.9) Skin lesion-possible BCC  Comment:   Plan: DERMATOLOGY REFERRAL               BMI:   Estimated body mass index is 37.22 kg/m  as calculated from the following:    Height as of this encounter: 1.689 m (5' 6.5\").    Weight as of this encounter: 106.2 kg (234 lb 1.6 oz).       Patient Instructions       Continue with discussed stretches. Sit with knees out. Ice as needed.    Follow up if pain worsens.     Schedule with [facility] for skin check.     Orders Placed This Encounter     Basic metabolic panel     Hemoglobin A1c     Last Lab Result: Hemoglobin A1C (%)       Date                     Value                 04/03/2019               5.9 (H)          ----------     DERMATOLOGY REFERRAL     Referral Priority:   Routine     Referral Type:   Consultation     Number of Visits Requested:   1                   Return in about 4 months (around 11/24/2019) for Hypertension Follow-up.    The information in this " document, created by the medical scribe for me, accurately reflects the services I personally performed and the decisions made by me. I have reviewed and approved this document for accuracy.     Armen Chavez MD, MD  [facility]

## 2019-07-24 NOTE — PATIENT INSTRUCTIONS
Continue with discussed stretches. Sit with knees out. Ice as needed.    Follow up if pain worsens.     Schedule with [facility] for skin check.     Orders Placed This Encounter     Basic metabolic panel     Hemoglobin A1c     Last Lab Result: Hemoglobin A1C (%)       Date                     Value                 04/03/2019               5.9 (H)          ----------     DERMATOLOGY REFERRAL     Referral Priority:   Routine     Referral Type:   Consultation     Number of Visits Requested:   1

## 2019-07-25 ENCOUNTER — TELEPHONE (OUTPATIENT)
Dept: FAMILY MEDICINE | Facility: CLINIC | Age: 69
End: 2019-07-25

## 2019-07-25 DIAGNOSIS — Z00.00 LABORATORY EXAMINATION ORDERED AS PART OF A ROUTINE GENERAL MEDICAL EXAMINATION: Primary | ICD-10-CM

## 2019-07-25 LAB
ANION GAP SERPL CALCULATED.3IONS-SCNC: 7 MMOL/L (ref 3–14)
BUN SERPL-MCNC: 21 MG/DL (ref 7–30)
CALCIUM SERPL-MCNC: 8.5 MG/DL (ref 8.5–10.1)
CHLORIDE SERPL-SCNC: 111 MMOL/L (ref 94–109)
CO2 SERPL-SCNC: 23 MMOL/L (ref 20–32)
CREAT SERPL-MCNC: 0.88 MG/DL (ref 0.66–1.25)
GFR SERPL CREATININE-BSD FRML MDRD: 89 ML/MIN/{1.73_M2}
GLUCOSE SERPL-MCNC: 127 MG/DL (ref 70–99)
HBA1C MFR BLD: 5.4 % (ref 0–5.6)
POTASSIUM SERPL-SCNC: 4 MMOL/L (ref 3.4–5.3)
SODIUM SERPL-SCNC: 141 MMOL/L (ref 133–144)

## 2019-07-25 PROCEDURE — 83036 HEMOGLOBIN GLYCOSYLATED A1C: CPT | Performed by: FAMILY MEDICINE

## 2019-07-25 NOTE — TELEPHONE ENCOUNTER
The hemoglobin A1c and BMP that were ordered yesterday were suppose to be done with the blood drawn yesterday morning. Please take the future orders and do with that lab draw if possible.

## 2019-07-25 NOTE — TELEPHONE ENCOUNTER
Completed.  In the future, please order these as add-on labs instead of Routine labs.      Thanks,  [facility]

## 2019-08-02 ENCOUNTER — TELEPHONE (OUTPATIENT)
Dept: OPHTHALMOLOGY | Facility: CLINIC | Age: 69
End: 2019-08-02

## 2019-08-02 NOTE — TELEPHONE ENCOUNTER
Patient has an appointment at [facility] on 8-5-19.  Last chart note faxed to [facility], attn:  Nahomy.

## 2019-08-05 ENCOUNTER — TRANSFERRED RECORDS (OUTPATIENT)
Dept: HEALTH INFORMATION MANAGEMENT | Facility: CLINIC | Age: 69
End: 2019-08-05

## 2019-08-09 ENCOUNTER — OFFICE VISIT (OUTPATIENT)
Dept: FAMILY MEDICINE | Facility: CLINIC | Age: 69
End: 2019-08-09
Payer: MEDICARE

## 2019-08-09 VITALS
HEART RATE: 77 BPM | BODY MASS INDEX: 36.52 KG/M2 | RESPIRATION RATE: 16 BRPM | OXYGEN SATURATION: 97 % | SYSTOLIC BLOOD PRESSURE: 130 MMHG | WEIGHT: 232.7 LBS | HEIGHT: 67 IN | TEMPERATURE: 98.6 F | DIASTOLIC BLOOD PRESSURE: 84 MMHG

## 2019-08-09 DIAGNOSIS — H27.9 LENS DISORDER: ICD-10-CM

## 2019-08-09 DIAGNOSIS — I42.9 CARDIOMYOPATHY, UNSPECIFIED TYPE (H): ICD-10-CM

## 2019-08-09 DIAGNOSIS — I10 ESSENTIAL HYPERTENSION WITH GOAL BLOOD PRESSURE LESS THAN 140/90: ICD-10-CM

## 2019-08-09 DIAGNOSIS — G47.33 OSA (OBSTRUCTIVE SLEEP APNEA): ICD-10-CM

## 2019-08-09 DIAGNOSIS — Z01.818 PREOP GENERAL PHYSICAL EXAM: Primary | ICD-10-CM

## 2019-08-09 PROCEDURE — 99214 OFFICE O/P EST MOD 30 MIN: CPT | Performed by: NURSE PRACTITIONER

## 2019-08-09 ASSESSMENT — PAIN SCALES - GENERAL: PAINLEVEL: NO PAIN (0)

## 2019-08-09 ASSESSMENT — MIFFLIN-ST. JEOR: SCORE: 1786.21

## 2019-08-09 NOTE — PROGRESS NOTES
[address] [id number] [phone]  Dept: [phone]    PRE-OP EVALUATION:  Today's date: 2019    Ricky Brown (: 1950) presents for pre-operative evaluation assessment as requested by  (patient does not know the name).  He requires evaluation and anesthesia risk assessment prior to undergoing surgery/procedure for treatment of right eye, lens repair.    Proposed Surgery/ Procedure: right lens repair   Date of Surgery/ Procedure: 2019  Time of Surgery/ Procedure: patient does not know  Hospital/Surgical Facility: [facility] eye specialist     Primary Physician: Armen Chavez  Type of Anesthesia Anticipated:  Conscious sedation    Patient has a Health Care Directive or Living Will:  YES     1. NO - Do you have a history of heart attack, stroke, stent, bypass or surgery on an artery in the head, neck, heart or legs?  2. NO - Do you ever have any pain or discomfort in your chest?  3. YES - DO YOU HAVE A HISTORY OF HEART FAILURE heart valve leaky and sleep apnea  4. NO - Are you troubled by shortness of breath when: walking on the level, up a slight hill or at night?  5. NO - Do you currently have a cold, bronchitis or other respiratory infection?  6. NO - Do you have a cough, shortness of breath or wheezing?  7. NO - Do you sometimes get pains in the calves of your legs when you walk?  8. NO - Do you or anyone in your family have previous history of blood clots?  9. NO - Do you or does anyone in your family have a serious bleeding problem such as prolonged bleeding following surgeries or cuts?  10. NO - Have you ever had problems with anemia or been told to take iron pills?  11. NO - Have you had any abnormal blood loss such as black, tarry or bloody stools, or abnormal vaginal bleeding?  12. NO - Have you ever had a blood transfusion?  13. NO - Have you or any of your relatives ever had problems with anesthesia?  14.  YES - DO YOU HAVE SLEEP APNEA, EXCESSIVE SNORING OR DAYTIME DROWSINESS? Sleep Apnea   15. NO - Do you have any prosthetic heart valves?  16. NO - Do you have prosthetic joints?  17. NO - Is there any chance that you may be pregnant?      HPI:     HPI related to upcoming procedure: right lens is in a fallen position.    Obstructive sleep apnea:  Severe but resolved after he had UPPP surgery per patient.  Heart valve noisy:  40-50%.    HR last week was  on watch recorder.  This week heart rate .  Constant cough, drinking water helps.  Not a deep cough.  Thinks it is post nasal drip.    Medications: Losartan 1 tablet daily (unsure if it is 50 mg or 100 mg)  Atorvastatin 20 mg  Metoprolol 50 mg  Aspirin 81 mg    Potassium and Lasix as needed- he takes this about once per week    See problem list for active medical problems.  Problems all longstanding and stable, except as noted/documented.  See ROS for pertinent symptoms related to these conditions.      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 Provider to review       RCT (rotator cuff tear) 02/12/2014     Priority: Medium     Near syncope 02/10/2014     Priority: Medium     Cardiomyopathy (H) 05/21/2013     Priority: Medium     BCC (basal cell carcinoma)      Priority: Medium     right shoulder        JOSE JUAN (obstructive sleep apnea)-severe (AHI 32)       "Priority: Medium     Indications for Polysomnography 6/28/2013: The patient is a 62 y year old Male who is 5' 6\" and weighs 230.0 lbs.  His BMI equals 37.4.  The patients [place] sleepiness scale was 4.0 and neck size was 19.5.  A diagnostic polysomnogram was performed to evaluate for re-evaluation of JOSE JUAN after development of a cardiomyopathy.  After 123.0 minutes of sleep time the patient exhibited sufficient respiratory events qualifying him for a CPAP trial which was then initiated.      Polysomnogram Data:  A full night polysomnogram was performed recording the standard physiologic parameters including EEG, EOG, EMG, EKG, nasal and oral airflow.  Respiratory parameters of chest and abdominal movements are recorded with respiratory inductance plethysmography.  Oxygen saturation was recorded by pulse oximetry.      Diagnostic PSG  Sleep Architecture:  The total recording time of the diagnostic portion of the study was 142.1 minutes.  The total sleep time was 123.0 minutes.  During the diagnostic portion of the study the sleep latency was 4.8 minutes.  REM latency was N/A.  Sleep Efficiency was 86.6%.  Wake after sleep onset was 10.5.   The patient spent 13.0% of total sleep time in Stage N1, 81.3% in Stage N2, 5.7% in Stages N3 and 0.0% in REM.  No REM sleep noted.       Respiration:     Sustained Sleep Associated Hypoventilation -was not monitored.    Sleep Associated Hypoxemia - was present.  Baseline oxygen saturation was 95.5%.  The lowest oxygen saturation was 85.0%.  Snoring was reported as loud.     Events - During the diagnostic portion of the study, the polysomnogram revealed a presence of 20 obstructive, 0 central, and 0 mixed apneas resulting in an Apnea index of 9.8 events per hour.  There were 45 hypopneas resulting in a Hypopnea index of 22.0 events per hour.  The combined Apnea/Hypopnea Index was 31.7 events per hour.  The REM AHI was N/A.  The RERA index was 26.3 per hour.   The RDI was 58.    26.3 " 31.7     Treatment PSG  Sleep Architecture:  At 12:08 am the patient was placed on CPAP treatment and was titrated at pressures ranging from 5 cm/H20 up to 13 cm/H20.  The total recording time of the treatment portion of the study was 380.1 minutes.  The total sleep time was 264.5 minutes.  During the treatment portion of the study the sleep latency was 89.0 minutes.  REM latency was 55.5 minutes.  Sleep Efficiency was 69.6%.  Sleep Maintenance Efficiency was 91.0%.  Total wake time was 116.0 minutes for a total wake percentage of 8.8%. the patient spent 9.1% of total sleep time in Stage N1, 41.0% in Stage N2, 13.6% in Stages N3 and N, and 36.3% in REM.       Respiration:  The optimal pressure was 13.0 with an AHI of 0 including REM lateral.    Movement Activity:      Limb - During the diagnostic portion of the study, there were 80 limb movements recorded.  Of this total, 57 were classified as PLMs.  Of the PLMs, 2 were associated with arousals.  The Limb Movement index was 39.0 per hour while the PLM index was 27.8 per hour.  During the treatment portion of the study, there were 67 limb movements recorded.  Of this total, 49 were classified as PLMs.  Of the PLMs, 7 were associated with arousals.  The Limb Movement index was 15.2 per hour while the PLM index was 11.1 per hour.     Behavior - none    Bruxism - none    Seizure - none      CARDIAC SUMMARY:   No cardiac arrhythmias noted.       Assessment:    Severe JOSE JUAN (AHI 32) with adequate positive airway pressure titration.  Recommendations:    CPAP pressure 13 cmH2O with clinical follow-up within 3 - 4 weeks including compliance measures.    If symptoms not controlled, consider full night titration study.     Advise regarding the risks of drowsy driving    Suggest optimizing sleep hygiene and avoiding sleep deprivation    Weight management  Dopaminergic therapy should be used for management of restless leg syndrome (RLS) and not based on the presence of periodic  limb movements alone.       Advanced directives, counseling/discussion 03/28/2012     Priority: Low     Keratoglobus, ou 10/14/2011     Priority: Low     Pseudophakia, PCL, od; ACL, os 10/14/2011     Priority: Low     Posterior vitreous detachment, od 10/14/2011     Priority: Low     Epiretinal membrane, mild, od 10/14/2011     Priority: Low     HL (hearing loss) 04/01/2011     Priority: Low     Erectile dysfunction 04/01/2011     Priority: Low      Past Medical History:   Diagnosis Date     Arthritis      BCC (basal cell carcinoma)     right shoulder      Cardiomyopathy (H)      Colon adenomas 9/20/11     Coronary artery disease      ED (erectile dysfunction)      HTN (hypertension)      Noncompliance      Obesity      JOSE JUAN (obstructive sleep apnea)      Past Surgical History:   Procedure Laterality Date     ARTHROSCOPY SHOULDER BICEPS TENODESIS REPAIR  2/27/2014    Procedure: ARTHROSCOPY SHOULDER BICEPS TENODESIS REPAIR;;  Surgeon: Michael Hagen MD;  [facility]     ARTHROSCOPY SHOULDER ROTATOR CUFF REPAIR  2/27/2014    Procedure: ARTHROSCOPY SHOULDER ROTATOR CUFF REPAIR;  Right Shoulder Arthroscopic Rotator Cuff Repair,  Subacromial Decompression, Biceps Tenodesis, Distal Clavicle Excision   ;  Surgeon: Michael Hagen MD;  Location: [facility]     BIOPSY       CARDIAC SURGERY       COLONOSCOPY       EXCHANGE INTRAOCULAR LENS IMPLANT  2005    left eye - first, recentered PCL with iris fixation; then IOLX with ACL     EXTRACAPSULAR CATARACT EXTRATION WITH INTRAOCULAR LENS IMPLANT  2005    both eyes (elsewhere)     TURBINOPLASTY  12/11/2013    Procedure: TURBINOPLASTY;;  Surgeon: Lisset Parsons MD;  Location:  OR     UVULOPALATOPHARYNGOPLASTY  12/11/2013    Procedure: UVULOPALATOPHARYNGOPLASTY;  Uvulopalatopharyngoplasty, Turbiante Reduction;  Surgeon: Lisset Parsons MD;  Location: [facility]     Current Outpatient Medications   Medication Sig Dispense Refill     aspirin 81 MG  "tablet Take 1 tablet (81 mg) by mouth daily 90 tablet 3     atorvastatin (LIPITOR) 10 MG tablet Take 1 tablet (10 mg) by mouth daily 90 tablet 3     Calcium Carbonate Antacid (TUMS CALCIUM FOR LIFE BONE PO) Take  by mouth.       diphenoxylate-atropine (LOMOTIL) 2.5-0.025 MG per tablet Take 1 tablet by mouth daily as needed for diarrhea Patient only takes 1 tablet as needed 30 tablet 5     furosemide (LASIX) 20 MG tablet Take 1 tablet (20 mg) by mouth daily (Patient taking differently: Take 20 mg by mouth daily as needed ) 60 tablet 3     losartan (COZAAR) 100 MG tablet Take 0.5 tablets (50 mg) by mouth daily       metoprolol succinate ER (TOPROL-XL) 50 MG 24 hr tablet Take 1 tablet (50 mg) by mouth daily 90 tablet 3     Multiple Vitamin (MULTIVITAMINS PO) Take  by mouth daily.       potassium chloride ER (K-DUR/KLOR-CON M) 20 MEQ CR tablet Take 1 tablet (20 mEq) by mouth daily (Patient taking differently: Take 20 mEq by mouth daily as needed (takes with Lasix as needed) ) 30 tablet 3     OTC products: None, except as noted above    Allergies   Allergen Reactions     Lisinopril Cough      Latex Allergy: NO    Social History     Tobacco Use     Smoking status: Former Smoker     Packs/day: 0.50     Years: 2.00     Pack years: 1.00     Types: Cigarettes     Last attempt to quit: 1996     Years since quittin.6     Smokeless tobacco: Never Used   Substance Use Topics     Alcohol use: Yes     Alcohol/week: 5.0 oz     Frequency: 4 or more times a week     Comment: Evening Marie     History   Drug Use No       REVIEW OF SYSTEMS:   Constitutional, neuro, ENT, endocrine, pulmonary, cardiac, gastrointestinal, genitourinary, musculoskeletal, integument and psychiatric systems are negative, except as otherwise noted.    EXAM:   /84   Pulse 77   Temp 98.6  F (37  C) (Oral)   Resp 16   Ht 1.689 m (5' 6.5\")   Wt 105.6 kg (232 lb 11.2 oz)   SpO2 97%   BMI 37.00 kg/m      GENERAL APPEARANCE: healthy, alert and " "no distress, obese     EYES: EOMI,  PERRL     HENT: ear canals and TM's normal and nose and mouth without ulcers or lesions     NECK: no adenopathy, no asymmetry, masses, or scars and thyroid normal to palpation     RESP: lungs clear to auscultation - no rales, rhonchi or wheezes     CV: regular rates and rhythm, normal S1 S2, no S3 or S4 and no murmur, click or rub     SKIN: no suspicious lesions or rashes     NEURO: Normal strength and tone, sensory exam grossly normal, mentation intact and speech normal     PSYCH: mentation appears normal. and affect normal/bright     LYMPHATICS: No cervical adenopathy    DIAGNOSTICS:   No labs or EKG required for low risk surgery (cataract, skin procedure, breast biopsy, etc)  EKG: Not indicated due to non-vascular surgery and last ekg on 2/21/19 (within 30 days for CAD history or last year for cardiac risk factors)    Recent Labs   Lab Test 07/25/19  0853 07/23/19  0853 04/24/19  0901 04/03/19  1004  02/21/19  1622  05/09/18  1640   HGB  --   --   --   --   --  14.8  --  13.9   PLT  --   --   --   --   --  283  --  260   NA  --  141 139  --    < > 139  --   --    POTASSIUM  --  4.0 3.9  --    < > 4.1  --   --    CR  --  0.88 1.12  --    < > 0.95  --   --    A1C 5.4  --   --  5.9*  --   --    < >  --     < > = values in this interval not displayed.      3/11/19:  Echocardiogram:  \"Interpretation Summary     Mildly (EF 45-50%) reduced left ventricular function is present. Mild to moderate left ventricular dilation is present.  Right ventricular function, chamber size, wall motion, and thickness are normal.  Mild left atrial enlargement is present.  Mild to moderate aortic insufficiency is present.  The inferior vena cava was normal in size with preserved respiratory variability.  No pericardial effusion is present.  Compared to prior TTE dated 4/19/18, there has not been significant change.\"    IMPRESSION:   Reason for surgery/procedure: right lens misplacement  Diagnosis/reason " for consult: right lens repair    The proposed surgical procedure is considered LOW risk.    REVISED CARDIAC RISK INDEX  The patient has the following serious cardiovascular risks for perioperative complications such as (MI, PE, VFib and 3  AV Block):  Cardiomyopathy  INTERPRETATION: 1 risks: Class II (low risk - 0.9% complication rate)    The patient has the following additional risks for perioperative complications:  Morbid obesity  Daily alcohol use 4 ounce bourbon daily, risk of withdrawal      ICD-10-CM    1. Preop general physical exam Z01.818    2. Lens disorder H27.9    3. Essential hypertension with goal blood pressure less than 140/90 I10    4. Cardiomyopathy, unspecified type (H) I42.9    5. JOSE JUAN (obstructive sleep apnea)-severe (AHI 32) G47.33      Stable hypertension and cardiomyopathy.  obstructive sleep apnea in the past but per patient had resolved after APPP surgery.    RECOMMENDATIONS:       Obstructive Sleep Apnea (or suspected sleep apnea)  Per patient sleep apnea resolved after APPP surgery      --Patient is to take all scheduled medications on the day of surgery EXCEPT for modifications listed below.  Hold the Lasix and Potassium the day of surgery  Take the aspirin after getting home from your surgery    Anticoagulant or Antiplatelet Medication Use  ASPIRIN: Bleeding risk is low for this procedure and aspirin 81 mg daily should be continued in the perioperative period        APPROVAL GIVEN to proceed with proposed procedure, without further diagnostic evaluation       Signed Electronically by: Stacey Perez NP    Copy of this evaluation report is provided to requesting physician.    Addendum:  Patient's surgery was rescheduled for 9/26/19 and okay for clearance for this surgery date.    [place] Preop Guidelines    Revised Cardiac Risk Index

## 2019-08-09 NOTE — PATIENT INSTRUCTIONS
Before Your Surgery      Call your surgeon if there is any change in your health. This includes signs of a cold or flu (such as a sore throat, runny nose, cough, rash or fever).    Do not smoke, drink alcohol or take over the counter medicine (unless your surgeon or primary care doctor tells you to) for the 24 hours before and after surgery.    If you take prescribed drugs: Follow your doctor s orders about which medicines to take and which to stop until after surgery.    Eating and drinking prior to surgery: follow the instructions from your surgeon    Take a shower or bath the night before surgery. Use the soap your surgeon gave you to gently clean your skin. If you do not have soap from your surgeon, use your regular soap. Do not shave or scrub the surgery site.  Wear clean pajamas and have clean sheets on your bed.     Hold the Lasix the day of surgery  Take the aspirin after getting home from your surgery

## 2019-08-13 ENCOUNTER — TRANSFERRED RECORDS (OUTPATIENT)
Dept: HEALTH INFORMATION MANAGEMENT | Facility: CLINIC | Age: 69
End: 2019-08-13

## 2019-08-19 ENCOUNTER — TELEPHONE (OUTPATIENT)
Dept: FAMILY MEDICINE | Facility: CLINIC | Age: 69
End: 2019-08-19

## 2019-08-19 NOTE — TELEPHONE ENCOUNTER
Reason for Call:  Other History and Physical     Detailed comments: Patient's wife Kyung  Is calling to request that a copy of the patient's H and P get faxed to [facility].  The form Filled out in the office has been misplaced.  Patient is due for surgery on 08/28/19.  His appointment was on 8/9/19  Please fax to [phone].  Please call to confirm with patient and spouse that this has been completed.    Phone Number Patient can be reached at:  [phone]  Kyung    Best Time: Any    Can we leave a detailed message on this number? YES    Call taken on 8/19/2019 at 6:18 PM by Justa Esparza

## 2019-08-21 ENCOUNTER — TRANSFERRED RECORDS (OUTPATIENT)
Dept: HEALTH INFORMATION MANAGEMENT | Facility: CLINIC | Age: 69
End: 2019-08-21

## 2019-08-27 ENCOUNTER — TELEPHONE (OUTPATIENT)
Dept: FAMILY MEDICINE | Facility: CLINIC | Age: 69
End: 2019-08-27

## 2019-08-27 NOTE — TELEPHONE ENCOUNTER
Routing to Stacey Perez, DNP, APRN, CNP to review and advise on 9/4.        Patient's wife, Kyung, called.  Consent to communicate on file.      Patient saw you on 8/9 for a pre-op exam for cataract surgery that was scheduled for 8/28.    The surgeon canceled surgery due to patient taking aspirin.  Surgeon explained his cataract surgery is more involved than normal cataract surgery.      Wife needs orders from Stacey lemos to stop aspirin x 7 days prior to surgery and to also specify if a bridge is needed.  Per wife, patient takes aspirin for heart failure with preserved ejection fraction.     Wife is also requesting Stacey to write letter that patient is still cleared for upcoming surgery (date to be scheduled soon).  Patient was suppose to have pre-op no more than 30 days prior to surgery.  Eye surgeon will accept a letter instead of repeating the pre-op.  If agreeable, please have TC notify wife when letter is ready.     Alok Daugherty RN

## 2019-08-27 NOTE — TELEPHONE ENCOUNTER
Reason for Call:  Other     Detailed comments: Patient wife requesting for nurse to call regarding pre op and surgery. Please advise.     Phone Number Patient can be reached at: Other phone number:  [phone]    Best Time: Anytime    Can we leave a detailed message on this number? YES    Call taken on 8/27/2019 at 2:07 PM by Shonda Lux

## 2019-08-27 NOTE — LETTER
August 30, 2019      Ricky Brown  [address] [facility] [address]        To Whom It May Concern,    Ricky Brown is cleared for surgery for right lens repair.  Ricky is on aspirin for chronic systolic heart failure secondary to ischemic cardiomyopathy with reduced ejection fraction.  Ricky may hold Aspirin 7 days prior to surgery; no bridging needed due to low thromboembolism risk.          Sincerely,        Stacey Perez, DNP, APRN, CNP

## 2019-08-30 NOTE — TELEPHONE ENCOUNTER
Patient's spouse Kyung notified with understanding voiced. [place] so she doesn't have information re: where to fax letter, but she will call us back once they reschedule the surgery and provide information.    Shelby Bridges RN

## 2019-08-30 NOTE — TELEPHONE ENCOUNTER
Letter created (see the letters section) including:    Ricky Brown is cleared for surgery for right lens repair.  Ricky is on aspirin for chronic systolic heart failure secondary to ischemic cardiomyopathy with reduced ejection fraction.  Ricky may hold Aspirin 7 days prior to surgery; no bridging needed due to low thromboembolism risk.    Please let patient know.    Stacey Perez, DARIEL, APRN, CNP

## 2019-09-19 ENCOUNTER — TELEPHONE (OUTPATIENT)
Dept: FAMILY MEDICINE | Facility: CLINIC | Age: 69
End: 2019-09-19

## 2019-09-19 NOTE — TELEPHONE ENCOUNTER
Reason for Call:  Other / Pre-op    Detailed comments: Patient's spouse, Kyung, called and stated patient had a pre-op on 8/9/19, however, his eye surgery was moved to 9/26/19.  Patient's spouse also stated that they spoke to the eye clinic and they informed them that patient would not need a new pre-op as long as the provider who did the pre-op on 8/9th faxes them a statement/addedum indicating that patient is okay to have the surgery based on those results.  Please fax statement/addendum to Dr Sierra at [phone]. Patient's spouse also stated it is okay to leave them a detailed message if further information is required.    Phone Number Patient can be reached at: [phone] (Kyung/patient's spouse)    Best Time: ASAP    Can we leave a detailed message on this number? YES    Call taken on 9/19/2019 at 1:05 PM by Mona Jewell

## 2019-09-19 NOTE — TELEPHONE ENCOUNTER
Routing below request to Stacey Perez, DNP, APRN, CNP.  Are you willing to make addendum?    Alok Daugherty RN

## 2019-09-19 NOTE — TELEPHONE ENCOUNTER
Left message for patient re; will route to TC to refax updated H&P  Jaja Yin,Clinic Rn  Callahan Vancleve

## 2019-09-26 ENCOUNTER — TRANSFERRED RECORDS (OUTPATIENT)
Dept: HEALTH INFORMATION MANAGEMENT | Facility: CLINIC | Age: 69
End: 2019-09-26

## 2019-09-27 ENCOUNTER — OFFICE VISIT (OUTPATIENT)
Dept: OPHTHALMOLOGY | Facility: CLINIC | Age: 69
End: 2019-09-27
Payer: MEDICARE

## 2019-09-27 DIAGNOSIS — Z96.1 PSEUDOPHAKIA: Primary | ICD-10-CM

## 2019-09-27 PROCEDURE — 99024 POSTOP FOLLOW-UP VISIT: CPT | Performed by: OPHTHALMOLOGY

## 2019-09-27 ASSESSMENT — SLIT LAMP EXAM - LIDS
COMMENTS: NORMAL
COMMENTS: NORMAL

## 2019-09-27 ASSESSMENT — VISUAL ACUITY
OD_SC: CF @ 3'
OD_PH_SC: 20/200
METHOD: SNELLEN - LINEAR

## 2019-09-27 ASSESSMENT — EXTERNAL EXAM - RIGHT EYE: OD_EXAM: NORMAL

## 2019-09-27 ASSESSMENT — TONOMETRY
IOP_METHOD: APPLANATION
OD_IOP_MMHG: 14

## 2019-09-27 ASSESSMENT — EXTERNAL EXAM - LEFT EYE: OS_EXAM: NORMAL

## 2019-09-27 NOTE — LETTER
9/27/2019         RE: Ricky Brown  [address]  [facility] [address]        Dear Colleague,    Thank you for referring your patient, Ricky Brown, to the [facility]. Please see a copy of my visit note below.     Current Eye Medications:  Imprimis (Prednosiolone/Moxi/Diclofenac) instilled first drop in clinic. Scheduled to use four times daily for 1 week then twice daily until bottle is gone.      Subjective:  Po day 1 right eye with Dr. Sierra . Pt slept well and notes right eye itches.      Objective:  See Ophthalmology Exam.       Assessment:  Doing well s/p IOL exchange/vitrectomy right eye.      Plan:  Continue using Imprimis as directed by Dr. Sierra.   Return visit in 1 week for additional post op as scheduled.     Jose Dewey M.D.  [phone]             Again, thank you for allowing me to participate in the care of your patient.        Sincerely,        Jose Dewey MD

## 2019-09-27 NOTE — PATIENT INSTRUCTIONS
Continue using Imprimis as directed by Dr. Sierra.   Return visit in 1 week for additional post op as scheduled.     Jose Dewey M.D.  [phone]

## 2019-09-27 NOTE — PROGRESS NOTES
Current Eye Medications:  Imprimis (Prednosiolone/Moxi/Diclofenac) instilled first drop in clinic. Scheduled to use four times daily for 1 week then twice daily until bottle is gone.      Subjective:  Po day 1 right eye with Dr. Sierra . Pt slept well and notes right eye itches.      Objective:  See Ophthalmology Exam.       Assessment:  Doing well s/p IOL exchange/vitrectomy right eye.      Plan:  Continue using Imprimis as directed by Dr. Sierra.   Return visit in 1 week for additional post op as scheduled.     Jose Dewey M.D.  [phone]

## 2019-10-03 ENCOUNTER — OFFICE VISIT (OUTPATIENT)
Dept: OPHTHALMOLOGY | Facility: CLINIC | Age: 69
End: 2019-10-03
Payer: MEDICARE

## 2019-10-03 DIAGNOSIS — Z96.1 PSEUDOPHAKIA: Primary | ICD-10-CM

## 2019-10-03 PROCEDURE — 92012 INTRM OPH EXAM EST PATIENT: CPT | Performed by: OPHTHALMOLOGY

## 2019-10-03 RX ORDER — KETOROLAC TROMETHAMINE 5 MG/ML
1 SOLUTION OPHTHALMIC 3 TIMES DAILY
Qty: 5 ML | Refills: 0 | Status: SHIPPED | OUTPATIENT
Start: 2019-10-03 | End: 2019-12-20

## 2019-10-03 RX ORDER — PREDNISOLONE ACETATE 10 MG/ML
1 SUSPENSION/ DROPS OPHTHALMIC 3 TIMES DAILY
Qty: 5 ML | Refills: 0 | Status: SHIPPED | OUTPATIENT
Start: 2019-10-03 | End: 2020-04-06

## 2019-10-03 ASSESSMENT — VISUAL ACUITY
METHOD: SNELLEN - LINEAR
OD_SC: CF

## 2019-10-03 ASSESSMENT — SLIT LAMP EXAM - LIDS
COMMENTS: NORMAL
COMMENTS: NORMAL

## 2019-10-03 ASSESSMENT — EXTERNAL EXAM - RIGHT EYE: OD_EXAM: NORMAL

## 2019-10-03 ASSESSMENT — TONOMETRY
OD_IOP_MMHG: 14
IOP_METHOD: APPLANATION

## 2019-10-03 ASSESSMENT — REFRACTION_MANIFEST
OD_SPHERE: +2.75
OD_CYLINDER: +2.50
OD_AXIS: 092

## 2019-10-03 ASSESSMENT — EXTERNAL EXAM - LEFT EYE: OS_EXAM: NORMAL

## 2019-10-03 NOTE — PATIENT INSTRUCTIONS
Continue using Imprimis as directed by Dr. Sierra.   Start Prednisolone 1 drop right eye three times daily, (when Imprimis runs out)  And Ketorolac 1 drop right eye three times daily. (when Imprimis runs out)  Wait at least 5 minutes between drops if using more than one at a time.  Return visit in 2 weeks for post op as scheduled.    Jose Dewey M.D.  [phone]

## 2019-10-03 NOTE — LETTER
10/3/2019         RE: Ricky Brown  [address]  [facility] [address]        Dear Colleague,    Thank you for referring your patient, Ricky Brown, to the [facility]. Please see a copy of my visit note below.     Current Eye Medications:  Imprimis( monofloxicn.diclofenac and prednisolone) for times a day right eye, starts twice a day tomorrow until gone.     Subjective:  1 wk surgery for dislocated IOL right eye by [facility].  Pt reports his vision continues to be blurry in this eye.     Objective:  See Ophthalmology Exam.       Assessment:  Doing well s/p IOL exchange/vitrectomy right eye.      Plan:  Continue using Imprimis as directed by Dr. Sierra.   Start Prednisolone 1 drop right eye three times daily, (when Imprimis runs out)  And Ketorolac 1 drop right eye three times daily. (when Imprimis runs out)  Wait at least 5 minutes between drops if using more than one at a time.  Return visit in 2 weeks for post op as scheduled.    Jose Dewey M.D.  [phone]       :      Again, thank you for allowing me to participate in the care of your patient.        Sincerely,        Jose Dewey MD

## 2019-10-03 NOTE — PROGRESS NOTES
Current Eye Medications:  Imprimis( monofloxicn.diclofenac and prednisolone) for times a day right eye, starts twice a day tomorrow until gone.     Subjective:  1 wk surgery for dislocated IOL right eye by [facility].  Pt reports his vision continues to be blurry in this eye.     Objective:  See Ophthalmology Exam.       Assessment:  Doing well s/p IOL exchange/vitrectomy right eye.      Plan:  Continue using Imprimis as directed by Dr. Sierra.   Start Prednisolone 1 drop right eye three times daily, (when Imprimis runs out)  And Ketorolac 1 drop right eye three times daily. (when Imprimis runs out)  Wait at least 5 minutes between drops if using more than one at a time.  Return visit in 2 weeks for post op as scheduled.    Jose Dewey M.D.  [phone]       :

## 2019-10-17 ENCOUNTER — OFFICE VISIT (OUTPATIENT)
Dept: OPHTHALMOLOGY | Facility: CLINIC | Age: 69
End: 2019-10-17
Payer: MEDICARE

## 2019-10-17 DIAGNOSIS — Z96.1 PSEUDOPHAKIA: Primary | ICD-10-CM

## 2019-10-17 PROCEDURE — 92012 INTRM OPH EXAM EST PATIENT: CPT | Performed by: OPHTHALMOLOGY

## 2019-10-17 ASSESSMENT — SLIT LAMP EXAM - LIDS
COMMENTS: NORMAL
COMMENTS: NORMAL

## 2019-10-17 ASSESSMENT — REFRACTION_MANIFEST
OD_CYLINDER: +5.75
OD_SPHERE: -4.00
OD_AXIS: 090

## 2019-10-17 ASSESSMENT — EXTERNAL EXAM - LEFT EYE: OS_EXAM: NORMAL

## 2019-10-17 ASSESSMENT — VISUAL ACUITY
OD_SC: 20/300
METHOD: SNELLEN - LINEAR
OS_SC+: -3
OS_SC: 20/20

## 2019-10-17 ASSESSMENT — EXTERNAL EXAM - RIGHT EYE: OD_EXAM: NORMAL

## 2019-10-17 ASSESSMENT — TONOMETRY
OD_IOP_MMHG: 9
OS_IOP_MMHG: 15
IOP_METHOD: ICARE

## 2019-10-17 NOTE — PROGRESS NOTES
Current Eye Medications:  Imprimis 2-3 times a day. Pt took a drop this morning. Pt has not had to use the prednisolone and ketorolac yet.     Subjective:   2 wk surgery for dislocated IOL right eye by [facility]. Pt states he has no depth perception. Cannot see anything on the tv. Pt denies any pain     Objective:  See Ophthalmology Exam.       Assessment:  Vision slowly improving right eye in patient with recent IOL exchange.  Significant astigmatism.      Plan:  Continue using Imprimis right eye as directed by Dr. Sierra.   Start Prednisolone 1 drop right eye three times daily, (when Imprimis runs out)  And Ketorolac 1 drop right eye three times daily. (when Imprimis runs out)  Wait at least 5 minutes between drops if using more than one at a time.  Return visit in 2 weeks for Post Op MD check.  (Discussed will not likely remove sutures yet)    Jose Dewey M.D.  [phone]

## 2019-10-17 NOTE — LETTER
10/17/2019         RE: Ricky Brown  [address]  [facility] [address]        Dear Colleague,    Thank you for referring your patient, Ricky Brown, to the [facility]. Please see a copy of my visit note below.     Current Eye Medications:  Imprimis 2-3 times a day. Pt took a drop this morning. Pt has not had to use the prednisolone and ketorolac yet.     Subjective:    2 wk surgery for dislocated IOL right eye by [facility]. Pt states he has no depth perception. Cannot see anything on the tv. Pt denies any pain     Objective:  See Ophthalmology Exam.       Assessment:  Vision slowly improving right eye in patient with recent IOL exchange.  Significant astigmatism.      Plan:  Continue using Imprimis right eye as directed by Dr. Sierra.   Start Prednisolone 1 drop right eye three times daily, (when Imprimis runs out)  And Ketorolac 1 drop right eye three times daily. (when Imprimis runs out)  Wait at least 5 minutes between drops if using more than one at a time.  Return visit in 2 weeks for Post Op MD check.  (Discussed will not likely remove sutures yet)    Jose Dewey M.D.  [phone]         Again, thank you for allowing me to participate in the care of your patient.        Sincerely,        Jose Dewey MD

## 2019-10-17 NOTE — PATIENT INSTRUCTIONS
Continue using Imprimis right eye as directed by Dr. Sierra.   Start Prednisolone 1 drop right eye three times daily, (when Imprimis runs out)  And Ketorolac 1 drop right eye three times daily. (when Imprimis runs out)  Wait at least 5 minutes between drops if using more than one at a time.  Return visit in 2 weeks for Post Op MD check.     Jose Dewey M.D.  [phone]

## 2019-10-21 ENCOUNTER — APPOINTMENT (OUTPATIENT)
Dept: CT IMAGING | Facility: CLINIC | Age: 69
End: 2019-10-21
Attending: EMERGENCY MEDICINE
Payer: MEDICARE

## 2019-10-21 ENCOUNTER — HOSPITAL ENCOUNTER (EMERGENCY)
Facility: CLINIC | Age: 69
Discharge: HOME | End: 2019-10-21
Attending: EMERGENCY MEDICINE | Admitting: EMERGENCY MEDICINE
Payer: MEDICARE

## 2019-10-21 VITALS
SYSTOLIC BLOOD PRESSURE: 181 MMHG | WEIGHT: 231 LBS | RESPIRATION RATE: 18 BRPM | HEART RATE: 72 BPM | BODY MASS INDEX: 36.73 KG/M2 | DIASTOLIC BLOOD PRESSURE: 88 MMHG | TEMPERATURE: 98.3 F | OXYGEN SATURATION: 100 %

## 2019-10-21 DIAGNOSIS — R10.32 ABDOMINAL PAIN, LEFT LOWER QUADRANT: ICD-10-CM

## 2019-10-21 DIAGNOSIS — R31.29 MICROSCOPIC HEMATURIA: ICD-10-CM

## 2019-10-21 LAB
ALBUMIN SERPL-MCNC: 3.7 G/DL (ref 3.4–5)
ALBUMIN UR-MCNC: 30 MG/DL
ALP SERPL-CCNC: 64 U/L (ref 40–150)
ALT SERPL W P-5'-P-CCNC: 28 U/L (ref 0–70)
ANION GAP SERPL CALCULATED.3IONS-SCNC: 6 MMOL/L (ref 3–14)
APPEARANCE UR: CLEAR
AST SERPL W P-5'-P-CCNC: 18 U/L (ref 0–45)
BACTERIA #/AREA URNS HPF: ABNORMAL /HPF
BASOPHILS # BLD AUTO: 0 10E9/L (ref 0–0.2)
BASOPHILS NFR BLD AUTO: 0.2 %
BILIRUB SERPL-MCNC: 0.4 MG/DL (ref 0.2–1.3)
BILIRUB UR QL STRIP: NEGATIVE
BUN SERPL-MCNC: 17 MG/DL (ref 7–30)
CALCIUM SERPL-MCNC: 8.2 MG/DL (ref 8.5–10.1)
CHLORIDE SERPL-SCNC: 111 MMOL/L (ref 94–109)
CO2 SERPL-SCNC: 26 MMOL/L (ref 20–32)
COLOR UR AUTO: YELLOW
CREAT SERPL-MCNC: 0.98 MG/DL (ref 0.66–1.25)
DIFFERENTIAL METHOD BLD: NORMAL
EOSINOPHIL # BLD AUTO: 0.1 10E9/L (ref 0–0.7)
EOSINOPHIL NFR BLD AUTO: 0.9 %
ERYTHROCYTE [DISTWIDTH] IN BLOOD BY AUTOMATED COUNT: 13.4 % (ref 10–15)
GFR SERPL CREATININE-BSD FRML MDRD: 79 ML/MIN/{1.73_M2}
GLUCOSE SERPL-MCNC: 117 MG/DL (ref 70–99)
GLUCOSE UR STRIP-MCNC: NEGATIVE MG/DL
HCT VFR BLD AUTO: 41.9 % (ref 40–53)
HGB BLD-MCNC: 13.5 G/DL (ref 13.3–17.7)
HGB UR QL STRIP: ABNORMAL
IMM GRANULOCYTES # BLD: 0.1 10E9/L (ref 0–0.4)
IMM GRANULOCYTES NFR BLD: 0.5 %
KETONES UR STRIP-MCNC: NEGATIVE MG/DL
LEUKOCYTE ESTERASE UR QL STRIP: NEGATIVE
LYMPHOCYTES # BLD AUTO: 1.4 10E9/L (ref 0.8–5.3)
LYMPHOCYTES NFR BLD AUTO: 14.7 %
MCH RBC QN AUTO: 29.3 PG (ref 26.5–33)
MCHC RBC AUTO-ENTMCNC: 32.2 G/DL (ref 31.5–36.5)
MCV RBC AUTO: 91 FL (ref 78–100)
MONOCYTES # BLD AUTO: 1.1 10E9/L (ref 0–1.3)
MONOCYTES NFR BLD AUTO: 11.1 %
MUCOUS THREADS #/AREA URNS LPF: PRESENT /LPF
NEUTROPHILS # BLD AUTO: 6.9 10E9/L (ref 1.6–8.3)
NEUTROPHILS NFR BLD AUTO: 72.6 %
NITRATE UR QL: NEGATIVE
NRBC # BLD AUTO: 0 10*3/UL
NRBC BLD AUTO-RTO: 0 /100
PH UR STRIP: 5.5 PH (ref 5–7)
PLATELET # BLD AUTO: 237 10E9/L (ref 150–450)
POTASSIUM SERPL-SCNC: 3.8 MMOL/L (ref 3.4–5.3)
PROT SERPL-MCNC: 7 G/DL (ref 6.8–8.8)
RBC # BLD AUTO: 4.61 10E12/L (ref 4.4–5.9)
RBC #/AREA URNS AUTO: >182 /HPF (ref 0–2)
SODIUM SERPL-SCNC: 143 MMOL/L (ref 133–144)
SOURCE: ABNORMAL
SP GR UR STRIP: 1.01 (ref 1–1.03)
UROBILINOGEN UR STRIP-MCNC: NORMAL MG/DL (ref 0–2)
WBC # BLD AUTO: 9.5 10E9/L (ref 4–11)
WBC #/AREA URNS AUTO: 2 /HPF (ref 0–5)

## 2019-10-21 PROCEDURE — 99285 EMERGENCY DEPT VISIT HI MDM: CPT | Mod: 25 | Performed by: EMERGENCY MEDICINE

## 2019-10-21 PROCEDURE — 85025 COMPLETE CBC W/AUTO DIFF WBC: CPT | Performed by: EMERGENCY MEDICINE

## 2019-10-21 PROCEDURE — 25000125 ZZHC RX 250: Performed by: EMERGENCY MEDICINE

## 2019-10-21 PROCEDURE — 74177 CT ABD & PELVIS W/CONTRAST: CPT

## 2019-10-21 PROCEDURE — 25000128 H RX IP 250 OP 636: Performed by: EMERGENCY MEDICINE

## 2019-10-21 PROCEDURE — 80053 COMPREHEN METABOLIC PANEL: CPT | Performed by: EMERGENCY MEDICINE

## 2019-10-21 PROCEDURE — 81001 URINALYSIS AUTO W/SCOPE: CPT | Performed by: EMERGENCY MEDICINE

## 2019-10-21 PROCEDURE — 99284 EMERGENCY DEPT VISIT MOD MDM: CPT | Mod: Z6 | Performed by: EMERGENCY MEDICINE

## 2019-10-21 PROCEDURE — 96360 HYDRATION IV INFUSION INIT: CPT | Mod: 59 | Performed by: EMERGENCY MEDICINE

## 2019-10-21 RX ORDER — IOPAMIDOL 755 MG/ML
50 INJECTION, SOLUTION INTRAVASCULAR ONCE
Status: COMPLETED | OUTPATIENT
Start: 2019-10-21 | End: 2019-10-21

## 2019-10-21 RX ORDER — IOPAMIDOL 755 MG/ML
100 INJECTION, SOLUTION INTRAVASCULAR ONCE
Status: COMPLETED | OUTPATIENT
Start: 2019-10-21 | End: 2019-10-21

## 2019-10-21 RX ADMIN — IOPAMIDOL 100 ML: 755 INJECTION, SOLUTION INTRAVENOUS at 05:41

## 2019-10-21 RX ADMIN — SODIUM CHLORIDE 68 ML: 9 INJECTION, SOLUTION INTRAVENOUS at 05:42

## 2019-10-21 RX ADMIN — SODIUM CHLORIDE 1000 ML: 9 INJECTION, SOLUTION INTRAVENOUS at 05:52

## 2019-10-21 RX ADMIN — IOPAMIDOL 14 ML: 755 INJECTION, SOLUTION INTRAVENOUS at 05:41

## 2019-10-21 ASSESSMENT — ENCOUNTER SYMPTOMS
DIARRHEA: 0
EYE DISCHARGE: 0
SORE THROAT: 0
ABDOMINAL PAIN: 1
MYALGIAS: 0
BRUISES/BLEEDS EASILY: 0
BACK PAIN: 0
DYSURIA: 0
NAUSEA: 0
FLANK PAIN: 0
VOMITING: 0
HEADACHES: 0
RHINORRHEA: 0
SHORTNESS OF BREATH: 0
CHILLS: 0
FEVER: 0
COUGH: 0
CONFUSION: 0
WEAKNESS: 0

## 2019-10-21 NOTE — ED PROVIDER NOTES
History     Chief Complaint   Patient presents with     Abdominal Pain     Left lower abdminal pain that started 0100 today.      HPI  Ricky Brown is a 68 year old male who has a past medical history of hypertension, obstructive sleep apnea, obesity, coronary artery disease who presents the emergency department from home with his wife with complaint of abdominal pain.  Patient complains of abdominal pain that started this morning around 1 AM.  Patient states that he went to bed last night around 930, slept, and woke up with the pain.  Patient describes the pain as a sharp pain in his left lower abdomen with no radiation, no aggravating, no alleviating factors.  Patient states the pain is constant however comes and goes in different intensities.  Patient states he did try having a bowel movement because he thought that might help however was unable to have a bowel movement except for a very small nugget. Patient had 2 normal bowel movements earlier today and denies any history of constipation, bloody stools.  Patient states that he has been passing gas with some improvement in the pain.  Patient denies any fever, chills, nausea, vomiting, chest pain, shortness of breath, back pain, dysuria, hematuria.  No other complaints.  Patient denies any history of abdominal surgeries in the past.      I have reviewed the Medications, Allergies, Past Medical and Surgical History, and Social History in the Epic system.    Review of Systems   Constitutional: Negative for chills and fever.   HENT: Negative for congestion, rhinorrhea and sore throat.    Eyes: Negative for discharge.   Respiratory: Negative for cough and shortness of breath.    Cardiovascular: Negative for chest pain and leg swelling.   Gastrointestinal: Positive for abdominal pain. Negative for diarrhea, nausea and vomiting.   Endocrine: Negative for polyuria.   Genitourinary: Negative for dysuria and flank pain.   Musculoskeletal: Negative for back pain  and myalgias.   Skin: Negative for rash.   Allergic/Immunologic: Negative for immunocompromised state.   Neurological: Negative for weakness and headaches.   Hematological: Does not bruise/bleed easily.   Psychiatric/Behavioral: Negative for confusion and suicidal ideas.       Physical Exam   BP: (!) 179/93  Pulse: 66  Temp: 98.3  F (36.8  C)  Resp: 16  Weight: 104.8 kg (231 lb)  SpO2: 98 %      Physical Exam  Constitutional:       General: He is not in acute distress.     Appearance: He is well-developed.   HENT:      Head: Normocephalic and atraumatic.      Right Ear: External ear normal.      Left Ear: External ear normal.   Eyes:      Conjunctiva/sclera: Conjunctivae normal.      Pupils: Pupils are equal, round, and reactive to light.   Neck:      Musculoskeletal: Normal range of motion and neck supple.   Cardiovascular:      Rate and Rhythm: Normal rate and regular rhythm.      Heart sounds: Normal heart sounds.   Pulmonary:      Effort: Pulmonary effort is normal. No respiratory distress.      Breath sounds: Normal breath sounds. No wheezing or rales.   Abdominal:      General: There is no distension.      Palpations: Abdomen is soft.      Tenderness: There is tenderness (mild TTP LLQ with no peritoneal signs). There is no guarding or rebound.   Musculoskeletal: Normal range of motion.         General: No tenderness or deformity.   Skin:     General: Skin is warm and dry.      Findings: No rash.   Neurological:      Mental Status: He is alert and oriented to person, place, and time.      Cranial Nerves: No cranial nerve deficit.      Coordination: Coordination normal.   Psychiatric:         Behavior: Behavior normal.         ED Course        Procedures     .  Results for orders placed or performed during the hospital encounter of 10/21/19   CT Abdomen Pelvis w Contrast    Narrative    CT ABDOMEN AND PELVIS WITH CONTRAST   10/21/2019 5:43 AM     HISTORY: Left lower quadrant pain.    COMPARISON:  None.    TECHNIQUE: Following the uneventful administration of 114 ml  Isovue-370 intravenous contrast, helical sections were acquired from  the top of the diaphragm through the pubic symphysis. Coronal  reconstructions were generated. Radiation dose for this scan was  reduced using automated exposure control, adjustment of the mA and/or  kV according to the patient's size, or iterative reconstruction  technique.    FINDINGS:     Abdomen: A few small low-attenuation lesions scattered within the  liver, too small to characterize. The spleen, pancreas and left  adrenal gland are unremarkable. 2.1 x 1.7 cm right adrenal nodule  measuring 55 Hounsfield units on postcontrast images (series 2, image  25). 1.8 cm cyst in the interpolar region of the left kidney.  Subcentimeter low-attenuation lesion in the upper pole of the right  kidney and a subcentimeter low-attenuation lesion in the interpolar  region of the left kidney, too small to characterize. Urothelial  enhancement of the left renal pelvis and ureter. Additionally, there  is mild left perinephric and periureteric stranding. No visualized  left ureteral calculus. No significant dilatation of the left  intrarenal collecting system or ureter. The gallbladder is present. No  enlarged lymph nodes or free fluid in the upper abdomen.  Atherosclerotic calcification in the abdominal aorta.    Scan through the lower chest is unremarkable.    Pelvis: The small and large bowel are normal in caliber. A few colonic  diverticula are present without evidence of diverticulitis. Normal  appendix. No bowel wall thickening, pneumatosis or free  intraperitoneal gas. No enlarged lymph nodes or free fluid in the  pelvis. Small left inguinal hernia containing fat.      Impression    IMPRESSION:   1. Urothelial enhancement of the left intrarenal collecting system and  ureter in addition to mild left perinephric and periureteric  stranding. These findings are suggestive of an left upper  urinary  tract infection. Recommend clinical correlation.  2. No other cause of acute pain identified in the abdomen or pelvis.  3. Colonic diverticulosis without evidence of diverticulitis.  4. 2 cm indeterminate right adrenal nodule. In the absence of known  malignancy, this very likely represents an adenoma. Recommend  comparison with prior imaging studies, if available. If not available  and desired, an adrenal mass protocol CT could be considered for  further evaluation.    MANAS RODRIGUEZ MD   Comprehensive metabolic panel   Result Value Ref Range    Sodium 143 133 - 144 mmol/L    Potassium 3.8 3.4 - 5.3 mmol/L    Chloride 111 (H) 94 - 109 mmol/L    Carbon Dioxide 26 20 - 32 mmol/L    Anion Gap 6 3 - 14 mmol/L    Glucose 117 (H) 70 - 99 mg/dL    Urea Nitrogen 17 7 - 30 mg/dL    Creatinine 0.98 0.66 - 1.25 mg/dL    GFR Estimate 79 >60 mL/min/[1.73_m2]    GFR Estimate If Black >90 >60 mL/min/[1.73_m2]    Calcium 8.2 (L) 8.5 - 10.1 mg/dL    Bilirubin Total 0.4 0.2 - 1.3 mg/dL    Albumin 3.7 3.4 - 5.0 g/dL    Protein Total 7.0 6.8 - 8.8 g/dL    Alkaline Phosphatase 64 40 - 150 U/L    ALT 28 0 - 70 U/L    AST 18 0 - 45 U/L   CBC with platelets differential   Result Value Ref Range    WBC 9.5 4.0 - 11.0 10e9/L    RBC Count 4.61 4.4 - 5.9 10e12/L    Hemoglobin 13.5 13.3 - 17.7 g/dL    Hematocrit 41.9 40.0 - 53.0 %    MCV 91 78 - 100 fl    MCH 29.3 26.5 - 33.0 pg    MCHC 32.2 31.5 - 36.5 g/dL    RDW 13.4 10.0 - 15.0 %    Platelet Count 237 150 - 450 10e9/L    Diff Method Automated Method     % Neutrophils 72.6 %    % Lymphocytes 14.7 %    % Monocytes 11.1 %    % Eosinophils 0.9 %    % Basophils 0.2 %    % Immature Granulocytes 0.5 %    Nucleated RBCs 0 0 /100    Absolute Neutrophil 6.9 1.6 - 8.3 10e9/L    Absolute Lymphocytes 1.4 0.8 - 5.3 10e9/L    Absolute Monocytes 1.1 0.0 - 1.3 10e9/L    Absolute Eosinophils 0.1 0.0 - 0.7 10e9/L    Absolute Basophils 0.0 0.0 - 0.2 10e9/L    Abs Immature Granulocytes 0.1 0 - 0.4  10e9/L    Absolute Nucleated RBC 0.0    UA with Microscopic reflex to Culture   Result Value Ref Range    Color Urine Yellow     Appearance Urine Clear     Glucose Urine Negative NEG^Negative mg/dL    Bilirubin Urine Negative NEG^Negative    Ketones Urine Negative NEG^Negative mg/dL    Specific Gravity Urine 1.012 1.003 - 1.035    Blood Urine Moderate (A) NEG^Negative    pH Urine 5.5 5.0 - 7.0 pH    Protein Albumin Urine 30 (A) NEG^Negative mg/dL    Urobilinogen mg/dL Normal 0.0 - 2.0 mg/dL    Nitrite Urine Negative NEG^Negative    Leukocyte Esterase Urine Negative NEG^Negative    Source Midstream Urine     WBC Urine 2 0 - 5 /HPF    RBC Urine >182 (H) 0 - 2 /HPF    Bacteria Urine Few (A) NEG^Negative /HPF    Mucous Urine Present (A) NEG^Negative /LPF         Assessments & Plan (with Medical Decision Making)   Ricky Brown is a 68 year old male who has a past medical history of hypertension, obstructive sleep apnea, obesity, coronary artery disease who presents the emergency department from home with his wife with complaint of abdominal pain.   Upon arrival patient is well-appearing, afebrile, no distress.  Abdomen is soft, nondistended, positive mild tenderness to palpation in the left lower quadrant with no peritoneal signs.  Differential diagnosis includes but is not limited to diverticulitis versus diverticulosis versus colitis versus irritable bowel versus gas versus constipation versus obstruction versus infection.  At this time plan for comprehensive labs, will obtain CT scan abdomen pelvis to rule out diverticuli, and reevaluate.    I reviewed comprehensive labs which are unremarkable with no leukocytosis white blood cell count 9.5, hemoglobin 13.5, no acute metabolic electro abnormality, urinalysis with no evidence of acute infection however does have moderate amount of blood and greater than 182 RBCs.  I reviewed CT scan of the abdomen pelvis which demonstrates urethral enhancement of the left  intrarenal collecting system and ureter with mild left perinephric and periureteral stranding.  No definitive stone identified, diverticulosis with no evidence of acute diverticulitis.  On reevaluation Patient continues to be pain-free.  Discussed results with patient and spouse.  At this time given his history of his left lower quadrant pain, microscopic hematuria.  Possibility they did have a kidney stone which passed.  At this time no emergent need for antibiotics.  We will follow-up urine culture.  Recommend continue supportive care at home with rest, oral hydration, Tylenol as needed for pain.  I encouraged him to follow-up with his primary care provider in the next 5 to 7 days for further evaluation, follow-up, and repeat urinalysis to ensure microscopic hematuria has cleared.  Discussed with patient and spouse to return to the emergency department if high fever, severe pain, persistent vomiting, or any worsening symptoms.  Patient and wife understand agree with plan. .The patient is discharged home with instructions to return if their symptoms persist or worsen.  Plan for close follow-up with their primary physician.  I discussed workup, results, treatment, and plan with the patient.  Patient understands and agrees with the plan.      I have reviewed the nursing notes.    I have reviewed the findings, diagnosis, plan and need for follow up with the patient.    New Prescriptions    No medications on file       Final diagnoses:   Abdominal pain, left lower quadrant   Microscopic hematuria       10/21/2019   [facility], [place], EMERGENCY DEPARTMENT     Janki Cummings MD  10/21/19 0636

## 2019-10-21 NOTE — ED AVS SNAPSHOT
[facility], [place], Emergency Department  [address]  [facility] [address]  Phone:  [phone]  Fax:  [phone]                                    Ricky Brown   MRN: [id number]    Department:  [facility], Emergency Department   Date of Visit:  10/21/2019           After Visit Summary Signature Page    I have received my discharge instructions, and my questions have been answered. I have discussed any challenges I see with this plan with the nurse or doctor.    ..........................................................................................................................................  Patient/Patient Representative Signature      ..........................................................................................................................................  Patient Representative Print Name and Relationship to Patient    ..................................................               ................................................  Date                                   Time    ..........................................................................................................................................  Reviewed by Signature/Title    ...................................................              ..............................................  Date                                               Time          22EPIC Rev 08/18

## 2019-10-21 NOTE — DISCHARGE INSTRUCTIONS
Thank you for your patience today.  Please follow-up with your regular doctor in the next 5-7 days for further evaluation and follow-up care.  Please call to schedule an appointment. Please continue your own medications.  Please take tylenol as needed for pain. Rest, drink plenty of liquids. Please return to the ER if you develop high fever, severe pain, persistent vomiting, or any worsening of your current symptoms.  It was a pleasure taking care of you today.  We hope you feel better soon.

## 2019-10-24 PROBLEM — T85.22XA DISLOCATION OF LENS IMPLANT: Status: RESOLVED | Noted: 2019-05-27 | Resolved: 2019-10-24

## 2019-10-31 ENCOUNTER — OFFICE VISIT (OUTPATIENT)
Dept: OPHTHALMOLOGY | Facility: CLINIC | Age: 69
End: 2019-10-31
Payer: MEDICARE

## 2019-10-31 DIAGNOSIS — Z96.1 PSEUDOPHAKIA: Primary | ICD-10-CM

## 2019-10-31 PROCEDURE — 92012 INTRM OPH EXAM EST PATIENT: CPT | Performed by: OPHTHALMOLOGY

## 2019-10-31 RX ORDER — MOXIFLOXACIN 5 MG/ML
1 SOLUTION/ DROPS OPHTHALMIC 3 TIMES DAILY
Qty: 1 BOTTLE | Refills: 0 | Status: SHIPPED | OUTPATIENT
Start: 2019-10-31 | End: 2019-12-20

## 2019-10-31 ASSESSMENT — VISUAL ACUITY
OD_PH_SC: 20/50
OS_CC+: -1
OD_SC: 20/300
OS_CC: 20/25
METHOD: SNELLEN - LINEAR

## 2019-10-31 ASSESSMENT — REFRACTION_MANIFEST
OD_SPHERE: -4.50
OD_AXIS: 099
OD_CYLINDER: +8.50

## 2019-10-31 ASSESSMENT — EXTERNAL EXAM - LEFT EYE: OS_EXAM: NORMAL

## 2019-10-31 ASSESSMENT — EXTERNAL EXAM - RIGHT EYE: OD_EXAM: NORMAL

## 2019-10-31 ASSESSMENT — SLIT LAMP EXAM - LIDS
COMMENTS: NORMAL
COMMENTS: NORMAL

## 2019-10-31 NOTE — LETTER
10/31/2019         RE: Ricky Brown  [address]  [facility] [address]        Dear Colleague,    Thank you for referring your patient, Ricky Brown, to the [facility]. Please see a copy of my visit note below.     Current Eye Medications:  Ketorolac and Prednisolone 1%  right eye      Subjective:  Status/Post IOL exchange, right eye with Dr. Sierra on 9-26-19.  Patient is here for a MD Check.  Vision continues to be blurry, right eye.  When looking at a light source, he see's 4 dots above the light source along with multiple other images above the light source.        Objective:  See Ophthalmology Exam.       Assessment:  Vision slowly improving right eye in patient with recent IOL exchange.  Significant astigmatism.      Plan:  Central 10 nylon suture at superior wound lysed; Vigamox given. Suture not removed.  Continue using Ketorolac right eye three times daily,  And Prednisolone right eye three times daily.   Start Vigamox right eye three times daily.  Return visit in 1 week for Post Op MD Check.    Jose Dewey M.D.  [phone]           Again, thank you for allowing me to participate in the care of your patient.        Sincerely,        Jose Dewey MD

## 2019-10-31 NOTE — PATIENT INSTRUCTIONS
Continue using Ketorolac right eye three times daily,  And Prednisolone right eye three times daily.   Start Vigamox right eye three times daily.  Return visit in 1 week for Post Op MD Check.    Jose Dewey M.D.  [phone]

## 2019-10-31 NOTE — PROGRESS NOTES
Current Eye Medications:  Ketorolac and Prednisolone 1%  right eye      Subjective:  Status/Post IOL exchange, right eye with Dr. Sierra on 9-26-19.  Patient is here for a MD Check.  Vision continues to be blurry, right eye.  When looking at a light source, he see's 4 dots above the light source along with multiple other images above the light source.        Objective:  See Ophthalmology Exam.       Assessment:  Vision slowly improving right eye in patient with recent IOL exchange.  Significant astigmatism.      Plan:  Central 10 nylon suture at superior wound lysed; Vigamox given. Suture not removed.  Continue using Ketorolac right eye three times daily,  And Prednisolone right eye three times daily.   Start Vigamox right eye three times daily.  Return visit in 1 week for Post Op MD Check.    Jose Dewey M.D.  [phone]

## 2019-11-09 ENCOUNTER — HEALTH MAINTENANCE LETTER (OUTPATIENT)
Age: 69
End: 2019-11-09

## 2019-11-14 ENCOUNTER — OFFICE VISIT (OUTPATIENT)
Dept: OPHTHALMOLOGY | Facility: CLINIC | Age: 69
End: 2019-11-14
Payer: MEDICARE

## 2019-11-14 DIAGNOSIS — Z96.1 PSEUDOPHAKIA: Primary | ICD-10-CM

## 2019-11-14 PROCEDURE — 99024 POSTOP FOLLOW-UP VISIT: CPT | Performed by: OPHTHALMOLOGY

## 2019-11-14 ASSESSMENT — VISUAL ACUITY
METHOD: SNELLEN - LINEAR
OD_PH_SC: 20/50
OD_SC: 20/200

## 2019-11-14 ASSESSMENT — SLIT LAMP EXAM - LIDS
COMMENTS: NORMAL
COMMENTS: NORMAL

## 2019-11-14 ASSESSMENT — REFRACTION_MANIFEST
OD_SPHERE: -2.50
OD_AXIS: 092
OD_CYLINDER: +5.75

## 2019-11-14 ASSESSMENT — EXTERNAL EXAM - RIGHT EYE: OD_EXAM: NORMAL

## 2019-11-14 ASSESSMENT — EXTERNAL EXAM - LEFT EYE: OS_EXAM: NORMAL

## 2019-11-14 NOTE — PATIENT INSTRUCTIONS
Continue using Ketorolac right eye three times daily,  And Prednisolone right eye three times daily.  Use the Moxifloxacin right eye three times daily for 3 days then stop.   Return visit in 2 weeks for Post Op MD check.     Jose Dewey M.D.  [phone]

## 2019-11-14 NOTE — PROGRESS NOTES
Current Eye Medications:  Ketorolac, and Prednisolone 1% and Moxifloxacin right eye three times a day.       Subjective:  Status/Post IOL exchange, right eye:  9-26-19.  Suture Lysed on 10-31-19.   Patient is here for a MD Check.  Vision is slightly better.  Right eye is comfortable.   He still has monocular diplopia (multiple images) right eye.      Objective:  See Ophthalmology Exam.       Assessment:  Some improvement in lessening astigmatism with previous suture lysis.      Plan:  Additional 10-0 nylon suture lysed under Proparacaine.  Previously lysed suture removed with Jewelers forceps as was exposed as yary. Vigamox drop given.    Continue using Ketorolac right eye three times daily,  And Prednisolone right eye three times daily.  Use the Moxifloxacin right eye three times daily for 3 days then stop.   Return visit in 2 weeks for Post Op MD check.     Jose Dewey M.D.  [phone]    See Patient Instructions.

## 2019-11-14 NOTE — LETTER
11/14/2019         RE: Ricky Brown  [address]  [facility] [address]        Dear Colleague,    Thank you for referring your patient, Ricky Brown, to the [facility]. Please see a copy of my visit note below.     Current Eye Medications:  Ketorolac, and Prednisolone 1% and Moxifloxacin right eye three times a day.       Subjective:  Status/Post IOL exchange, right eye:  9-26-19.  Suture Lysed on 10-31-19.   Patient is here for a MD Check.  Vision is slightly better.  Right eye is comfortable.   He still has monocular diplopia (multiple images) right eye.      Objective:  See Ophthalmology Exam.       Assessment:  Some improvement in lessening astigmatism with previous suture lysis.      Plan:  Additional 10-0 nylon suture lysed under Proparacaine.  Previously lysed suture removed with Jewelers forceps as was exposed as yary. Vigamox drop given.    Continue using Ketorolac right eye three times daily,  And Prednisolone right eye three times daily.  Use the Moxifloxacin right eye three times daily for 3 days then stop.   Return visit in 2 weeks for Post Op MD check.     Jose Dewey M.D.  [phone]    See Patient Instructions.         Again, thank you for allowing me to participate in the care of your patient.        Sincerely,        Jose Dewey MD

## 2019-11-21 ENCOUNTER — OFFICE VISIT (OUTPATIENT)
Dept: FAMILY MEDICINE | Facility: CLINIC | Age: 69
End: 2019-11-21
Payer: MEDICARE

## 2019-11-21 VITALS
WEIGHT: 233.2 LBS | SYSTOLIC BLOOD PRESSURE: 130 MMHG | RESPIRATION RATE: 15 BRPM | HEART RATE: 69 BPM | OXYGEN SATURATION: 97 % | HEIGHT: 67 IN | DIASTOLIC BLOOD PRESSURE: 82 MMHG | BODY MASS INDEX: 36.6 KG/M2

## 2019-11-21 DIAGNOSIS — R19.5 LOOSE STOOLS: ICD-10-CM

## 2019-11-21 DIAGNOSIS — I10 ESSENTIAL HYPERTENSION WITH GOAL BLOOD PRESSURE LESS THAN 140/90: Primary | ICD-10-CM

## 2019-11-21 DIAGNOSIS — E78.5 HYPERLIPIDEMIA LDL GOAL <70: ICD-10-CM

## 2019-11-21 DIAGNOSIS — Z23 NEED FOR PROPHYLACTIC VACCINATION AND INOCULATION AGAINST INFLUENZA: ICD-10-CM

## 2019-11-21 DIAGNOSIS — I42.9 CARDIOMYOPATHY, UNSPECIFIED TYPE (H): ICD-10-CM

## 2019-11-21 DIAGNOSIS — M17.0 PRIMARY OSTEOARTHRITIS OF BOTH KNEES: ICD-10-CM

## 2019-11-21 PROCEDURE — 90662 IIV NO PRSV INCREASED AG IM: CPT | Performed by: FAMILY MEDICINE

## 2019-11-21 PROCEDURE — G0008 ADMIN INFLUENZA VIRUS VAC: HCPCS | Performed by: FAMILY MEDICINE

## 2019-11-21 PROCEDURE — 99214 OFFICE O/P EST MOD 30 MIN: CPT | Mod: 25 | Performed by: FAMILY MEDICINE

## 2019-11-21 RX ORDER — LOSARTAN POTASSIUM 100 MG/1
50 TABLET ORAL DAILY
Status: CANCELLED | OUTPATIENT
Start: 2019-11-21

## 2019-11-21 RX ORDER — POTASSIUM CHLORIDE 1500 MG/1
20 TABLET, EXTENDED RELEASE ORAL DAILY PRN
Qty: 90 TABLET | Refills: 3 | Status: SHIPPED | OUTPATIENT
Start: 2019-11-21 | End: 2020-04-06

## 2019-11-21 RX ORDER — LOSARTAN POTASSIUM 50 MG/1
50 TABLET ORAL DAILY
Qty: 90 TABLET | Refills: 3 | Status: SHIPPED | OUTPATIENT
Start: 2019-11-21 | End: 2020-12-07

## 2019-11-21 RX ORDER — ATORVASTATIN CALCIUM 10 MG/1
10 TABLET, FILM COATED ORAL DAILY
Qty: 90 TABLET | Refills: 3 | Status: SHIPPED | OUTPATIENT
Start: 2019-11-21 | End: 2020-12-07

## 2019-11-21 RX ORDER — DIPHENOXYLATE HCL/ATROPINE 2.5-.025MG
1 TABLET ORAL DAILY PRN
Qty: 30 TABLET | Refills: 5 | Status: CANCELLED | OUTPATIENT
Start: 2019-11-21

## 2019-11-21 RX ORDER — METOPROLOL SUCCINATE 50 MG/1
50 TABLET, EXTENDED RELEASE ORAL DAILY
Qty: 90 TABLET | Refills: 3 | Status: SHIPPED | OUTPATIENT
Start: 2019-11-21 | End: 2020-12-07

## 2019-11-21 RX ORDER — FUROSEMIDE 20 MG
20 TABLET ORAL DAILY PRN
Qty: 90 TABLET | Refills: 3 | Status: SHIPPED | OUTPATIENT
Start: 2019-11-21 | End: 2020-12-14

## 2019-11-21 ASSESSMENT — MIFFLIN-ST. JEOR: SCORE: 1783.48

## 2019-11-21 ASSESSMENT — PAIN SCALES - GENERAL: PAINLEVEL: NO PAIN (0)

## 2019-11-21 NOTE — PATIENT INSTRUCTIONS
For your knee pain, you may take tylenol.   Continue with knee strengthening exercises at home, and walk whenever you can.     If the pain becomes unbearable, we can consider a referral to orthopedics.     Orders Placed This Encounter     atorvastatin (LIPITOR) 10 MG tablet     Sig: Take 1 tablet (10 mg) by mouth daily     Dispense:  90 tablet     Refill:  3     furosemide (LASIX) 20 MG tablet     Sig: Take 1 tablet (20 mg) by mouth daily as needed     Dispense:  90 tablet     Refill:  3     metoprolol succinate ER (TOPROL-XL) 50 MG 24 hr tablet     Sig: Take 1 tablet (50 mg) by mouth daily     Dispense:  90 tablet     Refill:  3     potassium chloride ER (K-DUR/KLOR-CON M) 20 MEQ CR tablet     Sig: Take 1 tablet (20 mEq) by mouth daily as needed (takes with Lasix as needed)     Dispense:  90 tablet     Refill:  3     losartan (COZAAR) 50 MG tablet     Sig: Take 1 tablet (50 mg) by mouth daily     Dispense:  90 tablet     Refill:  3     [facility]     Discharged by : Salena Raman CMA    If you have any questions regarding your visit please contact your care team:     Team Melissa              Clinic Hours Telephone Number     Dr. Armen Perez, [facility]   7am-7pm  Monday - Thursday   7am-5pm  Fridays  [phone]   (Appointment scheduling available 24/7)     RN Line  [phone] option 2     Urgent Care - Arlette Richards and Elizabeth Arlette Richards - 11am-9pm Monday-Friday Saturday-Sunday- 9am-5pm     [place] -   5pm-9pm Monday-Friday Saturday-Sunday- 9am-5pm    [phone] - Arlette Richards    [phone] - Elizabeth     For a Price Quote for your services, please call our Consumer Price Line at [phone].     What options do I have for visits at the clinic other than the traditional office visit?     To expand how we care for you, many of our providers are utilizing electronic visits (e-visits) and telephone visits, when medically appropriate, for  interactions with their patients rather than a visit in the clinic. We also offer nurse visits for many medical concerns. Just like any other service, we will bill your insurance company for this type of visit based on time spent on the phone with your provider. Not all insurance companies cover these visits. Please check with your medical insurance if this type of visit is covered. You will be responsible for any charges that are not paid by your insurance.     E-visits via Team Aparthart: generally incur a $45.00 fee.     Telephone visits:  Time spent on the phone: *charged based on time that is spent on the phone in increments of 10 minutes. Estimated cost:   5-10 mins $30.00   11-20 mins. $59.00   21-30 mins. $85.00       Use LaserLeap (secure email communication and access to your chart) to send your primary care provider a message or make an appointment. Ask someone on your Team how to sign up for LaserLeap.     As always, Thank you for trusting us with your health care needs!      [facility]:    Scheduling Appointments  [facility]  Call: [phone]    [facility]  Call: [phone]    [facility]  Call: [phone]    For Gastroenterology referrals   [facility]   Clinics and Surgery Center, 4th Floor   [address] [place] [id number]   Appointments: [phone]    WHERE TO GO FOR CARE?    Clinic    Make an appointment if you:       Are sick (cold, cough, flu, sore throat, earache or in pain).       Have a small injury (sprain, small cut, burn or broken bone).       Need a physical exam, Pap smear, vaccine or prescription refill.       Have questions about your health or medicines.    To reach us:      Call [phone] ([phone]). Open 24 hours every day. (For counseling services, call [phone].)    Log into LaserLeap at [email or web address]. (Visit [email or web address] to create an  account.) Hospital emergency room    An emergency is a serious or life- threatening problem that must be treated right away.    Call 911 or get to the hospital if you have:      Very bad or sudden:            - Chest pain or pressure         - Bleeding         - Head or belly pain         - Dizziness or trouble seeing, walking or                          Speaking      Problems breathing      Blood in your vomit or you are coughing up blood      A major injury (knocked out, loss of a finger or limb, rape, broken bone protruding from skin)    A mental health crisis. (Or call the Mental Health Crisis line at 1-843.534.3488 or Suicide Prevention Hotline at 1-615.404.7680.)    Open 24 hours every day. You don't need an appointment.     Urgent care    Visit urgent care for sickness or small injuries when the clinic is closed. You don't need an appointment. To check hours or find an urgent care near you, visit [email or web address]. Online care    Get online care from [facility] for more than 70 common problems, like colds, allergies and infections. Open 24 hours every day at:   [email or web address]   Need help deciding?    For advice about where to be seen, you may call your clinic and ask to speak with a nurse. We're here for you 24 hours every day.         If you are deaf or hard of hearing, please let us know. We provide many free services including sign language interpreters, oral interpreters, TTYs, telephone amplifiers, note takers and written materials.

## 2019-11-21 NOTE — PROGRESS NOTES
Subjective     Ricky Brown is a 69 year old male who presents to clinic today for the following health issues:    HPI     Hypertension Follow-up  Patient tolerating medication well. No concerns today.     Do you check your blood pressure regularly outside of the clinic? No     Are you following a low salt diet? Yes    Are your blood pressures ever more than 140 on the top number (systolic) OR more   than 90 on the bottom number (diastolic), for example 140/90? No      How many servings of fruits and vegetables do you eat daily?  2-3    On average, how many sweetened beverages do you drink each day (soda, juice, sweet tea, etc)?   3 soda's, and 4oz of grapefruit juice.    How many days per week do you miss taking your medication? 1    What makes it hard for you to take your medications?  remembering to take    Knee Pain   Patient reports bilateral knee pain, R > L. He experiences difficulty rising from seats, and climbing steps.   PT tried. Patient recalls taught exercises, and is agreeable to do them at home.    Patient Active Problem List   Diagnosis     HL (hearing loss)     Erectile dysfunction     Keratoglobus, ou     Pseudophakia, sutured PCL, od; ACL, os - hx of IOL dislocation, ou     Posterior vitreous detachment, od     Epiretinal membrane, mild, od     Advanced directives, counseling/discussion     JOSE JUAN (obstructive sleep apnea)-severe (AHI 32)     BCC (basal cell carcinoma)     Cardiomyopathy (H)     Near syncope     RCT (rotator cuff tear)     Disorder of bursae and tendons in shoulder region     Cramp of limb     Essential hypertension with goal blood pressure less than 140/90     Hyperlipidemia LDL goal <70     overwieght BMI > 35     Megalocornea     History of iritis, os     Past Surgical History:   Procedure Laterality Date     ARTHROSCOPY SHOULDER BICEPS TENODESIS REPAIR  2/27/2014    Procedure: ARTHROSCOPY SHOULDER BICEPS TENODESIS REPAIR;;  Surgeon: Michael Hagen MD;  [facility]     ARTHROSCOPY SHOULDER ROTATOR CUFF REPAIR  2014    Procedure: ARTHROSCOPY SHOULDER ROTATOR CUFF REPAIR;  Right Shoulder Arthroscopic Rotator Cuff Repair,  Subacromial Decompression, Biceps Tenodesis, Distal Clavicle Excision   ;  Surgeon: Michael Hagen MD;  Location: [facility]     BIOPSY       CARDIAC SURGERY       COLONOSCOPY       EXCHANGE INTRAOCULAR LENS IMPLANT      left eye - first, recentered PCL with iris fixation; then IOLX with ACL     EXTRACAPSULAR CATARACT EXTRATION WITH INTRAOCULAR LENS IMPLANT      both eyes (elsewhere)     TURBINOPLASTY  2013    Procedure: TURBINOPLASTY;;  Surgeon: Lisset Parsons MD;  Location:  OR     UVULOPALATOPHARYNGOPLASTY  2013    Procedure: UVULOPALATOPHARYNGOPLASTY;  Uvulopalatopharyngoplasty, Turbiante Reduction;  Surgeon: Lisset Parsons MD;  Location:  OR       Social History     Tobacco Use     Smoking status: Former Smoker     Packs/day: 0.50     Years: 2.00     Pack years: 1.00     Types: Cigarettes     Last attempt to quit: 1996     Years since quittin.9     Smokeless tobacco: Never Used   Substance Use Topics     Alcohol use: Yes     Alcohol/week: 8.3 standard drinks     Frequency: 4 or more times a week     Comment: Evening Marie     Family History   Problem Relation Age of Onset     Diabetes Father         Old age Diabetes     Cerebrovascular Disease Father      Obesity Father      Heart Disease Father      Coronary Artery Disease Father      Hypertension Father      Lipids Sister      C.A.D. No family hx of      Cancer No family hx of      Glaucoma No family hx of      Macular Degeneration No family hx of          Current Outpatient Medications   Medication Sig Dispense Refill     aspirin 81 MG tablet Take 1 tablet (81 mg) by mouth daily 90 tablet 3     atorvastatin (LIPITOR) 10 MG tablet Take 1 tablet (10 mg) by mouth daily 90 tablet 3     Calcium Carbonate Antacid (TUMS CALCIUM FOR LIFE  BONE PO) Take  by mouth.       diphenoxylate-atropine (LOMOTIL) 2.5-0.025 MG per tablet Take 1 tablet by mouth daily as needed for diarrhea Patient only takes 1 tablet as needed 30 tablet 5     furosemide (LASIX) 20 MG tablet Take 1 tablet (20 mg) by mouth daily as needed 90 tablet 3     ketorolac (ACULAR) 0.5 % ophthalmic solution Place 1 drop into the right eye 3 times daily 5 mL 0     losartan (COZAAR) 100 MG tablet Take 0.5 tablets (50 mg) by mouth daily       losartan (COZAAR) 50 MG tablet Take 1 tablet (50 mg) by mouth daily 90 tablet 3     metoprolol succinate ER (TOPROL-XL) 50 MG 24 hr tablet Take 1 tablet (50 mg) by mouth daily 90 tablet 3     moxifloxacin (VIGAMOX) 0.5 % ophthalmic solution Place 1 drop into the right eye 3 times daily 1 Bottle 0     Multiple Vitamin (MULTIVITAMINS PO) Take  by mouth daily.       potassium chloride ER (K-DUR/KLOR-CON M) 20 MEQ CR tablet Take 1 tablet (20 mEq) by mouth daily as needed (takes with Lasix as needed) 90 tablet 3     prednisoLONE acetate (PRED FORTE) 1 % ophthalmic suspension Place 1 drop into the right eye 3 times daily 5 mL 0     BP Readings from Last 3 Encounters:   11/21/19 130/82   10/21/19 (!) 181/88   08/09/19 130/84    Wt Readings from Last 3 Encounters:   11/21/19 105.8 kg (233 lb 3.2 oz)   10/21/19 104.8 kg (231 lb)   08/09/19 105.6 kg (232 lb 11.2 oz)            Reviewed and updated as needed this visit by Provider         Review of Systems   ROS COMP: Constitutional, HEENT, cardiovascular, pulmonary, gi and gu systems are negative, except as otherwise noted.    This document serves as a record of the services and decisions personally performed and made by Armen Chavez MD. It was created on his behalf by Lorrie García, a trained medical scribe. The creation of this document is based the provider's statements to the medical scribe.  Lorrie García, November 21, 2019 2:57 PM         Objective    /82 (BP Location: Right arm, Patient Position:  "Chair, Cuff Size: Adult Large)   Pulse 69   Resp 15   Ht 1.689 m (5' 6.5\")   Wt 105.8 kg (233 lb 3.2 oz)   SpO2 97%   BMI 37.08 kg/m    Body mass index is 37.08 kg/m .  Physical Exam   GENERAL: alert, no distress and obese  NECK: no adenopathy, no asymmetry, masses, or scars and thyroid normal to palpation  RESP: lungs clear to auscultation - no rales, rhonchi or wheezes  CV: regular rates and rhythm, normal S1 S2, no S3 or S4, no murmur, click or rub, peripheral pulses strong and no peripheral edema  ABDOMEN: soft, nontender, no hepatosplenomegaly, no masses and bowel sounds normal  MS: no gross musculoskeletal defects noted, no edema  PSYCH: mentation appears normal, affect normal/bright    Diagnostic Test Results:  Labs reviewed in Epic        Assessment & Plan     (I10) Essential hypertension with goal blood pressure less than 140/90  (primary encounter diagnosis)  Comment: Well controlled  Plan: metoprolol succinate ER (TOPROL-XL) 50 MG 24 hr        tablet, losartan (COZAAR) 50 MG tablet        Continue with current medications    (E78.5) Hyperlipidemia LDL goal <70  Comment: Stable  Plan: atorvastatin (LIPITOR) 10 MG tablet        Continue with current medications    (R19.5) Loose stools  Comment: Controlled  Plan: Use lomotil as needed    (I42.9) Cardiomyopathy, unspecified type (H)  Comment: he self adjusts his medications based on his weight  Plan: furosemide (LASIX) 20 MG tablet, metoprolol         succinate ER (TOPROL-XL) 50 MG 24 hr tablet,         potassium chloride ER (K-DUR/KLOR-CON M) 20 MEQ        CR tablet            (M17.0) Primary osteoarthritis of both knees  Comment: reviewed options for treatment and recommended conservative approach at this time  Plan: Take tylenol for pain, and continue with strengthening exercises. If not improved, I will consider a referral to orthopedics.     (Z23) Need for prophylactic vaccination and inoculation against influenza  Comment:   Plan: INFLUENZA (HIGH " "DOSE) 3 VALENT VACCINE [52104]               BMI:   Estimated body mass index is 37.08 kg/m  as calculated from the following:    Height as of this encounter: 1.689 m (5' 6.5\").    Weight as of this encounter: 105.8 kg (233 lb 3.2 oz).   Weight management plan: Discussed healthy diet and exercise guidelines        Patient Instructions       For your knee pain, you may take tylenol.   Continue with knee strengthening exercises at home, and walk whenever you can.     If the pain becomes unbearable, we can consider a referral to orthopedics.     Orders Placed This Encounter     atorvastatin (LIPITOR) 10 MG tablet     Sig: Take 1 tablet (10 mg) by mouth daily     Dispense:  90 tablet     Refill:  3     furosemide (LASIX) 20 MG tablet     Sig: Take 1 tablet (20 mg) by mouth daily as needed     Dispense:  90 tablet     Refill:  3     metoprolol succinate ER (TOPROL-XL) 50 MG 24 hr tablet     Sig: Take 1 tablet (50 mg) by mouth daily     Dispense:  90 tablet     Refill:  3     potassium chloride ER (K-DUR/KLOR-CON M) 20 MEQ CR tablet     Sig: Take 1 tablet (20 mEq) by mouth daily as needed (takes with Lasix as needed)     Dispense:  90 tablet     Refill:  3     losartan (COZAAR) 50 MG tablet     Sig: Take 1 tablet (50 mg) by mouth daily     Dispense:  90 tablet     Refill:  3     [facility]     Discharged by : Salena Raman CMA    If you have any questions regarding your visit please contact your care team:     Team Melissa              Clinic Hours Telephone Number     Dr. Armen Perez CNP   7am-7pm  Monday - Thursday   7am-5pm  Fridays  [phone]   (Appointment scheduling available 24/7)     RN Line  [phone] option 2     Urgent Care - Arlette Richards and Awilda Richards - 11am-9pm Monday-Friday Saturday-Sunday- 9am-5pm     [place] -   5pm-9pm Monday-Friday Saturday-Sunday- 9am-5pm    [phone] - Arlette Richards    [phone] - Awilda "     For a Price Quote for your services, please call our Consumer Price Line at [phone].     What options do I have for visits at the clinic other than the traditional office visit?     To expand how we care for you, many of our providers are utilizing electronic visits (e-visits) and telephone visits, when medically appropriate, for interactions with their patients rather than a visit in the clinic. We also offer nurse visits for many medical concerns. Just like any other service, we will bill your insurance company for this type of visit based on time spent on the phone with your provider. Not all insurance companies cover these visits. Please check with your medical insurance if this type of visit is covered. You will be responsible for any charges that are not paid by your insurance.     E-visits via CIHI: generally incur a $45.00 fee.     Telephone visits:  Time spent on the phone: *charged based on time that is spent on the phone in increments of 10 minutes. Estimated cost:   5-10 mins $30.00   11-20 mins. $59.00   21-30 mins. $85.00       Use CIHI (secure email communication and access to your chart) to send your primary care provider a message or make an appointment. Ask someone on your Team how to sign up for CIHI.     As always, Thank you for trusting us with your health care needs!      [facility] [facility]:    Scheduling Appointments  [place] [place]  Call: [phone]    [facility]  Call: [phone]    [facility]  Call: [phone]    For Gastroenterology referrals   [facility] Gastroenterology   Clinics and Surgery Center, 4th Floor   [address] [place] [address]   Appointments: [phone]    WHERE TO GO FOR CARE?    Clinic    Make an appointment if you:       Are sick (cold, cough, flu, sore throat, earache or in pain).       Have a small injury (sprain, small cut, burn or broken bone).        Need a physical exam, Pap smear, vaccine or prescription refill.       Have questions about your health or medicines.    To reach us:      Call [phone] ([phone]). Open 24 hours every day. (For counseling services, call [phone].)    Log into QuarterSpot at [email or web address]. (Visit [email or web address] to create an account.) Hospital emergency room    An emergency is a serious or life- threatening problem that must be treated right away.    Call 911 or get to the hospital if you have:      Very bad or sudden:            - Chest pain or pressure         - Bleeding         - Head or belly pain         - Dizziness or trouble seeing, walking or                          Speaking      Problems breathing      Blood in your vomit or you are coughing up blood      A major injury (knocked out, loss of a finger or limb, rape, broken bone protruding from skin)    A mental health crisis. (Or call the Mental Health Crisis line at 1-625.214.4072 or Suicide Prevention Hotline at 1-523.811.1126.)    Open 24 hours every day. You don't need an appointment.     Urgent care    Visit urgent care for sickness or small injuries when the clinic is closed. You don't need an appointment. To check hours or find an urgent care near you, visit [email or web address]. Online care    Get online care from [facility] for more than 70 common problems, like colds, allergies and infections. Open 24 hours every day at:   [email or web address]   Need help deciding?    For advice about where to be seen, you may call your clinic and ask to speak with a nurse. We're here for you 24 hours every day.         If you are deaf or hard of hearing, please let us know. We provide many free services including sign language interpreters, oral interpreters, TTYs, telephone amplifiers, note takers and written materials.                       Return in about 3 months (around 2/21/2020) for Hypertension Follow-up.    The information in this document, created by  the medical scribe for me, accurately reflects the services I personally performed and the decisions made by me. I have reviewed and approved this document for accuracy.     Armen Chavez MD, MD  [facility]

## 2019-12-03 ENCOUNTER — OFFICE VISIT (OUTPATIENT)
Dept: OPHTHALMOLOGY | Facility: CLINIC | Age: 69
End: 2019-12-03
Payer: MEDICARE

## 2019-12-03 DIAGNOSIS — Z96.1 PSEUDOPHAKIA: Primary | ICD-10-CM

## 2019-12-03 PROCEDURE — 99024 POSTOP FOLLOW-UP VISIT: CPT | Performed by: OPHTHALMOLOGY

## 2019-12-03 ASSESSMENT — VISUAL ACUITY
OD_PH_SC: 20/50
OD_SC: 20/125
OS_CC+: -3
CORRECTION_TYPE: GLASSES
OS_CC: 20/20
OD_PH_SC+: -1
METHOD: SNELLEN - LINEAR

## 2019-12-03 ASSESSMENT — TONOMETRY
OS_IOP_MMHG: 14
OD_IOP_MMHG: 12
IOP_METHOD: ICARE

## 2019-12-03 ASSESSMENT — EXTERNAL EXAM - LEFT EYE: OS_EXAM: NORMAL

## 2019-12-03 ASSESSMENT — REFRACTION_MANIFEST
OD_AXIS: 107
OD_ADD: +2.50
OD_CYLINDER: +2.25
OD_SPHERE: -0.25

## 2019-12-03 ASSESSMENT — SLIT LAMP EXAM - LIDS
COMMENTS: NORMAL
COMMENTS: NORMAL

## 2019-12-03 ASSESSMENT — EXTERNAL EXAM - RIGHT EYE: OD_EXAM: NORMAL

## 2019-12-03 NOTE — PROGRESS NOTES
Current Eye Medications:  Prednisolone and ketorolac should be three times a day right eye, but has been missing middle of day drop, so only getting twice daily right eye      Subjective:  2 week MD check Status/Post IOL exchange, right eye:  9-26-19.  Suture Lysed on 10-31-19. Vision is little improved right eye. Vision is fine left eye. Right eye gets tired fairly quickly. Still little light sensitive, but improving.     Mild foreign body sensation right eye.     Objective:  See Ophthalmology Exam.       Assessment:  Astigmatism improved right eye with suture lysis.      Plan:  Recent lysed suture removed (75% - rest subconj) Vigamox given.  Continue same glasses.  Continue using Prednisolone right eye three times daily, through Sunday then stop  And Ketorolac right eye three times daily through Sunday, then stop.  Use Vigamox right eye three times daily for two days.  Return visit in 2 weeks for Post Op MD check, final refraction and dilation right eye.     Jose Dewey M.D.  [phone]

## 2019-12-03 NOTE — PATIENT INSTRUCTIONS
Continue same glasses.  Continue using Prednisolone right eye three times daily, through Sunday then stop  And Ketorolac right eye three times daily through Sunday, then stop.  Return visit in 2 weeks for Post Op MD check, final refraction and dilation right eye.     Jose Dewey M.D.  [phone]

## 2019-12-03 NOTE — LETTER
12/3/2019         RE: Ricky Brown  [address]  [place] [address]        Dear Colleague,    Thank you for referring your patient, Ricky Brown, to the [facility]. Please see a copy of my visit note below.     Current Eye Medications:  Prednisolone and ketorolac should be three times a day right eye, but has been missing middle of day drop, so only getting twice daily right eye      Subjective:  2 week MD check Status/Post IOL exchange, right eye:  9-26-19.  Suture Lysed on 10-31-19. Vision is little improved right eye. Vision is fine left eye. Right eye gets tired fairly quickly. Still little light sensitive, but improving.     Mild foreign body sensation right eye.     Objective:  See Ophthalmology Exam.       Assessment:  Astigmatism improved right eye with suture lysis.      Plan:  Recent lysed suture removed (75% - rest subconj) Vigamox given.  Continue same glasses.  Continue using Prednisolone right eye three times daily, through Sunday then stop  And Ketorolac right eye three times daily through Sunday, then stop.  Use Vigamox right eye three times daily for two days.  Return visit in 2 weeks for Post Op MD check, final refraction and dilation right eye.     Jose Dewey M.D.  [phone]           Again, thank you for allowing me to participate in the care of your patient.        Sincerely,        Jose Dewey MD

## 2019-12-20 ENCOUNTER — OFFICE VISIT (OUTPATIENT)
Dept: OPHTHALMOLOGY | Facility: CLINIC | Age: 69
End: 2019-12-20
Payer: MEDICARE

## 2019-12-20 DIAGNOSIS — H35.81 MACULAR EDEMA: ICD-10-CM

## 2019-12-20 DIAGNOSIS — Z96.1 PSEUDOPHAKIA: Primary | ICD-10-CM

## 2019-12-20 DIAGNOSIS — H35.371 EPIRETINAL MEMBRANE (ERM) OF RIGHT EYE: Chronic | ICD-10-CM

## 2019-12-20 PROCEDURE — 92012 INTRM OPH EXAM EST PATIENT: CPT | Performed by: OPHTHALMOLOGY

## 2019-12-20 PROCEDURE — 92134 CPTRZ OPH DX IMG PST SGM RTA: CPT | Performed by: OPHTHALMOLOGY

## 2019-12-20 ASSESSMENT — TONOMETRY
OD_IOP_MMHG: 12
IOP_METHOD: APPLANATION
OS_IOP_MMHG: 17

## 2019-12-20 ASSESSMENT — EXTERNAL EXAM - RIGHT EYE: OD_EXAM: NORMAL

## 2019-12-20 ASSESSMENT — REFRACTION_WEARINGRX
OD_AXIS: 104
SPECS_TYPE: PAL
OS_ADD: +2.75
OS_CYLINDER: +1.25
OD_CYLINDER: +1.75
OD_ADD: +2.75
OD_SPHERE: PLANO
OS_SPHERE: -0.75
OS_AXIS: 142

## 2019-12-20 ASSESSMENT — REFRACTION_MANIFEST
OD_CYLINDER: +2.00
OD_AXIS: 103
OD_SPHERE: -0.25
OD_ADD: +2.50

## 2019-12-20 ASSESSMENT — VISUAL ACUITY
OD_SC: 20/100
OD_PH_SC+: -2
OS_SC: 20/20
METHOD: SNELLEN - LINEAR
OD_PH_SC: 20/50
OS_SC+: -2
CORRECTION_TYPE: GLASSES

## 2019-12-20 ASSESSMENT — CUP TO DISC RATIO: OD_RATIO: 0.1

## 2019-12-20 ASSESSMENT — SLIT LAMP EXAM - LIDS
COMMENTS: NORMAL
COMMENTS: NORMAL

## 2019-12-20 ASSESSMENT — EXTERNAL EXAM - LEFT EYE: OS_EXAM: NORMAL

## 2019-12-20 NOTE — PROGRESS NOTES
Current Eye Medications:  None     Subjective: Post IOL exchange, right eye:  9-26-19.  Suture Lysed on 10-31-19 and MD check. Vision is poor distance and near right eye. Vision is doing pretty well left eye. Patient sees gray stripes on top and bottom of right eye, feels vision is worse than before surgery. Street lights have halos around them right eye. Patient is very unhappy with vision right eye. No eye pain or discomfort in either eye.     Objective:  See Ophthalmology Exam.       Assessment:  Epiretinal membrane with macular thickening and edema right eye.  ERM preceded recent surgery but may be some component of post operative cystoid edema.      Plan:  Continue same glasses.  Start Prolensa right eye once daily.  Return visit in 1 month for MD check and retinal OCT.  Consider retinal consult.    Jose Dewey M.D.  [phone]

## 2019-12-20 NOTE — PATIENT INSTRUCTIONS
Continue same glasses.  Start Prolensa right eye once daily.  Return visit in 1 month for MD check and retinal OCT.  Consider retinal consult.    Jose Dewey M.D.  [phone]

## 2019-12-20 NOTE — LETTER
12/20/2019         RE: Ricky Brown  [address]  [facility] [address]        Dear Colleague,    Thank you for referring your patient, Ricky Brown, to the [facility]. Please see a copy of my visit note below.     Current Eye Medications:  None     Subjective: Post IOL exchange, right eye:  9-26-19.  Suture Lysed on 10-31-19 and MD check. Vision is poor distance and near right eye. Vision is doing pretty well left eye. Patient sees gray stripes on top and bottom of right eye, feels vision is worse than before surgery. Street lights have halos around them right eye. Patient is very unhappy with vision right eye. No eye pain or discomfort in either eye.     Objective:  See Ophthalmology Exam.       Assessment:  Epiretinal membrane with macular thickening and edema right eye.  ERM preceded recent surgery but may be some component of post operative cystoid edema.      Plan:  Continue same glasses.  Start Prolensa right eye once daily.  Return visit in 1 month for MD check and retinal OCT.  Consider retinal consult.    Jose Dewey M.D.  [phone]             Again, thank you for allowing me to participate in the care of your patient.        Sincerely,        Jose Dewey MD

## 2020-01-21 ENCOUNTER — OFFICE VISIT (OUTPATIENT)
Dept: OPHTHALMOLOGY | Facility: CLINIC | Age: 70
End: 2020-01-21
Payer: MEDICARE

## 2020-01-21 DIAGNOSIS — H35.81 MACULAR EDEMA: ICD-10-CM

## 2020-01-21 DIAGNOSIS — Z96.1 PSEUDOPHAKIA: Primary | ICD-10-CM

## 2020-01-21 DIAGNOSIS — H18.799 KERATOGLOBUS: ICD-10-CM

## 2020-01-21 DIAGNOSIS — H35.371 EPIRETINAL MEMBRANE (ERM) OF RIGHT EYE: ICD-10-CM

## 2020-01-21 PROCEDURE — 92012 INTRM OPH EXAM EST PATIENT: CPT | Performed by: OPHTHALMOLOGY

## 2020-01-21 PROCEDURE — 92134 CPTRZ OPH DX IMG PST SGM RTA: CPT | Performed by: OPHTHALMOLOGY

## 2020-01-21 ASSESSMENT — SLIT LAMP EXAM - LIDS
COMMENTS: NORMAL
COMMENTS: NORMAL

## 2020-01-21 ASSESSMENT — REFRACTION_MANIFEST
OS_AXIS: 145
OS_SPHERE: -0.75
OD_SPHERE: -1.00
OD_CYLINDER: +2.25
OD_ADD: +3.00
OD_AXIS: 098
OS_CYLINDER: +0.75
OS_ADD: +3.00

## 2020-01-21 ASSESSMENT — CUP TO DISC RATIO: OD_RATIO: 0.1

## 2020-01-21 ASSESSMENT — EXTERNAL EXAM - LEFT EYE: OS_EXAM: NORMAL

## 2020-01-21 ASSESSMENT — VISUAL ACUITY
OS_CC: 20/20-2
CORRECTION_TYPE: GLASSES
METHOD: SNELLEN - LINEAR
OD_CC: 20/100

## 2020-01-21 ASSESSMENT — EXTERNAL EXAM - RIGHT EYE: OD_EXAM: NORMAL

## 2020-01-21 NOTE — PROGRESS NOTES
Current Eye Medications:  Prolensa daily right eye     Subjective:  Pt reports that vision his remain blurry right eye.    Patient interested in opinion of Dr. Peterson, who was involved with surgery left eye.     Objective:  See Ophthalmology Exam.       Assessment:  Vision continues to slowly improve right eye after sutured PCL.  Retinal OCT shows less macular thickening, but epiretinal membrane is also present.      Plan:  Continue Prolensa daily right eye   Referral to [facility] in [place].    Jose Dewey M.D.  [phone]

## 2020-01-21 NOTE — LETTER
1/21/2020         RE: Ricky Brown  [address]  [facility] [address]        Dear Colleague,    Thank you for referring your patient, Ricky Brown, to the [facility]. Please see a copy of my visit note below.     Current Eye Medications:  Prolensa daily right eye     Subjective:  Pt reports that vision his remain blurry right eye.    Patient interested in opinion of Dr. Peterson, who was involved with surgery left eye.     Objective:  See Ophthalmology Exam.       Assessment:  Vision continues to slowly improve right eye after sutured PCL.  Retinal OCT shows less macular thickening, but epiretinal membrane is also present.      Plan:  Continue Prolensa daily right eye   Referral to [facility] in [place].    Jose Dewey M.D.  [phone]         Again, thank you for allowing me to participate in the care of your patient.        Sincerely,        Jose Dewey MD

## 2020-01-30 ENCOUNTER — TELEPHONE (OUTPATIENT)
Dept: OPHTHALMOLOGY | Facility: CLINIC | Age: 70
End: 2020-01-30

## 2020-01-30 NOTE — TELEPHONE ENCOUNTER
Dr. Dewey had wanted Mr. Brown to contact him to let him know when his appointment with Dr. Peterson is. February 12th at 10:00 am.

## 2020-02-10 ENCOUNTER — MYC MEDICAL ADVICE (OUTPATIENT)
Dept: FAMILY MEDICINE | Facility: CLINIC | Age: 70
End: 2020-02-10

## 2020-02-23 ENCOUNTER — HEALTH MAINTENANCE LETTER (OUTPATIENT)
Age: 70
End: 2020-02-23

## 2020-02-24 ENCOUNTER — OFFICE VISIT (OUTPATIENT)
Dept: OPHTHALMOLOGY | Facility: CLINIC | Age: 70
End: 2020-02-24
Payer: MEDICARE

## 2020-02-24 DIAGNOSIS — Z96.1 PSEUDOPHAKIA: Primary | ICD-10-CM

## 2020-02-24 PROCEDURE — 92012 INTRM OPH EXAM EST PATIENT: CPT | Performed by: OPHTHALMOLOGY

## 2020-02-24 ASSESSMENT — EXTERNAL EXAM - RIGHT EYE: OD_EXAM: NORMAL

## 2020-02-24 ASSESSMENT — REFRACTION_WEARINGRX
OS_CYLINDER: +1.25
OD_ADD: +2.75
OS_AXIS: 145
SPECS_TYPE: PAL
OD_AXIS: 098
OD_CYLINDER: +1.50
OD_SPHERE: +0.25
OS_SPHERE: -0.75
OS_ADD: +2.75

## 2020-02-24 ASSESSMENT — VISUAL ACUITY
METHOD: SNELLEN - LINEAR
CORRECTION_TYPE: GLASSES
OS_SC: 20/25
OD_SC: 20/60-2

## 2020-02-24 ASSESSMENT — REFRACTION_MANIFEST
OS_SPHERE: -0.75
OD_AXIS: 100
OS_AXIS: 145
OD_SPHERE: -1.00
OD_CYLINDER: +2.50
OS_ADD: +3.00
OS_CYLINDER: +0.75
OD_ADD: +3.00

## 2020-02-24 ASSESSMENT — EXTERNAL EXAM - LEFT EYE: OS_EXAM: NORMAL

## 2020-02-24 ASSESSMENT — TONOMETRY
IOP_METHOD: APPLANATION
OD_IOP_MMHG: 14
OS_IOP_MMHG: 16

## 2020-02-24 ASSESSMENT — SLIT LAMP EXAM - LIDS
COMMENTS: NORMAL
COMMENTS: NORMAL

## 2020-02-24 NOTE — LETTER
2/24/2020         RE: Ricky Brown  [address]  [facility] [address]        Dear Colleague,    Thank you for referring your patient, Ricky Brown, to the [facility]. Please see a copy of my visit note below.     Current Eye Medications:  no     Subjective:  Refraction   Pt referred [facility] to [facility] from [facility]( was seen there about ten days ago.) for MR to get new glasses. Pt reports vision is blurred in his right eye. Pt was taking prolensa daily right is out of this drop now.     Objective:  See Ophthalmology Exam.       Assessment:  Stable anterior segment exam right eye in patient with scleral fixated PCL and keratoglobus.      Plan:  Glasses Rx given.  Resume Prolensa right eye once daily.  Return visit in 1 month for MD check.     Jose Dewey M.D.  [phone]         Again, thank you for allowing me to participate in the care of your patient.        Sincerely,        Jose Dewey MD

## 2020-02-24 NOTE — PROGRESS NOTES
Current Eye Medications:  no     Subjective:  Refraction   Pt referred back to [facility] from [facility]( was seen there about ten days ago.) for MR to get new glasses. Pt reports vision is blurred in his right eye. Pt was taking prolensa daily right is out of this drop now.     Objective:  See Ophthalmology Exam.       Assessment:  Stable anterior segment exam right eye in patient with scleral fixated PCL and keratoglobus.      Plan:  Glasses Rx given.  Resume Prolensa right eye once daily.  Return visit in 1 month for MD check.     Jose Dewey M.D.  [phone]

## 2020-02-24 NOTE — PATIENT INSTRUCTIONS
Glasses Rx given.  Resume Prolensa right eye once daily.  Return visit in 1 month for MD check.     Jose Dewey M.D.  [phone]

## 2020-02-25 ENCOUNTER — OFFICE VISIT (OUTPATIENT)
Dept: FAMILY MEDICINE | Facility: CLINIC | Age: 70
End: 2020-02-25
Payer: MEDICARE

## 2020-02-25 VITALS
TEMPERATURE: 98.5 F | HEART RATE: 99 BPM | BODY MASS INDEX: 38.27 KG/M2 | WEIGHT: 240.7 LBS | RESPIRATION RATE: 16 BRPM | SYSTOLIC BLOOD PRESSURE: 124 MMHG | OXYGEN SATURATION: 95 % | DIASTOLIC BLOOD PRESSURE: 80 MMHG

## 2020-02-25 DIAGNOSIS — R35.0 URINARY FREQUENCY: Primary | ICD-10-CM

## 2020-02-25 DIAGNOSIS — Z12.5 SCREENING FOR PROSTATE CANCER: ICD-10-CM

## 2020-02-25 DIAGNOSIS — N40.1 BENIGN PROSTATIC HYPERPLASIA WITH URINARY FREQUENCY: ICD-10-CM

## 2020-02-25 DIAGNOSIS — R35.0 BENIGN PROSTATIC HYPERPLASIA WITH URINARY FREQUENCY: ICD-10-CM

## 2020-02-25 DIAGNOSIS — R73.9 HYPERGLYCEMIA: ICD-10-CM

## 2020-02-25 DIAGNOSIS — E66.01 MORBID OBESITY (H): ICD-10-CM

## 2020-02-25 LAB
ALBUMIN UR-MCNC: NEGATIVE MG/DL
APPEARANCE UR: CLEAR
BILIRUB UR QL STRIP: NEGATIVE
COLOR UR AUTO: YELLOW
GLUCOSE BLD-MCNC: 85 MG/DL (ref 70–99)
GLUCOSE SERPL-MCNC: 106 MG/DL (ref 70–99)
GLUCOSE UR STRIP-MCNC: NEGATIVE MG/DL
HBA1C MFR BLD: 5.6 % (ref 0–5.6)
HGB UR QL STRIP: NEGATIVE
KETONES UR STRIP-MCNC: NEGATIVE MG/DL
LEUKOCYTE ESTERASE UR QL STRIP: NEGATIVE
NITRATE UR QL: NEGATIVE
PH UR STRIP: 5 PH (ref 5–7)
SOURCE: NORMAL
SP GR UR STRIP: >1.03 (ref 1–1.03)
UROBILINOGEN UR STRIP-ACNC: 0.2 EU/DL (ref 0.2–1)

## 2020-02-25 PROCEDURE — 83036 HEMOGLOBIN GLYCOSYLATED A1C: CPT | Performed by: FAMILY MEDICINE

## 2020-02-25 PROCEDURE — 99213 OFFICE O/P EST LOW 20 MIN: CPT | Performed by: FAMILY MEDICINE

## 2020-02-25 PROCEDURE — 36415 COLL VENOUS BLD VENIPUNCTURE: CPT | Performed by: FAMILY MEDICINE

## 2020-02-25 PROCEDURE — 82947 ASSAY GLUCOSE BLOOD QUANT: CPT | Performed by: FAMILY MEDICINE

## 2020-02-25 PROCEDURE — G0103 PSA SCREENING: HCPCS | Performed by: FAMILY MEDICINE

## 2020-02-25 PROCEDURE — 81003 URINALYSIS AUTO W/O SCOPE: CPT | Performed by: FAMILY MEDICINE

## 2020-02-25 PROCEDURE — 82947 ASSAY GLUCOSE BLOOD QUANT: CPT | Mod: 59 | Performed by: FAMILY MEDICINE

## 2020-02-25 RX ORDER — TAMSULOSIN HYDROCHLORIDE 0.4 MG/1
0.4 CAPSULE ORAL DAILY
Qty: 30 CAPSULE | Refills: 0 | Status: SHIPPED | OUTPATIENT
Start: 2020-02-25 | End: 2021-01-21

## 2020-02-25 NOTE — PATIENT INSTRUCTIONS
Patient Education     Tamsulosin capsules  Brand Name: Flomax  What is this medicine?  TAMSULOSIN (pathak BRICE delphine sin) is used to treat enlargement of the prostate gland in men, a condition called benign prostatic hyperplasia or BPH. It is not for use in women. It works by relaxing muscles in the prostate and bladder neck. This improves urine flow and reduces BPH symptoms.  How should I use this medicine?  Take this medicine by mouth about 30 minutes after the same meal every day. Follow the directions on the prescription label. Swallow the capsules whole with a glass of water. Do not crush, chew, or open capsules. Do not take your medicine more often than directed. Do not stop taking your medicine unless your doctor tells you to.  Talk to your pediatrician regarding the use of this medicine in children. Special care may be needed.  What side effects may I notice from receiving this medicine?  Side effects that you should report to your doctor or health care professional as soon as possible:    allergic reactions like skin rash or itching, hives, swelling of the lips, mouth, tongue, or throat    breathing problems    change in vision    feeling faint or lightheaded    irregular heartbeat    prolonged or painful erection    weakness  Side effects that usually do not require medical attention (report to your doctor or health care professional if they continue or are bothersome):    back pain    change in sex drive or performance    constipation, nausea or vomiting    cough    drowsy    runny or stuffy nose    trouble sleeping  What may interact with this medicine?    cimetidine    fluoxetine    ketoconazole    medicines for erectile disfunction like sildenafil, tadalafil, vardenafil    medicines for high blood pressure    other alpha-blockers like alfuzosin, doxazosin, phentolamine, phenoxybenzamine, prazosin, terazosin    warfarin    What if I miss a dose?  If you miss a dose, take it as soon as you can. If it is  almost time for your next dose, take only that dose. Do not take double or extra doses. If you stop taking your medicine for several days or more, ask your doctor or health care professional what dose you should start back on.  Where should I keep my medicine?  Keep out of the reach of children.  Store at room temperature between 15 and 30 degrees C (59 and 86 degrees F). Throw away any unused medicine after the expiration date.  What should I tell my health care provider before I take this medicine?  They need to know if you have any of the following conditions:    advanced kidney disease    advanced liver disease    low blood pressure    prostate cancer    an unusual or allergic reaction to tamsulosin, sulfa drugs, other medicines, foods, dyes, or preservatives    pregnant or trying to get pregnant    breast-feeding  What should I watch for while using this medicine?  Visit your doctor or health care professional for regular check ups. You will need lab work done before you start this medicine and regularly while you are taking it. Check your blood pressure as directed. Ask your health care professional what your blood pressure should be, and when you should contact him or her.  This medicine may make you feel dizzy or lightheaded. This is more likely to happen after the first dose, after an increase in dose, or during hot weather or exercise. Drinking alcohol and taking some medicines can make this worse. Do not drive, use machinery, or do anything that needs mental alertness until you know how this medicine affects you. Do not sit or stand up quickly. If you begin to feel dizzy, sit down until you feel better. These effects can decrease once your body adjusts to the medicine.  Contact your doctor or health care professional right away if you have an erection that lasts longer than 4 hours or if it becomes painful. This may be a sign of a serious problem and must be treated right away to prevent permanent  damage.  If you are thinking of having cataract surgery, tell your eye surgeon that you have taken this medicine.  NOTE:This sheet is a summary. It may not cover all possible information. If you have questions about this medicine, talk to your doctor, pharmacist, or health care provider. Copyright  2019 [employer]

## 2020-02-25 NOTE — PROGRESS NOTES
Subjective     Ricky Brown is a 69 year old male who presents to clinic today for the following health issues:    HPI   Genitourinary - Male  Pt says he is Thirsty all The Time, has Polyuria in the last 2 weeks  Onset: 3 weeks    Description:   Dysuria (painful urination): no   Hematuria (blood in urine): No  Frequency: YES  Are you urinating at night : YES-3 times  And 6-7 times during the day  Hesitancy (delay in urine): YES  Retention (unable to empty): no   Decrease in urinary flow: YES  Incontinence: YES    Progression of Symptoms:  same    Accompanying Signs & Symptoms:  Fever: no   Back/Flank pain: no   Urethral discharge: no   Testicle lumps/masses/pain: no   Nausea and/or vomiting: no   Abdominal pain: no     History:   History of frequent UTI's: no   History of kidney stones: no   History of hernias: no   Personal or Family history of Prostate problems: no  Sexually active: YES    Precipitating factors:       Alleviating factors:          Patient Active Problem List   Diagnosis     HL (hearing loss)     Erectile dysfunction     Keratoglobus, ou     Pseudophakia, sutured PCL, od; ACL, os - hx of IOL dislocation, ou     Posterior vitreous detachment, od     Epiretinal membrane, mild, od     Advanced directives, counseling/discussion     JOSE JUAN (obstructive sleep apnea)-severe (AHI 32)     BCC (basal cell carcinoma)     Cardiomyopathy (H)     Near syncope     RCT (rotator cuff tear)     Disorder of bursae and tendons in shoulder region     Cramp of limb     Essential hypertension with goal blood pressure less than 140/90     Hyperlipidemia LDL goal <70     overwieght BMI > 35     Megalocornea     History of iritis, os     Macular edema, od     Past Surgical History:   Procedure Laterality Date     ARTHROSCOPY SHOULDER BICEPS TENODESIS REPAIR  2/27/2014    Procedure: ARTHROSCOPY SHOULDER BICEPS TENODESIS REPAIR;;  Surgeon: Michael Hagen MD;  [facility]     ARTHROSCOPY SHOULDER ROTATOR  CUFF REPAIR  2014    Procedure: ARTHROSCOPY SHOULDER ROTATOR CUFF REPAIR;  Right Shoulder Arthroscopic Rotator Cuff Repair,  Subacromial Decompression, Biceps Tenodesis, Distal Clavicle Excision   ;  Surgeon: Michael Hagen MD;  Location: [facility]     BIOPSY       CARDIAC SURGERY       COLONOSCOPY       EXCHANGE INTRAOCULAR LENS IMPLANT      left eye - first, recentered PCL with iris fixation; then IOLX with ACL     EXTRACAPSULAR CATARACT EXTRATION WITH INTRAOCULAR LENS IMPLANT      both eyes (elsewhere)     TURBINOPLASTY  2013    Procedure: TURBINOPLASTY;;  Surgeon: Lisset Parsons MD;  Location:  OR     UVULOPALATOPHARYNGOPLASTY  2013    Procedure: UVULOPALATOPHARYNGOPLASTY;  Uvulopalatopharyngoplasty, Turbiante Reduction;  Surgeon: Lisset Parsons MD;  Location:  OR       Social History     Tobacco Use     Smoking status: Former Smoker     Packs/day: 0.50     Years: 2.00     Pack years: 1.00     Types: Cigarettes     Last attempt to quit: 1996     Years since quittin.2     Smokeless tobacco: Never Used   Substance Use Topics     Alcohol use: Yes     Alcohol/week: 8.3 standard drinks     Frequency: 4 or more times a week     Comment: Evening Marie     Family History   Problem Relation Age of Onset     Diabetes Father         Old age Diabetes     Cerebrovascular Disease Father      Obesity Father      Heart Disease Father      Coronary Artery Disease Father      Hypertension Father      Lipids Sister      C.A.D. No family hx of      Cancer No family hx of      Glaucoma No family hx of      Macular Degeneration No family hx of          Current Outpatient Medications   Medication Sig Dispense Refill     aspirin 81 MG tablet Take 1 tablet (81 mg) by mouth daily 90 tablet 3     atorvastatin (LIPITOR) 10 MG tablet Take 1 tablet (10 mg) by mouth daily 90 tablet 3     Calcium Carbonate Antacid (TUMS CALCIUM FOR LIFE BONE PO) Take  by mouth.        diphenoxylate-atropine (LOMOTIL) 2.5-0.025 MG per tablet Take 1 tablet by mouth daily as needed for diarrhea Patient only takes 1 tablet as needed 30 tablet 5     furosemide (LASIX) 20 MG tablet Take 1 tablet (20 mg) by mouth daily as needed 90 tablet 3     losartan (COZAAR) 50 MG tablet Take 1 tablet (50 mg) by mouth daily 90 tablet 3     metoprolol succinate ER (TOPROL-XL) 50 MG 24 hr tablet Take 1 tablet (50 mg) by mouth daily 90 tablet 3     Multiple Vitamin (MULTIVITAMINS PO) Take  by mouth daily.       potassium chloride ER (K-DUR/KLOR-CON M) 20 MEQ CR tablet Take 1 tablet (20 mEq) by mouth daily as needed (takes with Lasix as needed) 90 tablet 3     prednisoLONE acetate (PRED FORTE) 1 % ophthalmic suspension Place 1 drop into the right eye 3 times daily 5 mL 0     tamsulosin (FLOMAX) 0.4 MG capsule Take 1 capsule (0.4 mg) by mouth daily 30 capsule 0     losartan (COZAAR) 100 MG tablet Take 0.5 tablets (50 mg) by mouth daily       Allergies   Allergen Reactions     Lisinopril Cough     Recent Labs   Lab Test 02/25/20  1523 10/21/19  0428 07/25/19  0853 07/23/19  0853  04/03/19  1004  02/21/19  1622 08/22/18  1505 04/11/18  1520  05/10/17  1038   A1C 5.6  --  5.4  --   --  5.9*  --   --  5.4  --    < >  --    LDL  --   --   --  16  --   --   --   --   --  38  --  22   HDL  --   --   --  52  --   --   --   --   --  36*  --  41   TRIG  --   --   --  192*  --   --   --   --   --  353*  --  267*   ALT  --  28  --   --   --   --   --  37  --  27  --  38   CR  --  0.98  --  0.88   < >  --    < > 0.95  --  0.86  --  0.98   GFRESTIMATED  --  79  --  89   < >  --    < > 82  --  90  --  77   GFRESTBLACK  --  >90  --  >90   < >  --    < > >90  --  >90  --  >90  African American GFR Calc     POTASSIUM  --  3.8  --  4.0   < >  --    < > 4.1  --  4.4  --  4.3   TSH  --   --   --   --   --   --   --  1.38 1.29  --   --   --     < > = values in this interval not displayed.      BP Readings from Last 3 Encounters:   02/25/20  124/80   11/21/19 130/82   10/21/19 (!) 181/88    Wt Readings from Last 3 Encounters:   02/25/20 109.2 kg (240 lb 11.2 oz)   11/21/19 105.8 kg (233 lb 3.2 oz)   10/21/19 104.8 kg (231 lb)                      Reviewed and updated as needed this visit by Provider         Review of Systems   ROS COMP: CONSTITUTIONAL: NEGATIVE for fever, chills, change in weight  RESP: NEGATIVE for significant cough or SOB  CV: NEGATIVE for chest pain, palpitations or peripheral edema  GI: NEGATIVE for nausea, abdominal pain, heartburn, or change in bowel habits  ROS otherwise negative      Objective    /80   Pulse 99   Temp 98.5  F (36.9  C)   Resp 16   Wt 109.2 kg (240 lb 11.2 oz)   SpO2 95%   BMI 38.27 kg/m    Body mass index is 38.27 kg/m .  Physical Exam   GENERAL: healthy, alert and no distress  GENERAL: healthy, alert, no distress and obese  RESP: lungs clear to auscultation - no rales, rhonchi or wheezes  CV: regular rate and rhythm, normal S1 S2, no S3 or S4, no murmur, click or rub, no peripheral edema and peripheral pulses strong  ABDOMEN: soft, nontender, no hepatosplenomegaly, no masses and bowel sounds normal   (male): enlarged Prostate  No tenderness   MS: no gross musculoskeletal defects noted, no edema  SKIN: no suspicious lesions or rashes    Diagnostic Test Results:  Labs reviewed in Epic        Assessment & Plan     1. Urinary frequency  Labs are l   - Hemoglobin A1c  - Glucose  - Glucose whole blood  - tamsulosin (FLOMAX) 0.4 MG capsule; Take 1 capsule (0.4 mg) by mouth daily  Dispense: 30 capsule; Refill: 0    2. Hyperglycemia    - Hemoglobin A1c  - Glucose  - Glucose whole blood    3. Benign prostatic hyperplasia with urinary frequency  Advised start Flomax  SEE EPIC care orders  The potential side effects of this medication have been discussed with the patient.  Call if any significant problems with these are experienced.    - UROLOGY ADULT REFERRAL    4. Morbid obesity (H)  Low heather diet  Avoid  Carbonated/caffeinated Beverages    5. Screening for prostate cancer    - PSA, screen           Return in about 1 month (around 3/25/2020) for Specialist appointment.    Hannah Etienne MD  [facility]

## 2020-02-26 LAB — PSA SERPL-ACNC: 0.3 UG/L (ref 0–4)

## 2020-03-05 ENCOUNTER — OFFICE VISIT (OUTPATIENT)
Dept: FAMILY MEDICINE | Facility: CLINIC | Age: 70
End: 2020-03-05
Payer: MEDICARE

## 2020-03-05 VITALS
HEART RATE: 88 BPM | SYSTOLIC BLOOD PRESSURE: 130 MMHG | BODY MASS INDEX: 38.22 KG/M2 | TEMPERATURE: 97.8 F | WEIGHT: 237.8 LBS | HEIGHT: 66 IN | DIASTOLIC BLOOD PRESSURE: 82 MMHG

## 2020-03-05 DIAGNOSIS — N40.1 BENIGN PROSTATIC HYPERPLASIA WITH POST-VOID DRIBBLING: ICD-10-CM

## 2020-03-05 DIAGNOSIS — N39.43 BENIGN PROSTATIC HYPERPLASIA WITH POST-VOID DRIBBLING: ICD-10-CM

## 2020-03-05 DIAGNOSIS — I42.9 CARDIOMYOPATHY, UNSPECIFIED TYPE (H): ICD-10-CM

## 2020-03-05 DIAGNOSIS — I10 ESSENTIAL HYPERTENSION WITH GOAL BLOOD PRESSURE LESS THAN 140/90: Primary | ICD-10-CM

## 2020-03-05 PROCEDURE — 99214 OFFICE O/P EST MOD 30 MIN: CPT | Performed by: FAMILY MEDICINE

## 2020-03-05 ASSESSMENT — MIFFLIN-ST. JEOR: SCORE: 1796.4

## 2020-03-05 NOTE — PROGRESS NOTES
Subjective     Ricky Brown is a 69 year old male who presents to clinic today for the following health issues:     HPI     Urinary Concerns:  Had concerns about increased urgency and decreased flow so saw Dr. Etienne. UA, kidney tests and PSA were normal but prostate was enlarged upon examination. Has only been on Flomax for a few days so he has not noticed major differences yet but has to get up at night a little less often. He has concerns about hemorrhoids because of increased rectal itching and puffiness.     Wheezing:  Having a lot of wheezing lately. It is worse at night. Wife reports he gets short of breath very easily and wheezes when he is doing almost anything including climbing stairs. He has a stress echo coming up.     Hypertension Follow-up      Do you check your blood pressure regularly outside of the clinic? No -doesn't believe machine is accurate so he hasn't been using it    Are you following a low salt diet? Yes    Are your blood pressures ever more than 140 on the top number (systolic) OR more   than 90 on the bottom number (diastolic), for example 140/90? No     He went out of town and ate a high salt diet while there so he gained some weight.  Did not take his Lasix pill while there.       How many servings of fruits and vegetables do you eat daily?  0-1    On average, how many sweetened beverages do you drink each day (Examples: soda, juice, sweet tea, etc.  Do NOT count diet or artificially sweetened beverages)?   2    How many days per week do you exercise enough to make your heart beat faster? 7    How many minutes a day do you exercise enough to make your heart beat faster? 30 - 60    How many days per week do you miss taking your medication? 0    BP Readings from Last 3 Encounters:   03/05/20 130/82   02/25/20 124/80   11/21/19 130/82    Wt Readings from Last 3 Encounters:   03/05/20 107.9 kg (237 lb 12.8 oz)   02/25/20 109.2 kg (240 lb 11.2 oz)   11/21/19 105.8 kg (233 lb 3.2 oz)  "        Reviewed and updated as needed this visit by Provider         Review of Systems   ROS COMP: Constitutional, HEENT, cardiovascular, pulmonary, gi and gu systems are negative, except as otherwise noted.    This document serves as a record of the services and decisions personally performed and made by Sidney Chavez MD. It was created on his behalf by Freya Sprague, a trained medical scribe. The creation of this document is based on the provider's statements to the medical scribe.  Freya Sprague 3:07 PM March 5, 2020        Objective    /82 (BP Location: Right arm, Patient Position: Sitting, Cuff Size: Adult Regular)   Pulse 88   Temp 97.8  F (36.6  C) (Oral)   Ht 1.676 m (5' 6\")   Wt 107.9 kg (237 lb 12.8 oz)   BMI 38.38 kg/m    Body mass index is 38.38 kg/m .     Physical Exam   GENERAL: healthy, alert and no distress  RESP: lungs clear to auscultation - no rales, rhonchi or wheezes  CV: regular rate and rhythm, normal S1 S2, no S3 or S4, no murmur, click or rub, no peripheral edema  SKIN: no suspicious lesions or rashes to visible skin   NEURO: Normal strength and tone, mentation intact and speech normal  PSYCH: mentation appears normal, affect normal/bright    Diagnostic Test Results:  Labs reviewed in Epic  No results found for this or any previous visit (from the past 24 hour(s)).        Assessment & Plan       ICD-10-CM    1. Essential hypertension with goal blood pressure less than 140/90 I10    2. Cardiomyopathy, unspecified type (H) I42.9    3. Benign prostatic hyperplasia with post-void dribbling N40.1     N39.43    Blood pressure is controlled, continue current medication. He has been diligent about monitoring weight and taking Lasix as needed. We will complete pulmonary function testing at his next visit. He has only been on Flomax for a month so he will continue this medication and follow up with specialist.       Patient Instructions   Advised follow up for evaluation with spirometry. "     follow up with cardiology as planned.      Continue present medications    Follow up with urology as planned        Return in about 4 weeks (around 4/2/2020) for spirometry.    The information in this document, created by the medical scribe for me, accurately reflects the services I personally performed and the decisions made by me. I have reviewed and approved this document for accuracy prior to leaving the patient care area.  March 5, 2020 3:29 PM    Armen Chavez MD, MD  [facility]

## 2020-03-06 NOTE — PATIENT INSTRUCTIONS
Advised follow up for evaluation with spirometry.     follow up with cardiology as planned.      Continue present medications    Follow up with urology as planned

## 2020-03-10 ENCOUNTER — OFFICE VISIT (OUTPATIENT)
Dept: UROLOGY | Facility: CLINIC | Age: 70
End: 2020-03-10
Payer: MEDICARE

## 2020-03-10 VITALS — HEART RATE: 94 BPM | SYSTOLIC BLOOD PRESSURE: 110 MMHG | DIASTOLIC BLOOD PRESSURE: 72 MMHG | OXYGEN SATURATION: 98 %

## 2020-03-10 DIAGNOSIS — R35.0 URINARY FREQUENCY: Primary | ICD-10-CM

## 2020-03-10 PROCEDURE — 51798 US URINE CAPACITY MEASURE: CPT | Performed by: UROLOGY

## 2020-03-10 PROCEDURE — 99203 OFFICE O/P NEW LOW 30 MIN: CPT | Mod: 25 | Performed by: UROLOGY

## 2020-03-10 RX ORDER — MIRABEGRON 50 MG/1
50 TABLET, EXTENDED RELEASE ORAL DAILY
Qty: 30 TABLET | Refills: 1 | Status: SHIPPED | OUTPATIENT
Start: 2020-03-10 | End: 2020-04-06

## 2020-03-10 NOTE — PROGRESS NOTES
S: Ricky Brown is a pleasant  69 year old male who was requested to be seen by  Dr. Hannah Etienne for a consult with regard to patient's urinary complaints.  Patient complains of Nocturia x 2, Urgency, Frequency and slower urinary stream.  He have history of elevated PSA.  Symptoms have been on going for   many years(s).  Seems to be worsened over time.  His recent PSA was found to be   PSA   Date Value Ref Range Status   02/25/2020 0.30 0 - 4 ug/L Final     Comment:     Assay Method:  Chemiluminescence using Siemens Vista analyzer   .  His AUA Symptom Score:  17.  His QOL score:  5.  He is currently on flomax without much improvements.  He drinks 5 diet soda daily along with 1 juice and 2 drinks..    Current Outpatient Medications   Medication Sig Dispense Refill     aspirin 81 MG tablet Take 1 tablet (81 mg) by mouth daily 90 tablet 3     atorvastatin (LIPITOR) 10 MG tablet Take 1 tablet (10 mg) by mouth daily 90 tablet 3     Calcium Carbonate Antacid (TUMS CALCIUM FOR LIFE BONE PO) Take  by mouth.       diphenoxylate-atropine (LOMOTIL) 2.5-0.025 MG per tablet Take 1 tablet by mouth daily as needed for diarrhea Patient only takes 1 tablet as needed 30 tablet 5     furosemide (LASIX) 20 MG tablet Take 1 tablet (20 mg) by mouth daily as needed 90 tablet 3     losartan (COZAAR) 50 MG tablet Take 1 tablet (50 mg) by mouth daily 90 tablet 3     metoprolol succinate ER (TOPROL-XL) 50 MG 24 hr tablet Take 1 tablet (50 mg) by mouth daily 90 tablet 3     mirabegron (MYRBETRIQ) 50 MG 24 hr tablet Take 1 tablet (50 mg) by mouth daily 30 tablet 1     Multiple Vitamin (MULTIVITAMINS PO) Take  by mouth daily.       prednisoLONE acetate (PRED FORTE) 1 % ophthalmic suspension Place 1 drop into the right eye 3 times daily 5 mL 0     tamsulosin (FLOMAX) 0.4 MG capsule Take 1 capsule (0.4 mg) by mouth daily 30 capsule 0     potassium chloride ER (K-DUR/KLOR-CON M) 20 MEQ CR tablet Take 1 tablet (20 mEq) by mouth daily as  needed (takes with Lasix as needed) (Patient not taking: Reported on 3/10/2020) 90 tablet 3     Allergies   Allergen Reactions     Lisinopril Cough     Past Medical History:   Diagnosis Date     Arthritis      BCC (basal cell carcinoma)     right shoulder      Cardiomyopathy (H)      Colon adenomas 9/20/11     Coronary artery disease      ED (erectile dysfunction)      HTN (hypertension)      Noncompliance      Obesity      JOSE JUAN (obstructive sleep apnea)      Past Surgical History:   Procedure Laterality Date     ARTHROSCOPY SHOULDER BICEPS TENODESIS REPAIR  2/27/2014    Procedure: ARTHROSCOPY SHOULDER BICEPS TENODESIS REPAIR;;  Surgeon: Michael Hagen MD;  [facility]: [facility]     ARTHROSCOPY SHOULDER ROTATOR CUFF REPAIR  2/27/2014    Procedure: ARTHROSCOPY SHOULDER ROTATOR CUFF REPAIR;  Right Shoulder Arthroscopic Rotator Cuff Repair,  Subacromial Decompression, Biceps Tenodesis, Distal Clavicle Excision   ;  Surgeon: Michael Hagen MD;  Location: [facility]     BIOPSY       CARDIAC SURGERY       COLONOSCOPY       EXCHANGE INTRAOCULAR LENS IMPLANT  2005    left eye - first, recentered PCL with iris fixation; then IOLX with ACL     EXTRACAPSULAR CATARACT EXTRATION WITH INTRAOCULAR LENS IMPLANT  2005    both eyes (elsewhere)     TURBINOPLASTY  12/11/2013    Procedure: TURBINOPLASTY;;  Surgeon: Lisset Parsons MD;  Location: [facility]     UVULOPALATOPHARYNGOPLASTY  12/11/2013    Procedure: UVULOPALATOPHARYNGOPLASTY;  Uvulopalatopharyngoplasty, Turbiante Reduction;  Surgeon: Lisset Parsons MD;  Location: [facility] OR      Family History   Problem Relation Age of Onset     Diabetes Father         Old age Diabetes     Cerebrovascular Disease Father      Obesity Father      Heart Disease Father      Coronary Artery Disease Father      Hypertension Father      Lipids Sister      C.A.D. No family hx of      Cancer No family hx of      Glaucoma No family hx of      Macular Degeneration No family hx of       He does not have a family history of prostate cancer.  Social History     Socioeconomic History     Marital status:      Spouse name: Kyung     Number of children: 0     Years of education: 14     Highest education level: None   Occupational History     Occupation: industrial electronics      Employer: SELF   Social Needs     Financial resource strain: None     Food insecurity     Worry: None     Inability: None     Transportation needs     Medical: None     Non-medical: None   Tobacco Use     Smoking status: Former Smoker     Packs/day: 0.50     Years: 2.00     Pack years: 1.00     Types: Cigarettes     Last attempt to quit: 1996     Years since quittin.2     Smokeless tobacco: Never Used   Substance and Sexual Activity     Alcohol use: Yes     Alcohol/week: 8.3 standard drinks     Frequency: 4 or more times a week     Comment: Evening Marei     Drug use: No     Sexual activity: Yes     Partners: Female     Birth control/protection: Male Surgical     Comment:  vasectomy   Lifestyle     Physical activity     Days per week: None     Minutes per session: None     Stress: None   Relationships     Social connections     Talks on phone: None     Gets together: None     Attends Denominational service: None     Active member of club or organization: None     Attends meetings of clubs or organizations: None     Relationship status: None     Intimate partner violence     Fear of current or ex partner: None     Emotionally abused: None     Physically abused: None     Forced sexual activity: None   Other Topics Concern     Parent/sibling w/ CABG, MI or angioplasty before 65F 55M? No   Social History Narrative     None        REVIEW OF SYSTEMS  =================  C: NEGATIVE for fever, chills, change in weight  I: NEGATIVE for worrisome rashes, moles or lesions  E/M: NEGATIVE for ear, mouth and throat problems  R: NEGATIVE for significant cough or SHORTNESS OF BREATH  CV:  NEGATIVE for chest pain,  palpitations or peripheral edema  GI: NEGATIVE for nausea, abdominal pain, heartburn, or change in bowel habits  NEURO: NEGATIVE numbness/weakness  : see HPI  PSYCH: NEGATIVE depression/anxiety  LYmph: no new enlarged lymph nodes  Ortho: no new trauma/movements           O: Exam:/72 (BP Location: Right arm, Patient Position: Chair, Cuff Size: Adult Large)   Pulse 94   SpO2 98%    Constitutional: healthy, alert and no distress  Cardiovascular: negative, PMI normal.   Respiratory: negative, no evidence of respiratory distress  Gastrointestinal: Abdomen soft, non-tender. BS normal. No masses, organomegaly  : Penis no discharge.  Testes without any masses but no scrotal skin lesion.  Prostate apex only few small.  Bladder scan with residual urine of 59 mL.  Musculoskeletal: extremities normal- no gross deformities noted, gait normal and normal muscle tone  Skin: no suspicious lesions or rashes  Neurologic: Alert and oriented  Psychiatric: mentation appears normal. and affect normal/bright  Hematologic/Lymphatic/Immunologic: normal ant/post cervical, axillary, supraclavicular and inguinal nodes    Assessment/Plan:   (R35.0) Urinary frequency  (primary encounter diagnosis)  Comment: Suspect OAB.  Discussed fluids consumption restriction.  Plan:  trial of Myrbetriq.  Return the office in 1 month for reexamination.

## 2020-03-16 ENCOUNTER — ANCILLARY PROCEDURE (OUTPATIENT)
Dept: CARDIOLOGY | Facility: CLINIC | Age: 70
End: 2020-03-16
Attending: INTERNAL MEDICINE
Payer: MEDICARE

## 2020-03-16 DIAGNOSIS — I42.9 CARDIOMYOPATHY, UNSPECIFIED TYPE (H): ICD-10-CM

## 2020-03-16 PROCEDURE — 93306 TTE W/DOPPLER COMPLETE: CPT | Performed by: INTERNAL MEDICINE

## 2020-03-16 RX ADMIN — Medication 7 ML: at 14:15

## 2020-03-19 ENCOUNTER — TELEPHONE (OUTPATIENT)
Dept: OPHTHALMOLOGY | Facility: CLINIC | Age: 70
End: 2020-03-19

## 2020-03-19 DIAGNOSIS — H35.81 MACULAR EDEMA: Primary | ICD-10-CM

## 2020-03-19 RX ORDER — BROMFENAC SODIUM 0.7 MG/ML
1 SOLUTION/ DROPS OPHTHALMIC DAILY
Qty: 1 BOTTLE | Refills: 3 | Status: SHIPPED | OUTPATIENT
Start: 2020-03-19 | End: 2020-04-06

## 2020-03-19 NOTE — TELEPHONE ENCOUNTER
Discussion with patient.  Cancelled upcoming appointment due to COVID situation.  Patient states still having difficulty with new glasses lens right eye. Does note that he has a small region in this lens that he can tip his chin up to see more clearly.  Still very light sensitive.  Recommend continuing Prolensa drop daily right eye.  Will reschedule appt when COVID situation allows.  Jose Dewey M.D.

## 2020-03-26 NOTE — ADDENDUM NOTE
Encounter addended by: Alpers, Allison E, SLP on: 3/26/2020 9:58 AM   Actions taken: Episode resolved

## 2020-03-31 ENCOUNTER — TELEPHONE (OUTPATIENT)
Dept: UROLOGY | Facility: CLINIC | Age: 70
End: 2020-03-31

## 2020-03-31 NOTE — TELEPHONE ENCOUNTER
Patient's wife calling regarding appointment, states that they didn't  the medication due to it being $400 & wants to know if they should keep the appointment cause it was suppose to be a follow up for the medication.

## 2020-03-31 NOTE — TELEPHONE ENCOUNTER
Left message for patient to call [phone] to convert 4/7 appointment to telephone appointment in the MORNING of 4/7  Lisset Sinha, CMA

## 2020-04-06 ENCOUNTER — VIRTUAL VISIT (OUTPATIENT)
Dept: CARDIOLOGY | Facility: CLINIC | Age: 70
End: 2020-04-06
Payer: MEDICARE

## 2020-04-06 DIAGNOSIS — I20.9 ANGINA, CLASS II (H): ICD-10-CM

## 2020-04-06 DIAGNOSIS — J43.9 PULMONARY EMPHYSEMA, UNSPECIFIED EMPHYSEMA TYPE (H): ICD-10-CM

## 2020-04-06 DIAGNOSIS — C74.91 MALIGNANT NEOPLASM OF ADRENAL GLAND, RIGHT (H): Primary | ICD-10-CM

## 2020-04-06 PROCEDURE — 99443 ZZC PHYSICIAN TELEPHONE EVALUATION 21-30 MIN: CPT | Performed by: INTERNAL MEDICINE

## 2020-04-06 RX ORDER — NITROGLYCERIN 0.4 MG/1
TABLET SUBLINGUAL
Qty: 30 TABLET | Refills: 3 | Status: SHIPPED | OUTPATIENT
Start: 2020-04-06 | End: 2021-04-28 | Stop reason: ALTCHOICE

## 2020-04-06 NOTE — PROGRESS NOTES
"[facility] TELEPHONE VISIT    Ricky Brown is a 69 year old male who is being evaluated via a billable telephone visit.      The patient has been notified of following:     \"This telephone visit will be conducted via a call between you and your physician/provider. We have found that certain health care needs can be provided without the need for a physical exam.  This service lets us provide the care you need with a short phone conversation.  If a prescription is necessary we can send it directly to your pharmacy.  If lab work is needed we can place an order for that and you can then stop by our lab to have the test done at a later time.    If during the course of the call the physician/provider feels a telephone visit is not appropriate, you will not be charged for this service.\"     Patient has given verbal consent for Telephone visit?  Yes    Telephone Details: [phone]   Start Time: 12:15 pm  Finish Time: 1:06 pm  Phone call duration: 51 minutes      Referring Provider:  Armen Chavez  Primary Care Provider:   Armen Chavez  Indication for Consultation:  Nonischemic Cardiomyopathy    HPI: Ricky Brown is a 69 year old male who returns for ongoing for evaluation of nonischemic cardiomyopathy.  I have not seen him since the initial consultation and diagnosis (2013).    The patient's risk factor profile is: (+) HTN, (-) diabetes, (+) hyperlipidemia, (+) remote tobacco use [1 pack per month x 5 yrs, quit 1996], (+) family Hx CAD [paternal, not early onset].     The patient has had a series of cardiovascular studies in the past few years to evaluate cardiomyopathy:    ECHO (5/21/13) for evaluation of the fatigue. It showed mild-moderate LV dilation, LVEF 35-40%, moderate AI, PASP could not be evaluated, no pericardial effusion, and mild aortic valve sclerosis. The LVIDd is 4.8 cm and the LVIDs is 3.5 cm. The stress ECHO component was canceled    CARDIAC MRA (05/28/13):  " Normal left-ventricular size and mildly decreased global LV systolic function (LVEF 47%). Mild global hypokinesis, no regional wall- motion abnormalities. Normal right-ventricular size with [normal right-ventricular systolic function (RVEF 54%). Normal resting perfusion study. There was no late gadolinium enhancement to suggest prior infarct, inflammation, or infiltration. Mild aortic regurgitation.    RHC & CORONARY ANGIO (5/28/13): Normal hemodynamic study. Mild CAD (<25%, diffuse).     It has been 1 year since I have seen him.  He has been followed by a cardiologist at [facility].    The patient describes a history of infrequent chest discomfort.  He did not bring it up at his last visit.  He notes chest discomfort with strenuous activity such as chasing his dog.  He can walk two blocks and ascend two flights of stairs without chest discomfort.  He notes TOM with walking multiple blocks if he walks at a fast pace. It is worse than a year ago.  He has notice wheezing with strenuous activity.   The patient denies PND, orthopnea, pedal edema.  He denies palpitations, lightheadedness, and syncope.      He previously used CPAP but had difficulty wearing the mask at night and could not use the nasal CPAP alone.  He was swallowing air, developing additional problems.  He had a uvuloplasty with improvement and it temporarily resulted in reduction in snoring.  He had relief for a period of time but has had recurrent snoring.    His BP had been running 140s-150s / 80s.  Recent clinic visits have been 110-130 / 72-84 mm Hg.      He has been trying to exercise.    He was on Lasix but that was stopped.  He is now on ASA, Lipitor, Toprol XL 50 qd, and Losartan 50 mg every day.        PAST MEDICAL HISTORY:  Past Medical History:   Diagnosis Date     Arthritis      BCC (basal cell carcinoma)     right shoulder      Cardiomyopathy (H)      Colon adenomas 9/20/11     Coronary artery disease      ED (erectile dysfunction)       HTN (hypertension)      Noncompliance      Obesity      JOSE JUAN (obstructive sleep apnea)        CURRENT MEDICATIONS:  Current Outpatient Medications   Medication Sig     aspirin 81 MG tablet Take 1 tablet (81 mg) by mouth daily     atorvastatin (LIPITOR) 10 MG tablet Take 1 tablet (10 mg) by mouth daily     Calcium Carbonate Antacid (TUMS CALCIUM FOR LIFE BONE PO) Take  by mouth.     diphenoxylate-atropine (LOMOTIL) 2.5-0.025 MG per tablet Take 1 tablet by mouth daily as needed for diarrhea Patient only takes 1 tablet as needed     furosemide (LASIX) 20 MG tablet Take 1 tablet (20 mg) by mouth daily as needed     losartan (COZAAR) 50 MG tablet Take 1 tablet (50 mg) by mouth daily     metoprolol succinate ER (TOPROL-XL) 50 MG 24 hr tablet Take 1 tablet (50 mg) by mouth daily     Multiple Vitamin (MULTIVITAMINS PO) Take  by mouth daily.     bromfenac (PROLENSA) 0.07 % ophthalmic solution Place 1 drop into the right eye daily (Patient not taking: Reported on 4/6/2020)     mirabegron (MYRBETRIQ) 50 MG 24 hr tablet Take 1 tablet (50 mg) by mouth daily (Patient not taking: Reported on 4/6/2020)     potassium chloride ER (K-DUR/KLOR-CON M) 20 MEQ CR tablet Take 1 tablet (20 mEq) by mouth daily as needed (takes with Lasix as needed) (Patient not taking: Reported on 3/10/2020)     prednisoLONE acetate (PRED FORTE) 1 % ophthalmic suspension Place 1 drop into the right eye 3 times daily (Patient not taking: Reported on 4/6/2020)     tamsulosin (FLOMAX) 0.4 MG capsule Take 1 capsule (0.4 mg) by mouth daily (Patient not taking: Reported on 4/6/2020)     No current facility-administered medications for this visit.          PAST SURGICAL HISTORY:  Past Surgical History:   Procedure Laterality Date     ARTHROSCOPY SHOULDER BICEPS TENODESIS REPAIR  2/27/2014    Procedure: ARTHROSCOPY SHOULDER BICEPS TENODESIS REPAIR;;  Surgeon: Michael Hagen MD;  [facility]     ARTHROSCOPY SHOULDER ROTATOR CUFF REPAIR  2/27/2014     Procedure: ARTHROSCOPY SHOULDER ROTATOR CUFF REPAIR;  Right Shoulder Arthroscopic Rotator Cuff Repair,  Subacromial Decompression, Biceps Tenodesis, Distal Clavicle Excision   ;  Surgeon: Michael Hagen MD;  Location: [facility]     BIOPSY       CARDIAC SURGERY       COLONOSCOPY       EXCHANGE INTRAOCULAR LENS IMPLANT      left eye - first, recentered PCL with iris fixation; then IOLX with ACL     EXTRACAPSULAR CATARACT EXTRATION WITH INTRAOCULAR LENS IMPLANT      both eyes (elsewhere)     TURBINOPLASTY  2013    Procedure: TURBINOPLASTY;;  Surgeon: Lisset Parsons MD;  Location: [facility]     UVULOPALATOPHARYNGOPLASTY  2013    Procedure: UVULOPALATOPHARYNGOPLASTY;  Uvulopalatopharyngoplasty, Turbiante Reduction;  Surgeon: Lisset Parsons MD;  Location: [facility] OR       ALLERGIES  Lisinopril    FAMILY HX:  Family History   Problem Relation Age of Onset     Diabetes Father         Old age Diabetes     Cerebrovascular Disease Father      Obesity Father      Heart Disease Father      Coronary Artery Disease Father      Hypertension Father      Lipids Sister      C.A.D. No family hx of      Cancer No family hx of      Glaucoma No family hx of      Macular Degeneration No family hx of        SOCIAL HX:  Social History     Socioeconomic History     Marital status:      Spouse name: Kyung     Number of children: 0     Years of education: 14     Highest education level: Not on file   Occupational History     Occupation: industrial electronics      Employer: SELF   Social Needs     Financial resource strain: Not on file     Food insecurity:     Worry: Not on file     Inability: Not on file     Transportation needs:     Medical: Not on file     Non-medical: Not on file   Tobacco Use     Smoking status: Former Smoker     Packs/day: 0.50     Years: 2.00     Pack years: 1.00     Types: Cigarettes     Last attempt to quit: 1996     Years since quittin.2     Smokeless tobacco:  Never Used   Substance and Sexual Activity     Alcohol use: Yes     Alcohol/week: 5.0 oz     Comment: Evening Marie     Drug use: No     Sexual activity: Yes     Partners: Female     Birth control/protection: Male Surgical     Comment: 2006 vasectomy   Lifestyle     Physical activity:     Days per week: Not on file     Minutes per session: Not on file     Stress: Not on file   Relationships     Social connections:     Talks on phone: Not on file     Gets together: Not on file     Attends Catholic service: Not on file     Active member of club or organization: Not on file     Attends meetings of clubs or organizations: Not on file     Relationship status: Not on file     Intimate partner violence:     Fear of current or ex partner: Not on file     Emotionally abused: Not on file     Physically abused: Not on file     Forced sexual activity: Not on file   Other Topics Concern     Parent/sibling w/ CABG, MI or angioplasty before 65F 55M? No   Social History Narrative     Not on file       ROS:  Constitutional: No fever, chills, or sweats. No weight gain/loss.   HEENT: No visual disturbance, ear ache, epistaxis, sore throat.   Allergies/Immunologic: Negative.   Respiratory: No cough, hemoptysis.   Cardiovascular: As per HPI.   GI: No nausea, vomiting, hematemesis, melena, or hematochezia.   : No urinary frequency, dysuria, or hematuria.   Integument: No rash.   Psychiatric: No anxiety / depression.   Neuro: No speech disturbance, focal sensory or motor deficit.   Endocrinology: No polyuria / polyphagia.   Musculoskeletal: No myalgia.    VITAL SIGNS:  There were no vitals taken for this visit.  There is no height or weight on file to calculate BMI.  Wt Readings from Last 2 Encounters:   03/05/20 107.9 kg (237 lb 12.8 oz)   02/25/20 109.2 kg (240 lb 11.2 oz)       PHYSICAL EXAM  Ricky Brown is a 69 year old male.in no acute distress.  HEENT: Eyes Nonicteric.  Neck: JVP normal.  Carotids +3/3 bilaterally  without bruits.  Lungs: CTA.  Cor: RRR. Normal S1 and S2.  No murmur, rub, or gallop.  PMI in Lf 5th ICS.  Abd: Soft, nontender, nondistended.  NABS.  No pulsatile mass.  Extremities: No C/C/E.  Pulses +3/3 symmetric in upper and lower extremities.  Neuro: Grossly intact.  Psych: A&O x 3.  Skin: No rash.    LABS  Recent Labs   Lab Test 10/21/19  0428 19  1622   WBC 9.5 7.8   HGB 13.5 14.8   HCT 41.9 45.9    283     Recent Labs   Lab Test 10/21/19  0428 19  0853    141   POTASSIUM 3.8 4.0   CHLORIDE 111* 111*   CO2 26 23   BUN 17 21   CR 0.98 0.88   GFRESTIMATED 79 89     Recent Labs   Lab Test 19  0853 18  1520  04/14/15  1058 14  0752   CHOL 106 145   < > 105 144   HDL 52 36*   < > 39* 31*   LDL 16 38   < > 18 77   TRIG 192* 353*   < > 241* 180*   CHOLHDLRATIO  --   --   --  2.7 4.6    < > = values in this interval not displayed.       EK19  SB at 51.  Normal intervals and axes.  Flattened T waves inferiorly.      ECHO:  (3/16/2020)  Mildly reduced left ventricular function with EF 45-50%.  Right ventricular function, chamber size, wall motion, and thickness are normal.  Mild to moderate aortic insufficiency is present.  IVC diameter <2.1 cm collapsing >50% with respiratory variation suggests a normal RA pressure of 3 mmHg.  No pericardial effusion is present.   Compared to prior TTE dated 3/11/19, there has not been significant change.      ECHO: 3/4/19  Mildly (EF 45-50%) reduced left ventricular function is present.   Mild to mderate left ventricular dilation is present.  Right ventricular function, chamber size, wall motion, and thickness are normal.  Mild left atrial enlargement is present.  Mild to moderate aortic insufficiency is present.  The inferior vena cava was normal in size with preserved respiratory variability.  No pericardial effusion is present.  Compared to prior TTE dated 18, there has not been significant change.    STRESS TEST:   None    CARDIAC MRI:  5/28/13  I.  Reasons for Referral:  62-year-old male] referred to cardiac MRI to assess for cardiac function and severity of aortic regurgitation.     II. Comparison: None available     III. Impressions:  1.  Normal left-ventricular size (LVEDV 185 ml, LVEDVI 82 ml/m2) and mildly decreased global LV systolic function (LVEF 47%). Mild global hypokinesis, there were no regional wall- motion abnormalities.  2.  Normal right-ventricular size with [normal right-ventricular systolic function (RVEF 54%).  3. Normal resting perfusion study.  There was no late gadolinium enhancement to suggest prior infarct, inflammation, or infiltration.  4. Mild aortic regurgitation with a regurgitant fraction of 8% by velocity encoded flow quantification method (aortic regurgitant volume is 7 ml per heart beat). AI regurgitant fraction by volumetric analysis is 11% with a regurgitant volume of 10 ml per heartbeat.  Velocity encoded flow quantification of abdominal aorta does not reveal any evidence of total diastolic flow reversal, mild early diastolic flow reversal is noted.     CARDIAC CATH: 5/28/13  Summary of Findings:  Hemodynamic study:  RA 8/8/7  RV 32, EDP 10  PA 30/18/21  PCW 13/10/10  Xavi CO 7.2  Xavi CI 3.4  PA sat 71%  Hgb 15 g/dL    Coronary angiogram:  LM: Angiographically normal.  LAD: type 2 vessel with luminal irregularities. There is one large caliber diagonal branch.   LCX: gives rise to one large OM branch and a moderate caliber PL branch. There are luminal irregularities throughout.   RCA (dominant) gives rise to PL branches and supplies PDA. There are mild irregularities throughout.     Impression:  1. Normal hemodynamic study.  2. No angiographic evidence of obstructive coronary artery disease.    Plan:  1. Continued medical management for cardiovascular risk factor optimization.  2. Consider alternative causes of fatigue (ie obstructive sleep apnea).     NICS:  2/10/14  IMPRESSION:  "Impression: Unremarkable ultrasonographic evaluation of the bilateral carotid arteries. Unremarkable vertebral arteries with antegrade flow.     ABDOMINAL US:  3/19/2019  No evidence of AAA.    ASSESSMENT AND PLAN:   1. Nonischemic cardiomyopathy.  - LVEF 40-45%  - ACC/AHA Stage C.  - NYHA Class II-III.  - History of noncompliance but currently taking his Rx.  - No indication for AICD at this time.  - No evidence of volume overload, defer on diuretic.  - Continue Losartan 50 every day and Toprol XL 50 mg every day.  - TOM with wheezing may represent asthma / COPD.  Check PFTs.    2. Aortic insufficiency.  - Mild-moderate  - Repeat ECHO in 2 years.    3. Coronary artery disease.  - Minimal CAD by coronary angiography (2014)  - Mild chest discomfort, possibly Class II angina  - ASA and statin  - Start SL NTG   - Stress cardiac MRI    4. R/O Vascular Disease  - Age > 65 and prior tobacco use.  - Abdominal US negative for AAA    5. Sleep Apnea, s/p uvuloplasty  - Unable to tolerate CPAP (face mask, nasal)    6. Adrenal nodual, RT  - CT adrenal scan per protocol.    FOLLOW UP:  1 year    ADDENDUM (4/28/2020):  Radiology report below.  Schedule repeat CT scan of chest for follow up on pulmonary nodule in Apr 2021.    \"The original report on this patient was dictated by me. Upon further review, there is a right adrenal nodule that is stable in size measuring 2.1 cm, series 3 image 68, when compared to the CT abdomen from 10/21/2019. At unenhanced scanning its internal density measures 6.6 Hounsfield units on series 3 image 67 making this most consistent with an adrenal adenoma. Given these imaging characteristics this is of doubtful clinical significance. However, follow-up should be made on a clinical basis.      There is also a stable subpleural nodule along the anterior right chest base measuring 0.4 cm at the right middle lobe series 3 image 7.  Consider one year surveillance CT chest, for example performed on or after " 10/21/2020.    CARDIAC MRI: 5/7/2020  1. The left ventricle is normal in cavity size and wall thickness. The global systolic function is mildly reduced. The LVEF is 42%. There is mild diffuse hypokinesis.  2. The right ventricle is normal in cavity size. The global systolic function is mildly reduced. The RVEF is 49%.   3. Both atria are normal in size.   4. There is mild aortic regurgitation.   5. There is mid-myocardial late gadolinium enhancement in the basal inferolateral and basal inferoseptal segments consistent with non ischemic fibrosis.   6. Regadenoson stress perfusion imaging shows no ischemia.   7. There is no pericardial effusion or thickening.   8. There is no intracardiac thrombus.     CONCLUSIONS:   Non-ischemic cardiomyopathy with mildly reduced biventricular function.  A small burden of midmyocardial fibrosis in the basal ventricle with no ischemia    PFTs  (5/7/2020)  The FVC, FEV1, and FEV1/FVC ratio are within normal limits.   The inspiratory flow rates are slightly reduced.   Lung volumes are within normal limits.     The diffusing capacity is normal.     IMPRESSION:   Normal Spirometry.   Normal diffusing capacity.   Normal lung volumes.   Flattening of the inspiratory limb of the flow volume loop suggests extrathoracic upper airway obstruction.  Clinical correlation is recommended.     ADDENDUM (5/7/2020): Stress cardiac MRI reflects prior scarring (not typical of CAD). [This was edited on 5/18/2020].  LVEF 42%, essentially unchanged.  No ischemia.  PFTs normal although the issue of extrathoracic upper airway obstruction may warrant further evaluation.  I will discuss with Dr. Guzmán whether ENT referral and laryngoscopy might help explain the TOM and wheezing.   I suspect the TOM is more a reflection of cardiomyopathy than ischemia based on these results.  Patient should watch Na intake and weight.  Check NT-BNP and CXR.   If evidence of interstitial edema or markedly elevated NT-BNP, consider  low dose diuretic for symptomatic improvement.    ADDENDUM (5/18/2020): CXR clear.  NT-.   Both of these suggest compensated CHF.  I would not increase diuretic at this time.    ADDENDUM (5/18/2020): I spoke with patient.  I had accidentally written MI rather than scar after reviewing the MRI.  I corrected this to state scarring (not typical of CAD).    MD Jose Forrester MD    Divisions of Cardiology  [facility]  Patient Care Team:  Deyvi Chavez MD as PCP - General (Family Practice)  Deyvi Chavez MD as Assigned PCP  Lisset Cedillo RN as Specialty Care Coordinator (Cardiology)  Teresa Morris NP as Nurse Practitioner (Nurse Practitioner - Gerontology)  DEYVI CHAVEZ

## 2020-04-06 NOTE — PATIENT INSTRUCTIONS
TO NURSING: PLEASE CALL PATIENT AND REVIEW ASSESSMENT / PLAN and AFTER VISIT SUMMARY.    It was a pleasure to see you in the cardiology clinic today.    If you have any questions, you can reach my nurse, Erin Lindsay, at [phone].     Note the new medications: Sublingual nitroglycerin.  Take one tablet and place under tongue and wait 5 minutes.  If you are still having chest discomfort, take a second tablet.  If the chest discomfort continues, take a 3rd tablet 5 minutes later.   If chest discomfort does not improve, call 9-1-1.    Stop the following medications: None    The results from today include:       ECHO:  (3/16/2020)  Mildly reduced left ventricular function with EF 45-50%.  Right ventricular function, chamber size, wall motion, and thickness are normal.  Mild to moderate aortic insufficiency is present.  IVC diameter <2.1 cm collapsing >50% with respiratory variation suggests a normal RA pressure of 3 mmHg.  No pericardial effusion is present.   Compared to prior TTE dated 3/11/19, there has not been significant change.        Tests ordered today:   1. ECHO in March 2022  2. CT Abdomen with and without contrast to follow up on a  2.1 x 1.7 cm right adrenal nodule  3. Stress Cardiac MRI  4. Check PFTs with and without bronchodilators (I did not explain this to patient on phone).    I would like you to follow up with Dr. Porras in 1 year.    Sincerely,      Jose Porras MD     [facility]

## 2020-04-11 ENCOUNTER — APPOINTMENT (OUTPATIENT)
Dept: CT IMAGING | Facility: CLINIC | Age: 70
End: 2020-04-11
Attending: EMERGENCY MEDICINE
Payer: MEDICARE

## 2020-04-11 ENCOUNTER — NURSE TRIAGE (OUTPATIENT)
Dept: NURSING | Facility: CLINIC | Age: 70
End: 2020-04-11

## 2020-04-11 ENCOUNTER — HOSPITAL ENCOUNTER (EMERGENCY)
Facility: CLINIC | Age: 70
Discharge: HOME | End: 2020-04-11
Attending: EMERGENCY MEDICINE | Admitting: EMERGENCY MEDICINE
Payer: MEDICARE

## 2020-04-11 VITALS
BODY MASS INDEX: 38.25 KG/M2 | OXYGEN SATURATION: 96 % | SYSTOLIC BLOOD PRESSURE: 164 MMHG | DIASTOLIC BLOOD PRESSURE: 95 MMHG | WEIGHT: 237 LBS | RESPIRATION RATE: 16 BRPM | TEMPERATURE: 97.9 F | HEART RATE: 72 BPM

## 2020-04-11 DIAGNOSIS — N23 RENAL COLIC: ICD-10-CM

## 2020-04-11 DIAGNOSIS — R10.84 ABDOMINAL PAIN, GENERALIZED: ICD-10-CM

## 2020-04-11 LAB
ALBUMIN SERPL-MCNC: 4 G/DL (ref 3.4–5)
ALBUMIN UR-MCNC: NEGATIVE MG/DL
ALP SERPL-CCNC: 72 U/L (ref 40–150)
ALT SERPL W P-5'-P-CCNC: 41 U/L (ref 0–70)
ANION GAP SERPL CALCULATED.3IONS-SCNC: 6 MMOL/L (ref 3–14)
APPEARANCE UR: ABNORMAL
AST SERPL W P-5'-P-CCNC: 30 U/L (ref 0–45)
BASOPHILS # BLD AUTO: 0 10E9/L (ref 0–0.2)
BASOPHILS NFR BLD AUTO: 0.1 %
BILIRUB SERPL-MCNC: 0.4 MG/DL (ref 0.2–1.3)
BILIRUB UR QL STRIP: NEGATIVE
BUN SERPL-MCNC: 23 MG/DL (ref 7–30)
CALCIUM SERPL-MCNC: 8.9 MG/DL (ref 8.5–10.1)
CHLORIDE SERPL-SCNC: 109 MMOL/L (ref 94–109)
CO2 SERPL-SCNC: 26 MMOL/L (ref 20–32)
COLOR UR AUTO: ABNORMAL
CREAT SERPL-MCNC: 1.1 MG/DL (ref 0.66–1.25)
DIFFERENTIAL METHOD BLD: NORMAL
EOSINOPHIL # BLD AUTO: 0 10E9/L (ref 0–0.7)
EOSINOPHIL NFR BLD AUTO: 0.4 %
ERYTHROCYTE [DISTWIDTH] IN BLOOD BY AUTOMATED COUNT: 13.6 % (ref 10–15)
GFR SERPL CREATININE-BSD FRML MDRD: 69 ML/MIN/{1.73_M2}
GLUCOSE SERPL-MCNC: 124 MG/DL (ref 70–99)
GLUCOSE UR STRIP-MCNC: NEGATIVE MG/DL
HCT VFR BLD AUTO: 41.7 % (ref 40–53)
HGB BLD-MCNC: 13.8 G/DL (ref 13.3–17.7)
HGB UR QL STRIP: ABNORMAL
IMM GRANULOCYTES # BLD: 0 10E9/L (ref 0–0.4)
IMM GRANULOCYTES NFR BLD: 0.3 %
KETONES UR STRIP-MCNC: NEGATIVE MG/DL
LACTATE BLD-SCNC: 1.6 MMOL/L (ref 0.7–2)
LEUKOCYTE ESTERASE UR QL STRIP: NEGATIVE
LIPASE SERPL-CCNC: 129 U/L (ref 73–393)
LYMPHOCYTES # BLD AUTO: 1.2 10E9/L (ref 0.8–5.3)
LYMPHOCYTES NFR BLD AUTO: 16.5 %
MCH RBC QN AUTO: 30 PG (ref 26.5–33)
MCHC RBC AUTO-ENTMCNC: 33.1 G/DL (ref 31.5–36.5)
MCV RBC AUTO: 91 FL (ref 78–100)
MONOCYTES # BLD AUTO: 0.8 10E9/L (ref 0–1.3)
MONOCYTES NFR BLD AUTO: 11.3 %
NEUTROPHILS # BLD AUTO: 5.3 10E9/L (ref 1.6–8.3)
NEUTROPHILS NFR BLD AUTO: 71.4 %
NITRATE UR QL: NEGATIVE
NRBC # BLD AUTO: 0 10*3/UL
NRBC BLD AUTO-RTO: 0 /100
PH UR STRIP: 5.5 PH (ref 5–7)
PLATELET # BLD AUTO: 242 10E9/L (ref 150–450)
POTASSIUM SERPL-SCNC: 4.3 MMOL/L (ref 3.4–5.3)
PROT SERPL-MCNC: 7.5 G/DL (ref 6.8–8.8)
RBC # BLD AUTO: 4.6 10E12/L (ref 4.4–5.9)
RBC #/AREA URNS AUTO: 4 /HPF (ref 0–2)
SODIUM SERPL-SCNC: 141 MMOL/L (ref 133–144)
SOURCE: ABNORMAL
SP GR UR STRIP: 1.02 (ref 1–1.03)
SQUAMOUS #/AREA URNS AUTO: <1 /HPF (ref 0–1)
UROBILINOGEN UR STRIP-MCNC: NORMAL MG/DL (ref 0–2)
WBC # BLD AUTO: 7.5 10E9/L (ref 4–11)
WBC #/AREA URNS AUTO: 1 /HPF (ref 0–5)

## 2020-04-11 PROCEDURE — 25000128 H RX IP 250 OP 636: Performed by: EMERGENCY MEDICINE

## 2020-04-11 PROCEDURE — 96374 THER/PROPH/DIAG INJ IV PUSH: CPT | Performed by: EMERGENCY MEDICINE

## 2020-04-11 PROCEDURE — 85025 COMPLETE CBC W/AUTO DIFF WBC: CPT | Performed by: EMERGENCY MEDICINE

## 2020-04-11 PROCEDURE — 96361 HYDRATE IV INFUSION ADD-ON: CPT | Performed by: EMERGENCY MEDICINE

## 2020-04-11 PROCEDURE — 99284 EMERGENCY DEPT VISIT MOD MDM: CPT | Mod: Z6 | Performed by: EMERGENCY MEDICINE

## 2020-04-11 PROCEDURE — 80053 COMPREHEN METABOLIC PANEL: CPT | Performed by: EMERGENCY MEDICINE

## 2020-04-11 PROCEDURE — 99284 EMERGENCY DEPT VISIT MOD MDM: CPT | Mod: 25 | Performed by: EMERGENCY MEDICINE

## 2020-04-11 PROCEDURE — 83605 ASSAY OF LACTIC ACID: CPT | Performed by: EMERGENCY MEDICINE

## 2020-04-11 PROCEDURE — 81001 URINALYSIS AUTO W/SCOPE: CPT | Performed by: EMERGENCY MEDICINE

## 2020-04-11 PROCEDURE — 25800030 ZZH RX IP 258 OP 636: Performed by: EMERGENCY MEDICINE

## 2020-04-11 PROCEDURE — 83690 ASSAY OF LIPASE: CPT | Performed by: EMERGENCY MEDICINE

## 2020-04-11 PROCEDURE — 74176 CT ABD & PELVIS W/O CONTRAST: CPT

## 2020-04-11 RX ORDER — KETOROLAC TROMETHAMINE 15 MG/ML
15 INJECTION, SOLUTION INTRAMUSCULAR; INTRAVENOUS ONCE
Status: COMPLETED | OUTPATIENT
Start: 2020-04-11 | End: 2020-04-11

## 2020-04-11 RX ADMIN — KETOROLAC TROMETHAMINE 15 MG: 15 INJECTION, SOLUTION INTRAMUSCULAR; INTRAVENOUS at 10:26

## 2020-04-11 RX ADMIN — SODIUM CHLORIDE 1000 ML: 9 INJECTION, SOLUTION INTRAVENOUS at 10:27

## 2020-04-11 ASSESSMENT — ENCOUNTER SYMPTOMS
ARTHRALGIAS: 0
FREQUENCY: 1
FEVER: 0
CONSTIPATION: 0
SHORTNESS OF BREATH: 0
CHILLS: 0
EYE REDNESS: 0
HEMATURIA: 0
CONFUSION: 0
NAUSEA: 0
DIAPHORESIS: 0
DIARRHEA: 0
DIFFICULTY URINATING: 0
HEADACHES: 0
VOMITING: 0
DYSURIA: 0
COLOR CHANGE: 0
ABDOMINAL PAIN: 1
NECK STIFFNESS: 0

## 2020-04-11 NOTE — ED TRIAGE NOTES
Patient complaining of constant 8/10 pain in left lower quadrant starting around 1:30 AM this morning, waking him from sleep. No relieving or precipitating factors.

## 2020-04-11 NOTE — TELEPHONE ENCOUNTER
"No F/ C / SOB    No travel     No (+) case  Contact       Call from wife ( in background)      CC:   L Lower ABD pain since about 2am this am        Wife indicates similar episode  Oct 2019 - work up for possible kidney stone - resolved by the time the results in      Unable to sleep due to discomfort       Did pee this am - NL - no blood   Did poop this am - NL - no blood     No V  No D       No back pain     Pain is currently 8/10       Alex Ruffin RN              Reason for Disposition    [1] SEVERE pain AND [2] age > 60    Additional Information    Negative: Shock suspected (e.g., cold/pale/clammy skin, too weak to stand, low BP, rapid pulse)    Negative: Difficult to awaken or acting confused (e.g., disoriented, slurred speech)    Negative: Sounds like a life-threatening emergency to the triager    Negative: Chest pain    Negative: Pain is mainly in upper abdomen  (if needed ask: \"is it mainly above the belly button?\")    Negative: Followed an abdomen (stomach) injury    Negative: [1] SEVERE pain (e.g., excruciating) AND [2] present > 1 hour    Protocols used: ABDOMINAL PAIN - MALE-A-AH      "

## 2020-04-11 NOTE — ED PROVIDER NOTES
[facility] EMERGENCY DEPARTMENT ([facility])     April 11, 2020    ED Provider Note  [facility]      History     Chief Complaint   Patient presents with     Abdominal Pain     left lower quadrant pain started at 1:30am this morning     HPI  Ricky Brown is a 69 year old male with a history of CAD, HTN, obesity, and JOSE JUAN who presents to the ED for evaluation of left lower quadrant abdominal pain. Patient reports the abdominal pain woke him up at 1:30 AM this morning. He states the pain feels like sharp cramps. He reports the pain radiates across his lower abdomen to the right lower quadrant. Patient states the pain is currently an 8 out of 10 on a pain scale of 1-10. He reports the pain is constant, and nothing makes the pain better or worse. He states he has not taken any pain medications. Patient denies fever, chills, diaphoresis, nausea, vomiting, or diarrhea. He reports he had a couple bowel movements this morning, and at first they were normal, but then they became looser as the morning went on. He states he has been experiencing urinary frequency, but denies dysuria or hematuria. Of note, patient reports this abdominal pain feels similar to when he had a kidney stone in October 2019, however, that pain seemed to go away at times, but his current pain is not going away.    Past Medical History  Past Medical History:   Diagnosis Date     Arthritis      BCC (basal cell carcinoma)     right shoulder      Cardiomyopathy (H)      Colon adenomas 9/20/11     Coronary artery disease      ED (erectile dysfunction)      HTN (hypertension)      Noncompliance      Obesity      JOSE JUAN (obstructive sleep apnea)      Past Surgical History:   Procedure Laterality Date     ARTHROSCOPY SHOULDER BICEPS TENODESIS REPAIR  2/27/2014    Procedure: ARTHROSCOPY SHOULDER BICEPS TENODESIS REPAIR;;  Surgeon: Michael Hagen MD;  Location: [facility]     ARTHROSCOPY SHOULDER ROTATOR CUFF REPAIR   2/27/2014    Procedure: ARTHROSCOPY SHOULDER ROTATOR CUFF REPAIR;  Right Shoulder Arthroscopic Rotator Cuff Repair,  Subacromial Decompression, Biceps Tenodesis, Distal Clavicle Excision   ;  Surgeon: Michael Hagen MD;  Location: [facility] OR     BIOPSY       CARDIAC SURGERY       COLONOSCOPY       EXCHANGE INTRAOCULAR LENS IMPLANT  2005    left eye - first, recentered PCL with iris fixation; then IOLX with ACL     EXTRACAPSULAR CATARACT EXTRATION WITH INTRAOCULAR LENS IMPLANT  2005    both eyes (elsewhere)     TURBINOPLASTY  12/11/2013    Procedure: TURBINOPLASTY;;  Surgeon: Lisset Parsons MD;  [facility]     UVULOPALATOPHARYNGOPLASTY  12/11/2013    Procedure: UVULOPALATOPHARYNGOPLASTY;  Uvulopalatopharyngoplasty, Turbiante Reduction;  Surgeon: Lisset Parsons MD;  Location:  OR     aspirin 81 MG tablet  atorvastatin (LIPITOR) 10 MG tablet  Calcium Carbonate Antacid (TUMS CALCIUM FOR LIFE BONE PO)  diphenoxylate-atropine (LOMOTIL) 2.5-0.025 MG per tablet  furosemide (LASIX) 20 MG tablet  losartan (COZAAR) 50 MG tablet  metoprolol succinate ER (TOPROL-XL) 50 MG 24 hr tablet  Multiple Vitamin (MULTIVITAMINS PO)  nitroGLYcerin (NITROSTAT) 0.4 MG sublingual tablet  tamsulosin (FLOMAX) 0.4 MG capsule      Allergies   Allergen Reactions     Lisinopril Cough     Past medical history, past surgical history, medications, and allergies were reviewed with the patient. Additional pertinent items: None    Family History  Family History   Problem Relation Age of Onset     Diabetes Father         Old age Diabetes     Cerebrovascular Disease Father      Obesity Father      Heart Disease Father      Coronary Artery Disease Father      Hypertension Father      Lipids Sister      C.A.D. No family hx of      Cancer No family hx of      Glaucoma No family hx of      Macular Degeneration No family hx of      Family history was reviewed with the patient. Additional pertinent items: None    Social  History  Social History     Tobacco Use     Smoking status: Former Smoker     Packs/day: 0.50     Years: 2.00     Pack years: 1.00     Types: Cigarettes     Last attempt to quit: 1996     Years since quittin.3     Smokeless tobacco: Never Used   Substance Use Topics     Alcohol use: Yes     Alcohol/week: 8.3 standard drinks     Frequency: 4 or more times a week     Comment: Evening Marie     Drug use: No      Social history was reviewed with the patient. Additional pertinent items: None    Review of Systems   Constitutional: Negative for chills, diaphoresis and fever.   HENT: Negative for congestion.    Eyes: Negative for redness.   Respiratory: Negative for shortness of breath.    Cardiovascular: Negative for chest pain.   Gastrointestinal: Positive for abdominal pain (LLQ  ). Negative for constipation, diarrhea, nausea and vomiting.   Genitourinary: Positive for frequency. Negative for difficulty urinating, dysuria and hematuria.   Musculoskeletal: Negative for arthralgias and neck stiffness.   Skin: Negative for color change.   Neurological: Negative for headaches.   Psychiatric/Behavioral: Negative for confusion.   All other systems reviewed and are negative.        Physical Exam   BP: (!) 187/101  Pulse: 87  Temp: 97.5  F (36.4  C)  Resp: 20  Weight: 107.5 kg (237 lb)  SpO2: 97 %  Physical Exam  Constitutional:       General: He is not in acute distress.     Appearance: He is not diaphoretic.   HENT:      Head: Atraumatic.   Eyes:      General: No scleral icterus.     Pupils: Pupils are equal, round, and reactive to light.   Cardiovascular:      Heart sounds: Normal heart sounds.   Pulmonary:      Effort: No respiratory distress.      Breath sounds: Normal breath sounds.   Abdominal:      General: Bowel sounds are normal.      Palpations: Abdomen is soft.      Tenderness: There is no abdominal tenderness.       Musculoskeletal:         General: No tenderness.   Skin:     General: Skin is warm.       Findings: No rash.         ED Course      Procedures                         Results for orders placed or performed during the hospital encounter of 04/11/20   Abd/pelvis CT no contrast - Stone Protocol     Status: None    Narrative    CT ABDOMEN AND PELVIS WITHOUT CONTRAST  4/11/2020 11:28 AM    CLINICAL HISTORY: Left flank pain, stone disease suspected.    TECHNIQUE: CT scan of the abdomen and pelvis was performed without IV  contrast. Multiplanar reformats were obtained. Dose reduction  techniques were used.  CONTRAST: None.    COMPARISON: CT abdomen and pelvis 10/21/2019.    FINDINGS:   LOWER CHEST: Normal.    HEPATOBILIARY: No acute abnormality. Suggestion of fatty infiltration  of the liver. Stable small hypodensity within the posterior right  liver suggesting a cyst that may be minimally complex measuring 0.9  cm, series 2 image 30.    PANCREAS: Normal.    SPLEEN: Normal.    ADRENAL GLANDS: Normal.    KIDNEYS/BLADDER: Left perirenal edema. No hydronephrosis. No visible  obstructing stone. Stable incidental left renal cysts not requiring  specific imaging follow-up.    BOWEL: No acute abnormality. Colonic diverticula. Normal appendix.    LYMPH NODES: Normal.    VASCULATURE: Unremarkable.    PELVIC ORGANS: Normal.    OTHER: Stable fat-containing hernia at the left inguinal region.    MUSCULOSKELETAL: Normal.      Impression    IMPRESSION:   1.  Left perirenal edema, but no obstructing stone can be seen. No  hydronephrosis. This may represent recently passed stone. Differential  includes pyelonephritis.  2.  Fatty liver.  3.  Stable small mildly complex cyst at the right liver.    MARIO ROONEY MD   CBC with platelets differential     Status: None   Result Value Ref Range    WBC 7.5 4.0 - 11.0 10e9/L    RBC Count 4.60 4.4 - 5.9 10e12/L    Hemoglobin 13.8 13.3 - 17.7 g/dL    Hematocrit 41.7 40.0 - 53.0 %    MCV 91 78 - 100 fl    MCH 30.0 26.5 - 33.0 pg    MCHC 33.1 31.5 - 36.5 g/dL    RDW 13.6 10.0 - 15.0 %     Platelet Count 242 150 - 450 10e9/L    Diff Method Automated Method     % Neutrophils 71.4 %    % Lymphocytes 16.5 %    % Monocytes 11.3 %    % Eosinophils 0.4 %    % Basophils 0.1 %    % Immature Granulocytes 0.3 %    Nucleated RBCs 0 0 /100    Absolute Neutrophil 5.3 1.6 - 8.3 10e9/L    Absolute Lymphocytes 1.2 0.8 - 5.3 10e9/L    Absolute Monocytes 0.8 0.0 - 1.3 10e9/L    Absolute Eosinophils 0.0 0.0 - 0.7 10e9/L    Absolute Basophils 0.0 0.0 - 0.2 10e9/L    Abs Immature Granulocytes 0.0 0 - 0.4 10e9/L    Absolute Nucleated RBC 0.0    Comprehensive metabolic panel     Status: Abnormal   Result Value Ref Range    Sodium 141 133 - 144 mmol/L    Potassium 4.3 3.4 - 5.3 mmol/L    Chloride 109 94 - 109 mmol/L    Carbon Dioxide 26 20 - 32 mmol/L    Anion Gap 6 3 - 14 mmol/L    Glucose 124 (H) 70 - 99 mg/dL    Urea Nitrogen 23 7 - 30 mg/dL    Creatinine 1.10 0.66 - 1.25 mg/dL    GFR Estimate 69 >60 mL/min/[1.73_m2]    GFR Estimate If Black 80 >60 mL/min/[1.73_m2]    Calcium 8.9 8.5 - 10.1 mg/dL    Bilirubin Total 0.4 0.2 - 1.3 mg/dL    Albumin 4.0 3.4 - 5.0 g/dL    Protein Total 7.5 6.8 - 8.8 g/dL    Alkaline Phosphatase 72 40 - 150 U/L    ALT 41 0 - 70 U/L    AST 30 0 - 45 U/L   UA reflex to Microscopic and Culture     Status: Abnormal    Specimen: Urine clean catch; Midstream Urine   Result Value Ref Range    Color Urine Light Yellow     Appearance Urine Slightly Cloudy     Glucose Urine Negative NEG^Negative mg/dL    Bilirubin Urine Negative NEG^Negative    Ketones Urine Negative NEG^Negative mg/dL    Specific Gravity Urine 1.019 1.003 - 1.035    Blood Urine Small (A) NEG^Negative    pH Urine 5.5 5.0 - 7.0 pH    Protein Albumin Urine Negative NEG^Negative mg/dL    Urobilinogen mg/dL Normal 0.0 - 2.0 mg/dL    Nitrite Urine Negative NEG^Negative    Leukocyte Esterase Urine Negative NEG^Negative    Source Midstream Urine     RBC Urine 4 (H) 0 - 2 /HPF    WBC Urine 1 0 - 5 /HPF    Squamous Epithelial /HPF Urine <1 0 - 1  /HPF   Lipase     Status: None   Result Value Ref Range    Lipase 129 73 - 393 U/L   Lactic acid whole blood     Status: None   Result Value Ref Range    Lactic Acid 1.6 0.7 - 2.0 mmol/L     Medications   ketorolac (TORADOL) injection 15 mg (15 mg Intravenous Given 4/11/20 1026)   0.9% sodium chloride BOLUS (0 mLs Intravenous Stopped 4/11/20 1131)        Assessments & Plan (with Medical Decision Making)   69-year-old male presents for evaluation of left lower quadrant abdominal pain.  Differential included renal colic, diverticulitis, gastroenteritis, colitis, zoster, muscle strain, pyelonephritis.  Examination revealed obesity and no acute abdominal findings.  Urinalysis revealed 4 red cells.  CBC and comprehensive metabolic panel were normal with exception of minimally elevated creatinine of 1.1.  Abdomen CT revealed evidence of some mild left perinephric edema consistent with possibly recently passed stone.  As patient was improving without Toradol and tolerating fluids, he will be discharged.  I have recommended follow-up with urology as this is the second episode of similar symptoms with somewhat similar findings on CT.    I have reviewed the nursing notes. I have reviewed the findings, diagnosis, plan and need for follow up with the patient.    Discharge Medication List as of 4/11/2020 12:04 PM          Final diagnoses:   Abdominal pain, generalized   Renal colic     IVerna, am serving as a trained medical scribe to document services personally performed by Alex Anglin MD, based on the provider's statements to me.      IAlex MD, was physically present and have reviewed and verified the accuracy of this note documented by Verna Villarreal.     --  Alex Anglin MD  Emergency Medicine   [facility], [facility] EMERGENCY DEPARTMENT  4/11/2020     aFraz Anglin MD  04/11/20 7984

## 2020-04-11 NOTE — ED AVS SNAPSHOT
[facility], [place], Emergency Department  [address]  [facility] [address]  Phone:  [phone]  Fax:  [phone]                                    Ricky Brown   MRN: [id number]    Department:  [facility], Emergency Department   Date of Visit:  4/11/2020           After Visit Summary Signature Page    I have received my discharge instructions, and my questions have been answered. I have discussed any challenges I see with this plan with the nurse or doctor.    ..........................................................................................................................................  Patient/Patient Representative Signature      ..........................................................................................................................................  Patient Representative Print Name and Relationship to Patient    ..................................................               ................................................  Date                                   Time    ..........................................................................................................................................  Reviewed by Signature/Title    ...................................................              ..............................................  Date                                               Time          22EPIC Rev 08/18

## 2020-04-11 NOTE — DISCHARGE INSTRUCTIONS
Please make an appointment to follow up with Your Primary Care Provider and Urology Clinic (phone: [phone]) as soon as possible unless symptoms completely resolve.  You may try Tylenol and heating.

## 2020-04-16 ENCOUNTER — TELEPHONE (OUTPATIENT)
Dept: CARDIOLOGY | Facility: CLINIC | Age: 70
End: 2020-04-16

## 2020-04-16 DIAGNOSIS — R91.8 PULMONARY NODULES: Primary | ICD-10-CM

## 2020-04-16 DIAGNOSIS — E27.9 ADRENAL NODULE (H): ICD-10-CM

## 2020-04-16 NOTE — TELEPHONE ENCOUNTER
Called patient to review after visit instructions for his visit with Dr Porras. Left phone number asking him to call back.   DBaadrian, RN'    TO NURSING: PLEASE CALL PATIENT AND REVIEW ASSESSMENT / PLAN and AFTER VISIT SUMMARY.       Note the new medications: Sublingual nitroglycerin.  Take one tablet and place under tongue and wait 5 minutes.  If you are still having chest discomfort, take a second tablet.  If the chest discomfort continues, take a 3rd tablet 5 minutes later.   If chest discomfort does not improve, call 9-1-1.     Stop the following medications: None        Tests ordered today:   1. ECHO in March 2022  2. CT Abdomen with and without contrast to follow up on a  2.1 x 1.7 cm right adrenal nodule  3. Stress Cardiac MRI  4. Check PFTs with and without bronchodilators (I did not explain this to patient on phone).

## 2020-04-17 NOTE — TELEPHONE ENCOUNTER
Jose Porras MD Balcome, Debbie A, RN Debbie, Good pickup.  Can you contact radiology and ask them to review the 2 CT scans.  They specifically asked for a high resolution CT of the adrenal.   Thus, the more recent CT scan which was no performed with this requested protocol may have missed the adrenal mass.  If the radiologist feels there is no longer an issue, document that in Epic.  Otherwise, follow through with CT adrenal per their request.  Make sure it is the correct side.

## 2020-04-28 NOTE — TELEPHONE ENCOUNTER
"Jose Porras MD Balcome, Debbie A, RN  Cc: [facility] [place]    Caller: Unspecified (1 week ago)               Erin,     The radiologist missed the adrenal mass on the Apr 2020 scan and has now addended the report.  It appears to be a benign lesion based on appearance (adrenal adenoma).  There is also pulmonary nodule they are following.  Schedule CT chest in Apr 2021, no contrast.  Write in the indication \"F/U pulmonary and adrenal nodules.\"      Order placed for chest CT from above. OK to do cardiac MRI and PFT's in 4-6  Weeks from appt date per Dr Porras. Will ask procedural schedulers to reach out ad schedule patient.   Rajinder RN         "

## 2020-05-07 ENCOUNTER — HOSPITAL ENCOUNTER (OUTPATIENT)
Dept: MRI IMAGING | Facility: CLINIC | Age: 70
Discharge: HOME | End: 2020-05-07
Attending: INTERNAL MEDICINE | Admitting: INTERNAL MEDICINE
Payer: MEDICARE

## 2020-05-07 VITALS
OXYGEN SATURATION: 95 % | RESPIRATION RATE: 16 BRPM | DIASTOLIC BLOOD PRESSURE: 77 MMHG | SYSTOLIC BLOOD PRESSURE: 138 MMHG

## 2020-05-07 DIAGNOSIS — J43.9 PULMONARY EMPHYSEMA, UNSPECIFIED EMPHYSEMA TYPE (H): ICD-10-CM

## 2020-05-07 DIAGNOSIS — I20.9 ANGINA, CLASS II (H): ICD-10-CM

## 2020-05-07 LAB
DLCOUNC-%PRED-PRE: 96 %
DLCOUNC-PRE: 23.14 ML/MIN/MMHG
DLCOUNC-PRED: 24.05 ML/MIN/MMHG
ERV-%PRED-PRE: 13 %
ERV-PRE: 0.05 L
ERV-PRED: 0.39 L
EXPTIME-PRE: 6.58 SEC
FEF2575-%PRED-PRE: 159 %
FEF2575-PRE: 3.65 L/SEC
FEF2575-PRED: 2.29 L/SEC
FEFMAX-%PRED-PRE: 93 %
FEFMAX-PRE: 7.43 L/SEC
FEFMAX-PRED: 7.98 L/SEC
FEV1-%PRED-PRE: 100 %
FEV1-PRE: 2.83 L
FEV1FEV6-PRE: 86 %
FEV1FEV6-PRED: 78 %
FEV1FVC-PRE: 86 %
FEV1FVC-PRED: 78 %
FEV1SVC-PRE: 85 %
FEV1SVC-PRED: 65 %
FIFMAX-PRE: 3.91 L/SEC
FRCPLETH-%PRED-PRE: 60 %
FRCPLETH-PRE: 2.1 L
FRCPLETH-PRED: 3.5 L
FVC-%PRED-PRE: 90 %
FVC-PRE: 3.29 L
FVC-PRED: 3.62 L
IC-%PRED-PRE: 83 %
IC-PRE: 3.27 L
IC-PRED: 3.91 L
RVPLETH-%PRED-PRE: 82 %
RVPLETH-PRE: 2.05 L
RVPLETH-PRED: 2.47 L
TLCPLETH-%PRED-PRE: 82 %
TLCPLETH-PRE: 5.37 L
TLCPLETH-PRED: 6.52 L
VA-%PRED-PRE: 80 %
VA-PRE: 4.69 L
VC-%PRED-PRE: 77 %
VC-PRE: 3.32 L
VC-PRED: 4.3 L

## 2020-05-07 PROCEDURE — 93005 ELECTROCARDIOGRAM TRACING: CPT

## 2020-05-07 PROCEDURE — 93018 CV STRESS TEST I&R ONLY: CPT | Performed by: INTERNAL MEDICINE

## 2020-05-07 PROCEDURE — 93010 ELECTROCARDIOGRAM REPORT: CPT | Mod: 76 | Performed by: INTERNAL MEDICINE

## 2020-05-07 PROCEDURE — 93017 CV STRESS TEST TRACING ONLY: CPT

## 2020-05-07 PROCEDURE — 40000065 ZZH STATISTIC EKG NON-CHARGEABLE

## 2020-05-07 PROCEDURE — 75563 CARD MRI W/STRESS IMG & DYE: CPT | Mod: 26 | Performed by: INTERNAL MEDICINE

## 2020-05-07 PROCEDURE — 25000128 H RX IP 250 OP 636: Performed by: INTERNAL MEDICINE

## 2020-05-07 PROCEDURE — 25500064 ZZH RX 255 OP 636: Performed by: INTERNAL MEDICINE

## 2020-05-07 PROCEDURE — 75563 CARD MRI W/STRESS IMG & DYE: CPT

## 2020-05-07 PROCEDURE — 93016 CV STRESS TEST SUPVJ ONLY: CPT | Performed by: INTERNAL MEDICINE

## 2020-05-07 PROCEDURE — A9585 GADOBUTROL INJECTION: HCPCS | Performed by: INTERNAL MEDICINE

## 2020-05-07 RX ORDER — REGADENOSON 0.08 MG/ML
0.4 INJECTION, SOLUTION INTRAVENOUS ONCE
Status: COMPLETED | OUTPATIENT
Start: 2020-05-07 | End: 2020-05-07

## 2020-05-07 RX ORDER — METHYLPREDNISOLONE SODIUM SUCCINATE 125 MG/2ML
125 INJECTION, POWDER, LYOPHILIZED, FOR SOLUTION INTRAMUSCULAR; INTRAVENOUS
Status: DISCONTINUED | OUTPATIENT
Start: 2020-05-07 | End: 2020-05-08 | Stop reason: HOSPADM

## 2020-05-07 RX ORDER — ACYCLOVIR 200 MG/1
0-1 CAPSULE ORAL
Status: DISCONTINUED | OUTPATIENT
Start: 2020-05-07 | End: 2020-05-08 | Stop reason: HOSPADM

## 2020-05-07 RX ORDER — GADOBUTROL 604.72 MG/ML
10 INJECTION INTRAVENOUS ONCE
Status: COMPLETED | OUTPATIENT
Start: 2020-05-07 | End: 2020-05-07

## 2020-05-07 RX ORDER — DIAZEPAM 5 MG
5 TABLET ORAL EVERY 30 MIN PRN
Status: DISCONTINUED | OUTPATIENT
Start: 2020-05-07 | End: 2020-05-08 | Stop reason: HOSPADM

## 2020-05-07 RX ORDER — ALBUTEROL SULFATE 90 UG/1
2 AEROSOL, METERED RESPIRATORY (INHALATION) EVERY 5 MIN PRN
Status: DISCONTINUED | OUTPATIENT
Start: 2020-05-07 | End: 2020-05-08 | Stop reason: HOSPADM

## 2020-05-07 RX ORDER — DIPHENHYDRAMINE HYDROCHLORIDE 50 MG/ML
25-50 INJECTION INTRAMUSCULAR; INTRAVENOUS
Status: DISCONTINUED | OUTPATIENT
Start: 2020-05-07 | End: 2020-05-08 | Stop reason: HOSPADM

## 2020-05-07 RX ORDER — ONDANSETRON 2 MG/ML
4 INJECTION INTRAMUSCULAR; INTRAVENOUS
Status: DISCONTINUED | OUTPATIENT
Start: 2020-05-07 | End: 2020-05-08 | Stop reason: HOSPADM

## 2020-05-07 RX ORDER — AMINOPHYLLINE 25 MG/ML
100 INJECTION, SOLUTION INTRAVENOUS ONCE
Status: DISCONTINUED | OUTPATIENT
Start: 2020-05-07 | End: 2020-05-08 | Stop reason: HOSPADM

## 2020-05-07 RX ORDER — DIPHENHYDRAMINE HCL 25 MG
25 CAPSULE ORAL
Status: DISCONTINUED | OUTPATIENT
Start: 2020-05-07 | End: 2020-05-08 | Stop reason: HOSPADM

## 2020-05-07 RX ADMIN — GADOBUTROL 10 ML: 604.72 INJECTION INTRAVENOUS at 14:03

## 2020-05-07 RX ADMIN — REGADENOSON 0.4 MG: 0.08 INJECTION, SOLUTION INTRAVENOUS at 13:16

## 2020-05-08 LAB
INTERPRETATION ECG - MUSE: NORMAL
INTERPRETATION ECG - MUSE: NORMAL

## 2020-05-15 ENCOUNTER — TELEPHONE (OUTPATIENT)
Dept: NEUROLOGY | Facility: CLINIC | Age: 70
End: 2020-05-15

## 2020-05-15 DIAGNOSIS — I50.9 HEART FAILURE, UNSPECIFIED (H): ICD-10-CM

## 2020-05-15 DIAGNOSIS — I42.9 CARDIOMYOPATHY (H): Primary | ICD-10-CM

## 2020-05-15 NOTE — TELEPHONE ENCOUNTER
Lab order and chest Xray order placed. The pt was informed of Dr Porras's recommendations and will ask for assistance with setting these two things up. He would like them to call his wife at [phone].      Jess Gallegos, RNCC  Neurology

## 2020-05-15 NOTE — TELEPHONE ENCOUNTER
----- Mes Patient should watch Na intake and weight.  Check NT-BNP and CXR.   If evidence of interstitial edema or markedly elevated NT-BNP, consider low dose diuretic for symptomatic improvement.     Jose Porras MD misael from Brandee Ochoa RN sent at 5/11/2020  3:41 PM CDT -----    ----- Message -----  From: Leann Booker RN  Sent: 5/8/2020  10:41 AM CDT  To: Erin Lindsay RN

## 2020-05-18 ENCOUNTER — ANCILLARY PROCEDURE (OUTPATIENT)
Dept: GENERAL RADIOLOGY | Facility: CLINIC | Age: 70
End: 2020-05-18
Attending: INTERNAL MEDICINE
Payer: MEDICARE

## 2020-05-18 DIAGNOSIS — I42.9 CARDIOMYOPATHY (H): ICD-10-CM

## 2020-05-18 DIAGNOSIS — I50.9 HEART FAILURE, UNSPECIFIED (H): ICD-10-CM

## 2020-05-18 LAB — NT-PROBNP SERPL-MCNC: 194 PG/ML (ref 0–125)

## 2020-05-18 PROCEDURE — 71046 X-RAY EXAM CHEST 2 VIEWS: CPT

## 2020-05-18 PROCEDURE — 36415 COLL VENOUS BLD VENIPUNCTURE: CPT | Performed by: INTERNAL MEDICINE

## 2020-05-18 PROCEDURE — 83880 ASSAY OF NATRIURETIC PEPTIDE: CPT | Performed by: INTERNAL MEDICINE

## 2020-05-18 NOTE — TELEPHONE ENCOUNTER
Writer spoke with Kyung, patient's spouse, to schedule the chest xray and lab ordered by Dr. Porras.    Patient/spouse were informed the patient had a heart attach(?), which they were never informed of. Patient has been seeing Dr. Porras for 13 years, and does not recall this ever being dicussed.    Patient/spouse would like a call from Dr. Porras to discuss this finding further. Please call Kyung at [phone].    Thank you.    Dodie Jimenez  [facility]  Adult Med Spec/Surg Spec   [phone]

## 2020-05-19 NOTE — TELEPHONE ENCOUNTER
ADDENDUM (5/18/2020): I spoke with patient.  I had accidentally written MI rather than scar after reviewing the MRI.  I corrected this to state scarring (not typical of CAD).     Jose Porras MD

## 2020-07-23 ENCOUNTER — OFFICE VISIT (OUTPATIENT)
Dept: OPHTHALMOLOGY | Facility: CLINIC | Age: 70
End: 2020-07-23
Payer: MEDICARE

## 2020-07-23 DIAGNOSIS — H35.371 EPIRETINAL MEMBRANE (ERM) OF RIGHT EYE: Chronic | ICD-10-CM

## 2020-07-23 DIAGNOSIS — H18.799 KERATOGLOBUS: ICD-10-CM

## 2020-07-23 DIAGNOSIS — Z96.1 PSEUDOPHAKIA: Primary | ICD-10-CM

## 2020-07-23 DIAGNOSIS — Z86.69 HISTORY OF IRITIS: ICD-10-CM

## 2020-07-23 DIAGNOSIS — Q15.8 MEGALOCORNEA: ICD-10-CM

## 2020-07-23 PROCEDURE — 92012 INTRM OPH EXAM EST PATIENT: CPT | Performed by: OPHTHALMOLOGY

## 2020-07-23 ASSESSMENT — VISUAL ACUITY
OS_CC: 20/25
OS_CC+: -1
METHOD: SNELLEN - LINEAR
CORRECTION_TYPE: GLASSES
OD_CC+: -1

## 2020-07-23 ASSESSMENT — TONOMETRY
OD_IOP_MMHG: 13
OS_IOP_MMHG: 17
IOP_METHOD: APPLANATION

## 2020-07-23 NOTE — PROGRESS NOTES
Current Eye Medications:  None     Subjective:  MD check. Patient feels vision is doing terrible right eye. Vision is normal left eye. Painful when going out side, sunlight is bothering patient. Has to use sunglasses. Would like referral to have surgery on right eye. Wants same type of lens that is in left eye. Not having any eye pain, but wants to rub right eye a lot.   Patient returned new glasses about 1 week ago, new Rx we gave him didn't work.    Not currently on Prolensa. Reviewed Dr. Peterson's retinal evaluation from 2/2020 ([place] Tab; Surgical notes from 8/19 w [facility] in [place]).     Objective:  See Ophthalmology Exam.       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      Plan:  Will refer patient to [facility] for evaluation.  Would hope for a refractive solution if possible.  Explained to patient that further surgery entails significant risk.  Jose Dewey M.D.  [phone]

## 2020-07-23 NOTE — LETTER
7/23/2020         RE: Ricky Brown  [address]  [place] [address]        Dear Colleague,    Thank you for referring your patient, Ricky Brown, to the [facility]. Please see a copy of my visit note below.     Current Eye Medications:  None     Subjective:  MD check. Patient feels vision is doing terrible right eye. Vision is normal left eye. Painful when going out side, sunlight is bothering patient. Has to use sunglasses. Would like referral to have surgery on right eye. Wants same type of lens that is in left eye. Not having any eye pain, but wants to rub right eye a lot.   Patient returned new glasses about 1 week ago, new Rx we gave him didn't work.    Not currently on Prolensa. Reviewed Dr. Peterson's retinal evaluation from 2/2020 (Media Tab; Surgical notes from 8/19 w [facility] in [place] also).     Objective:  See Ophthalmology Exam.       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      Plan:  Will refer patient to [facility] for evaluation.  Would hope for a refractive solution if possible.  Explained to patient that further surgery entails significant risk.  Jose Dewey M.D.  [phone]          Again, thank you for allowing me to participate in the care of your patient.        Sincerely,        Jose Dewey MD

## 2020-07-24 ENCOUNTER — DOCUMENTATION ONLY (OUTPATIENT)
Dept: OPHTHALMOLOGY | Facility: CLINIC | Age: 70
End: 2020-07-24

## 2020-07-26 ASSESSMENT — EXTERNAL EXAM - RIGHT EYE: OD_EXAM: NORMAL

## 2020-07-26 ASSESSMENT — SLIT LAMP EXAM - LIDS
COMMENTS: NORMAL
COMMENTS: NORMAL

## 2020-07-26 ASSESSMENT — EXTERNAL EXAM - LEFT EYE: OS_EXAM: NORMAL

## 2020-07-26 NOTE — PATIENT INSTRUCTIONS
Will refer patient to [facility] for evaluation.  Would hope for a refractive solution if possible.  Explained to patient that further surgery entails significant risk.  Jose Dewey M.D.  [phone]

## 2020-08-13 ENCOUNTER — OFFICE VISIT (OUTPATIENT)
Dept: OPHTHALMOLOGY | Facility: CLINIC | Age: 70
End: 2020-08-13
Attending: OPHTHALMOLOGY
Payer: MEDICARE

## 2020-08-13 DIAGNOSIS — T85.22XA DISLOCATED INTRAOCULAR LENS: ICD-10-CM

## 2020-08-13 DIAGNOSIS — H52.211 IRREGULAR ASTIGMATISM OF RIGHT EYE: ICD-10-CM

## 2020-08-13 DIAGNOSIS — H18.799 KERATOGLOBUS: Primary | ICD-10-CM

## 2020-08-13 DIAGNOSIS — Z96.1 PSEUDOPHAKIA: ICD-10-CM

## 2020-08-13 DIAGNOSIS — H18.799 KERATOGLOBUS: ICD-10-CM

## 2020-08-13 PROCEDURE — G0463 HOSPITAL OUTPT CLINIC VISIT: HCPCS | Mod: ZF

## 2020-08-13 PROCEDURE — 92025 CPTRIZED CORNEAL TOPOGRAPHY: CPT | Mod: ZF | Performed by: OPHTHALMOLOGY

## 2020-08-13 PROCEDURE — 76513 OPH US DX ANT SGM US UNI/BI: CPT | Mod: ZF | Performed by: OPHTHALMOLOGY

## 2020-08-13 PROCEDURE — 92132 CPTRZD OPH DX IMG ANT SGM: CPT | Mod: ZF | Performed by: OPHTHALMOLOGY

## 2020-08-13 ASSESSMENT — VISUAL ACUITY
OD_CC+: -1+1
OS_CC: 20/20
OS_CC+: -2
CORRECTION_TYPE: GLASSES
METHOD: SNELLEN - LINEAR
OD_CC: 20/25

## 2020-08-13 ASSESSMENT — EXTERNAL EXAM - RIGHT EYE: OD_EXAM: NORMAL

## 2020-08-13 ASSESSMENT — REFRACTION_WEARINGRX
OS_CYLINDER: +1.25
OS_AXIS: 145
OD_SPHERE: +0.25
OS_ADD: +2.75
OS_SPHERE: -0.75
SPECS_TYPE: PAL
OD_AXIS: 098
OD_CYLINDER: +1.50
OD_ADD: +2.75

## 2020-08-13 ASSESSMENT — TONOMETRY
OD_IOP_MMHG: 17
IOP_METHOD: ICARE
OS_IOP_MMHG: 16

## 2020-08-13 ASSESSMENT — CONF VISUAL FIELD
OS_NORMAL: 1
METHOD: COUNTING FINGERS
OD_NORMAL: 1

## 2020-08-13 ASSESSMENT — EXTERNAL EXAM - LEFT EYE: OS_EXAM: NORMAL

## 2020-08-13 ASSESSMENT — SLIT LAMP EXAM - LIDS
COMMENTS: NORMAL
COMMENTS: NORMAL

## 2020-08-13 NOTE — NURSING NOTE
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  Denies any pain, pressure, irritation, discharge, and tearing.  Ocular Meds: None    [facility] 1:16 PM August 13, 2020

## 2020-08-13 NOTE — PROGRESS NOTES
CC:  Blurry vision right eye, referral from Dr. Dewey     HPI:  69 M with PMHx JOSE JUAN, HLD, BCC, HTN, CHF and POHx IOL dislocation each eye (sutured PCIOL right eye, ACIOL left eye), macular edema right eye, mild ERM right eye, keraglobus? Presents as referral from Dr. De Dios for evaluation of blurry vision right eye.     Patient desires ACIOL right eye like he has in his left eye.      Patient reports no change since he saw Dr. Dewey in July 2020. Patient reports a lot of light sensitivity in the right eye. No tearing or discharge. No new floaters or flashes of light. Patient feels that his two eyes are not working together. Patient saw a retina doctor who does not think his problem is from the posterior segment.       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     Last eye exam: 7/24/2020  Glasses: yes  CL: Former CL wearer    Fam hx of eye disease:     Gtts:  None    All:  Lisinopril     A/P:    1) History of Dislocated IOL each eye s/p sutured PCIOL right eye, ACIOL left eye   - now with blurry vision right eye BCVA 20/25   - had to have special order ACIOL given eye size left eye .    UBM: lens appears to be mildly decentered and tilted, however more likely superior displacement of pupil right eye   Topography: right eye: significant inferior steepening; I-S ~3; left eye mild inferior steepening    Shin with significant right eye irregular astigmatism. Will Recommend right eye RGP trial (consider tinted lens if light sensitivity is an issue). _Consult Dr Bryan..  Pt told that he will need new glasses with RGP  Lens.    PLAN:  - will send to Dr. Bryan for contact lens over-refraction as patient's issues may be due to significant irregular astigmatism left eye more than IOL issue.  Consider tinted lens for light sensitivity.   - can reassess for subjective improvement as surgery high  risk.     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  Pentacam with inferior steepening right eye.  - will trial contact lenses as above.    3) Epiretinal Membrane right eye:  - mild per [facility] Dr. Peterson 2/2020 -- does not think is cause of patient's symptoms.  Was 20/25-1 at this visit.    Disc high risk lens (IOL exchange)    Follow up:  3 months, sooner prn .    --    Jason Goldberg, MD  Cornea, External Disease & Refractive Surgery Fellow  Department of Ophthalmology and Visual Neurosciences    Attending Physician Attestation:  Complete documentation of historical and exam elements from today's encounter can be found in the full encounter summary report (not reduplicated in this progress note).  I personally obtained the chief complaint(s) and history of present illness.  I confirmed and edited as necessary the review of systems, past medical/surgical history, family history, social history, and examination findings as documented by others; and I examined the patient myself.  I personally reviewed the relevant tests, images, and reports as documented above.  I formulated and edited as necessary the assessment and plan and discussed the findings and management plan with the patient and family. - Ricky Mcclure MD

## 2020-08-13 NOTE — LETTER
8/13/2020       RE: Ricky Brown  [address]  [facility] [address]     Dear Colleague,    Thank you for referring your patient, Ricky Brown, to the EYE CLINIC at [facility]. Please see a copy of my visit note below.    CC:  Blurry vision right eye, referral from Dr. Dewey     HPI:  69 M with PMHx JOSE JUAN, HLD, BCC, HTN, CHF and POHx IOL dislocation each eye (sutured PCIOL right eye, ACIOL left eye), macular edema right eye, mild ERM right eye, keraglobus? Presents as referral from Dr. De Dios for evaluation of blurry vision right eye.     Patient desires ACIOL right eye like he has in his left eye.      Patient reports no change since he saw Dr. Dewey in July 2020. Patient reports a lot of light sensitivity in the right eye. No tearing or discharge. No new floaters or flashes of light. Patient feels that his two eyes are not working together. Patient saw a retina doctor who does not think his problem is from the posterior segment.       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     Last eye exam: 7/24/2020  Glasses: yes  CL: Former CL wearer    Fam hx of eye disease:     Gtts:  None    All:  Lisinopril     A/P:    1) History of Dislocated IOL each eye s/p sutured PCIOL right eye, ACIOL left eye   - now with blurry vision right eye BCVA 20/25   - had to have special order ACIOL given eye size left eye .    UBM OD: lens appears to be mildly decentered and tilted, with superior displacement of pupil - right eye     Topography: right eye: significant inferior steepening; I-S ~3; left eye mild inferior steepening    Shin with significant right eye irregular astigmatism. Will Recommend right eye RGP trial (consider tinted lens if light sensitivity is an issue). _Consult Dr Bryan..  Pt told that he will need new glasses with RGP   Lens.    PLAN:  - will send to Dr. Bryan for contact lens over-refraction as patient's issues may be due to significant irregular astigmatism left eye more than IOL issue.  Consider tinted lens for light sensitivity.   - can reassess for subjective improvement as surgery high risk.     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  Pentacam with inferior steepening right eye.  - will trial contact lenses as above.    3) Epiretinal Membrane right eye:  - mild per V[facility] Dr. Peterson 2/2020 -- does not think is cause of patient's symptoms.  Was 20/25-1 at this visit.    Disc high risk lens (IOL exchange)    Follow up:  3 months, sooner prn .    Again, thank you for allowing me to participate in the care of your patient.      Sincerely,    Ricky Mcclure MD

## 2020-08-25 ENCOUNTER — OFFICE VISIT (OUTPATIENT)
Dept: OPHTHALMOLOGY | Facility: CLINIC | Age: 70
End: 2020-08-25
Attending: OPHTHALMOLOGY
Payer: MEDICARE

## 2020-08-25 ENCOUNTER — OFFICE VISIT (OUTPATIENT)
Dept: OPTOMETRY | Facility: CLINIC | Age: 70
End: 2020-08-25
Attending: OPHTHALMOLOGY
Payer: MEDICARE

## 2020-08-25 DIAGNOSIS — H52.211 IRREGULAR ASTIGMATISM OF RIGHT EYE: ICD-10-CM

## 2020-08-25 DIAGNOSIS — H35.371 EPIRETINAL MEMBRANE (ERM) OF RIGHT EYE: ICD-10-CM

## 2020-08-25 DIAGNOSIS — H18.799 KERATOGLOBUS: Primary | ICD-10-CM

## 2020-08-25 DIAGNOSIS — Q15.8 MEGALOCORNEA: Primary | ICD-10-CM

## 2020-08-25 DIAGNOSIS — Q15.8 MEGALOCORNEA: ICD-10-CM

## 2020-08-25 DIAGNOSIS — Z96.1 PSEUDOPHAKIA: ICD-10-CM

## 2020-08-25 PROCEDURE — G0463 HOSPITAL OUTPT CLINIC VISIT: HCPCS | Mod: ZF

## 2020-08-25 RX ORDER — BRIMONIDINE TARTRATE 2 MG/ML
1 SOLUTION/ DROPS OPHTHALMIC 2 TIMES DAILY
Qty: 1 BOTTLE | Refills: 11 | Status: SHIPPED | OUTPATIENT
Start: 2020-08-25 | End: 2021-08-13

## 2020-08-25 ASSESSMENT — VISUAL ACUITY
OS_CC: 20/20
OS_CC+: -1
OD_CC: 20/30
METHOD: SNELLEN - LINEAR
CORRECTION_TYPE: GLASSES
OD_CC: 20/30-2
METHOD: SNELLEN - LINEAR
OS_CC: 20/20-1
OD_CC+: -2
CORRECTION_TYPE: GLASSES

## 2020-08-25 ASSESSMENT — SLIT LAMP EXAM - LIDS
COMMENTS: NORMAL

## 2020-08-25 ASSESSMENT — REFRACTION_CURRENTRX
OD_DIAMETER: 11.2
OD_BASECURVE: 44.00
OD_SPHERE: -3.00
OD_BRAND: ROSE K2 IC

## 2020-08-25 ASSESSMENT — EXTERNAL EXAM - RIGHT EYE
OD_EXAM: NORMAL
OD_EXAM: NORMAL

## 2020-08-25 ASSESSMENT — TONOMETRY
OD_IOP_MMHG: 12
OS_IOP_MMHG: 14
IOP_METHOD: ICARE

## 2020-08-25 ASSESSMENT — CONF VISUAL FIELD
OD_NORMAL: 1
OS_NORMAL: 1

## 2020-08-25 ASSESSMENT — EXTERNAL EXAM - LEFT EYE
OS_EXAM: NORMAL
OS_EXAM: NORMAL

## 2020-08-25 NOTE — PROGRESS NOTES
CC:  Blurry vision right eye, referral from Dr. Dewey     HPI:  69 M with PMHx JOSE JUAN, HLD, BCC, HTN, CHF and POHx IOL dislocation each eye (sutured PCIOL right eye, ACIOL left eye), macular edema right eye, mild ERM right eye, keraglobus? Presents as referral from Dr. De Dios for evaluation of blurry vision right eye.     Patient desires ACIOL right eye like he has in his left eye.      Patient reports no change since he saw Dr. Dewey in July 2020. Patient reports a lot of light sensitivity in the right eye. No tearing or discharge. No new floaters or flashes of light. Patient feels that his two eyes are not working together. Patient saw a retina doctor who does not think his problem is from the posterior segment.     Interval:  No significant changes since last visit 2 weeks ago.  Still feels dissatistified with his vision.  No pain, no flashes/floaters, no tearing or discharge.    Trialed contact lenses with Dr. Bryan with no subjective improvement.       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     Last eye exam: 7/24/2020  Glasses: yes  CL: Former CL wearer    Fam hx of eye disease:     Gtts:  None    All:  Lisinopril     A/P:      ##  Pt's ,main complaint I now is light sensitivity (no cell): Wiil start a trial of bromonidine right eye BID prn for light sensitvity.    1) History of Dislocated IOL each eye s/p sutured PCIOL right eye, ACIOL left eye   - now with blurry vision right eye BCVA 20/25   - had to have special order ACIOL given eye size left eye .    UBM: lens appears to be mildly decentered and tilted, however more likely superior displacement of pupil right eye   Topography: right eye: significant inferior steepening; I-S ~3; left eye mild inferior steepening    Shin with significant right eye irregular astigmatism.     Now s/p contact lens evaluation with Dr. Bryan, did  not feel CL will help, no subjective improvement.     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  Pentacam with inferior steepening right eye.  - will trial contact lenses as above.    3) Epiretinal Membrane right eye:  - mild per VRS Dr. Peterson 2/2020 -- does not think is cause of patient's symptoms.  Was 20/25-1 at this visit.  - symptoms haven't changed significantly since that time.   Disc high risk lens (IOL exchange)    Follow up:    prn .    --    Jason Goldberg, MD  Cornea, External Disease & Refractive Surgery Fellow  Department of Ophthalmology and Visual Neurosciences    Attending Physician Attestation:  Complete documentation of historical and exam elements from today's encounter can be found in the full encounter summary report (not reduplicated in this progress note).  I personally obtained the chief complaint(s) and history of present illness.  I confirmed and edited as necessary the review of systems, past medical/surgical history, family history, social history, and examination findings as documented by others; and I examined the patient myself.  I personally reviewed the relevant tests, images, and reports as documented above.  I formulated and edited as necessary the assessment and plan and discussed the findings and management plan with the patient and family. - Ricky Mcclure MD

## 2020-08-25 NOTE — NURSING NOTE
Chief Complaints and History of Present Illnesses   Patient presents with     Dislocated Lens Follow Up     Chief Complaint(s) and History of Present Illness(es)     Dislocated Lens Follow Up     Laterality: both eyes    Severity: mild    Duration: 2 weeks    Frequency: constant    Course: stable    Pain scale: 0/10              Comments     Pt. States that he is still extremely photophobic. Eyes are painful when out in the sunlight. No change in VA BE.  Nancyharini Alfaro COT 9:17 AM August 25, 2020

## 2020-08-25 NOTE — PROGRESS NOTES
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 This does not appear to be a major factor in his decreased acuity  -VA likely limited by retina, would not recommend RGP at this time    Do not recommend RGP at this time - f/u with Dr. Mcclure regarding options

## 2020-09-08 ENCOUNTER — APPOINTMENT (OUTPATIENT)
Dept: GENERAL RADIOLOGY | Facility: CLINIC | Age: 70
End: 2020-09-08
Attending: EMERGENCY MEDICINE
Payer: MEDICARE

## 2020-09-08 ENCOUNTER — HOSPITAL ENCOUNTER (EMERGENCY)
Facility: CLINIC | Age: 70
Discharge: HOME | End: 2020-09-08
Attending: EMERGENCY MEDICINE | Admitting: EMERGENCY MEDICINE
Payer: MEDICARE

## 2020-09-08 ENCOUNTER — NURSE TRIAGE (OUTPATIENT)
Dept: FAMILY MEDICINE | Facility: CLINIC | Age: 70
End: 2020-09-08

## 2020-09-08 VITALS
OXYGEN SATURATION: 100 % | SYSTOLIC BLOOD PRESSURE: 133 MMHG | TEMPERATURE: 97.8 F | DIASTOLIC BLOOD PRESSURE: 85 MMHG | RESPIRATION RATE: 16 BRPM | HEART RATE: 96 BPM

## 2020-09-08 DIAGNOSIS — R06.00 DYSPNEA, UNSPECIFIED TYPE: ICD-10-CM

## 2020-09-08 DIAGNOSIS — R74.01 TRANSAMINITIS: ICD-10-CM

## 2020-09-08 DIAGNOSIS — Z20.828 EXPOSURE TO SARS-ASSOCIATED CORONAVIRUS: ICD-10-CM

## 2020-09-08 LAB
ALBUMIN SERPL-MCNC: 3.5 G/DL (ref 3.4–5)
ALBUMIN UR-MCNC: 10 MG/DL
ALP SERPL-CCNC: 79 U/L (ref 40–150)
ALT SERPL W P-5'-P-CCNC: 73 U/L (ref 0–70)
ANION GAP SERPL CALCULATED.3IONS-SCNC: 8 MMOL/L (ref 3–14)
APPEARANCE UR: CLEAR
AST SERPL W P-5'-P-CCNC: 51 U/L (ref 0–45)
AST SERPL W P-5'-P-CCNC: ABNORMAL U/L (ref 0–45)
BASOPHILS # BLD AUTO: 0 10E9/L (ref 0–0.2)
BASOPHILS NFR BLD AUTO: 0.9 %
BILIRUB SERPL-MCNC: 0.6 MG/DL (ref 0.2–1.3)
BILIRUB UR QL STRIP: NEGATIVE
BUN SERPL-MCNC: 14 MG/DL (ref 7–30)
CALCIUM SERPL-MCNC: 8.8 MG/DL (ref 8.5–10.1)
CHLORIDE SERPL-SCNC: 106 MMOL/L (ref 94–109)
CO2 SERPL-SCNC: 27 MMOL/L (ref 20–32)
COLOR UR AUTO: YELLOW
CREAT SERPL-MCNC: 0.88 MG/DL (ref 0.66–1.25)
DIFFERENTIAL METHOD BLD: NORMAL
EOSINOPHIL # BLD AUTO: 0.1 10E9/L (ref 0–0.7)
EOSINOPHIL NFR BLD AUTO: 2.6 %
ERYTHROCYTE [DISTWIDTH] IN BLOOD BY AUTOMATED COUNT: 13.9 % (ref 10–15)
GFR SERPL CREATININE-BSD FRML MDRD: 88 ML/MIN/{1.73_M2}
GLUCOSE SERPL-MCNC: 106 MG/DL (ref 70–99)
GLUCOSE UR STRIP-MCNC: NEGATIVE MG/DL
HCT VFR BLD AUTO: 43.6 % (ref 40–53)
HGB BLD-MCNC: 14.3 G/DL (ref 13.3–17.7)
HGB UR QL STRIP: NEGATIVE
KETONES UR STRIP-MCNC: NEGATIVE MG/DL
LEUKOCYTE ESTERASE UR QL STRIP: NEGATIVE
LYMPHOCYTES # BLD AUTO: 1.2 10E9/L (ref 0.8–5.3)
LYMPHOCYTES NFR BLD AUTO: 22.8 %
MCH RBC QN AUTO: 29.7 PG (ref 26.5–33)
MCHC RBC AUTO-ENTMCNC: 32.8 G/DL (ref 31.5–36.5)
MCV RBC AUTO: 91 FL (ref 78–100)
METAMYELOCYTES # BLD: 0 10E9/L
METAMYELOCYTES NFR BLD MANUAL: 0.9 %
MONOCYTES # BLD AUTO: 0.7 10E9/L (ref 0–1.3)
MONOCYTES NFR BLD AUTO: 12.3 %
MUCOUS THREADS #/AREA URNS LPF: PRESENT /LPF
NEUTROPHILS # BLD AUTO: 3.2 10E9/L (ref 1.6–8.3)
NEUTROPHILS NFR BLD AUTO: 60.5 %
NITRATE UR QL: NEGATIVE
NT-PROBNP SERPL-MCNC: 731 PG/ML (ref 0–900)
PH UR STRIP: 5.5 PH (ref 5–7)
PLATELET # BLD AUTO: 185 10E9/L (ref 150–450)
PLATELET # BLD EST: NORMAL 10*3/UL
POTASSIUM SERPL-SCNC: 4 MMOL/L (ref 3.4–5.3)
PROT SERPL-MCNC: 7.6 G/DL (ref 6.8–8.8)
RBC # BLD AUTO: 4.82 10E12/L (ref 4.4–5.9)
RBC #/AREA URNS AUTO: 0 /HPF (ref 0–2)
RBC MORPH BLD: NORMAL
SARS-COV-2 RNA SPEC QL NAA+PROBE: NORMAL
SODIUM SERPL-SCNC: 141 MMOL/L (ref 133–144)
SOURCE: ABNORMAL
SP GR UR STRIP: 1.02 (ref 1–1.03)
SPECIMEN SOURCE: NORMAL
TROPONIN I SERPL-MCNC: <0.015 UG/L (ref 0–0.04)
TROPONIN I SERPL-MCNC: <0.015 UG/L (ref 0–0.04)
UROBILINOGEN UR STRIP-MCNC: NORMAL MG/DL (ref 0–2)
WBC # BLD AUTO: 5.3 10E9/L (ref 4–11)
WBC #/AREA URNS AUTO: <1 /HPF (ref 0–5)

## 2020-09-08 PROCEDURE — 93010 ELECTROCARDIOGRAM REPORT: CPT | Mod: Z6 | Performed by: EMERGENCY MEDICINE

## 2020-09-08 PROCEDURE — 93005 ELECTROCARDIOGRAM TRACING: CPT

## 2020-09-08 PROCEDURE — 71045 X-RAY EXAM CHEST 1 VIEW: CPT

## 2020-09-08 PROCEDURE — 83880 ASSAY OF NATRIURETIC PEPTIDE: CPT | Performed by: EMERGENCY MEDICINE

## 2020-09-08 PROCEDURE — 84484 ASSAY OF TROPONIN QUANT: CPT | Performed by: EMERGENCY MEDICINE

## 2020-09-08 PROCEDURE — 80053 COMPREHEN METABOLIC PANEL: CPT | Performed by: EMERGENCY MEDICINE

## 2020-09-08 PROCEDURE — 85025 COMPLETE CBC W/AUTO DIFF WBC: CPT | Performed by: EMERGENCY MEDICINE

## 2020-09-08 PROCEDURE — 99285 EMERGENCY DEPT VISIT HI MDM: CPT | Mod: 25

## 2020-09-08 PROCEDURE — 81001 URINALYSIS AUTO W/SCOPE: CPT | Performed by: EMERGENCY MEDICINE

## 2020-09-08 PROCEDURE — 84450 TRANSFERASE (AST) (SGOT): CPT | Performed by: EMERGENCY MEDICINE

## 2020-09-08 PROCEDURE — 99285 EMERGENCY DEPT VISIT HI MDM: CPT | Mod: 25 | Performed by: EMERGENCY MEDICINE

## 2020-09-08 PROCEDURE — U0003 INFECTIOUS AGENT DETECTION BY NUCLEIC ACID (DNA OR RNA); SEVERE ACUTE RESPIRATORY SYNDROME CORONAVIRUS 2 (SARS-COV-2) (CORONAVIRUS DISEASE [COVID-19]), AMPLIFIED PROBE TECHNIQUE, MAKING USE OF HIGH THROUGHPUT TECHNOLOGIES AS DESCRIBED BY CMS-2020-01-R: HCPCS | Performed by: EMERGENCY MEDICINE

## 2020-09-08 PROCEDURE — C9803 HOPD COVID-19 SPEC COLLECT: HCPCS

## 2020-09-08 NOTE — ED AVS SNAPSHOT
[facility], [place], Emergency Department  [address]  [facility] [address]  Phone:  [phone]  Fax:  [phone]                                    Ricky Brown   MRN: [id number]    Department:  [facility], Emergency Department   Date of Visit:  9/8/2020           After Visit Summary Signature Page    I have received my discharge instructions, and my questions have been answered. I have discussed any challenges I see with this plan with the nurse or doctor.    ..........................................................................................................................................  Patient/Patient Representative Signature      ..........................................................................................................................................  Patient Representative Print Name and Relationship to Patient    ..................................................               ................................................  Date                                   Time    ..........................................................................................................................................  Reviewed by Signature/Title    ...................................................              ..............................................  Date                                               Time          22EPIC Rev 08/18

## 2020-09-08 NOTE — ED PROVIDER NOTES
[facility] EMERGENCY DEPARTMENT ([place] [facility])     September 8, 2020  History   No chief complaint on file.    HPI  Ricky Brown is a 69 year old male with a medical history significant for BCC, colon adenomas, cardiomyopathy, CAD, CHF, ED, HTN, noncompliance, JOSE JUAN, obesity, hearing loss, and mild epiretinal membrane who presents the ED today complaining of COVID-19 symptoms. ***    Patient was recently seen here in the ED on 4/11/2020 for renal colic. Examination revealed obesity and no acute abdominal findings. Urinalysis revealed 4 red cells. CBC and comprehensive metabolic panel were normal with exception of minimally elevated creatinine of 1.1. Abdomen CT revealed evidence of some mild left perinephric edema consistent with possibly recently passed stone. As patient was improving without Toradol and tolerating fluids, he was discharged.  It was recommended Pt follow-up with urology as this was the second episode of similar symptoms with somewhat similar findings on CT.    I have reviewed the Medications, Allergies, Past Medical and Surgical History, and Social History in the [employer] system.  PAST MEDICAL HISTORY:   Past Medical History:   Diagnosis Date     Arthritis      BCC (basal cell carcinoma)     right shoulder      Cardiomyopathy (H)      Colon adenomas 9/20/11     Coronary artery disease      ED (erectile dysfunction)      HTN (hypertension)      Noncompliance      Obesity      JOSE JUAN (obstructive sleep apnea)        PAST SURGICAL HISTORY:   Past Surgical History:   Procedure Laterality Date     ARTHROSCOPY SHOULDER BICEPS TENODESIS REPAIR  2/27/2014    Procedure: ARTHROSCOPY SHOULDER BICEPS TENODESIS REPAIR;;  Surgeon: Michael Hagen MD;  Location: [facility]     ARTHROSCOPY SHOULDER ROTATOR CUFF REPAIR  2/27/2014    Procedure: ARTHROSCOPY SHOULDER ROTATOR CUFF REPAIR;  Right Shoulder Arthroscopic Rotator Cuff Repair,  Subacromial Decompression, Biceps Tenodesis, Distal Clavicle Excision    ";  Surgeon: Michael Hagen MD;  Location: [facility]     BIOPSY       CARDIAC SURGERY       COLONOSCOPY       EXCHANGE INTRAOCULAR LENS IMPLANT      left eye - first, recentered PCL with iris fixation; then IOLX with ACL     EXTRACAPSULAR CATARACT EXTRATION WITH INTRAOCULAR LENS IMPLANT      both eyes (elsewhere)     TURBINOPLASTY  2013    Procedure: TURBINOPLASTY;;  Surgeon: Lisset Parsons MD;  Location: [facility]     UVULOPALATOPHARYNGOPLASTY  2013    Procedure: UVULOPALATOPHARYNGOPLASTY;  Uvulopalatopharyngoplasty, Turbiante Reduction;  Surgeon: Lisset Parsons MD;  Location: [facility] OR       Past medical history, past surgical history, medications, and allergies were reviewed with the patient. Additional pertinent items: {NONE:161120::\"None\"}    FAMILY HISTORY:   Family History   Problem Relation Age of Onset     Diabetes Father         Old age Diabetes     Cerebrovascular Disease Father      Obesity Father      Heart Disease Father      Coronary Artery Disease Father      Hypertension Father      Lipids Sister      C.A.D. No family hx of      Cancer No family hx of      Glaucoma No family hx of      Macular Degeneration No family hx of        SOCIAL HISTORY:   Social History     Tobacco Use     Smoking status: Former Smoker     Packs/day: 0.50     Years: 2.00     Pack years: 1.00     Types: Cigarettes     Last attempt to quit: 1996     Years since quittin.7     Smokeless tobacco: Never Used   Substance Use Topics     Alcohol use: Yes     Alcohol/week: 8.3 standard drinks     Frequency: 4 or more times a week     Comment: Evening Marie     Social history was reviewed with the patient. Additional pertinent items: {NONE:541969::\"None\"}      Patient's Medications   New Prescriptions    No medications on file   Previous Medications    ASPIRIN 81 MG TABLET    Take 1 tablet (81 mg) by mouth daily    ATORVASTATIN (LIPITOR) 10 MG TABLET    Take 1 tablet (10 mg) by mouth " "daily    BRIMONIDINE (ALPHAGAN) 0.2 % OPHTHALMIC SOLUTION    Place 1 drop into the right eye 2 times daily    CALCIUM CARBONATE ANTACID (TUMS CALCIUM FOR LIFE BONE PO)    Take  by mouth.    DIPHENOXYLATE-ATROPINE (LOMOTIL) 2.5-0.025 MG PER TABLET    Take 1 tablet by mouth daily as needed for diarrhea Patient only takes 1 tablet as needed    FUROSEMIDE (LASIX) 20 MG TABLET    Take 1 tablet (20 mg) by mouth daily as needed    LOSARTAN (COZAAR) 50 MG TABLET    Take 1 tablet (50 mg) by mouth daily    METOPROLOL SUCCINATE ER (TOPROL-XL) 50 MG 24 HR TABLET    Take 1 tablet (50 mg) by mouth daily    MULTIPLE VITAMIN (MULTIVITAMINS PO)    Take  by mouth daily.    NITROGLYCERIN (NITROSTAT) 0.4 MG SUBLINGUAL TABLET    For chest pain place 1 tablet under the tongue every 5 minutes for 3 doses. If symptoms persist 5 minutes after 1st dose call 911.    TAMSULOSIN (FLOMAX) 0.4 MG CAPSULE    Take 1 capsule (0.4 mg) by mouth daily   Modified Medications    No medications on file   Discontinued Medications    No medications on file          Allergies   Allergen Reactions     Lisinopril Cough        Review of Systems  {Complete vs limited ROS:648302::\"A complete review of systems was performed with pertinent positives and negatives noted in the HPI, and all other systems negative.\"}    Physical Exam          Physical Exam    ED Course        Procedures        {EKG done?:450902::\" \"}    {ed addendum:101767::\" \"}  {trauma activation or Fall?:553233::\" \"}  {Sepsis/Septic Shock/Stemi/Stroke:760457::\" \"}       No results found for this or any previous visit (from the past 24 hour(s)).  Medications - No data to display          Assessments & Plan (with Medical Decision Making)   ***    I have reviewed the nursing notes.    I have reviewed the findings, diagnosis, plan and need for follow up with the patient.    New Prescriptions    No medications on file       Final diagnoses:   None       9/8/2020   [facility], [place], EMERGENCY DEPARTMENT  "

## 2020-09-08 NOTE — ED PROVIDER NOTES
"  History     Chief Complaint   Patient presents with     Shortness of Breath     HPI  Ricky Brown is a 69 year old male with a medical history significant for BCC, colon adenomas, cardiomyopathy, CAD, CHF, ED, HTN, noncompliance, JOSE JUAN, obesity, hearing loss, and mild epiretinal membrane who presents the ED today complaining of SOB and cough. He is concerned about COVID-19. SOB is reportedly moderate.  Has been associated with some discomfort in chest area.  Symptoms started over the weekend.  No chest pain currently.  The patient also complains of urinary symptoms including odorous urine, frequency.  He reports that he and his spouse were in [place] with 3 other couples over the weekend, but he didn't go out much due to fatigue. He does have a history of CHF and takes Lasix PRN.    However, he denies increased weight from baseline, lower extremity swelling.  He also denies fever.    His spouse is concerned that he may have had \"cardiac episodes\" over the weekend.they initially called the triage line, hoping to set follow-up a clinic appointment, however patient was advised he should come to the emergency department due to the severity of his symptoms as well as concerns at a clinic appointment would have to be a virtual appointment due to symptoms consistent with COVID-19 infection at this time.  He was advised to come to the emergency department for further work-up, including a troponin testing.       Of note, patient was recently seen here in the ED on 4/11/2020 for renal colic. Examination revealed obesity and no acute abdominal findings. Urinalysis revealed 4 red cells. CBC and comprehensive metabolic panel were normal with exception of minimally elevated creatinine of 1.1. Abdomen CT revealed evidence of some mild left perinephric edema consistent with possibly recently passed stone. As patient was improving without Toradol and tolerating fluids, he was discharged.  It was recommended Pt follow-up with " urology as this was the second episode of similar symptoms with somewhat similar findings on CT.    I have reviewed the Medications, Allergies, Past Medical and Surgical History, and Social History in the [facility] system.    Past Medical History:   Diagnosis Date     Arthritis      BCC (basal cell carcinoma)     right shoulder      Cardiomyopathy (H)      Colon adenomas 9/20/11     Coronary artery disease      ED (erectile dysfunction)      HTN (hypertension)      Noncompliance      Obesity      JOSE JUAN (obstructive sleep apnea)      Past Surgical History:   Procedure Laterality Date     ARTHROSCOPY SHOULDER BICEPS TENODESIS REPAIR  2/27/2014    Procedure: ARTHROSCOPY SHOULDER BICEPS TENODESIS REPAIR;;  Surgeon: Michael Hagen MD;  [facility]     ARTHROSCOPY SHOULDER ROTATOR CUFF REPAIR  2/27/2014    Procedure: ARTHROSCOPY SHOULDER ROTATOR CUFF REPAIR;  Right Shoulder Arthroscopic Rotator Cuff Repair,  Subacromial Decompression, Biceps Tenodesis, Distal Clavicle Excision   ;  Surgeon: Michael Hagen MD;  Location: [facility]     BIOPSY       CARDIAC SURGERY       COLONOSCOPY       EXCHANGE INTRAOCULAR LENS IMPLANT  2005    left eye - first, recentered PCL with iris fixation; then IOLX with ACL     EXTRACAPSULAR CATARACT EXTRATION WITH INTRAOCULAR LENS IMPLANT  2005    both eyes (elsewhere)     TURBINOPLASTY  12/11/2013    Procedure: TURBINOPLASTY;;  Surgeon: Lisset Parsons MD;  [facility]     UVULOPALATOPHARYNGOPLASTY  12/11/2013    Procedure: UVULOPALATOPHARYNGOPLASTY;  Uvulopalatopharyngoplasty, Turbiante Reduction;  Surgeon: Lisset Parsons MD;  Location:  OR     No current facility-administered medications for this encounter.      Current Outpatient Medications   Medication     aspirin 81 MG tablet     atorvastatin (LIPITOR) 10 MG tablet     brimonidine (ALPHAGAN) 0.2 % ophthalmic solution     Calcium Carbonate Antacid (TUMS CALCIUM FOR LIFE BONE PO)      diphenoxylate-atropine (LOMOTIL) 2.5-0.025 MG per tablet     furosemide (LASIX) 20 MG tablet     losartan (COZAAR) 50 MG tablet     metoprolol succinate ER (TOPROL-XL) 50 MG 24 hr tablet     Multiple Vitamin (MULTIVITAMINS PO)     nitroGLYcerin (NITROSTAT) 0.4 MG sublingual tablet     tamsulosin (FLOMAX) 0.4 MG capsule     Allergies   Allergen Reactions     Lisinopril Cough     Past medical history, past surgical history, medications, and allergies were reviewed with the patient. Additional pertinent items: None    Social History     Socioeconomic History     Marital status:      Spouse name: Kyung     Number of children: 0     Years of education: 14     Highest education level: Not on file   Occupational History     Occupation: industrial electronics      Employer: SELF   Social Needs     Financial resource strain: Not on file     Food insecurity     Worry: Not on file     Inability: Not on file     Transportation needs     Medical: Not on file     Non-medical: Not on file   Tobacco Use     Smoking status: Former Smoker     Packs/day: 0.50     Years: 2.00     Pack years: 1.00     Types: Cigarettes     Last attempt to quit: 1996     Years since quittin.7     Smokeless tobacco: Never Used   Substance and Sexual Activity     Alcohol use: Yes     Alcohol/week: 8.3 standard drinks     Frequency: 4 or more times a week     Comment: Evening Marie     Drug use: No     Sexual activity: Yes     Partners: Female     Birth control/protection: Male Surgical     Comment: 2006 vasectomy   Lifestyle     Physical activity     Days per week: Not on file     Minutes per session: Not on file     Stress: Not on file   Relationships     Social connections     Talks on phone: Not on file     Gets together: Not on file     Attends Adventism service: Not on file     Active member of club or organization: Not on file     Attends meetings of clubs or organizations: Not on file     Relationship status: Not on file      Intimate partner violence     Fear of current or ex partner: Not on file     Emotionally abused: Not on file     Physically abused: Not on file     Forced sexual activity: Not on file   Other Topics Concern     Parent/sibling w/ CABG, MI or angioplasty before 65F 55M? No   Social History Narrative     Not on file     Social history was reviewed with the patient. Additional pertinent items: None    Review of Systems  General: No fevers or chills  Skin: No rash or diaphoresis  Eyes: No eye redness or discharge  Ears/Nose/Throat: No rhinorrhea or nasal congestion  Respiratory: Positive for cough and SOB  Cardiovascular: Positive for chest pain (now resolved), no palpitations  Gastrointestinal: No nausea, vomiting, or diarrhea  Genitourinary: No urinary frequency, hematuria, or dysuria  Musculoskeletal: No arthralgias or myalgias  Neurologic: No numbness or weakness  Psychiatric: No depression or SI  Hematologic/Lymphatic/Immunologic: No leg swelling, no easy bruising/bleeding  Endocrine: No polyuria/polydypsia    A complete review of systems was performed with pertinent positives and negatives noted in the HPI, and all other systems negative.    Physical Exam   Pulse: 79  Temp: 97.8  F (36.6  C)  Resp: 20  SpO2: 96 %      General: Well nourished, well developed, NAD  HEENT: EOMI, anicteric. NCAT, MMM  Neck: no jugular venous distension, supple, nl ROM  Cardiac: Regular rate and rhythm. No murmurs, rubs, or gallops. Normal S1, S2.  Intact peripheral pulses.  No chest wall tenderness on exam  Pulm: CTAB, no stridor, wheezes, rales, rhonchi  Abd: Soft, obese, nontender, nondistended.  No masses palpated.    Skin: Warm and dry to the touch.  No rash  Extremities: No LE edema, no cyanosis, w/w/p  Neuro: A&Ox3, no gross focal deficits    ED Course        Procedures             EKG Interpretation:      Interpreted by Deidra Hernandez MD  Time reviewed: 1733  Symptoms at time of EKG: Shortness of breath  Rhythm: normal  sinus   Rate: normal, 68 bpm  Axis: Left  Ectopy: none  Conduction: normal  ST Segments/ T Waves: No ST-T wave changes  Q Waves: none  Comparison to prior: Unchanged from May 7, 2020    Clinical Impression: abnormal EKG                        Labs Ordered and Resulted from Time of ED Arrival Up to the Time of Departure from the ED   COMPREHENSIVE METABOLIC PANEL - Abnormal; Notable for the following components:       Result Value    Glucose 106 (*)     ALT 73 (*)     All other components within normal limits   AST - Abnormal; Notable for the following components:    AST 51 (*)     All other components within normal limits   UA MACROSCOPIC WITH REFLEX TO MICRO AND CULTURE - Abnormal; Notable for the following components:    Protein Albumin Urine 10 (*)     Mucous Urine Present (*)     All other components within normal limits   CBC WITH PLATELETS DIFFERENTIAL   TROPONIN I   NT PROBNP INPATIENT   COVID-19 VIRUS (CORONAVIRUS) BY PCR   SARS-COV-2 (COVID-19) VIRUS RT-PCR   TROPONIN I            Results for orders placed or performed during the hospital encounter of 09/08/20 (from the past 24 hour(s))   EKG 12-lead, tracing only   Result Value Ref Range    Interpretation ECG Click View Image link to view waveform and result    CBC with platelets differential   Result Value Ref Range    WBC 5.3 4.0 - 11.0 10e9/L    RBC Count 4.82 4.4 - 5.9 10e12/L    Hemoglobin 14.3 13.3 - 17.7 g/dL    Hematocrit 43.6 40.0 - 53.0 %    MCV 91 78 - 100 fl    MCH 29.7 26.5 - 33.0 pg    MCHC 32.8 31.5 - 36.5 g/dL    RDW 13.9 10.0 - 15.0 %    Platelet Count 185 150 - 450 10e9/L    Diff Method Manual Differential     % Neutrophils 60.5 %    % Lymphocytes 22.8 %    % Monocytes 12.3 %    % Eosinophils 2.6 %    % Basophils 0.9 %    % Metamyelocytes 0.9 %    Absolute Neutrophil 3.2 1.6 - 8.3 10e9/L    Absolute Lymphocytes 1.2 0.8 - 5.3 10e9/L    Absolute Monocytes 0.7 0.0 - 1.3 10e9/L    Absolute Eosinophils 0.1 0.0 - 0.7 10e9/L    Absolute  Basophils 0.0 0.0 - 0.2 10e9/L    Absolute Metamyelocytes 0.0 0 10e9/L    RBC Morphology Consistent with reported results     Platelet Estimate Confirming automated cell count    Comprehensive metabolic panel   Result Value Ref Range    Sodium 141 133 - 144 mmol/L    Potassium 4.0 3.4 - 5.3 mmol/L    Chloride 106 94 - 109 mmol/L    Carbon Dioxide 27 20 - 32 mmol/L    Anion Gap 8 3 - 14 mmol/L    Glucose 106 (H) 70 - 99 mg/dL    Urea Nitrogen 14 7 - 30 mg/dL    Creatinine 0.88 0.66 - 1.25 mg/dL    GFR Estimate 88 >60 mL/min/[1.73_m2]    GFR Estimate If Black >90 >60 mL/min/[1.73_m2]    Calcium 8.8 8.5 - 10.1 mg/dL    Bilirubin Total 0.6 0.2 - 1.3 mg/dL    Albumin 3.5 3.4 - 5.0 g/dL    Protein Total 7.6 6.8 - 8.8 g/dL    Alkaline Phosphatase 79 40 - 150 U/L    ALT 73 (H) 0 - 70 U/L    AST Unsatisfactory specimen - hemolyzed 0 - 45 U/L   Troponin I   Result Value Ref Range    Troponin I ES <0.015 0.000 - 0.045 ug/L   Nt probnp inpatient (BNP)   Result Value Ref Range    N-Terminal Pro BNP Inpatient 731 0 - 900 pg/mL   Symptomatic COVID-19 Virus (Coronavirus) by PCR    Specimen: Nasopharyngeal   Result Value Ref Range    COVID-19 Virus PCR to [facility] - Source Nasopharyngeal     COVID-19 Virus PCR to [facility] - Result       Test received-See reflex to IDDL test SARS CoV2 (COVID-19) Virus RT-PCR   XR Chest Port 1 View    Narrative    XR CHEST PORT 1 VW 9/8/2020 7:24 PM    HISTORY: SOB    COMPARISON: Chest x-ray 5/18/2020    FINDINGS: Asymmetric elevation of the right hemidiaphragm. Old healed  right clavicle fracture. No acute findings. The lungs are clear and  there are no pleural effusions. Normal heart size.    GIOVANA FISHER MD   AST   Result Value Ref Range    AST 51 (H) 0 - 45 U/L   Troponin I (second draw)   Result Value Ref Range    Troponin I ES <0.015 0.000 - 0.045 ug/L   UA reflex to Microscopic and Culture    Specimen: Urine Midstream; Midstream Urine   Result Value Ref Range    Color Urine Yellow      Appearance Urine Clear     Glucose Urine Negative NEG^Negative mg/dL    Bilirubin Urine Negative NEG^Negative    Ketones Urine Negative NEG^Negative mg/dL    Specific Gravity Urine 1.022 1.003 - 1.035    Blood Urine Negative NEG^Negative    pH Urine 5.5 5.0 - 7.0 pH    Protein Albumin Urine 10 (A) NEG^Negative mg/dL    Urobilinogen mg/dL Normal 0.0 - 2.0 mg/dL    Nitrite Urine Negative NEG^Negative    Leukocyte Esterase Urine Negative NEG^Negative    Source Midstream Urine     RBC Urine 0 0 - 2 /HPF    WBC Urine <1 0 - 5 /HPF    Mucous Urine Present (A) NEG^Negative /LPF     Cardiac stress MRI from 5/7/2020:    Clinical history: 69-year old male with a history of non-ischemic cardiomyopathy (non-obstructive CAD on  coronary angiogram in 05/2013), hypertension, dyslipidemia, and mild-moderate aortic regurgitation with  chest discomfort. CMR to evaluate function and for ischemia.  Comparison CMR: 05/28/2013     1. The left ventricle is normal in cavity size and wall thickness. The global systolic function is mildly  reduced. The LVEF is 42%. There is mild diffuse hypokinesis.     2. The right ventricle is normal in cavity size. The global systolic function is mildly reduced. The RVEF  is 49%.      3. Both atria are normal in size.     4. There is mild aortic regurgitation.     5. There is mid-myocardial late gadolinium enhancement in the basal inferolateral and basal inferoseptal  segments consistent with non ischemic fibrosis.      6. Regadenoson stress perfusion imaging shows no ischemia.     7. There is no pericardial effusion or thickening.     8. There is no intracardiac thrombus.     CONCLUSIONS:   Non-ischemic cardiomyopathy with mildly reduced biventricular function.  A small burden of midmyocardial fibrosis in the basal ventricle with no ischemia.      Compared to the prior examination dated 05/28/2013, there is no significant change in the function or the  fibrosis.     Labs, vital signs, and imaging studies  were reviewed by me.    Medications - No data to display    Assessments & Plan (with Medical Decision Making)   Ricky Brown is a 69 year old male who presents the emergency department with shortness of breath, cough, and chest pain (now resolved).  Differential diagnosis includes ACS, CHF exacerbation, pneumonia, bronchitis, covid-19 infection, other viral syndrome, anxiety.    Cardiac stress MRI on 5/7/2020 was reviewed in patient's chart which showed no ischemia    EKG shows no acute changes.    Chest x-ray shows no acute findings    Laboratory work-up is remarkable for normal troponin x2, BNP within normal limits.  Patient does have mild transaminitis.    Patient was discussed with cardiology, given recent cardiac stress MRI that showed no ischemia, patient would be appropriate for discharge home to close follow-up if second troponin is normal.  Patient is advised to follow-up with cardiology within 1 week.    I have reviewed the nursing notes.    I have reviewed the findings, diagnosis, plan and need for follow up with the patient.    Patient to be discharged home. Advised to follow up with cardiology within 1 week. To return to ER immediately with any new/worsening symptoms. Plan of care discussed with patient who expresses understanding and agrees with plan of care.    COVID-19 testing is pending at this time.  Patient advised to self isolate at home until Covid testing is resulted negative.  Patient is advised that if Covid test results positive, he should receive a call with instructions on further self-isolation.      Discharge Medication List as of 9/8/2020 10:48 PM          Final diagnoses:   Dyspnea, unspecified type   Transaminitis       9/8/2020   [facility], [place], EMERGENCY DEPARTMENT     Deidra Hernandez MD  09/08/20 3150

## 2020-09-08 NOTE — DISCHARGE INSTRUCTIONS
TODAY'S VISIT:  You were seen today for shortness of breath  -   - If you had any labs or imaging/radiology tests performed today, you should also discuss these tests with your usual provider.     FOLLOW-UP:  Please make an appointment to follow up with:  - Your Primary Care Provider. If you do not have a PCP, please call the Primary Care Center (phone: [phone] for an appointment  -   Please make an appointment to follow up with Cardiology Clinic (phone: [phone]) in 7 days      - Have your provider review the results from today's visit with you again to make sure no further follow-up or additional testing is needed based on those results.     RETURN TO THE EMERGENCY DEPARTMENT  Return to the Emergency Department at any time for any new or worsening symptoms or any concerns.

## 2020-09-08 NOTE — TELEPHONE ENCOUNTER
"Patient calls reporting a plethora of symptoms, difficult to triage. He reports moderate SOB, fatigue, had some discomfort in chest area over the weekend (not now), urinary symptoms like odorous urine, frequency.  He and spouse were in [place] with 3 other couples over the weekend, but he didn't go out much due to fatigue. He has a history of HF, takes Lasix PRN.  Denies increased weight, swelling, fever.  He does report cough. Spouse is concerned and wants to make sure patient didn't have any \"cardiac episodes\" over the weekend, is wondering about clinic appt.  RN advised ER for further work-up given patient's age, history and symptoms.  Instructed many tests including troponin levels would need to be done in ER. Discussed that patient also having symptoms that could be COVID-19 related, so he would need a virtual visit for clinic.  They agree to go to [facility] for evaluation.     Shelby Bridges RN    "

## 2020-09-09 ENCOUNTER — VIRTUAL VISIT (OUTPATIENT)
Dept: FAMILY MEDICINE | Facility: CLINIC | Age: 70
End: 2020-09-09
Payer: MEDICARE

## 2020-09-09 ENCOUNTER — PATIENT OUTREACH (OUTPATIENT)
Dept: NURSING | Facility: CLINIC | Age: 70
End: 2020-09-09
Payer: MEDICARE

## 2020-09-09 DIAGNOSIS — R06.02 SOB (SHORTNESS OF BREATH): ICD-10-CM

## 2020-09-09 DIAGNOSIS — Z09 HOSPITAL DISCHARGE FOLLOW-UP: Primary | ICD-10-CM

## 2020-09-09 LAB
INTERPRETATION ECG - MUSE: NORMAL
LABORATORY COMMENT REPORT: NORMAL
SARS-COV-2 RNA SPEC QL NAA+PROBE: NEGATIVE
SPECIMEN SOURCE: NORMAL

## 2020-09-09 PROCEDURE — 99441 ZZC PHYSICIAN TELEPHONE EVALUATION 5-10 MIN: CPT | Performed by: FAMILY MEDICINE

## 2020-09-09 NOTE — LETTER
[place] CARE COORDINATION  [address] [facility] [address]    September 9, 2020    Ricky Brown  [address]  [place] [address]      Dear Ricky,    I am a clinic care coordinator who works with Armen Chavez MD, MD at [facility]. I wanted to thank you for spending the time to talk with me.  Below is a description of clinic care coordination and how I can further assist you.      The clinic care coordination team is made up of a registered nurse,  and community health worker who understand the health care system. The goal of clinic care coordination is to help you manage your health and improve access to the health care system in the most efficient manner. The team can assist you in meeting your health care goals by providing education, coordinating services, strengthening the communication among your providers and supporting you with any resource needs.    Please feel free to contact me at [phone] with any questions or concerns. We are focused on providing you with the highest-quality healthcare experience possible and that all starts with you.     Sincerely,     Tex Stanford MSN, RN, PHN, CCM   Primary Care Clinical RN Care Coordinator  [facility]  9/9/2020   9:29 AM  [email or web address]  Office: [phone]

## 2020-09-09 NOTE — PROGRESS NOTES
"Ricky Brown is a 69 year old male who is being evaluated via a billable telephone visit.      The patient has been notified of following:     \"This telephone visit will be conducted via a call between you and your physician/provider. We have found that certain health care needs can be provided without the need for a physical exam.  This service lets us provide the care you need with a short phone conversation.  If a prescription is necessary we can send it directly to your pharmacy.  If lab work is needed we can place an order for that and you can then stop by our lab to have the test done at a later time.    Telephone visits are billed at different rates depending on your insurance coverage. During this emergency period, for some insurers they may be billed the same as an in-person visit.  Please reach out to your insurance provider with any questions.    If during the course of the call the physician/provider feels a telephone visit is not appropriate, you will not be charged for this service.\"    Patient has given verbal consent for Telephone visit?  Yes    What phone number would you like to be contacted at? [phone]    How would you like to obtain your AVS? MyChart    Subjective     Ricky Brown is a 69 year old male who presents via phone visit today for the following health issues:    HPI    ED/UC Followup:    Facility:  [facility]   Date of visit: 9/8/2020  Reason for visit: Shortness of Breath, chest pain   Current Status: Lethargic and tired, pain in upper chest is gone            Reviewed ER evaluation. Overall normal  COVID negative  Cardiac evaluation negative  Mildly elevated ALT but overall WNL    He is feeling better today  Has appointment with cardiology Monday    Review of Systems   Constitutional, HEENT, cardiovascular, pulmonary, gi and gu systems are negative, except as otherwise noted.       Objective          Vitals:  No vitals were obtained today due to virtual " "visit.    alert and no distress  PSYCH: Alert and oriented times 3; coherent speech, normal   rate and volume, able to articulate logical thoughts, able   to abstract reason, no tangential thoughts, no hallucinations   or delusions  His affect is normal  RESP: No cough, no audible wheezing, able to talk in full sentences  Remainder of exam unable to be completed due to telephone visits            Assessment/Plan:    Assessment & Plan     Ricky was seen today for hospital f/u.    Diagnoses and all orders for this visit:    Hospital discharge follow-up    SOB (shortness of breath)      Improved. No follow up lab testing needed. He has appt with cardiology Monday to review any potential additional testing     BMI:   Estimated body mass index is 38.25 kg/m  as calculated from the following:    Height as of 3/5/20: 1.676 m (5' 6\").    Weight as of 4/11/20: 107.5 kg (237 lb).           Patient Instructions   Follow up with cardiology as scheduled      No follow-ups on file.    Armen Chavez MD, MD  [facility]      10  minutes with chart review, lab review and discussion with patient                "

## 2020-09-09 NOTE — PROGRESS NOTES
Clinic Care Coordination Contact      The RN CC contacted the patient by phone.  Patient stated that he is feeling much better.  Patient denies a fever, coughing, or chest pain.  The patient states that they just returned home from vacation and he really felt the worst when he was on vacation.  He does not wear masks and does not want to be told that he has to quarantine.  Patient was very upset when the results of his COVID testing were not available this morning.  The patient wants the RN care coordinator to call him as soon as the results are available.  The RN CC will monitor the chart for the results to return.    The patient declines care coordination at this time.    Tex Stanford MSN, RN, PHN, CCM   Primary Care Clinical RN Care Coordinator  [facility]  9/9/2020   9:45 AM  [email or web address]  Office: [phone]

## 2020-09-14 ENCOUNTER — OFFICE VISIT (OUTPATIENT)
Dept: CARDIOLOGY | Facility: CLINIC | Age: 70
End: 2020-09-14
Attending: INTERNAL MEDICINE
Payer: MEDICARE

## 2020-09-14 VITALS
HEART RATE: 82 BPM | SYSTOLIC BLOOD PRESSURE: 134 MMHG | WEIGHT: 235.2 LBS | BODY MASS INDEX: 36.91 KG/M2 | DIASTOLIC BLOOD PRESSURE: 87 MMHG | HEIGHT: 67 IN | OXYGEN SATURATION: 96 %

## 2020-09-14 DIAGNOSIS — I10 ESSENTIAL HYPERTENSION WITH GOAL BLOOD PRESSURE LESS THAN 140/90: ICD-10-CM

## 2020-09-14 DIAGNOSIS — E78.5 HYPERLIPIDEMIA LDL GOAL <70: ICD-10-CM

## 2020-09-14 DIAGNOSIS — I42.9 CARDIOMYOPATHY, UNSPECIFIED TYPE (H): ICD-10-CM

## 2020-09-14 DIAGNOSIS — R00.2 PALPITATIONS: ICD-10-CM

## 2020-09-14 DIAGNOSIS — R06.02 SOB (SHORTNESS OF BREATH): ICD-10-CM

## 2020-09-14 DIAGNOSIS — I42.9 CARDIOMYOPATHY, UNSPECIFIED TYPE (H): Primary | ICD-10-CM

## 2020-09-14 PROCEDURE — 0298T ZZC EXT ECG > 48HR TO 21 DAY REVIEW AND INTERPRETATN: CPT | Mod: ZP | Performed by: INTERNAL MEDICINE

## 2020-09-14 PROCEDURE — 99214 OFFICE O/P EST MOD 30 MIN: CPT | Mod: 25 | Performed by: INTERNAL MEDICINE

## 2020-09-14 RX ORDER — SPIRONOLACTONE 25 MG/1
12.5 TABLET ORAL DAILY
Qty: 45 TABLET | Refills: 3 | Status: SHIPPED | OUTPATIENT
Start: 2020-09-14 | End: 2020-09-29

## 2020-09-14 RX ORDER — FUROSEMIDE 20 MG
20 TABLET ORAL DAILY
Qty: 90 TABLET | Refills: 3 | Status: SHIPPED | OUTPATIENT
Start: 2020-09-14 | End: 2021-01-21

## 2020-09-14 ASSESSMENT — PAIN SCALES - GENERAL: PAINLEVEL: NO PAIN (0)

## 2020-09-14 ASSESSMENT — MIFFLIN-ST. JEOR: SCORE: 1800.49

## 2020-09-14 NOTE — LETTER
9/14/2020      RE: Ricky Brown  [address]  [facility] [address]       Dear Colleague,    Thank you for the opportunity to participate in the care of your patient, Ricky Brown, at the [facility] [place] [facility]. Please see a copy of my visit note below.    HPI:     I had the privilege to evaluate and examine Mr Ricky Brown, who is  is a 69 year old male , who has a Hx of non-ischemic cardiomyopathy, hypertension, hyperlipidemia., who was seen at ER because of COVID19 like symptoms (COVID19 testing was negative).    Patient denies chest pain, but complaints about swelling ankles, slightly shortness of breath. Patient denies intermittent claudication.  In 2018 stopped intermittently his medication of heart failure and hyperlipidemia.  Since then he takes his medication regularly.       PAST MEDICAL HISTORY:  Past Medical History:   Diagnosis Date     Arthritis      BCC (basal cell carcinoma)     right shoulder      Cardiomyopathy (H)      Colon adenomas 9/20/11     Coronary artery disease      ED (erectile dysfunction)      HTN (hypertension)      Noncompliance      Obesity      JOSE JUAN (obstructive sleep apnea)        CURRENT MEDICATIONS:  Current Outpatient Medications   Medication Sig Dispense Refill     aspirin 81 MG tablet Take 1 tablet (81 mg) by mouth daily 90 tablet 3     atorvastatin (LIPITOR) 10 MG tablet Take 1 tablet (10 mg) by mouth daily 90 tablet 3     brimonidine (ALPHAGAN) 0.2 % ophthalmic solution Place 1 drop into the right eye 2 times daily 1 Bottle 11     Calcium Carbonate Antacid (TUMS CALCIUM FOR LIFE BONE PO) Take  by mouth.       diphenoxylate-atropine (LOMOTIL) 2.5-0.025 MG per tablet Take 1 tablet by mouth daily as needed for diarrhea Patient only takes 1 tablet as needed 30 tablet 5     furosemide (LASIX) 20 MG tablet Take 1 tablet (20 mg) by mouth daily 90 tablet 3     furosemide (LASIX) 20 MG  tablet Take 1 tablet (20 mg) by mouth daily as needed 90 tablet 3     losartan (COZAAR) 50 MG tablet Take 1 tablet (50 mg) by mouth daily 90 tablet 3     metoprolol succinate ER (TOPROL-XL) 50 MG 24 hr tablet Take 1 tablet (50 mg) by mouth daily 90 tablet 3     Multiple Vitamin (MULTIVITAMINS PO) Take  by mouth daily.       nitroGLYcerin (NITROSTAT) 0.4 MG sublingual tablet For chest pain place 1 tablet under the tongue every 5 minutes for 3 doses. If symptoms persist 5 minutes after 1st dose call 911. (Patient not taking: Reported on 9/9/2020) 30 tablet 3     spironolactone (ALDACTONE) 25 MG tablet Take 0.5 tablets (12.5 mg) by mouth daily 45 tablet 2     tamsulosin (FLOMAX) 0.4 MG capsule Take 1 capsule (0.4 mg) by mouth daily (Patient not taking: Reported on 9/9/2020) 30 capsule 0       PAST SURGICAL HISTORY:  Past Surgical History:   Procedure Laterality Date     ARTHROSCOPY SHOULDER BICEPS TENODESIS REPAIR  2/27/2014    Procedure: ARTHROSCOPY SHOULDER BICEPS TENODESIS REPAIR;;  Surgeon: Michale Hagen MD;  [facility]     ARTHROSCOPY SHOULDER ROTATOR CUFF REPAIR  2/27/2014    Procedure: ARTHROSCOPY SHOULDER ROTATOR CUFF REPAIR;  Right Shoulder Arthroscopic Rotator Cuff Repair,  Subacromial Decompression, Biceps Tenodesis, Distal Clavicle Excision   ;  Surgeon: Michael Hagen MD;  Location: [facility]     BIOPSY       CARDIAC SURGERY       COLONOSCOPY       EXCHANGE INTRAOCULAR LENS IMPLANT  2005    left eye - first, recentered PCL with iris fixation; then IOLX with ACL     EXTRACAPSULAR CATARACT EXTRATION WITH INTRAOCULAR LENS IMPLANT  2005    both eyes (elsewhere)     TURBINOPLASTY  12/11/2013    Procedure: TURBINOPLASTY;;  Surgeon: Lisset Parsons MD;  [facility]     UVULOPALATOPHARYNGOPLASTY  12/11/2013    Procedure: UVULOPALATOPHARYNGOPLASTY;  Uvulopalatopharyngoplasty, Turbiante Reduction;  Surgeon: Lisset Parsons MD;  Location: [facility] OR       ALLERGIES      Allergies   Allergen Reactions     Lisinopril Cough       FAMILY HISTORY:  Family History   Problem Relation Age of Onset     Diabetes Father         Old age Diabetes     Cerebrovascular Disease Father      Obesity Father      Heart Disease Father      Coronary Artery Disease Father      Hypertension Father      Lipids Sister      C.A.D. No family hx of      Cancer No family hx of      Glaucoma No family hx of      Macular Degeneration No family hx of        SOCIAL HISTORY:  Social History     Socioeconomic History     Marital status:      Spouse name: Kyung     Number of children: 0     Years of education: 14     Highest education level: None   Occupational History     Occupation: industrial electronics      Employer: SELF   Social Needs     Financial resource strain: None     Food insecurity     Worry: None     Inability: None     Transportation needs     Medical: None     Non-medical: None   Tobacco Use     Smoking status: Former Smoker     Packs/day: 0.50     Years: 2.00     Pack years: 1.00     Types: Cigarettes     Quit date: 1996     Years since quittin.8     Smokeless tobacco: Never Used   Substance and Sexual Activity     Alcohol use: Yes     Alcohol/week: 8.3 standard drinks     Frequency: 4 or more times a week     Comment: Evening Marie     Drug use: No     Sexual activity: Yes     Partners: Female     Birth control/protection: Male Surgical     Comment: 2006 vasectomy   Lifestyle     Physical activity     Days per week: None     Minutes per session: None     Stress: None   Relationships     Social connections     Talks on phone: None     Gets together: None     Attends Nondenominational service: None     Active member of club or organization: None     Attends meetings of clubs or organizations: None     Relationship status: None     Intimate partner violence     Fear of current or ex partner: None     Emotionally abused: None     Physically abused: None     Forced sexual activity: None  "  Other Topics Concern     Parent/sibling w/ CABG, MI or angioplasty before 65F 55M? No   Social History Narrative     None       ROS:   Constitutional: No fever, chills, or sweats. No weight gain/loss   ENT: No visual disturbance, ear ache, epistaxis, sore throat  Allergies/Immunologic: Negative.   Respiratory: No cough, hemoptysia  Cardiovascular: As per HPI  GI: No nausea, vomiting, hematemesis, melena, or hematochezia  : No urinary frequency, dysuria, or hematuria  Integument: Negative  Psychiatric: Negative  Neuro: Negative  Endocrinology: Negative   Musculoskeletal: Negative    EXAM:  /87 (BP Location: Left arm, Patient Position: Chair, Cuff Size: Adult Regular)   Pulse 82   Ht 1.702 m (5' 7\")   Wt 106.7 kg (235 lb 3.2 oz)   SpO2 96%   BMI 36.84 kg/m    In general, the patient is a pleasant male in no apparent distress.    HEENT: NC/AT.  PERRLA.  EOMI.  Sclerae white, not injected.  Nares clear.  Pharynx without erythema or exudate.  Dentition intact.    Neck: No adenopathy.  No thyromegaly. Carotids +4/4 bilaterally without bruits.  No jugular venous distension.   Heart: RRR. Normal S1, S2 splits physiologically. No murmur, rub, click, or gallop. The PMI is in the 5th ICS in the midclavicular line. There is no heave.    Lungs: CTA.  No ronchi, wheezes, rales.  No dullness to percussion.   Abdomen: Soft, nontender, nondistended. No organomegaly.  No bruits.   Extremities: No clubbing, cyanosis, or edema.  The pulses are +4/4 at the radial, brachial, femoral, popliteal, DP, and PT sites bilaterally.  No bruits are noted.  Neurologic: Alert and oriented to person/place/time, normal speech, gait and affect  Skin: No petechiae, purpura or rash.    Labs:       Ref. Range 9/22/2020 13:49   Sodium Latest Ref Range: 133 - 144 mmol/L 138   Potassium Latest Ref Range: 3.4 - 5.3 mmol/L 4.2   Chloride Latest Ref Range: 94 - 109 mmol/L 105   Carbon Dioxide Latest Ref Range: 20 - 32 mmol/L 26   Urea Nitrogen " Latest Ref Range: 7 - 30 mg/dL 20   Creatinine Latest Ref Range: 0.66 - 1.25 mg/dL 1.08   GFR Estimate Latest Ref Range: >60 mL/min/1.73_m2 70   GFR Estimate If Black Latest Ref Range: >60 mL/min/1.73_m2 81   Calcium Latest Ref Range: 8.5 - 10.1 mg/dL 8.9   Anion Gap Latest Ref Range: 3 - 14 mmol/L 7   Lipoprotein (a) Latest Ref Range: <=29 mg/dL <6   Glucose Latest Ref Range: 70 - 99 mg/dL 111 (H)       LIPID RESULTS:  Lab Results   Component Value Date    CHOL 106 07/23/2019    HDL 52 07/23/2019    LDL 16 07/23/2019    TRIG 192 (H) 07/23/2019    CHOLHDLRATIO 2.7 04/14/2015    NHDL 54 07/23/2019       LIVER ENZYME RESULTS:  Lab Results   Component Value Date    AST 51 (H) 09/08/2020    ALT 73 (H) 09/08/2020       CBC RESULTS:  Lab Results   Component Value Date    WBC 5.3 09/08/2020    RBC 4.82 09/08/2020    HGB 14.3 09/08/2020    HCT 43.6 09/08/2020    MCV 91 09/08/2020    MCH 29.7 09/08/2020    MCHC 32.8 09/08/2020    RDW 13.9 09/08/2020     09/08/2020       BMP RESULTS:  Lab Results   Component Value Date     09/22/2020    POTASSIUM 4.2 09/22/2020    CHLORIDE 105 09/22/2020    CO2 26 09/22/2020    ANIONGAP 7 09/22/2020     (H) 09/22/2020    BUN 20 09/22/2020    CR 1.08 09/22/2020    GFRESTIMATED 70 09/22/2020    GFRESTBLACK 81 09/22/2020    CATHERINE 8.9 09/22/2020        A1C RESULTS:  Lab Results   Component Value Date    A1C 5.6 02/25/2020     Results for LOPEZ MERIDA (MRN [id number]) as of 10/11/2020 08:16   Ref. Range 9/8/2020 17:43   Sodium Latest Ref Range: 133 - 144 mmol/L 141   Potassium Latest Ref Range: 3.4 - 5.3 mmol/L 4.0   Chloride Latest Ref Range: 94 - 109 mmol/L 106   Carbon Dioxide Latest Ref Range: 20 - 32 mmol/L 27   Urea Nitrogen Latest Ref Range: 7 - 30 mg/dL 14   Creatinine Latest Ref Range: 0.66 - 1.25 mg/dL 0.88   GFR Estimate Latest Ref Range: >60 mL/min/1.73_m2 88   GFR Estimate If Black Latest Ref Range: >60 mL/min/1.73_m2 >90   Calcium Latest Ref Range: 8.5 -  10.1 mg/dL 8.8   Anion Gap Latest Ref Range: 3 - 14 mmol/L 8   Albumin Latest Ref Range: 3.4 - 5.0 g/dL 3.5   Protein Total Latest Ref Range: 6.8 - 8.8 g/dL 7.6   Bilirubin Total Latest Ref Range: 0.2 - 1.3 mg/dL 0.6   Alkaline Phosphatase Latest Ref Range: 40 - 150 U/L 79   ALT Latest Ref Range: 0 - 70 U/L 73 (H)   AST Latest Ref Range: 0 - 45 U/L Re-measured 51   N-Terminal Pro BNP Inpatient Latest Ref Range: 0 - 900 pg/mL 731   Troponin I ES Latest Ref Range: 0.000 - 0.045 ug/L <0.015   Glucose Latest Ref Range: 70 - 99 mg/dL 106 (H)   WBC Latest Ref Range: 4.0 - 11.0 10e9/L 5.3   Hemoglobin Latest Ref Range: 13.3 - 17.7 g/dL 14.3   Hematocrit Latest Ref Range: 40.0 - 53.0 % 43.6   Platelet Count Latest Ref Range: 150 - 450 10e9/L 185   RBC Count Latest Ref Range: 4.4 - 5.9 10e12/L 4.82   MCV Latest Ref Range: 78 - 100 fl 91   MCH Latest Ref Range: 26.5 - 33.0 pg 29.7   MCHC Latest Ref Range: 31.5 - 36.5 g/dL 32.8   RDW Latest Ref Range: 10.0 - 15.0 % 13.9   Diff Method Unknown Manual Differential   % Neutrophils Latest Units: % 60.5   % Lymphocytes Latest Units: % 22.8   % Monocytes Latest Units: % 12.3   % Eosinophils Latest Units: % 2.6   % Basophils Latest Units: % 0.9   % Metamyelocytes Latest Units: % 0.9   Absolute Neutrophil Latest Ref Range: 1.6 - 8.3 10e9/L 3.2   Absolute Lymphocytes Latest Ref Range: 0.8 - 5.3 10e9/L 1.2   Absolute Monocytes Latest Ref Range: 0.0 - 1.3 10e9/L 0.7   Absolute Eosinophils Latest Ref Range: 0.0 - 0.7 10e9/L 0.1   Absolute Basophils Latest Ref Range: 0.0 - 0.2 10e9/L 0.0   Absolute Metamyelocytes Latest Ref Range: 0 10e9/L 0.0   RBC Morphology Unknown Consistent with reported results   Platelet Estimate Unknown Confirming automated cell count        Ref. Range 9/8/2020 21:26   Color Urine Unknown Yellow   Appearance Urine Unknown Clear   Glucose Urine Latest Ref Range: NEG^Negative mg/dL Negative   Bilirubin Urine Latest Ref Range: NEG^Negative  Negative   Ketones Urine  Latest Ref Range: NEG^Negative mg/dL Negative   Specific Gravity Urine Latest Ref Range: 1.003 - 1.035  1.022   pH Urine Latest Ref Range: 5.0 - 7.0 pH 5.5   Protein Albumin Urine Latest Ref Range: NEG^Negative mg/dL 10 (A)   Urobilinogen mg/dL Latest Ref Range: 0.0 - 2.0 mg/dL Normal   Nitrite Urine Latest Ref Range: NEG^Negative  Negative   Blood Urine Latest Ref Range: NEG^Negative  Negative   Leukocyte Esterase Urine Latest Ref Range: NEG^Negative  Negative   Source Unknown Midstream Urine   WBC Urine Latest Ref Range: 0 - 5 /HPF <1   RBC Urine Latest Ref Range: 0 - 2 /HPF 0   Mucous Urine Latest Ref Range: NEG^Negative /LPF Present (A)     Procedures:    Echocardiogram 09-29-20  Global and regional left ventricular function is normal with an EF of 55-60%.  Global right ventricular function is normal.  Trileaflet aortic sclerosis without stenosis. Mild aortic insufficiency is  present.  The inferior vena cava was normal in size with preserved respiratory  variability.  No pericardial effusion is present.  _____________________________________________________________________________  __        Left Ventricle  Left ventricular size is normal. Left ventricular wall thickness is normal.  Global and regional left ventricular function is normal with an EF of 55-60%.  Left ventricular diastolic function is abnormal. No regional wall motion  abnormalities are seen.     Right Ventricle  The right ventricle is normal size. Global right ventricular function is  normal.     Atria  Both atria appear normal. The atrial septum is intact as assessed by color  Doppler .     Mitral Valve  The mitral valve is normal.        Aortic Valve  Trileaflet aortic sclerosis without stenosis. Mild aortic insufficiency is  present.     Tricuspid Valve  The tricuspid valve is normal. Trace tricuspid insufficiency is present. The  peak velocity of the tricuspid regurgitant jet is not obtainable. Pulmonary  artery systolic pressure cannot be  assessed.     Pulmonic Valve  The pulmonic valve is normal.     Vessels  The aorta root is normal. The inferior vena cava was normal in size with  preserved respiratory variability.     Pericardium  No pericardial effusion is present.        Compared to Previous Study  This study was compared with the study from 3.16.20 . LV function is normal.  _____________________________________________________________________________  __  MMode/2D Measurements & Calculations     IVSd: 1.2 cm  LVIDd: 4.4 cm  LVIDs: 2.7 cm  LVPWd: 1.2 cm  FS: 38.9 %  LV mass(C)d: 191.9 grams  LV mass(C)dI: 88.9 grams/m2  Ao root diam: 3.3 cm  asc Aorta Diam: 3.2 cm  LVOT diam: 2.5 cm  LVOT area: 4.9 cm2  LA Volume (BP): 38.0 ml  LA Volume Index (BP): 17.6 ml/m2  RWT: 0.52           Doppler Measurements & Calculations  MV E max evgeny: 76.0 cm/sec  MV A max evgeny: 85.4 cm/sec  MV E/A: 0.89  MV dec time: 0.26 sec  Ao V2 max: 128.6 cm/sec  Ao max P.0 mmHg  AI P1/2t: 696.3 msec  E/E' av.2  Lateral E/e': 10.8  Medial E/e': 15.6        EKG 20    Sin rythm      Assessment and Plan:     We discussed the results with patient.  We discussed the importance of a heart healthy diet and lifestyle.     Medication Changes:   - Start Lasix (Furosimide) 20 mg every day  - Start Spironolactone 12.5 mg every day (0.5 25 mg tablet).     Recommendations: Non-fasting labs in one week.    We ordered an echocardiogram - result included in the note  We also ordered a  ziopatch for 14 day - results pending     Studies Ordered:   - 14 day ZioPatch cardiac event monitor today  - Echocardiogram in about 2 weeks after the monitor is complete.     The results from today include: Labs.     Please follow up: With Dr. Ortiz in 3 months with fasting labs prior.    Carroll Ortiz MD, PhD  Professor of Medicine  Division of Cardiology    CC  Patient Care Team:  Armen Chavez MD as PCP - General (Family Practice)  Armen Chavez MD as Assigned  PCP  Lisset Cedillo, RN as Specialty Care Coordinator (Cardiology)  Teresa Morris NP as Nurse Practitioner (Nurse Practitioner - Gerontology)  Carroll Ortiz MD as MD (Cardiology)  SELF, REFERRED      Please do not hesitate to contact me if you have any questions/concerns.     Sincerely,     Carroll Ortiz MD

## 2020-09-14 NOTE — NURSING NOTE
Chief Complaint   Patient presents with     Follow Up     Hospital Follow-up      Vitals were taken and medications were reconciled.    Stephie Boateng  2:56 PM

## 2020-09-14 NOTE — PATIENT INSTRUCTIONS
Patient Instructions:  It was a pleasure to see you in the cardiology clinic today.      If you have any questions, you can reach my nurse, Susy ASHTON LPN, at [phone].  Press Option #1 for the [facility], and then press Option #4 for nursing.    We are encouraging the use of MyChart to communicate with your HealthCare Provider    Medication Changes:   - Start Lasix (Furosimide) 20 mg every day  - Start Spironolactone 12.5 mg every day (0.5 25 mg tablet).    Recommendations: Non-fasting labs in one week.    Studies Ordered:   - 14 day HumoPatch cardiac event monitor today  - Echocardiogram in about 2 weeks after the monitor is complete.    The results from today include: Labs.    Please follow up: With Dr. Ortiz in 3 months with fasting labs prior.    Sincerely,    Carroll Ortiz MD     If you have an urgent need after hours (8:00 am to 4:30 pm) please call [phone] and ask for the cardiology fellow on call.            Patient Education     Furosemide tablets  Brand Names: Active-Medicated Specimen Kit, Delone, Diuscreen, Lasix, RX Specimen Collection Kit, Specimen Collection Kit  What is this medicine?  FUROSEMIDE (fyoor [place] se mide) is a diuretic. It helps you make more urine and to lose salt and excess water from your body. This medicine is used to treat high blood pressure, and edema or swelling from heart, kidney, or liver disease.  How should I use this medicine?  Take this medicine by mouth with a glass of water. Follow the directions on the prescription label. You may take this medicine with or without food. If it upsets your stomach, take it with food or milk. Do not take your medicine more often than directed. Remember that you will need to pass more urine after taking this medicine. Do not take your medicine at a time of day that will cause you problems. Do not take at bedtime.  Talk to your pediatrician regarding the use of this medicine in children. While this drug  may be prescribed for selected conditions, precautions do apply.  What side effects may I notice from receiving this medicine?  Side effects that you should report to your doctor or health care professional as soon as possible:    blood in urine or stools    dry mouth    fever or chills    hearing loss or ringing in the ears    irregular heartbeat    muscle pain or weakness, cramps    skin rash    stomach upset, pain, or nausea    tingling or numbness in the hands or feet    unusually weak or tired    vomiting or diarrhea    yellowing of the eyes or skin  Side effects that usually do not require medical attention (report to your doctor or health care professional if they continue or are bothersome):    headache    loss of appetite    unusual bleeding or bruising  What may interact with this medicine?      aspirin and aspirin-like medicines    certain antibiotics    chloral hydrate    cisplatin    cyclosporine    digoxin    diuretics    laxatives    lithium    medicines for blood pressure    medicines that relax muscles for surgery    methotrexate    NSAIDs, medicines for pain and inflammation like ibuprofen, naproxen, or indomethacin    phenytoin    steroid medicines like prednisone or cortisone    sucralfate    thyroid hormones  What if I miss a dose?  If you miss a dose, take it as soon as you can. If it is almost time for your next dose, take only that dose. Do not take double or extra doses.  Where should I keep my medicine?  Keep out of the reach of children.  Store at room temperature between 15 and 30 degrees C (59 and 86 degrees F). Protect from light. Throw away any unused medicine after the expiration date.  What should I tell my health care provider before I take this medicine?  They need to know if you have any of these conditions:    abnormal blood electrolytes    diarrhea or vomiting    gout    heart disease    kidney disease, small amounts of urine, or difficulty passing urine    liver  disease    thyroid disease    an unusual or allergic reaction to furosemide, sulfa drugs, other medicines, foods, dyes, or preservatives    pregnant or trying to get pregnant    breast-feeding  What should I watch for while using this medicine?  Visit your doctor or health care professional for regular checks on your progress. Check your blood pressure regularly. Ask your doctor or health care professional what your blood pressure should be, and when you should contact him or her. If you are a diabetic, check your blood sugar as directed.  You may need to be on a special diet while taking this medicine. Check with your doctor. Also, ask how many glasses of fluid you need to drink a day. You must not get dehydrated.  You may get drowsy or dizzy. Do not drive, use machinery, or do anything that needs mental alertness until you know how this drug affects you. Do not stand or sit up quickly, especially if you are an older patient. This reduces the risk of dizzy or fainting spells. Alcohol can make you more drowsy and dizzy. Avoid alcoholic drinks.  This medicine can make you more sensitive to the sun. Keep out of the sun. If you cannot avoid being in the sun, wear protective clothing and use sunscreen. Do not use sun lamps or tanning beds/booths.  NOTE:This sheet is a summary. It may not cover all possible information. If you have questions about this medicine, talk to your doctor, pharmacist, or health care provider. Copyright  2019 [employer]           Patient Education     Spironolactone tablets  Brand Name: Aldactone  What is this medicine?  SPIRONOLACTONE ([place] LAK tone) is a diuretic. It helps you make more urine and to lose excess water from your body. This medicine is used to treat high blood pressure, and edema or swelling from heart, kidney, or liver disease. It is also used to treat patients who make too much aldosterone or have low potassium.  How should I use this medicine?  Take this medicine by mouth  with a drink of water. Follow the directions on your prescription label. You can take it with or without food. If it upsets your stomach, take it with food. Do not take your medicine more often than directed. Remember that you will need to pass more urine after taking this medicine. Do not take your doses at a time of day that will cause you problems. Do not take at bedtime.  Talk to your pediatrician regarding the use of this medicine in children. While this drug may be prescribed for selected conditions, precautions do apply.  What side effects may I notice from receiving this medicine?  Side effects that you should report to your doctor or health care professional as soon as possible:    allergic reactions such as skin rash or itching, hives, swelling of the lips, mouth, tongue, or throat    black or tarry stools    fast, irregular heartbeat    fever    muscle pain, cramps    numbness, tingling in hands or feet    trouble breathing    trouble passing urine    unusual bleeding    unusually weak or tired  Side effects that usually do not require medical attention (report to your doctor or health care professional if they continue or are bothersome):    change in voice or hair growth    confusion    dizzy, drowsy    dry mouth, increased thirst    enlarged or tender breasts    headache    irregular menstrual periods    sexual difficulty, unable to have an erection    stomach upset  What may interact with this medicine?  Do not take this medicine with any of the following medications:    cidofovir    eplerenone    tranylcypromine  This medicine may also interact with the following medications:    aspirin    certain medicines for blood pressure or heart disease like benazepril, lisinopril, losartan, valsartan    certain medicines that treat or prevent blood clots like heparin and enoxaparin    cholestyramine    cyclosporine    digoxin    lithium    medicines that relax muscles for surgery    NSAIDs, medicines for pain  and inflammation, like ibuprofen or naproxen    other diuretics    potassium supplements    steroid medicines like prednisone or cortisone    trimethoprim  What if I miss a dose?  If you miss a dose, take it as soon as you can. If it is almost time for your next dose, take only that dose. Do not take double or extra doses.  Where should I keep my medicine?  Keep out of the reach of children.  Store below 25 degrees C (77 degrees F). Throw away any unused medicine after the expiration date.  What should I tell my health care provider before I take this medicine?  They need to know if you have any of these conditions:    high blood level of potassium    kidney disease or trouble making urine    liver disease    an unusual or allergic reaction to spironolactone, other medicines, foods, dyes, or preservatives    pregnant or trying to get pregnant    breast-feeding  What should I watch for while using this medicine?  Visit your doctor or health care professional for regular checks on your progress. Check your blood pressure as directed. Ask your doctor what your blood pressure should be, and when you should contact them.  You may need to be on a special diet while taking this medicine. Ask your doctor. Also, ask how many glasses of fluid you need to drink a day. You must not get dehydrated.  This medicine may make you feel confused, dizzy or lightheaded. Drinking alcohol and taking some medicines can make this worse. Do not drive, use machinery, or do anything that needs mental alertness until you know how this medicine affects you. Do not sit or stand up quickly.  NOTE:This sheet is a summary. It may not cover all possible information. If you have questions about this medicine, talk to your doctor, pharmacist, or health care provider. Copyright  2019 [employer]

## 2020-09-22 DIAGNOSIS — E78.5 HYPERLIPIDEMIA LDL GOAL <70: ICD-10-CM

## 2020-09-22 DIAGNOSIS — R06.02 SOB (SHORTNESS OF BREATH): ICD-10-CM

## 2020-09-22 DIAGNOSIS — R00.2 PALPITATIONS: ICD-10-CM

## 2020-09-22 DIAGNOSIS — I10 ESSENTIAL HYPERTENSION WITH GOAL BLOOD PRESSURE LESS THAN 140/90: ICD-10-CM

## 2020-09-22 DIAGNOSIS — I42.9 CARDIOMYOPATHY, UNSPECIFIED TYPE (H): ICD-10-CM

## 2020-09-22 PROCEDURE — 99000 SPECIMEN HANDLING OFFICE-LAB: CPT | Performed by: INTERNAL MEDICINE

## 2020-09-22 PROCEDURE — 80048 BASIC METABOLIC PNL TOTAL CA: CPT | Performed by: INTERNAL MEDICINE

## 2020-09-22 PROCEDURE — 36415 COLL VENOUS BLD VENIPUNCTURE: CPT | Performed by: INTERNAL MEDICINE

## 2020-09-22 PROCEDURE — 83695 ASSAY OF LIPOPROTEIN(A): CPT | Mod: 90 | Performed by: INTERNAL MEDICINE

## 2020-09-23 LAB
ANION GAP SERPL CALCULATED.3IONS-SCNC: 7 MMOL/L (ref 3–14)
BUN SERPL-MCNC: 20 MG/DL (ref 7–30)
CALCIUM SERPL-MCNC: 8.9 MG/DL (ref 8.5–10.1)
CHLORIDE SERPL-SCNC: 105 MMOL/L (ref 94–109)
CO2 SERPL-SCNC: 26 MMOL/L (ref 20–32)
CREAT SERPL-MCNC: 1.08 MG/DL (ref 0.66–1.25)
GFR SERPL CREATININE-BSD FRML MDRD: 70 ML/MIN/{1.73_M2}
GLUCOSE SERPL-MCNC: 111 MG/DL (ref 70–99)
LPA SERPL-MCNC: <6 MG/DL
POTASSIUM SERPL-SCNC: 4.2 MMOL/L (ref 3.4–5.3)
SODIUM SERPL-SCNC: 138 MMOL/L (ref 133–144)

## 2020-09-29 ENCOUNTER — ANCILLARY PROCEDURE (OUTPATIENT)
Dept: CARDIOLOGY | Facility: CLINIC | Age: 70
End: 2020-09-29
Attending: INTERNAL MEDICINE
Payer: MEDICARE

## 2020-09-29 DIAGNOSIS — R06.02 SOB (SHORTNESS OF BREATH): ICD-10-CM

## 2020-09-29 DIAGNOSIS — I42.9 CARDIOMYOPATHY, UNSPECIFIED TYPE (H): ICD-10-CM

## 2020-09-29 DIAGNOSIS — I10 ESSENTIAL HYPERTENSION WITH GOAL BLOOD PRESSURE LESS THAN 140/90: ICD-10-CM

## 2020-09-29 DIAGNOSIS — R00.2 PALPITATIONS: ICD-10-CM

## 2020-09-29 PROCEDURE — 93306 TTE W/DOPPLER COMPLETE: CPT | Performed by: INTERNAL MEDICINE

## 2020-09-29 RX ORDER — SPIRONOLACTONE 25 MG/1
25 TABLET ORAL DAILY
Qty: 90 TABLET | Refills: 2 | Status: SHIPPED | OUTPATIENT
Start: 2020-09-29 | End: 2020-10-02

## 2020-09-29 RX ADMIN — Medication 5 ML: at 15:00

## 2020-09-29 NOTE — PROGRESS NOTES
Date: 9/29/2020    Time of Call: 8:59 AM     Diagnosis:  HTN     [ TORB ] Ordering provider: Dr Carroll Ortiz  Order: Increase Spironolactone to 25 mg qd     Order received by: Susy Li LPN     Follow-up/additional notes: Per result note. Order placed.

## 2020-10-11 NOTE — PROGRESS NOTES
HPI:     I had the privilege to evaluate and examine Mr Ricky Brown, who is  is a 69 year old male , who has a Hx of non-ischemic cardiomyopathy, hypertension, hyperlipidemia., who was seen at ER because of COVID19 like symptoms (COVID19 testing was negative).    Patient denies chest pain, but complaints about swelling ankles, slightly shortness of breath. Patient denies intermittent claudication.  In 2018 stopped intermittently his medication of heart failure and hyperlipidemia.  Since then he takes his medication regularly.       PAST MEDICAL HISTORY:  Past Medical History:   Diagnosis Date     Arthritis      BCC (basal cell carcinoma)     right shoulder      Cardiomyopathy (H)      Colon adenomas 9/20/11     Coronary artery disease      ED (erectile dysfunction)      HTN (hypertension)      Noncompliance      Obesity      JOSE JUAN (obstructive sleep apnea)        CURRENT MEDICATIONS:  Current Outpatient Medications   Medication Sig Dispense Refill     aspirin 81 MG tablet Take 1 tablet (81 mg) by mouth daily 90 tablet 3     atorvastatin (LIPITOR) 10 MG tablet Take 1 tablet (10 mg) by mouth daily 90 tablet 3     brimonidine (ALPHAGAN) 0.2 % ophthalmic solution Place 1 drop into the right eye 2 times daily 1 Bottle 11     Calcium Carbonate Antacid (TUMS CALCIUM FOR LIFE BONE PO) Take  by mouth.       diphenoxylate-atropine (LOMOTIL) 2.5-0.025 MG per tablet Take 1 tablet by mouth daily as needed for diarrhea Patient only takes 1 tablet as needed 30 tablet 5     furosemide (LASIX) 20 MG tablet Take 1 tablet (20 mg) by mouth daily 90 tablet 3     furosemide (LASIX) 20 MG tablet Take 1 tablet (20 mg) by mouth daily as needed 90 tablet 3     losartan (COZAAR) 50 MG tablet Take 1 tablet (50 mg) by mouth daily 90 tablet 3     metoprolol succinate ER (TOPROL-XL) 50 MG 24 hr tablet Take 1 tablet (50 mg) by mouth daily 90 tablet 3     Multiple Vitamin (MULTIVITAMINS PO) Take  by mouth daily.       nitroGLYcerin  (NITROSTAT) 0.4 MG sublingual tablet For chest pain place 1 tablet under the tongue every 5 minutes for 3 doses. If symptoms persist 5 minutes after 1st dose call 911. (Patient not taking: Reported on 9/9/2020) 30 tablet 3     spironolactone (ALDACTONE) 25 MG tablet Take 0.5 tablets (12.5 mg) by mouth daily 45 tablet 2     tamsulosin (FLOMAX) 0.4 MG capsule Take 1 capsule (0.4 mg) by mouth daily (Patient not taking: Reported on 9/9/2020) 30 capsule 0       PAST SURGICAL HISTORY:  Past Surgical History:   Procedure Laterality Date     ARTHROSCOPY SHOULDER BICEPS TENODESIS REPAIR  2/27/2014    Procedure: ARTHROSCOPY SHOULDER BICEPS TENODESIS REPAIR;;  Surgeon: Michael Hagen MD;  [facility]     ARTHROSCOPY SHOULDER ROTATOR CUFF REPAIR  2/27/2014    Procedure: ARTHROSCOPY SHOULDER ROTATOR CUFF REPAIR;  Right Shoulder Arthroscopic Rotator Cuff Repair,  Subacromial Decompression, Biceps Tenodesis, Distal Clavicle Excision   ;  Surgeon: Michael Hagen MD;  Location: [facility]     BIOPSY       CARDIAC SURGERY       COLONOSCOPY       EXCHANGE INTRAOCULAR LENS IMPLANT  2005    left eye - first, recentered PCL with iris fixation; then IOLX with ACL     EXTRACAPSULAR CATARACT EXTRATION WITH INTRAOCULAR LENS IMPLANT  2005    both eyes (elsewhere)     TURBINOPLASTY  12/11/2013    Procedure: TURBINOPLASTY;;  Surgeon: Lisset Parsons MD;  Location: [facility]     UVULOPALATOPHARYNGOPLASTY  12/11/2013    Procedure: UVULOPALATOPHARYNGOPLASTY;  Uvulopalatopharyngoplasty, Turbiante Reduction;  Surgeon: Lisset Parsons MD;  Location: [facility]       ALLERGIES     Allergies   Allergen Reactions     Lisinopril Cough       FAMILY HISTORY:  Family History   Problem Relation Age of Onset     Diabetes Father         Old age Diabetes     Cerebrovascular Disease Father      Obesity Father      Heart Disease Father      Coronary Artery Disease Father      Hypertension Father      Lipids Sister      C.A.D. No  family hx of      Cancer No family hx of      Glaucoma No family hx of      Macular Degeneration No family hx of        SOCIAL HISTORY:  Social History     Socioeconomic History     Marital status:      Spouse name: Kyung     Number of children: 0     Years of education: 14     Highest education level: None   Occupational History     Occupation: industrial electronics      Employer: SELF   Social Needs     Financial resource strain: None     Food insecurity     Worry: None     Inability: None     Transportation needs     Medical: None     Non-medical: None   Tobacco Use     Smoking status: Former Smoker     Packs/day: 0.50     Years: 2.00     Pack years: 1.00     Types: Cigarettes     Quit date: 1996     Years since quittin.8     Smokeless tobacco: Never Used   Substance and Sexual Activity     Alcohol use: Yes     Alcohol/week: 8.3 standard drinks     Frequency: 4 or more times a week     Comment: Evening Marie     Drug use: No     Sexual activity: Yes     Partners: Female     Birth control/protection: Male Surgical     Comment: 2006 vasectomy   Lifestyle     Physical activity     Days per week: None     Minutes per session: None     Stress: None   Relationships     Social connections     Talks on phone: None     Gets together: None     Attends Jainism service: None     Active member of club or organization: None     Attends meetings of clubs or organizations: None     Relationship status: None     Intimate partner violence     Fear of current or ex partner: None     Emotionally abused: None     Physically abused: None     Forced sexual activity: None   Other Topics Concern     Parent/sibling w/ CABG, MI or angioplasty before 65F 55M? No   Social History Narrative     None       ROS:   Constitutional: No fever, chills, or sweats. No weight gain/loss   ENT: No visual disturbance, ear ache, epistaxis, sore throat  Allergies/Immunologic: Negative.   Respiratory: No cough,  "hemoptysia  Cardiovascular: As per HPI  GI: No nausea, vomiting, hematemesis, melena, or hematochezia  : No urinary frequency, dysuria, or hematuria  Integument: Negative  Psychiatric: Negative  Neuro: Negative  Endocrinology: Negative   Musculoskeletal: Negative    EXAM:  /87 (BP Location: Left arm, Patient Position: Chair, Cuff Size: Adult Regular)   Pulse 82   Ht 1.702 m (5' 7\")   Wt 106.7 kg (235 lb 3.2 oz)   SpO2 96%   BMI 36.84 kg/m    In general, the patient is a pleasant male in no apparent distress.    HEENT: NC/AT.  PERRLA.  EOMI.  Sclerae white, not injected.  Nares clear.  Pharynx without erythema or exudate.  Dentition intact.    Neck: No adenopathy.  No thyromegaly. Carotids +4/4 bilaterally without bruits.  No jugular venous distension.   Heart: RRR. Normal S1, S2 splits physiologically. No murmur, rub, click, or gallop. The PMI is in the 5th ICS in the midclavicular line. There is no heave.    Lungs: CTA.  No ronchi, wheezes, rales.  No dullness to percussion.   Abdomen: Soft, nontender, nondistended. No organomegaly.  No bruits.   Extremities: No clubbing, cyanosis, or edema.  The pulses are +4/4 at the radial, brachial, femoral, popliteal, DP, and PT sites bilaterally.  No bruits are noted.  Neurologic: Alert and oriented to person/place/time, normal speech, gait and affect  Skin: No petechiae, purpura or rash.    Labs:       Ref. Range 9/22/2020 13:49   Sodium Latest Ref Range: 133 - 144 mmol/L 138   Potassium Latest Ref Range: 3.4 - 5.3 mmol/L 4.2   Chloride Latest Ref Range: 94 - 109 mmol/L 105   Carbon Dioxide Latest Ref Range: 20 - 32 mmol/L 26   Urea Nitrogen Latest Ref Range: 7 - 30 mg/dL 20   Creatinine Latest Ref Range: 0.66 - 1.25 mg/dL 1.08   GFR Estimate Latest Ref Range: >60 mL/min/1.73_m2 70   GFR Estimate If Black Latest Ref Range: >60 mL/min/1.73_m2 81   Calcium Latest Ref Range: 8.5 - 10.1 mg/dL 8.9   Anion Gap Latest Ref Range: 3 - 14 mmol/L 7   Lipoprotein (a) Latest " Ref Range: <=29 mg/dL <6   Glucose Latest Ref Range: 70 - 99 mg/dL 111 (H)       LIPID RESULTS:  Lab Results   Component Value Date    CHOL 106 07/23/2019    HDL 52 07/23/2019    LDL 16 07/23/2019    TRIG 192 (H) 07/23/2019    CHOLHDLRATIO 2.7 04/14/2015    NHDL 54 07/23/2019       LIVER ENZYME RESULTS:  Lab Results   Component Value Date    AST 51 (H) 09/08/2020    ALT 73 (H) 09/08/2020       CBC RESULTS:  Lab Results   Component Value Date    WBC 5.3 09/08/2020    RBC 4.82 09/08/2020    HGB 14.3 09/08/2020    HCT 43.6 09/08/2020    MCV 91 09/08/2020    MCH 29.7 09/08/2020    MCHC 32.8 09/08/2020    RDW 13.9 09/08/2020     09/08/2020       BMP RESULTS:  Lab Results   Component Value Date     09/22/2020    POTASSIUM 4.2 09/22/2020    CHLORIDE 105 09/22/2020    CO2 26 09/22/2020    ANIONGAP 7 09/22/2020     (H) 09/22/2020    BUN 20 09/22/2020    CR 1.08 09/22/2020    GFRESTIMATED 70 09/22/2020    GFRESTBLACK 81 09/22/2020    CATHERINE 8.9 09/22/2020        A1C RESULTS:  Lab Results   Component Value Date    A1C 5.6 02/25/2020     Results for LOPEZ MERIDA ARI (MRN [id number]) as of 10/11/2020 08:16   Ref. Range 9/8/2020 17:43   Sodium Latest Ref Range: 133 - 144 mmol/L 141   Potassium Latest Ref Range: 3.4 - 5.3 mmol/L 4.0   Chloride Latest Ref Range: 94 - 109 mmol/L 106   Carbon Dioxide Latest Ref Range: 20 - 32 mmol/L 27   Urea Nitrogen Latest Ref Range: 7 - 30 mg/dL 14   Creatinine Latest Ref Range: 0.66 - 1.25 mg/dL 0.88   GFR Estimate Latest Ref Range: >60 mL/min/1.73_m2 88   GFR Estimate If Black Latest Ref Range: >60 mL/min/1.73_m2 >90   Calcium Latest Ref Range: 8.5 - 10.1 mg/dL 8.8   Anion Gap Latest Ref Range: 3 - 14 mmol/L 8   Albumin Latest Ref Range: 3.4 - 5.0 g/dL 3.5   Protein Total Latest Ref Range: 6.8 - 8.8 g/dL 7.6   Bilirubin Total Latest Ref Range: 0.2 - 1.3 mg/dL 0.6   Alkaline Phosphatase Latest Ref Range: 40 - 150 U/L 79   ALT Latest Ref Range: 0 - 70 U/L 73 (H)   AST Latest  Ref Range: 0 - 45 U/L Re-measured 51   N-Terminal Pro BNP Inpatient Latest Ref Range: 0 - 900 pg/mL 731   Troponin I ES Latest Ref Range: 0.000 - 0.045 ug/L <0.015   Glucose Latest Ref Range: 70 - 99 mg/dL 106 (H)   WBC Latest Ref Range: 4.0 - 11.0 10e9/L 5.3   Hemoglobin Latest Ref Range: 13.3 - 17.7 g/dL 14.3   Hematocrit Latest Ref Range: 40.0 - 53.0 % 43.6   Platelet Count Latest Ref Range: 150 - 450 10e9/L 185   RBC Count Latest Ref Range: 4.4 - 5.9 10e12/L 4.82   MCV Latest Ref Range: 78 - 100 fl 91   MCH Latest Ref Range: 26.5 - 33.0 pg 29.7   MCHC Latest Ref Range: 31.5 - 36.5 g/dL 32.8   RDW Latest Ref Range: 10.0 - 15.0 % 13.9   Diff Method Unknown Manual Differential   % Neutrophils Latest Units: % 60.5   % Lymphocytes Latest Units: % 22.8   % Monocytes Latest Units: % 12.3   % Eosinophils Latest Units: % 2.6   % Basophils Latest Units: % 0.9   % Metamyelocytes Latest Units: % 0.9   Absolute Neutrophil Latest Ref Range: 1.6 - 8.3 10e9/L 3.2   Absolute Lymphocytes Latest Ref Range: 0.8 - 5.3 10e9/L 1.2   Absolute Monocytes Latest Ref Range: 0.0 - 1.3 10e9/L 0.7   Absolute Eosinophils Latest Ref Range: 0.0 - 0.7 10e9/L 0.1   Absolute Basophils Latest Ref Range: 0.0 - 0.2 10e9/L 0.0   Absolute Metamyelocytes Latest Ref Range: 0 10e9/L 0.0   RBC Morphology Unknown Consistent with reported results   Platelet Estimate Unknown Confirming automated cell count        Ref. Range 9/8/2020 21:26   Color Urine Unknown Yellow   Appearance Urine Unknown Clear   Glucose Urine Latest Ref Range: NEG^Negative mg/dL Negative   Bilirubin Urine Latest Ref Range: NEG^Negative  Negative   Ketones Urine Latest Ref Range: NEG^Negative mg/dL Negative   Specific Gravity Urine Latest Ref Range: 1.003 - 1.035  1.022   pH Urine Latest Ref Range: 5.0 - 7.0 pH 5.5   Protein Albumin Urine Latest Ref Range: NEG^Negative mg/dL 10 (A)   Urobilinogen mg/dL Latest Ref Range: 0.0 - 2.0 mg/dL Normal   Nitrite Urine Latest Ref Range: NEG^Negative   Negative   Blood Urine Latest Ref Range: NEG^Negative  Negative   Leukocyte Esterase Urine Latest Ref Range: NEG^Negative  Negative   Source Unknown Midstream Urine   WBC Urine Latest Ref Range: 0 - 5 /HPF <1   RBC Urine Latest Ref Range: 0 - 2 /HPF 0   Mucous Urine Latest Ref Range: NEG^Negative /LPF Present (A)     Procedures:    Echocardiogram 09-29-20  Global and regional left ventricular function is normal with an EF of 55-60%.  Global right ventricular function is normal.  Trileaflet aortic sclerosis without stenosis. Mild aortic insufficiency is  present.  The inferior vena cava was normal in size with preserved respiratory  variability.  No pericardial effusion is present.  _____________________________________________________________________________  __        Left Ventricle  Left ventricular size is normal. Left ventricular wall thickness is normal.  Global and regional left ventricular function is normal with an EF of 55-60%.  Left ventricular diastolic function is abnormal. No regional wall motion  abnormalities are seen.     Right Ventricle  The right ventricle is normal size. Global right ventricular function is  normal.     Atria  Both atria appear normal. The atrial septum is intact as assessed by color  Doppler .     Mitral Valve  The mitral valve is normal.        Aortic Valve  Trileaflet aortic sclerosis without stenosis. Mild aortic insufficiency is  present.     Tricuspid Valve  The tricuspid valve is normal. Trace tricuspid insufficiency is present. The  peak velocity of the tricuspid regurgitant jet is not obtainable. Pulmonary  artery systolic pressure cannot be assessed.     Pulmonic Valve  The pulmonic valve is normal.     Vessels  The aorta root is normal. The inferior vena cava was normal in size with  preserved respiratory variability.     Pericardium  No pericardial effusion is present.        Compared to Previous Study  This study was compared with the study from 3.16.20 . LV function  is normal.  _____________________________________________________________________________  __  MMode/2D Measurements & Calculations     IVSd: 1.2 cm  LVIDd: 4.4 cm  LVIDs: 2.7 cm  LVPWd: 1.2 cm  FS: 38.9 %  LV mass(C)d: 191.9 grams  LV mass(C)dI: 88.9 grams/m2  Ao root diam: 3.3 cm  asc Aorta Diam: 3.2 cm  LVOT diam: 2.5 cm  LVOT area: 4.9 cm2  LA Volume (BP): 38.0 ml  LA Volume Index (BP): 17.6 ml/m2  RWT: 0.52           Doppler Measurements & Calculations  MV E max evgeny: 76.0 cm/sec  MV A max evgeny: 85.4 cm/sec  MV E/A: 0.89  MV dec time: 0.26 sec  Ao V2 max: 128.6 cm/sec  Ao max P.0 mmHg  AI P1/2t: 696.3 msec  E/E' av.2  Lateral E/e': 10.8  Medial E/e': 15.6        EKG 20    Sin rythm      Assessment and Plan:     We discussed the results with patient.  We discussed the importance of a heart healthy diet and lifestyle.     Medication Changes:   - Start Lasix (Furosimide) 20 mg every day  - Start Spironolactone 12.5 mg every day (0.5 25 mg tablet).     Recommendations: Non-fasting labs in one week.    We ordered an echocardiogram - result included in the note  We also ordered a  ziopatch for 14 day - results pending     Studies Ordered:   - 14 day ZioPatch cardiac event monitor today  - Echocardiogram in about 2 weeks after the monitor is complete.     The results from today include: Labs.     Please follow up: With Dr. Ortiz in 3 months with fasting labs prior.    Carroll Ortiz MD, PhD  Professor of Medicine  Division of Cardiology    CC  Patient Care Team:  Armen Chavez MD as PCP - General (Family Practice)  Armen Chavez MD as Assigned PCP  Lisset Ceidllo RN as Specialty Care Coordinator (Cardiology)  Teresa Morris NP as Nurse Practitioner (Nurse Practitioner - Gerontology)  Carroll Ortiz MD as MD (Cardiology)  SELF, REFERRED

## 2020-10-15 ENCOUNTER — MYC MEDICAL ADVICE (OUTPATIENT)
Dept: OPHTHALMOLOGY | Facility: CLINIC | Age: 70
End: 2020-10-15

## 2020-10-21 NOTE — TELEPHONE ENCOUNTER
Discussion with patient.  Informed him about nature of [facility] - that is, a common facility used by many.  I recommended he seek an opinion from Dr. Alex Coyle, who operated on his other eye, or the [facility].  Patient agreeable and will request for record transfer from us when he makes an appointment.  Jose Dewey M.D.  [phone]

## 2020-10-27 ENCOUNTER — DOCUMENTATION ONLY (OUTPATIENT)
Dept: CARE COORDINATION | Facility: CLINIC | Age: 70
End: 2020-10-27

## 2020-10-27 DIAGNOSIS — R94.31 ABNORMAL PATIENT-ACTIVATED CARDIAC EVENT MONITOR: Primary | ICD-10-CM

## 2020-10-27 NOTE — PROGRESS NOTES
Date: 10/27/2020    Time of Call: 11:28 AM     Diagnosis:  Abnormal cardiac event monitor     [ TORB ] Ordering provider: Dr Carroll Ortiz  Order: EP Referral     Order received by: Susy Li LPN     Follow-up/additional notes: Per result note.  Order placed.  MyChart sent to Pt.

## 2020-10-30 ENCOUNTER — TELEPHONE (OUTPATIENT)
Dept: OPHTHALMOLOGY | Facility: CLINIC | Age: 70
End: 2020-10-30

## 2020-10-30 NOTE — TELEPHONE ENCOUNTER
Dr. Dewey had called and offered the name of a  That he would recommend Ricky to see for his eye. He would like a [employer] message with this information sent to him. He did not want a return phone call, just a mychart message with the 's name.

## 2020-10-30 NOTE — TELEPHONE ENCOUNTER
Sent a [employer] message with the Doctor information Dr. Dewey provided at an earlier encounter.

## 2020-11-04 ENCOUNTER — TELEPHONE (OUTPATIENT)
Dept: CARDIOLOGY | Facility: CLINIC | Age: 70
End: 2020-11-04

## 2020-11-04 NOTE — TELEPHONE ENCOUNTER
1st Attempt: Recall Letter mailed to Ricky requesting a call back to schedule their 1 year follow up appointment with Dr Porras. Patient is due to be seen in April 2021.    Gorge Puri  Procedure , [place]  [facility]

## 2020-11-11 ENCOUNTER — MYC MEDICAL ADVICE (OUTPATIENT)
Dept: CARDIOLOGY | Facility: CLINIC | Age: 70
End: 2020-11-11

## 2020-11-19 ENCOUNTER — TRANSFERRED RECORDS (OUTPATIENT)
Dept: HEALTH INFORMATION MANAGEMENT | Facility: CLINIC | Age: 70
End: 2020-11-19

## 2020-11-19 LAB — RETINOPATHY: NEGATIVE

## 2020-12-05 ENCOUNTER — TELEPHONE (OUTPATIENT)
Dept: FAMILY MEDICINE | Facility: CLINIC | Age: 70
End: 2020-12-05

## 2020-12-05 DIAGNOSIS — E78.5 HYPERLIPIDEMIA LDL GOAL <70: ICD-10-CM

## 2020-12-05 DIAGNOSIS — I42.9 CARDIOMYOPATHY, UNSPECIFIED TYPE (H): ICD-10-CM

## 2020-12-05 DIAGNOSIS — I10 ESSENTIAL HYPERTENSION WITH GOAL BLOOD PRESSURE LESS THAN 140/90: ICD-10-CM

## 2020-12-06 ENCOUNTER — HEALTH MAINTENANCE LETTER (OUTPATIENT)
Age: 70
End: 2020-12-06

## 2020-12-07 RX ORDER — LOSARTAN POTASSIUM 50 MG/1
TABLET ORAL
Qty: 90 TABLET | Refills: 0 | Status: SHIPPED | OUTPATIENT
Start: 2020-12-07 | End: 2021-03-21

## 2020-12-07 RX ORDER — METOPROLOL SUCCINATE 50 MG/1
TABLET, EXTENDED RELEASE ORAL
Qty: 90 TABLET | Refills: 0 | Status: SHIPPED | OUTPATIENT
Start: 2020-12-07 | End: 2022-08-03

## 2020-12-07 RX ORDER — ATORVASTATIN CALCIUM 10 MG/1
TABLET, FILM COATED ORAL
Qty: 90 TABLET | Refills: 0 | Status: SHIPPED | OUTPATIENT
Start: 2020-12-07 | End: 2021-03-19

## 2020-12-07 NOTE — TELEPHONE ENCOUNTER
informed patient/wife regarding below message. Wife wants patient to set up  Care with [facility]. I did let them know his practice was Closed for establishing care but would route this to provider.

## 2020-12-09 NOTE — TELEPHONE ENCOUNTER
Spoke with patients wife and relayed message that [facility] is not taking any new patients, patient will call back to schedule an appointment.    Meron Sr CMA

## 2020-12-11 DIAGNOSIS — R00.2 PALPITATIONS: ICD-10-CM

## 2020-12-11 DIAGNOSIS — E78.5 HYPERLIPIDEMIA LDL GOAL <70: ICD-10-CM

## 2020-12-11 DIAGNOSIS — R06.02 SOB (SHORTNESS OF BREATH): ICD-10-CM

## 2020-12-11 DIAGNOSIS — I42.9 CARDIOMYOPATHY, UNSPECIFIED TYPE (H): ICD-10-CM

## 2020-12-11 DIAGNOSIS — I10 ESSENTIAL HYPERTENSION WITH GOAL BLOOD PRESSURE LESS THAN 140/90: ICD-10-CM

## 2020-12-11 PROCEDURE — 80061 LIPID PANEL: CPT | Performed by: INTERNAL MEDICINE

## 2020-12-11 PROCEDURE — 80053 COMPREHEN METABOLIC PANEL: CPT | Performed by: INTERNAL MEDICINE

## 2020-12-11 PROCEDURE — 36415 COLL VENOUS BLD VENIPUNCTURE: CPT | Performed by: INTERNAL MEDICINE

## 2020-12-12 LAB
ALBUMIN SERPL-MCNC: 3.8 G/DL (ref 3.4–5)
ALP SERPL-CCNC: 67 U/L (ref 40–150)
ALT SERPL W P-5'-P-CCNC: 60 U/L (ref 0–70)
ANION GAP SERPL CALCULATED.3IONS-SCNC: 5 MMOL/L (ref 3–14)
AST SERPL W P-5'-P-CCNC: 34 U/L (ref 0–45)
BILIRUB SERPL-MCNC: 0.4 MG/DL (ref 0.2–1.3)
BUN SERPL-MCNC: 20 MG/DL (ref 7–30)
CALCIUM SERPL-MCNC: 8.3 MG/DL (ref 8.5–10.1)
CHLORIDE SERPL-SCNC: 110 MMOL/L (ref 94–109)
CHOLEST SERPL-MCNC: 129 MG/DL
CO2 SERPL-SCNC: 23 MMOL/L (ref 20–32)
CREAT SERPL-MCNC: 0.96 MG/DL (ref 0.66–1.25)
GFR SERPL CREATININE-BSD FRML MDRD: 81 ML/MIN/{1.73_M2}
GLUCOSE SERPL-MCNC: 123 MG/DL (ref 70–99)
HDLC SERPL-MCNC: 39 MG/DL
LDLC SERPL CALC-MCNC: 42 MG/DL
NONHDLC SERPL-MCNC: 90 MG/DL
POTASSIUM SERPL-SCNC: 3.9 MMOL/L (ref 3.4–5.3)
PROT SERPL-MCNC: 7.4 G/DL (ref 6.8–8.8)
SODIUM SERPL-SCNC: 138 MMOL/L (ref 133–144)
TRIGL SERPL-MCNC: 238 MG/DL

## 2020-12-14 ENCOUNTER — OFFICE VISIT (OUTPATIENT)
Dept: CARDIOLOGY | Facility: CLINIC | Age: 70
End: 2020-12-14
Attending: INTERNAL MEDICINE
Payer: MEDICARE

## 2020-12-14 VITALS
DIASTOLIC BLOOD PRESSURE: 77 MMHG | SYSTOLIC BLOOD PRESSURE: 130 MMHG | HEART RATE: 82 BPM | WEIGHT: 238.1 LBS | HEIGHT: 66 IN | OXYGEN SATURATION: 96 % | BODY MASS INDEX: 38.27 KG/M2

## 2020-12-14 DIAGNOSIS — I10 ESSENTIAL HYPERTENSION WITH GOAL BLOOD PRESSURE LESS THAN 140/90: ICD-10-CM

## 2020-12-14 DIAGNOSIS — I42.9 CARDIOMYOPATHY, UNSPECIFIED TYPE (H): ICD-10-CM

## 2020-12-14 DIAGNOSIS — G47.33 OSA (OBSTRUCTIVE SLEEP APNEA): Primary | ICD-10-CM

## 2020-12-14 DIAGNOSIS — E78.5 HYPERLIPIDEMIA LDL GOAL <70: ICD-10-CM

## 2020-12-14 DIAGNOSIS — R06.02 SOB (SHORTNESS OF BREATH): ICD-10-CM

## 2020-12-14 DIAGNOSIS — E66.01 MORBID OBESITY DUE TO EXCESS CALORIES (H): ICD-10-CM

## 2020-12-14 DIAGNOSIS — R00.2 PALPITATIONS: ICD-10-CM

## 2020-12-14 PROCEDURE — G0463 HOSPITAL OUTPT CLINIC VISIT: HCPCS

## 2020-12-14 PROCEDURE — 99214 OFFICE O/P EST MOD 30 MIN: CPT | Performed by: INTERNAL MEDICINE

## 2020-12-14 RX ORDER — SPIRONOLACTONE 25 MG/1
25 TABLET ORAL DAILY
Qty: 90 TABLET | Refills: 3 | Status: SHIPPED | OUTPATIENT
Start: 2020-12-14 | End: 2021-01-21

## 2020-12-14 ASSESSMENT — PAIN SCALES - GENERAL: PAINLEVEL: NO PAIN (0)

## 2020-12-14 ASSESSMENT — MIFFLIN-ST. JEOR: SCORE: 1792.76

## 2020-12-14 NOTE — PROGRESS NOTES
HPI:     I had the privilege to evaluate and examine Mr Ricky Brown, who is  is a 70 year old male , who has a Hx of non-ischemic cardiomyopathy, hypertension, hyperlipidemia.     Patient denies chest pain.Patient denies intermittent claudication.  Patient feels better than previous visit.  In 2018 stopped intermittently his medication of heart failure and hyperlipidemia.  Since then he takes his medication regularly    PAST MEDICAL HISTORY:  Past Medical History:   Diagnosis Date     Arthritis      BCC (basal cell carcinoma)     right shoulder      Cardiomyopathy (H)      Colon adenomas 9/20/11     Coronary artery disease      ED (erectile dysfunction)      HTN (hypertension)      Noncompliance      Obesity      JOSE JUAN (obstructive sleep apnea)        CURRENT MEDICATIONS:  Current Outpatient Medications   Medication Sig Dispense Refill     aspirin 81 MG tablet Take 1 tablet (81 mg) by mouth daily 90 tablet 3     atorvastatin (LIPITOR) 10 MG tablet TAKE ONE TABLET BY MOUTH ONE TIME DAILY  90 tablet 0     Calcium Carbonate Antacid (TUMS CALCIUM FOR LIFE BONE PO) Take  by mouth.       diphenoxylate-atropine (LOMOTIL) 2.5-0.025 MG per tablet Take 1 tablet by mouth daily as needed for diarrhea Patient only takes 1 tablet as needed 30 tablet 5     furosemide (LASIX) 20 MG tablet Take 1 tablet (20 mg) by mouth daily as needed 90 tablet 3     losartan (COZAAR) 50 MG tablet TAKE ONE TABLET BY MOUTH ONE TIME DAILY  90 tablet 0     metoprolol succinate ER (TOPROL-XL) 50 MG 24 hr tablet Take 1 tablet by mouth daily 90 tablet 0     Multiple Vitamin (MULTIVITAMINS PO) Take  by mouth daily.       spironolactone (ALDACTONE) 25 MG tablet Take 0.5 tablets (12.5 mg) by mouth daily 45 tablet 2     tamsulosin (FLOMAX) 0.4 MG capsule Take 1 capsule (0.4 mg) by mouth daily 30 capsule 0     brimonidine (ALPHAGAN) 0.2 % ophthalmic solution Place 1 drop into the right eye 2 times daily 1 Bottle 11     furosemide (LASIX) 20 MG tablet  Take 1 tablet (20 mg) by mouth daily (Patient not taking: Reported on 12/14/2020) 90 tablet 3     nitroGLYcerin (NITROSTAT) 0.4 MG sublingual tablet For chest pain place 1 tablet under the tongue every 5 minutes for 3 doses. If symptoms persist 5 minutes after 1st dose call 911. (Patient not taking: Reported on 9/9/2020) 30 tablet 3       PAST SURGICAL HISTORY:  Past Surgical History:   Procedure Laterality Date     ARTHROSCOPY SHOULDER BICEPS TENODESIS REPAIR  2/27/2014    Procedure: ARTHROSCOPY SHOULDER BICEPS TENODESIS REPAIR;;  Surgeon: Michael Hagen MD;  [facility]     ARTHROSCOPY SHOULDER ROTATOR CUFF REPAIR  2/27/2014    Procedure: ARTHROSCOPY SHOULDER ROTATOR CUFF REPAIR;  Right Shoulder Arthroscopic Rotator Cuff Repair,  Subacromial Decompression, Biceps Tenodesis, Distal Clavicle Excision   ;  Surgeon: Michael Hagen MD;  Location: [facility]     BIOPSY       CARDIAC SURGERY       COLONOSCOPY       EXCHANGE INTRAOCULAR LENS IMPLANT  2005    left eye - first, recentered PCL with iris fixation; then IOLX with ACL     EXTRACAPSULAR CATARACT EXTRATION WITH INTRAOCULAR LENS IMPLANT  2005    both eyes (elsewhere)     TURBINOPLASTY  12/11/2013    Procedure: TURBINOPLASTY;;  Surgeon: Lisset Parsons MD;  Location: [facility]     UVULOPALATOPHARYNGOPLASTY  12/11/2013    Procedure: UVULOPALATOPHARYNGOPLASTY;  Uvulopalatopharyngoplasty, Turbiante Reduction;  Surgeon: Lisset Parsons MD;  Location: [facility]       ALLERGIES     Allergies   Allergen Reactions     Lisinopril Cough       FAMILY HISTORY:  Family History   Problem Relation Age of Onset     Diabetes Father         Old age Diabetes     Cerebrovascular Disease Father      Obesity Father      Heart Disease Father      Coronary Artery Disease Father      Hypertension Father      Lipids Sister      C.A.D. No family hx of      Cancer No family hx of      Glaucoma No family hx of      Macular Degeneration No family hx of         SOCIAL HISTORY:  Social History     Socioeconomic History     Marital status:      Spouse name: Kyung     Number of children: 0     Years of education: 14     Highest education level: None   Occupational History     Occupation: industrial electronics      Employer: SELF   Social Needs     Financial resource strain: None     Food insecurity     Worry: None     Inability: None     Transportation needs     Medical: None     Non-medical: None   Tobacco Use     Smoking status: Former Smoker     Packs/day: 0.50     Years: 2.00     Pack years: 1.00     Types: Cigarettes     Quit date: 1996     Years since quittin.0     Smokeless tobacco: Never Used   Substance and Sexual Activity     Alcohol use: Yes     Alcohol/week: 8.3 standard drinks     Frequency: 4 or more times a week     Comment: Evening Marie     Drug use: No     Sexual activity: Yes     Partners: Female     Birth control/protection: Male Surgical     Comment:  vasectomy   Lifestyle     Physical activity     Days per week: None     Minutes per session: None     Stress: None   Relationships     Social connections     Talks on phone: None     Gets together: None     Attends Mosque service: None     Active member of club or organization: None     Attends meetings of clubs or organizations: None     Relationship status: None     Intimate partner violence     Fear of current or ex partner: None     Emotionally abused: None     Physically abused: None     Forced sexual activity: None   Other Topics Concern     Parent/sibling w/ CABG, MI or angioplasty before 65F 55M? No   Social History Narrative     None       ROS:   Constitutional: No fever, chills, or sweats. No weight gain/loss   ENT: No visual disturbance, ear ache, epistaxis, sore throat  Allergies/Immunologic: Negative.   Respiratory: No cough, hemoptysia  Cardiovascular: As per HPI  GI: No nausea, vomiting, hematemesis, melena, or hematochezia  : No urinary frequency, dysuria,  "or hematuria  Integument: Negative  Psychiatric: Negative  Neuro: Negative  Endocrinology: Negative   Musculoskeletal: Negative    EXAM:  /77 (BP Location: Right arm, Patient Position: Chair, Cuff Size: Adult Large)   Pulse 82   Ht 1.676 m (5' 6\")   Wt 108 kg (238 lb 1.6 oz)   SpO2 96%   BMI 38.43 kg/m    In general, the patient is a pleasant male in no apparent distress.    HEENT: NC/AT.  PERRLA.  EOMI.  Sclerae white, not injected.  Nares clear.  Pharynx without erythema or exudate.  Dentition intact.    Neck: No adenopathy.  No thyromegaly. Carotids +4/4 bilaterally without bruits.  No jugular venous distension.   Heart: RRR. Normal S1, S2 splits physiologically. No murmur, rub, click, or gallop. The PMI is in the 5th ICS in the midclavicular line. There is no heave.    Lungs: CTA.  No ronchi, wheezes, rales.  No dullness to percussion.   Abdomen: Soft, nontender, nondistended. No organomegaly.  No bruits.   Extremities: No clubbing, cyanosis, or edema.  The pulses are +4/4 at the radial, brachial, femoral, popliteal, DP, and PT sites bilaterally.  No bruits are noted.  Neurologic: Alert and oriented to person/place/time, normal speech, gait and affect  Skin: No petechiae, purpura or rash.    Labs:  LIPID RESULTS:  Lab Results   Component Value Date    CHOL 129 12/11/2020    HDL 39 (L) 12/11/2020    LDL 42 12/11/2020    TRIG 238 (H) 12/11/2020    CHOLHDLRATIO 2.7 04/14/2015    NHDL 90 12/11/2020       LIVER ENZYME RESULTS:  Lab Results   Component Value Date    AST 34 12/11/2020    ALT 60 12/11/2020       CBC RESULTS:  Lab Results   Component Value Date    WBC 5.3 09/08/2020    RBC 4.82 09/08/2020    HGB 14.3 09/08/2020    HCT 43.6 09/08/2020    MCV 91 09/08/2020    MCH 29.7 09/08/2020    MCHC 32.8 09/08/2020    RDW 13.9 09/08/2020     09/08/2020       BMP RESULTS:  Lab Results   Component Value Date     12/11/2020    POTASSIUM 3.9 12/11/2020    CHLORIDE 110 (H) 12/11/2020    CO2 23 " 12/11/2020    ANIONGAP 5 12/11/2020     (H) 12/11/2020    BUN 20 12/11/2020    CR 0.96 12/11/2020    GFRESTIMATED 81 12/11/2020    GFRESTBLACK >90 12/11/2020    CATHERINE 8.3 (L) 12/11/2020        A1C RESULTS:  Lab Results   Component Value Date    A1C 5.6 02/25/2020     Results for LOPEZ MERIDA (MRN [id number]) as of 1/13/2021 23:04   Ref. Range 1/7/2021 14:14   Sodium Latest Ref Range: 133 - 144 mmol/L 138   Potassium Latest Ref Range: 3.4 - 5.3 mmol/L 4.1   Chloride Latest Ref Range: 94 - 109 mmol/L 106   Carbon Dioxide Latest Ref Range: 20 - 32 mmol/L 25   Urea Nitrogen Latest Ref Range: 7 - 30 mg/dL 21   Creatinine Latest Ref Range: 0.66 - 1.25 mg/dL 1.05   GFR Estimate Latest Ref Range: >60 mL/min/1.73_m2 73   GFR Estimate If Black Latest Ref Range: >60 mL/min/1.73_m2 84   Calcium Latest Ref Range: 8.5 - 10.1 mg/dL 8.7   Anion Gap Latest Ref Range: 3 - 14 mmol/L 7       Procedures:    Echocardiogram [id number]    Global and regional left ventricular function is normal with an EF of 55-60%.  Global right ventricular function is normal.  Trileaflet aortic sclerosis without stenosis. Mild aortic insufficiency is  present.  The inferior vena cava was normal in size with preserved respiratory  variability.  No pericardial effusion is present.  _____________________________________________________________________________  __        Left Ventricle  Left ventricular size is normal. Left ventricular wall thickness is normal.  Global and regional left ventricular function is normal with an EF of 55-60%.  Left ventricular diastolic function is abnormal. No regional wall motion  abnormalities are seen.     Right Ventricle  The right ventricle is normal size. Global right ventricular function is  normal.     Atria  Both atria appear normal. The atrial septum is intact as assessed by color  Doppler .     Mitral Valve  The mitral valve is normal.        Aortic Valve  Trileaflet aortic sclerosis without stenosis. Mild  aortic insufficiency is  present.     Tricuspid Valve  The tricuspid valve is normal. Trace tricuspid insufficiency is present. The  peak velocity of the tricuspid regurgitant jet is not obtainable. Pulmonary  artery systolic pressure cannot be assessed.     Pulmonic Valve  The pulmonic valve is normal.     Vessels  The aorta root is normal. The inferior vena cava was normal in size with  preserved respiratory variability.     Pericardium  No pericardial effusion is present.        Compared to Previous Study  This study was compared with the study from 3.16.20 . LV function is normal.  _____________________________________________________________________________  __  MMode/2D Measurements & Calculations     IVSd: 1.2 cm  LVIDd: 4.4 cm  LVIDs: 2.7 cm  LVPWd: 1.2 cm  FS: 38.9 %  LV mass(C)d: 191.9 grams  LV mass(C)dI: 88.9 grams/m2  Ao root diam: 3.3 cm  asc Aorta Diam: 3.2 cm  LVOT diam: 2.5 cm  LVOT area: 4.9 cm2  LA Volume (BP): 38.0 ml  LA Volume Index (BP): 17.6 ml/m2  RWT: 0.52           Doppler Measurements & Calculations  MV E max evgeny: 76.0 cm/sec  MV A max evgeny: 85.4 cm/sec  MV E/A: 0.89  MV dec time: 0.26 sec  Ao V2 max: 128.6 cm/sec  Ao max P.0 mmHg  AI P1/2t: 696.3 msec  E/E' av.2  Lateral E/e': 10.8  Medial E/e': 15.6      Assessment and Plan:     We discussed the results with patient.  We dicussed the importance of a heart healthy diet and lifestyle.    We discussed following items with patient:       Medication Changes: Increase spironolactone to 25 mg every day.     Recommendations:   - Non-fasting labs in 2 weeks.  - Fasting labs in one year prior to seeing Dr Ortiz.     Studies Ordered: Repeat echocardiogram in 1 year.        Please follow up: With Dr. Ortiz in one year or sooner if needed.    Carroll Ortiz MD, PhD  Professor of Medicine  Division of Cardiology      CC  Patient Care Team:  [facility], [facility] as PCP - General ([facility])  Armen Chavez MD as Assigned  PCP  Lisset Cedillo, RN as Specialty Care Coordinator (Cardiology)  Teresa Morris, NP as Nurse Practitioner (Nurse Practitioner - Gerontology)  Carroll Ortiz MD as MD (Cardiology)  Jose Dewey MD as Assigned Surgical Provider  Seema Olsen MD as MD (Clinical Cardiac Electrophysiology)  Carroll Ortiz MD as Assigned Heart and Vascular Provider  SELF, REFERRED

## 2020-12-14 NOTE — PATIENT INSTRUCTIONS
Patient Instructions:  It was a pleasure to see you in the cardiology clinic today.      If you have any questions, you can reach my nurse, Susy ASHTON LPN, at [phone].  Press Option #1 for the [facility], and then press Option #4 for nursing.    We are encouraging the use of [employer] to communicate with your HealthCare Provider    Medication Changes: Increase spironolactone to 25 mg every day.    Recommendations:   - Non-fasting labs in 2 weeks.  - Fasting labs in one year prior to seeing Dr Ortiz.    Studies Ordered: Repeat echocardiogram in 1 year.    The results from today include: Labs.    Please follow up: With Dr. Ortiz in one year or sooner if needed.    Sincerely,    Carroll Ortiz MD     If you have an urgent need after hours (8:00 am to 4:30 pm) please call [phone] and ask for the cardiology fellow on call.

## 2020-12-14 NOTE — NURSING NOTE
Cardiac Testing: Patient given instructions regarding  echocardiogram . Discussed purpose, preparation, procedure and when to expect results reported back to the patient. Patient demonstrated understanding of this information and agreed to call with further questions or concerns.  Labs: Patient was given results of the laboratory testing obtained today. Patient was instructed to return for the next laboratory testing in 2 weeks and again in one year. Patient demonstrated understanding of this information and agreed to call with further questions or concerns.   Med Reconcile: Reviewed and verified all current medications with the patient. The updated medication list was printed and given to the patient.  Return Appointment: Patient given instructions regarding scheduling next clinic visit. Patient demonstrated understanding of this information and agreed to call with further questions or concerns.  Medication Change: Patient was educated regarding prescribed medication change, including discussion of the indication, administration, side effects, and when to report to MD or RN. Patient demonstrated understanding of this information and agreed to call with further questions or concerns.  Patient stated he understood all health information given and agreed to call with further questions or concerns.    Susy Li LPN

## 2020-12-14 NOTE — LETTER
12/14/2020      RE: Ricky Brown  [address]  [facility] [address]       Dear Colleague,    Thank you for the opportunity to participate in the care of your patient, Ricky Brown, at the [facility]. Please see a copy of my visit note below.    HPI:     I had the privilege to evaluate and examine Mr Ricky Brown, who is  is a 70 year old male , who has a Hx of non-ischemic cardiomyopathy, hypertension, hyperlipidemia.     Patient denies chest pain.Patient denies intermittent claudication.  Patient feels better than previous visit.  In 2018 stopped intermittently his medication of heart failure and hyperlipidemia.  Since then he takes his medication regularly    PAST MEDICAL HISTORY:  Past Medical History:   Diagnosis Date     Arthritis      BCC (basal cell carcinoma)     right shoulder      Cardiomyopathy (H)      Colon adenomas 9/20/11     Coronary artery disease      ED (erectile dysfunction)      HTN (hypertension)      Noncompliance      Obesity      JOSE JUAN (obstructive sleep apnea)        CURRENT MEDICATIONS:  Current Outpatient Medications   Medication Sig Dispense Refill     aspirin 81 MG tablet Take 1 tablet (81 mg) by mouth daily 90 tablet 3     atorvastatin (LIPITOR) 10 MG tablet TAKE ONE TABLET BY MOUTH ONE TIME DAILY  90 tablet 0     Calcium Carbonate Antacid (TUMS CALCIUM FOR LIFE BONE PO) Take  by mouth.       diphenoxylate-atropine (LOMOTIL) 2.5-0.025 MG per tablet Take 1 tablet by mouth daily as needed for diarrhea Patient only takes 1 tablet as needed 30 tablet 5     furosemide (LASIX) 20 MG tablet Take 1 tablet (20 mg) by mouth daily as needed 90 tablet 3     losartan (COZAAR) 50 MG tablet TAKE ONE TABLET BY MOUTH ONE TIME DAILY  90 tablet 0     metoprolol succinate ER (TOPROL-XL) 50 MG 24 hr tablet Take 1 tablet by mouth daily 90 tablet 0     Multiple Vitamin (MULTIVITAMINS PO) Take  by mouth  daily.       spironolactone (ALDACTONE) 25 MG tablet Take 0.5 tablets (12.5 mg) by mouth daily 45 tablet 2     tamsulosin (FLOMAX) 0.4 MG capsule Take 1 capsule (0.4 mg) by mouth daily 30 capsule 0     brimonidine (ALPHAGAN) 0.2 % ophthalmic solution Place 1 drop into the right eye 2 times daily 1 Bottle 11     furosemide (LASIX) 20 MG tablet Take 1 tablet (20 mg) by mouth daily (Patient not taking: Reported on 12/14/2020) 90 tablet 3     nitroGLYcerin (NITROSTAT) 0.4 MG sublingual tablet For chest pain place 1 tablet under the tongue every 5 minutes for 3 doses. If symptoms persist 5 minutes after 1st dose call 911. (Patient not taking: Reported on 9/9/2020) 30 tablet 3       PAST SURGICAL HISTORY:  Past Surgical History:   Procedure Laterality Date     ARTHROSCOPY SHOULDER BICEPS TENODESIS REPAIR  2/27/2014    Procedure: ARTHROSCOPY SHOULDER BICEPS TENODESIS REPAIR;;  Surgeon: Michael Hagen MD;  [facility]     ARTHROSCOPY SHOULDER ROTATOR CUFF REPAIR  2/27/2014    Procedure: ARTHROSCOPY SHOULDER ROTATOR CUFF REPAIR;  Right Shoulder Arthroscopic Rotator Cuff Repair,  Subacromial Decompression, Biceps Tenodesis, Distal Clavicle Excision   ;  Surgeon: Michael Hagen MD;  Location[facility]     BIOPSY       CARDIAC SURGERY       COLONOSCOPY       EXCHANGE INTRAOCULAR LENS IMPLANT  2005    left eye - first, recentered PCL with iris fixation; then IOLX with ACL     EXTRACAPSULAR CATARACT EXTRATION WITH INTRAOCULAR LENS IMPLANT  2005    both eyes (elsewhere)     TURBINOPLASTY  12/11/2013    Procedure: TURBINOPLASTY;;  Surgeon: Lisset Parsons MD;  [facility]: [facility]     UVULOPALATOPHARYNGOPLASTY  12/11/2013    Procedure: UVULOPALATOPHARYNGOPLASTY;  Uvulopalatopharyngoplasty, Turbiante Reduction;  Surgeon: Lisset Parsons MD;  Location: [facility] OR       ALLERGIES     Allergies   Allergen Reactions     Lisinopril Cough       FAMILY HISTORY:  Family History   Problem Relation Age of  Onset     Diabetes Father         Old age Diabetes     Cerebrovascular Disease Father      Obesity Father      Heart Disease Father      Coronary Artery Disease Father      Hypertension Father      Lipids Sister      C.A.D. No family hx of      Cancer No family hx of      Glaucoma No family hx of      Macular Degeneration No family hx of        SOCIAL HISTORY:  Social History     Socioeconomic History     Marital status:      Spouse name: Kyung     Number of children: 0     Years of education: 14     Highest education level: None   Occupational History     Occupation: industrial electronics      Employer: SELF   Social Needs     Financial resource strain: None     Food insecurity     Worry: None     Inability: None     Transportation needs     Medical: None     Non-medical: None   Tobacco Use     Smoking status: Former Smoker     Packs/day: 0.50     Years: 2.00     Pack years: 1.00     Types: Cigarettes     Quit date: 1996     Years since quittin.0     Smokeless tobacco: Never Used   Substance and Sexual Activity     Alcohol use: Yes     Alcohol/week: 8.3 standard drinks     Frequency: 4 or more times a week     Comment: Evening Marie     Drug use: No     Sexual activity: Yes     Partners: Female     Birth control/protection: Male Surgical     Comment:  vasectomy   Lifestyle     Physical activity     Days per week: None     Minutes per session: None     Stress: None   Relationships     Social connections     Talks on phone: None     Gets together: None     Attends Mu-ism service: None     Active member of club or organization: None     Attends meetings of clubs or organizations: None     Relationship status: None     Intimate partner violence     Fear of current or ex partner: None     Emotionally abused: None     Physically abused: None     Forced sexual activity: None   Other Topics Concern     Parent/sibling w/ CABG, MI or angioplasty before 65F 55M? No   Social History Narrative      "None       ROS:   Constitutional: No fever, chills, or sweats. No weight gain/loss   ENT: No visual disturbance, ear ache, epistaxis, sore throat  Allergies/Immunologic: Negative.   Respiratory: No cough, hemoptysia  Cardiovascular: As per HPI  GI: No nausea, vomiting, hematemesis, melena, or hematochezia  : No urinary frequency, dysuria, or hematuria  Integument: Negative  Psychiatric: Negative  Neuro: Negative  Endocrinology: Negative   Musculoskeletal: Negative    EXAM:  /77 (BP Location: Right arm, Patient Position: Chair, Cuff Size: Adult Large)   Pulse 82   Ht 1.676 m (5' 6\")   Wt 108 kg (238 lb 1.6 oz)   SpO2 96%   BMI 38.43 kg/m    In general, the patient is a pleasant male in no apparent distress.    HEENT: NC/AT.  PERRLA.  EOMI.  Sclerae white, not injected.  Nares clear.  Pharynx without erythema or exudate.  Dentition intact.    Neck: No adenopathy.  No thyromegaly. Carotids +4/4 bilaterally without bruits.  No jugular venous distension.   Heart: RRR. Normal S1, S2 splits physiologically. No murmur, rub, click, or gallop. The PMI is in the 5th ICS in the midclavicular line. There is no heave.    Lungs: CTA.  No ronchi, wheezes, rales.  No dullness to percussion.   Abdomen: Soft, nontender, nondistended. No organomegaly.  No bruits.   Extremities: No clubbing, cyanosis, or edema.  The pulses are +4/4 at the radial, brachial, femoral, popliteal, DP, and PT sites bilaterally.  No bruits are noted.  Neurologic: Alert and oriented to person/place/time, normal speech, gait and affect  Skin: No petechiae, purpura or rash.    Labs:  LIPID RESULTS:  Lab Results   Component Value Date    CHOL 129 12/11/2020    HDL 39 (L) 12/11/2020    LDL 42 12/11/2020    TRIG 238 (H) 12/11/2020    CHOLHDLRATIO 2.7 04/14/2015    NHDL 90 12/11/2020       LIVER ENZYME RESULTS:  Lab Results   Component Value Date    AST 34 12/11/2020    ALT 60 12/11/2020       CBC RESULTS:  Lab Results   Component Value Date    WBC 5.3 " 09/08/2020    RBC 4.82 09/08/2020    HGB 14.3 09/08/2020    HCT 43.6 09/08/2020    MCV 91 09/08/2020    MCH 29.7 09/08/2020    MCHC 32.8 09/08/2020    RDW 13.9 09/08/2020     09/08/2020       BMP RESULTS:  Lab Results   Component Value Date     12/11/2020    POTASSIUM 3.9 12/11/2020    CHLORIDE 110 (H) 12/11/2020    CO2 23 12/11/2020    ANIONGAP 5 12/11/2020     (H) 12/11/2020    BUN 20 12/11/2020    CR 0.96 12/11/2020    GFRESTIMATED 81 12/11/2020    GFRESTBLACK >90 12/11/2020    CATHERINE 8.3 (L) 12/11/2020        A1C RESULTS:  Lab Results   Component Value Date    A1C 5.6 02/25/2020     Results for LOPEZ MERIDA (MRN [id number]) as of 1/13/2021 23:04   Ref. Range 1/7/2021 14:14   Sodium Latest Ref Range: 133 - 144 mmol/L 138   Potassium Latest Ref Range: 3.4 - 5.3 mmol/L 4.1   Chloride Latest Ref Range: 94 - 109 mmol/L 106   Carbon Dioxide Latest Ref Range: 20 - 32 mmol/L 25   Urea Nitrogen Latest Ref Range: 7 - 30 mg/dL 21   Creatinine Latest Ref Range: 0.66 - 1.25 mg/dL 1.05   GFR Estimate Latest Ref Range: >60 mL/min/1.73_m2 73   GFR Estimate If Black Latest Ref Range: >60 mL/min/1.73_m2 84   Calcium Latest Ref Range: 8.5 - 10.1 mg/dL 8.7   Anion Gap Latest Ref Range: 3 - 14 mmol/L 7       Procedures:    Echocardiogram [id number]    Global and regional left ventricular function is normal with an EF of 55-60%.  Global right ventricular function is normal.  Trileaflet aortic sclerosis without stenosis. Mild aortic insufficiency is  present.  The inferior vena cava was normal in size with preserved respiratory  variability.  No pericardial effusion is present.  _____________________________________________________________________________  __        Left Ventricle  Left ventricular size is normal. Left ventricular wall thickness is normal.  Global and regional left ventricular function is normal with an EF of 55-60%.  Left ventricular diastolic function is abnormal. No regional wall  motion  abnormalities are seen.     Right Ventricle  The right ventricle is normal size. Global right ventricular function is  normal.     Atria  Both atria appear normal. The atrial septum is intact as assessed by color  Doppler .     Mitral Valve  The mitral valve is normal.        Aortic Valve  Trileaflet aortic sclerosis without stenosis. Mild aortic insufficiency is  present.     Tricuspid Valve  The tricuspid valve is normal. Trace tricuspid insufficiency is present. The  peak velocity of the tricuspid regurgitant jet is not obtainable. Pulmonary  artery systolic pressure cannot be assessed.     Pulmonic Valve  The pulmonic valve is normal.     Vessels  The aorta root is normal. The inferior vena cava was normal in size with  preserved respiratory variability.     Pericardium  No pericardial effusion is present.        Compared to Previous Study  This study was compared with the study from 3.16.20 . LV function is normal.  _____________________________________________________________________________  __  MMode/2D Measurements & Calculations     IVSd: 1.2 cm  LVIDd: 4.4 cm  LVIDs: 2.7 cm  LVPWd: 1.2 cm  FS: 38.9 %  LV mass(C)d: 191.9 grams  LV mass(C)dI: 88.9 grams/m2  Ao root diam: 3.3 cm  asc Aorta Diam: 3.2 cm  LVOT diam: 2.5 cm  LVOT area: 4.9 cm2  LA Volume (BP): 38.0 ml  LA Volume Index (BP): 17.6 ml/m2  RWT: 0.52           Doppler Measurements & Calculations  MV E max evgeny: 76.0 cm/sec  MV A max evgeny: 85.4 cm/sec  MV E/A: 0.89  MV dec time: 0.26 sec  Ao V2 max: 128.6 cm/sec  Ao max P.0 mmHg  AI P1/2t: 696.3 msec  E/E' av.2  Lateral E/e': 10.8  Medial E/e': 15.6      Assessment and Plan:     We discussed the results with patient.  We dicussed the importance of a heart healthy diet and lifestyle.    We discussed following items with patient:       Medication Changes: Increase spironolactone to 25 mg every day.     Recommendations:   - Non-fasting labs in 2 weeks.  - Fasting labs in one year prior to  seeing Dr Ortiz.     Studies Ordered: Repeat echocardiogram in 1 year.        Please follow up: With Dr. Ortiz in one year or sooner if needed.    Carroll Ortiz MD, PhD  Professor of Medicine  Division of Cardiology      CC  Patient Care Team:  [facility] as PCP - General (Clinic)  Armen Chavez MD as Assigned PCP  Lisset Cedillo RN as Specialty Care Coordinator (Cardiology)  Teresa Morris NP as Nurse Practitioner (Nurse Practitioner - Gerontology)  Carroll Ortiz MD as MD (Cardiology)  Jose Dewey MD as Assigned Surgical Provider  Seema Olsen MD as MD (Clinical Cardiac Electrophysiology)  Carroll Ortiz MD as Assigned Heart and Vascular Provider  SELF, REFERRED      Please do not hesitate to contact me if you have any questions/concerns.     Sincerely,     Carroll Ortiz MD

## 2020-12-14 NOTE — NURSING NOTE
Chief Complaint   Patient presents with     Follow Up      Hospital Follow-up      Vitals were taken and medications reconciled.     Eleazar Haile CMA  2:20 PM

## 2021-01-07 DIAGNOSIS — I10 ESSENTIAL HYPERTENSION WITH GOAL BLOOD PRESSURE LESS THAN 140/90: ICD-10-CM

## 2021-01-07 PROCEDURE — 36415 COLL VENOUS BLD VENIPUNCTURE: CPT | Performed by: INTERNAL MEDICINE

## 2021-01-07 PROCEDURE — 80048 BASIC METABOLIC PNL TOTAL CA: CPT | Performed by: INTERNAL MEDICINE

## 2021-01-08 LAB
ANION GAP SERPL CALCULATED.3IONS-SCNC: 7 MMOL/L (ref 3–14)
BUN SERPL-MCNC: 21 MG/DL (ref 7–30)
CALCIUM SERPL-MCNC: 8.7 MG/DL (ref 8.5–10.1)
CHLORIDE SERPL-SCNC: 106 MMOL/L (ref 94–109)
CO2 SERPL-SCNC: 25 MMOL/L (ref 20–32)
CREAT SERPL-MCNC: 1.05 MG/DL (ref 0.66–1.25)
GFR SERPL CREATININE-BSD FRML MDRD: 73 ML/MIN/{1.73_M2}
GLUCOSE SERPL-MCNC: 152 MG/DL (ref 70–99)
POTASSIUM SERPL-SCNC: 4.1 MMOL/L (ref 3.4–5.3)
SODIUM SERPL-SCNC: 138 MMOL/L (ref 133–144)

## 2021-01-21 ENCOUNTER — OFFICE VISIT (OUTPATIENT)
Dept: INTERNAL MEDICINE | Facility: CLINIC | Age: 71
End: 2021-01-21
Payer: MEDICARE

## 2021-01-21 ENCOUNTER — IMMUNIZATION (OUTPATIENT)
Dept: NURSING | Facility: CLINIC | Age: 71
End: 2021-01-21
Payer: MEDICARE

## 2021-01-21 VITALS
DIASTOLIC BLOOD PRESSURE: 89 MMHG | HEART RATE: 85 BPM | BODY MASS INDEX: 38.61 KG/M2 | WEIGHT: 246 LBS | SYSTOLIC BLOOD PRESSURE: 153 MMHG | RESPIRATION RATE: 16 BRPM | HEIGHT: 67 IN | OXYGEN SATURATION: 97 % | TEMPERATURE: 98.4 F

## 2021-01-21 DIAGNOSIS — I42.9 CARDIOMYOPATHY, UNSPECIFIED TYPE (H): ICD-10-CM

## 2021-01-21 DIAGNOSIS — J43.9 PULMONARY EMPHYSEMA, UNSPECIFIED EMPHYSEMA TYPE (H): ICD-10-CM

## 2021-01-21 DIAGNOSIS — E66.01 MORBID OBESITY DUE TO EXCESS CALORIES (H): ICD-10-CM

## 2021-01-21 DIAGNOSIS — R19.5 LOOSE STOOLS: Primary | ICD-10-CM

## 2021-01-21 DIAGNOSIS — I20.9 ANGINA, CLASS II (H): ICD-10-CM

## 2021-01-21 DIAGNOSIS — G47.33 OSA (OBSTRUCTIVE SLEEP APNEA): ICD-10-CM

## 2021-01-21 DIAGNOSIS — D35.01 ADRENAL ADENOMA, RIGHT: ICD-10-CM

## 2021-01-21 DIAGNOSIS — R06.02 SOB (SHORTNESS OF BREATH): ICD-10-CM

## 2021-01-21 DIAGNOSIS — R73.09 ELEVATED GLUCOSE: ICD-10-CM

## 2021-01-21 PROBLEM — C74.91: Status: ACTIVE | Noted: 2021-01-21

## 2021-01-21 LAB — HBA1C MFR BLD: 5.7 % (ref 0–5.6)

## 2021-01-21 PROCEDURE — 36415 COLL VENOUS BLD VENIPUNCTURE: CPT | Performed by: INTERNAL MEDICINE

## 2021-01-21 PROCEDURE — 99214 OFFICE O/P EST MOD 30 MIN: CPT | Performed by: INTERNAL MEDICINE

## 2021-01-21 PROCEDURE — 83036 HEMOGLOBIN GLYCOSYLATED A1C: CPT | Performed by: INTERNAL MEDICINE

## 2021-01-21 PROCEDURE — 91300 PR COVID VAC PFIZER DIL RECON 30 MCG/0.3 ML IM: CPT

## 2021-01-21 PROCEDURE — 0001A PR COVID VAC PFIZER DIL RECON 30 MCG/0.3 ML IM: CPT

## 2021-01-21 RX ORDER — PREDNISOLONE ACETATE 10 MG/ML
SUSPENSION/ DROPS OPHTHALMIC
COMMUNITY
Start: 2020-12-16 | End: 2021-08-13

## 2021-01-21 RX ORDER — KETOROLAC TROMETHAMINE 5 MG/ML
SOLUTION OPHTHALMIC
COMMUNITY
Start: 2020-12-16 | End: 2021-08-13

## 2021-01-21 RX ORDER — FUROSEMIDE 20 MG
20 TABLET ORAL DAILY
Qty: 90 TABLET | Refills: 3
Start: 2021-01-21 | End: 2021-04-28 | Stop reason: ALTCHOICE

## 2021-01-21 RX ORDER — DIPHENOXYLATE HCL/ATROPINE 2.5-.025MG
1 TABLET ORAL DAILY PRN
Qty: 30 TABLET | Refills: 5 | Status: SHIPPED | OUTPATIENT
Start: 2021-01-21 | End: 2024-07-19

## 2021-01-21 ASSESSMENT — MIFFLIN-ST. JEOR: SCORE: 1836.54

## 2021-01-21 NOTE — LETTER
January 25, 2021      Ricky CHAPMAN Stephanie  [address]  [facility] [address]        Dear ,    We are writing to inform you of your test results.    This shows nothing of concern . This should be rechecked in roughly a year .       Resulted Orders   Hemoglobin A1c   Result Value Ref Range    Hemoglobin A1C 5.7 (H) 0 - 5.6 %      Comment:      Normal <5.7% Prediabetes 5.7-6.4%  Diabetes 6.5% or higher - adopted from ADA   consensus guidelines.         If you have any questions or concerns, please call the clinic at the number listed above.       Sincerely,      Holland Blunt MD/xenia

## 2021-01-21 NOTE — LETTER
[facility]  [address]  [place] [facility] [place] [address]  [phone]          January 25, 2021    Ricky Brown                                                                                                           [address] [address]            Dear iRcky,    I further reviewed your chart and the diagnosis of malignant adrenal process is wrong.  I have changed your diagnosis. It now reads adrenal adenoma, benign.     I did review your entire chart and have some ideas. These can be reviewed with you at the next appointment.  I am mainly concerned with your diagnosis of obstructive sleep apnea, and it looks like you're not being treated for this.     Please get back in touch with me if you have any additional concerns or questions for me, otherwise this can wait for 6 -12 months.     Sincerely,         Holland Blunt MD/magaly

## 2021-01-21 NOTE — PROGRESS NOTES
Assessment & Plan     Loose stools  Today is a get acquainted visit with a new patient to the Internal Medicine department . He's here with his spouse who was also an employee with [employer]  As a  in the past. Ricky ARI Brown had a longterm primary health care provider Dr. Chavez who has gone into penitentiary and now he needs new primary health care provider. He's using diphenoxylate-atropine (LOMOTIL) roughly 10-40 tabs per year and needs this medication refilled.  - diphenoxylate-atropine (LOMOTIL) 2.5-0.025 MG tablet; Take 1 tablet by mouth daily as needed for diarrhea Patient only takes 1 tablet as needed    Cardiomyopathy, unspecified type (H)  We got into a discussion. He's seen a few different cardiologists and his most useful appointment was with Dr. Toby Yee with [facility] . For this appointment we did review care everywhere with the documentation from 1/19/2021. Apparently at one point his % ejection fraction had dropped as long as 30-35% range but the most recent echocardiogram showed a return to very closer to the normal range of 50-55%. Patient denies a history of ischemic vascular disease  And so for now this issue sees to be quiescent   - furosemide (LASIX) 20 MG tablet; Take 1 tablet (20 mg) by mouth daily. It's listed as a non-ischemic cardiomyopathy     SOB (shortness of breath)  We talked about a lot of things today. One of them is a generalized sense of patient being easily out of breath .the exact etiologies of this isn't clear. There's possibly a component of cardiomyopathy but most likely it's aging and deconditioning along with a reported history of chronic obstructive pulmonary disease. I reviewed with patient and spouse his reported chronic obstructive pulmonary disease , apparently to the point that , at some point in the past he'd used inhalers but these were reported to be of no value. We agreed to take a wait and see approach with this  "symptom and a further follow up appointment is without a doubt necessary. The time for this was decided to be roughly 6-12 months   - furosemide (LASIX) 20 MG tablet; Take 1 tablet (20 mg) by mouth daily    Elevated glucose  As we reviewed data we see his last 2 blood glucose readings were in the 120's and 150's and I didn't see an hemoglobin a1c  [ diabetes test ] , thus hemoglobin a1c  [ diabetes test ] is justified  - Hemoglobin hemoglobin a1c  [ diabetes test ]  Follow up as indicated on results     Malignant neoplasm of adrenal gland, right (H)  I culled this diagnosis from the best practices alert section and this was listed as an HCC diagnosis that was worthy of being explored and renewed. Patient actually took issue with this as he doesn't believe this diagnosis. I agreed that I lacked the foundation with this patients chart to endorse or dispell the accuracy of this diagnosis yet. Further follow up needed with this one, seems to be problem is stable and ongoing monitoring which is not typical for any malignant process thus this diagnosis is likely inaccurate and just plain wrong. If so I will inactivate and try to delete this diagnosis from the chart. Malignancy of adrenal right ? There is simply no basis for this. What he has was an \" incidentaloma \" found with radiographic images. An asymptomatic adrenal adenoma that would not likely even qualify for additional follow up at this point     Pulmonary emphysema, unspecified emphysema type (H)  As detailed above     Morbid obesity due to excess calories (H)  Likely a major contributing factor to his shortness of breath symptoms     Angina, class II (H)  Again I lack the foundation here. This is a diagnosis of ischemic vascular disease  Which I am not so sure patient has any evidence of. This requires further evaluation and review of chart    JOSE JUAN (obstructive sleep apnea)-severe (AHI 32)  Needs more review but assuming this is accurate it's another major " "contributing factor to fatigue . Kyler diagnosis was listed as severe. According to my understanding patient is not using cpap machine and mask. I found the formal report of the Polysomnography (PSG) / sleep study from 2009 and it showed a total apnea-hypopnea index (AHI) score of 74. Clearly this needs To be reviewed at next soonest opportunity with patient.        Review of external notes as documented above       25 minutes spent on the date of the encounter doing chart review, history and exam, documentation and further activities as noted above       BMI:   Estimated body mass index is 39.11 kg/m  as calculated from the following:    Height as of this encounter: 1.689 m (5' 6.5\").    Weight as of this encounter: 111.6 kg (246 lb).   Weight management plan: Discussed healthy diet and exercise guidelines      Return in about 6 months (around 7/21/2021).    Holland Blunt MD  [facility] YAQUELIN García is a 70 year old who presents to clinic today for the following health issues   Encounter Diagnoses   Name Primary?     Loose stools Yes     Cardiomyopathy, unspecified type (H)      SOB (shortness of breath)      Elevated glucose      Malignant neoplasm of adrenal gland, right (H)      Pulmonary emphysema, unspecified emphysema type (H)      Morbid obesity due to excess calories (H)      Angina, class II (H)      KYLER (obstructive sleep apnea)-severe (AHI 32)     accompanied by his spouse:    HPI   Chief Complaint   Patient presents with     Establish Care     See as detailed above with the assessment and plan section        Review of Systems   Constitutional, HEENT, cardiovascular, pulmonary, gi and gu systems are negative, except as otherwise noted.      Objective    BP (!) 153/89   Pulse 85   Temp 98.4  F (36.9  C) (Oral)   Resp 16   Ht 1.689 m (5' 6.5\")   Wt 111.6 kg (246 lb)   SpO2 97%   BMI 39.11 kg/m    Body mass index is 39.11 kg/m .  Physical Exam   GENERAL: healthy, " alert and no distress  EYES: Eyes grossly normal to inspection, PERRL and conjunctivae and sclerae normal  MS: no gross musculoskeletal defects noted, no edema  NEURO: Normal strength and tone, mentation intact and speech normal  PSYCH: mentation appears normal, affect normal/bright    Orders Placed This Encounter   Procedures     Hemoglobin A1c         I spent a total of 25 minutes face-to-face with Ricky Brown during today's office visit.  Over 50% of this time was spent counseling the patient and/or coordinating care regarding   Encounter Diagnoses   Name Primary?     Loose stools Yes     Cardiomyopathy, unspecified type (H)      SOB (shortness of breath)      Elevated glucose      Malignant neoplasm of adrenal gland, right (H)      Pulmonary emphysema, unspecified emphysema type (H)      Morbid obesity due to excess calories (H)      Angina, class II (H)      JOSE JUAN (obstructive sleep apnea)-severe (AHI 32)     .  See note for details.      Stephanie    Recent r eye cataract surgery, see scanned opth notes - he is listed as having dm 2 but this seems an error  Megalocornea  Ho iritis  Macular edema - I will want to review this patients ophthalmologic history at the next opportunity     Copd - quit smoking on 12-12-96 and was never more then a light smoker less then 1 permanent pacemaker in [facility]. More importantly his spirometry was essentially normal with full pulmonary function tests from 5/7/2020 ordered by Dr. Jose Porras, cardiologist     Angina ?    HPI - from emergency room evaluation 9/2020 - see [facility] electronic medical records     Ricky Brown is a 69 year old male with a medical history significant for BCC, colon adenomas, cardiomyopathy, CAD, CHF, ED, HTN, noncompliance, JOSE JUAN, obesity, hearing loss, and mild epiretinal membrane who presents the ED today complaining of SOB and cough. He is concerned about COVID-19. SOB is reportedly moderate.  Has been associated with some discomfort in chest  "area.  Symptoms started over the weekend.  No chest pain currently.  The patient also complains of urinary symptoms including odorous urine, frequency.  He reports that he and his spouse were in [place] with 3 other couples over the weekend, but he didn't go out much due to fatigue. He does have a history of CHF and takes Lasix PRN.    However, he denies increased weight from baseline, lower extremity swelling.  He also denies fever.    His spouse is concerned that he may have had \"cardiac episodes\" over the weekend.they initially called the triage line, hoping to set follow-up a clinic appointment, however patient was advised he should come to the emergency department due to the severity of his symptoms as well as concerns at a clinic appointment would have to be a virtual appointment due to symptoms consistent with COVID-19 infection at this time.  He was advised to come to the emergency department for further work-up, including a troponin testing.       Of note, patient was recently seen here in the ED on 4/11/2020 for renal colic. Examination revealed obesity and no acute abdominal findings. Urinalysis revealed 4 red cells. CBC and comprehensive metabolic panel were normal with exception of minimally elevated creatinine of 1.1. Abdomen CT revealed evidence of some mild left perinephric edema consistent with possibly recently passed stone. As patient was improving without Toradol and tolerating fluids, he was discharged.  It was recommended Pt follow-up with urology as this was the second episode of similar symptoms with somewhat similar findings on CT.     Cardiac stress MRI from 5/7/2020:     Clinical history: 69-year old male with a history of non-ischemic cardiomyopathy (non-obstructive CAD on  coronary angiogram in 05/2013), hypertension, dyslipidemia, and mild-moderate aortic regurgitation with  chest discomfort. CMR to evaluate function and for ischemia.  Comparison CMR: 05/28/2013     1. The left ventricle " is normal in cavity size and wall thickness. The global systolic function is mildly  reduced. The LVEF is 42%. There is mild diffuse hypokinesis.     2. The right ventricle is normal in cavity size. The global systolic function is mildly reduced. The RVEF  is 49%.      3. Both atria are normal in size.     4. There is mild aortic regurgitation.     5. There is mid-myocardial late gadolinium enhancement in the basal inferolateral and basal inferoseptal  segments consistent with non ischemic fibrosis.      6. Regadenoson stress perfusion imaging shows no ischemia.     7. There is no pericardial effusion or thickening.     8. There is no intracardiac thrombus.     CONCLUSIONS:   Non-ischemic cardiomyopathy with mildly reduced biventricular function.  A small burden of midmyocardial fibrosis in the basal ventricle with no ischemia.      Compared to the prior examination dated 05/28/2013, there is no significant change in the function or the  fibrosis.      4/11/2020  The original report on this patient was dictated by me. Upon further  review, there is a right adrenal nodule that is stable in size  measuring 2.1 cm, series 3 image 68, when compared to the CT abdomen  from 10/21/2019. At unenhanced scanning its internal density measures  6.6 Hounsfield units on series 3 image 67 making this most consistent  with an adrenal adenoma. Given these imaging characteristics this is  of doubtful clinical significance. However, follow-up should be made  on a clinical basis.

## 2021-01-21 NOTE — Clinical Note
"Can you create letter to patient with \" I further reviewed your chart and the diagnosis of malignant adrenal process is wrong and I have changed your diagnosis. It now reads adrenal adenoma, benign. I did review your entire chart and have some ideas. These can be reviewed with you at the next appointment, I mainly concerned with your diagnosis of obstructive sleep apnea and it loks like you're not being treated for this. Please get back in touch with me if you have any additional concerns or questions for me, otherwise this can wait for 6 -12 months.    Holland Blunt MD  "

## 2021-01-22 ENCOUNTER — TELEPHONE (OUTPATIENT)
Dept: FAMILY MEDICINE | Facility: CLINIC | Age: 71
End: 2021-01-22

## 2021-01-22 ENCOUNTER — TRANSFERRED RECORDS (OUTPATIENT)
Dept: HEALTH INFORMATION MANAGEMENT | Facility: CLINIC | Age: 71
End: 2021-01-22

## 2021-01-22 NOTE — TELEPHONE ENCOUNTER
Reason for Call:  Order    Detailed comments: Patient is having eye surgery 2/4/21 at [facility]. Patient is wondering if Dr. Blunt is able to order the COVID test for him.    Phone Number Patient can be reached at: Cell number on file:    Telephone Information:   Mobile [phone]       Best Time: Anytime    Can we leave a detailed message on this number? YES    Call taken on 1/22/2021 at 3:33 PM by Carey Becerra

## 2021-01-25 NOTE — TELEPHONE ENCOUNTER
It's not a question of could. Of course we could. But according to my understanding this is ordered by the surgeon / ophthalmologist because it is they that want the test and are eager to see the results     Holland Blunt MD

## 2021-01-26 NOTE — TELEPHONE ENCOUNTER
Called patient and informed to contact surgeon and have them order covid vaccine. Patient understood and nothing else needed.      Harika RENEE CMA ([facility])

## 2021-01-29 ENCOUNTER — OFFICE VISIT (OUTPATIENT)
Dept: INTERNAL MEDICINE | Facility: CLINIC | Age: 71
End: 2021-01-29
Payer: MEDICARE

## 2021-01-29 VITALS
BODY MASS INDEX: 39.59 KG/M2 | OXYGEN SATURATION: 97 % | RESPIRATION RATE: 14 BRPM | WEIGHT: 249 LBS | DIASTOLIC BLOOD PRESSURE: 85 MMHG | TEMPERATURE: 99.3 F | SYSTOLIC BLOOD PRESSURE: 147 MMHG | HEART RATE: 70 BPM

## 2021-01-29 DIAGNOSIS — G47.33 OSA (OBSTRUCTIVE SLEEP APNEA): ICD-10-CM

## 2021-01-29 DIAGNOSIS — Z01.818 PREOP GENERAL PHYSICAL EXAM: Primary | ICD-10-CM

## 2021-01-29 PROBLEM — C74.91: Status: RESOLVED | Noted: 2021-01-21 | Resolved: 2021-01-29

## 2021-01-29 PROCEDURE — 99214 OFFICE O/P EST MOD 30 MIN: CPT | Performed by: INTERNAL MEDICINE

## 2021-01-29 NOTE — PROGRESS NOTES
Separately today we spent a considerable amount of time reviewing the pathophysiology of obstructive sleep apnea and how it is believed to have significant adverse health consequences. Unfortunately as is often the case, this patient has a significant intolerance of the external application of CPAP [ continuous positive airway pressure]     Possibly there are new advances with the equipment. It is uncertain to me if patient needs a new Polysomnography (PSG) / sleep study. Most likely he just needs a review of potential treatment options and find something he can tolerate

## 2021-02-11 ENCOUNTER — IMMUNIZATION (OUTPATIENT)
Dept: NURSING | Facility: CLINIC | Age: 71
End: 2021-02-11
Attending: FAMILY MEDICINE
Payer: MEDICARE

## 2021-02-11 PROCEDURE — 0002A PR COVID VAC PFIZER DIL RECON 30 MCG/0.3 ML IM: CPT

## 2021-02-11 PROCEDURE — 91300 PR COVID VAC PFIZER DIL RECON 30 MCG/0.3 ML IM: CPT

## 2021-02-27 ENCOUNTER — ANCILLARY PROCEDURE (OUTPATIENT)
Dept: GENERAL RADIOLOGY | Facility: CLINIC | Age: 71
End: 2021-02-27
Attending: FAMILY MEDICINE
Payer: MEDICARE

## 2021-02-27 ENCOUNTER — OFFICE VISIT (OUTPATIENT)
Dept: URGENT CARE | Facility: URGENT CARE | Age: 71
End: 2021-02-27
Payer: MEDICARE

## 2021-02-27 VITALS
HEART RATE: 95 BPM | OXYGEN SATURATION: 96 % | DIASTOLIC BLOOD PRESSURE: 85 MMHG | BODY MASS INDEX: 37.88 KG/M2 | TEMPERATURE: 98.9 F | RESPIRATION RATE: 24 BRPM | WEIGHT: 238.25 LBS | SYSTOLIC BLOOD PRESSURE: 147 MMHG

## 2021-02-27 DIAGNOSIS — S93.401A SPRAIN OF RIGHT ANKLE, UNSPECIFIED LIGAMENT, INITIAL ENCOUNTER: Primary | ICD-10-CM

## 2021-02-27 DIAGNOSIS — S99.911A INJURY OF RIGHT ANKLE, INITIAL ENCOUNTER: ICD-10-CM

## 2021-02-27 PROCEDURE — 73630 X-RAY EXAM OF FOOT: CPT | Mod: RT | Performed by: RADIOLOGY

## 2021-02-27 PROCEDURE — 99214 OFFICE O/P EST MOD 30 MIN: CPT | Performed by: FAMILY MEDICINE

## 2021-02-27 PROCEDURE — 73610 X-RAY EXAM OF ANKLE: CPT | Mod: RT | Performed by: RADIOLOGY

## 2021-02-27 NOTE — PATIENT INSTRUCTIONS
Patient Education     Self-Care for Strains and Sprains  Most minor strains and sprains can be treated with self-care. Recovering from a strain or sprain may take 6 to 8 weeks. Your self-care goal is to reduce pain and immobilize the injury to speed healing.   Support the injured area  Wrapping the injured area provides support for short, necessary activities. Be careful not to wrap the area too tightly. This could cut off the blood supply.     Support a wrist, elbow, or shoulder with a sling.    Wrap an ankle or knee with an elastic bandage.    Tape a finger or toe to the one next to it.  Use cold and heat  Cold reduces swelling. Both cold and heat reduce pain. Heat should not be used in the initial treatment of the injury. When using cold or heat, always place a thin towel between the pack and your skin.     Apply ice or a cold pack 10 to 15 minutes every hour you re awake for the first 2 days.    After the swelling goes down, use cold or heat to control pain. Don t use heat late in the day, since it can cause swelling when you re not active.  Rest and elevate  Rest and elevation help your injury heal faster.    Raise the injured area above your heart level.    Keep the injured area from moving.    Limit the use of the joint or limb.  Use medicine    Aspirin reduces pain and swelling. (Note: Don t give aspirin to a child 18 or younger unless prescribed by the doctor.)    Non-steroidal anti-inflammatory medicines, such as ibuprofen, may reduce pain and swelling, as well. Ask your healthcare provider for advice.    When to call your healthcare provider  Call your healthcare provider if:    The injured joint won t move, or bones make a grating sound when they move    You can t put weight on the injured area, even after 24 hours    The injured body part is cold, blue, tingling, or numb    The joint or limb appears bent or crooked.    Pain increases or doesn t improve in 4 days    When pressing along the injured  area, you notice a spot that is especially painful  George last reviewed this educational content on 5/1/2018 2000-2020 [employer]. [address] [place] [address], [place] [address]. All rights reserved. This information is not intended as a substitute for professional medical care. Always follow your healthcare professional's instructions.

## 2021-02-27 NOTE — PROGRESS NOTES
Assessment & Plan     Sprain of right ankle, unspecified ligament, initial encounter  Differentials discussed in detail.  Suspect symptoms secondary to ankle sprain.  X-ray findings reviewed independently which showed mild arthritic changes, calcaneal spur, no fracture noted.  Suggested rest, icing, compression, elevation and over-the-counter analgesia.  Recommended to follow-up if symptoms persist or worsen.  Patient/wife understood and in agreement with above plan.  All questions answered.    Injury of right ankle, initial encounter  - XR Ankle Right G/E 3 Views; Future  - XR Foot Right G/E 3 Views; Future         Patient Instructions     Patient Education     Self-Care for Strains and Sprains  Most minor strains and sprains can be treated with self-care. Recovering from a strain or sprain may take 6 to 8 weeks. Your self-care goal is to reduce pain and immobilize the injury to speed healing.   Support the injured area  Wrapping the injured area provides support for short, necessary activities. Be careful not to wrap the area too tightly. This could cut off the blood supply.     Support a wrist, elbow, or shoulder with a sling.    Wrap an ankle or knee with an elastic bandage.    Tape a finger or toe to the one next to it.  Use cold and heat  Cold reduces swelling. Both cold and heat reduce pain. Heat should not be used in the initial treatment of the injury. When using cold or heat, always place a thin towel between the pack and your skin.     Apply ice or a cold pack 10 to 15 minutes every hour you re awake for the first 2 days.    After the swelling goes down, use cold or heat to control pain. Don t use heat late in the day, since it can cause swelling when you re not active.  Rest and elevate  Rest and elevation help your injury heal faster.    Raise the injured area above your heart level.    Keep the injured area from moving.    Limit the use of the joint or limb.  Use medicine    Aspirin reduces pain and  swelling. (Note: Don t give aspirin to a child 18 or younger unless prescribed by the doctor.)    Non-steroidal anti-inflammatory medicines, such as ibuprofen, may reduce pain and swelling, as well. Ask your healthcare provider for advice.    When to call your healthcare provider  Call your healthcare provider if:    The injured joint won t move, or bones make a grating sound when they move    You can t put weight on the injured area, even after 24 hours    The injured body part is cold, blue, tingling, or numb    The joint or limb appears bent or crooked.    Pain increases or doesn t improve in 4 days    When pressing along the injured area, you notice a spot that is especially painful  [employer] last reviewed this educational content on 5/1/2018 2000-2020 The "[employer] [address] [place] [address], [place] [address]. All rights reserved. This information is not intended as a substitute for professional medical care. Always follow your healthcare professional's instructions.               Clifford De Luna MD  [facility]    Magaly García is a 70 year old who presents for the following health issues  accompanied by his spouse:    HPI     Musculoskeletal problem/pain  Onset/Duration: yesterday   Description  Location: right ankle   Joint Swelling: YES  Redness: no  Pain: YES  Warmth: no  Intensity:  moderate  Progression of Symptoms:  same  Accompanying signs and symptoms:   Fevers: no  Numbness/tingling/weakness: no  History  Trauma to the area: YES, Pt fell at home around 3:30pm yesterday and sprained his right ankle,  top of his foot is swollen, foot was iced, elevated, and wrapped, now it is very painful  Recent illness:  no  Previous similar problem: no  Previous evaluation:  no  Therapies tried and outcome: rest/inactivity, ice and support wrap        Review of Systems   Constitutional, HEENT, cardiovascular, pulmonary, gi and gu systems are negative, except as  otherwise noted.      Objective    BP (!) 147/85 (BP Location: Left arm, Patient Position: Chair, Cuff Size: Adult Regular)   Pulse 103   Temp 98.9  F (37.2  C) (Tympanic)   Resp (!) 32   Wt 108.1 kg (238 lb 4 oz)   SpO2 96%   BMI 37.88 kg/m    Body mass index is 37.88 kg/m .  Physical Exam   GENERAL: alert, no distress, frail and elderly  EYES: Eyes grossly normal to inspection, PERRL and conjunctivae and sclerae normal  NECK: no adenopathy, no asymmetry, masses, or scars and thyroid normal to palpation  RESP: lungs clear to auscultation - no rales, rhonchi or wheezes  CV: regular rates and rhythm, normal S1 S2, no S3 or S4 and no murmur, click or rub  MS: right ankle mildly swollen and tender, no skin discoloration or warmth noted, Cade Homans' sign negative, pedal pulses 3+, sensation to touch and pressure intact  SKIN: no suspicious lesions or rashes  NEURO: Normal strength and tone, mentation intact and speech normal  PSYCH: mentation appears normal, affect normal/bright      Results for orders placed or performed in visit on 02/27/21   XR Foot Right G/E 3 Views     Status: None    Narrative    XR FOOT RT G/E 3 VW 2/27/2021 11:45 AM     HISTORY: right ankle and foot pain, had a fall yesterday; Injury of  right ankle, initial encounter    COMPARISON: 12/5/2017.      Impression    IMPRESSION: No fractures are identified. Mild degenerative changes at  first MTP joint and multiple joints of the midfoot. Accessory  navicular. Findings are stable.     JOSE BAKER MD   Results for orders placed or performed in visit on 02/27/21   XR Ankle Right G/E 3 Views     Status: None    Narrative    XR ANKLE RT G/E 3 VW 2/27/2021 11:45 AM     HISTORY: right ankle and foot pain, had a fall yesterday; Injury of  right ankle, initial encounter    COMPARISON: 12/4/2017      Impression    IMPRESSION: No fractures are evident. The ankle mortise is intact. The  talar dome is unremarkable. Mild degenerative changes in  the  tibiotalar joint. Plantar and Achilles calcaneal spurs.     JOSE BAKER MD

## 2021-03-04 ENCOUNTER — TELEPHONE (OUTPATIENT)
Dept: CARDIOLOGY | Facility: CLINIC | Age: 71
End: 2021-03-04

## 2021-03-04 NOTE — TELEPHONE ENCOUNTER
Spoke to pt, and he will no longer be following with Dr. Porras. He is seen through [facility] for his Cardiology care.    Ida Klein  Medical Specialty Procedure   [facility] [place]

## 2021-03-17 DIAGNOSIS — E78.5 HYPERLIPIDEMIA LDL GOAL <70: ICD-10-CM

## 2021-03-17 DIAGNOSIS — I10 ESSENTIAL HYPERTENSION WITH GOAL BLOOD PRESSURE LESS THAN 140/90: ICD-10-CM

## 2021-03-19 RX ORDER — ATORVASTATIN CALCIUM 10 MG/1
TABLET, FILM COATED ORAL
Qty: 90 TABLET | Refills: 0 | Status: SHIPPED | OUTPATIENT
Start: 2021-03-19 | End: 2021-07-20

## 2021-03-19 NOTE — TELEPHONE ENCOUNTER
Prescription approved per [facility] Refill Protocol.    Tabitha Jordan RN

## 2021-03-19 NOTE — TELEPHONE ENCOUNTER
Routing refill request to provider for review/approval because:  Last BP undre 140/90 in past 12 months    Tabitha Jordan RN

## 2021-03-21 RX ORDER — LOSARTAN POTASSIUM 50 MG/1
TABLET ORAL
Qty: 90 TABLET | Refills: 0 | Status: SHIPPED | OUTPATIENT
Start: 2021-03-21 | End: 2021-07-21

## 2021-04-11 ENCOUNTER — HEALTH MAINTENANCE LETTER (OUTPATIENT)
Age: 71
End: 2021-04-11

## 2021-04-28 ENCOUNTER — OFFICE VISIT (OUTPATIENT)
Dept: FAMILY MEDICINE | Facility: CLINIC | Age: 71
End: 2021-04-28
Payer: MEDICARE

## 2021-04-28 VITALS
HEIGHT: 67 IN | DIASTOLIC BLOOD PRESSURE: 70 MMHG | TEMPERATURE: 98.1 F | HEART RATE: 86 BPM | OXYGEN SATURATION: 95 % | WEIGHT: 234.6 LBS | SYSTOLIC BLOOD PRESSURE: 120 MMHG | BODY MASS INDEX: 36.82 KG/M2

## 2021-04-28 DIAGNOSIS — R05.9 COUGH: ICD-10-CM

## 2021-04-28 DIAGNOSIS — R53.83 FATIGUE, UNSPECIFIED TYPE: Primary | ICD-10-CM

## 2021-04-28 DIAGNOSIS — G47.33 OSA (OBSTRUCTIVE SLEEP APNEA): ICD-10-CM

## 2021-04-28 DIAGNOSIS — R73.9 HYPERGLYCEMIA: ICD-10-CM

## 2021-04-28 LAB
GLUCOSE SERPL-MCNC: 112 MG/DL (ref 70–99)
HBA1C MFR BLD: 5.6 % (ref 0–5.6)

## 2021-04-28 PROCEDURE — U0003 INFECTIOUS AGENT DETECTION BY NUCLEIC ACID (DNA OR RNA); SEVERE ACUTE RESPIRATORY SYNDROME CORONAVIRUS 2 (SARS-COV-2) (CORONAVIRUS DISEASE [COVID-19]), AMPLIFIED PROBE TECHNIQUE, MAKING USE OF HIGH THROUGHPUT TECHNOLOGIES AS DESCRIBED BY CMS-2020-01-R: HCPCS | Performed by: PHYSICIAN ASSISTANT

## 2021-04-28 PROCEDURE — 36415 COLL VENOUS BLD VENIPUNCTURE: CPT | Performed by: PHYSICIAN ASSISTANT

## 2021-04-28 PROCEDURE — U0005 INFEC AGEN DETEC AMPLI PROBE: HCPCS | Performed by: PHYSICIAN ASSISTANT

## 2021-04-28 PROCEDURE — 99214 OFFICE O/P EST MOD 30 MIN: CPT | Performed by: PHYSICIAN ASSISTANT

## 2021-04-28 PROCEDURE — 83036 HEMOGLOBIN GLYCOSYLATED A1C: CPT | Performed by: PHYSICIAN ASSISTANT

## 2021-04-28 PROCEDURE — 82947 ASSAY GLUCOSE BLOOD QUANT: CPT | Performed by: PHYSICIAN ASSISTANT

## 2021-04-28 ASSESSMENT — MIFFLIN-ST. JEOR: SCORE: 1784.83

## 2021-04-28 NOTE — PROGRESS NOTES
Assessment & Plan     Fatigue, unspecified type    Cough  - Symptomatic COVID-19 Virus (Coronavirus) by PCR; Future  - Symptomatic COVID-19 Virus (Coronavirus) by PCR    Hyperglycemia  - Hemoglobin A1c  - Glucose    JOSE JUAN (obstructive sleep apnea)-severe (AHI 32)          Return in about 1 week (around 5/5/2021), or with PCP for ongoing work up of shortness of breath and fatigue.    Joan Gonzalez PA-C  [facility] YAQUELIN García is a 70 year old who presents for the following health issues  accompanied by his spouse Kyung:    HPI       Concern for COVID-19  About how many days ago did these symptoms start? 14 days   Is this your first visit for this illness? Yes  In the 14 days before your symptoms started, have you had close contact with someone with COVID-19 (Coronavirus)? No  Do you have a fever or chills? Yes, I felt feverish or had chills for one day but no fever currently  Are you having new or worsening difficulty breathing? Yes   Please describe what kind of difficulty you are having breathing:Mild dyspnea (able to do ADLs without difficulty, mild shortness of breath with activities such as climbing one or two flights of stairs or walking briskly)  Do you have new or worsening cough? Yes, it's a dry cough.   Have you had any new or unexplained body aches? YES- getting worse    Have you experienced any of the following NEW symptoms?    Headache: YES    Sore throat: YES    Loss of taste or smell: No    Chest pain: YES-chest pressure    Diarrhea: YES-loose stool x 1 month    Rash: No  What treatments have you tried? Sudafed   Who do you live with? Wife, dog  Are you, or a household member, a healthcare worker or a ? No  Do you live in a nursing home, group home, or shelter? No  Do you have a way to get food/medications if quarantined? Yes, I have a friend or family member who can help me.    PMHx sig for decreased ejection fraction and sleep apnea.  Did  "review the contributions of these conditions to fatigue.  Patient also notes some sinus drainage in the mornings, clear.    Review of Systems   Constitutional, HEENT, cardiovascular, pulmonary, gi and gu systems are negative, except as otherwise noted.      Objective    /70   Pulse 86   Temp 98.1  F (36.7  C) (Oral)   Ht 1.689 m (5' 6.5\")   Wt 106.4 kg (234 lb 9.6 oz)   SpO2 95%   BMI 37.30 kg/m    Body mass index is 37.3 kg/m .  Physical Exam   GENERAL: healthy, alert and no distress  HENT: ear canals and TM's normal, nose and mouth without ulcers or lesions  NECK: no adenopathy, no asymmetry, masses, or scars and thyroid normal to palpation  RESP: lungs clear to auscultation - no rales, rhonchi or wheezes  CV: regular rate and rhythm, normal S1 S2, no S3 or S4, no murmur, click or rub, no peripheral edema and peripheral pulses strong  SKIN: no suspicious lesions or rashes                "

## 2021-04-29 LAB
SARS-COV-2 RNA RESP QL NAA+PROBE: NORMAL
SPECIMEN SOURCE: NORMAL

## 2021-04-30 LAB
LABORATORY COMMENT REPORT: ABNORMAL
SARS-COV-2 RNA RESP QL NAA+PROBE: POSITIVE
SPECIMEN SOURCE: ABNORMAL

## 2021-05-03 ENCOUNTER — VIRTUAL VISIT (OUTPATIENT)
Dept: INTERNAL MEDICINE | Facility: CLINIC | Age: 71
End: 2021-05-03
Payer: MEDICARE

## 2021-05-03 DIAGNOSIS — R79.89 ELEVATED LFTS: ICD-10-CM

## 2021-05-03 DIAGNOSIS — U07.1 2019 NOVEL CORONAVIRUS DISEASE (COVID-19): Primary | ICD-10-CM

## 2021-05-03 DIAGNOSIS — K76.0 FATTY LIVER: ICD-10-CM

## 2021-05-03 PROCEDURE — 99442 PR PHYSICIAN TELEPHONE EVALUATION 11-20 MIN: CPT | Performed by: INTERNAL MEDICINE

## 2021-05-03 NOTE — PROGRESS NOTES
Ricky is a 70 year old who is being evaluated via a billable telephone visit.      What phone number would you like to be contacted at? Cell phone.     How would you like to obtain your AVS? Mail a copy    Assessment & Plan     2019 novel coronavirus disease (COVID-19)  Today was an interesting case regarding Coronavirus (COVID-19) . This is a patient who completed his vaccination series for Coronavirus (COVID-19) back in January and February 2021. And for various reasons he did complete a negative Coronavirus (COVID-19) test a few times since his vaccinations. In the current situation he had the onset of some fairly mild symptoms of virus and he admits he did have some lowgrade generalized weakness, sore throat and mild coughing. He says these symptoms are mild enough that he already feels 100%. He had a Coronavirus (COVID-19) polymerase chain reaction test done on 2/1/2021 negative and then a redo on 4/28/that is positive. Patient does not believe his positive test results and wants to have this repeated. Given the fact that a] he's already been immunized and b] he's asymptomatic, doing a retest seems reasonable to me. Based on these facts the most likely scenario is that he was infected with one of the variants that is only partially susceptible to the vaccine    - Asymptomatic COVID-19 Virus (Coronavirus) by PCR; Future    Elevated LFTs  This patient concedes to alcohol intoxication and was briefly hospitalized . All of this information is reviewed with care everywhere. During this workup patient had a rapid recovery of things and was discharged after about one day. He has no known history of alcohol abuse and we discussed this to some degree. Patient only described a day of overuse of alcohol and not that he has an issue with alcohol. With did not go into this further other then for me to express sympathy and if patient felt there was a problem here he could be referred to the proper channels for help. This  is not sought or felt necessary     Fatty liver  We note he had a small blip upwards with his transaminases [ALT and AST] but this is tiny and not worrisome. He had a history of an abdominal CT scan roughly a year ago which showed no evidence of cirrhosis and just some fatty infiltration of the liver. We reviewed this. I don't think any additional treatments or follow up workup is necessary at this point       Review of the result(s) of each unique test - see [facility] labs and recent covid test results  16 minutes spent on the date of the encounter doing chart review, history and exam, documentation and further activities per the note      Return in about 6 months (around 11/3/2021).    Holland Blunt MD  [facility] YAQUELIN García is a 70 year old who presents for the following health issues   Encounter Diagnoses   Name Primary?     2019 novel coronavirus disease (COVID-19) Yes     Elevated LFTs      Fatty liver        HPI       Hospital Follow-up Visit:    Hospital/Nursing Home/IP Rehab Facility: [facility]   Date of Admission: 04/29/2021  Date of Discharge: 04/30/2021  Reason(s) for Admission:     Syncope, unspecified syncope type (Primary Dx);   Non-intractable vomiting with nausea, unspecified vomiting type;   Alcoholic intoxication without complication (HC)     Patient has no questions for me he says, he felt he had coughing and respiratory symptoms . He had the Coronavirus (COVID-19) test and was drinking and he drank more then usual, he had 2 mixed drinks and concedes this wiped him out. He had a blood alcohol of 0.13, he was lying on the bathroom floor and was weak from coughing.     He had a shot in January and February so he is fully vaccinated, he had a few tests negative since then.    He is tired a lot . His Coronavirus (COVID-19) test was positive with the [place] laboratory studies. He was found unresponsive in the bathroom is what led.   Was your  hospitalization related to COVID-19? YES   How are you feeling today? Much better  In the past 24 hours have you had shortness of breath when speaking, walking, or climbing stairs? I don't have breathing problems  Do you have a cough? I don't have a cough  When is the last time you had a fever greater than 100? Not having fever  Are you having any other symptoms? None   Do you have any other stressors you would like to discuss with your provider? No             Was the patient in the ICU or did the patient experience delirium during hospitalization?  Yes - this was initially when he was found unresponsive on the bathroom floor at home by his spouse    Problems taking medications regularly:  None  Medication changes since discharge: None  Problems adhering to non-medication therapy:  None    Summary of hospitalization:  CareEverywhere information obtained and reviewed  Diagnostic Tests/Treatments reviewed.  Follow up needed: none  Other Healthcare Providers Involved in Patient s Care:         None  Update since discharge: improved.       Post Discharge Medication Reconciliation: discharge medications reconciled and changed, per note/orders.  Plan of care communicated with patient            Review of Systems   Constitutional, HEENT, cardiovascular, pulmonary, gi and gu systems are negative, except as otherwise noted.      Objective           Vitals:  No vitals were obtained today due to virtual visit.    Physical Exam   healthy, alert and no distress  PSYCH: Alert and oriented times 3; coherent speech, normal   rate and volume, able to articulate logical thoughts, able   to abstract reason, no tangential thoughts, no hallucinations   or delusions  His affect is normal  RESP: No cough, no audible wheezing, able to talk in full sentences  Remainder of exam unable to be completed due to telephone visits        3:51 PM   4:07 PM         Phone call duration: 16 minutes

## 2021-05-04 DIAGNOSIS — U07.1 2019 NOVEL CORONAVIRUS DISEASE (COVID-19): ICD-10-CM

## 2021-05-04 LAB
LABORATORY COMMENT REPORT: ABNORMAL
SARS-COV-2 RNA RESP QL NAA+PROBE: NORMAL
SARS-COV-2 RNA RESP QL NAA+PROBE: POSITIVE
SPECIMEN SOURCE: ABNORMAL
SPECIMEN SOURCE: NORMAL

## 2021-05-04 PROCEDURE — U0005 INFEC AGEN DETEC AMPLI PROBE: HCPCS | Performed by: INTERNAL MEDICINE

## 2021-05-04 PROCEDURE — U0003 INFECTIOUS AGENT DETECTION BY NUCLEIC ACID (DNA OR RNA); SEVERE ACUTE RESPIRATORY SYNDROME CORONAVIRUS 2 (SARS-COV-2) (CORONAVIRUS DISEASE [COVID-19]), AMPLIFIED PROBE TECHNIQUE, MAKING USE OF HIGH THROUGHPUT TECHNOLOGIES AS DESCRIBED BY CMS-2020-01-R: HCPCS | Performed by: INTERNAL MEDICINE

## 2021-05-05 ENCOUNTER — TELEPHONE (OUTPATIENT)
Dept: LAB | Facility: CLINIC | Age: 71
End: 2021-05-05

## 2021-05-05 NOTE — TELEPHONE ENCOUNTER
"Notified patient this is his 2nd positive Covid-19 test. Patient refuses education and reports \"this is a scam and you don't know nothing!\" Writer ended call as patient was yelling.    Coronavirus (COVID-19) Notification    Caller Name (Patient, parent, daughter/son, grandparent, etc)  patient    Reason for call  Notify of Positive Coronavirus (COVID-19) lab results, assess symptoms,  review [facility] recommendations    Lab Result    Lab test:  2019-nCoV rRt-PCR or SARS-CoV-2 PCR    Oropharyngeal AND/OR nasopharyngeal swabs is POSITIVE for 2019-nCoV RNA/SARS-COV-2 PCR (COVID-19 virus)    RN Recommendations/Instructions per [facility] Coronavirus COVID-19 recommendations    Brief introduction script  Introduce self then review script:  \"I am calling on behalf of [employer].  We were notified that your Coronavirus test (COVID-19) for was POSITIVE for the virus.    A Positive COVID-19 letter will be sent via [employer] or the mail. (Exception, no letters sent to Presurgerical/Preprocedure Patients)    Angélica Yap LPN        "

## 2021-05-06 ENCOUNTER — NURSE TRIAGE (OUTPATIENT)
Dept: FAMILY MEDICINE | Facility: CLINIC | Age: 71
End: 2021-05-06

## 2021-05-06 NOTE — TELEPHONE ENCOUNTER
"Reason for Disposition    Severe eye pain    Additional Information    Negative: Weakness of the face, arm or leg on one side of the body    Negative: Followed getting substance in the eye    Negative: Foreign body or object is or was lodged in the eye    Negative: Followed an eye injury    Negative: Followed sun lamp or sun exposure (UV keratitis)    Negative: Yellow or green discharge (pus) in the eye    Negative: Pregnant    Negative: Complete loss of vision in 1 or both eyes    Answer Assessment - Initial Assessment Questions  1. DESCRIPTION: \"What is the vision loss like? Describe it for me.\" (e.g., complete vision loss, blurred vision, double vision, floaters, etc.)      Very blurry vision  2. LOCATION: \"One or both eyes?\" If one, ask: \"Which eye?\"      Right eye  3. SEVERITY: \"Can you see anything?\" If so, ask: \"What can you see?\" (e.g., fine print)      Feels like he has dirt in his eye. Tries to open it and vision is blurry  4. ONSET: \"When did this begin?\" \"Did it start suddenly or has this been gradual?\"      Last night  5. PATTERN: \"Does this come and go, or has it been constant since it started?\"      Constant  6. PAIN: \"Is there any pain in your eye(s)?\"  (Scale 1-10; or mild, moderate, severe)      9/10  7. CONTACTS-GLASSES: \"Do you wear contacts or glasses?\"      NA  8. CAUSE: \"What do you think is causing this visual problem?\"      Has had several surgeries on his right eye in the past  9. OTHER SYMPTOMS: \"Do you have any other symptoms?\" (e.g., confusion, headache, arm or leg weakness, speech problems)      No headache, confusion, speech problems, or weakness  10. PREGNANCY: \"Is there any chance you are pregnant?\" \"When was your last menstrual period?\"        NA    Protocols used: VISION LOSS OR CHANGE-A-OH      "

## 2021-05-06 NOTE — TELEPHONE ENCOUNTER
Reason for call:  Patient reporting a symptom    Symptom or request: Symptoms     Duration (how long have symptoms been present): on going    Have you been treated for this before? Yes    Additional comments: Pt calling for he has been difficulty seeing out of his right eye due to being diagnosed with COVID-19 and would like a call back for further advisement.    Phone Number patient can be reached at:  Home number on file [phone] (home)    Best Time:  anytime    Can we leave a detailed message on this number:  YES    Call taken on 5/6/2021 at 9:00 AM by Silas Fairbanks

## 2021-05-12 ENCOUNTER — OFFICE VISIT (OUTPATIENT)
Dept: FAMILY MEDICINE | Facility: CLINIC | Age: 71
End: 2021-05-12
Payer: MEDICARE

## 2021-05-12 VITALS
TEMPERATURE: 98 F | HEART RATE: 71 BPM | OXYGEN SATURATION: 97 % | DIASTOLIC BLOOD PRESSURE: 78 MMHG | SYSTOLIC BLOOD PRESSURE: 120 MMHG

## 2021-05-12 DIAGNOSIS — M79.2 RADICULAR PAIN IN RIGHT ARM: Primary | ICD-10-CM

## 2021-05-12 PROCEDURE — 99214 OFFICE O/P EST MOD 30 MIN: CPT | Performed by: PHYSICIAN ASSISTANT

## 2021-05-12 RX ORDER — METHYLPREDNISOLONE 4 MG
TABLET, DOSE PACK ORAL
Qty: 21 TABLET | Refills: 0 | Status: SHIPPED | OUTPATIENT
Start: 2021-05-12 | End: 2021-06-23

## 2021-05-12 NOTE — PROGRESS NOTES
Assessment & Plan     Radicular pain in right arm  Trial of medrol, if not improving return to clinic. No worrisome neurological s/s at this time.   - methylPREDNISolone (MEDROL DOSEPAK) 4 MG tablet therapy pack; Follow Package Directions                 No follow-ups on file.    [facility] YAQUELIN García is a 70 year old who presents for the following health issues     HPI     Musculoskeletal problem/pain  Onset/Duration: x2 days  Description  Location: hand - right  Joint Swelling: no  Redness: no  Pain: no  Warmth: no  Intensity:  mild, moderate  Progression of Symptoms:  same  Accompanying signs and symptoms:   Fevers: no  Numbness/tingling/weakness: YES  History  Trauma to the area: no  Recent illness:  no  Previous similar problem: no  Previous evaluation:  no  Precipitating or alleviating factors:  Aggravating factors include: none  Therapies tried and outcome: nothing    Started yesterday afternoon. Arm went numb from the elbow to the hands. He had no sense of touch or , but could still move it. He was able to play the piano but couldn't feel the keys.   Now just from the rist to the fingers it is still numb-still able to move the hand.   Patient is left handed.     No heavy lifting no injury.   Very sudden onset for him.       Review of Systems   Constitutional, HEENT, cardiovascular, pulmonary, gi and gu systems are negative, except as otherwise noted.      Objective    BP (!) 147/82 (BP Location: Left arm, Patient Position: Chair, Cuff Size: Adult Large)   Pulse 71   Temp 98  F (36.7  C) (Oral)   SpO2 97%   There is no height or weight on file to calculate BMI.  Physical Exam   GENERAL: healthy, alert and no distress  MS: no gross musculoskeletal defects noted, no edema- full range of motion of the neck. Minimal pain with palpation to the right upper trapezius. full range of motion of the right shoulder. full range of motion of the right  elbow. full range of motion of the right wrist and hand.   SKIN: no suspicious lesions or rashes  NEURO: Normal strength and tone, mentation intact and speech normal, CN II-XII intact, deep tendon reflexes intact.

## 2021-05-19 ENCOUNTER — TRANSFERRED RECORDS (OUTPATIENT)
Dept: HEALTH INFORMATION MANAGEMENT | Facility: CLINIC | Age: 71
End: 2021-05-19

## 2021-06-04 ENCOUNTER — OFFICE VISIT (OUTPATIENT)
Dept: INTERNAL MEDICINE | Facility: CLINIC | Age: 71
End: 2021-06-04
Payer: MEDICARE

## 2021-06-04 ENCOUNTER — TELEPHONE (OUTPATIENT)
Dept: INTERNAL MEDICINE | Facility: CLINIC | Age: 71
End: 2021-06-04

## 2021-06-04 VITALS
BODY MASS INDEX: 38.16 KG/M2 | OXYGEN SATURATION: 96 % | DIASTOLIC BLOOD PRESSURE: 89 MMHG | WEIGHT: 240 LBS | HEART RATE: 87 BPM | SYSTOLIC BLOOD PRESSURE: 160 MMHG | TEMPERATURE: 98 F | RESPIRATION RATE: 16 BRPM

## 2021-06-04 DIAGNOSIS — R20.0 NUMBNESS AND TINGLING OF RIGHT HAND: Primary | ICD-10-CM

## 2021-06-04 DIAGNOSIS — G47.33 OSA (OBSTRUCTIVE SLEEP APNEA): ICD-10-CM

## 2021-06-04 DIAGNOSIS — R20.0 NUMBNESS AND TINGLING IN RIGHT HAND: Primary | ICD-10-CM

## 2021-06-04 DIAGNOSIS — R20.2 NUMBNESS AND TINGLING IN RIGHT HAND: Primary | ICD-10-CM

## 2021-06-04 DIAGNOSIS — R20.2 NUMBNESS AND TINGLING OF RIGHT HAND: Primary | ICD-10-CM

## 2021-06-04 DIAGNOSIS — I49.9 IRREGULAR HEART RHYTHM: ICD-10-CM

## 2021-06-04 DIAGNOSIS — I10 ESSENTIAL HYPERTENSION WITH GOAL BLOOD PRESSURE LESS THAN 140/90: ICD-10-CM

## 2021-06-04 DIAGNOSIS — I42.9 CARDIOMYOPATHY, UNSPECIFIED TYPE (H): ICD-10-CM

## 2021-06-04 PROCEDURE — 99214 OFFICE O/P EST MOD 30 MIN: CPT | Performed by: INTERNAL MEDICINE

## 2021-06-04 NOTE — TELEPHONE ENCOUNTER
Reason for call:  Other   Patient called regarding (reason for call): NEUROLOGY ADULT REFERRAL     The patient and his wife Kyung were under the understanding that [facility] should be schedule an EMG    The order that [place] can see is not for an EMG.     Please contact the family to explain the care plan. Wife said they are scheduling EMG's into August.     They didn't feel a consult with Neuro consult isn't needed.   Additional comments:     Phone number to reach KYUNG MERIDA [phone]    Best Time:      Can we leave a detailed message on this number?  YES    Travel screening: Not Applicable

## 2021-06-04 NOTE — TELEPHONE ENCOUNTER
It was my intention to order an needle EMG examination and that is what the orders indicated I had ordered. If this is not what happened this is a problem with Epic electronic medical records and should be further explored.    The test requested is an needle EMG examination. Thank you for the update and orders placement, I have signed these orders    Lets make sure patient is receiving the right order which is for an needle EMG examination and not a neurological consultation     Holland Blunt MD

## 2021-06-04 NOTE — PROGRESS NOTES
Assessment & Plan     Numbness and tingling in right hand  I am seeing this patient on the heels of a prior evaluation of similar symptoms with his right hand. He's had a persistent sensation of tingling and numbess with the right hand, probably mostly in a carpal tunnel syndrome distribution although to a lesser degree he's saying the tingling and numbess type sensation also involves fingers 4 and 5. This was all a spontaneous onset. But not resolving and patient is more and more wanting an answer and potential treatment if available. He already tried a treatment with a medrol dosepak with no benefit and I am less convinced this is a cervical radiculopathy anyway. We talked about his options. The next logical starting point is with an needle EMG examination and further follow up depending on the test results , if carpal tunnel syndrome is confirmed he would need an orthopedic consultation   - NEUROLOGY ADULT REFERRAL    JOSE JUAN (obstructive sleep apnea)-severe (AHI 32)  Patient has continued intolerance of cpap machine and mask and is living with severe untreated obstructive sleep apnea. I am not surprised that he has a chronic fatigue and malaise. I had placed a referral to have a repeat discussion and evaluation with consultation with a sleep disorder specialist but patient did not schedule that appointment as he only wants to have a face to face encounter in person appointment which wasn't offered but I suspect it is now and I recommended he call the [facility] office numbers to be rescheduled     Cardiomyopathy, unspecified type (H)  Problem is stable and ongoing monitoring , see office visit notes with Dr. Toby Yee with [facility] from care everywhere     Essential hypertension with goal blood pressure less than 140/90  Out of control, to difficult to control with this gentleman with multiple co-morbidities and I did not deem to make any medication adjustments today      Irregular heart rhythm  See as detailed below, we considered a repeat Zio Patch but have opted for a posture of observation  For now. We could change our mind at anytime, we discussed wtwf,, update me if significant changes have taken place       Review of the result(s) of each unique test - EMG  Ordering of each unique test  38 minutes spent on the date of the encounter doing chart review, history and exam, documentation and further activities per the note      Return in about 6 months (around 12/4/2021).    Holland Blunt MD  [facility] YAQUELIN García is a 70 year old who presents for the following health issues     HPI   Chief Complaint   Patient presents with     Numbness     still having numbness in Rt hand      Fatigue     just feeling really tired, not wanting to do anything      Current medication for blood pressure is, per [facility] chart, see care everywhere . Energy chart doesn't list the furosemide [ Lasix ] at all  Furosemide [ Lasix ] 20 milligrams 1 time per day but is being taken prn  Toprol [metoprolol XL] 50 milligrams 1/2 tab per day [ decreased by Dr. Toby Yee with [facility]  In January 2021, see care everywhere ] also spironolactone was stopped  Losartan ( Cozaar ) 50 milligrams 1 time per day     History of Present Illness:  Gilbert entire cardiac history has been managed elsewhere, primarily at the [facility] system and the [facility] system. Objective evaluations include previous MRA scan showing a nonischemic cardiomyopathy. MRI 5/7/2020 calculated an ejection fraction of 42% with mild aortic regurgitation in the absence of ischemia. Echocardiogram 9/29/2020 suggested normal estimated ejection fraction of 55-60%. There was trileaflet aortic sclerosis without stenosis and mild aortic insufficiency.     Alex's has multiple questions he presented with. He would like to know if he has coronary artery disease. He  would like to know the status of his cardiac aortic valve. He is concerned in regards to his inadequately treated sleep apnea. He feels he has intermittent tachy dysrhythmias. He has progressive AM severe fatigue. He wishes to review his medical management and therapeutics.    Past Medical History:   1. Nonischemic cardiomyopathy based on serial MRI assessments with probably reduced baseline ejection fraction but recent outside echo suggesting normalization of EF.   2. Severe sleep apnea. Intolerance to CPAP therapy. Status post uvulectomy without follow-up for further assessment of sleep apnea.   3. Aortic sclerosis without stenosis and mild aortic insufficiency.   4. Nonobstructive coronary artery disease with previous coronary angiogram suggesting all stenoses less or equal to 25%.   5. Hyperlipidemia.   6. BMI at 40.   7. Hypertension.   8. ED.      We discussed his second Coronavirus (COVID-19) case. The important thing here is that patient is doing ok and current with vaccine for Coronavirus (COVID-19) status    ED/ Followup:    Facility:  [facility]   Date of visit: 05/06/2021  Reason for visit: Abrasion of right conjunctiva, initial encounter (Primary Dx)  Current Status: resolved.     He woke up with pain in his eye. Put dye in his eye with flourescein angiography and found a small corneal abrasion. This has resolved now, he did have     Right Hand tingling and numbess type sensation is really the chief complaint today. This developed spontaneous and has not resolved. He has normal motor function but the sensory disturbance is persistent. All the way from his elbow to fingertips initially but the elbow to the wrist resolved the first day, but his right hand has continued with feelings of numbness ever since, now nearly a month. He agrees that the sensation of 4th and 5th fingers are slightly different then the 1-3 fingers. He was on vacation in [place] at this time.    We discussed an needle EMG  examination and the possibly of orthopedic consultation versus neurosurgery     Second issue is problems with his heart rate , with problems noted since wearing his new apple watch which is giving him a report. He says he has had a heart rate of between 29 to 198. He wore a Zio Patch already earlier this year. He has some different cardiac arrhythmias, less then  10% of his heart rate and said that no pacemaker was needed. He got a second opinion with an [facility] cardiologist     Also wants to see a sleep disorder specialist in a face to face encounter       Review of Systems   Constitutional, HEENT, cardiovascular, pulmonary, gi and gu systems are negative, except as otherwise noted.      Objective    BP (!) 160/89   Pulse 87   Temp 98  F (36.7  C) (Oral)   Resp 16   Wt 108.9 kg (240 lb)   SpO2 96%   BMI 38.16 kg/m    Body mass index is 38.16 kg/m .  Physical Exam   GENERAL: healthy, alert and no distress  RESP: lungs clear to auscultation - no rales, rhonchi or wheezes  CV: regular rate and rhythm, normal S1 S2, no S3 or S4, no murmur, click or rub, no peripheral edema and peripheral pulses strong  MS: no gross musculoskeletal defects noted, no edema, no anatomic abnormality with hand or wrist or elbow exam. Has evidence of right sided shoulder pathology with limited range of motion but normal neck exam and normal grasp strength   SKIN: no suspicious lesions or rashes  NEURO: Normal strength and tone, mentation intact and speech normal    Orders Placed This Encounter   Procedures     NEUROLOGY ADULT REFERRAL

## 2021-06-04 NOTE — TELEPHONE ENCOUNTER
Routing to provider to clarify. It appears that referral placed today was for a consult with neurology. Please advise.

## 2021-06-07 ENCOUNTER — OFFICE VISIT (OUTPATIENT)
Dept: NEUROLOGY | Facility: CLINIC | Age: 71
End: 2021-06-07
Attending: INTERNAL MEDICINE
Payer: MEDICARE

## 2021-06-07 DIAGNOSIS — G56.21 ULNAR NEURITIS, RIGHT: ICD-10-CM

## 2021-06-07 DIAGNOSIS — R20.0 NUMBNESS OF RIGHT HAND: Primary | ICD-10-CM

## 2021-06-07 PROCEDURE — 95886 MUSC TEST DONE W/N TEST COMP: CPT | Performed by: PSYCHIATRY & NEUROLOGY

## 2021-06-07 PROCEDURE — 95909 NRV CNDJ TST 5-6 STUDIES: CPT | Performed by: PSYCHIATRY & NEUROLOGY

## 2021-06-07 NOTE — TELEPHONE ENCOUNTER
Called [facility].  EMG at [facility]: [facility] [phone]   [email or web address]    To verify the type of EMG (needle EMG) ordered by the PCP.    Verified that the needle EMG will be performed.    Called Kyung and informed her of the above.  Kyung verbalized understanding and has no further questions or concerns at this time.

## 2021-06-07 NOTE — LETTER
2021         RE: Ricky Brown  [address]  [facility] [address]        Dear Colleague,    Thank you for referring your patient, Ricky Brown, to the [facility] [place]. Please see a copy of my visit note below.    [facility]   EMG Laboratory      Nerve Conduction & EMG Report          Patient:       Ricky Brown  Patient ID:    [id number]  Gender:        Male  YOB: 1950  Age:           70 Years 6 Months      History and Examination:  Ricky Brown is a 70 year old man with a 3 week history of numbness in the right hand. All fingers are affected but the numbness is most severe in the fourth and fifth digits. Tone, bulk, and strength are normal and there is no numbness in the face or lower limb. He is referred for evaluation of suspected entrapment neuropathy.    Techniques:  Motor conduction studies were done with surface recording electrodes. Sensory conduction studies were performed with surface electrodes, unless indicated otherwise by (n), designating the use of subdermal recording electrodes. Temperature was monitored and recorded throughout the study. Upper extremities were maintained at a temperature of 32 degrees Centigrade or higher.  EMG was done with a concentric needle electrode.     Results:  Right median sensory and mixed nerve conduction studies demonstrated mild to moderate conduction slowing, accentuated in the transcarpal segment. Right ulnar sensory and mixed nerve conduction studies were normal. Right median and ulnar motor conduction studies demonstrated moderate prolongation of median distal latencies and moderately severe slowing of ulnar conduction across the elbow. Electromyography of the right upper limb was normal.     Interpretation:  This is an abnormal study, demonstrating electrophysiologic evidence of the followin. 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  2. Moderate  right median neuropathy at the wrist.   3. No evidence of right cervical radiculopathy.    Sai Stanton MD        Sensory NCS      Nerve / Sites Rec. Site Onset Peak Ref. NP Amp Ref. PP Amp Dist Lorenzo Ref. Temp     ms ms ms  V  V  V cm m/s m/s  C   R MEDIAN - Dig II Anti      Wrist Dig II 3.39 4.48  11.0 10.0 15.6 14 41.4 48.0 32.5   R ULNAR - Dig V Anti      Wrist Dig V 2.45 3.07  10.0 8.0 3.9 12.5 51.1 48.0 33.6   R MEDIAN - Ulnar - Palmar      Median Wrist 2.45 3.07 2.40 6.1  4.9 8 32.7  33.5      Ulnar Wrist 1.41 1.98 2.40 10.8  13.9 8 56.9  33.9       Motor NCS      Nerve / Sites Rec. Site Lat Ref. Amp Ref. Rel Amp Dist Lorenzo Ref. Dur. Area Temp.     ms ms mV mV % cm m/s m/s ms %  C   R MEDIAN - APB      Wrist APB 5.16 4.40 7.3 5.0 100 8   5.94 100 32.2      Elbow APB 9.58  7.0  95 22 49.7 48.0 6.20 101 32.1   R ULNAR - ADM      Wrist ADM 2.76 3.50 9.6 5.0 100 8   4.84 100 33.3      B.Elbow ADM 6.30  8.6  88.9 20 56.5 48.0 5.21 94.3 33      A.Elbow ADM 8.75  7.6  79.2 9 36.8 48.0 5.47 89.2 32.9   R MEDIAN - II Lumb      Median II Lumb 4.58  2.1  100 10   7.08 100 33.1      Ulnar Palm Int 2.92  2.0  95.5 10   5.94 68.6 33.3       EMG Summary Table     Spontaneous MUAP Recruitment    IA Fib PSW Fasc H.F. Amp Dur. PPP Pattern   R. PRON TERES N None None None None N N N N   R. BICEPS N None None None None N N N N   R. EXT DIG COMM N None None None None N N N N   R. FIRST D INTEROSS N None None None None N N N N   R. ABD POLL BREVIS N None None None None N N N N   R. TRICEPS N None None None None N N N N   R. C8 PSP N None None None None N N N N                          Again, thank you for allowing me to participate in the care of your patient.        Sincerely,        Sai Stanton MD

## 2021-06-07 NOTE — Clinical Note
See chart note addendum . Patient needs to be advised of his needle EMG examination results and why I am recommending orthopedic consultation . Orders placed

## 2021-06-07 NOTE — PROGRESS NOTES
[facility]   EMG Laboratory      Nerve Conduction & EMG Report          Patient:       Ricky Brown  Patient ID:    [id number]  Gender:        Male  YOB: 1950  Age:           70 Years 6 Months      History and Examination:  Ricky Brown is a 70 year old man with a 3 week history of numbness in the right hand. All fingers are affected but the numbness is most severe in the fourth and fifth digits. Tone, bulk, and strength are normal and there is no numbness in the face or lower limb. He is referred for evaluation of suspected entrapment neuropathy.    Techniques:  Motor conduction studies were done with surface recording electrodes. Sensory conduction studies were performed with surface electrodes, unless indicated otherwise by (n), designating the use of subdermal recording electrodes. Temperature was monitored and recorded throughout the study. Upper extremities were maintained at a temperature of 32 degrees Centigrade or higher.  EMG was done with a concentric needle electrode.     Results:  Right median sensory and mixed nerve conduction studies demonstrated mild to moderate conduction slowing, accentuated in the transcarpal segment. Right ulnar sensory and mixed nerve conduction studies were normal. Right median and ulnar motor conduction studies demonstrated moderate prolongation of median distal latencies and moderately severe slowing of ulnar conduction across the elbow. Electromyography of the right upper limb was normal.     Interpretation:  This is an abnormal study, demonstrating electrophysiologic evidence of the followin. 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  2. Moderate right median neuropathy at the wrist.   3. No evidence of right cervical radiculopathy.    Sai Stanton MD        Sensory NCS      Nerve / Sites Rec. Site Onset Peak Ref. NP Amp Ref. PP Amp Dist Lorenzo Ref. Temp     ms ms ms  V  V  V cm m/s m/s  C   R MEDIAN - Dig II Anti       Wrist Dig II 3.39 4.48  11.0 10.0 15.6 14 41.4 48.0 32.5   R ULNAR - Dig V Anti      Wrist Dig V 2.45 3.07  10.0 8.0 3.9 12.5 51.1 48.0 33.6   R MEDIAN - Ulnar - Palmar      Median Wrist 2.45 3.07 2.40 6.1  4.9 8 32.7  33.5      Ulnar Wrist 1.41 1.98 2.40 10.8  13.9 8 56.9  33.9       Motor NCS      Nerve / Sites Rec. Site Lat Ref. Amp Ref. Rel Amp Dist Lorenzo Ref. Dur. Area Temp.     ms ms mV mV % cm m/s m/s ms %  C   R MEDIAN - APB      Wrist APB 5.16 4.40 7.3 5.0 100 8   5.94 100 32.2      Elbow APB 9.58  7.0  95 22 49.7 48.0 6.20 101 32.1   R ULNAR - ADM      Wrist ADM 2.76 3.50 9.6 5.0 100 8   4.84 100 33.3      B.Elbow ADM 6.30  8.6  88.9 20 56.5 48.0 5.21 94.3 33      A.Elbow ADM 8.75  7.6  79.2 9 36.8 48.0 5.47 89.2 32.9   R MEDIAN - II Lumb      Median II Lumb 4.58  2.1  100 10   7.08 100 33.1      Ulnar Palm Int 2.92  2.0  95.5 10   5.94 68.6 33.3       EMG Summary Table     Spontaneous MUAP Recruitment    IA Fib PSW Fasc H.F. Amp Dur. PPP Pattern   R. PRON TERES N None None None None N N N N   R. BICEPS N None None None None N N N N   R. EXT DIG COMM N None None None None N N N N   R. FIRST D INTEROSS N None None None None N N N N   R. ABD POLL BREVIS N None None None None N N N N   R. TRICEPS N None None None None N N N N   R. C8 PSP N None None None None N N N N

## 2021-06-08 NOTE — PROGRESS NOTES
Please review the following with this patient .    We need to let patient know that his needle EMG examination is abnormal and confirms the presence of a peripheral neuropathy most likely ulnar neuritis , this is something that can be addressed with a brace and possibly surgery. I have place some orders for an orthopedic consultation to have further follow up on this condition and review your options    Holland Blunt MD

## 2021-06-09 ENCOUNTER — TELEPHONE (OUTPATIENT)
Dept: FAMILY MEDICINE | Facility: CLINIC | Age: 71
End: 2021-06-09

## 2021-06-09 ENCOUNTER — OFFICE VISIT (OUTPATIENT)
Dept: ORTHOPEDICS | Facility: CLINIC | Age: 71
End: 2021-06-09
Attending: INTERNAL MEDICINE
Payer: MEDICARE

## 2021-06-09 VITALS — OXYGEN SATURATION: 93 % | DIASTOLIC BLOOD PRESSURE: 86 MMHG | SYSTOLIC BLOOD PRESSURE: 146 MMHG | HEART RATE: 82 BPM

## 2021-06-09 DIAGNOSIS — G56.21 ULNAR NEURITIS, RIGHT: ICD-10-CM

## 2021-06-09 DIAGNOSIS — G56.01 RIGHT CARPAL TUNNEL SYNDROME: Primary | ICD-10-CM

## 2021-06-09 PROCEDURE — 99203 OFFICE O/P NEW LOW 30 MIN: CPT | Performed by: ORTHOPAEDIC SURGERY

## 2021-06-09 ASSESSMENT — PAIN SCALES - GENERAL: PAINLEVEL: MODERATE PAIN (5)

## 2021-06-09 NOTE — NURSING NOTE
Ricky to follow up with Primary Care provider regarding elevated blood pressure.    Gerhard MOLINA

## 2021-06-09 NOTE — LETTER
6/9/2021         RE: Ricky Brown  [address]  [facility] [address]        Dear Colleague,    Thank you for referring your patient, Ricky Brown, to the [facility]. Please see a copy of my visit note below.    SUBJECTIVE:  Ricky Brown is a 70 year old male who is seen in consultation at the request of Dr. Blunt for right arm and hand numbness.  The problems have been present x 3 weeks .   Onset/cause: started when making dinner 3 weeks ago suddenly the entire forearm became numb. 1-2 days later the forearm numbness resolved. No pain associated with this issue.    Has lost some motor control for fine activities.  Numbness is dense in all 5 fingers.    No neck issues.     Previous treatment: Medrol Dose Pack     Prior history of related problems: no prior problems with this area in the past.    Past Medical History:   Diagnosis Date     Arthritis      BCC (basal cell carcinoma)     right shoulder      Cardiomyopathy (H)      Colon adenomas 9/20/11     Coronary artery disease      ED (erectile dysfunction)      HTN (hypertension)      Noncompliance      Obesity      JOSE JUAN (obstructive sleep apnea)       Past Surgical History:   Procedure Laterality Date     ARTHROSCOPY SHOULDER BICEPS TENODESIS REPAIR  2/27/2014    Procedure: ARTHROSCOPY SHOULDER BICEPS TENODESIS REPAIR;;  Surgeon: Michael Hagen MD;  [facility]     ARTHROSCOPY SHOULDER ROTATOR CUFF REPAIR  2/27/2014    Procedure: ARTHROSCOPY SHOULDER ROTATOR CUFF REPAIR;  Right Shoulder Arthroscopic Rotator Cuff Repair,  Subacromial Decompression, Biceps Tenodesis, Distal Clavicle Excision   ;  Surgeon: Michael Hgaen MD;  [facility]     BIOPSY       CARDIAC SURGERY       COLONOSCOPY       EXCHANGE INTRAOCULAR LENS IMPLANT  2005    left eye - first, recentered PCL with iris fixation; then IOLX with ACL     EXTRACAPSULAR CATARACT EXTRATION WITH INTRAOCULAR LENS IMPLANT  2005     both eyes (elsewhere)     TURBINOPLASTY  2013    Procedure: TURBINOPLASTY;;  Surgeon: Lisset Parsons MD;  [facility]     UVULOPALATOPHARYNGOPLASTY  2013    Procedure: UVULOPALATOPHARYNGOPLASTY;  Uvulopalatopharyngoplasty, Turbiante Reduction;  Surgeon: Lisset Parsons MD;  Location: [facility] OR      Family History   Problem Relation Age of Onset     Diabetes Father         Old age Diabetes     Cerebrovascular Disease Father      Obesity Father      Heart Disease Father      Coronary Artery Disease Father      Hypertension Father      Lipids Sister      C.A.D. No family hx of      Cancer No family hx of      Glaucoma No family hx of      Macular Degeneration No family hx of       Social History     Socioeconomic History     Marital status:      Spouse name: Kyung     Number of children: 0     Years of education: 14     Highest education level: Not on file   Occupational History     Occupation: industrial electronics      Employer: SELF   Social Needs     Financial resource strain: Not on file     Food insecurity     Worry: Not on file     Inability: Not on file     Transportation needs     Medical: Not on file     Non-medical: Not on file   Tobacco Use     Smoking status: Former Smoker     Packs/day: 0.50     Years: 2.00     Pack years: 1.00     Types: Cigarettes     Quit date: 1996     Years since quittin.5     Smokeless tobacco: Never Used   Substance and Sexual Activity     Alcohol use: Yes     Alcohol/week: 8.3 standard drinks     Frequency: 4 or more times a week     Comment: Evening Marie     Drug use: No     Sexual activity: Yes     Partners: Female     Birth control/protection: Male Surgical     Comment: 2006 vasectomy   Lifestyle     Physical activity     Days per week: Not on file     Minutes per session: Not on file     Stress: Not on file   Relationships     Social connections     Talks on phone: Not on file     Gets together: Not on file     Attends  Bahai service: Not on file     Active member of club or organization: Not on file     Attends meetings of clubs or organizations: Not on file     Relationship status: Not on file     Intimate partner violence     Fear of current or ex partner: Not on file     Emotionally abused: Not on file     Physically abused: Not on file     Forced sexual activity: Not on file   Other Topics Concern     Parent/sibling w/ CABG, MI or angioplasty before 65F 55M? No   Social History Narrative     Not on file         REVIEW OF SYSTEMS:  CONSTITUTIONAL:  NEGATIVE for fever, chills, change in weight  INTEGUMENTARY/SKIN:  NEGATIVE for worrisome rashes, moles or lesions  EYES:  NEGATIVE for vision changes or irritation  ENT/MOUTH:  NEGATIVE for ear, mouth and throat problems  RESP:  NEGATIVE for significant cough or SOB  BREAST:  NEGATIVE for masses, tenderness or discharge  CV:  NEGATIVE for chest pain, palpitations or peripheral edema  GI:  NEGATIVE for nausea, abdominal pain, heartburn, or change in bowel habits  :  Negative   MUSCULOSKELETAL:  See HPI above  NEURO: see HPI  ENDOCRINE:  NEGATIVE for temperature intolerance, skin/hair changes  HEME/ALLERGY/IMMUNE:  NEGATIVE for bleeding problems  PSYCHIATRIC:  NEGATIVE for changes in mood or affect     BP (!) 146/86 (BP Location: Left arm, Patient Position: Sitting, Cuff Size: Adult Regular)   Pulse 82   SpO2 93%     EXAM:  GENERAL APPEARANCE: healthy, alert and no distress   GAIT: NORMAL  SKIN: no suspicious lesions or rashes  PSYCH:  mentation appears normal and affect normal/bright  RESP: No increased work of breathing  LYMPH:  No lymphedema  PULSES:  Intact.    MUSCULOSKELETAL:    right ELBOW:  Inspection: no swelling  Tenderness: non-tender   Range of Motion: full range of motion     Tinels at Cubital tunnel: negative.  At Carpal tunnel: negative    Special tests medial elbow ulnar nerve:    Flexion/compression test negative.    NEURO:    strength: normal,    thenar  fasiculations: positive     Thenar atrophy: none.    Sensation diminished in  all digits and palm,    reflexes normal in upper extremities.   Interossei: strength normal, atrophy? no  Thenar atrophy? no Fasiculations: equivocal  Froment's: neg   Clawing: no   strength: good  Sensation loss: all digits    Special tests wrist:    Phalen's negative.    Ulnar piano sign: negative   Ulnar foveal tenderness:  negative    EM21: Interpretation:  This is an abnormal study, demonstrating electrophysiologic evidence of the followin. 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  2. Moderate right median neuropathy at the wrist.   No evidence of right cervical radiculopathy.    ASSESSMENT/PLAN:  Encounter Diagnoses   Name Primary?     Right carpal tunnel syndrome Yes     Ulnar neuritis, right     the circumstances/ causes of the problem are not clear.    Plan:  Avoid leaning on  Elbow, hyperflexing.  Night brace for wrist  Image guided corticosteroid injection right carpal tunnel     Return to clinic in 4 week(s) if still symptoms.    DORYS Bravo MD  Dept. Orthopedic Surgery  [facility]        Again, thank you for allowing me to participate in the care of your patient.        Sincerely,        Tony Bravo MD

## 2021-06-09 NOTE — TELEPHONE ENCOUNTER
Please follow up with pt per Dr. Blunt request regarding needle EMG results from Neurology OV 6/7/21 with Dr. Stanton. Please see below:    Holland Blunt MD P Fz Rn Triage Pool             See chart note addendum . Patient needs to be advised of his needle EMG examination results and why I am recommending orthopedic consultation . Orders placed      Holland Blunt MD at 6/7/2021  2:00 PM    Status: Signed      Please review the following with this patient .     We need to let patient know that his needle EMG examination is abnormal and confirms the presence of a peripheral neuropathy most likely ulnar neuritis , this is something that can be addressed with a brace and possibly surgery. I have place some orders for an orthopedic consultation to have further follow up on this condition and review your options     Holland AIKEN RN, BSN  [facility]

## 2021-06-09 NOTE — TELEPHONE ENCOUNTER
Writer called pt to inform of message from Dr. Blunt. Pt reported he was currently walking in to the clinic from the parking lot for his orthopedic appointment. Pt said he would be inside in a minute to talk. Writer was unable to relay message below.    Unsure if information will be relayed at appointment. Please follow up.    Maddy AIKEN RN, BSN  [facility]

## 2021-06-09 NOTE — PROGRESS NOTES
SUBJECTIVE:  Ricky Brown is a 70 year old male who is seen in consultation at the request of Dr. Blunt for right arm and hand numbness.  The problems have been present x 3 weeks .   Onset/cause: started when making dinner 3 weeks ago suddenly the entire forearm became numb. 1-2 days later the forearm numbness resolved. No pain associated with this issue.    Has lost some motor control for fine activities.  Numbness is dense in all 5 fingers.    No neck issues.     Previous treatment: Medrol Dose Pack     Prior history of related problems: no prior problems with this area in the past.    Past Medical History:   Diagnosis Date     Arthritis      BCC (basal cell carcinoma)     right shoulder      Cardiomyopathy (H)      Colon adenomas 9/20/11     Coronary artery disease      ED (erectile dysfunction)      HTN (hypertension)      Noncompliance      Obesity      JOSE JUAN (obstructive sleep apnea)       Past Surgical History:   Procedure Laterality Date     ARTHROSCOPY SHOULDER BICEPS TENODESIS REPAIR  2/27/2014    Procedure: ARTHROSCOPY SHOULDER BICEPS TENODESIS REPAIR;;  Surgeon: Michael Hagen MD;  [facility]     ARTHROSCOPY SHOULDER ROTATOR CUFF REPAIR  2/27/2014    Procedure: ARTHROSCOPY SHOULDER ROTATOR CUFF REPAIR;  Right Shoulder Arthroscopic Rotator Cuff Repair,  Subacromial Decompression, Biceps Tenodesis, Distal Clavicle Excision   ;  Surgeon: Michael Hagen MD;  [facility]     BIOPSY       CARDIAC SURGERY       COLONOSCOPY       EXCHANGE INTRAOCULAR LENS IMPLANT  2005    left eye - first, recentered PCL with iris fixation; then IOLX with ACL     EXTRACAPSULAR CATARACT EXTRATION WITH INTRAOCULAR LENS IMPLANT  2005    both eyes (elsewhere)     TURBINOPLASTY  12/11/2013    Procedure: TURBINOPLASTY;;  Surgeon: Lisset Parsons MD;  Location:  OR     UVULOPALATOPHARYNGOPLASTY  12/11/2013    Procedure: UVULOPALATOPHARYNGOPLASTY;  Uvulopalatopharyngoplasty, Turbiante  Reduction;  Surgeon: Lisset Parsons MD;  Location:  OR      Family History   Problem Relation Age of Onset     Diabetes Father         Old age Diabetes     Cerebrovascular Disease Father      Obesity Father      Heart Disease Father      Coronary Artery Disease Father      Hypertension Father      Lipids Sister      C.A.D. No family hx of      Cancer No family hx of      Glaucoma No family hx of      Macular Degeneration No family hx of       Social History     Socioeconomic History     Marital status:      Spouse name: Kyung     Number of children: 0     Years of education: 14     Highest education level: Not on file   Occupational History     Occupation: industrial electronics      Employer: SELF   Social Needs     Financial resource strain: Not on file     Food insecurity     Worry: Not on file     Inability: Not on file     Transportation needs     Medical: Not on file     Non-medical: Not on file   Tobacco Use     Smoking status: Former Smoker     Packs/day: 0.50     Years: 2.00     Pack years: 1.00     Types: Cigarettes     Quit date: 1996     Years since quittin.5     Smokeless tobacco: Never Used   Substance and Sexual Activity     Alcohol use: Yes     Alcohol/week: 8.3 standard drinks     Frequency: 4 or more times a week     Comment: Evening Marie     Drug use: No     Sexual activity: Yes     Partners: Female     Birth control/protection: Male Surgical     Comment: 2006 vasectomy   Lifestyle     Physical activity     Days per week: Not on file     Minutes per session: Not on file     Stress: Not on file   Relationships     Social connections     Talks on phone: Not on file     Gets together: Not on file     Attends Holiness service: Not on file     Active member of club or organization: Not on file     Attends meetings of clubs or organizations: Not on file     Relationship status: Not on file     Intimate partner violence     Fear of current or ex partner: Not on file      Emotionally abused: Not on file     Physically abused: Not on file     Forced sexual activity: Not on file   Other Topics Concern     Parent/sibling w/ CABG, MI or angioplasty before 65F 55M? No   Social History Narrative     Not on file         REVIEW OF SYSTEMS:  CONSTITUTIONAL:  NEGATIVE for fever, chills, change in weight  INTEGUMENTARY/SKIN:  NEGATIVE for worrisome rashes, moles or lesions  EYES:  NEGATIVE for vision changes or irritation  ENT/MOUTH:  NEGATIVE for ear, mouth and throat problems  RESP:  NEGATIVE for significant cough or SOB  BREAST:  NEGATIVE for masses, tenderness or discharge  CV:  NEGATIVE for chest pain, palpitations or peripheral edema  GI:  NEGATIVE for nausea, abdominal pain, heartburn, or change in bowel habits  :  Negative   MUSCULOSKELETAL:  See HPI above  NEURO: see HPI  ENDOCRINE:  NEGATIVE for temperature intolerance, skin/hair changes  HEME/ALLERGY/IMMUNE:  NEGATIVE for bleeding problems  PSYCHIATRIC:  NEGATIVE for changes in mood or affect     BP (!) 146/86 (BP Location: Left arm, Patient Position: Sitting, Cuff Size: Adult Regular)   Pulse 82   SpO2 93%     EXAM:  GENERAL APPEARANCE: healthy, alert and no distress   GAIT: NORMAL  SKIN: no suspicious lesions or rashes  PSYCH:  mentation appears normal and affect normal/bright  RESP: No increased work of breathing  LYMPH:  No lymphedema  PULSES:  Intact.    MUSCULOSKELETAL:    right ELBOW:  Inspection: no swelling  Tenderness: non-tender   Range of Motion: full range of motion     Tinels at Cubital tunnel: negative.  At Carpal tunnel: negative    Special tests medial elbow ulnar nerve:    Flexion/compression test negative.    NEURO:    strength: normal,    thenar fasiculations: positive     Thenar atrophy: none.    Sensation diminished in  all digits and palm,    reflexes normal in upper extremities.   Interossei: strength normal, atrophy? no  Thenar atrophy? no Fasiculations: equivocal  Froment's: neg   Clawing: no    strength: good  Sensation loss: all digits    Special tests wrist:    Phalen's negative.    Ulnar piano sign: negative   Ulnar foveal tenderness:  negative    EM21: Interpretation:  This is an abnormal study, demonstrating electrophysiologic evidence of the followin. 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  2. Moderate right median neuropathy at the wrist.   No evidence of right cervical radiculopathy.    ASSESSMENT/PLAN:  Encounter Diagnoses   Name Primary?     Right carpal tunnel syndrome Yes     Ulnar neuritis, right     the circumstances/ causes of the problem are not clear.    Plan:  Avoid leaning on  Elbow, hyperflexing.  Night brace for wrist  Image guided corticosteroid injection right carpal tunnel     Return to clinic in 4 week(s) if still symptoms.    DORYS Bravo MD  Dept. Orthopedic Surgery  [facility]

## 2021-06-11 NOTE — TELEPHONE ENCOUNTER
Patient notified of this message and he stated that he was already aware. No further action needed at this time.    KARMEN BurlesonN RN  [facility], [place]

## 2021-06-23 ENCOUNTER — OFFICE VISIT (OUTPATIENT)
Dept: ORTHOPEDICS | Facility: CLINIC | Age: 71
End: 2021-06-23
Attending: ORTHOPAEDIC SURGERY
Payer: MEDICARE

## 2021-06-23 VITALS — WEIGHT: 240 LBS | BODY MASS INDEX: 37.67 KG/M2 | HEIGHT: 67 IN

## 2021-06-23 DIAGNOSIS — G56.01 RIGHT CARPAL TUNNEL SYNDROME: ICD-10-CM

## 2021-06-23 PROCEDURE — 20526 THER INJECTION CARP TUNNEL: CPT | Mod: RT | Performed by: FAMILY MEDICINE

## 2021-06-23 PROCEDURE — 76942 ECHO GUIDE FOR BIOPSY: CPT | Performed by: FAMILY MEDICINE

## 2021-06-23 RX ADMIN — TRIAMCINOLONE ACETONIDE 40 MG: 40 INJECTION, SUSPENSION INTRA-ARTICULAR; INTRAMUSCULAR at 16:20

## 2021-06-23 RX ADMIN — LIDOCAINE HYDROCHLORIDE 2 ML: 10 INJECTION, SOLUTION INFILTRATION; PERINEURAL at 16:20

## 2021-06-23 ASSESSMENT — MIFFLIN-ST. JEOR: SCORE: 1809.32

## 2021-06-23 NOTE — PROGRESS NOTES
Ricky Brown  :  1950  DOS: 2021  MRN: [id number]    Sports Medicine Clinic Procedure    Ultrasound Guided right Carpal Tunnel Injection    Clinical History: Gradual onset of right hand numbness/tingling over the past 6+ weeks.     Diagnosis:   1. Right carpal tunnel syndrome       Referring Physician: ARI Bravo MD  Hand / Upper Extremity Injection/Arthrocentesis: R carpal tunnel    Date/Time: 2021 4:20 PM  Performed by: Marcelo Beyer DO  Authorized by: Marcelo Beyer DO     Indications:  Therapeutic and diagnostic  Needle Size:  25 G  Guidance: ultrasound    Approach:  Volar  Condition: carpal tunnel      Site:  R carpal tunnel  Medications:  40 mg triamcinolone 40 MG/ML; 2 mL lidocaine 1 %  Outcome:  Tolerated well, no immediate complications  Procedure discussed: discussed risks, benefits, and alternatives    Consent Given by:  Patient  Timeout: timeout called immediately prior to procedure    Prep: patient was prepped and draped in usual sterile fashion     Hydrodissection of median nerve in carpal tunnel performed, with fenestration of transverse carpal ligament        Impression:  Successful right carpal tunnel injection    Plan:  Follow up with Dr Bravo as directed, especially if no significant benefit after 2 weeks  Incidentally noted bifurcated median nerve which starts to combine at and just distal to the carpal tunnel, may be important for surgery if pursued in the future  Expectations and goals of CSI reviewed  Often 2-3 days for steroid effect, and can take up to two weeks for maximum effect  We discussed modified progressive pain-free activity as tolerated  Do not overuse in first two weeks if feeling better due to concern for vulnerability while steroid is working  Supportive care reviewed  All questions were answered today  Contact us with additional questions or concerns  Signs and sx of concern reviewed      Marcelo Beyer DO, CATIANA  Primary  Care Sports Medicine  [facility]

## 2021-06-23 NOTE — Clinical Note
Interesting nerve, bifurcated proximal to the carpal tunnel and combines as it courses through and distal to the TCL.  Not sure if that would be helpful to know if he ever had surgery.    Marcelo Beyer DO, CAQ  Sports Medicine Physician  [facility]

## 2021-06-23 NOTE — LETTER
2021         RE: Ricky Brown  [address]  [place] [place] [address]        Dear Colleague,    Thank you for referring your patient, Ricky Brown, to the [facility]. Please see a copy of my visit note below.    Ricky Brown  :  1950  DOS: 2021  MRN: [id number]    Sports Medicine Clinic Procedure    Ultrasound Guided right Carpal Tunnel Injection    Clinical History: Gradual onset of right hand numbness/tingling over the past 6+ weeks.     Diagnosis:   1. Right carpal tunnel syndrome       Referring Physician: ARI Bravo MD  Hand / Upper Extremity Injection/Arthrocentesis: R carpal tunnel    Date/Time: 2021 4:20 PM  Performed by: Marcelo Beyer DO  Authorized by: Marcelo Beyer DO     Indications:  Therapeutic and diagnostic  Needle Size:  25 G  Guidance: ultrasound    Approach:  Volar  Condition: carpal tunnel      Site:  R carpal tunnel  Medications:  40 mg triamcinolone 40 MG/ML; 2 mL lidocaine 1 %  Outcome:  Tolerated well, no immediate complications  Procedure discussed: discussed risks, benefits, and alternatives    Consent Given by:  Patient  Timeout: timeout called immediately prior to procedure    Prep: patient was prepped and draped in usual sterile fashion     Hydrodissection of median nerve in carpal tunnel performed, with fenestration of transverse carpal ligament        Impression:  Successful right carpal tunnel injection    Plan:  Follow up with Dr Bravo as directed, especially if no significant benefit after 2 weeks  Incidentally noted bifurcated median nerve which starts to combine at and just distal to the carpal tunnel, may be important for surgery if pursued in the future  Expectations and goals of CSI reviewed  Often 2-3 days for steroid effect, and can take up to two weeks for maximum effect  We discussed modified progressive pain-free activity as tolerated  Do not overuse  in first two weeks if feeling better due to concern for vulnerability while steroid is working  Supportive care reviewed  All questions were answered today  Contact us with additional questions or concerns  Signs and sx of concern reviewed      Marcelo Beyer DO, CAQ  Primary Care Sports Medicine  [facility]       Again, thank you for allowing me to participate in the care of your patient.        Sincerely,        Marcelo Beyer DO

## 2021-06-24 RX ORDER — TRIAMCINOLONE ACETONIDE 40 MG/ML
40 INJECTION, SUSPENSION INTRA-ARTICULAR; INTRAMUSCULAR
Status: DISCONTINUED | OUTPATIENT
Start: 2021-06-23 | End: 2021-08-13

## 2021-06-24 RX ORDER — LIDOCAINE HYDROCHLORIDE 10 MG/ML
2 INJECTION, SOLUTION INFILTRATION; PERINEURAL
Status: DISCONTINUED | OUTPATIENT
Start: 2021-06-23 | End: 2021-08-13

## 2021-07-07 ENCOUNTER — OFFICE VISIT (OUTPATIENT)
Dept: ORTHOPEDICS | Facility: CLINIC | Age: 71
End: 2021-07-07
Payer: MEDICARE

## 2021-07-07 ENCOUNTER — PREP FOR PROCEDURE (OUTPATIENT)
Dept: ORTHOPEDICS | Facility: CLINIC | Age: 71
End: 2021-07-07

## 2021-07-07 VITALS
DIASTOLIC BLOOD PRESSURE: 79 MMHG | HEART RATE: 74 BPM | OXYGEN SATURATION: 98 % | WEIGHT: 231 LBS | SYSTOLIC BLOOD PRESSURE: 168 MMHG | BODY MASS INDEX: 36.26 KG/M2 | HEIGHT: 67 IN

## 2021-07-07 DIAGNOSIS — G56.01 RIGHT CARPAL TUNNEL SYNDROME: Primary | ICD-10-CM

## 2021-07-07 DIAGNOSIS — G56.21 ULNAR NEURITIS, RIGHT: ICD-10-CM

## 2021-07-07 DIAGNOSIS — G56.21 CUBITAL TUNNEL SYNDROME ON RIGHT: ICD-10-CM

## 2021-07-07 PROCEDURE — 99214 OFFICE O/P EST MOD 30 MIN: CPT | Performed by: ORTHOPAEDIC SURGERY

## 2021-07-07 ASSESSMENT — PAIN SCALES - GENERAL: PAINLEVEL: MODERATE PAIN (4)

## 2021-07-07 ASSESSMENT — MIFFLIN-ST. JEOR: SCORE: 1776.44

## 2021-07-07 NOTE — LETTER
"    2021         RE: Ricky Brown  [address]  [place] [facility] [address]        Dear Colleague,    Thank you for referring your patient, Ricky Brown, to the [facility]. Please see a copy of my visit note below.    SUBJECTIVE:   Ricky Brown is here in follow up of his Ulnar Neuritis and Carpal Tunnel Syndrome of the right upper extremity.     Onset/cause: started when making dinner 7weeks ago suddenly the entire forearm became numb. 1-2 days later the forearm numbness resolved. No pain associated with this issue    Present symptoms: still can't feel buttons to push. Can't feel keys in pocket. 5th finger included (all fingers)    Treatments tried to this point: US guided right carpal tunnel steroid injection on 21 with Sports Medicine.   Bifurcation of the nerve distal to the carpal tunnel was noted.    EM21: Interpretation:  This is an abnormal study, demonstrating electrophysiologic evidence of the followin. 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  2. Moderate right median neuropathy at the wrist.   No evidence of right cervical radiculopathy.    Wearing brace, avoiding stretch and pressure on cubital tunnel     Review of Systems:  Constitutional/General: Negative for fever, chills, change in weight  Integumentary/Skin: Negative for worrisome rashes, moles, or lesions  Neuro: Negative for weakness, dizziness, or paresthesias   Psychiatric: negative for changes in mood or affect    OBJECTIVE:  Physical Exam:  BP (!) 168/79 (BP Location: Left arm, Patient Position: Sitting, Cuff Size: Adult Regular)   Pulse 74   Ht 1.702 m (5' 7\")   Wt 104.8 kg (231 lb)   SpO2 98%   BMI 36.18 kg/m    General Appearance: healthy, alert and no distress   Skin: no suspicious lesions or rashes  Neuro: Normal strength and tone, mentation intact and speech normal  Vascular: good pulses, and capillary refill   Lymph: no lymphadenopathy   Psych:  " mentation appears normal and affect normal/bright  Resp: no increased work of breathing    Neg tinels at wrist  Froment's mildly positive   slightly weak  No clawing  Good abduction and adduction strength of fingers  All fingers decreased sensation    Right Elbow Exam:  +tinels a cubital tunnel     ASSESSMENT:   Carpal tunnel syndrome moderate  Cubital tunnel mild-moderat    PLAN:   Right CARPAL TUNNEL RELEASE, and activity modification for the cubital tunnel, because if he needs transposition of the ulnar nerve, there could be unacceptable complications, so we'll see how he responds to non-op treatment for the cubital tunnel and could do decompression, possible transposition later if needed.  His usual function is being greatly compromised by the carpal tunnel syndrome.  Carpal tunnel release:  We discussed the procedure of open carpal tunnel release. We discussed the risks, which include but are not limited to incisional tenderness/pillar pain, infection, minor or major neurovascular injury, tendon laceration, finger stiffness and failure to relieve symptoms.  We also discussed the alternatives, including nonsurgical options, and alternatives surgical options such as minimally invasive carpal tunnel release.  We discussed the pros and cons of open carpal tunnel release versus minimally invasive carpal tunnel release.      Total time spent was 30 minutes; including but not limited to prep time and discussion.      DORYS Bravo MD  Dept. Orthopedic Surgery  [facility]           Again, thank you for allowing me to participate in the care of your patient.        Sincerely,        Tony Bravo MD

## 2021-07-07 NOTE — PROGRESS NOTES
"SUBJECTIVE:   Ricky Brown is here in follow up of his Ulnar Neuritis and Carpal Tunnel Syndrome of the right upper extremity.     Onset/cause: started when making dinner 7weeks ago suddenly the entire forearm became numb. 1-2 days later the forearm numbness resolved. No pain associated with this issue    Present symptoms: still can't feel buttons to push. Can't feel keys in pocket. 5th finger included (all fingers)    Treatments tried to this point: US guided right carpal tunnel steroid injection on 21 with Sports Medicine.   Bifurcation of the nerve distal to the carpal tunnel was noted.    EM21: Interpretation:  This is an abnormal study, demonstrating electrophysiologic evidence of the followin. 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  2. Moderate right median neuropathy at the wrist.   No evidence of right cervical radiculopathy.    Wearing brace, avoiding stretch and pressure on cubital tunnel     Review of Systems:  Constitutional/General: Negative for fever, chills, change in weight  Integumentary/Skin: Negative for worrisome rashes, moles, or lesions  Neuro: Negative for weakness, dizziness, or paresthesias   Psychiatric: negative for changes in mood or affect    OBJECTIVE:  Physical Exam:  BP (!) 168/79 (BP Location: Left arm, Patient Position: Sitting, Cuff Size: Adult Regular)   Pulse 74   Ht 1.702 m (5' 7\")   Wt 104.8 kg (231 lb)   SpO2 98%   BMI 36.18 kg/m    General Appearance: healthy, alert and no distress   Skin: no suspicious lesions or rashes  Neuro: Normal strength and tone, mentation intact and speech normal  Vascular: good pulses, and capillary refill   Lymph: no lymphadenopathy   Psych:  mentation appears normal and affect normal/bright  Resp: no increased work of breathing    Neg tinels at wrist  Froment's mildly positive   slightly weak  No clawing  Good abduction and adduction strength of fingers  All fingers decreased sensation    Right Elbow " Exam:  +tinels a cubital tunnel     ASSESSMENT:   Carpal tunnel syndrome moderate  Cubital tunnel mild-moderat    PLAN:   Right CARPAL TUNNEL RELEASE, and activity modification for the cubital tunnel, because if he needs transposition of the ulnar nerve, there could be unacceptable complications, so we'll see how he responds to non-op treatment for the cubital tunnel and could do decompression, possible transposition later if needed.  His usual function is being greatly compromised by the carpal tunnel syndrome.  Carpal tunnel release:  We discussed the procedure of open carpal tunnel release. We discussed the risks, which include but are not limited to incisional tenderness/pillar pain, infection, minor or major neurovascular injury, tendon laceration, finger stiffness and failure to relieve symptoms.  We also discussed the alternatives, including nonsurgical options, and alternatives surgical options such as minimally invasive carpal tunnel release.  We discussed the pros and cons of open carpal tunnel release versus minimally invasive carpal tunnel release.      Total time spent was 30 minutes; including but not limited to prep time and discussion.      DORYS Bravo MD  Dept. Orthopedic Surgery  [facility]

## 2021-07-08 ENCOUNTER — TELEPHONE (OUTPATIENT)
Dept: ORTHOPEDICS | Facility: CLINIC | Age: 71
End: 2021-07-08

## 2021-07-13 DIAGNOSIS — Z11.59 ENCOUNTER FOR SCREENING FOR OTHER VIRAL DISEASES: ICD-10-CM

## 2021-07-13 PROBLEM — G56.01 RIGHT CARPAL TUNNEL SYNDROME: Status: ACTIVE | Noted: 2021-07-13

## 2021-07-13 NOTE — TELEPHONE ENCOUNTER
Called patient and spoke with wife.  was in shower. She states surgery needs to be in August because of recent injection. Offered 8-13 and 8-27. She will talk it over with  and employer and call back to schedule.  
Type of surgery: Release right carpal tunnel right   CPT 21971   Right carpal tunnel syndrome G56.01     Ulnar neuritis, right G56.21     Cubital tunnel syndrome on right G56.21    Location of surgery: [facility]  Date and time of surgery: 08/13/2021  Surgeon: Lawrence  Pre-Op Appt Date: 08/06/2021  Post-Op Appt Date: 09/01/2021   Packet sent out: Yes  Pre-cert/Authorization completed:    No prior auth needed for Medicare and [facility]  Date: 07/13/21    Thank you,   Brandee Zimmerman   Prior Authorization [facility]  [phone]    
I, Rashel Ross MD, personally saw the patient with the PA, and completed the key components of the history and physical exam. I then discussed the management plan with the PA.

## 2021-07-19 DIAGNOSIS — I10 ESSENTIAL HYPERTENSION WITH GOAL BLOOD PRESSURE LESS THAN 140/90: ICD-10-CM

## 2021-07-21 RX ORDER — LOSARTAN POTASSIUM 50 MG/1
TABLET ORAL
Qty: 90 TABLET | Refills: 0 | Status: SHIPPED | OUTPATIENT
Start: 2021-07-21 | End: 2021-10-04

## 2021-07-21 NOTE — TELEPHONE ENCOUNTER
Routing refill request to provider for review/approval because:  BP    BP Readings from Last 3 Encounters:   07/07/21 (!) 168/79   06/09/21 (!) 146/86   06/04/21 (!) 160/89              Pending Prescriptions:                       Disp   Refills    losartan (COZAAR) 50 MG tablet [Pharmacy M*90 tab*0        Sig: TAKE ONE TABLET BY MOUTH ONE TIME DAILY         Alok Daugherty RN

## 2021-07-30 ENCOUNTER — APPOINTMENT (OUTPATIENT)
Dept: CT IMAGING | Facility: CLINIC | Age: 71
End: 2021-07-30
Attending: EMERGENCY MEDICINE
Payer: MEDICARE

## 2021-07-30 ENCOUNTER — HOSPITAL ENCOUNTER (EMERGENCY)
Facility: CLINIC | Age: 71
Discharge: HOME | End: 2021-07-30
Attending: EMERGENCY MEDICINE | Admitting: EMERGENCY MEDICINE
Payer: MEDICARE

## 2021-07-30 ENCOUNTER — TELEPHONE (OUTPATIENT)
Dept: FAMILY MEDICINE | Facility: CLINIC | Age: 71
End: 2021-07-30

## 2021-07-30 VITALS
TEMPERATURE: 97.3 F | HEART RATE: 74 BPM | SYSTOLIC BLOOD PRESSURE: 175 MMHG | RESPIRATION RATE: 18 BRPM | WEIGHT: 232.1 LBS | OXYGEN SATURATION: 94 % | DIASTOLIC BLOOD PRESSURE: 93 MMHG | BODY MASS INDEX: 36.35 KG/M2

## 2021-07-30 DIAGNOSIS — R10.32 ABDOMINAL PAIN, LEFT LOWER QUADRANT: ICD-10-CM

## 2021-07-30 DIAGNOSIS — R82.90 ABNORMAL FINDING IN URINE: ICD-10-CM

## 2021-07-30 DIAGNOSIS — R82.90 ABNORMAL FINDING IN URINE: Primary | ICD-10-CM

## 2021-07-30 LAB
ALBUMIN SERPL-MCNC: 3.7 G/DL (ref 3.4–5)
ALBUMIN UR-MCNC: NEGATIVE MG/DL
ALP SERPL-CCNC: 74 U/L (ref 40–150)
ALT SERPL W P-5'-P-CCNC: 31 U/L (ref 0–70)
ANION GAP SERPL CALCULATED.3IONS-SCNC: 7 MMOL/L (ref 3–14)
APPEARANCE UR: ABNORMAL
AST SERPL W P-5'-P-CCNC: 23 U/L (ref 0–45)
BASOPHILS # BLD AUTO: 0 10E3/UL (ref 0–0.2)
BASOPHILS NFR BLD AUTO: 0 %
BILIRUB SERPL-MCNC: 0.4 MG/DL (ref 0.2–1.3)
BILIRUB UR QL STRIP: NEGATIVE
BUN SERPL-MCNC: 27 MG/DL (ref 7–30)
CALCIUM SERPL-MCNC: 8.9 MG/DL (ref 8.5–10.1)
CHLORIDE BLD-SCNC: 109 MMOL/L (ref 94–109)
CO2 SERPL-SCNC: 23 MMOL/L (ref 20–32)
COLOR UR AUTO: ABNORMAL
CREAT SERPL-MCNC: 1.28 MG/DL (ref 0.66–1.25)
EOSINOPHIL # BLD AUTO: 0 10E3/UL (ref 0–0.7)
EOSINOPHIL NFR BLD AUTO: 0 %
ERYTHROCYTE [DISTWIDTH] IN BLOOD BY AUTOMATED COUNT: 14.4 % (ref 10–15)
GFR SERPL CREATININE-BSD FRML MDRD: 57 ML/MIN/1.73M2
GLUCOSE BLD-MCNC: 124 MG/DL (ref 70–99)
GLUCOSE UR STRIP-MCNC: NEGATIVE MG/DL
HCT VFR BLD AUTO: 45.3 % (ref 40–53)
HGB BLD-MCNC: 14.8 G/DL (ref 13.3–17.7)
HGB UR QL STRIP: ABNORMAL
HYALINE CASTS: 1 /LPF
IMM GRANULOCYTES # BLD: 0.1 10E3/UL
IMM GRANULOCYTES NFR BLD: 1 %
KETONES UR STRIP-MCNC: NEGATIVE MG/DL
LEUKOCYTE ESTERASE UR QL STRIP: NEGATIVE
LYMPHOCYTES # BLD AUTO: 1.1 10E3/UL (ref 0.8–5.3)
LYMPHOCYTES NFR BLD AUTO: 10 %
MCH RBC QN AUTO: 28.6 PG (ref 26.5–33)
MCHC RBC AUTO-ENTMCNC: 32.7 G/DL (ref 31.5–36.5)
MCV RBC AUTO: 88 FL (ref 78–100)
MONOCYTES # BLD AUTO: 1.2 10E3/UL (ref 0–1.3)
MONOCYTES NFR BLD AUTO: 10 %
MUCOUS THREADS #/AREA URNS LPF: PRESENT /LPF
NEUTROPHILS # BLD AUTO: 9 10E3/UL (ref 1.6–8.3)
NEUTROPHILS NFR BLD AUTO: 79 %
NITRATE UR QL: NEGATIVE
NRBC # BLD AUTO: 0 10E3/UL
NRBC BLD AUTO-RTO: 0 /100
PH UR STRIP: 5 [PH] (ref 5–7)
PLATELET # BLD AUTO: 244 10E3/UL (ref 150–450)
POTASSIUM BLD-SCNC: 3.8 MMOL/L (ref 3.4–5.3)
PROT SERPL-MCNC: 7.2 G/DL (ref 6.8–8.8)
RBC # BLD AUTO: 5.17 10E6/UL (ref 4.4–5.9)
RBC URINE: 8 /HPF
SODIUM SERPL-SCNC: 139 MMOL/L (ref 133–144)
SP GR UR STRIP: 1.03 (ref 1–1.03)
SQUAMOUS EPITHELIAL: <1 /HPF
URATE CRY #/AREA URNS HPF: ABNORMAL /HPF
UROBILINOGEN UR STRIP-MCNC: NORMAL MG/DL
WBC # BLD AUTO: 11.5 10E3/UL (ref 4–11)
WBC URINE: 1 /HPF

## 2021-07-30 PROCEDURE — 82040 ASSAY OF SERUM ALBUMIN: CPT | Performed by: EMERGENCY MEDICINE

## 2021-07-30 PROCEDURE — 96374 THER/PROPH/DIAG INJ IV PUSH: CPT | Mod: 59 | Performed by: EMERGENCY MEDICINE

## 2021-07-30 PROCEDURE — 82247 BILIRUBIN TOTAL: CPT | Performed by: EMERGENCY MEDICINE

## 2021-07-30 PROCEDURE — 250N000011 HC RX IP 250 OP 636: Performed by: EMERGENCY MEDICINE

## 2021-07-30 PROCEDURE — 96376 TX/PRO/DX INJ SAME DRUG ADON: CPT | Performed by: EMERGENCY MEDICINE

## 2021-07-30 PROCEDURE — 99285 EMERGENCY DEPT VISIT HI MDM: CPT | Mod: 25 | Performed by: EMERGENCY MEDICINE

## 2021-07-30 PROCEDURE — 85004 AUTOMATED DIFF WBC COUNT: CPT | Performed by: EMERGENCY MEDICINE

## 2021-07-30 PROCEDURE — 250N000009 HC RX 250: Performed by: EMERGENCY MEDICINE

## 2021-07-30 PROCEDURE — 36415 COLL VENOUS BLD VENIPUNCTURE: CPT | Performed by: EMERGENCY MEDICINE

## 2021-07-30 PROCEDURE — G1004 CDSM NDSC: HCPCS

## 2021-07-30 PROCEDURE — 51798 US URINE CAPACITY MEASURE: CPT | Performed by: EMERGENCY MEDICINE

## 2021-07-30 PROCEDURE — 81001 URINALYSIS AUTO W/SCOPE: CPT | Performed by: EMERGENCY MEDICINE

## 2021-07-30 PROCEDURE — 87086 URINE CULTURE/COLONY COUNT: CPT | Performed by: PHYSICIAN ASSISTANT

## 2021-07-30 PROCEDURE — 99285 EMERGENCY DEPT VISIT HI MDM: CPT | Performed by: EMERGENCY MEDICINE

## 2021-07-30 RX ORDER — MORPHINE SULFATE 4 MG/ML
4 INJECTION, SOLUTION INTRAMUSCULAR; INTRAVENOUS ONCE
Status: COMPLETED | OUTPATIENT
Start: 2021-07-30 | End: 2021-07-30

## 2021-07-30 RX ORDER — MORPHINE SULFATE 4 MG/ML
4 INJECTION, SOLUTION INTRAMUSCULAR; INTRAVENOUS ONCE
Status: DISCONTINUED | OUTPATIENT
Start: 2021-07-30 | End: 2021-07-30 | Stop reason: HOSPADM

## 2021-07-30 RX ORDER — IOPAMIDOL 755 MG/ML
100 INJECTION, SOLUTION INTRAVASCULAR ONCE
Status: COMPLETED | OUTPATIENT
Start: 2021-07-30 | End: 2021-07-30

## 2021-07-30 RX ADMIN — SODIUM CHLORIDE 68 ML: 9 INJECTION, SOLUTION INTRAVENOUS at 04:07

## 2021-07-30 RX ADMIN — MORPHINE SULFATE 4 MG: 4 INJECTION, SOLUTION INTRAMUSCULAR; INTRAVENOUS at 02:05

## 2021-07-30 RX ADMIN — IOPAMIDOL 100 ML: 755 INJECTION, SOLUTION INTRAVENOUS at 04:03

## 2021-07-30 NOTE — ED PROVIDER NOTES
"ED Provider Note  [facility]      History     Chief Complaint   Patient presents with     Abdominal Pain     LLQ pain, Pt has been here prior with stone study, no kidney stones found.  Pt is dribbling urination, normal BM's.     HPI  Ricky Brown is a 70 year old male with a PMH of cardiomyopathy, CAD, angina, JOSE JUAN, HLD, HTN and BCC who presents to the ED today complaining of abdominal pain.  Patient presents to the ED tonight with his wife.  He endorses LLQ abdominal pain that started earlier this afternoon.  He reports this pain is constant and has been worsening.  He reports has been having normal BMs with his last one this morning.  Patient also states that he cannot pee very much right now and that his urine just \"drips out\".  He denies a history of urinary issues and states he is never dealt with this before.  He states he last urinated 30 minutes ago here in the ED.  He also endorses left gluteal pain.  Patient denies fever, vomiting, previous abdominal surgery, abdominal masses, testicular pain, hematuria, dysuria, or any falls or injuries.    Past Medical History  Past Medical History:   Diagnosis Date     Arthritis      BCC (basal cell carcinoma)     right shoulder      Cardiomyopathy (H)      Colon adenomas 9/20/11     Coronary artery disease      ED (erectile dysfunction)      HTN (hypertension)      Noncompliance      Obesity      JOSE JUAN (obstructive sleep apnea)      Past Surgical History:   Procedure Laterality Date     ARTHROSCOPY SHOULDER BICEPS TENODESIS REPAIR  2/27/2014    Procedure: ARTHROSCOPY SHOULDER BICEPS TENODESIS REPAIR;;  Surgeon: Michael Hagen MD;  [facility]     ARTHROSCOPY SHOULDER ROTATOR CUFF REPAIR  2/27/2014    Procedure: ARTHROSCOPY SHOULDER ROTATOR CUFF REPAIR;  Right Shoulder Arthroscopic Rotator Cuff Repair,  Subacromial Decompression, Biceps Tenodesis, Distal Clavicle Excision   ;  Surgeon: Michael Hagen MD;  " Location: [facility]     BIOPSY       CARDIAC SURGERY       COLONOSCOPY       EXCHANGE INTRAOCULAR LENS IMPLANT      left eye - first, recentered PCL with iris fixation; then IOLX with ACL     EXTRACAPSULAR CATARACT EXTRATION WITH INTRAOCULAR LENS IMPLANT      both eyes (elsewhere)     TURBINOPLASTY  2013    Procedure: TURBINOPLASTY;;  Surgeon: Lisset Parsons MD;  [facility]     UVULOPALATOPHARYNGOPLASTY  2013    Procedure: UVULOPALATOPHARYNGOPLASTY;  Uvulopalatopharyngoplasty, Turbiante Reduction;  Surgeon: Lisset Parsons MD;  Location: [facility] OR     aspirin 81 MG tablet  atorvastatin (LIPITOR) 10 MG tablet  brimonidine (ALPHAGAN) 0.2 % ophthalmic solution  Calcium Carbonate Antacid (TUMS CALCIUM FOR LIFE BONE PO)  diphenoxylate-atropine (LOMOTIL) 2.5-0.025 MG tablet  ketorolac (ACULAR) 0.5 % ophthalmic solution  losartan (COZAAR) 50 MG tablet  metoprolol succinate ER (TOPROL-XL) 50 MG 24 hr tablet  Multiple Vitamin (MULTIVITAMINS PO)  prednisoLONE acetate (PRED FORTE) 1 % ophthalmic suspension      Allergies   Allergen Reactions     Lisinopril Cough     Family History  Family History   Problem Relation Age of Onset     Diabetes Father         Old age Diabetes     Cerebrovascular Disease Father      Obesity Father      Heart Disease Father      Coronary Artery Disease Father      Hypertension Father      Lipids Sister      C.A.D. No family hx of      Cancer No family hx of      Glaucoma No family hx of      Macular Degeneration No family hx of      Social History   Social History     Tobacco Use     Smoking status: Former Smoker     Packs/day: 0.50     Years: 2.00     Pack years: 1.00     Types: Cigarettes     Quit date: 1996     Years since quittin.6     Smokeless tobacco: Never Used   Substance Use Topics     Alcohol use: Yes     Alcohol/week: 8.3 standard drinks     Comment: Evening Marie     Drug use: No      Past medical history, past surgical history,  medications, allergies, family history, and social history were reviewed with the patient. No additional pertinent items.       Review of Systems  ROS: 14 point ROS neg other than the symptoms noted above in the HPI.  Physical Exam      Physical Exam    Physical Exam   Constitutional: oriented to person, place, and time. appears well-developed and well-nourished.   HENT:   Head: Normocephalic and atraumatic.   Neck: Normal range of motion.   Pulmonary/Chest: Effort normal. No respiratory distress.   Cardiac: No murmurs, rubs, gallops. RRR.  Abdominal: Abdomen soft, LLQ TTP, nondistended. No rebound tenderness. No CVA TTP  MSK: Long bones without deformity or evidence of trauma  Neurological: alert and oriented to person, place, and time.   Skin: Skin is warm and dry.   Psychiatric:  normal mood and affect.  behavior is normal. Thought content normal.   ED Course     1:10 AM  The patient was seen and examined by Marcelo Hannon MD in Room ED11.     Procedures     Results for orders placed or performed during the hospital encounter of 07/30/21   CT Abdomen Pelvis w Contrast     Status: None    Narrative    EXAM: CT ABDOMEN PELVIS W CONTRAST  LOCATION: [facility]  DATE/TIME: 7/30/2021 3:57 AM    INDICATION: Left lower quadrant abdominal pain.  COMPARISON: 4/11/2020.  TECHNIQUE: CT scan of the abdomen and pelvis was performed following injection of IV contrast. Multiplanar reformats were obtained. Dose reduction techniques were used.  CONTRAST: 114 mL .    FINDINGS:   LOWER CHEST: Normal.    HEPATOBILIARY: Small cyst in the right lobe of liver laterally.    PANCREAS: Normal.    SPLEEN: Normal.    ADRENAL GLANDS: Stable 1.9 cm right adrenal gland nodule.    KIDNEYS/BLADDER: There is wall thickening of the left renal pelvis and inflammation surrounding the renal pelvis and kidney. No hydronephrosis. Few left renal cortical cysts. The right kidney is normal in  appearance.    BOWEL: There are colonic diverticula without acute diverticulitis. No bowel obstruction or inflammation. The appendix is normal. No free intraperitoneal gas or fluid.    LYMPH NODES: Normal.    VASCULATURE: Unremarkable.    PELVIC ORGANS: Normal.    MUSCULOSKELETAL: Degenerative disease in the lumbar spine.      Impression    IMPRESSION:   1.  Inflammation about the left renal pelvis and kidney. No ureteral stone or hydronephrosis.  2.  Colonic diverticula without acute diverticulitis. No bowel obstruction or inflammation.  3.  Stable right adrenal gland adenoma.              Comprehensive metabolic panel     Status: Abnormal   Result Value Ref Range    Sodium 139 133 - 144 mmol/L    Potassium 3.8 3.4 - 5.3 mmol/L    Chloride 109 94 - 109 mmol/L    Carbon Dioxide (CO2) 23 20 - 32 mmol/L    Anion Gap 7 3 - 14 mmol/L    Urea Nitrogen 27 7 - 30 mg/dL    Creatinine 1.28 (H) 0.66 - 1.25 mg/dL    Calcium 8.9 8.5 - 10.1 mg/dL    Glucose 124 (H) 70 - 99 mg/dL    Alkaline Phosphatase 74 40 - 150 U/L    AST 23 0 - 45 U/L    ALT 31 0 - 70 U/L    Protein Total 7.2 6.8 - 8.8 g/dL    Albumin 3.7 3.4 - 5.0 g/dL    Bilirubin Total 0.4 0.2 - 1.3 mg/dL    GFR Estimate 57 (L) >60 mL/min/1.73m2   UA with Microscopic reflex to Culture     Status: Abnormal    Specimen: Urine, Clean Catch   Result Value Ref Range    Color Urine Light Yellow Colorless, Straw, Light Yellow, Yellow    Appearance Urine Slightly Cloudy (A) Clear    Glucose Urine Negative Negative mg/dL    Bilirubin Urine Negative Negative    Ketones Urine Negative Negative mg/dL    Specific Gravity Urine 1.027 1.003 - 1.035    Blood Urine Small (A) Negative    pH Urine 5.0 5.0 - 7.0    Protein Albumin Urine Negative Negative mg/dL    Urobilinogen Urine Normal Normal, 2.0 mg/dL    Nitrite Urine Negative Negative    Leukocyte Esterase Urine Negative Negative    Mucus Urine Present (A) None Seen /LPF    Uric Acid Crystals Urine Few (A) None Seen /HPF    RBC Urine 8  (H) <=2 /HPF    WBC Urine 1 <=5 /HPF    Squamous Epithelials Urine <1 <=1 /HPF    Hyaline Casts Urine 1 <=2 /LPF    Narrative    Urine Culture not indicated   CBC with platelets and differential     Status: Abnormal   Result Value Ref Range    WBC Count 11.5 (H) 4.0 - 11.0 10e3/uL    RBC Count 5.17 4.40 - 5.90 10e6/uL    Hemoglobin 14.8 13.3 - 17.7 g/dL    Hematocrit 45.3 40.0 - 53.0 %    MCV 88 78 - 100 fL    MCH 28.6 26.5 - 33.0 pg    MCHC 32.7 31.5 - 36.5 g/dL    RDW 14.4 10.0 - 15.0 %    Platelet Count 244 150 - 450 10e3/uL    % Neutrophils 79 %    % Lymphocytes 10 %    % Monocytes 10 %    % Eosinophils 0 %    % Basophils 0 %    % Immature Granulocytes 1 %    NRBCs per 100 WBC 0 <1 /100    Absolute Neutrophils 9.0 (H) 1.6 - 8.3 10e3/uL    Absolute Lymphocytes 1.1 0.8 - 5.3 10e3/uL    Absolute Monocytes 1.2 0.0 - 1.3 10e3/uL    Absolute Eosinophils 0.0 0.0 - 0.7 10e3/uL    Absolute Basophils 0.0 0.0 - 0.2 10e3/uL    Absolute Immature Granulocytes 0.1 (H) <=0.0 10e3/uL    Absolute NRBCs 0.0 10e3/uL   CBC with platelets differential     Status: Abnormal    Narrative    The following orders were created for panel order CBC with platelets differential.  Procedure                               Abnormality         Status                     ---------                               -----------         ------                     CBC with platelets and d...[[id number]]  Abnormal            Final result                 Please view results for these tests on the individual orders.          No results found for any visits on 07/30/21.  Medications - No data to display     Assessments & Plan (with Medical Decision Making)   MDM  Patient presenting with left lower quadrant pain.  CT does not show diverticulitis.  He is also having difficulty urination.  The patient has pain over the left buttocks but no midline pain.  No red flags to suggest cauda equina, epidural abscess or other acute spinous process.  He has not no radiation  of pain.  No sensation loss.  Patellar reflexes are 2+ bilaterally.  Do not feel MRI of the spine is necessary at this point.  Patient was given pain meds.  He is able to urinate normally with significant relief of his symptoms.  CT shows some inflammation around his renal pelvis.  White blood count is at the high end of normal at 11.3.  Urinalysis shows some blood that is minimal and uric acid stones, no leukocyte esterase or nitrites.  He is having no dysuria.  No symptoms of an infection.  This patient may have passed a kidney stone.  He feels fine on repeat evaluation and would like to be discharged.  Discussed CT findings and offered antibiotics however low suspicion for pyelonephritis.  The patient would like to defer at this point.  Strongly encouraged him to follow-up with his primary care provider to have repeat of his creatinine as it is slightly above his prior results.  He is comfortable and tolerating p.o. intake and has been drinking fluids since he has been here.  The patient will return if symptoms are worsening.    I have reviewed the nursing notes. I have reviewed the findings, diagnosis, plan and need for follow up with the patient.    New Prescriptions    No medications on file       Final diagnoses:   Abdominal pain, left lower quadrant   I, Yaya Carpenter, am serving as a trained medical scribe to document services personally performed by Marcelo Hannon MD, based on the provider's statements to me.     I, Marcelo Hannon MD, was physically present and have reviewed and verified the accuracy of this note documented by Yaya Carpenter.      --  Marcelo Hannon MD  [facility] EMERGENCY DEPARTMENT  7/30/2021     Marcelo Hannon MD  07/30/21 0515       Marcelo Hannon MD  07/30/21 0516

## 2021-07-30 NOTE — ED TRIAGE NOTES
LLQ pain, Pt has been here prior with stone study, no kidney stones found.  Pt is dribbling urination, normal BM's.

## 2021-07-30 NOTE — TELEPHONE ENCOUNTER
Called patients wife and made appointment for next week Tuesday.         Harika RENEE CMA ([facility])

## 2021-07-30 NOTE — TELEPHONE ENCOUNTER
Reason for Call:  Other call back    Detailed comments: Pts wife called to schedule ER (abdominal pain) follow up with Jose Raul. No available appointment until late September does not want to schedule with other providers. Pt has pre op appointment with Jose Raul for 8/6/21. Wife is wondering if possible to discuss ER visit with  At that appointment or if Jose Raul wants to orde labs on pt and try and see pt soon on different date. Please call pts wife back with further steps to take.    Phone Number Patient can be reached at: BALDO MERIDA :[phone] (M)    Best Time: anytime    Can we leave a detailed message on this number? YES    Call taken on 7/30/2021 at 1:34 PM by Verna Newell

## 2021-07-30 NOTE — DISCHARGE INSTRUCTIONS
Please make an appointment to follow up with Your Primary Care Provider as soon as possible to discuss your symptoms today and to have your kidney function rechecked.    As we discussed, you do have some inflammation around your kidney.  This can be caused by infections however your urine studies do not suggest that you have an infection.  If you start to feel sick or have worsening symptoms return to the emergency department.

## 2021-07-31 LAB — BACTERIA UR CULT: NO GROWTH

## 2021-08-01 NOTE — RESULT ENCOUNTER NOTE
"Final urine culture report shows \"NO GROWTH\" and is NEGATIVE.  Recommendations in treatment per [facility] ED Lab result Urine culture protocol.    "

## 2021-08-02 ENCOUNTER — ALLIED HEALTH/NURSE VISIT (OUTPATIENT)
Dept: FAMILY MEDICINE | Facility: CLINIC | Age: 71
End: 2021-08-02
Payer: MEDICARE

## 2021-08-02 VITALS — DIASTOLIC BLOOD PRESSURE: 80 MMHG | HEART RATE: 86 BPM | SYSTOLIC BLOOD PRESSURE: 137 MMHG

## 2021-08-02 DIAGNOSIS — I10 ESSENTIAL HYPERTENSION WITH GOAL BLOOD PRESSURE LESS THAN 140/90: Primary | ICD-10-CM

## 2021-08-02 PROCEDURE — 99207 PR NO CHARGE NURSE ONLY: CPT | Performed by: INTERNAL MEDICINE

## 2021-08-02 NOTE — PROGRESS NOTES
Ricky Brown was evaluated at [facility] on August 2, 2021 at which time his blood pressure was:    BP Readings from Last 3 Encounters:   08/02/21 137/80   07/30/21 (!) 175/93   07/07/21 (!) 168/79     Pulse Readings from Last 3 Encounters:   08/02/21 86   07/30/21 74   07/07/21 74       Reviewed lifestyle modifications for blood pressure control and reduction: including making healthy food choices, managing weight, getting regular exercise, smoking cessation, reducing alcohol consumption, monitoring blood pressure regularly.     Symptoms: None    BP Goal:< 140/90 mmHg    BP Assessment:  BP at goal    Potential Reasons for BP too high: NA - Not applicable    BP Follow-Up Plan: Recheck BP in 6 months at pharmacy    Recommendation to Provider:     Note completed by:     Deborah Stovall PharmD, [facility]  Pharmacist Manager   [facility]  [phone]

## 2021-08-03 ENCOUNTER — OFFICE VISIT (OUTPATIENT)
Dept: INTERNAL MEDICINE | Facility: CLINIC | Age: 71
End: 2021-08-03
Payer: MEDICARE

## 2021-08-03 VITALS
WEIGHT: 233 LBS | TEMPERATURE: 97.9 F | BODY MASS INDEX: 36.49 KG/M2 | SYSTOLIC BLOOD PRESSURE: 142 MMHG | HEART RATE: 75 BPM | DIASTOLIC BLOOD PRESSURE: 90 MMHG | OXYGEN SATURATION: 97 % | RESPIRATION RATE: 14 BRPM

## 2021-08-03 DIAGNOSIS — I10 ESSENTIAL HYPERTENSION WITH GOAL BLOOD PRESSURE LESS THAN 140/90: ICD-10-CM

## 2021-08-03 DIAGNOSIS — R35.0 URINARY FREQUENCY: Primary | ICD-10-CM

## 2021-08-03 DIAGNOSIS — R10.12 LUQ ABDOMINAL PAIN: ICD-10-CM

## 2021-08-03 PROCEDURE — 99214 OFFICE O/P EST MOD 30 MIN: CPT | Performed by: INTERNAL MEDICINE

## 2021-08-03 RX ORDER — TAMSULOSIN HYDROCHLORIDE 0.4 MG/1
0.4 CAPSULE ORAL DAILY
Qty: 30 CAPSULE | Refills: 3 | Status: SHIPPED | OUTPATIENT
Start: 2021-08-03 | End: 2021-12-13

## 2021-08-03 NOTE — PROGRESS NOTES
Assessment & Plan     Urinary frequency  Today is a post emergency room evaluation follow up office visit , for complete details please see discharge summary from [facility]. Patient had the onset of a sharp pain with his left lower quadrant which was of an abrupt onset. He had a quite extensive and thorough evaluation with the emergency room that ultimately was a non-diagnostic workup. The main noteworthy findings are that a] there are somewhat nonspecific changes seen with bladder and ureter suggestive of a plausible scenario where he may have passed a kidney stone. There were also clearly some urological symptoms component here with dribbling urinary stream and some longer history of these symptoms. Interestingly there is a nearly identical clinical presentation just like this in early 2020 where again the same plausible scenario of a passed kidney stone was suggested. In my opinion these symptoms are more likely benign prostatic hyperplasia issues  And we agreed to a trial of Flomax [ tamsulosin ] and a further follow up appointment with Dr. Robbin Chand urologist with [facility] to determine if further steps in this workup are indicated   - tamsulosin (FLOMAX) 0.4 MG capsule; Take 1 capsule (0.4 mg) by mouth daily  - Adult Urology Referral; Future    LUQ abdominal pain  As detailed above, see emergency room discharge summary     Essential hypertension with goal blood pressure less than 140/90  I am interested to see that adding the Flomax [ tamsulosin ] may help his urinary symptoms but also may improve his blood pressure   - tamsulosin (FLOMAX) 0.4 MG capsule; Take 1 capsule (0.4 mg) by mouth daily    Review of the result(s) of each unique test - see ER work-up from few days ago  Prescription drug management  32 minutes spent on the date of the encounter doing chart review, history and exam, documentation and further activities per the note      Return in about 6 months (around 2/3/2022),  or if symptoms worsen or fail to improve.    Holland Blunt MD  [employer] YAQUELIN García is a 70 year old who presents for the following health issues   Encounter Diagnoses   Name Primary?     Urinary frequency Yes     LUQ abdominal pain      Essential hypertension with goal blood pressure less than 140/90       accompanied by his spouse:    HPI     ED/UC Followup:    Facility:  [facility]  Date of visit: 07/30/2021  Reason for visit:    Abdominal Pain       LLQ pain, Pt has been here prior with stone study, no kidney stones found.  Pt is dribbling urination, normal BM's.        Current Status: Pt still having symptoms      BP Readings from Last 6 Encounters:   08/03/21 (!) 174/82   08/02/21 137/80   07/30/21 (!) 175/93   07/07/21 (!) 168/79   06/09/21 (!) 146/86   06/04/21 (!) 160/89     Wt Readings from Last 5 Encounters:   08/03/21 105.7 kg (233 lb)   07/30/21 105.3 kg (232 lb 1.6 oz)   07/07/21 104.8 kg (231 lb)   06/23/21 108.9 kg (240 lb)   06/04/21 108.9 kg (240 lb)     Body mass index is 36.49 kg/m .    GFR Estimate   Date Value Ref Range Status   07/30/2021 57 (L) >60 mL/min/1.73m2 Final     Comment:     As of July 11, 2021, eGFR is calculated by the CKD-EPI creatinine equation, without race adjustment. eGFR can be influenced by muscle mass, exercise, and diet. The reported eGFR is an estimation only and is only applicable if the renal function is stable.   01/07/2021 73 >60 mL/min/[1.73_m2] Final     Comment:     Non  GFR Calc  Starting 12/18/2018, serum creatinine based estimated GFR (eGFR) will be   calculated using the Chronic Kidney Disease Epidemiology Collaboration   (CKD-EPI) equation.     12/11/2020 81 >60 mL/min/[1.73_m2] Final     Comment:     Non  GFR Calc  Starting 12/18/2018, serum creatinine based estimated GFR (eGFR) will be   calculated using the Chronic Kidney Disease Epidemiology Collaboration   (CKD-EPI) equation.      09/22/2020 70 >60 mL/min/[1.73_m2] Final     Comment:     Non  GFR Calc  Starting 12/18/2018, serum creatinine based estimated GFR (eGFR) will be   calculated using the Chronic Kidney Disease Epidemiology Collaboration   (CKD-EPI) equation.     09/08/2020 88 >60 mL/min/[1.73_m2] Final     Comment:     Non  GFR Calc  Starting 12/18/2018, serum creatinine based estimated GFR (eGFR) will be   calculated using the Chronic Kidney Disease Epidemiology Collaboration   (CKD-EPI) equation.     04/11/2020 69 >60 mL/min/[1.73_m2] Final     Comment:     Non  GFR Calc  Starting 12/18/2018, serum creatinine based estimated GFR (eGFR) will be   calculated using the Chronic Kidney Disease Epidemiology Collaboration   (CKD-EPI) equation.       GFR Estimate If Black   Date Value Ref Range Status   01/07/2021 84 >60 mL/min/[1.73_m2] Final     Comment:      GFR Calc  Starting 12/18/2018, serum creatinine based estimated GFR (eGFR) will be   calculated using the Chronic Kidney Disease Epidemiology Collaboration   (CKD-EPI) equation.     12/11/2020 >90 >60 mL/min/[1.73_m2] Final     Comment:      GFR Calc  Starting 12/18/2018, serum creatinine based estimated GFR (eGFR) will be   calculated using the Chronic Kidney Disease Epidemiology Collaboration   (CKD-EPI) equation.     09/22/2020 81 >60 mL/min/[1.73_m2] Final     Comment:      GFR Calc  Starting 12/18/2018, serum creatinine based estimated GFR (eGFR) will be   calculated using the Chronic Kidney Disease Epidemiology Collaboration   (CKD-EPI) equation.     09/08/2020 >90 >60 mL/min/[1.73_m2] Final     Comment:      GFR Calc  Starting 12/18/2018, serum creatinine based estimated GFR (eGFR) will be   calculated using the Chronic Kidney Disease Epidemiology Collaboration   (CKD-EPI) equation.     04/11/2020 80 >60 mL/min/[1.73_m2] Final     Comment:      GFR  Calc  Starting 12/18/2018, serum creatinine based estimated GFR (eGFR) will be   calculated using the Chronic Kidney Disease Epidemiology Collaboration   (CKD-EPI) equation.           Review of Systems   Constitutional, HEENT, cardiovascular, pulmonary, gi and gu systems are negative, except as otherwise noted.      Objective    BP (!) 142/90   Pulse 75   Temp 97.9  F (36.6  C) (Oral)   Resp 14   Wt 105.7 kg (233 lb)   SpO2 97%   BMI 36.49 kg/m    Body mass index is 36.49 kg/m .  Physical Exam   GENERAL: healthy, alert and no distress  EYES: Eyes grossly normal to inspection, PERRL and conjunctivae and sclerae normal  MS: no gross musculoskeletal defects noted, no edema  NEURO: Normal strength and tone, mentation intact and speech normal  PSYCH: mentation appears normal, affect normal/bright    Orders Placed This Encounter   Procedures     Adult Urology Referral

## 2021-08-06 ENCOUNTER — OFFICE VISIT (OUTPATIENT)
Dept: INTERNAL MEDICINE | Facility: CLINIC | Age: 71
End: 2021-08-06
Payer: MEDICARE

## 2021-08-06 VITALS
TEMPERATURE: 98.7 F | RESPIRATION RATE: 22 BRPM | HEART RATE: 87 BPM | BODY MASS INDEX: 36.73 KG/M2 | WEIGHT: 234 LBS | DIASTOLIC BLOOD PRESSURE: 79 MMHG | SYSTOLIC BLOOD PRESSURE: 122 MMHG | HEIGHT: 67 IN | OXYGEN SATURATION: 94 %

## 2021-08-06 DIAGNOSIS — Z01.818 PREOP GENERAL PHYSICAL EXAM: ICD-10-CM

## 2021-08-06 PROCEDURE — 93000 ELECTROCARDIOGRAM COMPLETE: CPT | Performed by: INTERNAL MEDICINE

## 2021-08-06 PROCEDURE — 99214 OFFICE O/P EST MOD 30 MIN: CPT | Performed by: INTERNAL MEDICINE

## 2021-08-06 ASSESSMENT — MIFFLIN-ST. JEOR: SCORE: 1790.05

## 2021-08-06 ASSESSMENT — PAIN SCALES - GENERAL: PAINLEVEL: NO PAIN (0)

## 2021-08-06 NOTE — PATIENT INSTRUCTIONS

## 2021-08-06 NOTE — PROGRESS NOTES
[facility]  [address]  [place] [address]  Phone: [phone]  Primary Provider: Holland Blunt  { AMB Performing Provider (Optional):951347}    {Provider  Link to PREOP SmartSet  Use this to apply standard patient instructions to AVS; includes medication directions, common orders, guidelines for anemia, warfarin, additional testing   :495576}  PREOPERATIVE EVALUATION:  Today's date: 8/6/2021    Ricky Brown is a 70 year old male who presents for a preoperative evaluation.    Surgical Information:  Surgery/Procedure: RELEASE, RIGHT CARPAL TUNNEL  Surgery Location: [facility],    Surgeon: Tony Bravo MD  Surgery Date: 8/13/2021  Time of Surgery: 12:45PM   Where patient plans to recover: At home with family  Fax number for surgical facility: Note does not need to be faxed, will be available electronically in Epic.    Type of Anesthesia Anticipated: Monitor Anesthesia Care    {2021 Provider Charting Preference for Preop :994874}    Subjective     HPI related to upcoming procedure: ***    Preop Questions 1/29/2021   1. Have you ever had a heart attack or stroke? No   2. Have you ever had surgery on your heart or blood vessels, such as a stent placement, a coronary artery bypass, or surgery on an artery in your head, neck, heart, or legs? No   3. Do you have chest pain with activity? No   4. Do you have a history of  heart failure? YES - ***   5. Do you currently have a cold, bronchitis or symptoms of other infection? No   6. Do you have a cough, shortness of breath, or wheezing? YES - ***   7. Do you or anyone in your family have previous history of blood clots? UNKNOWN - ***   8. Do you or does anyone in your family have a serious bleeding problem such as prolonged bleeding following surgeries or cuts? No   9. Have you ever had problems with anemia or been told to take iron pills? No   10. Have you had any abnormal blood loss such as black,  tarry or bloody stools? No   11. Have you ever had a blood transfusion? No   12. Are you willing to have a blood transfusion if it is medically needed before, during, or after your surgery? Yes   13. Have you or any of your relatives ever had problems with anesthesia? No   14. Do you have sleep apnea, excessive snoring or daytime drowsiness? YES - ***   14a. Do you have a CPAP machine? No   15. Do you have any artifical heart valves or other implanted medical devices like a pacemaker, defibrillator, or continuous glucose monitor? No   16. Do you have artificial joints? No   17. Are you allergic to latex? No       Health Care Directive:  Patient does not have a Health Care Directive or Living Will: {ADVANCE_DIRECTIVE_STATUS:473264}    Preoperative Review of :  {Mnpmpreport:861012}  {Review MNPMP for all patients per [facility] MNPMP Profile:209639}    {Chronic problem details (Optional) :522245}    Review of Systems  {ROS Preop Choices:247225}    Patient Active Problem List    Diagnosis Date Noted     Right carpal tunnel syndrome 07/13/2021     Priority: Medium     Added automatically from request for surgery [id number]       Elevated LFTs 05/03/2021     Priority: Medium     Fatty liver 05/03/2021     Priority: Medium     Angina, class II (H) 01/21/2021     Priority: Medium     Macular edema, od 12/24/2019     Priority: Medium     Megalocornea 05/27/2019     Priority: Medium     History of iritis, os 05/27/2019     Priority: Medium     Hyperlipidemia LDL goal <70 08/18/2016     Priority: Medium     overwieght BMI > 35 08/18/2016     Priority: Medium     Essential hypertension with goal blood pressure less than 140/90 08/10/2016     Priority: Medium     Cramp of limb 04/14/2015     Priority: Medium     Disorder of bursae and tendons in shoulder region 04/18/2014     Priority: Medium     Problem list name updated by automated process. Provider to review       RCT (rotator cuff tear) 02/12/2014     Priority: Medium     Near  "syncope 02/10/2014     Priority: Medium     Cardiomyopathy (H) 05/21/2013     Priority: Medium     BCC (basal cell carcinoma)      Priority: Medium     right shoulder        JOSE JUAN (obstructive sleep apnea)-severe (AHI 32)      Priority: Medium     Indications for Polysomnography 6/28/2013: The patient is a 62 y year old Male who is 5' 6\" and weighs 230.0 lbs.  His BMI equals 37.4.  The patients [place] sleepiness scale was 4.0 and neck size was 19.5.  A diagnostic polysomnogram was performed to evaluate for re-evaluation of JOSE JUAN after development of a cardiomyopathy.  After 123.0 minutes of sleep time the patient exhibited sufficient respiratory events qualifying him for a CPAP trial which was then initiated.      Polysomnogram Data:  A full night polysomnogram was performed recording the standard physiologic parameters including EEG, EOG, EMG, EKG, nasal and oral airflow.  Respiratory parameters of chest and abdominal movements are recorded with respiratory inductance plethysmography.  Oxygen saturation was recorded by pulse oximetry.      Diagnostic PSG  Sleep Architecture:  The total recording time of the diagnostic portion of the study was 142.1 minutes.  The total sleep time was 123.0 minutes.  During the diagnostic portion of the study the sleep latency was 4.8 minutes.  REM latency was N/A.  Sleep Efficiency was 86.6%.  Wake after sleep onset was 10.5.   The patient spent 13.0% of total sleep time in Stage N1, 81.3% in Stage N2, 5.7% in Stages N3 and 0.0% in REM.  No REM sleep noted.       Respiration:     Sustained Sleep Associated Hypoventilation -was not monitored.    Sleep Associated Hypoxemia - was present.  Baseline oxygen saturation was 95.5%.  The lowest oxygen saturation was 85.0%.  Snoring was reported as loud.     Events - During the diagnostic portion of the study, the polysomnogram revealed a presence of 20 obstructive, 0 central, and 0 mixed apneas resulting in an Apnea index of 9.8 events per " hour.  There were 45 hypopneas resulting in a Hypopnea index of 22.0 events per hour.  The combined Apnea/Hypopnea Index was 31.7 events per hour.  The REM AHI was N/A.  The RERA index was 26.3 per hour.   The RDI was 58.    26.3 31.7     Treatment PSG  Sleep Architecture:  At 12:08 am the patient was placed on CPAP treatment and was titrated at pressures ranging from 5 cm/H20 up to 13 cm/H20.  The total recording time of the treatment portion of the study was 380.1 minutes.  The total sleep time was 264.5 minutes.  During the treatment portion of the study the sleep latency was 89.0 minutes.  REM latency was 55.5 minutes.  Sleep Efficiency was 69.6%.  Sleep Maintenance Efficiency was 91.0%.  Total wake time was 116.0 minutes for a total wake percentage of 8.8%. the patient spent 9.1% of total sleep time in Stage N1, 41.0% in Stage N2, 13.6% in Stages N3 and N, and 36.3% in REM.       Respiration:  The optimal pressure was 13.0 with an AHI of 0 including REM lateral.    Movement Activity:      Limb - During the diagnostic portion of the study, there were 80 limb movements recorded.  Of this total, 57 were classified as PLMs.  Of the PLMs, 2 were associated with arousals.  The Limb Movement index was 39.0 per hour while the PLM index was 27.8 per hour.  During the treatment portion of the study, there were 67 limb movements recorded.  Of this total, 49 were classified as PLMs.  Of the PLMs, 7 were associated with arousals.  The Limb Movement index was 15.2 per hour while the PLM index was 11.1 per hour.     Behavior - none    Bruxism - none    Seizure - none      CARDIAC SUMMARY:   No cardiac arrhythmias noted.       Assessment:    Severe JOSE JUAN (AHI 32) with adequate positive airway pressure titration.  Recommendations:    CPAP pressure 13 cmH2O with clinical follow-up within 3 - 4 weeks including compliance measures.    If symptoms not controlled, consider full night titration study.     Advise regarding the risks of  drowsy driving    Suggest optimizing sleep hygiene and avoiding sleep deprivation    Weight management  Dopaminergic therapy should be used for management of restless leg syndrome (RLS) and not based on the presence of periodic limb movements alone.       Advanced directives, counseling/discussion 03/28/2012     Priority: Low     Keratoglobus, ou 10/14/2011     Priority: Low     Pseudophakia, sutured PCL, od; ACL, os - hx of IOL dislocation, ou 10/14/2011     Priority: Low     Posterior vitreous detachment, od 10/14/2011     Priority: Low     Epiretinal membrane, mild, od 10/14/2011     Priority: Low     HL (hearing loss) 04/01/2011     Priority: Low     Erectile dysfunction 04/01/2011     Priority: Low      Past Medical History:   Diagnosis Date     Arthritis      BCC (basal cell carcinoma)     right shoulder      Cardiomyopathy (H)      Colon adenomas 9/20/11     Coronary artery disease      ED (erectile dysfunction)      HTN (hypertension)      Noncompliance      Obesity      JOSE JUAN (obstructive sleep apnea)      Past Surgical History:   Procedure Laterality Date     ARTHROSCOPY SHOULDER BICEPS TENODESIS REPAIR  2/27/2014    Procedure: ARTHROSCOPY SHOULDER BICEPS TENODESIS REPAIR;;  Surgeon: Michael Hagen MD;  [facility]: [facility]     ARTHROSCOPY SHOULDER ROTATOR CUFF REPAIR  2/27/2014    Procedure: ARTHROSCOPY SHOULDER ROTATOR CUFF REPAIR;  Right Shoulder Arthroscopic Rotator Cuff Repair,  Subacromial Decompression, Biceps Tenodesis, Distal Clavicle Excision   ;  Surgeon: Michael Hagen MD;  Location:  OR     BIOPSY       CARDIAC SURGERY       COLONOSCOPY       EXCHANGE INTRAOCULAR LENS IMPLANT  2005    left eye - first, recentered PCL with iris fixation; then IOLX with ACL     EXTRACAPSULAR CATARACT EXTRATION WITH INTRAOCULAR LENS IMPLANT  2005    both eyes (elsewhere)     TURBINOPLASTY  12/11/2013    Procedure: TURBINOPLASTY;;  Surgeon: Lisset Parsons MD;  Location:  OR      UVULOPALATOPHARYNGOPLASTY  2013    Procedure: UVULOPALATOPHARYNGOPLASTY;  Uvulopalatopharyngoplasty, [place] Reduction;  Surgeon: Lisset Parsons MD;  Location:  OR     Current Outpatient Medications   Medication Sig Dispense Refill     aspirin 81 MG tablet Take 1 tablet (81 mg) by mouth daily 90 tablet 3     atorvastatin (LIPITOR) 10 MG tablet TAKE ONE TABLET BY MOUTH ONE TIME DAILY  90 tablet 0     Calcium Carbonate Antacid (TUMS CALCIUM FOR LIFE BONE PO) Take  by mouth.       diphenoxylate-atropine (LOMOTIL) 2.5-0.025 MG tablet Take 1 tablet by mouth daily as needed for diarrhea Patient only takes 1 tablet as needed 30 tablet 5     ketorolac (ACULAR) 0.5 % ophthalmic solution INSTILL ONE DROP INTO RIGHT EYE FOUR TIMES DAILY       losartan (COZAAR) 50 MG tablet TAKE ONE TABLET BY MOUTH ONE TIME DAILY  90 tablet 0     metoprolol succinate ER (TOPROL-XL) 50 MG 24 hr tablet Take 1 tablet by mouth daily (Patient taking differently: Taking half of tablet) 90 tablet 0     Multiple Vitamin (MULTIVITAMINS PO) Take  by mouth daily.       prednisoLONE acetate (PRED FORTE) 1 % ophthalmic suspension INSTILL ONE DROP INTO RIGHT EYE FOUR TIMES DAILY       tamsulosin (FLOMAX) 0.4 MG capsule Take 1 capsule (0.4 mg) by mouth daily 30 capsule 3     brimonidine (ALPHAGAN) 0.2 % ophthalmic solution Place 1 drop into the right eye 2 times daily 1 Bottle 11       Allergies   Allergen Reactions     Lisinopril Cough     Perfume Other (See Comments)        Social History     Tobacco Use     Smoking status: Former Smoker     Packs/day: 0.50     Years: 2.00     Pack years: 1.00     Types: Cigarettes     Quit date: 1996     Years since quittin.6     Smokeless tobacco: Never Used   Substance Use Topics     Alcohol use: Yes     Alcohol/week: 8.3 standard drinks     Comment: 1-2 drinks a day     {FAMILY HISTORY (Optional):634982068}  History   Drug Use No         Objective     /79   Pulse 87   Temp 98.7  F  "(37.1  C) (Oral)   Resp 22   Ht 1.702 m (5' 7\")   Wt 106.1 kg (234 lb)   SpO2 94%   BMI 36.65 kg/m      Physical Exam  {EXAM Preop Choices:234015}    Recent Labs   Lab Test 21  0159 21  1212 21  1733 21  1414 20  1743   HGB 14.8  --   --   --  14.3     --   --   --  185     --   --  138 141   POTASSIUM 3.8  --   --  4.1 4.0   CR 1.28*  --   --  1.05 0.88   A1C  --  5.6 5.7*  --   --         Diagnostics:  {LABS:765869}   {EK}    Revised Cardiac Risk Index (RCRI):  The patient has the following serious cardiovascular risks for perioperative complications:  {PREOP REVISED CARDIAC RISK INDEX (RCRI) :355637::\" - No serious cardiac risks = 0 points\"}     RCRI Interpretation: {REVISED CARDIAC RISK INTERPRETATION :304024}         Signed Electronically by: Holland Blunt MD  Copy of this evaluation report is provided to requesting physician.    {Provider Resources  Preop [facility] Preop Guidelines  Revised Cardiac Risk Index :452511}  "

## 2021-08-06 NOTE — PROGRESS NOTES
[facility]  [address]  [place] [address]  Phone: [phone]  Primary Provider: Holland Blunt  Pre-op Performing Provider: HOLLAND BLUNT      PREOPERATIVE EVALUATION:  Today's date: 8/6/2021    Ricky Brown is a 70 year old male who presents for a preoperative evaluation.    Surgical Information:  Surgery/Procedure: RELEASE, RIGHT CARPAL TUNNEL  Surgery Location: [facility]   Surgeon: Tony Bravo MD  Surgery Date: 8/13/2021  Time of Surgery: 12:45PM   Where patient plans to recover: At home with family  Fax number for surgical facility: Note does not need to be faxed, will be available electronically in Epic.      Type of Anesthesia Anticipated: Monitor Anesthesia Care    Assessment & Plan     The proposed surgical procedure is considered INTERMEDIATE risk.    Preop general physical exam    This is a fully suitable operative candidate cleared for surgery without reservation    - EKG 12-lead complete w/read - Clinics  Laboratory studies were done roughly 1 weeks ago, see laboratory section of Epic electronic medical records         Risks and Recommendations:  The patient has the following additional risks and recommendations for perioperative complications: untreated obstructive sleep apnea , slight reduced cardiac % ejection fraction without symptoms       Medication Instructions:  Patient is to take all scheduled medications on the day of surgery EXCEPT for modifications listed below:     For the night before the surgery take all regularly scheduled medications except for the losartan ( Cozaar )     Skip the aspirin for one full week prior to the planned procedure     RECOMMENDATION:  APPROVAL GIVEN to proceed with proposed procedure, without further diagnostic evaluation.    Review of the result(s) of each unique test - see labs from 7/30/2021      28 minutes spent on the date of the encounter doing chart review, history and exam,  documentation and further activities per the note      Subjective     HPI related to upcoming procedure: see abnormal needle EMG examination. Patient has carpal tunnel syndrome as well as possibly some ulnar neuritis     Preop Questions 8/6/2021   1. Have you ever had a heart attack or stroke? No   2. Have you ever had surgery on your heart or blood vessels, such as a stent placement, a coronary artery bypass, or surgery on an artery in your head, neck, heart, or legs? No   3. Do you have chest pain with activity? No   4. Do you have a history of  heart failure? No   5. Do you currently have a cold, bronchitis or symptoms of other infection? No   6. Do you have a cough, shortness of breath, or wheezing? YES - mild intermittent and goes as far back as several years ago, see recent negative chest xray    7. Do you or anyone in your family have previous history of blood clots? No   8. Do you or does anyone in your family have a serious bleeding problem such as prolonged bleeding following surgeries or cuts? No   9. Have you ever had problems with anemia or been told to take iron pills? No   10. Have you had any abnormal blood loss such as black, tarry or bloody stools? No   11. Have you ever had a blood transfusion? No   12. Are you willing to have a blood transfusion if it is medically needed before, during, or after your surgery? Yes   13. Have you or any of your relatives ever had problems with anesthesia? No   14. Do you have sleep apnea, excessive snoring or daytime drowsiness? YES , doesn't tolerate cpap machine and mask at this time   14a. Do you have a CPAP machine? No   15. Do you have any artifical heart valves or other implanted medical devices like a pacemaker, defibrillator, or continuous glucose monitor? No   16. Do you have artificial joints? No   17. Are you allergic to latex? No     Health Care Directive:  Patient does have a Health Care Directive or Living Will: Advance Directive received and scanned.  Click on Code in the patient header to view.    Preoperative Review of :   reviewed - no record of controlled substances prescribed.      Status of Chronic Conditions:  See problem list for active medical problems.  Problems all longstanding and stable, except as noted/documented.  See ROS for pertinent symptoms related to these conditions.      Review of Systems  CONSTITUTIONAL: NEGATIVE for fever, chills, change in weight  ENT/MOUTH: NEGATIVE for ear, mouth and throat problems  RESP: NEGATIVE for significant cough or SOB  CV: NEGATIVE for chest pain, palpitations or peripheral edema  ENDOCRINE: NEGATIVE for temperature intolerance, skin/hair changes  HEME/ALLERGY/IMMUNE: NEGATIVE for bleeding problems  PSYCHIATRIC: NEGATIVE for changes in mood or affect  ROS otherwise negative    Patient Active Problem List    Diagnosis Date Noted     Right carpal tunnel syndrome 07/13/2021     Priority: Medium     Added automatically from request for surgery [id number]       Elevated LFTs 05/03/2021     Priority: Medium     Fatty liver 05/03/2021     Priority: Medium     Angina, class II (H) 01/21/2021     Priority: Medium     Macular edema, od 12/24/2019     Priority: Medium     Megalocornea 05/27/2019     Priority: Medium     History of iritis, os 05/27/2019     Priority: Medium     Hyperlipidemia LDL goal <70 08/18/2016     Priority: Medium     overwieght BMI > 35 08/18/2016     Priority: Medium     Essential hypertension with goal blood pressure less than 140/90 08/10/2016     Priority: Medium     Cramp of limb 04/14/2015     Priority: Medium     Disorder of bursae and tendons in shoulder region 04/18/2014     Priority: Medium     Problem list name updated by automated process. Provider to review       RCT (rotator cuff tear) 02/12/2014     Priority: Medium     Near syncope 02/10/2014     Priority: Medium     Cardiomyopathy (H) 05/21/2013     Priority: Medium     BCC (basal cell carcinoma)      Priority: Medium     right  "shoulder        JOSE JUAN (obstructive sleep apnea)-severe (AHI 32)      Priority: Medium     Indications for Polysomnography 6/28/2013: The patient is a 62 y year old Male who is 5' 6\" and weighs 230.0 lbs.  His BMI equals 37.4.  The patients Hamler sleepiness scale was 4.0 and neck size was 19.5.  A diagnostic polysomnogram was performed to evaluate for re-evaluation of JOSE JUAN after development of a cardiomyopathy.  After 123.0 minutes of sleep time the patient exhibited sufficient respiratory events qualifying him for a CPAP trial which was then initiated.      Polysomnogram Data:  A full night polysomnogram was performed recording the standard physiologic parameters including EEG, EOG, EMG, EKG, nasal and oral airflow.  Respiratory parameters of chest and abdominal movements are recorded with respiratory inductance plethysmography.  Oxygen saturation was recorded by pulse oximetry.      Diagnostic PSG  Sleep Architecture:  The total recording time of the diagnostic portion of the study was 142.1 minutes.  The total sleep time was 123.0 minutes.  During the diagnostic portion of the study the sleep latency was 4.8 minutes.  REM latency was N/A.  Sleep Efficiency was 86.6%.  Wake after sleep onset was 10.5.   The patient spent 13.0% of total sleep time in Stage N1, 81.3% in Stage N2, 5.7% in Stages N3 and 0.0% in REM.  No REM sleep noted.       Respiration:     Sustained Sleep Associated Hypoventilation -was not monitored.    Sleep Associated Hypoxemia - was present.  Baseline oxygen saturation was 95.5%.  The lowest oxygen saturation was 85.0%.  Snoring was reported as loud.     Events - During the diagnostic portion of the study, the polysomnogram revealed a presence of 20 obstructive, 0 central, and 0 mixed apneas resulting in an Apnea index of 9.8 events per hour.  There were 45 hypopneas resulting in a Hypopnea index of 22.0 events per hour.  The combined Apnea/Hypopnea Index was 31.7 events per hour.  The REM AHI " was N/A.  The RERA index was 26.3 per hour.   The RDI was 58.    26.3 31.7     Treatment PSG  Sleep Architecture:  At 12:08 am the patient was placed on CPAP treatment and was titrated at pressures ranging from 5 cm/H20 up to 13 cm/H20.  The total recording time of the treatment portion of the study was 380.1 minutes.  The total sleep time was 264.5 minutes.  During the treatment portion of the study the sleep latency was 89.0 minutes.  REM latency was 55.5 minutes.  Sleep Efficiency was 69.6%.  Sleep Maintenance Efficiency was 91.0%.  Total wake time was 116.0 minutes for a total wake percentage of 8.8%. the patient spent 9.1% of total sleep time in Stage N1, 41.0% in Stage N2, 13.6% in Stages N3 and N, and 36.3% in REM.       Respiration:  The optimal pressure was 13.0 with an AHI of 0 including REM lateral.    Movement Activity:      Limb - During the diagnostic portion of the study, there were 80 limb movements recorded.  Of this total, 57 were classified as PLMs.  Of the PLMs, 2 were associated with arousals.  The Limb Movement index was 39.0 per hour while the PLM index was 27.8 per hour.  During the treatment portion of the study, there were 67 limb movements recorded.  Of this total, 49 were classified as PLMs.  Of the PLMs, 7 were associated with arousals.  The Limb Movement index was 15.2 per hour while the PLM index was 11.1 per hour.     Behavior - none    Bruxism - none    Seizure - none      CARDIAC SUMMARY:   No cardiac arrhythmias noted.       Assessment:    Severe JOSE JUAN (AHI 32) with adequate positive airway pressure titration.  Recommendations:    CPAP pressure 13 cmH2O with clinical follow-up within 3 - 4 weeks including compliance measures.    If symptoms not controlled, consider full night titration study.     Advise regarding the risks of drowsy driving    Suggest optimizing sleep hygiene and avoiding sleep deprivation    Weight management  Dopaminergic therapy should be used for management of  restless leg syndrome (RLS) and not based on the presence of periodic limb movements alone.       Advanced directives, counseling/discussion 03/28/2012     Priority: Low     Keratoglobus, ou 10/14/2011     Priority: Low     Pseudophakia, sutured PCL, od; ACL, os - hx of IOL dislocation, ou 10/14/2011     Priority: Low     Posterior vitreous detachment, od 10/14/2011     Priority: Low     Epiretinal membrane, mild, od 10/14/2011     Priority: Low     HL (hearing loss) 04/01/2011     Priority: Low     Erectile dysfunction 04/01/2011     Priority: Low      Past Medical History:   Diagnosis Date     Arthritis      BCC (basal cell carcinoma)     right shoulder      Cardiomyopathy (H)      Colon adenomas 9/20/11     Coronary artery disease      ED (erectile dysfunction)      HTN (hypertension)      Noncompliance      Obesity      JOSE JUAN (obstructive sleep apnea)      Past Surgical History:   Procedure Laterality Date     ARTHROSCOPY SHOULDER BICEPS TENODESIS REPAIR  2/27/2014    Procedure: ARTHROSCOPY SHOULDER BICEPS TENODESIS REPAIR;;  Surgeon: Michael Hagen MD;  Location: [facility]     ARTHROSCOPY SHOULDER ROTATOR CUFF REPAIR  2/27/2014    Procedure: ARTHROSCOPY SHOULDER ROTATOR CUFF REPAIR;  Right Shoulder Arthroscopic Rotator Cuff Repair,  Subacromial Decompression, Biceps Tenodesis, Distal Clavicle Excision   ;  Surgeon: Michael Hagen MD;  Location: [facility]     BIOPSY       CARDIAC SURGERY       COLONOSCOPY       EXCHANGE INTRAOCULAR LENS IMPLANT  2005    left eye - first, recentered PCL with iris fixation; then IOLX with ACL     EXTRACAPSULAR CATARACT EXTRATION WITH INTRAOCULAR LENS IMPLANT  2005    both eyes (elsewhere)     TURBINOPLASTY  12/11/2013    Procedure: TURBINOPLASTY;;  Surgeon: Lisset Parsons MD;  Location: [facility]     UVULOPALATOPHARYNGOPLASTY  12/11/2013    Procedure: UVULOPALATOPHARYNGOPLASTY;  Uvulopalatopharyngoplasty, Turbiante Reduction;  Surgeon: Lisset Parsons,  MD;  Location: [facility] OR     Current Outpatient Medications   Medication Sig Dispense Refill     aspirin 81 MG tablet Take 1 tablet (81 mg) by mouth daily 90 tablet 3     atorvastatin (LIPITOR) 10 MG tablet TAKE ONE TABLET BY MOUTH ONE TIME DAILY  90 tablet 0     Calcium Carbonate Antacid (TUMS CALCIUM FOR LIFE BONE PO) Take  by mouth.       diphenoxylate-atropine (LOMOTIL) 2.5-0.025 MG tablet Take 1 tablet by mouth daily as needed for diarrhea Patient only takes 1 tablet as needed 30 tablet 5     ketorolac (ACULAR) 0.5 % ophthalmic solution INSTILL ONE DROP INTO RIGHT EYE FOUR TIMES DAILY       losartan (COZAAR) 50 MG tablet TAKE ONE TABLET BY MOUTH ONE TIME DAILY  90 tablet 0     metoprolol succinate ER (TOPROL-XL) 50 MG 24 hr tablet Take 1 tablet by mouth daily (Patient taking differently: Taking half of tablet) 90 tablet 0     Multiple Vitamin (MULTIVITAMINS PO) Take  by mouth daily.       prednisoLONE acetate (PRED FORTE) 1 % ophthalmic suspension INSTILL ONE DROP INTO RIGHT EYE FOUR TIMES DAILY       tamsulosin (FLOMAX) 0.4 MG capsule Take 1 capsule (0.4 mg) by mouth daily 30 capsule 3     brimonidine (ALPHAGAN) 0.2 % ophthalmic solution Place 1 drop into the right eye 2 times daily 1 Bottle 11       Allergies   Allergen Reactions     Lisinopril Cough     Perfume Other (See Comments)        Social History     Tobacco Use     Smoking status: Former Smoker     Packs/day: 0.50     Years: 2.00     Pack years: 1.00     Types: Cigarettes     Quit date: 1996     Years since quittin.6     Smokeless tobacco: Never Used   Substance Use Topics     Alcohol use: Yes     Alcohol/week: 8.3 standard drinks     Comment: 1-2 drinks a day     Family History   Problem Relation Age of Onset     Diabetes Father         Old age Diabetes     Cerebrovascular Disease Father      Obesity Father      Heart Disease Father      Coronary Artery Disease Father      Hypertension Father      Lipids Sister      C.A.D. No family hx of   "    Cancer No family hx of      Glaucoma No family hx of      Macular Degeneration No family hx of      History   Drug Use No         Objective     /79   Pulse 87   Temp 98.7  F (37.1  C) (Oral)   Resp 22   Ht 1.702 m (5' 7\")   Wt 106.1 kg (234 lb)   SpO2 94%   BMI 36.65 kg/m      Physical Exam  GENERAL APPEARANCE: healthy, alert and no distress  HENT: ear canals and TM's normal and nose and mouth without ulcers or lesions  RESP: lungs clear to auscultation - no rales, rhonchi or wheezes  CV: regular rate and rhythm, normal S1 S2, no S3 or S4 and no murmur, click or rub   ABDOMEN: soft, nontender, no HSM or masses and bowel sounds normal  NEURO: Normal strength and tone, sensory exam grossly normal, mentation intact and speech normal    Recent Labs   Lab Test 07/30/21  0159 04/28/21  1212 01/21/21  1733 01/07/21  1414 09/08/20  1743   HGB 14.8  --   --   --  14.3     --   --   --  185     --   --  138 141   POTASSIUM 3.8  --   --  4.1 4.0   CR 1.28*  --   --  1.05 0.88   A1C  --  5.6 5.7*  --   --         Diagnostics:  No labs were ordered during this visit.   EKG: appears normal, NSR, normal axis, normal intervals, no acute ST/T changes c/w ischemia, no LVH by voltage criteria, unchanged from previous tracings    Revised Cardiac Risk Index (RCRI):  The patient has the following serious cardiovascular risks for perioperative complications:   - No serious cardiac risks = 0 points     RCRI Interpretation: 0 points: Class I (very low risk - 0.4% complication rate)           Signed Electronically by: Holland Blunt MD  Copy of this evaluation report is provided to requesting physician.      "

## 2021-08-06 NOTE — H&P (VIEW-ONLY)
[facility]  [address]  [place] [address]  Phone: [phone]  Primary Provider: Holland Blunt  Pre-op Performing Provider: HOLLAND BLUNT      PREOPERATIVE EVALUATION:  Today's date: 8/6/2021    Ricky Brown is a 70 year old male who presents for a preoperative evaluation.    Surgical Information:  Surgery/Procedure: RELEASE, RIGHT CARPAL TUNNEL  Surgery Location: [facility]   Surgeon: Tony Bravo MD  Surgery Date: 8/13/2021  Time of Surgery: 12:45PM   Where patient plans to recover: At home with family  Fax number for surgical facility: Note does not need to be faxed, will be available electronically in Epic.      Type of Anesthesia Anticipated: Monitor Anesthesia Care    Assessment & Plan     The proposed surgical procedure is considered INTERMEDIATE risk.    Preop general physical exam    This is a fully suitable operative candidate cleared for surgery without reservation    - EKG 12-lead complete w/read - Clinics  Laboratory studies were done roughly 1 weeks ago, see laboratory section of Epic electronic medical records         Risks and Recommendations:  The patient has the following additional risks and recommendations for perioperative complications: untreated obstructive sleep apnea , slight reduced cardiac % ejection fraction without symptoms       Medication Instructions:  Patient is to take all scheduled medications on the day of surgery EXCEPT for modifications listed below:     For the night before the surgery take all regularly scheduled medications except for the losartan ( Cozaar )     Skip the aspirin for one full week prior to the planned procedure     RECOMMENDATION:  APPROVAL GIVEN to proceed with proposed procedure, without further diagnostic evaluation.    Review of the result(s) of each unique test - see labs from 7/30/2021      28 minutes spent on the date of the encounter doing chart review, history and exam,  documentation and further activities per the note      Subjective     HPI related to upcoming procedure: see abnormal needle EMG examination. Patient has carpal tunnel syndrome as well as possibly some ulnar neuritis     Preop Questions 8/6/2021   1. Have you ever had a heart attack or stroke? No   2. Have you ever had surgery on your heart or blood vessels, such as a stent placement, a coronary artery bypass, or surgery on an artery in your head, neck, heart, or legs? No   3. Do you have chest pain with activity? No   4. Do you have a history of  heart failure? No   5. Do you currently have a cold, bronchitis or symptoms of other infection? No   6. Do you have a cough, shortness of breath, or wheezing? YES - mild intermittent and goes as far back as several years ago, see recent negative chest xray    7. Do you or anyone in your family have previous history of blood clots? No   8. Do you or does anyone in your family have a serious bleeding problem such as prolonged bleeding following surgeries or cuts? No   9. Have you ever had problems with anemia or been told to take iron pills? No   10. Have you had any abnormal blood loss such as black, tarry or bloody stools? No   11. Have you ever had a blood transfusion? No   12. Are you willing to have a blood transfusion if it is medically needed before, during, or after your surgery? Yes   13. Have you or any of your relatives ever had problems with anesthesia? No   14. Do you have sleep apnea, excessive snoring or daytime drowsiness? YES , doesn't tolerate cpap machine and mask at this time   14a. Do you have a CPAP machine? No   15. Do you have any artifical heart valves or other implanted medical devices like a pacemaker, defibrillator, or continuous glucose monitor? No   16. Do you have artificial joints? No   17. Are you allergic to latex? No     Health Care Directive:  Patient does have a Health Care Directive or Living Will: Advance Directive received and scanned.  Click on Code in the patient header to view.    Preoperative Review of :   reviewed - no record of controlled substances prescribed.      Status of Chronic Conditions:  See problem list for active medical problems.  Problems all longstanding and stable, except as noted/documented.  See ROS for pertinent symptoms related to these conditions.      Review of Systems  CONSTITUTIONAL: NEGATIVE for fever, chills, change in weight  ENT/MOUTH: NEGATIVE for ear, mouth and throat problems  RESP: NEGATIVE for significant cough or SOB  CV: NEGATIVE for chest pain, palpitations or peripheral edema  ENDOCRINE: NEGATIVE for temperature intolerance, skin/hair changes  HEME/ALLERGY/IMMUNE: NEGATIVE for bleeding problems  PSYCHIATRIC: NEGATIVE for changes in mood or affect  ROS otherwise negative    Patient Active Problem List    Diagnosis Date Noted     Right carpal tunnel syndrome 07/13/2021     Priority: Medium     Added automatically from request for surgery [id number]       Elevated LFTs 05/03/2021     Priority: Medium     Fatty liver 05/03/2021     Priority: Medium     Angina, class II (H) 01/21/2021     Priority: Medium     Macular edema, od 12/24/2019     Priority: Medium     Megalocornea 05/27/2019     Priority: Medium     History of iritis, os 05/27/2019     Priority: Medium     Hyperlipidemia LDL goal <70 08/18/2016     Priority: Medium     overwieght BMI > 35 08/18/2016     Priority: Medium     Essential hypertension with goal blood pressure less than 140/90 08/10/2016     Priority: Medium     Cramp of limb 04/14/2015     Priority: Medium     Disorder of bursae and tendons in shoulder region 04/18/2014     Priority: Medium     Problem list name updated by automated process. Provider to review       RCT (rotator cuff tear) 02/12/2014     Priority: Medium     Near syncope 02/10/2014     Priority: Medium     Cardiomyopathy (H) 05/21/2013     Priority: Medium     BCC (basal cell carcinoma)      Priority: Medium     right  "shoulder        JOSE JUAN (obstructive sleep apnea)-severe (AHI 32)      Priority: Medium     Indications for Polysomnography 6/28/2013: The patient is a 62 y year old Male who is 5' 6\" and weighs 230.0 lbs.  His BMI equals 37.4.  The patients Hawley sleepiness scale was 4.0 and neck size was 19.5.  A diagnostic polysomnogram was performed to evaluate for re-evaluation of JOSE JUAN after development of a cardiomyopathy.  After 123.0 minutes of sleep time the patient exhibited sufficient respiratory events qualifying him for a CPAP trial which was then initiated.      Polysomnogram Data:  A full night polysomnogram was performed recording the standard physiologic parameters including EEG, EOG, EMG, EKG, nasal and oral airflow.  Respiratory parameters of chest and abdominal movements are recorded with respiratory inductance plethysmography.  Oxygen saturation was recorded by pulse oximetry.      Diagnostic PSG  Sleep Architecture:  The total recording time of the diagnostic portion of the study was 142.1 minutes.  The total sleep time was 123.0 minutes.  During the diagnostic portion of the study the sleep latency was 4.8 minutes.  REM latency was N/A.  Sleep Efficiency was 86.6%.  Wake after sleep onset was 10.5.   The patient spent 13.0% of total sleep time in Stage N1, 81.3% in Stage N2, 5.7% in Stages N3 and 0.0% in REM.  No REM sleep noted.       Respiration:     Sustained Sleep Associated Hypoventilation -was not monitored.    Sleep Associated Hypoxemia - was present.  Baseline oxygen saturation was 95.5%.  The lowest oxygen saturation was 85.0%.  Snoring was reported as loud.     Events - During the diagnostic portion of the study, the polysomnogram revealed a presence of 20 obstructive, 0 central, and 0 mixed apneas resulting in an Apnea index of 9.8 events per hour.  There were 45 hypopneas resulting in a Hypopnea index of 22.0 events per hour.  The combined Apnea/Hypopnea Index was 31.7 events per hour.  The REM AHI " was N/A.  The RERA index was 26.3 per hour.   The RDI was 58.    26.3 31.7     Treatment PSG  Sleep Architecture:  At 12:08 am the patient was placed on CPAP treatment and was titrated at pressures ranging from 5 cm/H20 up to 13 cm/H20.  The total recording time of the treatment portion of the study was 380.1 minutes.  The total sleep time was 264.5 minutes.  During the treatment portion of the study the sleep latency was 89.0 minutes.  REM latency was 55.5 minutes.  Sleep Efficiency was 69.6%.  Sleep Maintenance Efficiency was 91.0%.  Total wake time was 116.0 minutes for a total wake percentage of 8.8%. the patient spent 9.1% of total sleep time in Stage N1, 41.0% in Stage N2, 13.6% in Stages N3 and N, and 36.3% in REM.       Respiration:  The optimal pressure was 13.0 with an AHI of 0 including REM lateral.    Movement Activity:      Limb - During the diagnostic portion of the study, there were 80 limb movements recorded.  Of this total, 57 were classified as PLMs.  Of the PLMs, 2 were associated with arousals.  The Limb Movement index was 39.0 per hour while the PLM index was 27.8 per hour.  During the treatment portion of the study, there were 67 limb movements recorded.  Of this total, 49 were classified as PLMs.  Of the PLMs, 7 were associated with arousals.  The Limb Movement index was 15.2 per hour while the PLM index was 11.1 per hour.     Behavior - none    Bruxism - none    Seizure - none      CARDIAC SUMMARY:   No cardiac arrhythmias noted.       Assessment:    Severe JOSE JUAN (AHI 32) with adequate positive airway pressure titration.  Recommendations:    CPAP pressure 13 cmH2O with clinical follow-up within 3 - 4 weeks including compliance measures.    If symptoms not controlled, consider full night titration study.     Advise regarding the risks of drowsy driving    Suggest optimizing sleep hygiene and avoiding sleep deprivation    Weight management  Dopaminergic therapy should be used for management of  restless leg syndrome (RLS) and not based on the presence of periodic limb movements alone.       Advanced directives, counseling/discussion 03/28/2012     Priority: Low     Keratoglobus, ou 10/14/2011     Priority: Low     Pseudophakia, sutured PCL, od; ACL, os - hx of IOL dislocation, ou 10/14/2011     Priority: Low     Posterior vitreous detachment, od 10/14/2011     Priority: Low     Epiretinal membrane, mild, od 10/14/2011     Priority: Low     HL (hearing loss) 04/01/2011     Priority: Low     Erectile dysfunction 04/01/2011     Priority: Low      Past Medical History:   Diagnosis Date     Arthritis      BCC (basal cell carcinoma)     right shoulder      Cardiomyopathy (H)      Colon adenomas 9/20/11     Coronary artery disease      ED (erectile dysfunction)      HTN (hypertension)      Noncompliance      Obesity      JOSE JUAN (obstructive sleep apnea)      Past Surgical History:   Procedure Laterality Date     ARTHROSCOPY SHOULDER BICEPS TENODESIS REPAIR  2/27/2014    Procedure: ARTHROSCOPY SHOULDER BICEPS TENODESIS REPAIR;;  Surgeon: Michael Hagen MD;  [facility]     ARTHROSCOPY SHOULDER ROTATOR CUFF REPAIR  2/27/2014    Procedure: ARTHROSCOPY SHOULDER ROTATOR CUFF REPAIR;  Right Shoulder Arthroscopic Rotator Cuff Repair,  Subacromial Decompression, Biceps Tenodesis, Distal Clavicle Excision   ;  Surgeon: Michael Hagen MD;  [facility]     BIOPSY       CARDIAC SURGERY       COLONOSCOPY       EXCHANGE INTRAOCULAR LENS IMPLANT  2005    left eye - first, recentered PCL with iris fixation; then IOLX with ACL     EXTRACAPSULAR CATARACT EXTRATION WITH INTRAOCULAR LENS IMPLANT  2005    both eyes (elsewhere)     TURBINOPLASTY  12/11/2013    Procedure: TURBINOPLASTY;;  Surgeon: Lisset Parsons MD;  [facility]: [facility]     UVULOPALATOPHARYNGOPLASTY  12/11/2013    Procedure: UVULOPALATOPHARYNGOPLASTY;  Uvulopalatopharyngoplasty, Turbiante Reduction;  Surgeon: Lisset Parsons,  MD;  Location: [facility] OR     Current Outpatient Medications   Medication Sig Dispense Refill     aspirin 81 MG tablet Take 1 tablet (81 mg) by mouth daily 90 tablet 3     atorvastatin (LIPITOR) 10 MG tablet TAKE ONE TABLET BY MOUTH ONE TIME DAILY  90 tablet 0     Calcium Carbonate Antacid (TUMS CALCIUM FOR LIFE BONE PO) Take  by mouth.       diphenoxylate-atropine (LOMOTIL) 2.5-0.025 MG tablet Take 1 tablet by mouth daily as needed for diarrhea Patient only takes 1 tablet as needed 30 tablet 5     ketorolac (ACULAR) 0.5 % ophthalmic solution INSTILL ONE DROP INTO RIGHT EYE FOUR TIMES DAILY       losartan (COZAAR) 50 MG tablet TAKE ONE TABLET BY MOUTH ONE TIME DAILY  90 tablet 0     metoprolol succinate ER (TOPROL-XL) 50 MG 24 hr tablet Take 1 tablet by mouth daily (Patient taking differently: Taking half of tablet) 90 tablet 0     Multiple Vitamin (MULTIVITAMINS PO) Take  by mouth daily.       prednisoLONE acetate (PRED FORTE) 1 % ophthalmic suspension INSTILL ONE DROP INTO RIGHT EYE FOUR TIMES DAILY       tamsulosin (FLOMAX) 0.4 MG capsule Take 1 capsule (0.4 mg) by mouth daily 30 capsule 3     brimonidine (ALPHAGAN) 0.2 % ophthalmic solution Place 1 drop into the right eye 2 times daily 1 Bottle 11       Allergies   Allergen Reactions     Lisinopril Cough     Perfume Other (See Comments)        Social History     Tobacco Use     Smoking status: Former Smoker     Packs/day: 0.50     Years: 2.00     Pack years: 1.00     Types: Cigarettes     Quit date: 1996     Years since quittin.6     Smokeless tobacco: Never Used   Substance Use Topics     Alcohol use: Yes     Alcohol/week: 8.3 standard drinks     Comment: 1-2 drinks a day     Family History   Problem Relation Age of Onset     Diabetes Father         Old age Diabetes     Cerebrovascular Disease Father      Obesity Father      Heart Disease Father      Coronary Artery Disease Father      Hypertension Father      Lipids Sister      C.A.D. No family hx of   "    Cancer No family hx of      Glaucoma No family hx of      Macular Degeneration No family hx of      History   Drug Use No         Objective     /79   Pulse 87   Temp 98.7  F (37.1  C) (Oral)   Resp 22   Ht 1.702 m (5' 7\")   Wt 106.1 kg (234 lb)   SpO2 94%   BMI 36.65 kg/m      Physical Exam  GENERAL APPEARANCE: healthy, alert and no distress  HENT: ear canals and TM's normal and nose and mouth without ulcers or lesions  RESP: lungs clear to auscultation - no rales, rhonchi or wheezes  CV: regular rate and rhythm, normal S1 S2, no S3 or S4 and no murmur, click or rub   ABDOMEN: soft, nontender, no HSM or masses and bowel sounds normal  NEURO: Normal strength and tone, sensory exam grossly normal, mentation intact and speech normal    Recent Labs   Lab Test 07/30/21  0159 04/28/21  1212 01/21/21  1733 01/07/21  1414 09/08/20  1743   HGB 14.8  --   --   --  14.3     --   --   --  185     --   --  138 141   POTASSIUM 3.8  --   --  4.1 4.0   CR 1.28*  --   --  1.05 0.88   A1C  --  5.6 5.7*  --   --         Diagnostics:  No labs were ordered during this visit.   EKG: appears normal, NSR, normal axis, normal intervals, no acute ST/T changes c/w ischemia, no LVH by voltage criteria, unchanged from previous tracings    Revised Cardiac Risk Index (RCRI):  The patient has the following serious cardiovascular risks for perioperative complications:   - No serious cardiac risks = 0 points     RCRI Interpretation: 0 points: Class I (very low risk - 0.4% complication rate)           Signed Electronically by: Holland Blunt MD  Copy of this evaluation report is provided to requesting physician.      "

## 2021-08-10 ENCOUNTER — LAB (OUTPATIENT)
Dept: LAB | Facility: CLINIC | Age: 71
End: 2021-08-10
Payer: MEDICARE

## 2021-08-10 DIAGNOSIS — Z11.59 ENCOUNTER FOR SCREENING FOR OTHER VIRAL DISEASES: ICD-10-CM

## 2021-08-10 PROCEDURE — U0003 INFECTIOUS AGENT DETECTION BY NUCLEIC ACID (DNA OR RNA); SEVERE ACUTE RESPIRATORY SYNDROME CORONAVIRUS 2 (SARS-COV-2) (CORONAVIRUS DISEASE [COVID-19]), AMPLIFIED PROBE TECHNIQUE, MAKING USE OF HIGH THROUGHPUT TECHNOLOGIES AS DESCRIBED BY CMS-2020-01-R: HCPCS

## 2021-08-10 PROCEDURE — U0005 INFEC AGEN DETEC AMPLI PROBE: HCPCS

## 2021-08-11 LAB — SARS-COV-2 RNA RESP QL NAA+PROBE: NEGATIVE

## 2021-08-12 ENCOUNTER — ANESTHESIA EVENT (OUTPATIENT)
Dept: SURGERY | Facility: AMBULATORY SURGERY CENTER | Age: 71
End: 2021-08-12
Payer: MEDICARE

## 2021-08-13 ENCOUNTER — HOSPITAL ENCOUNTER (OUTPATIENT)
Facility: AMBULATORY SURGERY CENTER | Age: 71
End: 2021-08-13
Attending: ORTHOPAEDIC SURGERY | Admitting: ORTHOPAEDIC SURGERY
Payer: MEDICARE

## 2021-08-13 ENCOUNTER — ANESTHESIA (OUTPATIENT)
Dept: SURGERY | Facility: AMBULATORY SURGERY CENTER | Age: 71
End: 2021-08-13
Payer: MEDICARE

## 2021-08-13 VITALS
TEMPERATURE: 98.4 F | OXYGEN SATURATION: 96 % | DIASTOLIC BLOOD PRESSURE: 71 MMHG | SYSTOLIC BLOOD PRESSURE: 131 MMHG | HEART RATE: 66 BPM | RESPIRATION RATE: 16 BRPM | BODY MASS INDEX: 36.65 KG/M2 | WEIGHT: 234 LBS

## 2021-08-13 DIAGNOSIS — Z98.890 S/P CARPAL TUNNEL RELEASE: Primary | ICD-10-CM

## 2021-08-13 DIAGNOSIS — G56.01 RIGHT CARPAL TUNNEL SYNDROME: ICD-10-CM

## 2021-08-13 PROCEDURE — 64721 CARPAL TUNNEL SURGERY: CPT | Mod: RT | Performed by: ORTHOPAEDIC SURGERY

## 2021-08-13 PROCEDURE — G8907 PT DOC NO EVENTS ON DISCHARG: HCPCS

## 2021-08-13 PROCEDURE — G8918 PT W/O PREOP ORDER IV AB PRO: HCPCS

## 2021-08-13 PROCEDURE — 64721 CARPAL TUNNEL SURGERY: CPT | Mod: RT

## 2021-08-13 RX ORDER — MEPERIDINE HYDROCHLORIDE 25 MG/ML
12.5 INJECTION INTRAMUSCULAR; INTRAVENOUS; SUBCUTANEOUS
Status: DISCONTINUED | OUTPATIENT
Start: 2021-08-13 | End: 2021-08-14 | Stop reason: HOSPADM

## 2021-08-13 RX ORDER — ONDANSETRON 2 MG/ML
INJECTION INTRAMUSCULAR; INTRAVENOUS PRN
Status: DISCONTINUED | OUTPATIENT
Start: 2021-08-13 | End: 2021-08-13

## 2021-08-13 RX ORDER — FENTANYL CITRATE 50 UG/ML
25 INJECTION, SOLUTION INTRAMUSCULAR; INTRAVENOUS EVERY 5 MIN PRN
Status: DISCONTINUED | OUTPATIENT
Start: 2021-08-13 | End: 2021-08-14 | Stop reason: HOSPADM

## 2021-08-13 RX ORDER — LABETALOL HYDROCHLORIDE 5 MG/ML
10 INJECTION, SOLUTION INTRAVENOUS
Status: DISCONTINUED | OUTPATIENT
Start: 2021-08-13 | End: 2021-08-14 | Stop reason: HOSPADM

## 2021-08-13 RX ORDER — FENTANYL CITRATE 50 UG/ML
25-50 INJECTION, SOLUTION INTRAMUSCULAR; INTRAVENOUS EVERY 5 MIN PRN
Status: ACTIVE | OUTPATIENT
Start: 2021-08-13 | End: 2021-08-13

## 2021-08-13 RX ORDER — SODIUM CHLORIDE, SODIUM LACTATE, POTASSIUM CHLORIDE, CALCIUM CHLORIDE 600; 310; 30; 20 MG/100ML; MG/100ML; MG/100ML; MG/100ML
INJECTION, SOLUTION INTRAVENOUS CONTINUOUS
Status: DISCONTINUED | OUTPATIENT
Start: 2021-08-13 | End: 2021-08-14 | Stop reason: HOSPADM

## 2021-08-13 RX ORDER — HYDROCODONE BITARTRATE AND ACETAMINOPHEN 5; 325 MG/1; MG/1
1 TABLET ORAL
Status: DISCONTINUED | OUTPATIENT
Start: 2021-08-13 | End: 2021-08-14 | Stop reason: HOSPADM

## 2021-08-13 RX ORDER — CEFAZOLIN SODIUM 2 G/100ML
2 INJECTION, SOLUTION INTRAVENOUS
Status: COMPLETED | OUTPATIENT
Start: 2021-08-13 | End: 2021-08-13

## 2021-08-13 RX ORDER — OXYCODONE HYDROCHLORIDE 5 MG/1
5-10 TABLET ORAL EVERY 4 HOURS PRN
Status: DISCONTINUED | OUTPATIENT
Start: 2021-08-13 | End: 2021-08-14 | Stop reason: HOSPADM

## 2021-08-13 RX ORDER — ALBUTEROL SULFATE 0.83 MG/ML
2.5 SOLUTION RESPIRATORY (INHALATION) EVERY 4 HOURS PRN
Status: DISCONTINUED | OUTPATIENT
Start: 2021-08-13 | End: 2021-08-14 | Stop reason: HOSPADM

## 2021-08-13 RX ORDER — DIAZEPAM 10 MG/2ML
2.5 INJECTION, SOLUTION INTRAMUSCULAR; INTRAVENOUS
Status: DISCONTINUED | OUTPATIENT
Start: 2021-08-13 | End: 2021-08-14 | Stop reason: HOSPADM

## 2021-08-13 RX ORDER — ONDANSETRON 2 MG/ML
4 INJECTION INTRAMUSCULAR; INTRAVENOUS EVERY 30 MIN PRN
Status: DISCONTINUED | OUTPATIENT
Start: 2021-08-13 | End: 2021-08-14 | Stop reason: HOSPADM

## 2021-08-13 RX ORDER — ACETAMINOPHEN 325 MG/1
975 TABLET ORAL ONCE
Status: COMPLETED | OUTPATIENT
Start: 2021-08-13 | End: 2021-08-13

## 2021-08-13 RX ORDER — LIDOCAINE 40 MG/G
CREAM TOPICAL
Status: DISCONTINUED | OUTPATIENT
Start: 2021-08-13 | End: 2021-08-14 | Stop reason: HOSPADM

## 2021-08-13 RX ORDER — PROPOFOL 10 MG/ML
INJECTION, EMULSION INTRAVENOUS CONTINUOUS PRN
Status: DISCONTINUED | OUTPATIENT
Start: 2021-08-13 | End: 2021-08-13

## 2021-08-13 RX ORDER — PROPOFOL 10 MG/ML
INJECTION, EMULSION INTRAVENOUS PRN
Status: DISCONTINUED | OUTPATIENT
Start: 2021-08-13 | End: 2021-08-13

## 2021-08-13 RX ORDER — FENTANYL CITRATE 50 UG/ML
INJECTION, SOLUTION INTRAMUSCULAR; INTRAVENOUS PRN
Status: DISCONTINUED | OUTPATIENT
Start: 2021-08-13 | End: 2021-08-13

## 2021-08-13 RX ORDER — ACETAMINOPHEN 325 MG/1
650 TABLET ORAL
Status: DISCONTINUED | OUTPATIENT
Start: 2021-08-13 | End: 2021-08-14 | Stop reason: HOSPADM

## 2021-08-13 RX ORDER — CEFAZOLIN SODIUM 2 G/100ML
2 INJECTION, SOLUTION INTRAVENOUS SEE ADMIN INSTRUCTIONS
Status: DISCONTINUED | OUTPATIENT
Start: 2021-08-13 | End: 2021-08-14 | Stop reason: HOSPADM

## 2021-08-13 RX ORDER — HYDROCODONE BITARTRATE AND ACETAMINOPHEN 5; 325 MG/1; MG/1
1-2 TABLET ORAL EVERY 4 HOURS PRN
Qty: 15 TABLET | Refills: 0 | Status: SHIPPED | OUTPATIENT
Start: 2021-08-13 | End: 2021-08-16

## 2021-08-13 RX ORDER — LIDOCAINE HYDROCHLORIDE 20 MG/ML
INJECTION, SOLUTION INFILTRATION; PERINEURAL PRN
Status: DISCONTINUED | OUTPATIENT
Start: 2021-08-13 | End: 2021-08-13

## 2021-08-13 RX ORDER — ONDANSETRON 4 MG/1
4 TABLET, ORALLY DISINTEGRATING ORAL EVERY 30 MIN PRN
Status: DISCONTINUED | OUTPATIENT
Start: 2021-08-13 | End: 2021-08-14 | Stop reason: HOSPADM

## 2021-08-13 RX ADMIN — CEFAZOLIN SODIUM 2 G: 2 INJECTION, SOLUTION INTRAVENOUS at 13:54

## 2021-08-13 RX ADMIN — LIDOCAINE HYDROCHLORIDE 60 MG: 20 INJECTION, SOLUTION INFILTRATION; PERINEURAL at 14:03

## 2021-08-13 RX ADMIN — FENTANYL CITRATE 50 MCG: 50 INJECTION, SOLUTION INTRAMUSCULAR; INTRAVENOUS at 14:02

## 2021-08-13 RX ADMIN — ONDANSETRON 4 MG: 2 INJECTION INTRAMUSCULAR; INTRAVENOUS at 14:18

## 2021-08-13 RX ADMIN — ACETAMINOPHEN 975 MG: 325 TABLET ORAL at 12:09

## 2021-08-13 RX ADMIN — PROPOFOL 150 MCG/KG/MIN: 10 INJECTION, EMULSION INTRAVENOUS at 14:05

## 2021-08-13 RX ADMIN — SODIUM CHLORIDE, SODIUM LACTATE, POTASSIUM CHLORIDE, CALCIUM CHLORIDE: 600; 310; 30; 20 INJECTION, SOLUTION INTRAVENOUS at 12:08

## 2021-08-13 RX ADMIN — PROPOFOL 80 MG: 10 INJECTION, EMULSION INTRAVENOUS at 14:03

## 2021-08-13 ASSESSMENT — LIFESTYLE VARIABLES: TOBACCO_USE: 1

## 2021-08-13 NOTE — ANESTHESIA POSTPROCEDURE EVALUATION
Patient: Ricky Brown    Procedure(s):  RELEASE, RIGHT CARPAL TUNNEL    Diagnosis:Right carpal tunnel syndrome [G56.01]  Diagnosis Additional Information: No value filed.    Anesthesia Type:  MAC    Note:  Disposition: Outpatient   Postop Pain Control: Uneventful            Sign Out: Well controlled pain   PONV: No   Neuro/Psych: Uneventful            Sign Out: Acceptable/Baseline neuro status   Airway/Respiratory: Uneventful            Sign Out: Acceptable/Baseline resp. status   CV/Hemodynamics: Uneventful            Sign Out: Acceptable CV status; No obvious hypovolemia; No obvious fluid overload   Other NRE: NONE   DID A NON-ROUTINE EVENT OCCUR? No           Last vitals:  Vitals Value Taken Time   /71 08/13/21 1500   Temp     Pulse 66 08/13/21 1500   Resp 16 08/13/21 1500   SpO2 97 % 08/13/21 1501   Vitals shown include unvalidated device data.    Electronically Signed By: Sai Reid MD  August 13, 2021  3:40 PM

## 2021-08-13 NOTE — OP NOTE
OPERATIVE REPORT    DATE OF SERVICE:  August 13, 2021       PREOPERATIVE DIAGNOSIS  Right carpal tunnel syndrome.      POSTOPERATIVE DIAGNOSIS  Right carpal tunnel syndrome.      NAME OF OPERATION  Right carpal tunnel release.     SURGEON  Tony Bravo MD     FIRST ASSISTANT  TRACY Hummel.     ANESTHESIA: MAC    ESTIMATED BLOOD LOSS: minimal, 1cc    Complications: none    Specimen: none    INDICATIONS  The patient is a 70 year old male with moderate right carpal tunnel syndrome based on EMG. The symptoms have been quite bothersome, mainly numbness/tingling in the hand.  Conservative treatment has failed.  Informed consent was obtained for the procedure.       PROCEDURE IN DETAIL  The patient was taken to the operating room and placed on the operating table in the supine position.  Tourniquet was placed around the proximal forearm. The limb was prepped and draped in the usual sterile fashion.  IV sedation was given by Anesthesia.  Local anesthetic using a combination of 0.25% Marcaine and 1% lidocaine with epinepherine was injected in the anticipated incision site.  The limb was exsanguinated and tourniquet inflated to 250 mmHg.    A longitudinal incision was made in the usual location at the base of the palm just ulnar to midline.  The incision was taken down through the skin and subcutaneous tissue carefully to the level of the palmar fascia.  The palmar fascia was then carefully incised, and the transverse carpal ligament was then exposed. The transverse carpal ligament was incised from distal to proximal under direct vision while protecting underlying structures with an elevator.  The TCL was moderately tight and moderately thick.  Proximally, we protected the underlying structures using a clamp, and then completed the ligament incision using a tenotomy scissors.  Once I felt that the transverse carpal ligament was released completely, we spread the clamp to make sure that it was in fact completely  released.  The wound was irrigated and the tourniquet deflated.  Minor bleeders were encountered and bovied.  The median nerve was observed to turn deep red . Then, 5-0 nylon sutures placed in a vertical mattress fashion were used to close the skin, and a sterile dressing was applied followed by a volar splint. The patient was then transferred to the hospital cart and to the recovery area in stable condition.    DORYS Bravo MD  Dept. Orthopedic Surgery  [facility]

## 2021-08-13 NOTE — ANESTHESIA PREPROCEDURE EVALUATION
Anesthesia Pre-Procedure Evaluation    Patient: Ricky Brown   MRN: [id number] : 1950        Preoperative Diagnosis: Right carpal tunnel syndrome [G56.01]   Procedure : Procedure(s):  RELEASE, RIGHT CARPAL TUNNEL     Past Medical History:   Diagnosis Date     Arthritis      BCC (basal cell carcinoma)     right shoulder      Cardiomyopathy (H)      Colon adenomas 11     Coronary artery disease      ED (erectile dysfunction)      HTN (hypertension)      Noncompliance      Obesity      JOSE JUAN (obstructive sleep apnea)       Past Surgical History:   Procedure Laterality Date     ARTHROSCOPY SHOULDER BICEPS TENODESIS REPAIR  2014    Procedure: ARTHROSCOPY SHOULDER BICEPS TENODESIS REPAIR;;  Surgeon: Michael Hagen MD;  [facility]     ARTHROSCOPY SHOULDER ROTATOR CUFF REPAIR  2014    Procedure: ARTHROSCOPY SHOULDER ROTATOR CUFF REPAIR;  Right Shoulder Arthroscopic Rotator Cuff Repair,  Subacromial Decompression, Biceps Tenodesis, Distal Clavicle Excision   ;  Surgeon: Michael Hagen MD;  [facility]     BIOPSY       CARDIAC SURGERY       COLONOSCOPY       EXCHANGE INTRAOCULAR LENS IMPLANT      left eye - first, recentered PCL with iris fixation; then IOLX with ACL     EXTRACAPSULAR CATARACT EXTRATION WITH INTRAOCULAR LENS IMPLANT      both eyes (elsewhere)     TURBINOPLASTY  2013    Procedure: TURBINOPLASTY;;  Surgeon: Lisset Parsons MD;  [facility]     UVULOPALATOPHARYNGOPLASTY  2013    Procedure: UVULOPALATOPHARYNGOPLASTY;  Uvulopalatopharyngoplasty, Turbiante Reduction;  Surgeon: Lisset Parsons MD;  Location: [facility]      Allergies   Allergen Reactions     Lisinopril Cough     Perfume Other (See Comments)      Social History     Tobacco Use     Smoking status: Former Smoker     Packs/day: 0.50     Years: 2.00     Pack years: 1.00     Types: Cigarettes     Quit date: 1996     Years since quittin.6      Smokeless tobacco: Never Used   Substance Use Topics     Alcohol use: Yes     Alcohol/week: 8.3 standard drinks     Comment: 1-2 drinks a day      Wt Readings from Last 1 Encounters:   08/13/21 106.1 kg (234 lb)        Anesthesia Evaluation   Pt has had prior anesthetic. Type: General.    No history of anesthetic complications       ROS/MED HX  ENT/Pulmonary:     (+) sleep apnea, doesn't use CPAP, tobacco use, Past use,     Neurologic:  - neg neurologic ROS     Cardiovascular: Comment: Cardiomyopathy    (+) hypertension--CAD angina---    METS/Exercise Tolerance:     Hematologic:  - neg hematologic  ROS     Musculoskeletal:  - neg musculoskeletal ROS     GI/Hepatic: Comment: Fatty Liver - neg GI/hepatic ROS   (+) liver disease,     Renal/Genitourinary:       Endo:     (+) Obesity,     Psychiatric/Substance Use:  - neg psychiatric ROS     Infectious Disease:       Malignancy:  - neg malignancy ROS     Other:  - neg other ROS          Physical Exam    Airway  airway exam normal           Respiratory Devices and Support         Dental  no notable dental history         Cardiovascular   cardiovascular exam normal          Pulmonary   pulmonary exam normal                OUTSIDE LABS:  CBC:   Lab Results   Component Value Date    WBC 11.5 (H) 07/30/2021    WBC 5.3 09/08/2020    HGB 14.8 07/30/2021    HGB 14.3 09/08/2020    HCT 45.3 07/30/2021    HCT 43.6 09/08/2020     07/30/2021     09/08/2020     BMP:   Lab Results   Component Value Date     07/30/2021     01/07/2021    POTASSIUM 3.8 07/30/2021    POTASSIUM 4.1 01/07/2021    CHLORIDE 109 07/30/2021    CHLORIDE 106 01/07/2021    CO2 23 07/30/2021    CO2 25 01/07/2021    BUN 27 07/30/2021    BUN 21 01/07/2021    CR 1.28 (H) 07/30/2021    CR 1.05 01/07/2021     (H) 07/30/2021     (H) 04/28/2021     COAGS: No results found for: PTT, INR, FIBR  POC: No results found for: BGM, HCG, HCGS  HEPATIC:   Lab Results   Component Value Date     ALBUMIN 3.7 07/30/2021    PROTTOTAL 7.2 07/30/2021    ALT 31 07/30/2021    AST 23 07/30/2021    ALKPHOS 74 07/30/2021    BILITOTAL 0.4 07/30/2021     OTHER:   Lab Results   Component Value Date    PH 7.31 (L) 12/11/2013    LACT 1.6 04/11/2020    A1C 5.6 04/28/2021    CATHERINE 8.9 07/30/2021    PHOS 4.0 12/12/2013    MAG 2.2 02/11/2014    LIPASE 129 04/11/2020    TSH 1.38 02/21/2019    SED 8 08/18/2016       Anesthesia Plan    ASA Status:  3   NPO Status:  NPO Appropriate    Anesthesia Type: MAC.     - Reason for MAC: chronic cardiopulmonary disease   Induction: Intravenous, Propofol.   Maintenance: TIVA.        Consents    Anesthesia Plan(s) and associated risks, benefits, and realistic alternatives discussed. Questions answered and patient/representative(s) expressed understanding.     - Discussed with:  Patient      - Extended Intubation/Ventilatory Support Discussed: No.      - Patient is DNR/DNI Status: No    Use of blood products discussed: No .     Postoperative Care    Pain management: IV analgesics.   PONV prophylaxis: Ondansetron (or other 5HT-3), Background Propofol Infusion     Comments:                Sai Reid MD

## 2021-08-13 NOTE — ANESTHESIA CARE TRANSFER NOTE
Patient: Ricky Brown    Procedure(s):  RELEASE, RIGHT CARPAL TUNNEL    Diagnosis: Right carpal tunnel syndrome [G56.01]  Diagnosis Additional Information: No value filed.    Anesthesia Type:   MAC     Note:    Oropharynx: oropharynx clear of all foreign objects and spontaneously breathing  Level of Consciousness: drowsy and awake  Oxygen Supplementation: room air    Independent Airway: airway patency satisfactory and stable  Dentition: dentition unchanged  Vital Signs Stable: post-procedure vital signs reviewed and stable  Report to RN Given: handoff report given  Patient transferred to: Phase II    Handoff Report: Identifed the Patient, Identified the Reponsible Provider, Reviewed the pertinent medical history, Discussed the surgical course, Reviewed Intra-OP anesthesia mangement and issues during anesthesia, Set expectations for post-procedure period and Allowed opportunity for questions and acknowledgement of understanding      Vitals:  Vitals Value Taken Time   BP     Temp     Pulse     Resp     SpO2         Electronically Signed By: MANDO Walden CRNA  August 13, 2021  2:45 PM

## 2021-08-30 NOTE — PROGRESS NOTES
Detail Level: Detailed [facility]  [address] [place] [address]  [place] [address]  Phone: [phone]  Primary Provider: Holland Blunt  Pre-op Performing Provider: HOLLAND BLUNT    PREOPERATIVE EVALUATION:  Today's date: 1/29/2021    Ricky Brown is a 70 year old male who presents for a preoperative evaluation.    Surgical Information:  Surgery/Procedure: RIGHT VITRECTOMY POSTERIOR 23 GAUGE SYSTEM, MEMBRANE STRIPPING, INTERNAL LIMITING MEMBRANE REMOVAL,BRILLIANT BLUE, POSSIBLE AIR FLUID EXCHANGE  Surgery Location: [facility]  Surgeon: Nicholas  Surgery Date: 02/04/2021  Time of Surgery: 6:00am  Where patient plans to recover: At home with family  Fax number for surgical facility: [phone]    Type of Anesthesia Anticipated: Local with MAC    Subjective     HPI related to upcoming procedure: has a history of complicated eye disease with megalocornea and problems with lens  / lens issues .     Preop Questions 1/29/2021   1. Have you ever had a heart attack or stroke? No   2. Have you ever had surgery on your heart or blood vessels, such as a stent placement, a coronary artery bypass, or surgery on an artery in your head, neck, heart, or legs? No   3. Do you have chest pain with activity? No   4. Do you have a history of  heart failure? YES - this was a diagnosis of non-ischemic cardiomyopathy with a most recent previous echocardiogram nearly normal at % ejection fraction 50-55% one month ago   5. Do you currently have a cold, bronchitis or symptoms of other infection? No   6. Do you have a cough, shortness of breath, or wheezing? YES - mild ongoing chronic coughing , denies symptoms of illness.   7. Do you or anyone in your family have previous history of blood clots? UNKNOWN family history , patient has had no history of venous thromboembolic disease    8. Do you or does anyone in your family have a serious bleeding problem such as prolonged bleeding following surgeries or cuts? No   9. Have you  Add 03293 Cpt? (Important Note: In 2017 The Use Of 96853 Is Being Tracked By Cms To Determine Future Global Period Reimbursement For Global Periods): yes ever had problems with anemia or been told to take iron pills? No   10. Have you had any abnormal blood loss such as black, tarry or bloody stools? No   11. Have you ever had a blood transfusion? No   12. Are you willing to have a blood transfusion if it is medically needed before, during, or after your surgery? Yes   13. Have you or any of your relatives ever had problems with anesthesia? No   14. Do you have sleep apnea, excessive snoring or daytime drowsiness? YES -    14a. Do you have a CPAP machine? No   15. Do you have any artifical heart valves or other implanted medical devices like a pacemaker, defibrillator, or continuous glucose monitor? No   16. Do you have artificial joints? No   17. Are you allergic to latex? No       Health Care Directive:  Patient does not have a Health Care Directive or Living Will: Discussed advance care planning with patient; information given to patient to review.    Preoperative Review of :  956}    Status of Chronic Conditions:  See problem list for active medical problems.  Problems all longstanding and stable, except as noted/documented.  See ROS for pertinent symptoms related to these conditions.      Review of Systems  CONSTITUTIONAL: NEGATIVE for fever, chills, change in weight  ENT/MOUTH: NEGATIVE for ear, mouth and throat problems  RESP: NEGATIVE for significant cough or SOB  CV: NEGATIVE for chest pain, palpitations or peripheral edema       Past Medical History:   Diagnosis Date     Arthritis      BCC (basal cell carcinoma)     right shoulder      Cardiomyopathy (H)      Colon adenomas 9/20/11     Coronary artery disease      ED (erectile dysfunction)      HTN (hypertension)      Noncompliance      Obesity      JOSE JUAN (obstructive sleep apnea)      Pulmonary emphysema, unspecified emphysema type (H) 1/21/2021     Past Surgical History:   Procedure Laterality Date     ARTHROSCOPY SHOULDER BICEPS TENODESIS REPAIR  2/27/2014    Procedure: ARTHROSCOPY SHOULDER BICEPS  TENODESIS REPAIR;;  Surgeon: Michael Hagen MD;  [facility]     ARTHROSCOPY SHOULDER ROTATOR CUFF REPAIR  2/27/2014    Procedure: ARTHROSCOPY SHOULDER ROTATOR CUFF REPAIR;  Right Shoulder Arthroscopic Rotator Cuff Repair,  Subacromial Decompression, Biceps Tenodesis, Distal Clavicle Excision   ;  Surgeon: Michael Hagen MD;  [facility]     BIOPSY       CARDIAC SURGERY       COLONOSCOPY       EXCHANGE INTRAOCULAR LENS IMPLANT  2005    left eye - first, recentered PCL with iris fixation; then IOLX with ACL     EXTRACAPSULAR CATARACT EXTRATION WITH INTRAOCULAR LENS IMPLANT  2005    both eyes (elsewhere)     TURBINOPLASTY  12/11/2013    Procedure: TURBINOPLASTY;;  Surgeon: Lisset Parsons MD;  [facility]     UVULOPALATOPHARYNGOPLASTY  12/11/2013    Procedure: UVULOPALATOPHARYNGOPLASTY;  Uvulopalatopharyngoplasty, Turbiante Reduction;  Surgeon: Lisset Parsons MD;  Location: [facility] OR     Current Outpatient Medications   Medication Sig Dispense Refill     aspirin 81 MG tablet Take 1 tablet (81 mg) by mouth daily 90 tablet 3     atorvastatin (LIPITOR) 10 MG tablet TAKE ONE TABLET BY MOUTH ONE TIME DAILY  90 tablet 0     brimonidine (ALPHAGAN) 0.2 % ophthalmic solution Place 1 drop into the right eye 2 times daily 1 Bottle 11     Calcium Carbonate Antacid (TUMS CALCIUM FOR LIFE BONE PO) Take  by mouth.       diphenoxylate-atropine (LOMOTIL) 2.5-0.025 MG tablet Take 1 tablet by mouth daily as needed for diarrhea Patient only takes 1 tablet as needed 30 tablet 5     furosemide (LASIX) 20 MG tablet Take 1 tablet (20 mg) by mouth daily 90 tablet 3     ketorolac (ACULAR) 0.5 % ophthalmic solution INSTILL ONE DROP INTO RIGHT EYE FOUR TIMES DAILY       losartan (COZAAR) 50 MG tablet TAKE ONE TABLET BY MOUTH ONE TIME DAILY  90 tablet 0     metoprolol succinate ER (TOPROL-XL) 50 MG 24 hr tablet Take 1 tablet by mouth daily (Patient taking differently: Taking half of tablet) 90  tablet 0     Multiple Vitamin (MULTIVITAMINS PO) Take  by mouth daily.       nitroGLYcerin (NITROSTAT) 0.4 MG sublingual tablet For chest pain place 1 tablet under the tongue every 5 minutes for 3 doses. If symptoms persist 5 minutes after 1st dose call 911. 30 tablet 3     prednisoLONE acetate (PRED FORTE) 1 % ophthalmic suspension INSTILL ONE DROP INTO RIGHT EYE FOUR TIMES DAILY         Allergies   Allergen Reactions     Lisinopril Cough        Social History     Tobacco Use     Smoking status: Former Smoker     Packs/day: 0.50     Years: 2.00     Pack years: 1.00     Types: Cigarettes     Quit date: 1996     Years since quittin.1     Smokeless tobacco: Never Used   Substance Use Topics     Alcohol use: Yes     Alcohol/week: 8.3 standard drinks     Frequency: 4 or more times a week     Comment: Evening Marie     Family History   Problem Relation Age of Onset     Diabetes Father         Old age Diabetes     Cerebrovascular Disease Father      Obesity Father      Heart Disease Father      Coronary Artery Disease Father      Hypertension Father      Lipids Sister      C.A.D. No family hx of      Cancer No family hx of      Glaucoma No family hx of      Macular Degeneration No family hx of      History   Drug Use No         Objective     There were no vitals taken for this visit.    Physical Exam  GENERAL APPEARANCE: healthy, alert and no distress  HENT: ear canals and TM's normal and nose and mouth without ulcers or lesions  RESP: lungs clear to auscultation - no rales, rhonchi or wheezes  CV: regular rate and rhythm, normal S1 S2, no S3 or S4 and no murmur, click or rub   ABDOMEN: soft, nontender, no HSM or masses and bowel sounds normal  NEURO: Normal strength and tone, sensory exam grossly normal, mentation intact and speech normal    Recent Labs   Lab Test 21  1733 21  1414 20  0929 20  1743 20  1743 20  1021 20  1523   HGB  --   --   --   --  14.3 13.8  --     PLT  --   --   --   --  185 242  --    NA  --  138 138   < > 141 141  --    POTASSIUM  --  4.1 3.9   < > 4.0 4.3  --    CR  --  1.05 0.96   < > 0.88 1.10  --    A1C 5.7*  --   --   --   --   --  5.6    < > = values in this interval not displayed.        Diagnostics:  No labs were ordered during this visit.   No EKG required for low risk surgery (cataract, skin procedure, breast biopsy, etc).    Revised Cardiac Risk Index (RCRI):  The patient has the following serious cardiovascular risks for perioperative complications:   - No serious cardiac risks = 0 points     RCRI Interpretation: 0 points: Class I (very low risk - 0.4% complication rate)       Assessment & Plan   The proposed surgical procedure is considered LOW risk.    Preop general physical exam  Approved for surgery without reservations    JOSE JUAN (obstructive sleep apnea)-severe (AHI 32)  See separate entry with this chart and prior letter to patient . He has established obstructive sleep apnea but has been intolerant of cpap machine and mask and is status post uvulopalatopharyngeoplasty   - SLEEP EVALUATION & MANAGEMENT REFERRAL - [facility]  [phone] (Age 15 and up); Future       Risks and Recommendations:  The patient has the following additional risks and recommendations for perioperative complications:   - No identified additional risk factors other than previously addressed    Medication Instructions:  Patient is to take all scheduled medications on the day of surgery    RECOMMENDATION:  APPROVAL GIVEN to proceed with proposed procedure, without further diagnostic evaluation.    Signed Electronically by: Holland Blunt MD    Copy of this evaluation report is provided to requesting physician.    Preop [facility]current [facility] Preop Guidelines    Revised Cardiac Risk Index

## 2021-09-01 ENCOUNTER — OFFICE VISIT (OUTPATIENT)
Dept: ORTHOPEDICS | Facility: CLINIC | Age: 71
End: 2021-09-01
Payer: MEDICARE

## 2021-09-01 VITALS
HEART RATE: 88 BPM | SYSTOLIC BLOOD PRESSURE: 137 MMHG | BODY MASS INDEX: 35.08 KG/M2 | WEIGHT: 224 LBS | DIASTOLIC BLOOD PRESSURE: 88 MMHG

## 2021-09-01 DIAGNOSIS — G56.01 RIGHT CARPAL TUNNEL SYNDROME: ICD-10-CM

## 2021-09-01 DIAGNOSIS — Z98.890 S/P CARPAL TUNNEL RELEASE: Primary | ICD-10-CM

## 2021-09-01 PROCEDURE — 99024 POSTOP FOLLOW-UP VISIT: CPT | Performed by: ORTHOPAEDIC SURGERY

## 2021-09-01 NOTE — PATIENT INSTRUCTIONS
POST CARPAL TUNNEL RELEASE INSTRUCTIONS    Wear velcro splint day and night for 10 days.  Remove to do wrist range of motion and gentle gripping exercises 4-5 time per day, and for hygiene.     In 10 days, brace use during the day is OPTIONAL, BUT should be worn at night for 10 days.      In 3 weeks from now, brace wear at any time is OPTIONAL    Avoid heavy or repetitive use of the hand.  But you should do finger range of motion therapeutically.    Wound:  Once sutures are out, it is ok to get wound wet in the shower or gently running water.  NO SOAKING in water until 3 weeks postoperative, and the wound is sealed-over.  Scar massage: use Vitamin E (liquid inside Vitamin E tablet, or Vitamin E lotion) to gently massage the scar once per day for 30 seconds.

## 2021-09-01 NOTE — PROGRESS NOTES
Ricky Brown is here for follow up after right carpal tunnel release on 8/13/21.    Numbness and tingling have decreased since surgery.    He is able to feel objects now.     Exam:  Wound looks good, no evidence of infection.  Non-tender over the incision site.  Able to make a full fist    Assessment:  Doing well status post right  carpal tunnel release     Plan:  Sutures were removed today.  Taught scar management techniques.  Discussed splint wear.  Therapy: no  Can return to repetitive use at 4-6 weeks.  Return to clinic 4wks or as needed       DORYS Bravo MD  Dept. Orthopedic Surgery  [facility]

## 2021-09-01 NOTE — LETTER
9/1/2021         RE: Ricky Brown  [address]  [place] [address]        Dear Colleague,    Thank you for referring your patient, Ricky Brown, to the [facility]. Please see a copy of my visit note below.    Ricky Brown is here for follow up after right carpal tunnel release on 8/13/21.    Numbness and tingling have decreased since surgery.    He is able to feel objects now.     Exam:  Wound looks good, no evidence of infection.  Non-tender over the incision site.  Able to make a full fist    Assessment:  Doing well status post right  carpal tunnel release     Plan:  Sutures were removed today.  Taught scar management techniques.  Discussed splint wear.  Therapy: no  Can return to repetitive use at 4-6 weeks.  Return to clinic 4wks or as needed       DORYS Bravo MD  Dept. Orthopedic Surgery  [facility]        Again, thank you for allowing me to participate in the care of your patient.        Sincerely,        Tony Bravo MD

## 2021-09-02 PROBLEM — I25.10 CAD (CORONARY ARTERY DISEASE): Chronic | Status: ACTIVE | Noted: 2021-09-02

## 2021-09-02 PROBLEM — K76.0 FATTY LIVER: Chronic | Status: ACTIVE | Noted: 2021-05-03

## 2021-09-03 ENCOUNTER — OFFICE VISIT (OUTPATIENT)
Dept: SLEEP MEDICINE | Facility: CLINIC | Age: 71
End: 2021-09-03
Attending: INTERNAL MEDICINE
Payer: MEDICARE

## 2021-09-03 VITALS
RESPIRATION RATE: 20 BRPM | OXYGEN SATURATION: 97 % | HEART RATE: 95 BPM | HEIGHT: 67 IN | WEIGHT: 224.4 LBS | BODY MASS INDEX: 35.22 KG/M2 | DIASTOLIC BLOOD PRESSURE: 73 MMHG | SYSTOLIC BLOOD PRESSURE: 129 MMHG

## 2021-09-03 DIAGNOSIS — R06.00 DYSPNEA AND RESPIRATORY ABNORMALITY: ICD-10-CM

## 2021-09-03 DIAGNOSIS — I10 ESSENTIAL HYPERTENSION: ICD-10-CM

## 2021-09-03 DIAGNOSIS — R53.81 MALAISE AND FATIGUE: ICD-10-CM

## 2021-09-03 DIAGNOSIS — E66.01 CLASS 2 SEVERE OBESITY DUE TO EXCESS CALORIES WITH SERIOUS COMORBIDITY AND BODY MASS INDEX (BMI) OF 35.0 TO 35.9 IN ADULT (H): ICD-10-CM

## 2021-09-03 DIAGNOSIS — I25.10 ATHEROSCLEROSIS OF CORONARY ARTERY OF NATIVE HEART WITHOUT ANGINA PECTORIS, UNSPECIFIED VESSEL OR LESION TYPE: ICD-10-CM

## 2021-09-03 DIAGNOSIS — R53.83 MALAISE AND FATIGUE: ICD-10-CM

## 2021-09-03 DIAGNOSIS — G47.33 OSA (OBSTRUCTIVE SLEEP APNEA): ICD-10-CM

## 2021-09-03 DIAGNOSIS — Z72.820 LACK OF ADEQUATE SLEEP: ICD-10-CM

## 2021-09-03 DIAGNOSIS — R06.89 DYSPNEA AND RESPIRATORY ABNORMALITY: ICD-10-CM

## 2021-09-03 DIAGNOSIS — G47.30 SLEEP APNEA, UNSPECIFIED TYPE: ICD-10-CM

## 2021-09-03 DIAGNOSIS — E66.812 CLASS 2 SEVERE OBESITY DUE TO EXCESS CALORIES WITH SERIOUS COMORBIDITY AND BODY MASS INDEX (BMI) OF 35.0 TO 35.9 IN ADULT (H): ICD-10-CM

## 2021-09-03 PROCEDURE — 99203 OFFICE O/P NEW LOW 30 MIN: CPT | Mod: 24 | Performed by: PHYSICIAN ASSISTANT

## 2021-09-03 ASSESSMENT — MIFFLIN-ST. JEOR: SCORE: 1746.5

## 2021-09-03 NOTE — PROGRESS NOTES
"  Sleep Consultation:    Date on this visit: 9/3/2021    Ricky Brown is sent by Holland Blunt for a sleep consultation regarding sleep apnea.    Primary Physician: Holland Blunt     Chief Complaint   Patient presents with     Sleep Problem     Consult. Hx of sleep apnea. Tried CPAP but was unsuccessful with many masks and nasal cannula. Would have chest pain in the AM. Told her was \"swallowing air\".       Ricky Brown is a 70 year old male with a PMH of cardiomyopathy, CAD, angina, HLD, HTN, BCC  and JOSE JUAN. His last sleep medicine follow up was in 2013.     In review:   He underwent a split night sleep study 1/29/2009 at [facility] and was found to have severe JOSE JUAN with an AHI of 72 and O2 rody of 78%.  CPAP was titrated to a final pressure of 11 cm/H20 but only lateral REM was seen and his was started on CPAP at a pressure of 13 cm/H20.  He had pressure tolerance issues and ultimately returned CPAP. The patient had gained weight.         A polysomnogram was recommended and completed on 6/28/2013.  During the first portion of the study night the patient was found to have severe JOSE JUAN (AHI 32, RDI 58, O2 rody 85%) and therefore a CPAP titration was completed during the second portion of the study night.  CPAP was titrated to an effective pressure of 13 cm/H20 with an AHI including REM lateral. Weight was 230 lbs.      He started CPAP 7/12/2013.  He has struggled with aerophagia and a feeling of chest fullness and discomfort since starting CPAP.  His pressure was to 11 cm/H20 and add and EPR of 3.     He stopped using CPAP in 2013. He struggled with aerophagia. He underwent uvulopalatopharyngoplasty (UPPP) in 2013 with Dr. Parsons. The snoring did improve immediately post surgery but snoring has since returned.     Weight now: 224 lbs    Ricky goes to sleep at 11:00 PM. He wakes up at 10:00 AM without an alarm. He falls asleep in 5-10 minutes.  Ricky denies difficulty falling asleep.  " He wakes up 3-4 times a night for 5 minutes before falling back to sleep.  Ricky wakes up to go to the bathroom.  Patient gets an average of 6-7 hours of sleep per night. He does not feel refreshed.     Patient does watch TV in bed.     Ricky does not do shift work.      Ricky does snore every night and snoring is loud. Patient does not have a regular bed partner. There is report of gasping.  He does have witnessed apneas. They always sleep separately.  Patient sleeps on his side. He denies no morning headaches, morning confusion and restless legs. Ricky denies any bruxism, sleep walking, sleep talking, dream enactment, sleep paralysis, cataplexy and hypnogogic/hypnopompic hallucinations.    Ricky has difficulty breathing through his nose.      Patient's Reading Sleepiness score 3/24 inconsistent with excessive  daytime sleepiness.  He has fatigue.      Allergies:    Allergies   Allergen Reactions     Lisinopril Cough     Perfume Other (See Comments)       Medications:    Current Outpatient Medications   Medication Sig Dispense Refill     aspirin 81 MG tablet Take 1 tablet (81 mg) by mouth daily 90 tablet 3     atorvastatin (LIPITOR) 10 MG tablet TAKE ONE TABLET BY MOUTH ONE TIME DAILY  90 tablet 0     Calcium Carbonate Antacid (TUMS CALCIUM FOR LIFE BONE PO) Take  by mouth.       diphenoxylate-atropine (LOMOTIL) 2.5-0.025 MG tablet Take 1 tablet by mouth daily as needed for diarrhea Patient only takes 1 tablet as needed 30 tablet 5     losartan (COZAAR) 50 MG tablet TAKE ONE TABLET BY MOUTH ONE TIME DAILY  90 tablet 0     metoprolol succinate ER (TOPROL-XL) 50 MG 24 hr tablet Take 1 tablet by mouth daily (Patient taking differently: Taking half of tablet) 90 tablet 0     Multiple Vitamin (MULTIVITAMINS PO) Take  by mouth daily.       tamsulosin (FLOMAX) 0.4 MG capsule Take 1 capsule (0.4 mg) by mouth daily 30 capsule 3       Problem List:  Patient Active Problem List    Diagnosis Date Noted     CAD  (coronary artery disease) 09/02/2021     Priority: Medium     Nonobstructive coronary artery disease with previous coronary angiogram suggesting all stenoses less or equal to 25%.          Right carpal tunnel syndrome 07/13/2021     Priority: Medium     Added automatically from request for surgery [id number]       Elevated LFTs 05/03/2021     Priority: Medium     Fatty liver 05/03/2021     Priority: Medium     Angina, class II (H) 01/21/2021     Priority: Medium     Macular edema, od 12/24/2019     Priority: Medium     Megalocornea 05/27/2019     Priority: Medium     History of iritis, os 05/27/2019     Priority: Medium     Hyperlipidemia LDL goal <70 08/18/2016     Priority: Medium     overwieght BMI > 35 08/18/2016     Priority: Medium     Essential hypertension with goal blood pressure less than 140/90 08/10/2016     Priority: Medium     Cramp of limb 04/14/2015     Priority: Medium     Disorder of bursae and tendons in shoulder region 04/18/2014     Priority: Medium     Problem list name updated by automated process. Provider to review       RCT (rotator cuff tear) 02/12/2014     Priority: Medium     Near syncope 02/10/2014     Priority: Medium     Cardiomyopathy (H) 05/21/2013     Priority: Medium     Nonischemic cardiomyopathy based on serial MRI assessments with probably reduced baseline ejection fraction but recent outside echo suggesting normalization of EF.     Echo 9/29/2020  Interpretation Summary   Global and regional left ventricular function is normal with an EF of 55-60%.  Global right ventricular function is normal.  Trileaflet aortic sclerosis without stenosis. Mild aortic insufficiency is  present.  The inferior vena cava was normal in size with preserved respiratory  variability.  No pericardial effusion is present.         BCC (basal cell carcinoma)      Priority: Medium     right shoulder        JOSE JUAN (obstructive sleep apnea)-severe (AHI 32)      Priority: Medium     1/29/2009 at [facility] and was found to have severe JOSE JUAN with an AHI of 72 and O2 rody of 78%.  CPAP was titrated to a final pressure of 11 cm/H20 but only lateral REM    A polysomnogram was recommended and completed on 6/28/2013 (230.0 lbs)-  During the first portion of the study night the patient was found to have severe JOSE JUAN (AHI 32, RDI 58, O2 rody 85%) and therefore a CPAP titration was completed during the second portion of the study night.  CPAP was titrated to an effective pressure of 13 cm/H20 with an AHI including REM lateral.          Advanced directives, counseling/discussion 03/28/2012     Priority: Low     Keratoglobus, ou 10/14/2011     Priority: Low     Pseudophakia, sutured PCL, od; ACL, os - hx of IOL dislocation, ou 10/14/2011     Priority: Low     Posterior vitreous detachment, od 10/14/2011     Priority: Low     Epiretinal membrane, mild, od 10/14/2011     Priority: Low     HL (hearing loss) 04/01/2011     Priority: Low     Erectile dysfunction 04/01/2011     Priority: Low        Past Medical/Surgical History:  Past Medical History:   Diagnosis Date     Arthritis      BCC (basal cell carcinoma)     right shoulder      Cardiomyopathy (H)      Colon adenomas 9/20/11     Coronary artery disease      ED (erectile dysfunction)      HTN (hypertension)      Noncompliance      Obesity      JOSE JUAN (obstructive sleep apnea)      Past Surgical History:   Procedure Laterality Date     ARTHROSCOPY SHOULDER BICEPS TENODESIS REPAIR  2/27/2014    Procedure: ARTHROSCOPY SHOULDER BICEPS TENODESIS REPAIR;;  Surgeon: Michael Hagen MD;  [facility]     ARTHROSCOPY SHOULDER ROTATOR CUFF REPAIR  2/27/2014    Procedure: ARTHROSCOPY SHOULDER ROTATOR CUFF REPAIR;  Right Shoulder Arthroscopic Rotator Cuff Repair,  Subacromial Decompression, Biceps Tenodesis, Distal Clavicle Excision   ;  Surgeon: Michael Hagen MD;  Location: [facility]     BIOPSY       CARDIAC SURGERY       COLONOSCOPY       EXCHANGE INTRAOCULAR  LENS IMPLANT      left eye - first, recentered PCL with iris fixation; then IOLX with ACL     EXTRACAPSULAR CATARACT EXTRATION WITH INTRAOCULAR LENS IMPLANT      both eyes (elsewhere)     TURBINOPLASTY  2013    Procedure: TURBINOPLASTY;;  Surgeon: Lisset Parsons MD;  Location:  OR     UVULOPALATOPHARYNGOPLASTY  2013    Procedure: UVULOPALATOPHARYNGOPLASTY;  Uvulopalatopharyngoplasty, Turbiante Reduction;  Surgeon: Lisset Parsons MD;  Location:  OR       Social History:  Social History     Socioeconomic History     Marital status:      Spouse name: Kyung     Number of children: 0     Years of education: 14     Highest education level: Not on file   Occupational History     Occupation: industrial electronics      Employer: S[employer]   Tobacco Use     Smoking status: Former Smoker     Packs/day: 0.50     Years: 2.00     Pack years: 1.00     Types: Cigarettes     Quit date: 1996     Years since quittin.7     Smokeless tobacco: Never Used   Substance and Sexual Activity     Alcohol use: Yes     Alcohol/week: 8.3 standard drinks     Comment: 1-2 drinks a day     Drug use: No     Sexual activity: Yes     Partners: Female     Birth control/protection: Male Surgical     Comment: 2006 vasectomy   Other Topics Concern     Parent/sibling w/ CABG, MI or angioplasty before 65F 55M? No   Social History Narrative     Not on file     Social Determinants of Health     Financial Resource Strain:      Difficulty of Paying Living Expenses:    Food Insecurity:      Worried About Running Out of Food in the Last Year:      Ran Out of Food in the Last Year:    Transportation Needs:      Lack of Transportation (Medical):      Lack of Transportation (Non-Medical):    Physical Activity:      Days of Exercise per Week:      Minutes of Exercise per Session:    Stress:      Feeling of Stress :    Social Connections:      Frequency of Communication with Friends and Family:      Frequency  of Social Gatherings with Friends and Family:      Attends Restorationist Services:      Active Member of Clubs or Organizations:      Attends Club or Organization Meetings:      Marital Status:    Intimate Partner Violence:      Fear of Current or Ex-Partner:      Emotionally Abused:      Physically Abused:      Sexually Abused:        Family History:  Family History   Problem Relation Age of Onset     Diabetes Father         Old age Diabetes     Cerebrovascular Disease Father      Obesity Father      Heart Disease Father      Coronary Artery Disease Father      Hypertension Father      Lipids Sister      C.A.D. No family hx of      Cancer No family hx of      Glaucoma No family hx of      Macular Degeneration No family hx of        Review of Systems:  A complete review of systems reviewed by me is negative with the exeption of what has been mentioned in the history of present illness.  CONSTITUTIONAL: NEGATIVE for weight gain/loss, fever, chills, sweats or night sweats, drug allergies.  EYES: NEGATIVE for changes in vision, blind spots, double vision.  ENT: NEGATIVE for ear pain, sore throat, sinus pain, post-nasal drip, runny nose, bloody nose  CARDIAC: NEGATIVE for fast heartbeats or fluttering in chest, chest pain or pressure, breathlessness when lying flat, swollen legs or swollen feet.  NEUROLOGIC: NEGATIVE headaches, weakness or numbness in the arms or legs.  DERMATOLOGIC: NEGATIVE for rashes, new moles or change in mole(s)  PULMONARY: NEGATIVE SOB at rest, SOB with activity, dry cough, productive cough, coughing up blood, wheezing or whistling when breathing.    GASTROINTESTINAL: NEGATIVE for nausea or vomitting, loose or watery stools, fat or grease in stools, constipation, abdominal pain, bowel movements black in color or blood noted.  GENITOURINARY: NEGATIVE for pain during urination, blood in urine, urinating more frequently than usual, irregular menstrual periods.  MUSCULOSKELETAL: NEGATIVE for muscle pain,  "bone or joint pain, swollen joints.  ENDOCRINE: NEGATIVE for increased thirst or urination, diabetes.  LYMPHATIC: NEGATIVE for swollen lymph nodes, lumps or bumps in the breasts or nipple discharge.    Physical Examination:  Vitals: /73   Pulse 95   Resp 20   Ht 1.702 m (5' 7\")   Wt 101.8 kg (224 lb 6.4 oz)   SpO2 97%   BMI 35.15 kg/m    BMI= Body mass index is 35.15 kg/m .    Neck Cir (cm): 49 cm    Ancram Total Score 9/3/2021   Total score - Ancram 9       SUSAN Total Score: 12 (09/03/21 1300)    GENERAL APPEARANCE: alert and no distress  EYES: Eyes grossly normal to inspection  NECK: no asymmetry, masses, or scars and thyroid normal to palpation  RESP: lungs clear to auscultation - no rales, rhonchi or wheezes  CV: regular rates and rhythm, normal S1 S2, no S3 or S4 and no murmur, click or rub  MS: extremities normal- no gross deformities noted  NEURO: Normal strength and tone, mentation intact and speech normal  PSYCH: mentation appears normal and affect normal/bright  Mallampati Class: III.  Tonsillar Stage: 1  hidden by pillars.    Last Comprehensive Metabolic Panel:  Sodium   Date Value Ref Range Status   07/30/2021 139 133 - 144 mmol/L Final   01/07/2021 138 133 - 144 mmol/L Final     Potassium   Date Value Ref Range Status   07/30/2021 3.8 3.4 - 5.3 mmol/L Final   01/07/2021 4.1 3.4 - 5.3 mmol/L Final     Chloride   Date Value Ref Range Status   07/30/2021 109 94 - 109 mmol/L Final   01/07/2021 106 94 - 109 mmol/L Final     Carbon Dioxide   Date Value Ref Range Status   01/07/2021 25 20 - 32 mmol/L Final     Carbon Dioxide (CO2)   Date Value Ref Range Status   07/30/2021 23 20 - 32 mmol/L Final     Anion Gap   Date Value Ref Range Status   07/30/2021 7 3 - 14 mmol/L Final   01/07/2021 7 3 - 14 mmol/L Final     Glucose   Date Value Ref Range Status   07/30/2021 124 (H) 70 - 99 mg/dL Final   04/28/2021 112 (H) 70 - 99 mg/dL Final     Urea Nitrogen   Date Value Ref Range Status   07/30/2021 27 7 - " 30 mg/dL Final   01/07/2021 21 7 - 30 mg/dL Final     Creatinine   Date Value Ref Range Status   07/30/2021 1.28 (H) 0.66 - 1.25 mg/dL Final   01/07/2021 1.05 0.66 - 1.25 mg/dL Final     GFR Estimate   Date Value Ref Range Status   07/30/2021 57 (L) >60 mL/min/1.73m2 Final     Comment:     As of July 11, 2021, eGFR is calculated by the CKD-EPI creatinine equation, without race adjustment. eGFR can be influenced by muscle mass, exercise, and diet. The reported eGFR is an estimation only and is only applicable if the renal function is stable.   01/07/2021 73 >60 mL/min/[1.73_m2] Final     Comment:     Non  GFR Calc  Starting 12/18/2018, serum creatinine based estimated GFR (eGFR) will be   calculated using the Chronic Kidney Disease Epidemiology Collaboration   (CKD-EPI) equation.       Calcium   Date Value Ref Range Status   07/30/2021 8.9 8.5 - 10.1 mg/dL Final   01/07/2021 8.7 8.5 - 10.1 mg/dL Final     TSH   Date Value Ref Range Status   02/21/2019 1.38 0.40 - 4.00 mU/L Final     Impression/Plan:  History of severe obstructive sleep apnea. He did not tolerate CPAP so he underwent uvulopalatopharyngoplasty (UPPP) in 2013.  He presents with snoring, witnessed apnea, frequent nocturnal arousals, daytime fatigue. Co-morbid CAD and HTN.  Recommend polysomnogram (using 4% desaturation/Medicare/ AASM 1B scoring rules) to evaluate current severity of obstructive sleep apnea.   He does not think he will be able to tolerate CPAP. Will consider mandibular advancement device as an initial treatment.     Literature provided regarding sleep apnea.      He will follow up with me in approximately two weeks after his sleep study has been competed to review the results and discuss plan of care.       Polysomnography reviewed.  Obstructive sleep apnea reviewed.  Complications of untreated sleep apnea were reviewed.    Benjamin Lara PA-C    CC: Holland Blunt

## 2021-09-03 NOTE — NURSING NOTE
"Chief Complaint   Patient presents with     Sleep Problem     Consult. Hx of sleep apnea. Tried CPAP but was unsuccessful with many masks and nasal cannula. Would have chest pain in the AM. Told her was \"swallowing air\".       Initial There were no vitals taken for this visit. Estimated body mass index is 35.08 kg/m  as calculated from the following:    Height as of 8/6/21: 1.702 m (5' 7\").    Weight as of 9/1/21: 101.6 kg (224 lb).    Medication Reconciliation: complete   ESS: 9    Neck circumference: 49 centimeters    "
Sleep Study scheduled. Kendra Mujica, CMA    
[id number] Detailed

## 2021-09-03 NOTE — PATIENT INSTRUCTIONS
Your BMI is Body mass index is 35.15 kg/m .  Weight management is a personal decision.  If you are interested in exploring weight loss strategies, the following discussion covers the approaches that may be successful. Body mass index (BMI) is one way to tell whether you are at a healthy weight, overweight, or obese. It measures your weight in relation to your height.  A BMI of 18.5 to 24.9 is in the healthy range. A person with a BMI of 25 to 29.9 is considered overweight, and someone with a BMI of 30 or greater is considered obese. More than two-thirds of American adults are considered overweight or obese.  Being overweight or obese increases the risk for further weight gain. Excess weight may lead to heart disease and diabetes.  Creating and following plans for healthy eating and physical activity may help you improve your health.  Weight control is part of healthy lifestyle and includes exercise, emotional health, and healthy eating habits. Careful eating habits lifelong are the mainstay of weight control. Though there are significant health benefits from weight loss, long-term weight loss with diet alone may be very difficult to achieve- studies show long-term success with dietary management in less than 10% of people. Attaining a healthy weight may be especially difficult to achieve in those with severe obesity. In some cases, medications, devices and surgical management might be considered.  What can you do?  If you are overweight or obese and are interested in methods for weight loss, you should discuss this with your provider.     Consider reducing daily calorie intake by 500 calories.     Keep a food journal.     Avoiding skipping meals, consider cutting portions instead.    Diet combined with exercise helps maintain muscle while optimizing fat loss. Strength training is particularly important for building and maintaining muscle mass. Exercise helps reduce stress, increase energy, and improves fitness.  Increasing exercise without diet control, however, may not burn enough calories to loose weight.       Start walking three days a week 10-20 minutes at a time    Work towards walking thirty minutes five days a week     Eventually, increase the speed of your walking for 1-2 minutes at time    In addition, we recommend that you review healthy lifestyles and methods for weight loss available through the National Institutes of Health patient information sites:  http://win.niddk.nih.gov/publications/index.htm    And look into health and wellness programs that may be available through your health insurance provider, employer, local community center, or bertin club.    Weight management plan: Patient was referred to their PCP to discuss a diet and exercise plan.    MY CONTACT NUMBERS ARE:   DOCTOR : BEKAH Amanda  SLEEP CENTER :   CPAP EQUIPMENT :    IF I HAVE SLEEP APNEA.....  WHERE CAN I FIND MORE INFORMATION?    American Academy of Sleep Medicine Patient information on sleep disorders:  http://yoursleep.aasmnet.org    THINGS TO REMEMBER  In most situations, sleep apnea is a lifelong disease that must be managed with daily therapy. Untreated disease, when severe, may result in an increased risk for an array of problems from heart disease to mood changes, car accidents and shorter lifespan.    CPAP -  WHY AND HOW?  Continuous positive airway pressure, or CPAP, is the most effective treatment for obstructive sleep apnea. A decision to use CPAP is a major step forward in the pursuit of a healthier life. The successful use of CPAP will help you breathe easier, sleep better and live healthier. Using CPAP can be a positive experience if you keep these welch points in mind:  1. Commitment  CPAP is not a quick fix for your problem. It involves a long-term commitment to improve your sleep and your health.    2. Communication  Stay in close communication with both your sleep doctor and your CPAP supplier. Ask lots of questions  "and seek help when you need it.    3. Consistency  Use CPAP all night, every night and for every nap. You will receive the maximum health benefits from CPAP when you use it every time that you sleep. This will also make it easier for your body to adjust to the treatment.    4. Correction  The first machine and mask that you try may not be the best ones for you. Work with your sleep doctor and your CPAP supplier to make corrections to your equipment selection. Ask about trying a different type of machine or mask if you have ongoing problems. Make sure that your mask is a good fit and learn to use your equipment properly.    5. Challenge  Tell a family member or close friend to ask you each morning if you used your CPAP the previous night. Have someone to challenge you to give it your best effort.    6. Connection   Your adjustment to CPAP will be easier if you are able to connect with others who use the same treatment. Ask your sleep doctor if there is a support group in your area for people who have sleep apnea, or look for one on the Internet.    7. Comfort   Increase your level of comfort by using a saline spray, decongestant or heated humidifier if CPAP irritates your nose, mouth or throat. Use your unit's \"ramp\" setting to slowly get used to the air pressure level. There may be soft pads you can buy that will fit over your mask straps. Look on www.CPAP.com for accessories such as these straps, a pillow contoured for side-sleeping with CPAP, longer hoses, hose covers to reduce condensation, or stands to keep the hose out of your way.                                 8. Cleaning   Clean your mask, tubing and headgear on a regular basis. Put this time in your schedule so that you don't forget to do it. Check and replace the filters for your CPAP unit and humidifier.    9. Completion   Although you are never finished with CPAP therapy, you should reward yourself by celebrating the completion of your first month of " treatment. Expect this first month to be your hardest period of adjustment. It will involve some trial and error as you find the machine, mask and pressure settings that are right for you.    Continuation  After your first month of treatment, continue to make a daily commitment to use your CPAP all night, every night and for every nap.    CPAP-Tips to starting with success:  Begin using your CPAP for short periods of time during the day while you watch TV or read.    Use CPAP every night and for every nap. Using it less often reduces the health benefits and makes it harder for your body to get used to it.    Newer CPAP models are virtually silent; however, if you find the sound of your CPAP machine to be bothersome, place the unit under your bed to dampen the sound.     Make small adjustments to your mask, tubing, straps and headgear until you get the right fit. Tightening the mask may actually worsen the leak.  If it leaves significant marks on your face or irritates the bridge of your nose, it may not be the best mask for you.  Speak with the person who supplied the mask and consider trying other masks.    Use a saline nasal spray to ease mild nasal congestion. Neti-Pot or saline nasal rinses may also help. Nasal gel sprays can help reduce nasal dryness.  Biotene mouthwash can be helpful to protect your teeth if you experience frequent dry mouth.  Dry mouth may be a sign of air escaping out of your mouth or out of the mask in the case of a full face mask.  Speak with your provider if you expect that is the case.     Take a nasal decongestant to relieve more severe nasal or sinus congestion.  Do not use Afrin (oxymetazoline) nasal spray more than 3 days in a row.  Speak with your sleep doctor if your nasal congestion is chronic.    Use a heated humidifier that fits your CPAP model to enhance your breathing comfort. Adjust the heat setting up if you get a dry nose or throat, down if you get condensation in the hose  or mask.  Position the CPAP lower than you so that any condensation in the hose drains back into the machine rather than towards the mask.    Try a system that uses nasal pillows if traditional masks give you problems.    Clean your mask, tubing and headgear once a week. Make sure the equipment dries fully.    Regularly check and replace the filters for your CPAP unit and humidifier.    Work closely with your sleep doctor and your CPAP supplier to make sure that you have the machine, mask and air pressure setting that works best for you.    BESIDES CPAP, WHAT OTHER THERAPIES ARE THERE?    Postioning devices if you only have the problem on your back    Dental devices if your condition is mild    Nasal valves may be effective though experience is limited    Tongue Retaining Device if missing teeth precludes the use of a dental device    Weight loss if you are overweight    Surgery in limited cases where devices are not acceptable or there are problems with structures in the nose and throat  If treated with one of these alternative options, further evaluation is necessary to ensure that the therapy is effective. This may require some form of testing.     Healthy Lifestyle:  Healthy diet, exercise and Limit alcohol: Not only will excessive alcohol increase your weight over time, but it irritates the throat tissues and make them swell, shrinking the airway and causing snoring. Drinking alcohol should be limited and stopped within 3-4 hours before going to bed.   Stop smoking: (Red swollen throat, heat, nicotine), also irritates and swells the airway, among numerous other negative health consequences.    Positioning Device  This example shows a pillow that straps around the waist. It may be appropriate for those whose sleep study shows milder sleep apnea that occurs primarily when lying flat on one's back. Preliminary studies have shown benefit but effectiveness at home should be verified.    Nasal Valves               Nasal valves may not be effective if you have frequent nasal congestion or have difficulty breathing through your nose. They may be an option for mild apnea if other options are not well tolerated. The efficacy of these devices is generally less than CPAP or oral appliances.  Oral Appliance  These are examples of two of many custom-made devices that are more likely to work in mild sleep apnea  Oral appliances are dental mouth pieces that fit very much like a sports mouth guards or removable orthodontic retainers. They are used to treat snoring  And obstructive sleep apnea . The device prevents the airway from collapsing by either holding the tongue or supporting the jaw in a forward position. Since oral appliances are non-invasive and easy to use, they may be considered as an early treatment option. Oral appliance therapy (OAT) involves the customization, selection, fabrication, fitting, adjustments and long-term follow-up care of specially designed oral devices, worn during sleep, which reposition the lower jaw and tongue base forward to maintain an open airway.  Custom made oral appliances are proven to be more effective than over-the-counter devices. Therefore, the over-the-counter devices are recommended not to be used as a screening tool nor as a therapeutic option.  Who gets a dental device?  Oral appliance therapy can be used as an alternative to  CPAP therapy for the treatment of mild to moderate sleep apnea and for those patients who prefer OAT to CPAP. Oral appliance therapy is a first line therapy for the treatment of primary snoring. Additionally, OAT is an option for those that cannot tolerate CPAP as therapy or who have experienced insufficient surgical results.    Possible side effects?  Frequent but minor side effects include: excessive salivation, dry mouth, discomfort of teeth and jaw and temporary changes in the patient s bite.  Potential complications include: jaw pain, permanent occlusal  changes and TMJ symptoms.  The above mentioned side effects and complications can be recognized and managed by dentists trained in dental sleep medicine.    Finding a dentist that practices dental sleep medicine  Specific training is available through the American Academy of Dental Sleep Medicine for dentists interested in working in the field of sleep. To find a dentist who is educated in the field of sleep and the use of oral appliances, near you, visit the Web site of the American Academy of Dental Sleep Medicine; also see http://www.accpstorage.org/newOrganization/patients/oralAppliances.pdf   To search for a dentist certified in these practices:  Http://aadsm.org/FindADentist.aspx?1  Http://www.accpstorage.org/newOrganization/patients/oralAppliances.pdf    Tongue Retaining Device               Tongue Retaining Devices are devices that generally  suction cup  onto the tongue preventing it from falling back into the back of the throat during sleep.  They may be an option for people missing teeth, but can be uncomfortable. This particular device can be purchased online, but similar devices made by dentists fit more precisely and may be tolerated better. In general, they are rarely effective and often not very well tolerated.    Weight Loss:  Some patients may experience reduction or elimination of sleep apnea with weight loss.  Though there are significant health benefits from weight loss, long-term weight loss is very difficult to achieve- studies show success with dietary management in less that 10% of people.     If you are interested in dietary weight loss, you should review the options discussed at the National Institutes of Health patient information site:     Http:/www.health.nih.gov/topic/WeightLossDieting    Bariatric programs offer counseling in all methods of weight loss:    Http:/www.[facility].org/Specialties/WeightLossSurgeryandMedicalMgmt/htm    Surgery:  There are a number of surgeries that  "have been attempted to treat apnea. In general, surgical options are usually reserved for cases in which there is a physical abnormality contributing to obstruction or other treatment options are ineffective or not tolerated. Most surgical options are either unreliable or quite invasive. One of the more common procedures is:  Uvulopalatopharyngoplasty: In this procedure, the uvula (the finger-like tissue that hangs in the back of the throat), part of the soft palate (the tissue that the uvula is attached to), and sometimes the tonsils or adenoids are removed. The efficacy of this surgery is around 30-50% .  After surgery, complications may include:  Sleepiness and sleep apnea related to post-surgery medication   Swelling, infection and bleeding   A sore throat and/or difficulty swallowing   Drainage of secretions into the nose and a nasal quality to the voice. English language speech does not seem to be affected by this surgery.   Narrowing of the airway in the nose and throat (hence constricting breathing) snoring and even iatrogenically caused sleep apnea. By cutting the tissues, excess scar tissue can \"tighten\" the airway and make it even smaller than it was before UPPP.  Patients who have had the uvula removed will become unable to correctly speak Citizen of the [place] or any other language that has a uvular 'r' phoneme.    Surgeries to help resolve nasal congestion may help reduce the severity of apnea slightly. Nasal congestion does not cause apnea on its own, so these surgeries are usually not performed just for JOSE JUAN.  They may be worth considering if the nasal congestion is significantly bothersome independent of apnea.    "

## 2021-09-21 ENCOUNTER — OFFICE VISIT (OUTPATIENT)
Dept: UROLOGY | Facility: CLINIC | Age: 71
End: 2021-09-21
Attending: INTERNAL MEDICINE
Payer: MEDICARE

## 2021-09-21 VITALS — OXYGEN SATURATION: 95 % | HEART RATE: 106 BPM | DIASTOLIC BLOOD PRESSURE: 78 MMHG | SYSTOLIC BLOOD PRESSURE: 120 MMHG

## 2021-09-21 DIAGNOSIS — R35.0 URINARY FREQUENCY: Primary | ICD-10-CM

## 2021-09-21 DIAGNOSIS — R10.32 LLQ ABDOMINAL PAIN: ICD-10-CM

## 2021-09-21 LAB
ALBUMIN UR-MCNC: NEGATIVE MG/DL
APPEARANCE UR: CLEAR
BILIRUB UR QL STRIP: NEGATIVE
COLOR UR AUTO: YELLOW
GLUCOSE UR STRIP-MCNC: NEGATIVE MG/DL
HGB UR QL STRIP: NEGATIVE
KETONES UR STRIP-MCNC: ABNORMAL MG/DL
LEUKOCYTE ESTERASE UR QL STRIP: NEGATIVE
NITRATE UR QL: NEGATIVE
PH UR STRIP: 5 [PH] (ref 5–7)
SP GR UR STRIP: >=1.03 (ref 1–1.03)
UROBILINOGEN UR STRIP-ACNC: 0.2 E.U./DL

## 2021-09-21 PROCEDURE — 51798 US URINE CAPACITY MEASURE: CPT | Performed by: UROLOGY

## 2021-09-21 PROCEDURE — 81003 URINALYSIS AUTO W/O SCOPE: CPT | Performed by: UROLOGY

## 2021-09-21 PROCEDURE — 99214 OFFICE O/P EST MOD 30 MIN: CPT | Mod: 25 | Performed by: UROLOGY

## 2021-09-21 RX ORDER — TOLTERODINE 4 MG/1
4 CAPSULE, EXTENDED RELEASE ORAL DAILY
Qty: 30 CAPSULE | Refills: 1 | Status: SHIPPED | OUTPATIENT
Start: 2021-09-21 | End: 2021-12-13

## 2021-09-21 NOTE — PROGRESS NOTES
Chief Complaint   Patient presents with     RECHECK     post ER had pain?        Ricky Brown is a 70 year old male who presents today for follow up of   Chief Complaint   Patient presents with     RECHECK     post ER had pain?    Patient is a pleasant 70-year-old male who was seen for a consultation with regard to patient's history of left lower quadrant abdominal pain and urinary problems.  He was seen emergency room in July of this year with a sudden onset of left low abdominal pain without any obvious etiology.  CT scan was unremarkable except for nonspecific inflammation of the ureter on the left side.  His urinalysis showed microscopic hematuria without any evidence of infection.  Symptoms resolved soon after ER visit and has not had any problems since.  He has a long history of urinary frequency and nocturia.  He is currently on Flomax.  He has not noticed any improvements.  He has no dysuria or hematuria.    Current Outpatient Medications   Medication Sig Dispense Refill     aspirin 81 MG tablet Take 1 tablet (81 mg) by mouth daily 90 tablet 3     atorvastatin (LIPITOR) 10 MG tablet TAKE ONE TABLET BY MOUTH ONE TIME DAILY  90 tablet 0     Calcium Carbonate Antacid (TUMS CALCIUM FOR LIFE BONE PO) Take  by mouth.       diphenoxylate-atropine (LOMOTIL) 2.5-0.025 MG tablet Take 1 tablet by mouth daily as needed for diarrhea Patient only takes 1 tablet as needed 30 tablet 5     losartan (COZAAR) 50 MG tablet TAKE ONE TABLET BY MOUTH ONE TIME DAILY  90 tablet 0     metoprolol succinate ER (TOPROL-XL) 50 MG 24 hr tablet Take 1 tablet by mouth daily (Patient taking differently: Taking half of tablet) 90 tablet 0     Multiple Vitamin (MULTIVITAMINS PO) Take  by mouth daily.       tamsulosin (FLOMAX) 0.4 MG capsule Take 1 capsule (0.4 mg) by mouth daily 30 capsule 3     tolterodine ER (DETROL LA) 4 MG 24 hr capsule Take 1 capsule (4 mg) by mouth daily 30 capsule 1     Allergies   Allergen Reactions      Lisinopril Cough     Perfume Other (See Comments)      Past Medical History:   Diagnosis Date     Arthritis      BCC (basal cell carcinoma)     right shoulder      Cardiomyopathy (H)      Colon adenomas 9/20/11     Coronary artery disease      ED (erectile dysfunction)      HTN (hypertension)      Noncompliance      Obesity      JOSE JUAN (obstructive sleep apnea)      Past Surgical History:   Procedure Laterality Date     ARTHROSCOPY SHOULDER BICEPS TENODESIS REPAIR  2/27/2014    Procedure: ARTHROSCOPY SHOULDER BICEPS TENODESIS REPAIR;;  Surgeon: Michael Hagen MD;  [facility]     ARTHROSCOPY SHOULDER ROTATOR CUFF REPAIR  2/27/2014    Procedure: ARTHROSCOPY SHOULDER ROTATOR CUFF REPAIR;  Right Shoulder Arthroscopic Rotator Cuff Repair,  Subacromial Decompression, Biceps Tenodesis, Distal Clavicle Excision   ;  Surgeon: Michael Hagen MD;  Location: [facility]     BIOPSY       CARDIAC SURGERY       COLONOSCOPY       EXCHANGE INTRAOCULAR LENS IMPLANT  2005    left eye - first, recentered PCL with iris fixation; then IOLX with ACL     EXTRACAPSULAR CATARACT EXTRATION WITH INTRAOCULAR LENS IMPLANT  2005    both eyes (elsewhere)     RELEASE CARPAL TUNNEL Right 8/13/2021    Procedure: RELEASE, RIGHT CARPAL TUNNEL;  Surgeon: Tony Bravo MD;  Location: [facility]     TURBINOPLASTY  12/11/2013    Procedure: TURBINOPLASTY;;  Surgeon: Lisset Parsons MD;  Location: [facility]     UVULOPALATOPHARYNGOPLASTY  12/11/2013    Procedure: UVULOPALATOPHARYNGOPLASTY;  Uvulopalatopharyngoplasty, Turbiante Reduction;  Surgeon: Lisset Parsons MD;  Location: [facility] OR     Family History   Problem Relation Age of Onset     Diabetes Father         Old age Diabetes     Cerebrovascular Disease Father      Obesity Father      Heart Disease Father      Coronary Artery Disease Father      Hypertension Father      Lipids Sister      C.A.D. No family hx of      Cancer No family hx of      Glaucoma No family hx of       Macular Degeneration No family hx of      Social History     Socioeconomic History     Marital status:      Spouse name: Kyung     Number of children: 0     Years of education: 14     Highest education level: None   Occupational History     Occupation: industrial electronics      Employer: SELF   Tobacco Use     Smoking status: Former Smoker     Packs/day: 0.50     Years: 2.00     Pack years: 1.00     Types: Cigarettes     Quit date: 1996     Years since quittin.7     Smokeless tobacco: Never Used   Substance and Sexual Activity     Alcohol use: Yes     Alcohol/week: 8.3 standard drinks     Comment: 1-2 drinks a day     Drug use: No     Sexual activity: Yes     Partners: Female     Birth control/protection: Male Surgical     Comment:  vasectomy   Other Topics Concern     Parent/sibling w/ CABG, MI or angioplasty before 65F 55M? No   Social History Narrative     None     Social Determinants of Health     Financial Resource Strain:      Difficulty of Paying Living Expenses:    Food Insecurity:      Worried About Running Out of Food in the Last Year:      Ran Out of Food in the Last Year:    Transportation Needs:      Lack of Transportation (Medical):      Lack of Transportation (Non-Medical):    Physical Activity:      Days of Exercise per Week:      Minutes of Exercise per Session:    Stress:      Feeling of Stress :    Social Connections:      Frequency of Communication with Friends and Family:      Frequency of Social Gatherings with Friends and Family:      Attends Tenriism Services:      Active Member of Clubs or Organizations:      Attends Club or Organization Meetings:      Marital Status:    Intimate Partner Violence:      Fear of Current or Ex-Partner:      Emotionally Abused:      Physically Abused:      Sexually Abused:        REVIEW OF SYSTEMS  =================  C: NEGATIVE for fever, chills, change in weight  I: NEGATIVE for worrisome rashes, moles or lesions  E/M: NEGATIVE for  ear, mouth and throat problems  R: NEGATIVE for significant cough or SHORTNESS OF BREATH  CV:  NEGATIVE for chest pain, palpitations or peripheral edema  GI: NEGATIVE for nausea, abdominal pain, heartburn, or change in bowel habits  NEURO: NEGATIVE numbness/weakness  : see HPI  PSYCH: NEGATIVE depression/anxiety  LYmph: no new enlarged lymph nodes  Ortho: no new trauma/movements    Physical Exam:  /78 (BP Location: Right arm, Patient Position: Chair, Cuff Size: Adult Large)   Pulse 106   SpO2 95%    Patient is pleasant, in no acute distress, good general condition.  Lung: no evidence of respiratory distress    Abdomen: Soft, nondistended, non tender. No masses. No rebound or guarding.   Exam: No CVA tenderness.  Bladder scan with minimal residual urine.    Skin: Warm and dry.  No redness.  Psych: normal mood and affect  Neuro: alert and oriented  Musculaskeletal: moving all extremities    Medical records from ER/CT scan reviewed.      Assessment/Plan:   (R35.0) Urinary frequency  (primary encounter diagnosis)  Comment: Continue with Flomax    Plan:  Add detrol.  Side effects discussed.  Patient to call for reports in several weeks.      (R10.32) LLQ abdominal pain  Comment: resolved.  No obvious urological issues  Plan: recheck UA

## 2021-09-26 ENCOUNTER — HEALTH MAINTENANCE LETTER (OUTPATIENT)
Age: 71
End: 2021-09-26

## 2021-09-28 ENCOUNTER — TELEPHONE (OUTPATIENT)
Dept: UROLOGY | Facility: CLINIC | Age: 71
End: 2021-09-28

## 2021-09-28 NOTE — TELEPHONE ENCOUNTER
Prior Authorization Retail Medication Request    Medication/Dose: tolterodine ER (DETROL LA) 4 MG 24 hr capsule  ICD code (if different than what is on RX):  R35.0      Insurance Name:  Medicare  Insurance ID:  [id number]      Pharmacy Information (if different than what is on RX)  [facility]  Phone:  [phone]

## 2021-09-29 NOTE — TELEPHONE ENCOUNTER
Central Prior Authorization Team   Phone: [phone]    PA Initiation    Medication: tolterodine 4 mg  Insurance Company: [employer] - Phone [phone] Fax [phone]  Pharmacy Filling the Rx: [place] [id number]  Filling Pharmacy Phone: [phone]  Filling Pharmacy Fax:    Start Date: 9/29/2021

## 2021-09-29 NOTE — TELEPHONE ENCOUNTER
Prior Authorization Approval    Authorization Effective Date: 7/1/2021  Authorization Expiration Date:    Medication: tolterodine 4 mg  Approved Dose/Quantity:    Reference #:     Insurance Company: [employer] - Phone [phone] Fax [phone]  Expected CoPay:       CoPay Card Available:      Foundation Assistance Needed:    Which Pharmacy is filling the prescription (Not needed for infusion/clinic administered): [facility] [address] [place] [address]  Pharmacy Notified: Yes  Patient Notified: Yes  **Instructed pharmacy to notify patient when script is ready to /ship.**

## 2021-09-30 DIAGNOSIS — I10 ESSENTIAL HYPERTENSION WITH GOAL BLOOD PRESSURE LESS THAN 140/90: ICD-10-CM

## 2021-10-04 RX ORDER — LOSARTAN POTASSIUM 50 MG/1
TABLET ORAL
Qty: 90 TABLET | Refills: 0 | Status: SHIPPED | OUTPATIENT
Start: 2021-10-04 | End: 2022-01-14

## 2021-10-04 NOTE — TELEPHONE ENCOUNTER
"Routing refill request to provider for review/approval because:  Labs out of range:  Cr.       Requested Prescriptions   Pending Prescriptions Disp Refills     losartan (COZAAR) 50 MG tablet [Pharmacy Med Name: Losartan Potassium Oral Tablet 50 MG] 90 tablet 0     Sig: TAKE ONE TABLET BY MOUTH ONE TIME DAILY       Angiotensin-II Receptors Failed - 9/30/2021  6:52 PM        Failed - Normal serum creatinine on file in past 12 months     Recent Labs   Lab Test 07/30/21  0159   CR 1.28*       Ok to refill medication if creatinine is low          Passed - Last blood pressure under 140/90 in past 12 months     BP Readings from Last 3 Encounters:   09/21/21 120/78   09/03/21 129/73   09/01/21 137/88                 Passed - Recent (12 mo) or future (30 days) visit within the authorizing provider's specialty     Patient has had an office visit with the authorizing provider or a provider within the authorizing providers department within the previous 12 mos or has a future within next 30 days. See \"Patient Info\" tab in inbasket, or \"Choose Columns\" in Meds & Orders section of the refill encounter.              Passed - Medication is active on med list        Passed - Patient is age 18 or older        Passed - Normal serum potassium on file in past 12 months     Recent Labs   Lab Test 07/30/21  0159   POTASSIUM 3.8                       Viky Mccray RN    "

## 2021-10-21 ENCOUNTER — TELEPHONE (OUTPATIENT)
Dept: FAMILY MEDICINE | Facility: CLINIC | Age: 71
End: 2021-10-21

## 2021-10-21 NOTE — TELEPHONE ENCOUNTER
Tierra with [employer] calling.     They sent a fax on behalf of patient yesterday, wondering if clinic received?     RN does not see any recent TE regarding faxes recevied in chart.     RN provided [facility] fax number, which was different than used before. She will resend.    Corina Arshad RN, BSN, PHN  [facility]: [place]

## 2021-10-25 ENCOUNTER — TELEPHONE (OUTPATIENT)
Dept: INTERNAL MEDICINE | Facility: CLINIC | Age: 71
End: 2021-10-25

## 2021-10-25 DIAGNOSIS — I42.9 CARDIOMYOPATHY, UNSPECIFIED TYPE (H): Primary | ICD-10-CM

## 2021-10-25 RX ORDER — FUROSEMIDE 20 MG
20 TABLET ORAL DAILY PRN
Qty: 1 TABLET | Refills: 0
Start: 2021-10-25 | End: 2021-12-13

## 2021-10-25 NOTE — TELEPHONE ENCOUNTER
Spoke with patient and he states he has the furosemide if he needs it but hasn't taken it in over a year.

## 2021-10-26 ENCOUNTER — THERAPY VISIT (OUTPATIENT)
Dept: SLEEP MEDICINE | Facility: CLINIC | Age: 71
End: 2021-10-26
Payer: MEDICARE

## 2021-10-26 DIAGNOSIS — R06.00 DYSPNEA AND RESPIRATORY ABNORMALITY: ICD-10-CM

## 2021-10-26 DIAGNOSIS — R53.81 MALAISE AND FATIGUE: ICD-10-CM

## 2021-10-26 DIAGNOSIS — I10 ESSENTIAL HYPERTENSION: ICD-10-CM

## 2021-10-26 DIAGNOSIS — I25.10 ATHEROSCLEROSIS OF CORONARY ARTERY OF NATIVE HEART WITHOUT ANGINA PECTORIS, UNSPECIFIED VESSEL OR LESION TYPE: ICD-10-CM

## 2021-10-26 DIAGNOSIS — E66.812 CLASS 2 SEVERE OBESITY DUE TO EXCESS CALORIES WITH SERIOUS COMORBIDITY AND BODY MASS INDEX (BMI) OF 35.0 TO 35.9 IN ADULT (H): ICD-10-CM

## 2021-10-26 DIAGNOSIS — R06.89 DYSPNEA AND RESPIRATORY ABNORMALITY: ICD-10-CM

## 2021-10-26 DIAGNOSIS — R53.83 MALAISE AND FATIGUE: ICD-10-CM

## 2021-10-26 DIAGNOSIS — G47.30 SLEEP APNEA, UNSPECIFIED TYPE: ICD-10-CM

## 2021-10-26 DIAGNOSIS — Z72.820 LACK OF ADEQUATE SLEEP: ICD-10-CM

## 2021-10-26 DIAGNOSIS — E66.01 CLASS 2 SEVERE OBESITY DUE TO EXCESS CALORIES WITH SERIOUS COMORBIDITY AND BODY MASS INDEX (BMI) OF 35.0 TO 35.9 IN ADULT (H): ICD-10-CM

## 2021-10-26 PROCEDURE — 95810 POLYSOM 6/> YRS 4/> PARAM: CPT | Performed by: INTERNAL MEDICINE

## 2021-10-26 NOTE — TELEPHONE ENCOUNTER
Dr. Blunt received a form from [employer].  They wanted to know what medical conditions that the patient was taking the following medications for:  Tamsulosin, furosemide, nitroglycerin and spironolacone.    This was completed and signed by Dr. Blunt.    Form was faxed back to them at [phone].    I called and spoke to Tierra Smallwood who sent the fax and informed her this was done.   Vanesa Rene,

## 2021-10-28 PROBLEM — H35.81 MACULAR EDEMA: Status: ACTIVE | Noted: 2019-12-24

## 2021-10-28 NOTE — PROCEDURES
" SLEEP STUDY INTERPRETATION  DIAGNOSTIC POLYSOMNOGRAPHY REPORT      Patient: LOPEZ MERIDA  YOB: 1950  Study Date: 10/26/2021  MRN: [id number]  Referring Provider: Benjamin Lara PA  Ordering Provider: Benjamin Lara PA-C    Indications for Polysomnography: The patient is a 70 year old Male who is 5' 7\" and weighs 244.0 lbs. His BMI is 38.3, McCune sleepiness scale 9 and neck circumference is 49 cm. A diagnostic polysomnogram was performed to evaluate for history of severe obstructive sleep apnea by sleep studies 2009, 2013. He did not tolerate CPAP due to areophagia so he underwent uvulopalatopharyngoplasty (UPPP) in 2013.He presents with snoring, witnessed apnea, frequent nocturnal arousals, daytime fatigue. Co-morbid CAD and HTN.    Polysomnogram Data: A full night polysomnogram recorded the standard physiologic parameters including EEG, EOG, EMG, ECG, nasal and oral airflow. Respiratory parameters of chest and abdominal movements were recorded with respiratory inductance plethysmography. Oxygen saturation was recorded by pulse oximetry. Hypopnea scoring rule used: 1B 4%.    Sleep Architecture:   The total recording time of the polysomnogram was 501.9 minutes. The total sleep time was 358.5 minutes. Sleep latency was increased at 33.5 minutes without the use of a sleep aid while watching TV for 40 minutes. REM latency was 54.5 minutes. Arousal index was increased at 61.3 arousals per hour. Sleep efficiency was decreased at 71.4%. Wake after sleep onset was 56.0 minutes. The patient spent 21.2% of total sleep time in Stage N1, 44.8% in Stage N2, 20.8% in Stage N3, and 13.2% in REM. Time in REM supine was 0 minutes.    Respiration:     Events ? The polysomnogram revealed a presence of 25 obstructive, 2 central, and 21 mixed apneas resulting in an apnea index of 8.0 events per hour. There were 319 obstructive hypopneas and 0 central hypopneas resulting in an obstructive hypopnea index of 53.4 and " central hypopnea index of 0 events per hour. The combined apnea/hypopnea index was 61.4 events per hour (central apnea/hypopnea index was 0.3 events per hour). The REM AHI was 69.5 events per hour. The supine AHI was n/a (5 minutes TST). The RERA index was 2.8 events per hour.  The RDI was 64.3 events per hour.    Snoring - was reported as loud.    Respiratory rate and pattern - Events appeared periodic at times. Morphology was suggestive of Cheyne richard at times, lung to finger circulation times were not clearly prolonged    Sustained Sleep Associated Hypoventilation - Transcutaneous carbon dioxide monitoring was not used, however significant hypoventilation was not suggested by oximetry    Sleep Associated Hypoxemia - (Greater than 5 minutes O2 sat at or below 88%) was present. Baseline oxygen saturation was 93.0%. Lowest oxygen saturation was 61.4%. Time spent less than or equal to 88% was 29.7 minutes. Time spent less than or equal to 89% was 38.4 minutes.    Movement Activity:     Periodic Limb Activity - There were 14 PLMs during the entire study. The PLM index was 2.3 movements per hour. The PLM Arousal Index was 0.3 per hour.    REM EMG Activity - Excessive transient/sustained muscle activity was not present.    Nocturnal Behavior - Abnormal sleep related behaviors were not noted     Bruxism - None apparent.    Cardiac Summary:   The average pulse rate was 64.4 bpm. The minimum pulse rate was 48.9 bpm while the maximum pulse rate was 97.0 bpm.  Arrhythmias were not noted.      Assessment:     Severe obstructive sleep apnea with hypoxemia    Possible periodic breathing    Recommendations:    Consider repeat polysomnography with full night titration of positive airway pressure therapy for the control of sleep disordered breathing.    Based on the presence of covid 19 restrictions and Respironics recall treatment could be empirically initiated with Auto?titrating PAP therapy with a range of 7 to 15 cmH2O.  Recommend clinical follow up with sleep management team including oximetry or HSAT on treatment. Attention to position and mask style to minimize aerophagia.     Patient may be a candidate for dental appliance through referral to Sleep Dentistry for the treatment of obstructive sleep apnea and/or socially disruptive snoring.    If devices are not acceptable or effective, patient may benefit from evaluation of possible surgical options. If he is interested, would recommend referral to specialized ENT-Sleep provider.    Advice regarding the risks of drowsy driving.    Suggest optimizing sleep schedule and avoiding sleep deprivation.    Weight management (if BMI > 30).    Pharmacologic therapy should be used for management of restless legs syndrome only if present and clinically indicated and not based on the presence of periodic limb movements alone.    Diagnostic Codes:   Obstructive Sleep Apnea G47.33  Sleep Hypoxemia/Hypoventilation G47.36         _____________________________________   Electronically Signed By: Kanu Pineda MD 10/28/21           Range(%) Time in range (min)   0.0 - 89.0 38.4   0.0 - 88.0 29.7         Stage Min(mm Hg) Max(mm Hg)   Wake - -   NREM(1+2+3) - -   REM - -       Range(mmHg) Time in range (min)   55.0 - 100.0 -   Excluded data <20.0 & >65.0 502.0

## 2021-10-29 LAB — SLPCOMP: NORMAL

## 2021-11-09 ENCOUNTER — OFFICE VISIT (OUTPATIENT)
Dept: SLEEP MEDICINE | Facility: CLINIC | Age: 71
End: 2021-11-09
Payer: MEDICARE

## 2021-11-09 VITALS
BODY MASS INDEX: 35.02 KG/M2 | HEIGHT: 67 IN | OXYGEN SATURATION: 95 % | DIASTOLIC BLOOD PRESSURE: 87 MMHG | HEART RATE: 66 BPM | RESPIRATION RATE: 16 BRPM | SYSTOLIC BLOOD PRESSURE: 161 MMHG | WEIGHT: 223.1 LBS

## 2021-11-09 DIAGNOSIS — E66.01 MORBID OBESITY DUE TO EXCESS CALORIES (H): ICD-10-CM

## 2021-11-09 DIAGNOSIS — G47.33 OSA (OBSTRUCTIVE SLEEP APNEA): Primary | ICD-10-CM

## 2021-11-09 PROCEDURE — 99213 OFFICE O/P EST LOW 20 MIN: CPT | Mod: 24 | Performed by: PHYSICIAN ASSISTANT

## 2021-11-09 ASSESSMENT — MIFFLIN-ST. JEOR: SCORE: 1735.6

## 2021-11-09 NOTE — PROGRESS NOTES
Sleep Study Follow-Up Visit:    Date on this visit: 11/9/2021    Ricky Brown comes in today for follow-up of his sleep study done on 10/26/2011 at the [facility] [place].    Polysomnogram as interpreted by Dr Pineda:  A diagnostic polysomnogram was performed to evaluate for history of severe obstructive sleep apnea by sleep studies 2009, 2013. He did not tolerate CPAP due to areophagia so he underwent uvulopalatopharyngoplasty (UPPP) in 2013.He presents with snoring, witnessed apnea, frequent nocturnal arousals, daytime fatigue. Co-morbid CAD and HTN.     Polysomnogram Data: A full night polysomnogram recorded the standard physiologic parameters including EEG, EOG, EMG, ECG, nasal and oral airflow. Respiratory parameters of chest and abdominal movements were recorded with respiratory inductance plethysmography. Oxygen saturation was recorded by pulse oximetry. Hypopnea scoring rule used: 1B 4%.     Sleep Architecture:   The total recording time of the polysomnogram was 501.9 minutes. The total sleep time was 358.5 minutes. Sleep latency was increased at 33.5 minutes without the use of a sleep aid while watching TV for 40 minutes. REM latency was 54.5 minutes. Arousal index was increased at 61.3 arousals per hour. Sleep efficiency was decreased at 71.4%. Wake after sleep onset was 56.0 minutes. The patient spent 21.2% of total sleep time in Stage N1, 44.8% in Stage N2, 20.8% in Stage N3, and 13.2% in REM. Time in REM supine was 0 minutes.     Respiration:   Events ? The polysomnogram revealed a presence of 25 obstructive, 2 central, and 21 mixed apneas resulting in an apnea index of 8.0 events per hour. There were 319 obstructive hypopneas and 0 central hypopneas resulting in an obstructive hypopnea index of 53.4 and central hypopnea index of 0 events per hour. The combined apnea/hypopnea index was 61.4 events per hour (central apnea/hypopnea index was 0.3 events per hour). The REM AHI  was 69.5 events per hour. The supine AHI was n/a (5 minutes TST). The RERA index was 2.8 events per hour.  The RDI was 64.3 events per hour.  Snoring - was reported as loud.  Respiratory rate and pattern - Events appeared periodic at times. Morphology was suggestive of Cheyne richard at times, lung to finger circulation times were not clearly prolonged  Sustained Sleep Associated Hypoventilation - Transcutaneous carbon dioxide monitoring was not used, however significant hypoventilation was not suggested by oximetry  Sleep Associated Hypoxemia - (Greater than 5 minutes O2 sat at or below 88%) was present. Baseline oxygen saturation was 93.0%. Lowest oxygen saturation was 61.4%. Time spent less than or equal to 88% was 29.7 minutes. Time spent less than or equal to 89% was 38.4 minutes.     Movement Activity:   Periodic Limb Activity - There were 14 PLMs during the entire study. The PLM index was 2.3 movements per hour. The PLM Arousal Index was 0.3 per hour.  REM EMG Activity - Excessive transient/sustained muscle activity was not present.  Nocturnal Behavior - Abnormal sleep related behaviors were not noted   Bruxism - None apparent.     Cardiac Summary:   The average pulse rate was 64.4 bpm. The minimum pulse rate was 48.9 bpm while the maximum pulse rate was 97.0 bpm.  Arrhythmias were not noted.         Past medical/surgical history, family history, social history, medications and allergies were reviewed.      Problem List:  Patient Active Problem List    Diagnosis Date Noted     CAD (coronary artery disease) 09/02/2021     Priority: Medium     Nonobstructive coronary artery disease with previous coronary angiogram suggesting all stenoses less or equal to 25%.          Right carpal tunnel syndrome 07/13/2021     Priority: Medium     Added automatically from request for surgery [id number]       Elevated LFTs 05/03/2021     Priority: Medium     Fatty liver 05/03/2021     Priority: Medium     Angina, class II (H)  01/21/2021     Priority: Medium     Macular edema, od 12/24/2019     Priority: Medium     Megalocornea 05/27/2019     Priority: Medium     History of iritis, os 05/27/2019     Priority: Medium     Hyperlipidemia LDL goal <70 08/18/2016     Priority: Medium     Overwieght BMI > 35 08/18/2016     Priority: Medium     Essential hypertension with goal blood pressure less than 140/90 08/10/2016     Priority: Medium     RCT (rotator cuff tear) 02/12/2014     Priority: Medium     Cardiomyopathy (H) 05/21/2013     Priority: Medium     Nonischemic cardiomyopathy based on serial MRI assessments with probably reduced baseline ejection fraction but recent outside echo suggesting normalization of EF.     Echo 9/29/2020-  Global and regional left ventricular function is normal with an EF of 55-60%. Global right ventricular function is normal. Trileaflet aortic sclerosis without stenosis. Mild aortic insufficiency is  Present. The inferior vena cava was normal in size with preserved respiratory Variability. No pericardial effusion is present.         BCC (basal cell carcinoma)      Priority: Medium     right shoulder        JOSE JUAN (obstructive sleep apnea)-severe (AHI 61)      Priority: Medium     Polysomnogram 1/29/2009 at [facility] - AHI of 72 and O2 rody of 78%.  CPAP was titrated to a final pressure of 11 cm/H20 but only lateral REM  Polysomnogram  6/28/2013 (230.0 lbs)-  AHI 32, RDI 58, O2 rody 85%. CPAP titration was completed during the second portion of the study night.  CPAP was titrated to an effective pressure of 13 cm/H20 with an AHI including REM lateral.   10/26/2021 [facility] (244.0 lbs) - AHI 61.4, RDI 64.3, Supine AHI n/a, REM AHI 69.5, Low O2 61.4%, Time Spent ?88% 29.7 minutes / Time Spent ?89% 38.4 minutes.         Advanced directives, counseling/discussion 03/28/2012     Priority: Low     Keratoglobus, ou 10/14/2011     Priority: Low     Pseudophakia, sutured PCL, od;  ACL, os - hx of IOL dislocation, ou 10/14/2011     Priority: Low     Posterior vitreous detachment, od 10/14/2011     Priority: Low     Epiretinal membrane, mild, od 10/14/2011     Priority: Low     HL (hearing loss) 04/01/2011     Priority: Low     Erectile dysfunction 04/01/2011     Priority: Low        Impression/Plan:  1. Severe JOSE JUAN with sleep-associated hypoxemia.   2. Possible periodic breathing    - Overnight polysomnogram was reviewed in detail today and a copy given to him for his records.  - Treatment options for severe JOSE JUAN reviewed.   - He is vehemently opposed opposed to using PAP therapy. He has been through [facility]. He is not a good candidate for Inspire therapy with BMI of 35 and possible periodic breathing.   - He elected treatment with a mandibular advancement device    Follow up once  mandibular advancement device is constructed to re-evaluate sleep apnea.    Benjamin Lara PA-C

## 2021-11-09 NOTE — NURSING NOTE
"Chief Complaint   Patient presents with     Study Results       Initial Ht 1.702 m (5' 7\")   Wt 101.2 kg (223 lb 1.6 oz)   BMI 34.94 kg/m   Estimated body mass index is 34.94 kg/m  as calculated from the following:    Height as of this encounter: 1.702 m (5' 7\").    Weight as of this encounter: 101.2 kg (223 lb 1.6 oz).    Medication Reconciliation: complete   ESS: 8  Neck circumference: 49 centimeters.  DME: N/A  Kendra Mujica CMA        "

## 2021-11-09 NOTE — PATIENT INSTRUCTIONS
Your BMI is Body mass index is 34.94 kg/m .  Weight management is a personal decision.  If you are interested in exploring weight loss strategies, the following discussion covers the approaches that may be successful. Body mass index (BMI) is one way to tell whether you are at a healthy weight, overweight, or obese. It measures your weight in relation to your height.  A BMI of 18.5 to 24.9 is in the healthy range. A person with a BMI of 25 to 29.9 is considered overweight, and someone with a BMI of 30 or greater is considered obese. More than two-thirds of American adults are considered overweight or obese.  Being overweight or obese increases the risk for further weight gain. Excess weight may lead to heart disease and diabetes.  Creating and following plans for healthy eating and physical activity may help you improve your health.  Weight control is part of healthy lifestyle and includes exercise, emotional health, and healthy eating habits. Careful eating habits lifelong are the mainstay of weight control. Though there are significant health benefits from weight loss, long-term weight loss with diet alone may be very difficult to achieve- studies show long-term success with dietary management in less than 10% of people. Attaining a healthy weight may be especially difficult to achieve in those with severe obesity. In some cases, medications, devices and surgical management might be considered.  What can you do?  If you are overweight or obese and are interested in methods for weight loss, you should discuss this with your provider.     Consider reducing daily calorie intake by 500 calories.     Keep a food journal.     Avoiding skipping meals, consider cutting portions instead.    Diet combined with exercise helps maintain muscle while optimizing fat loss. Strength training is particularly important for building and maintaining muscle mass. Exercise helps reduce stress, increase energy, and improves fitness.  Increasing exercise without diet control, however, may not burn enough calories to loose weight.       Start walking three days a week 10-20 minutes at a time    Work towards walking thirty minutes five days a week     Eventually, increase the speed of your walking for 1-2 minutes at time    In addition, we recommend that you review healthy lifestyles and methods for weight loss available through the National Institutes of Health patient information sites:  http://win.niddk.nih.gov/publications/index.htm    And look into health and wellness programs that may be available through your health insurance provider, employer, local community center, or beritn club.    Weight management plan: Patient was referred to their PCP to discuss a diet and exercise plan.

## 2021-11-22 ENCOUNTER — TELEPHONE (OUTPATIENT)
Dept: SLEEP MEDICINE | Facility: CLINIC | Age: 71
End: 2021-11-22
Payer: MEDICARE

## 2021-11-22 NOTE — TELEPHONE ENCOUNTER
Reason for Call:  Other call back    Detailed comments: pt is calling to ask if Jane can resend the info regarding where to go for the dental referral. Can added to [facility]     Phone Number Patient can be reached at: Home number on file [phone] (home) or Other phone number:      Best Time: any    Can we leave a detailed message on this number? YES    Call taken on 11/22/2021 at 10:40 AM by Nataliya Nevarez

## 2021-12-13 ENCOUNTER — ANCILLARY PROCEDURE (OUTPATIENT)
Dept: GENERAL RADIOLOGY | Facility: CLINIC | Age: 71
End: 2021-12-13
Attending: PHYSICIAN ASSISTANT
Payer: MEDICARE

## 2021-12-13 ENCOUNTER — OFFICE VISIT (OUTPATIENT)
Dept: FAMILY MEDICINE | Facility: CLINIC | Age: 71
End: 2021-12-13
Payer: MEDICARE

## 2021-12-13 VITALS
RESPIRATION RATE: 20 BRPM | WEIGHT: 222.4 LBS | DIASTOLIC BLOOD PRESSURE: 88 MMHG | TEMPERATURE: 98.1 F | BODY MASS INDEX: 34.83 KG/M2 | OXYGEN SATURATION: 95 % | SYSTOLIC BLOOD PRESSURE: 138 MMHG | HEART RATE: 81 BPM

## 2021-12-13 DIAGNOSIS — B35.4 TINEA CORPORIS: ICD-10-CM

## 2021-12-13 DIAGNOSIS — M25.512 ACUTE PAIN OF LEFT SHOULDER: Primary | ICD-10-CM

## 2021-12-13 DIAGNOSIS — Z28.21 COVID-19 VACCINE DOSE DECLINED: ICD-10-CM

## 2021-12-13 PROCEDURE — 99214 OFFICE O/P EST MOD 30 MIN: CPT | Performed by: PHYSICIAN ASSISTANT

## 2021-12-13 PROCEDURE — 73030 X-RAY EXAM OF SHOULDER: CPT | Mod: LT | Performed by: RADIOLOGY

## 2021-12-13 RX ORDER — CLOTRIMAZOLE 1 %
CREAM (GRAM) TOPICAL 2 TIMES DAILY
Qty: 30 G | Refills: 0 | Status: SHIPPED | OUTPATIENT
Start: 2021-12-13 | End: 2021-12-27

## 2021-12-13 ASSESSMENT — PAIN SCALES - GENERAL: PAINLEVEL: SEVERE PAIN (7)

## 2021-12-13 NOTE — PROGRESS NOTES
Assessment & Plan     Acute pain of left shoulder  Due to recent injury.  Xray clear, no fracture noted.    I suggest topical pain medication (Australian Dream/Tiger Balm) and ice/heat to area.    Gentle range of motion exercises advised and f/u with a round of physical therapy if pain persists.  If no improvement with physical therapy, I would then next suggest further imaging with MRI  - XR Shoulder Left 2 Views    Tinea corporis      Tinea Cruris    Will start antifungal cream to be applied BID x 14 days.  he is to keep area clean and dry, he is to avoid contact of this area with others, as it is contagious.  Patient education materials given during visit today (Adult Health Advisor-Oliver Bocanegra).        - clotrimazole (LOTRIMIN) 1 % external cream; Apply topically 2 times daily for 14 days Right axilla      25 minutes spent on the date of the encounter doing chart review, history and exam, documentation and further activities per the note           Return in about 2 weeks (around 12/27/2021) for recheck if no improvement.    Michelle Figueroa PA-C  [facility] ALTAF García is a 71 year old who presents for the following health issues  accompanied by his spouse.    History of Present Illness       He eats 0-1 servings of fruits and vegetables daily.He consumes 1 sweetened beverage(s) daily.He exercises with enough effort to increase his heart rate 10 to 19 minutes per day.  He exercises with enough effort to increase his heart rate 3 or less days per week.   He is taking medications regularly.       Pain History:  When did you first notice your pain? - Less than 1 week   Have you seen anyone else for your pain? No  Where in your body do you have pain? Musculoskeletal problem/pain  Onset/Duration: 3 days ago  Description  Location: shoulder - left  Joint Swelling: no  Redness: no  Pain: YES  Warmth: no  Intensity:  moderate  Progression of Symptoms:  same  Accompanying signs and  symptoms:   Fevers: no  Numbness/tingling/weakness: no  History  Trauma to the area: YES- had rotator cuff surgery in 2007  Recent illness:  no  Previous similar problem: no  Previous evaluation:  YES  Precipitating or alleviating factors:  Aggravating factors include: overuse  Therapies tried and outcome: ice and acetaminophen      Tried using a sling on the shoulder yesterday, didn't help.    Landed on his right knee and then onto his left shoulder.    No bruising or swelling.    Also with pain in right hand (feels warm and numb).      Has a dark spot under right arm.  It is a little more dry, not itchy.                Review of Systems   Constitutional, HEENT, cardiovascular, pulmonary, gi and gu systems are negative, except as otherwise noted.      Objective    /88   Pulse 81   Temp 98.1  F (36.7  C) (Tympanic)   Resp 20   Wt 100.9 kg (222 lb 6.4 oz)   SpO2 95%   BMI 34.83 kg/m    Body mass index is 34.83 kg/m .  Physical Exam   GENERAL: healthy, alert and no distress  Skin:  Right axilla with round, scaling, sharply defined plaques   No signs of secondary bacterial infection noted.  Inspection: No obvious deformity, asymmetry, muscle atrophy or abnormal motion noted.   Palpation:  Pain noted over left acromion and subacromial space,  And bicipital groove No pain over greater/less tubercles, pain noted over left scapular spine and adjacent musculature, no pain over cervical spine.    External/Internal rotation strength:  Limited on left due to pain  Abduction/Adduction strength: Limited on right due to previous injury, left is better preserved.   Biceps/Triceps strength: full  Neck examination: normal  ROM/crepitus? normal  Capillary refills less than 2 seconds and radial pulses normal bilaterally.   Sensation intact bilateral fingers, hands and arms.            Results for orders placed or performed in visit on 12/13/21   XR Shoulder Left 2 Views     Status: None    Narrative    XR SHOULDER 2 VIEW  LEFT   12/13/2021 2:19 PM     HISTORY:  Acute pain of left shoulder      Impression    IMPRESSION: There is chronic ossification superolaterally. Otherwise  unremarkable. No acute fracture identified.    JARROD MAURICE MD         SYSTEM ID:  [id number]

## 2021-12-13 NOTE — LETTER
December 14, 2021    Ricky Brown  [address]  [place] [address]          Dear ,    We are writing to inform you of your test results.    This is a within normal limits shoulder xray except for some osteoarthritis change . No fracture       Resulted Orders   XR Shoulder Left 2 Views    Narrative    XR SHOULDER 2 VIEW LEFT   12/13/2021 2:19 PM     HISTORY:  Acute pain of left shoulder      Impression    IMPRESSION: There is chronic ossification superolaterally. Otherwise  unremarkable. No acute fracture identified.    JARROD MAURICE MD         SYSTEM ID:  [id number]       If you have any questions or concerns, please call the clinic at the number listed above.       Sincerely,      Holland Blunt MD

## 2022-01-06 ENCOUNTER — OFFICE VISIT (OUTPATIENT)
Dept: FAMILY MEDICINE | Facility: CLINIC | Age: 72
End: 2022-01-06
Payer: MEDICARE

## 2022-01-06 VITALS
DIASTOLIC BLOOD PRESSURE: 80 MMHG | BODY MASS INDEX: 34.11 KG/M2 | HEART RATE: 107 BPM | TEMPERATURE: 97.9 F | SYSTOLIC BLOOD PRESSURE: 138 MMHG | WEIGHT: 217.3 LBS | HEIGHT: 67 IN | OXYGEN SATURATION: 96 %

## 2022-01-06 DIAGNOSIS — M25.512 ACUTE PAIN OF LEFT SHOULDER: Primary | ICD-10-CM

## 2022-01-06 DIAGNOSIS — Z23 NEED FOR PROPHYLACTIC VACCINATION AND INOCULATION AGAINST INFLUENZA: ICD-10-CM

## 2022-01-06 DIAGNOSIS — Z23 HIGH PRIORITY FOR 2019-NCOV VACCINE: ICD-10-CM

## 2022-01-06 PROCEDURE — 99213 OFFICE O/P EST LOW 20 MIN: CPT | Mod: 25 | Performed by: FAMILY MEDICINE

## 2022-01-06 PROCEDURE — 0004A COVID-19,PF,PFIZER (12+ YRS): CPT | Performed by: FAMILY MEDICINE

## 2022-01-06 PROCEDURE — 90662 IIV NO PRSV INCREASED AG IM: CPT | Performed by: FAMILY MEDICINE

## 2022-01-06 PROCEDURE — G0008 ADMIN INFLUENZA VIRUS VAC: HCPCS | Performed by: FAMILY MEDICINE

## 2022-01-06 PROCEDURE — 91300 COVID-19,PF,PFIZER (12+ YRS): CPT | Performed by: FAMILY MEDICINE

## 2022-01-06 ASSESSMENT — MIFFLIN-ST. JEOR: SCORE: 1709.3

## 2022-01-06 NOTE — PROGRESS NOTES
"  Assessment & Plan     Acute pain of left shoulder  Refer PT  Follow up 1 month  Tylenol 2 tabs every 6 Hours OTC  - BIBI PT and Hand Referral; Future    High priority for 2019-nCoV vaccine  Advised   - COVID-19,PF,PFIZER (12+ Yrs PURPLE LABEL)    Need for prophylactic vaccination and inoculation against influenza  Advised   - INFLUENZA, QUAD, HIGH DOSE, PF, 65YR + (FLUZONE HD)      [id number]}     Return in about 1 month (around 2/6/2022) for recheck/ sooner if worse or New symptoms.    Hannah Etienne MD  [facility] [place] YAQUELIN García is a 71 year old who presents for the following health issues  accompanied by his self.    HPI     Patient presents with:  RECHECK: Left shoulder. fell and injured left shoulder. did see Michelle Moore PA-C on 12/13/2021 @ [facility].   Imm/Inj: COVID-19 VACCINE  Imm/Inj: Flu Shot  Notes reviewed   Xray normal   Still has pain  Occasionally Takes Tylenol  Pain with certain Movements  No neck pain      Review of Systems   Rest of the ROS is Negative except see above and Problem list [stable]        Objective    /80   Pulse 107   Temp 97.9  F (36.6  C) (Oral)   Ht 1.702 m (5' 7\")   Wt 98.6 kg (217 lb 4.8 oz)   SpO2 96%   BMI 34.03 kg/m    Body mass index is 34.03 kg/m .  Physical Exam   GENERAL: healthy, alert and no distress  Left shoulder  Pain with ext Rotation  Able to Lift arm  No c -spine tenderness         "

## 2022-01-07 ENCOUNTER — THERAPY VISIT (OUTPATIENT)
Dept: PHYSICAL THERAPY | Facility: CLINIC | Age: 72
End: 2022-01-07
Attending: FAMILY MEDICINE
Payer: MEDICARE

## 2022-01-07 DIAGNOSIS — M25.512 ACUTE PAIN OF LEFT SHOULDER: ICD-10-CM

## 2022-01-07 PROCEDURE — 97110 THERAPEUTIC EXERCISES: CPT | Mod: GP | Performed by: PHYSICAL THERAPIST

## 2022-01-07 PROCEDURE — 97161 PT EVAL LOW COMPLEX 20 MIN: CPT | Mod: GP | Performed by: PHYSICAL THERAPIST

## 2022-01-07 NOTE — PROGRESS NOTES
[facility]    OUTPATIENT Physical Therapy ORTHOPEDIC EVALUATION  PLAN OF TREATMENT FOR OUTPATIENT REHABILITATION  (COMPLETE FOR INITIAL CLAIMS ONLY)  Patient's Last Name, First Name, M.I.  YOB: 1950  Ricky Brown    Provider s Name:  [facility]   Medical Record No.  [id number]   Start of Care Date:  01/07/22   Onset Date:   12/10/21   Type:     _X__PT   ___OT Medical Diagnosis:    Encounter Diagnosis   Name Primary?     Acute pain of left shoulder         Treatment Diagnosis:  Left shoulder pain        Goals:     01/07/22 0500   Body Part   Goals listed below are for Shoulder   Goal #1   Goal #1 reaching   Previous Functional Level No restrictions   Current Functional Level Cannot reach ;overhead   Performance level without 5/10 pain   STG Target Performance Reach ;overhead   Performance level 3/10 pain   Rationale for meal preparation   Due date 01/28/22   LTG Target Performance Reach;overhead   Performance Level 1-2/10 pain   Rationale for meal preparation   Due date 03/04/22   Goal #2   Goal #2 reaching   Previous Functional Level No restrictions   Current Functional Level Cannot reach;behind back   Performance level without 6/10 pain   STG Target Performance Reach;behind back   Performance level 4/10 pain   Rationale for dressing   Due date 01/28/22   LTG Target Performance Reach;behind back   Performance Level with 1-2/10 pain   Rationale for dressing   Due date 03/04/22       Therapy Frequency:  1 time per week  Predicted Duration of Therapy Intervention:  8 weeks    Bam Foster, PT                 I CERTIFY THE NEED FOR THESE SERVICES FURNISHED UNDER        THIS PLAN OF TREATMENT AND WHILE UNDER MY CARE     (Physician attestation of this document indicates review and certification of the therapy plan).                       Certification Date  From:  01/07/22   Certification Date To:  03/04/22    Referring Provider:  Hannah Etienne    Initial Assessment        See Epic Evaluation SOC Date: 01/07/22

## 2022-01-07 NOTE — PROGRESS NOTES
Physical Therapy Initial Evaluation  Subjective:  The history is provided by the patient.   Therapist Generated HPI Evaluation  Problem details: Had a fall on 12/10/2021 and landed on right hand and then landed on left shoulder. Had rotator cuff surgery on both shoulders. X-rays were negative. Having pain on lateral shoulder. .         Type of problem:  Left shoulder.    This is a new condition.  Condition occurred with:  A fall.  Where condition occurred: at home.  Patient reports pain:  Lateral.  Pain is described as aching and is constant.  Pain is the same all the time.  Since onset symptoms are unchanged.  Associated symptoms:  Loss of motion/stiffness, loss of strength and painful arc. Symptoms are exacerbated by using arm overhead and using arm behind back  and relieved by rest.  Special tests included:  X-ray.    Barriers include:  None as reported by patient.    Patient Health History  Ricky Brown being seen for Left shoulder.     Problem began: 12/10/2021.   Problem occurred: Fell - ice/snow   Pain is reported as 3/10 on pain scale.  General health as reported by patient is fair.  Pertinent medical history includes: heart problems, high blood pressure, numbness/tingling and sleep disorder/apnea.   Red flags:  None as reported by patient.     Surgeries include:  Orthopedic surgery. Other surgery history details: Rotator cuff x2.    Current medications:  High blood pressure medication.    Current occupation is Retired.   Primary job tasks include:  Computer work.                                    Objective:  System                   Shoulder Evaluation:  ROM:  AROM:    Flexion:  Left:  167 with pain        Abduction:  Left: 137 with pain       Internal Rotation:  Left:  L5                      PROM:              External Rotation:  Left:  40                        Strength:    Flexion: Left:4/5   Pain:        Abduction:  Left: 3-/5  Pain:        Internal Rotation:  Left:4/5     Pain:      External  Rotation:   Left:2+/5     Pain:               Special Tests:  Special tests assessed shoulder: Negative Spurling and Cervical spine distraction.  Left shoulder positive for the following special tests:  Impingement and Rotator cuff tear    Palpation:    Left shoulder tenderness present at:  Supraspinatus and Infraspinatus                                       General     ROS    Assessment/Plan:    Patient is a 71 year old male with left side shoulder complaints.    Patient has the following significant findings with corresponding treatment plan.                Diagnosis 1:  Left shoulder pain  Pain -  manual therapy, self management, education and home program  Decreased ROM/flexibility - manual therapy, therapeutic exercise, therapeutic activity and home program  Decreased joint mobility - manual therapy, therapeutic exercise, therapeutic activity and home program  Decreased strength - therapeutic exercise, therapeutic activities and home program  Decreased function - therapeutic activities and home program    Therapy Evaluation Codes:     Cumulative Therapy Evaluation is: Low complexity.    Previous and current functional limitations:  (See Goal Flow Sheet for this information)    Short term and Long term goals: (See Goal Flow Sheet for this information)     Communication ability:  Patient appears to be able to clearly communicate and understand verbal and written communication and follow directions correctly.  Treatment Explanation - The following has been discussed with the patient:   RX ordered/plan of care  Anticipated outcomes  Possible risks and side effects  This patient would benefit from PT intervention to resume normal activities.   Rehab potential is good.    Frequency:  1 X week, once daily  Duration:  for 8 weeks  Discharge Plan:  Achieve all LTG.  Independent in home treatment program.  Reach maximal therapeutic benefit.    Please refer to the daily flowsheet for treatment today, total treatment time  and time spent performing 1:1 timed codes.

## 2022-01-13 DIAGNOSIS — I10 ESSENTIAL HYPERTENSION WITH GOAL BLOOD PRESSURE LESS THAN 140/90: ICD-10-CM

## 2022-01-13 DIAGNOSIS — E78.5 HYPERLIPIDEMIA LDL GOAL <70: ICD-10-CM

## 2022-01-14 RX ORDER — LOSARTAN POTASSIUM 50 MG/1
TABLET ORAL
Qty: 90 TABLET | Refills: 0 | Status: SHIPPED | OUTPATIENT
Start: 2022-01-14 | End: 2022-09-07

## 2022-01-14 RX ORDER — ATORVASTATIN CALCIUM 10 MG/1
TABLET, FILM COATED ORAL
Qty: 90 TABLET | Refills: 0 | Status: SHIPPED | OUTPATIENT
Start: 2022-01-14 | End: 2023-06-02

## 2022-01-17 ENCOUNTER — LAB (OUTPATIENT)
Dept: URGENT CARE | Facility: URGENT CARE | Age: 72
End: 2022-01-17
Payer: MEDICARE

## 2022-01-17 DIAGNOSIS — Z20.822 SUSPECTED COVID-19 VIRUS INFECTION: ICD-10-CM

## 2022-01-17 PROCEDURE — U0005 INFEC AGEN DETEC AMPLI PROBE: HCPCS | Performed by: FAMILY MEDICINE

## 2022-01-18 LAB — SARS-COV-2 RNA RESP QL NAA+PROBE: NOT DETECTED

## 2022-02-03 ENCOUNTER — TELEPHONE (OUTPATIENT)
Dept: ORTHOPEDICS | Facility: CLINIC | Age: 72
End: 2022-02-03
Payer: MEDICARE

## 2022-02-04 NOTE — TELEPHONE ENCOUNTER
Discussed w patient. S/P 8/13/21 R CTR with fall/FOOSH type injury 2 wk ago onto operative palm. C/O swelling to digits and pain along incision. No new neuro changes ,numbness of digits, open skin or distal radius pain  voiced. P: Appt made for Wed 2/9 @  Ortho. Pt agrees with treatment plan.  Will wear brace for work/physical activities.  Gerhard MOLINA

## 2022-02-04 NOTE — TELEPHONE ENCOUNTER
Reason for Call:  Other appointment    Detailed comments: wife calling to request appt for pt about ongoing swelling and pain in pt's hand, surgery has not helped, wanting to f/u about carpal tunnel issues    Phone Number Patient can be reached at: Other phone number:  [phone]*    Best Time: Anytime    Can we leave a detailed message on this number? YES    Call taken on 2/3/2022 at 7:46 PM by Jacquelyn Santo

## 2022-02-08 NOTE — PROGRESS NOTES
"Ricky Brown is here for follow up after right carpal tunnel release on 8/13/21.   He had mild- moderate cubital tunnel on his preop EMG.  He had a fall a couple of weeks ago, and now has palmar pain.    No difference in sensation since surgery.  No improvement   Can't button shirts.    Not sure if things got worse after the fall with the nerve function. Has more palmar pain.  Felt like \"road rash\" right after he fell.  He says the fingers feel fat  Drops things  Keyboarding is difficult. Piano is difficult    Since the fall, has noticed what he terms a \"blister\" that \"detached the skin\" from the muscle of the ulnar palm.     Exam:  I don't see any blistering. There may be some atrophy of the hypothenar area.  His palmar creases do end much more radially than the other hand's   Decent  strength  Normal motor function of the ulnar nerve.  Good FPB strength, some fasciculations.  Decreased sensation radial 3.5 digits  Negative Tinels a the carpal tunnel and cubital tunnel  Decreased  palm sensation.    Assessment:  Doing poorly status post right carpal tunnel release.  He hasn't benefitted from this surgery. There is still a chance for improvement, since sometimes over a year is required, but he is not doing well at all.   May have injured palmar cutaneous branch with a fall.  I am uncertain if he had a form of a Farrar-Samuel injury to his palm.  I don't think this will require any intervention.    Plan:  Taught scar management techniques.  Repeat EMG ordered  Hand therapy ordered    Return to clinic 4wks or as needed       DORYS Bravo MD  Dept. Orthopedic Surgery  [facility]    "

## 2022-02-09 ENCOUNTER — OFFICE VISIT (OUTPATIENT)
Dept: ORTHOPEDICS | Facility: CLINIC | Age: 72
End: 2022-02-09
Payer: MEDICARE

## 2022-02-09 VITALS
BODY MASS INDEX: 34.06 KG/M2 | OXYGEN SATURATION: 96 % | SYSTOLIC BLOOD PRESSURE: 162 MMHG | DIASTOLIC BLOOD PRESSURE: 96 MMHG | WEIGHT: 217 LBS | HEART RATE: 86 BPM | HEIGHT: 67 IN

## 2022-02-09 DIAGNOSIS — S60.221A CONTUSION OF RIGHT HAND, INITIAL ENCOUNTER: ICD-10-CM

## 2022-02-09 DIAGNOSIS — Z98.890 S/P CARPAL TUNNEL RELEASE: Primary | ICD-10-CM

## 2022-02-09 DIAGNOSIS — M79.609 PILLAR PAIN OF EXTREMITY: ICD-10-CM

## 2022-02-09 PROCEDURE — 99214 OFFICE O/P EST MOD 30 MIN: CPT | Performed by: ORTHOPAEDIC SURGERY

## 2022-02-09 ASSESSMENT — MIFFLIN-ST. JEOR: SCORE: 1707.94

## 2022-02-09 NOTE — LETTER
"    2/9/2022         RE: Ricky Brown  [address]  [place] [address]        Dear Colleague,    Thank you for referring your patient, Ricky Brown[facility] Please see a copy of my visit note below.    Ricky Brown is here for follow up after right carpal tunnel release on 8/13/21.   He had mild- moderate cubital tunnel on his preop EMG.  He had a fall a couple of weeks ago, and now has palmar pain.    No difference in sensation since surgery.  No improvement   Can't button shirts.    Not sure if things got worse after the fall with the nerve function. Has more palmar pain.  Felt like \"road rash\" right after he fell.  He says the fingers feel fat  Drops things  Keyboarding is difficult. Piano is difficult    Since the fall, has noticed what he terms a \"blister\" that \"detached the skin\" from the muscle of the ulnar palm.     Exam:  I don't see any blistering. There may be some atrophy of the hypothenar area.  His palmar creases do end much more radially than the other hand's   Decent  strength  Normal motor function of the ulnar nerve.  Good FPB strength, some fasciculations.  Decreased sensation radial 3.5 digits  Negative Tinels a the carpal tunnel and cubital tunnel  Decreased  palm sensation.    Assessment:  Doing poorly status post right carpal tunnel release.  He hasn't benefitted from this surgery. There is still a chance for improvement, since sometimes over a year is required, but he is not doing well at all.   May have injured palmar cutaneous branch with a fall.  I am uncertain if he had a form of a Farrar-Samuel injury to his palm.  I don't think this will require any intervention.    Plan:  Taught scar management techniques.  Repeat EMG ordered  Hand therapy ordered    Return to clinic 4wks or as needed       DORYS Bravo MD  Dept. Orthopedic Surgery  [facility]        Again, thank you for allowing me to " participate in the care of your patient.        Sincerely,        Tony Bravo MD

## 2022-02-15 PROBLEM — M25.512 LEFT SHOULDER PAIN: Status: RESOLVED | Noted: 2022-01-07 | Resolved: 2022-02-15

## 2022-02-28 ENCOUNTER — THERAPY VISIT (OUTPATIENT)
Dept: OCCUPATIONAL THERAPY | Facility: CLINIC | Age: 72
End: 2022-02-28
Attending: ORTHOPAEDIC SURGERY
Payer: MEDICARE

## 2022-02-28 DIAGNOSIS — Z98.890 S/P CARPAL TUNNEL RELEASE: ICD-10-CM

## 2022-02-28 DIAGNOSIS — M79.609 PILLAR PAIN OF EXTREMITY: ICD-10-CM

## 2022-02-28 DIAGNOSIS — R29.898 RIGHT HAND WEAKNESS: Primary | ICD-10-CM

## 2022-02-28 DIAGNOSIS — S60.221A CONTUSION OF RIGHT HAND, INITIAL ENCOUNTER: ICD-10-CM

## 2022-02-28 PROCEDURE — 97165 OT EVAL LOW COMPLEX 30 MIN: CPT | Mod: GO | Performed by: OCCUPATIONAL THERAPIST

## 2022-02-28 PROCEDURE — 97110 THERAPEUTIC EXERCISES: CPT | Mod: GO | Performed by: OCCUPATIONAL THERAPIST

## 2022-02-28 PROCEDURE — 97140 MANUAL THERAPY 1/> REGIONS: CPT | Mod: GO | Performed by: OCCUPATIONAL THERAPIST

## 2022-02-28 NOTE — PROGRESS NOTES
"Hand Therapy Initial Evaluation    Current Date:  2/28/2022    Subjective:  Ricky (\"Alex\") ARI Brown is a 71 year old left hand dominant male.    Diagnosis:   Right CTS   DOS:  8/13/2021  Procedure:  CTR  Therapy referral: 2/9/2022    Patient reports symptoms of pain, stiffness/loss of motion, weakness/loss of strength and numbness and tingling of the right hand which occurred due to gradual onset with contusion due to FOOSH about 1 month ago. Since onset symptoms are unchanged since surgery.  Special tests:  EMG.  Previous treatment: splint and injection.  General health as reported by patient is good.  Pertinent medical history includes:Heart Problems, High Blood Pressure, Numbness/Tingling, Overweight, Sleep Disorder/Apnea  Medical allergies:see list in EMR.  Surgical history: orthopedic, other: eyes.  Medication history: High Blood Pressure.    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, walk dog  Prior functional level:  independent-shared household chores  Barriers include:none  Mobility: No difficulty  Transportation: drives    Functional Outcome Measure:  Upper Extremity Functional Index  SCORE:   Column Totals: 65/80  (A lower score indicates greater disability.)    Objective:  Pain Level (Scale 0-10)   2/28/2022   At Rest 2   With Use 3-4     Pain Description  Date 2/28/2022   Location Volar wrist   Pain Quality Stiffness, tightness   Frequency constant     Pain is worst  daytime   Exacerbated by  weight bearing   Relieved by rest   Progression unchanged     Edema  Mild    Scar   Sensitivity: Mild to moderate Quality:  Moderately adherent     Sensation   Decreased Median and Ulnar Nerve distribution per pt report no change since surgery    ROM  Pain Report: - none  + mild    ++ moderate    +++ severe   Wrist 2/28/2022 2/28/2022   AROM (PROM) Left Right   Extension 55 45   Flexion 70 70   RD 25 20   UD 40 40     Strength   (Measured in pounds)  Pain " Report: - none  + mild    ++ moderate    +++ severe    2/28/2022 2/28/2022   Trials Left Right   1  2  3 67  64  65 35+  37+  38+   Average 65 37     Lat Pinch 2/28/2022 2/28/2022   Trials Left Right   1  2  3 14  13  13 18+  17+  16+   Average 13 17     3 Pt Pinch 2/28/2022 2/28/2022   Trials Left Right   1  2  3 13  14  14 14+  15+  15+   Average 14 15     Assessment:  Patient presents with symptoms consistent with diagnosis of right hand Carpal Tunnel Syndrome, with surgical intervention and recent contusion due to FOOSH.    Patient's limitations or Problem List includes:  Pain, Decreased ROM/motion, Weakness, Sensory disturbance, Adherent scarring and Decreased  of the right wrist and hand which interferes with the patient's ability to perform Self Care Tasks (dressing, eating), Sleep Patterns, Recreational Activities and Household Chores as compared to previous level of function.    Rehab Potential:  Good - Return to full activity, some limitations    Patient will benefit from skilled Occupational Therapy to increase ROM, flexibility, overall strength,  strength and sensation and decrease pain and adherence of scarring to return to previous activity level and resume normal daily tasks and to reach their rehab potential.    Barriers to Learning:  No barrier    Communication Issues:  Patient appears to be able to clearly communicate and understand verbal and written communication and follow directions correctly.    Chart Review: Chart Review and Simple history review with patient    Identified Performance Deficits: dressing, care of pets, home establishment and management, meal preparation and cleanup, sleep and leisure activities    Assessment of Occupational Performance:  5 or more Performance Deficits    Clinical Decision Making (Complexity): Low complexity    Treatment Explanation:  The following has been discussed with the patient:    RX ordered/plan of care  Anticipated outcomes  Possible risks  and side effects    Plan:  Frequency:  1 X week, once daily  Duration:  for 2 months    Treatment Plan:    Modalities:    US   Therapeutic Exercise:    AROM, PROM, Tendon Gliding and Isotonics  Neuromuscular re-ed:   Nerve Gliding and Kinesiotaping  Manual Techniques:   Scar mobilization  Self Care:    Self Care Tasks    Discharge Plan:    Achieve all LTG.  Independent in home treatment program.  Reach maximal therapeutic benefit.    Home Exercise Program:  Warmth for stiffness  Scar massage, 3 vector  PROM wrist ext  Tendon gliding with 3 fists and FDS   strengthening with foam wedge    Next Visit:  See in 1 week  Add distal median nerve gliding  Consider US and scar pad for scar softening

## 2022-02-28 NOTE — PROGRESS NOTES
[facility]    OUTPATIENT Occupational Therapy ORTHOPEDIC EVALUATION  PLAN OF TREATMENT FOR OUTPATIENT REHABILITATION  (COMPLETE FOR INITIAL CLAIMS ONLY)  Patient's Last Name, First Name, M.I.  YOB: 1950  Ricky Brown    Provider s Name:  [facility]   Medical Record No.  [id number]   Start of Care Date:  02/28/22   Onset Date:  08/13/21    Type:     ___PT   _X__OT Medical Diagnosis:    Encounter Diagnoses   Name Primary?     S/P carpal tunnel release      Pillar pain of extremity      Contusion of right hand, initial encounter      Right hand weakness Yes        Treatment Diagnosis:  R CTS s/p CTR        Goals:     02/28/22 0500   Goal #1   Goal #1 household chores   Previous Performance Level Independent   Current Functional Task    Current Performance Level moderate difficulty   STG Target Perfomance Open a tight or new jar   STG Target Perform Level mild difficulty   Due Date 03/29/22   LTG Target Task/Performance No Difficulty   Due Date 04/28/22       Therapy Frequency:  1 x per week  Predicted Duration of Therapy Intervention:  2 months    Brandee Colbert OT                 I CERTIFY THE NEED FOR THESE SERVICES FURNISHED UNDER        THIS PLAN OF TREATMENT AND WHILE UNDER MY CARE     (Physician attestation of this document indicates review and certification of the therapy plan).                       Certification Date From:  02/28/22   Certification Date To:  04/28/22    Referring Provider:  Tony Bravo    Initial Assessment        See Epic Evaluation SOC Date: 02/28/22

## 2022-03-07 ENCOUNTER — THERAPY VISIT (OUTPATIENT)
Dept: OCCUPATIONAL THERAPY | Facility: CLINIC | Age: 72
End: 2022-03-07
Payer: MEDICARE

## 2022-03-07 DIAGNOSIS — R29.898 RIGHT HAND WEAKNESS: ICD-10-CM

## 2022-03-07 DIAGNOSIS — Z98.890 S/P CARPAL TUNNEL RELEASE: ICD-10-CM

## 2022-03-07 PROCEDURE — 97140 MANUAL THERAPY 1/> REGIONS: CPT | Mod: GO | Performed by: OCCUPATIONAL THERAPIST

## 2022-03-07 PROCEDURE — 97110 THERAPEUTIC EXERCISES: CPT | Mod: GO | Performed by: OCCUPATIONAL THERAPIST

## 2022-03-07 NOTE — PROGRESS NOTES
SOAP note objective information for 3/7/2022:    Diagnosis:   Right CTS   DOS:  8/13/2021  Procedure:  CTR  Therapy referral: 2/9/2022    Pain Level (Scale 0-10)   2/28/2022 3/7/2022   At Rest 2 2   With Use 3-4 3-4     ROM  Wrist 2/28/2022 2/28/2022 3/7/2022   AROM (PROM) Left Right Right   Extension 55 45 50   Flexion 70 70    RD 25 20    UD 40 40      Strength   (Measured in pounds)  Pain Report: - none  + mild    ++ moderate    +++ severe    2/28/2022 2/28/2022   Trials Left Right   1  2  3 67  64  65 35+  37+  38+   Average 65 37     Home Exercise Program:  Warmth for stiffness  Scar massage, 3 vector  PROM wrist ext  Tendon gliding with 3 fists and FDS   strengthening with foam wedge  Distal median nerve gliding    Next Visit:  See in 1-2 weeks  Continue US for scar softening   Defer scar pad as scar is softening nicely     Please refer to the daily flowsheet for treatment today, total treatment time and time spent performing 1:1 timed codes.

## 2022-03-18 ENCOUNTER — TRANSFERRED RECORDS (OUTPATIENT)
Dept: HEALTH INFORMATION MANAGEMENT | Facility: CLINIC | Age: 72
End: 2022-03-18
Payer: MEDICARE

## 2022-03-22 ENCOUNTER — OFFICE VISIT (OUTPATIENT)
Dept: FAMILY MEDICINE | Facility: CLINIC | Age: 72
End: 2022-03-22
Payer: MEDICARE

## 2022-03-22 VITALS
SYSTOLIC BLOOD PRESSURE: 148 MMHG | OXYGEN SATURATION: 96 % | TEMPERATURE: 98.9 F | DIASTOLIC BLOOD PRESSURE: 72 MMHG | HEART RATE: 67 BPM

## 2022-03-22 DIAGNOSIS — E66.01 MORBID OBESITY DUE TO EXCESS CALORIES (H): ICD-10-CM

## 2022-03-22 DIAGNOSIS — Z23 NEED FOR PNEUMOCOCCAL VACCINATION: ICD-10-CM

## 2022-03-22 DIAGNOSIS — R10.32 LLQ ABDOMINAL PAIN: Primary | ICD-10-CM

## 2022-03-22 DIAGNOSIS — I10 ESSENTIAL HYPERTENSION WITH GOAL BLOOD PRESSURE LESS THAN 140/90: Chronic | ICD-10-CM

## 2022-03-22 DIAGNOSIS — K57.30 DIVERTICULAR DISEASE OF COLON: ICD-10-CM

## 2022-03-22 PROCEDURE — 99213 OFFICE O/P EST LOW 20 MIN: CPT | Mod: 25 | Performed by: FAMILY MEDICINE

## 2022-03-22 PROCEDURE — G0009 ADMIN PNEUMOCOCCAL VACCINE: HCPCS | Performed by: FAMILY MEDICINE

## 2022-03-22 PROCEDURE — 90732 PPSV23 VACC 2 YRS+ SUBQ/IM: CPT | Performed by: FAMILY MEDICINE

## 2022-03-22 NOTE — PROGRESS NOTES
Assessment & Plan       ICD-10-CM    1. LLQ abdominal pain  R10.32    2. Diverticular disease of colon  K57.30 amoxicillin-clavulanate (AUGMENTIN) 875-125 MG tablet   3. Essential hypertension with goal blood pressure less than 140/90  I10    4. Morbid obesity due to excess calories (H)  E66.01    5. Need for pneumococcal vaccination  Z23 SCREENING QUESTIONS FOR ADULT IMMUNIZATIONS     PPSV23, IM/SUBQ (2+ YRS) - [id number]     His symptoms and exam are suspicious for diverticulitis  We will go ahead and treat him empirically with Augmentin 875 mg twice a day for 10 days  I recommended a bland low fiber diet during this time  If symptoms do not respond to this treatment, then return for further evaluation including possible repeat abdominal/pelvic CT (if indicated)    Continue same baseline meds for now and continue to work on weight loss    We discussed routine vaccines and we will give him a Pneumovax 23 today  He was advised to go to his local pharmacy at some point in the near future for the second Shingrix vaccine    Return for a recheck if symptoms worsen or fail to improve.    Sai Maher MD  [facility] YAQUELIN García is a 71 year old who presents for the following health issues     Pain    History of Present Illness       Reason for visit:  Pain , large intestine.  Symptom onset:  1-3 days ago    He eats 2-3 servings of fruits and vegetables daily.He consumes 0 sweetened beverage(s) daily.He exercises with enough effort to increase his heart rate 30 to 60 minutes per day.  He exercises with enough effort to increase his heart rate 3 or less days per week. He is missing 1 dose(s) of medications per week.       Pain History:  When did you first notice your pain? - Less than 1 week   Have you seen anyone else for your pain? No  Where in your body do you have pain? LLQ pain    He started out with some generalized abdominal discomfort a couple of days ago which is now  localized into the left lower quadrant.  He had some diarrhea a couple of weeks ago for 4 days, but his bowel movements have been relatively normal here in the last week or so.  He does have known diverticulosis.  He had an abdominal CT scan done in July of last summer which showed this.  He has a history of heart disease and hypertension and other health conditions as listed below.  He is on baseline medications as listed.    He has struggled with obesity for years and notes that he has been trying to lose weight for a number of months and has been losing about a pound or so per week.  Review of his chart shows that he is due for a pneumococcal 23 vaccine and he is overdue for a Shingrix booster.  He obtains his primary care from Dr. Blunt.    Patient Active Problem List   Diagnosis     HL (hearing loss)     Erectile dysfunction     Keratoglobus, ou     Pseudophakia, sutured PCL, od; ACL, os - hx of IOL dislocation, ou     Posterior vitreous detachment, od     Epiretinal membrane, mild, od     Advanced directives, counseling/discussion     JOSE JUAN (obstructive sleep apnea)-severe (AHI 61)     BCC (basal cell carcinoma)     Cardiomyopathy (H)     RCT (rotator cuff tear)     Essential hypertension with goal blood pressure less than 140/90     Hyperlipidemia LDL goal <70     Overwieght BMI > 35     Megalocornea     History of iritis, os     Macular edema, od     Angina, class II (H)     Elevated LFTs     Fatty liver     Right carpal tunnel syndrome     CAD (coronary artery disease)     S/P carpal tunnel release     Right hand weakness     Contusion of right hand, initial encounter     Pillar pain of extremity     Current Outpatient Medications   Medication         aspirin 81 MG tablet     atorvastatin (LIPITOR) 10 MG tablet     diphenoxylate-atropine (LOMOTIL) 2.5-0.025 MG tablet     losartan (COZAAR) 50 MG tablet     metoprolol succinate ER (TOPROL-XL) 50 MG 24 hr tablet     No current facility-administered medications  for this visit.     Facility-Administered Medications Ordered in Other Visits   Medication     sodium chloride (PF) 0.9% PF flush 10 mL         Review of Systems   Constitutional, HEENT, cardiovascular, pulmonary, gi and gu systems are negative, except as otherwise noted.      Objective    BP (!) 148/72 (BP Location: Left arm, Patient Position: Sitting, Cuff Size: Adult Regular)   Pulse 67   Temp 98.9  F (37.2  C) (Oral)   SpO2 96%   There is no height or weight on file to calculate BMI.  Physical Exam   GENERAL: alert, no distress and obese  ABDOMEN: soft, mild to moderate tenderness in the left lower quadrant.  No guarding or rebound or mass.  No specific point tenderness elsewhere in the abdomen.    His abdominal/pelvic CT results from last summer were reviewed

## 2022-03-31 ENCOUNTER — OFFICE VISIT (OUTPATIENT)
Dept: NEUROLOGY | Facility: CLINIC | Age: 72
End: 2022-03-31
Attending: ORTHOPAEDIC SURGERY
Payer: MEDICARE

## 2022-03-31 ENCOUNTER — THERAPY VISIT (OUTPATIENT)
Dept: OCCUPATIONAL THERAPY | Facility: CLINIC | Age: 72
End: 2022-03-31
Payer: MEDICARE

## 2022-03-31 DIAGNOSIS — Z98.890 S/P CARPAL TUNNEL RELEASE: ICD-10-CM

## 2022-03-31 DIAGNOSIS — R20.0 NUMBNESS OF RIGHT HAND: Primary | ICD-10-CM

## 2022-03-31 DIAGNOSIS — S60.221A CONTUSION OF RIGHT HAND, INITIAL ENCOUNTER: ICD-10-CM

## 2022-03-31 DIAGNOSIS — R29.898 RIGHT HAND WEAKNESS: ICD-10-CM

## 2022-03-31 PROCEDURE — 97110 THERAPEUTIC EXERCISES: CPT | Mod: GO | Performed by: OCCUPATIONAL THERAPIST

## 2022-03-31 PROCEDURE — 95885 MUSC TST DONE W/NERV TST LIM: CPT | Performed by: INTERNAL MEDICINE

## 2022-03-31 PROCEDURE — 95909 NRV CNDJ TST 5-6 STUDIES: CPT | Performed by: INTERNAL MEDICINE

## 2022-03-31 PROCEDURE — 97140 MANUAL THERAPY 1/> REGIONS: CPT | Mod: GO | Performed by: OCCUPATIONAL THERAPIST

## 2022-03-31 NOTE — PROGRESS NOTES
"Hand Therapy Progress Note    Current Date:  3/31/2022    Reporting period is 2/28/2022 to 3/31/2022    Diagnosis:   Right CTS   DOS:  8/13/2021  Procedure:  CTR  Therapy referral: 2/9/2022    Subjective:   Subjective changes noted by patient:  Pt had EMG just prior to therapy today.  The massage helps loosen things up, there is less pain but the hand still quite numb.  Functional changes noted by patient:  Improvement in Household Chores  Patient has noted adverse reaction to:  None    Objective:  Pain Level (Scale 0-10)   2/28/2022 3/31/2022   At Rest 2 0   With Use 3-4 \"It just feels numb\"     Pain Description  Date 2/28/2022 3/31/2022   Location Volar wrist Volar wrist   Pain Quality Stiffness, tightness tightness   Frequency constant   intermittent   Pain is worst  daytime daytime   Exacerbated by  weight bearing Wrist extension   Relieved by rest rest   Progression unchanged Somewhat better     Edema  Mild    Scar   Sensitivity: Mild Quality:  Mildly adherent     Sensation   Decreased Median and Ulnar Nerve distribution per pt report still no change since surgery    ROM  Pain Report: - none  + mild    ++ moderate    +++ severe   Wrist 2/28/2022 2/28/2022 3/31/2022   AROM (PROM) Left Right Right   Extension 55 45 55   Flexion 70 70 70   RD 25 20 20   UD 40 40 40     Strength   (Measured in pounds)  Pain Report: - none  + mild    ++ moderate    +++ severe    2/28/2022 2/28/2022 3/31/2022   Trials Left Right Right   1  2  3 67  64  65 35+  37+  38+ 47+  37+  38+   Average 65 37 41     Lat Pinch 2/28/2022 2/28/2022 3/31/2022   Trials Left Right Right   1  2  3 14  13  13 18+  17+  16+ 18-  17-  16-   Average 13 17 17     3 Pt Pinch 2/28/2022 2/28/2022 3/31/2022   Trials Left Right Right   1  2  3 13  14  14 14+  15+  15+ 10+  8+  9+   Average 14 15 9     Please refer to the daily flowsheet for treatment provided today.     Assessment:  Response to therapy has been improvement to:  ROM of Wrist:  Ext "   Flexibility:  tendon gliding is improved and scar is more mobile  Strength:     Response to therapy has been lack of progress in:  Paresthesias:  Median nerve - no change in numbness    Overall Assessment:  Patient is progressing well and is becoming more independent in home exercise program  STG/LTG:  STGoals have been reviewed and progress or achievement has occurred;  see goal sheet for details and updates.  I have re-evaluated this patient and find that the nature, scope, duration and intensity of the therapy is appropriate for the medical condition of the patient.    Plan:  Frequency/Duration:  Recommend continuing with the current treatment plan. 2 X a month, once daily  for 1 month    Recommendations for Continued Therapy  Treatment Plan:    Modalities:    US   Therapeutic Exercise:    AROM, PROM, Tendon Gliding and Isotonics  Neuromuscular re-ed:   Nerve Gliding and Kinesiotaping  Manual Techniques:   Scar mobilization  Self Care:    Self Care Tasks    Home Exercise Program:  Warmth for stiffness  Scar massage, 3 vector  PROM wrist ext  Tendon gliding with 3 fists and FDS   strengthening with foam wedge  Distal median nerve gliding    Next Visit:  Pt to f/u with MD regarding EMG results  Hold chart open for one month, if no contact is received from patient, discharge will occur at that time with this note serving as the discharge summary.

## 2022-03-31 NOTE — PROGRESS NOTES
"    [facility]   EMG Laboratory      Nerve Conduction & EMG Report          Patient:       Ricky Brown  Patient ID:    [id number]  Gender:        Male  YOB: 1950  Age:           71 Years 4 Months      History and Examination:  Patient is a 72 y/o male with history of carpal tunnel syndrome s/p release. He presents today for evaluation of continued hand tightness, numbness, and weakness despite release surgery. Examination shows 5/5 strength in bilateral upper extremities. He reports an abnormal \"sponge\" sensation to light touch on the entire right hand when compared to the left.     Techniques:  Motor conduction studies were done with surface recording electrodes. Sensory conduction studies were performed with surface electrodes, unless indicated otherwise by (n), designating the use of subdermal recording electrodes. Temperature was monitored and recorded throughout the study. Upper extremities were maintained at a temperature of 32 degrees Centigrade or higher.  Lower extremities were maintained at a temperature of 31 degrees Centigrade or higher. EMG was done with a concentric needle electrode.     Results:  Sensory NCS  - Right median, ulnar sensory nerve action potentials (SNAPs) showed normal amplitude. Right median SNAP showed decreased conduction velocity. There is minimal decrease in right ulnar SNAP conduction velocity, which may be temperature effect.   - Right radial SNAP was normal  - Right median-ulnar palmar comparison study showed significant difference in peak latency    Motor NCS  - Right median compound motor action potential (CMAP) shows prolonged distal latency and normal amplitude and conduction velocity  - Right ulnar-ADM and ulnar-FDI CMAPs show normal distal latency and amplitude. There is slowing of conduction velocity across the elbow segment, more pronounce in the right ulnar-FDI study.   - Right median-lumbrical/ulnar-interosseous comparison study showed " significant difference in distal latency    Needle EMG of selected proximal and distal limb muscles of the right upper extremity was performed as tabulated below. No abnormal spontaneous activity was observed in the sampled muscles. Motor unit potential morphology and recruitment patterns were normal.       Interpretation:  This is an abnormal study, demonstrating electrophysiologic evidence of the following:  - Moderate right sided median mononeuropathy at the wrist (carpal tunnel syndrome)  - Mild right sided ulnar mononeuropathy at the elbow    Comment: In comparison to EMG from June 2021, there is no significant change in the severity of median and ulnar mononeuropathies of the right upper extremity.  __________________________________________________________________________________    Ha Chapin MD   of Neurology  [facility]            Sensory NCS      Nerve / Sites Rec. Site Onset Peak Ref. NP Amp Ref. PP Amp Dist Lorenzo Ref. Temp     ms ms ms  V  V  V cm m/s m/s  C   R MEDIAN - Dig II Anti      Wrist Dig II 3.75 4.95  13.1 10.0 17.6 14 37.3 48.0 32.9   R ULNAR - Dig V Anti      Wrist Dig V 2.66 3.44  10.1 8.0 15.6 12.5 47.1 48.0 26.6   R RADIAL - Snuff      Forearm Snuff 1.72 2.34  26.1 15.0 13.1 12.5 72.7 48.0 32.2   R MEDIAN - Ulnar - Palmar      Median Wrist 2.50 3.13 2.40 19.9  30.9 8 32.0  33.3      Ulnar Wrist 1.61 2.08 2.40 11.7  11.7 8 49.5  33.4       Motor NCS      Nerve / Sites Rec. Site Lat Ref. Amp Ref. Rel Amp Dist Lorenzo Ref. Dur. Area Temp.     ms ms mV mV % cm m/s m/s ms %  C   R MEDIAN - APB      Wrist APB 5.05 4.40 6.7 5.0 100 8   6.20 100 33.4      Elbow APB 9.48  6.3  93.3 24.1 54.4 48.0 6.51 98.1 33.3   R ULNAR - ADM      Wrist ADM 2.60 3.50 11.2 5.0 100 8   4.79 100 32.5      B.Elbow ADM 6.41  11.4  102 20 52.6 48.0 5.36 106 32.7      A.Elbow ADM 8.91  10.6  94.5 11.7 46.8 48.0 5.83 105 32.7   R MEDIAN - II Lumb      Median II Lumb 4.58  2.2  100 10   6.51  100 33.4      Ulnar Palm Int 3.07  4.9  221 10   5.83 133 33.4   R ULNAR - FDI      Wrist FDI 3.85  6.3  100    5.10 100 26.5      B.Elbow FDI 7.24  5.9  93.8 17.5 51.7  5.05 91.6 26.4      A.Elbow FDI 9.84  4.8  76.8 10 38.4  5.52 75.1 26.5       EMG Summary Table     Spontaneous MUAP Recruitment    IA Fib PSW Fasc H.F. Amp Dur. PPP Pattern   R. DELTOID N None None None None N N N N   R. TRICEPS N None None None None N N N N   R. EXT DIG COMM N None None None None N N N N   R. FIRST D INTEROSS N None None None None N N N N

## 2022-03-31 NOTE — LETTER
"    3/31/2022         RE: Ricky Brown  [address]  [facility] [address]        Dear Colleague,    Thank you for referring your patient, Ricky Brown, to the [facility] [place]. Please see a copy of my visit note below.        [facility]   EMG Laboratory      Nerve Conduction & EMG Report          Patient:       Ricky Brown  Patient ID:    [id number]  Gender:        Male  YOB: 1950  Age:           71 Years 4 Months      History and Examination:  Patient is a 72 y/o male with history of carpal tunnel syndrome s/p release. He presents today for evaluation of continued hand tightness, numbness, and weakness despite release surgery. Examination shows 5/5 strength in bilateral upper extremities. He reports an abnormal \"sponge\" sensation to light touch on the entire right hand when compared to the left.     Techniques:  Motor conduction studies were done with surface recording electrodes. Sensory conduction studies were performed with surface electrodes, unless indicated otherwise by (n), designating the use of subdermal recording electrodes. Temperature was monitored and recorded throughout the study. Upper extremities were maintained at a temperature of 32 degrees Centigrade or higher.  Lower extremities were maintained at a temperature of 31 degrees Centigrade or higher. EMG was done with a concentric needle electrode.     Results:  Sensory NCS  - Right median, ulnar sensory nerve action potentials (SNAPs) showed normal amplitude. Right median SNAP showed decreased conduction velocity. There is minimal decrease in right ulnar SNAP conduction velocity, which may be temperature effect.   - Right radial SNAP was normal  - Right median-ulnar palmar comparison study showed significant difference in peak latency    Motor NCS  - Right median compound motor action potential (CMAP) shows prolonged distal latency and normal amplitude and " conduction velocity  - Right ulnar-ADM and ulnar-FDI CMAPs show normal distal latency and amplitude. There is slowing of conduction velocity across the elbow segment, more pronounce in the right ulnar-FDI study.   - Right median-lumbrical/ulnar-interosseous comparison study showed significant difference in distal latency    Needle EMG of selected proximal and distal limb muscles of the right upper extremity was performed as tabulated below. No abnormal spontaneous activity was observed in the sampled muscles. Motor unit potential morphology and recruitment patterns were normal.       Interpretation:  This is an abnormal study, demonstrating electrophysiologic evidence of the following:  - Moderate right sided median mononeuropathy at the wrist (carpal tunnel syndrome)  - Mild right sided ulnar mononeuropathy at the elbow    Comment: In comparison to EMG from June 2021, there is no significant change in the severity of median and ulnar mononeuropathies of the right upper extremity.  __________________________________________________________________________________    Ha Chapin MD   of Neurology  [facility]            Sensory NCS      Nerve / Sites Rec. Site Onset Peak Ref. NP Amp Ref. PP Amp Dist Lorenzo Ref. Temp     ms ms ms  V  V  V cm m/s m/s  C   R MEDIAN - Dig II Anti      Wrist Dig II 3.75 4.95  13.1 10.0 17.6 14 37.3 48.0 32.9   R ULNAR - Dig V Anti      Wrist Dig V 2.66 3.44  10.1 8.0 15.6 12.5 47.1 48.0 26.6   R RADIAL - Snuff      Forearm Snuff 1.72 2.34  26.1 15.0 13.1 12.5 72.7 48.0 32.2   R MEDIAN - Ulnar - Palmar      Median Wrist 2.50 3.13 2.40 19.9  30.9 8 32.0  33.3      Ulnar Wrist 1.61 2.08 2.40 11.7  11.7 8 49.5  33.4       Motor NCS      Nerve / Sites Rec. Site Lat Ref. Amp Ref. Rel Amp Dist Lorenzo Ref. Dur. Area Temp.     ms ms mV mV % cm m/s m/s ms %  C   R MEDIAN - APB      Wrist APB 5.05 4.40 6.7 5.0 100 8   6.20 100 33.4      Elbow APB 9.48  6.3  93.3 24.1 54.4  48.0 6.51 98.1 33.3   R ULNAR - ADM      Wrist ADM 2.60 3.50 11.2 5.0 100 8   4.79 100 32.5      B.Elbow ADM 6.41  11.4  102 20 52.6 48.0 5.36 106 32.7      A.Elbow ADM 8.91  10.6  94.5 11.7 46.8 48.0 5.83 105 32.7   R MEDIAN - II Lumb      Median II Lumb 4.58  2.2  100 10   6.51 100 33.4      Ulnar Palm Int 3.07  4.9  221 10   5.83 133 33.4   R ULNAR - FDI      Wrist FDI 3.85  6.3  100    5.10 100 26.5      B.Elbow FDI 7.24  5.9  93.8 17.5 51.7  5.05 91.6 26.4      A.Elbow FDI 9.84  4.8  76.8 10 38.4  5.52 75.1 26.5       EMG Summary Table     Spontaneous MUAP Recruitment    IA Fib PSW Fasc H.F. Amp Dur. PPP Pattern   R. DELTOID N None None None None N N N N   R. TRICEPS N None None None None N N N N   R. EXT DIG COMM N None None None None N N N N   R. FIRST D INTEROSS N None None None None N N N N                              Again, thank you for allowing me to participate in the care of your patient.        Sincerely,        Ha Chapin MD

## 2022-04-04 ENCOUNTER — E-VISIT (OUTPATIENT)
Dept: FAMILY MEDICINE | Facility: CLINIC | Age: 72
End: 2022-04-04
Payer: MEDICARE

## 2022-04-04 DIAGNOSIS — K57.32 DIVERTICULITIS OF COLON: Primary | ICD-10-CM

## 2022-04-04 PROCEDURE — 99422 OL DIG E/M SVC 11-20 MIN: CPT | Performed by: INTERNAL MEDICINE

## 2022-04-05 RX ORDER — CIPROFLOXACIN 500 MG/1
500 TABLET, FILM COATED ORAL 2 TIMES DAILY
Qty: 20 TABLET | Refills: 0 | Status: SHIPPED | OUTPATIENT
Start: 2022-04-05 | End: 2022-06-20

## 2022-04-05 RX ORDER — METRONIDAZOLE 500 MG/1
500 TABLET ORAL 3 TIMES DAILY
Qty: 30 TABLET | Refills: 0 | Status: SHIPPED | OUTPATIENT
Start: 2022-04-05 | End: 2022-06-20

## 2022-04-05 NOTE — PATIENT INSTRUCTIONS
Thank you for choosing us for your care. I have placed an order for a prescription so that you can start treatment. View your full visit summary for details by clicking on the link below. Your pharmacist will able to address any questions you may have about the medication.     If you're not feeling better within 5-7 days, please schedule an appointment.  You can schedule an appointment right here in [facility], or call [phone]  If the visit is for the same symptoms as your eVisit, we'll refund the cost of your eVisit if seen within seven days.    Thank you for choosing us for your care. I think an in-clinic visit would be best next steps based on your symptoms. Please schedule a clinic appointment; you won t be charged for this eVisit.      You can schedule an appointment right here in [facility], or call [phone]

## 2022-04-07 ENCOUNTER — ANCILLARY PROCEDURE (OUTPATIENT)
Dept: CT IMAGING | Facility: CLINIC | Age: 72
End: 2022-04-07
Attending: INTERNAL MEDICINE
Payer: MEDICARE

## 2022-04-07 DIAGNOSIS — K57.32 DIVERTICULITIS OF COLON: ICD-10-CM

## 2022-04-07 PROCEDURE — G1004 CDSM NDSC: HCPCS | Mod: TC | Performed by: RADIOLOGY

## 2022-04-07 PROCEDURE — 74177 CT ABD & PELVIS W/CONTRAST: CPT | Mod: TC | Performed by: RADIOLOGY

## 2022-04-07 RX ORDER — IOPAMIDOL 755 MG/ML
100 INJECTION, SOLUTION INTRAVASCULAR ONCE
Status: COMPLETED | OUTPATIENT
Start: 2022-04-07 | End: 2022-04-07

## 2022-04-07 RX ADMIN — IOPAMIDOL 100 ML: 755 INJECTION, SOLUTION INTRAVASCULAR at 10:01

## 2022-04-08 ENCOUNTER — OFFICE VISIT (OUTPATIENT)
Dept: INTERNAL MEDICINE | Facility: CLINIC | Age: 72
End: 2022-04-08
Payer: MEDICARE

## 2022-04-08 VITALS
HEART RATE: 72 BPM | HEIGHT: 67 IN | BODY MASS INDEX: 33.99 KG/M2 | TEMPERATURE: 98.6 F | RESPIRATION RATE: 14 BRPM | SYSTOLIC BLOOD PRESSURE: 126 MMHG | OXYGEN SATURATION: 98 % | DIASTOLIC BLOOD PRESSURE: 84 MMHG

## 2022-04-08 DIAGNOSIS — K57.32 DIVERTICULITIS OF COLON: Primary | ICD-10-CM

## 2022-04-08 DIAGNOSIS — I20.9 ANGINA, CLASS II (H): ICD-10-CM

## 2022-04-08 DIAGNOSIS — I42.9 CARDIOMYOPATHY, IDIOPATHIC (H): ICD-10-CM

## 2022-04-08 PROCEDURE — 99214 OFFICE O/P EST MOD 30 MIN: CPT | Performed by: INTERNAL MEDICINE

## 2022-04-08 NOTE — PROGRESS NOTES
Assessment & Plan     Diverticulitis of colon  Today is a follow up office visit regarding this patient who had presumed diverticulitis but this was in point of fact, the very first clinical manifestation of this problem. Prior to this recent minor flare-up of diverticulitis, he only had diverticulosis seen on prior abdominal pelvic CT scan . For complete details please see recent E-visit with this patient. He was empirically added to his treatment with dual antibiotic therapy of ciprofloxicin and Flagyl (Metronidazole). This has been helping. The abdominal pelvic CT scan is cut and pasted into this note and demonstrates definitely positive for mild uncomplicated diverticulitis . The bulk of this appointment was spent in discussion about the nature of this diagnosis and the potential severity of this, recommended to following high fiber diet and try to avoid constipation as much as possible. We discussed what to be on the watch for  . All questions answered, see patient after-visit summary       (I42.8) Cardiomyopathy, idiopathic (H)  Comment: follows with cardiologist   Plan: problem is stable and ongoing monitoring      (I20.9) Angina, class II (H)  Comment: follows with cardiologist   Plan: problem is stable and ongoing monitoring      Review of the result(s) of each unique test - abdominal-pelvic CT  Prescription drug management  25 minutes spent on the date of the encounter doing chart review, history and exam, documentation and further activities per the note      Return in about 4 months (around 8/8/2022). for annual complete physical exam      Holland Blunt MD  [facility] YAQUELIN García is a 71 year old who presents for the following health issues   Encounter Diagnoses   Name Primary?     Diverticulitis of colon Yes     Cardiomyopathy, idiopathic (H)      Angina, class II (H)      accompanied by his spouse.    HPI   Chief Complaint   Patient presents with     Results     CT  results      Today is a follow up from the earlier E-visit we had 4-4-22    See abdominal pelvic CT scan  , positive for mild diverticulitis     From the  E-visit :    1. I recommend adding ciprofloxicin and Flagyl (Metronidazole) as the antibiotics to treat diverticulitis, these are the more standard antibiotic treatments for diverticulitis . I am going to send these prescriptions to your pharmacy   2. I have ordered a redo abdominal pelvic CT scan   3. You need to be scheduled for a face to face encounter with me in the office for follow up / recheck next week. I am going to talk with the Team [facility] health unit coordinator and arrange a time to see you next week for a follow up appointment. This is what I am recommending.      CT ABDOMEN AND PELVIS WITH CONTRAST 4/7/2022 10:13 AM     CLINICAL HISTORY: Abdominal pain. Diverticulitis suspected.  Diverticulitis of colon.     TECHNIQUE: CT scan of the abdomen and pelvis was performed following  injection of IV contrast. Multiplanar reformats were obtained. Dose  reduction techniques were used.  CONTRAST: 100 mL Isovue-370     COMPARISON:  7/30/2021     FINDINGS:   LOWER CHEST: No infiltrates or effusions.     HEPATOBILIARY: No significant mass or bile duct dilatation. No  calcified gallstones. Stable liver cyst.     PANCREAS: No significant mass, duct dilatation, or inflammatory  change.     SPLEEN: Normal size.     ADRENAL GLANDS: Stable 1.9 cm adrenal nodule on the right. Left  adrenal gland unremarkable.     KIDNEYS/BLADDER: No significant mass, stones, or hydronephrosis. There  are simple or benign cysts. No follow up is needed.     BOWEL: Inflammatory change around a diverticula of the sigmoid colon  compatible with acute uncomplicated diverticulitis. No free air or  abscess.     PELVIC ORGANS: No pelvic masses.     ADDITIONAL FINDINGS: No ascites. Small fat-containing right inguinal  hernia.     MUSCULOSKELETAL: No frankly destructive bony lesions.       "                                                                IMPRESSION:   1.  Mild acute uncomplicated sigmoid diverticulitis.     CATARINA MORROW MD         Review of Systems   Constitutional, HEENT, cardiovascular, pulmonary, gi and gu systems are negative, except as otherwise noted.      Objective    BP (!) 144/87   Pulse 72   Temp 98.6  F (37  C) (Oral)   Resp 14   Ht 1.702 m (5' 7\")   SpO2 98%   BMI 33.99 kg/m    Body mass index is 33.99 kg/m .  Physical Exam   GENERAL: healthy, alert and no distress  RESP: lungs clear to auscultation - no rales, rhonchi or wheezes  CV: regular rate and rhythm, normal S1 S2, no S3 or S4, no murmur, click or rub, no peripheral edema and peripheral pulses strong  ABDOMEN: soft, nontender, no hepatosplenomegaly, no masses and bowel sounds normal he still has mildly positive peritoneal signs with some rebound tenderness especially in left lower quadrant  but he described his symptoms as improving  MS: no gross musculoskeletal defects noted, no edema    No orders of the defined types were placed in this encounter.          "

## 2022-04-08 NOTE — PATIENT INSTRUCTIONS
SOURCES OF HIGH FIBER   1. Beans. Think three-bean salad, bean burritos, chili, soup.  2. Whole grains. That means whole-wheat bread, pasta, etc.  3. Brown rice. White rice doesn't offer much fiber.  4. Popcorn. It's a great source of fiber.  5. Nuts. Almonds, pecans, and walnuts have more fiber than other nuts.  6. Baked potato with skin. It's the skin that's important here.  7. Berries. All those seeds, plus the skin, give great fiber to any berry.  8. Bran cereal. Actually, any cereal that has 5 grams of fiber or more in a serving counts as high fiber.  9. Oatmeal. Whether its microwaved or stove-cooked, oatmeal is good fiber.  10. Vegetables. The crunchier, the better.

## 2022-04-08 NOTE — PROGRESS NOTES
Today is a follow up from the earlier E-visit we had 4-4-22    See abdominal pelvic CT scan  , positive for mild diverticulitis     From the  E-visit :    1. I recommend adding ciprofloxicin and Flagyl (Metronidazole) as the antibiotics to treat diverticulitis, these are the more standard antibiotic treatments for diverticulitis . I am going to send these prescriptions to your pharmacy   2. I have ordered a redo abdominal pelvic CT scan   3. You need to be scheduled for a face to face encounter with me in the office for follow up / recheck next week. I am going to talk with the Team [facility] health unit coordinator and arrange a time to see you next week for a follow up appointment. This is what I am recommending.      CT ABDOMEN AND PELVIS WITH CONTRAST 4/7/2022 10:13 AM     CLINICAL HISTORY: Abdominal pain. Diverticulitis suspected.  Diverticulitis of colon.     TECHNIQUE: CT scan of the abdomen and pelvis was performed following  injection of IV contrast. Multiplanar reformats were obtained. Dose  reduction techniques were used.  CONTRAST: 100 mL Isovue-370     COMPARISON:  7/30/2021     FINDINGS:   LOWER CHEST: No infiltrates or effusions.     HEPATOBILIARY: No significant mass or bile duct dilatation. No  calcified gallstones. Stable liver cyst.     PANCREAS: No significant mass, duct dilatation, or inflammatory  change.     SPLEEN: Normal size.     ADRENAL GLANDS: Stable 1.9 cm adrenal nodule on the right. Left  adrenal gland unremarkable.     KIDNEYS/BLADDER: No significant mass, stones, or hydronephrosis. There  are simple or benign cysts. No follow up is needed.     BOWEL: Inflammatory change around a diverticula of the sigmoid colon  compatible with acute uncomplicated diverticulitis. No free air or  abscess.     PELVIC ORGANS: No pelvic masses.     ADDITIONAL FINDINGS: No ascites. Small fat-containing right inguinal  hernia.     MUSCULOSKELETAL: No frankly destructive bony lesions.                                                                       IMPRESSION:   1.  Mild acute uncomplicated sigmoid diverticulitis.     CATARINA MORROW MD

## 2022-04-20 NOTE — DISCHARGE INSTRUCTIONS
* LACERATION (All Closures)  A laceration is a cut through the skin. This will usually require stitches (sutures) or staples if it is deep. Minor cuts may be treated with a tape closure ( Steri-Strips ) or Dermabond skin glue.       HOME CARE:  PAIN MEDICINE: You may use acetaminophen (Tylenol) 650-1000 mg every 6 hours or ibuprofen (Motrin, Advil) 600 mg every 6-8 hours with food to control pain, if you are able to take these medicines. [NOTE: If you have chronic liver or kidney disease or ever had a stomach ulcer or GI bleeding, talk with your doctor before using these medicines.]  EXTREMITY, FACE or TRUNK WOUNDS:    Keep the wound clean and dry. If a bandage was applied and it becomes wet or dirty, replace it. Otherwise, leave it in place for the first 24 hours.    If stitches or staples were used, clean the wound daily. Protect the wound from sunlight and tanning lamps.    After removing the bandage, wash the area with soap and water. Use a wet cotton swab (Q tip) to loosen and remove any blood or crust that forms.    After cleaning, apply a thin layer of Polysporin or Bacitracin ointment. This will keep the wound clean and make it easier to remove the stitches or staples. Reapply a fresh bandage.    You may remove the bandage to shower as usual after the first 24 hours, but do not soak the area in water (no swimming) until the stitches or staples are removed.    If Steri-Strips were used, keep the area clean and dry. If it becomes wet, blot it dry with a towel. It is okay to take a brief shower, but avoid scrubbing the area.    If Dermabond skin adhesive was used, do not scratch, rub or pick at the adhesive film. Do not place tape directly over the film. Do not apply liquid, ointment or creams to the wound while the film is in place. Do not clean the wound with peroxide and do not apply ointments. Avoid activities that cause heavy sweating until the film has fallen off. Protect the wound from prolonged  exposure to sunlight or tanning lamps. You may shower as usual but do not soak the wound in water (no baths or swimming). The film will fall off by itself in 5-10 days.  SCALP WOUNDS: During the first two days, you may carefully rinse your hair in the shower to remove blood, glass or dirt particles. After two days, you may shower and shampoo your hair normally. Do not soak your scalp in the tub or go swimming until the stitches or staples have been removed.  MOUTH WOUNDS: Eat soft foods to reduce pain. If the cut is inside of your mouth, clean by rinsing after each meal and at bedtime with a mixture of equal parts water and Hydrogen Peroxide (do not swallow!). Or, you can use a cotton swab to directly apply Hydrogen Peroxide onto the cut.  After the wound is done healing, use sunscreen over the area whenever exposed for the next 6 minths to avoid a darker scar.     FOLLOW UP: Most skin wounds heal within ten days. Mouth and facial wounds heal within five days. However, even with proper treatment, a wound infection may sometimes occur. Therefore, you should check the wound daily for signs of infection listed below.  Stitches should be removed from the face within five days; stitches and staples should be removed from other parts of the body within 7-10 days. Unless you are told to come back to the emergency room, you may have your doctor or urgent care remove the stitches. If dissolving stitches were used in the mouth, these will fall out or dissolve without the need for removal. If tape closures ( Steri-Strips ) were used, remove them yourself if they have not fallen off after 7 days. If Dermabond skin glue was used, the film will fall off by itself in 5-10 days.      GET PROMPT MEDICAL ATTENTION  if any of the following occur:    Increasing pain in the wound    Redness, swelling or pus coming from the wound    Fever over 101 F (38.3 C) oral    If stitches or staples come apart or fall out or if Steri-Strips fall  off before seven days    If the wound edges re-open    Bleeding not controlled by direct pressure    5426-9482 [employer]ge [employer], [address] [facility], [place] [address]. All rights reserved. This information is not intended as a substitute for professional medical care. Always follow your healthcare professional's instructions.      Old Laceration: Not Sutured  A laceration is a cut through the skin. This will usually require stitches if it is deep. However, if a laceration remains open for too long, the risk of infection increases. In your case, too much time has passed before coming for treatment. The danger of infection from suturing at this time is too high. That is why your wound was not sutured.  If the wound is spread open, it will heal by filling in from the bottom and sides. A wound that is not sutured may take 1 to 4 weeks to heal, depending on the size of the opening. A visible scar will probably occur. You can discuss revision of the scar with your healthcare provider at a later time.  Home care  The following guidelines will help you care for your laceration at home:    Keep the wound clean and dry. If a bandage was applied and it becomes wet or dirty, replace it. Otherwise, leave it in place for the first 24 hours, then change it once a day or as directed.    Clean the wound daily:    After removing any bandage, wash the area with soap and water. Use a wet cotton swab to loosen and remove any blood or crust that forms.    Talk with your doctor before applying any antibiotic ointment to the wound. Reapply a fresh bandage.    You may remove the bandage to shower as usual after the first 24 hours, but do not soak the area in water (no tub baths or swimming) for the next five days. Avoid activities that may reinjure your wound.    The healthcare provider may prescribe an antibiotic cream or ointment to prevent infection. Do not stop using this medication until you have finished the prescribed course or  the provider tells you to stop.    The healthcare provider may also prescribe medications for pain. Follow instructions for taking these medications.    Check the wound daily for signs of infection listed below.  Follow-up care  Follow up with your healthcare provider as advised.  When to seek medical advice  Call your healthcare provider right away if any of these occur:    Wound bleeding not controlled by direct pressure    Signs of infection, including increasing pain in the wound, increasing wound redness or swelling, or pus or bad odor coming from the wound    Fever of 100.4 F (38. C) or higher or as directed by your healthcare provider    Wound edges re-open    Wound changes colors    Numbness around the wound     Decreased movement around the injured area  Date Last Reviewed: 6/10/2015    [id number] [employer]. [address] [place] [address], [place] [address]. All rights reserved. This information is not intended as a substitute for professional medical care. Always follow your healthcare professional's instructions.         No

## 2022-05-07 ENCOUNTER — HEALTH MAINTENANCE LETTER (OUTPATIENT)
Age: 72
End: 2022-05-07

## 2022-05-10 DIAGNOSIS — S60.221A CONTUSION OF RIGHT HAND, INITIAL ENCOUNTER: ICD-10-CM

## 2022-05-10 DIAGNOSIS — Z98.890 S/P CARPAL TUNNEL RELEASE: Primary | ICD-10-CM

## 2022-05-10 DIAGNOSIS — M79.609 PILLAR PAIN OF EXTREMITY: ICD-10-CM

## 2022-05-19 ENCOUNTER — ANCILLARY PROCEDURE (OUTPATIENT)
Dept: MRI IMAGING | Facility: CLINIC | Age: 72
End: 2022-05-19
Attending: ORTHOPAEDIC SURGERY
Payer: COMMERCIAL

## 2022-05-19 DIAGNOSIS — M79.609 PILLAR PAIN OF EXTREMITY: ICD-10-CM

## 2022-05-19 DIAGNOSIS — S60.221A CONTUSION OF RIGHT HAND, INITIAL ENCOUNTER: ICD-10-CM

## 2022-05-19 DIAGNOSIS — Z98.890 S/P CARPAL TUNNEL RELEASE: ICD-10-CM

## 2022-05-19 PROCEDURE — 73221 MRI JOINT UPR EXTREM W/O DYE: CPT | Mod: RT | Performed by: RADIOLOGY

## 2022-05-24 DIAGNOSIS — M79.609 PILLAR PAIN OF EXTREMITY: ICD-10-CM

## 2022-05-24 DIAGNOSIS — Z98.890 S/P CARPAL TUNNEL RELEASE: Primary | ICD-10-CM

## 2022-06-13 ENCOUNTER — OFFICE VISIT (OUTPATIENT)
Dept: INTERNAL MEDICINE | Facility: CLINIC | Age: 72
End: 2022-06-13
Payer: COMMERCIAL

## 2022-06-13 VITALS
TEMPERATURE: 97.7 F | BODY MASS INDEX: 31.01 KG/M2 | DIASTOLIC BLOOD PRESSURE: 74 MMHG | RESPIRATION RATE: 12 BRPM | SYSTOLIC BLOOD PRESSURE: 162 MMHG | HEART RATE: 67 BPM | OXYGEN SATURATION: 97 % | WEIGHT: 198 LBS

## 2022-06-13 DIAGNOSIS — G47.33 OSA (OBSTRUCTIVE SLEEP APNEA): Primary | ICD-10-CM

## 2022-06-13 DIAGNOSIS — R47.89 WORD FINDING PROBLEM: ICD-10-CM

## 2022-06-13 DIAGNOSIS — R42 DIZZINESS: ICD-10-CM

## 2022-06-13 DIAGNOSIS — Z91.81 PERSONAL HISTORY OF FALL: ICD-10-CM

## 2022-06-13 DIAGNOSIS — E78.5 HYPERLIPIDEMIA LDL GOAL <70: ICD-10-CM

## 2022-06-13 DIAGNOSIS — R41.89 BRAIN FOG: ICD-10-CM

## 2022-06-13 DIAGNOSIS — R41.3 MEMORY LOSS: ICD-10-CM

## 2022-06-13 LAB
BASOPHILS # BLD AUTO: 0 10E3/UL (ref 0–0.2)
BASOPHILS NFR BLD AUTO: 0 %
EOSINOPHIL # BLD AUTO: 0.1 10E3/UL (ref 0–0.7)
EOSINOPHIL NFR BLD AUTO: 1 %
ERYTHROCYTE [DISTWIDTH] IN BLOOD BY AUTOMATED COUNT: 15.3 % (ref 10–15)
HCT VFR BLD AUTO: 42.3 % (ref 40–53)
HGB BLD-MCNC: 13.8 G/DL (ref 13.3–17.7)
LYMPHOCYTES # BLD AUTO: 1.6 10E3/UL (ref 0.8–5.3)
LYMPHOCYTES NFR BLD AUTO: 29 %
MCH RBC QN AUTO: 29.2 PG (ref 26.5–33)
MCHC RBC AUTO-ENTMCNC: 32.6 G/DL (ref 31.5–36.5)
MCV RBC AUTO: 90 FL (ref 78–100)
MONOCYTES # BLD AUTO: 0.7 10E3/UL (ref 0–1.3)
MONOCYTES NFR BLD AUTO: 12 %
NEUTROPHILS # BLD AUTO: 3.2 10E3/UL (ref 1.6–8.3)
NEUTROPHILS NFR BLD AUTO: 58 %
PLATELET # BLD AUTO: 251 10E3/UL (ref 150–450)
RBC # BLD AUTO: 4.72 10E6/UL (ref 4.4–5.9)
WBC # BLD AUTO: 5.5 10E3/UL (ref 4–11)

## 2022-06-13 PROCEDURE — 80053 COMPREHEN METABOLIC PANEL: CPT | Performed by: INTERNAL MEDICINE

## 2022-06-13 PROCEDURE — 82607 VITAMIN B-12: CPT | Performed by: INTERNAL MEDICINE

## 2022-06-13 PROCEDURE — 36415 COLL VENOUS BLD VENIPUNCTURE: CPT | Performed by: INTERNAL MEDICINE

## 2022-06-13 PROCEDURE — 85025 COMPLETE CBC W/AUTO DIFF WBC: CPT | Performed by: INTERNAL MEDICINE

## 2022-06-13 PROCEDURE — 80061 LIPID PANEL: CPT | Performed by: INTERNAL MEDICINE

## 2022-06-13 PROCEDURE — 99215 OFFICE O/P EST HI 40 MIN: CPT | Performed by: INTERNAL MEDICINE

## 2022-06-13 PROCEDURE — 84443 ASSAY THYROID STIM HORMONE: CPT | Performed by: INTERNAL MEDICINE

## 2022-06-13 ASSESSMENT — ENCOUNTER SYMPTOMS: FATIGUE: 1

## 2022-06-13 NOTE — LETTER
June 16, 2022      Ricky Brown  [address]  [facility] [address]        Dear ,    We are writing to inform you of your test results.    Every single one of these tests is within acceptable limits. The Vitamin B 12 levels are technically normal although at the lower limit of the normal range   and so I am recommending actually that you start treatment with a vitamin B 12 supplement , 1 milligram [ 1000 micrograms ] every single day. We can recheck this level in 3 to 6 months.     Please write back if you'd like to discuss this more or call or MyChart message me.         Resulted Orders   TSH with free T4 reflex   Result Value Ref Range    TSH 1.60 0.40 - 4.00 mU/L   Vitamin B12   Result Value Ref Range    Vitamin B12 263 193 - 986 pg/mL   Comprehensive metabolic panel (BMP + Alb, Alk Phos, ALT, AST, Total. Bili, TP)   Result Value Ref Range    Sodium 140 133 - 144 mmol/L    Potassium 4.3 3.4 - 5.3 mmol/L    Chloride 109 94 - 109 mmol/L    Carbon Dioxide (CO2) 26 20 - 32 mmol/L    Anion Gap 5 3 - 14 mmol/L    Urea Nitrogen 14 7 - 30 mg/dL    Creatinine 0.87 0.66 - 1.25 mg/dL    Calcium 8.7 8.5 - 10.1 mg/dL    Glucose 87 70 - 99 mg/dL    Alkaline Phosphatase 130 40 - 150 U/L    AST 14 0 - 45 U/L    ALT 20 0 - 70 U/L    Protein Total 7.1 6.8 - 8.8 g/dL    Albumin 3.9 3.4 - 5.0 g/dL    Bilirubin Total 0.4 0.2 - 1.3 mg/dL    GFR Estimate >90 >60 mL/min/1.73m2      Comment:      Effective December 21, 2021 eGFRcr in adults is calculated using the 2021 CKD-EPI creatinine equation which includes age and gender (Neva et al., NEJM, DOI: 10.1056/DLTKct8531163)   Lipid panel reflex to direct LDL Non-fasting   Result Value Ref Range    Cholesterol 92 <200 mg/dL    Triglycerides 124 <150 mg/dL    Direct Measure HDL 40 >=40 mg/dL    LDL Cholesterol Calculated 27 <=100 mg/dL    Non HDL Cholesterol 52 <130 mg/dL    Patient Fasting > 8hrs? No     Narrative    Cholesterol  Desirable:  <200  mg/dL    Triglycerides  Normal:  Less than 150 mg/dL  Borderline High:  150-199 mg/dL  High:  200-499 mg/dL  Very High:  Greater than or equal to 500 mg/dL    Direct Measure HDL  Female:  Greater than or equal to 50 mg/dL   Male:  Greater than or equal to 40 mg/dL    LDL Cholesterol  Desirable:  <100mg/dL  Above Desirable:  100-129 mg/dL   Borderline High:  130-159 mg/dL   High:  160-189 mg/dL   Very High:  >= 190 mg/dL    Non HDL Cholesterol  Desirable:  130 mg/dL  Above Desirable:  130-159 mg/dL  Borderline High:  160-189 mg/dL  High:  190-219 mg/dL  Very High:  Greater than or equal to 220 mg/dL   CBC with platelets and differential   Result Value Ref Range    WBC Count 5.5 4.0 - 11.0 10e3/uL    RBC Count 4.72 4.40 - 5.90 10e6/uL    Hemoglobin 13.8 13.3 - 17.7 g/dL    Hematocrit 42.3 40.0 - 53.0 %    MCV 90 78 - 100 fL    MCH 29.2 26.5 - 33.0 pg    MCHC 32.6 31.5 - 36.5 g/dL    RDW 15.3 (H) 10.0 - 15.0 %    Platelet Count 251 150 - 450 10e3/uL    % Neutrophils 58 %    % Lymphocytes 29 %    % Monocytes 12 %    % Eosinophils 1 %    % Basophils 0 %    Absolute Neutrophils 3.2 1.6 - 8.3 10e3/uL    Absolute Lymphocytes 1.6 0.8 - 5.3 10e3/uL    Absolute Monocytes 0.7 0.0 - 1.3 10e3/uL    Absolute Eosinophils 0.1 0.0 - 0.7 10e3/uL    Absolute Basophils 0.0 0.0 - 0.2 10e3/uL       If you have any questions or concerns, please call the clinic at the number listed above.       Sincerely,      Holland Blunt MD/xenia

## 2022-06-13 NOTE — PROGRESS NOTES
Last appointment with me was 4-8-22 for diverticulitis diagnosis and follow up, for complete details please see most recent previous office visit with me.  Health Maintenance Due   Topic Date Due     ADVANCE CARE PLANNING  03/28/2017     ZOSTER IMMUNIZATION (2 of 2) 06/06/2018     MEDICARE ANNUAL WELLNESS VISIT  04/11/2019     EYE EXAM  11/19/2021     COVID-19 Vaccine (4 - Booster for Pfizer series) 05/06/2022      Chief complaint mental fogginess  Patient has severe and untreated obstructive sleep apnea   Answers for HPI/ROS submitted by the patient on 6/12/2022  On average, how many sweetened beverages do you drink each day (Examples: soda, juice, sweet tea, etc.  Do NOT count diet or artificially sweetened beverages)?: 0  How many minutes a day do you exercise enough to make your heart beat faster?: 9 or less  How many days a week do you exercise enough to make your heart beat faster?: 3 or less  How many days per week do you miss taking your medication?: 1  What makes it hard for you to take your medication every day?: remembering to take  What is the reason for your visit today?: FATGUE, MEMORY,  When did your symptoms begin?: More than a month  What are your symptoms?: FATGUE, MEMORY LOSS,  How would you describe these symptoms?: Moderate  Are your symptoms:: Worsening  Have you had these symptoms before?: Yes  Have you tried or received treatment for these symptoms before?: No

## 2022-06-13 NOTE — PROGRESS NOTES
"  Assessment & Plan     JOSE JUAN (obstructive sleep apnea)-severe (AHI 61)  I met with Ricky Brown and his spouse Kyung today to discuss a picture of a patient who is actually declining to some extent with his overall health and there are some features of this that are suggestive of possibly alcoholism versus alcohol abuse . Patient is up front about it and this would appear to at the very least be precipitated by his severe obstructive sleep apnea which isn't being treated with CPAP [ continuous positive airway pressure] due to intolerance. He's apparently already had the uvulopalatopharyngeoplasty surgery and has tried CPAP [ continuous positive airway pressure] but \" it flies off of his face every night\". He apparently sought treatment with the inspire device . As he tells me, he was disallowed form pursuing this further secondary to his weight. I reviewed with patient the direct page per the company that manufactures the inspire and what is listed as a limiting factor is \" significant obesity\". I found it interesting that this doesn't speak to any specific body mass index range. This patient was weighed today and he has a body mass index of just past 30. This is the first step in obesity , certainly not morbid obesity and so I don't see why this would be a factor. I agreed to refer patient to an Ear, Nose, and Throat specialist MD that places the chest device that's part of inspire and we will proceed as if we are planning to move forwards with this device. Patient and spouse are well aware of the issues and diagnosis and are int to proceed.     - Adult ENT  Referral; Future  - Adult Neurology  Referral; Future  - TSH with free T4 reflex; Future  - Vitamin B12; Future  - CBC with platelets and differential; Future  - Comprehensive metabolic panel (BMP + Alb, Alk Phos, ALT, AST, Total. Bili, TP); Future  - TSH with free T4 reflex  - Vitamin B12  - CBC with platelets and differential  - " "Comprehensive metabolic panel (BMP + Alb, Alk Phos, ALT, AST, Total. Bili, TP)    Brain fog  There are a whole bunch of more profound questions though that I am sled about this patient and his \"brain fog\". I spent a good bit of time reviewing symptoms and am wondering if these symptoms are alcohol related ? A review of his history fails to disclose any obvious evidence of am organic pathological process . We agreed to start with comprehensive laboratory studies and then further follow up is depending on how things go and consider neurological consultation   - Adult ENT  Referral; Future  - Adult Neurology  Referral; Future  - TSH with free T4 reflex; Future  - Vitamin B12; Future  - CBC with platelets and differential; Future  - Comprehensive metabolic panel (BMP + Alb, Alk Phos, ALT, AST, Total. Bili, TP); Future  - TSH with free T4 reflex  - Vitamin B12  - CBC with platelets and differential  - Comprehensive metabolic panel (BMP + Alb, Alk Phos, ALT, AST, Total. Bili, TP)    Dizziness  Nonspecific symptoms , suggestive of alcohol related in my opinion   - Adult ENT  Referral; Future  - Adult Neurology  Referral; Future  - TSH with free T4 reflex; Future  - Vitamin B12; Future  - CBC with platelets and differential; Future  - Comprehensive metabolic panel (BMP + Alb, Alk Phos, ALT, AST, Total. Bili, TP); Future  - TSH with free T4 reflex  - Vitamin B12  - CBC with platelets and differential  - Comprehensive metabolic panel (BMP + Alb, Alk Phos, ALT, AST, Total. Bili, TP)    Memory loss  We agreed on need for neurological consultation to further workup his history and symptoms   - Adult ENT  Referral; Future  - Adult Neurology  Referral; Future  - TSH with free T4 reflex; Future  - Vitamin B12; Future  - CBC with platelets and differential; Future  - Comprehensive metabolic panel (BMP + Alb, Alk Phos, ALT, AST, Total. Bili, TP); Future  - TSH with free T4 " reflex  - Vitamin B12  - CBC with platelets and differential  - Comprehensive metabolic panel (BMP + Alb, Alk Phos, ALT, AST, Total. Bili, TP)    Personal history of fall  As detailed above   - Adult ENT  Referral; Future  - Adult Neurology  Referral; Future  - TSH with free T4 reflex; Future  - Vitamin B12; Future  - CBC with platelets and differential; Future  - Comprehensive metabolic panel (BMP + Alb, Alk Phos, ALT, AST, Total. Bili, TP); Future  - TSH with free T4 reflex  - Vitamin B12  - CBC with platelets and differential  - Comprehensive metabolic panel (BMP + Alb, Alk Phos, ALT, AST, Total. Bili, TP)    Word finding problem    - Adult ENT  Referral; Future  - Adult Neurology  Referral; Future  - TSH with free T4 reflex; Future  - Vitamin B12; Future  - CBC with platelets and differential; Future  - Comprehensive metabolic panel (BMP + Alb, Alk Phos, ALT, AST, Total. Bili, TP); Future  - TSH with free T4 reflex  - Vitamin B12  - CBC with platelets and differential  - Comprehensive metabolic panel (BMP + Alb, Alk Phos, ALT, AST, Total. Bili, TP)    Hyperlipidemia LDL goal <70  Routine screening   - Lipid panel reflex to direct LDL Non-fasting; Future  - Lipid panel reflex to direct LDL Non-fasting    Review of the result(s) of each unique test - todays tests  48 minutes spent on the date of the encounter doing chart review, history and exam, documentation and further activities per the note  {    Return in about 6 months (around 12/13/2022).    Holland Blunt MD  [facility] YAQUELIN García is a 71 year old who presents for the following health issues   Encounter Diagnoses   Name Primary?     JOSE JUAN (obstructive sleep apnea)-severe (AHI 61) Yes     Brain fog      Dizziness      Memory loss      Personal history of fall      Word finding problem      Hyperlipidemia LDL goal <70       accompanied by his spouse.    Fatigue  Associated symptoms include  fatigue.   History of Present Illness       Reason for visit:  FATGUE, MEMORY,  Symptom onset:  More than a month  Symptoms include:  FATGUE, MEMORY LOSS,  Symptom intensity:  Moderate  Symptom progression:  Worsening  Had these symptoms before:  Yes  Has tried/received treatment for these symptoms:  Katherine consumes 0 sweetened beverage(s) daily.He exercises with enough effort to increase his heart rate 9 or less minutes per day.  He exercises with enough effort to increase his heart rate 3 or less days per week. He is missing 1 dose(s) of medications per week.  He is not taking prescribed medications regularly due to remembering to take.    Tired all the time  sleep is non-restorative   Apple watch - we reviewed his oxygen curve and the lowest reading was a 70%   Patient says he has chest pains from the CPAP [ continuous positive airway pressure] device, and he has   Has been losing weight he says  Heart history   Wt Readings from Last 5 Encounters:   02/09/22 98.4 kg (217 lb)   01/06/22 98.6 kg (217 lb 4.8 oz)   12/13/21 100.9 kg (222 lb 6.4 oz)   11/09/21 101.2 kg (223 lb 1.6 oz)   09/03/21 101.8 kg (224 lb 6.4 oz)     Patient and his spouse say    Had a fall   Got out of the car and was so weak he was almost like he was drunk   He medicTES WQ alcohol AND HAD BEEN OVERDOING THIS.  delirious and no drop of alcohol since then.   Joint pains , low back pain  Weakness  Several serious epistaxis   profound dizziness symptoms , head is spinning  Going to be 72 in a short time    Last appointment with me was 4-8-22 for diverticulitis diagnosis and follow up, for complete details please see most recent previous office visit with me.  Ask sleep , physicians letter of support ,        Health Maintenance Due   Topic Date Due     ADVANCE CARE PLANNING  03/28/2017     ZOSTER IMMUNIZATION (2 of 2) 06/06/2018     MEDICARE ANNUAL WELLNESS VISIT  04/11/2019     EYE EXAM  11/19/2021     COVID-19 Vaccine (4 - Booster for Pfizer  series) 05/06/2022      Chief complaint mental fogginess  Patient has severe and untreated obstructive sleep apnea         Review of Systems   Constitutional: Positive for fatigue.      Constitutional, HEENT, cardiovascular, pulmonary, gi and gu systems are negative, except as otherwise noted.      Objective    BP (!) 162/74   Pulse 67   Temp 97.7  F (36.5  C) (Oral)   Resp 12   Wt 89.8 kg (198 lb)   SpO2 97%   BMI 31.01 kg/m    Body mass index is 31.01 kg/m .  Physical Exam   GENERAL: healthy, alert and no distress  EYES: Eyes grossly normal to inspection, PERRL and conjunctivae and sclerae normal  MS: no gross musculoskeletal defects noted, no edema  NEURO: Normal strength and tone, mentation intact and speech normal  PSYCH: mentation appears normal, affect normal/bright    Orders Placed This Encounter   Procedures     TSH with free T4 reflex     Vitamin B12     Comprehensive metabolic panel (BMP + Alb, Alk Phos, ALT, AST, Total. Bili, TP)     Lipid panel reflex to direct LDL Non-fasting     CBC with platelets and differential     Adult ENT  Referral     Adult Neurology  Referral     CBC with platelets and differential

## 2022-06-14 LAB
ALBUMIN SERPL-MCNC: 3.9 G/DL (ref 3.4–5)
ALP SERPL-CCNC: 130 U/L (ref 40–150)
ALT SERPL W P-5'-P-CCNC: 20 U/L (ref 0–70)
ANION GAP SERPL CALCULATED.3IONS-SCNC: 5 MMOL/L (ref 3–14)
AST SERPL W P-5'-P-CCNC: 14 U/L (ref 0–45)
BILIRUB SERPL-MCNC: 0.4 MG/DL (ref 0.2–1.3)
BUN SERPL-MCNC: 14 MG/DL (ref 7–30)
CALCIUM SERPL-MCNC: 8.7 MG/DL (ref 8.5–10.1)
CHLORIDE BLD-SCNC: 109 MMOL/L (ref 94–109)
CHOLEST SERPL-MCNC: 92 MG/DL
CO2 SERPL-SCNC: 26 MMOL/L (ref 20–32)
CREAT SERPL-MCNC: 0.87 MG/DL (ref 0.66–1.25)
FASTING STATUS PATIENT QL REPORTED: NO
GFR SERPL CREATININE-BSD FRML MDRD: >90 ML/MIN/1.73M2
GLUCOSE BLD-MCNC: 87 MG/DL (ref 70–99)
HDLC SERPL-MCNC: 40 MG/DL
LDLC SERPL CALC-MCNC: 27 MG/DL
NONHDLC SERPL-MCNC: 52 MG/DL
POTASSIUM BLD-SCNC: 4.3 MMOL/L (ref 3.4–5.3)
PROT SERPL-MCNC: 7.1 G/DL (ref 6.8–8.8)
SODIUM SERPL-SCNC: 140 MMOL/L (ref 133–144)
TRIGL SERPL-MCNC: 124 MG/DL
TSH SERPL DL<=0.005 MIU/L-ACNC: 1.6 MU/L (ref 0.4–4)
VIT B12 SERPL-MCNC: 263 PG/ML (ref 193–986)

## 2022-06-15 DIAGNOSIS — I25.10 CORONARY ATHEROSCLEROSIS OF NATIVE CORONARY ARTERY: Primary | ICD-10-CM

## 2022-06-20 ENCOUNTER — OFFICE VISIT (OUTPATIENT)
Dept: NEUROLOGY | Facility: CLINIC | Age: 72
End: 2022-06-20
Attending: INTERNAL MEDICINE
Payer: COMMERCIAL

## 2022-06-20 VITALS
DIASTOLIC BLOOD PRESSURE: 76 MMHG | SYSTOLIC BLOOD PRESSURE: 150 MMHG | BODY MASS INDEX: 29.86 KG/M2 | WEIGHT: 197 LBS | HEART RATE: 61 BPM | HEIGHT: 68 IN

## 2022-06-20 DIAGNOSIS — R41.89 BRAIN FOG: ICD-10-CM

## 2022-06-20 DIAGNOSIS — G47.33 OSA (OBSTRUCTIVE SLEEP APNEA): ICD-10-CM

## 2022-06-20 DIAGNOSIS — Z91.81 PERSONAL HISTORY OF FALL: ICD-10-CM

## 2022-06-20 DIAGNOSIS — R47.89 WORD FINDING PROBLEM: ICD-10-CM

## 2022-06-20 DIAGNOSIS — R42 DIZZINESS: ICD-10-CM

## 2022-06-20 DIAGNOSIS — R41.3 MEMORY LOSS: ICD-10-CM

## 2022-06-20 PROBLEM — S60.221A CONTUSION OF RIGHT HAND, INITIAL ENCOUNTER: Status: RESOLVED | Noted: 2022-02-28 | Resolved: 2022-06-20

## 2022-06-20 PROBLEM — Z86.69 HISTORY OF IRITIS: Status: RESOLVED | Noted: 2019-05-27 | Resolved: 2022-06-20

## 2022-06-20 PROCEDURE — 99205 OFFICE O/P NEW HI 60 MIN: CPT | Performed by: PSYCHIATRY & NEUROLOGY

## 2022-06-20 RX ORDER — LANOLIN ALCOHOL/MO/W.PET/CERES
1000 CREAM (GRAM) TOPICAL 2 TIMES DAILY
COMMUNITY
End: 2024-07-19

## 2022-06-20 ASSESSMENT — MONTREAL COGNITIVE ASSESSMENT (MOCA)
WHAT IS THE TOTAL SCORE (OUT OF 30): 26
WHAT LEVEL OF EDUCATION WAS ATTAINED: 0
12. MEMORY INDEX SCORE: 2
13. ORIENTATION SUBSCORE: 6
9. REPEAT EACH SENTENCE: 2
4. NAME EACH OF THE THREE ANIMALS SHOWN: 3
10. [FLUENCY] NAME WORDS STARTING WITH DESIGNATED LETTER: 1
7. [VIGILENCE] TAP WHEN HEARING DESIGNATED LETTER: 1
VISUOSPATIAL/EXECUTIVE SUBSCORE: 5
8. SERIAL SUBTRACTION OF 7S: 2
11. FOR EACH PAIR OF WORDS, WHAT CATEGORY DO THEY BELONG TO (OUT OF 2): 2
6. READ LIST OF DIGITS [FORWARD/BACKWARD]: 2

## 2022-06-20 NOTE — PROGRESS NOTES
neuropsychologicalNEUROLOGY CONSULTATION NOTE       [facility] NEUROLOGY [place]  [address] [place] [address]  Tel: [phone]  Fax: [phone]  [email or web address]     Ricky Brown,  1950, MRN [id number]  PCP: Holland Blunt  Date: 2022     ASSESSMENT & PLAN     Visit Diagnosis  JOSE JUAN (obstructive sleep apnea)  Brain fog  Dizziness  Memory loss  Personal history of fall  Word finding problem     Mild neurocognitive disorder; EtOH vs Alzheimer  71-year-old male with history of JOSE JUAN, cardiomyopathy, HTN, HLD, CAD, EtOH abuse who was referred for evaluation of progressive cognitive decline.  He was previously evaluated in 2019 and had a neuropsychological testing that was normal but wife has noticed steady decline.  This could be related to his excessive drinking.  He scored 26/30 on MoCA today and early Alzheimer's dementia is also a possibility.  I have recommended:    1.  MRI brain  2.  EEG  3.  Repeat neuropsychological testing  4.  Lab work to include folate, homocystine, MMA, RPR, vitamin B1  5.  Follow-up after above tests    Thank you again for this referral, please feel free to contact me if you have any questions.    Nathen Ruiz MD  [facility]  (Formerly, [facility]     REASON FOR CONSULTATION Extremity Weakness        HISTORY OF PRESENT ILLNESS     We have been requested by Dr. Blunt to evaluate Ricky Brown who is a 71 year old  male for cognitive decline    Patient is 71-year-old male with history of JOSE JUAN, cardiomyopathy, HTN, HLD, CAD who was referred for evaluation of progressive cognitive decline along with the brain fog.  He is accompanied by his wife who reports that patient used to be extremely sharp but in the last few years there has been a steady decline in his ability to remember.  In 2019 he had a neuropsychological testing that did not show any abnormality.  However in the  recent past there has been steady decline and he struggles with names.  Also wife has noticed trouble with his balance and he tends to fall easily.  He does have history of alcohol use and claims for the last 3 weeks he has stopped drinking.  He has given up taking care of the finances at home as he admits he made mistakes.  He continues to drive and does not get lost.  He has not done anything that puts him or the people at risk.  There is no history of any auditory visual hallucinations.     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     Pseudophakia, sutured PCL, od; ACL, os - hx of IOL dislocation, ou Z96.1     Posterior vitreous detachment, od H43.819     Epiretinal membrane, mild, od H35.379     Advanced directives, counseling/discussion Z71.89     JOSE JUAN (obstructive sleep apnea)-severe (AHI 61) G47.33     BCC (basal cell carcinoma) C44.91     Cardiomyopathy (H) I42.9     RCT (rotator cuff tear) M75.100     Essential hypertension with goal blood pressure less than 140/90 I10     Hyperlipidemia LDL goal <70 E78.5     Overwieght BMI > 35 E66.01     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     Macular edema, od H35.81     Angina, class II (H) I20.9     Elevated LFTs R79.89     Fatty liver K76.0     Right carpal tunnel syndrome G56.01     CAD (coronary artery disease) I25.10     S/P carpal tunnel release Z98.890     Right hand weakness R29.898     Pillar pain of extremity M79.609         PAST MEDICAL & SURGICAL HISTORY     Past Medical History:   Patient  has a past medical history of Arthritis, BCC (basal cell carcinoma), Cardiomyopathy (H), 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    Surgical  History:  He  has a past surgical history that includes Extracapsular cataract extration with intraocular lens implant (2005); Exchange intraocular lens implant (2005); Uvulopalatopharyngoplasty (12/11/2013); Turbinoplasty (12/11/2013); Arthroscopy shoulder rotator cuff repair (02/27/2014); Arthroscopy shoulder biceps tenodesis repair (02/27/2014); biopsy; Cardiac surgery; colonoscopy (06/12/2018); and Release carpal tunnel (Right, 08/13/2021).     SOCIAL HISTORY     Reviewed, and he  reports that he quit smoking about 25 years ago. His smoking use included cigarettes. He has a 1.00 pack-year smoking history. He has never used smokeless tobacco. He reports current alcohol use of about 8.3 standard drinks of alcohol per week. He reports that he does not use drugs.     FAMILY HISTORY     Reviewed, and family history includes Cerebrovascular Disease in his father; Coronary Artery Disease in his father; Diabetes in his father; Heart Disease in his father; Hypertension in his father; Lipids in his sister; Obesity in his father.     ALLERGIES     Allergies   Allergen Reactions     Lisinopril Cough     Perfume Other (See Comments)         REVIEW OF SYSTEMS     A 12 point review of system was performed and was negative except as outlined in the history of present illness.     HOME MEDICATIONS     Current Outpatient Rx   Medication Sig Dispense Refill     aspirin 81 MG tablet Take 1 tablet (81 mg) by mouth daily 90 tablet 3     atorvastatin (LIPITOR) 10 MG tablet TAKE ONE TABLET BY MOUTH ONE TIME DAILY 90 tablet 0     cyanocobalamin (VITAMIN B-12) 1000 MCG tablet Take 1,000 mcg by mouth daily       diphenoxylate-atropine (LOMOTIL) 2.5-0.025 MG tablet Take 1 tablet by mouth daily as needed for diarrhea Patient only takes 1 tablet as needed 30 tablet 5     losartan (COZAAR) 50 MG tablet TAKE ONE TABLET BY MOUTH ONE TIME DAILY 90 tablet 0     metoprolol succinate ER (TOPROL-XL) 50 MG 24 hr tablet Take 1 tablet by mouth daily  "(Patient taking differently: Taking half of tablet) 90 tablet 0         PHYSICAL EXAM     Vital signs  BP (!) 150/76 (BP Location: Left arm, Patient Position: Sitting)   Pulse 61   Ht 1.727 m (5' 8\")   Wt 89.4 kg (197 lb)   BMI 29.95 kg/m      Weight:   197 lbs 0 oz  MOCA 6/20/2022   Visuospatial/Executive  5   Naming 3   Attention - Digits 2   Attention - Letters 1   Attention - Subtraction 2   Language - Repeat 2   Language - Fluency  1   Abstraction 2   Delayed Recall 2   Orientation 6   Education 0   MOCA Score 26   Administered by:  Amy Yang CMA     Elderly gentleman who is alert and oriented x3 no acute distress vital signs were reviewed and are documented in electronic medical record.  Neck supple.  Neurologically speech mentation and affect was normal.  He scored 26/30 on MoCA.  He is hard of hearing rest of his cranial nerves are intact.  Strength in extremities 5/5 reflexes 1+ absent at brachioradialis and ankles toes downgoing.  He has dysmetria in finger-nose testing.  He has a wide-based gait.  Romberg negative.     PERTINENT DIAGNOSTIC STUDIES     Following studies were reviewed:     CT HEAD 4/30/2021  1.  No hemorrhage, mass, or mass effect.    NEUROPSYCHOLOGICAL TESTING 3/17/2019  This 68-year-old man has been having mild cognitive struggles.  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  Medical history is significant for obesity and sleep apnea; he does not tolerate the CPAP well.  Otherwise, the psychiatric and neurological histories are unremarkable.      The test findings are largely within normal limits, aside from mild difficulty on one executive task requiring inductive reasoning, which is borderline impaired.  He was a bit hasty and impulsive on this particular test, which may have lowered the score.  This was an isolated finding, as other executive abilities, including divided attention and " nonverbal planning, were average.  He was a little slow, in the below average range, on a timed transcription task requiring graphomotor learning.  But short and long-term retention of story passages and figural material was actually above average to superior.  Word knowledge and auditory attention span were average, nonverbal reasoning abilities superior.  He was reasonably fast on timed tasks requiring sustained and divided attention.  Psychomotor speeds were grossly intact except for mild slowing of the left hand on a dexterity task, probably due to peripheral factors (he is missing the tip edge of his left thumb with diminished proprioception).  Expressive and receptive language abilities, including speeded word retrieval and confrontation naming, were within normal limits.  There were no signs of visual misperception or hemispatial visual neglect on any of the visually demanding tasks.      The test findings generally contraindicate acquired brain disease.  The difficulty with inductive reasoning raises the possibility of subtle executive impairment, possibly implying mild frontal lobe dysfunction.  But this is rather speculative as there were no other executive impairments.  More likely Mr. Brown is experiencing subtle cognitive inefficiencies of a benign, functional sort.  This may be related to inattentiveness, missed information due to hearing impairment and possibly reduced alertness secondary to sleep apnea.  These findings will serve as a baseline for future comparisons should there be indications of further decline, greater than expected with normal ageing.  In the meantime, Mr. Brown appears to be functioning reasonably well and there are no indications of an emerging dementia.     EMG 3/31/2022  This is an abnormal study, demonstrating electrophysiologic evidence of the following:  - Moderate right sided median mononeuropathy at the wrist (carpal tunnel syndrome)  - Mild right sided ulnar  mononeuropathy at the elbow     Comment: In comparison to EMG from June 2021, there is no significant change in the severity of median and ulnar mononeuropathies of the right upper extremity.     PERTINENT LABS  Following labs were reviewed:  Office Visit on 06/13/2022   Component Date Value     TSH 06/13/2022 1.60      Vitamin B12 06/13/2022 263      Sodium 06/13/2022 140      Potassium 06/13/2022 4.3      Chloride 06/13/2022 109      Carbon Dioxide (CO2) 06/13/2022 26      Anion Gap 06/13/2022 5      Urea Nitrogen 06/13/2022 14      Creatinine 06/13/2022 0.87      Calcium 06/13/2022 8.7      Glucose 06/13/2022 87      Alkaline Phosphatase 06/13/2022 130      AST 06/13/2022 14      ALT 06/13/2022 20      Protein Total 06/13/2022 7.1      Albumin 06/13/2022 3.9      Bilirubin Total 06/13/2022 0.4      GFR Estimate 06/13/2022 >90      Cholesterol 06/13/2022 92      Triglycerides 06/13/2022 124      Direct Measure HDL 06/13/2022 40      LDL Cholesterol Calculat* 06/13/2022 27      Non HDL Cholesterol 06/13/2022 52      Patient Fasting > 8hrs? 06/13/2022 No      WBC Count 06/13/2022 5.5      RBC Count 06/13/2022 4.72      Hemoglobin 06/13/2022 13.8      Hematocrit 06/13/2022 42.3      MCV 06/13/2022 90      MCH 06/13/2022 29.2      MCHC 06/13/2022 32.6      RDW 06/13/2022 15.3 (A)     Platelet Count 06/13/2022 251      % Neutrophils 06/13/2022 58      % Lymphocytes 06/13/2022 29      % Monocytes 06/13/2022 12      % Eosinophils 06/13/2022 1      % Basophils 06/13/2022 0      Absolute Neutrophils 06/13/2022 3.2      Absolute Lymphocytes 06/13/2022 1.6      Absolute Monocytes 06/13/2022 0.7      Absolute Eosinophils 06/13/2022 0.1      Absolute Basophils 06/13/2022 0.0         Total time spent for face to face visit, reviewing labs/imaging studies, counseling and coordination of care was: 1 Hour spent on the date of the encounter doing chart review, review of outside records, review of test results, interpretation of  tests, patient visit and documentation     This note was dictated using voice recognition software.  Any grammatical or context distortions are unintentional and inherent to the software.    Orders Placed This Encounter   Procedures     MR Brain w/o & w Contrast     Folate     Homocysteine     Methylmalonic Acid     Treponema Abs w Reflex to RPR and Titer     Vitamin B1 whole blood     Adult Neuropsychology Referral     EEG Routine      New Prescriptions    No medications on file      Modified Medications    No medications on file

## 2022-06-20 NOTE — LETTER
2022         RE: Ricky Brown  [address]  [facility] [address]        Dear Colleague,    Thank you for referring your patient, Ricky Brown, to the [facility] [place]. Please see a copy of my visit note below.    neuropsychologicalNEUROLOGY CONSULTATION NOTE       [facility] [place]  [address] [place] [address]  Tel: [phone]  Fax: [phone]  [email or web address]     Ricky Brown,  1950, MRN [id number]  PCP: Holland Blunt  Date: 2022     ASSESSMENT & PLAN     Visit Diagnosis  1. JOSE JUAN (obstructive sleep apnea)  2. Brain fog  3. Dizziness  4. Memory loss  5. Personal history of fall  6. Word finding problem     Mild neurocognitive disorder; EtOH vs Alzheimer  71-year-old male with history of JOSE JUAN, cardiomyopathy, HTN, HLD, CAD, EtOH abuse who was referred for evaluation of progressive cognitive decline.  He was previously evaluated in 2019 and had a neuropsychological testing that was normal but wife has noticed steady decline.  This could be related to his excessive drinking.  He scored 26/30 on MoCA today and early Alzheimer's dementia is also a possibility.  I have recommended:    1.  MRI brain  2.  EEG  3.  Repeat neuropsychological testing  4.  Lab work to include folate, homocystine, MMA, RPR, vitamin B1  5.  Follow-up after above tests    Thank you again for this referral, please feel free to contact me if you have any questions.    Nathen Ruiz MD  [facility]  (Formerly, [facility]     REASON FOR CONSULTATION Extremity Weakness        HISTORY OF PRESENT ILLNESS     We have been requested by Dr. Blunt to evaluate Ricky Brown who is a 71 year old  male for cognitive decline    Patient is 71-year-old male with history of JOSE JUAN, cardiomyopathy, HTN, HLD, CAD who was referred for evaluation of progressive cognitive decline  along with the brain fog.  He is accompanied by his wife who reports that patient used to be extremely sharp but in the last few years there has been a steady decline in his ability to remember.  In 2019 he had a neuropsychological testing that did not show any abnormality.  However in the recent past there has been steady decline and he struggles with names.  Also wife has noticed trouble with his balance and he tends to fall easily.  He does have history of alcohol use and claims for the last 3 weeks he has stopped drinking.  He has given up taking care of the finances at home as he admits he made mistakes.  He continues to drive and does not get lost.  He has not done anything that puts him or the people at risk.  There is no history of any auditory visual hallucinations.     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     Pseudophakia, sutured PCL, od; ACL, os - hx of IOL dislocation, ou Z96.1     Posterior vitreous detachment, od H43.819     Epiretinal membrane, mild, od H35.379     Advanced directives, counseling/discussion Z71.89     JOSE JUAN (obstructive sleep apnea)-severe (AHI 61) G47.33     BCC (basal cell carcinoma) C44.91     Cardiomyopathy (H) I42.9     RCT (rotator cuff tear) M75.100     Essential hypertension with goal blood pressure less than 140/90 I10     Hyperlipidemia LDL goal <70 E78.5     Overwieght BMI > 35 E66.01     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     Macular edema, od H35.81     Angina, class II (H) I20.9     Elevated LFTs R79.89     Fatty liver K76.0     Right carpal tunnel syndrome G56.01     CAD (coronary artery disease) I25.10     S/P carpal tunnel release Z98.890     Right hand weakness R29.898     Pillar pain of extremity M79.609         PAST MEDICAL & SURGICAL HISTORY     Past Medical History:   Patient  has a past medical history of Arthritis, BCC (basal cell carcinoma), Cardiomyopathy (H), Colon  adenomas (9/20/11), Coronary artery disease, Disorder of bursae and tendons in shoulder region (4/18/2014), ED (erectile dysfunction), History of iritis, os (5/27/2019), HTN (hypertension), Near syncope (2/10/2014), Noncompliance, Obesity, and JOSE JUAN (obstructive sleep apnea).    Surgical History:  He  has a past surgical history that includes Extracapsular cataract extration with intraocular lens implant (2005); Exchange intraocular lens implant (2005); Uvulopalatopharyngoplasty (12/11/2013); Turbinoplasty (12/11/2013); Arthroscopy shoulder rotator cuff repair (02/27/2014); Arthroscopy shoulder biceps tenodesis repair (02/27/2014); biopsy; Cardiac surgery; colonoscopy (06/12/2018); and Release carpal tunnel (Right, 08/13/2021).     SOCIAL HISTORY     Reviewed, and he  reports that he quit smoking about 25 years ago. His smoking use included cigarettes. He has a 1.00 pack-year smoking history. He has never used smokeless tobacco. He reports current alcohol use of about 8.3 standard drinks of alcohol per week. He reports that he does not use drugs.     FAMILY HISTORY     Reviewed, and family history includes Cerebrovascular Disease in his father; Coronary Artery Disease in his father; Diabetes in his father; Heart Disease in his father; Hypertension in his father; Lipids in his sister; Obesity in his father.     ALLERGIES     Allergies   Allergen Reactions     Lisinopril Cough     Perfume Other (See Comments)         REVIEW OF SYSTEMS     A 12 point review of system was performed and was negative except as outlined in the history of present illness.     HOME MEDICATIONS     Current Outpatient Rx   Medication Sig Dispense Refill     aspirin 81 MG tablet Take 1 tablet (81 mg) by mouth daily 90 tablet 3     atorvastatin (LIPITOR) 10 MG tablet TAKE ONE TABLET BY MOUTH ONE TIME DAILY 90 tablet 0     cyanocobalamin (VITAMIN B-12) 1000 MCG tablet Take 1,000 mcg by mouth daily       diphenoxylate-atropine (LOMOTIL) 2.5-0.025 MG  "tablet Take 1 tablet by mouth daily as needed for diarrhea Patient only takes 1 tablet as needed 30 tablet 5     losartan (COZAAR) 50 MG tablet TAKE ONE TABLET BY MOUTH ONE TIME DAILY 90 tablet 0     metoprolol succinate ER (TOPROL-XL) 50 MG 24 hr tablet Take 1 tablet by mouth daily (Patient taking differently: Taking half of tablet) 90 tablet 0         PHYSICAL EXAM     Vital signs  BP (!) 150/76 (BP Location: Left arm, Patient Position: Sitting)   Pulse 61   Ht 1.727 m (5' 8\")   Wt 89.4 kg (197 lb)   BMI 29.95 kg/m      Weight:   197 lbs 0 oz  MOCA 6/20/2022   Visuospatial/Executive  5   Naming 3   Attention - Digits 2   Attention - Letters 1   Attention - Subtraction 2   Language - Repeat 2   Language - Fluency  1   Abstraction 2   Delayed Recall 2   Orientation 6   Education 0   MOCA Score 26   Administered by:  Amy Yang CMA     Elderly gentleman who is alert and oriented x3 no acute distress vital signs were reviewed and are documented in electronic medical record.  Neck supple.  Neurologically speech mentation and affect was normal.  He scored 26/30 on MoCA.  He is hard of hearing rest of his cranial nerves are intact.  Strength in extremities 5/5 reflexes 1+ absent at brachioradialis and ankles toes downgoing.  He has dysmetria in finger-nose testing.  He has a wide-based gait.  Romberg negative.     PERTINENT DIAGNOSTIC STUDIES     Following studies were reviewed:     CT HEAD 4/30/2021  1.  No hemorrhage, mass, or mass effect.    NEUROPSYCHOLOGICAL TESTING 3/17/2019  This 68-year-old man has been having mild cognitive struggles.  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  Medical history is significant for obesity and sleep apnea; he does not tolerate the CPAP well.  Otherwise, the psychiatric and neurological histories are unremarkable.      The test findings are largely within normal limits, aside from " mild difficulty on one executive task requiring inductive reasoning, which is borderline impaired.  He was a bit hasty and impulsive on this particular test, which may have lowered the score.  This was an isolated finding, as other executive abilities, including divided attention and nonverbal planning, were average.  He was a little slow, in the below average range, on a timed transcription task requiring graphomotor learning.  But short and long-term retention of story passages and figural material was actually above average to superior.  Word knowledge and auditory attention span were average, nonverbal reasoning abilities superior.  He was reasonably fast on timed tasks requiring sustained and divided attention.  Psychomotor speeds were grossly intact except for mild slowing of the left hand on a dexterity task, probably due to peripheral factors (he is missing the tip edge of his left thumb with diminished proprioception).  Expressive and receptive language abilities, including speeded word retrieval and confrontation naming, were within normal limits.  There were no signs of visual misperception or hemispatial visual neglect on any of the visually demanding tasks.      The test findings generally contraindicate acquired brain disease.  The difficulty with inductive reasoning raises the possibility of subtle executive impairment, possibly implying mild frontal lobe dysfunction.  But this is rather speculative as there were no other executive impairments.  More likely Mr. Brown is experiencing subtle cognitive inefficiencies of a benign, functional sort.  This may be related to inattentiveness, missed information due to hearing impairment and possibly reduced alertness secondary to sleep apnea.  These findings will serve as a baseline for future comparisons should there be indications of further decline, greater than expected with normal ageing.  In the meantime, Mr. Brown appears to be functioning  reasonably well and there are no indications of an emerging dementia.     EMG 3/31/2022  This is an abnormal study, demonstrating electrophysiologic evidence of the following:  - Moderate right sided median mononeuropathy at the wrist (carpal tunnel syndrome)  - Mild right sided ulnar mononeuropathy at the elbow     Comment: In comparison to EMG from June 2021, there is no significant change in the severity of median and ulnar mononeuropathies of the right upper extremity.     PERTINENT LABS  Following labs were reviewed:  Office Visit on 06/13/2022   Component Date Value     TSH 06/13/2022 1.60      Vitamin B12 06/13/2022 263      Sodium 06/13/2022 140      Potassium 06/13/2022 4.3      Chloride 06/13/2022 109      Carbon Dioxide (CO2) 06/13/2022 26      Anion Gap 06/13/2022 5      Urea Nitrogen 06/13/2022 14      Creatinine 06/13/2022 0.87      Calcium 06/13/2022 8.7      Glucose 06/13/2022 87      Alkaline Phosphatase 06/13/2022 130      AST 06/13/2022 14      ALT 06/13/2022 20      Protein Total 06/13/2022 7.1      Albumin 06/13/2022 3.9      Bilirubin Total 06/13/2022 0.4      GFR Estimate 06/13/2022 >90      Cholesterol 06/13/2022 92      Triglycerides 06/13/2022 124      Direct Measure HDL 06/13/2022 40      LDL Cholesterol Calculat* 06/13/2022 27      Non HDL Cholesterol 06/13/2022 52      Patient Fasting > 8hrs? 06/13/2022 No      WBC Count 06/13/2022 5.5      RBC Count 06/13/2022 4.72      Hemoglobin 06/13/2022 13.8      Hematocrit 06/13/2022 42.3      MCV 06/13/2022 90      MCH 06/13/2022 29.2      MCHC 06/13/2022 32.6      RDW 06/13/2022 15.3 (A)     Platelet Count 06/13/2022 251      % Neutrophils 06/13/2022 58      % Lymphocytes 06/13/2022 29      % Monocytes 06/13/2022 12      % Eosinophils 06/13/2022 1      % Basophils 06/13/2022 0      Absolute Neutrophils 06/13/2022 3.2      Absolute Lymphocytes 06/13/2022 1.6      Absolute Monocytes 06/13/2022 0.7      Absolute Eosinophils 06/13/2022 0.1       Absolute Basophils 06/13/2022 0.0         Total time spent for face to face visit, reviewing labs/imaging studies, counseling and coordination of care was: 1 Hour spent on the date of the encounter doing chart review, review of outside records, review of test results, interpretation of tests, patient visit and documentation       This note was dictated using voice recognition software.  Any grammatical or context distortions are unintentional and inherent to the software.    Orders Placed This Encounter   Procedures     MR Brain w/o & w Contrast     Folate     Homocysteine     Methylmalonic Acid     Treponema Abs w Reflex to RPR and Titer     Vitamin B1 whole blood     Adult Neuropsychology Referral     EEG Routine      New Prescriptions    No medications on file      Modified Medications    No medications on file              Again, thank you for allowing me to participate in the care of your patient.        Sincerely,        Nathen Ruiz MD

## 2022-06-20 NOTE — NURSING NOTE
Chief Complaint   Patient presents with     Extremity Weakness     BUE/BLE weakness and falls, memory loss (MOCA 26/30)Neuropsych testing 2019     Amy Yang CMA on 6/20/2022 at 2:19 PM

## 2022-06-20 NOTE — NURSING NOTE
LISA COGNITIVE ASSESSMENT (MOCA)  Version 7.1 Original Version  VISUOSPATIAL/EXECUTIVE               COPY CUBE      [ 1   ]                                [ 1   ] DRAW CLOCK (Ten past eleven)  (3 points)    [1    ]                    [  1  ]               [1    ]       Contour            Numbers     Hands POINTS             5      / 5   NAMING    [   1]                                                                        [ 1   ]                                             [   1 ]  Lilourdes Dennison                                Camel                3     / 3   MEMORY Read list of words, subject must repeat them. Do 2 trials, even if 1st trial is successful. Do a recall after 5 minutes  FACE VELVET Jew HARPAL RED No Points    1st x x x x x     2nd x x x x x    ATTENTION Read list of digits (1 digit/sec) Subject has to repeat in the forward order       [   1 ]   2  1  8  5  4                                [  1  ] 7 4 2                     2   /2   Read list of letters. The subject must tap with his hand at each letter A. No points if > 2 errors.  [   1 ] F B A C M N A A J K L B A F A K D E A A A J A M O F A A B          1    /1   Serial 7 subtraction starting at 100          [ 1   ] 93         [ 1   ] 86          [  0  ] 79          [ 0   ] 72         [   0 ] 65   4 or 5 correct subtractions: 3 points,  2 or 3 correct: 2 points,  1correct: 1 point,   0 correct: 0 points        2    /3   LANGUAGE Repeat: I only know that Tony is the one to help today. [  1   ]                                      The cat always hid under the couch when dogs were in the room. [1   ]              2 /2   Fluency: Name maximum number of words in one minute that begin with the letter F                                                                                                                    [  1  ] _13__ (N > 11 words)              1 /1   ABSTRACTION  Similarity between e.g. banana-orange=fruit                                                                   [ 1   ] train-bicycle                      [ 1   ] watch-ruler          2    /2   DELAYED  RECALL Has to recall words  WITH NO CUE FACE  [    0] VELVET  [  1  ] Pentecostal  [  0  ]  HARPAL  [ 0   ] RED  [ 1   ] Points for UNCUED recall only      2      /5           OPTIONAL Category cue   1 1       Multiple choice cue          ORIENTATION  [   1 ] Date     [ 1   ] Month       [ 1   ] Year      [  1  ] Day      [ 1   ] Place        [  1  ] City     6 /6   TOTAL  Normal > 26/30 Add 1 point if < 12 years education      26 /30

## 2022-06-23 ENCOUNTER — LAB (OUTPATIENT)
Dept: LAB | Facility: CLINIC | Age: 72
End: 2022-06-23
Payer: COMMERCIAL

## 2022-06-23 DIAGNOSIS — R42 DIZZINESS: ICD-10-CM

## 2022-06-23 DIAGNOSIS — I25.10 CORONARY ATHEROSCLEROSIS OF NATIVE CORONARY ARTERY: ICD-10-CM

## 2022-06-23 DIAGNOSIS — R41.3 MEMORY LOSS: ICD-10-CM

## 2022-06-23 LAB
ANION GAP SERPL CALCULATED.3IONS-SCNC: 5 MMOL/L (ref 3–14)
BUN SERPL-MCNC: 23 MG/DL (ref 7–30)
CALCIUM SERPL-MCNC: 9.1 MG/DL (ref 8.5–10.1)
CHLORIDE BLD-SCNC: 110 MMOL/L (ref 94–109)
CO2 SERPL-SCNC: 26 MMOL/L (ref 20–32)
CREAT SERPL-MCNC: 1.1 MG/DL (ref 0.66–1.25)
FOLATE SERPL-MCNC: 3.7 NG/ML (ref 4.6–34.8)
GFR SERPL CREATININE-BSD FRML MDRD: 73 ML/MIN/1.73M2
GLUCOSE BLD-MCNC: 100 MG/DL (ref 70–99)
POTASSIUM BLD-SCNC: 4 MMOL/L (ref 3.4–5.3)
SODIUM SERPL-SCNC: 141 MMOL/L (ref 133–144)
T PALLIDUM AB SER QL: NONREACTIVE

## 2022-06-23 PROCEDURE — 84425 ASSAY OF VITAMIN B-1: CPT | Mod: 90

## 2022-06-23 PROCEDURE — 86780 TREPONEMA PALLIDUM: CPT

## 2022-06-23 PROCEDURE — 82746 ASSAY OF FOLIC ACID SERUM: CPT

## 2022-06-23 PROCEDURE — 83090 ASSAY OF HOMOCYSTEINE: CPT

## 2022-06-23 PROCEDURE — 99000 SPECIMEN HANDLING OFFICE-LAB: CPT

## 2022-06-23 PROCEDURE — 80048 BASIC METABOLIC PNL TOTAL CA: CPT

## 2022-06-23 PROCEDURE — 83921 ORGANIC ACID SINGLE QUANT: CPT

## 2022-06-23 PROCEDURE — 36415 COLL VENOUS BLD VENIPUNCTURE: CPT

## 2022-06-24 DIAGNOSIS — E53.8 FOLIC ACID DEFICIENCY (NON ANEMIC): ICD-10-CM

## 2022-06-24 LAB — HCYS SERPL-SCNC: 12.1 UMOL/L (ref 4–12)

## 2022-06-24 RX ORDER — FOLIC ACID 1 MG/1
1 TABLET ORAL 2 TIMES DAILY
Qty: 60 TABLET | Refills: 4 | Status: SHIPPED | OUTPATIENT
Start: 2022-06-24 | End: 2022-06-29

## 2022-06-24 NOTE — RESULT ENCOUNTER NOTE
Dear Ricky,   Folic acid level is low, start Folic acid supplement. I have sent prescription to your pharmacy.     Nathen Ruiz MD

## 2022-06-27 ENCOUNTER — ANCILLARY PROCEDURE (OUTPATIENT)
Dept: MRI IMAGING | Facility: CLINIC | Age: 72
End: 2022-06-27
Attending: PSYCHIATRY & NEUROLOGY
Payer: COMMERCIAL

## 2022-06-27 ENCOUNTER — TELEPHONE (OUTPATIENT)
Dept: NEUROLOGY | Facility: CLINIC | Age: 72
End: 2022-06-27

## 2022-06-27 DIAGNOSIS — I63.439 CEREBROVASCULAR ACCIDENT (CVA) DUE TO EMBOLISM OF POSTERIOR CEREBRAL ARTERY WITH INFARCTIONS OF BOTH OCCIPITAL LOBES (H): ICD-10-CM

## 2022-06-27 DIAGNOSIS — R41.3 MEMORY LOSS: ICD-10-CM

## 2022-06-27 DIAGNOSIS — G45.0 VERTEBRO-BASILAR ARTERY SYNDROME: ICD-10-CM

## 2022-06-27 DIAGNOSIS — R42 DIZZINESS: ICD-10-CM

## 2022-06-27 LAB
RADIOLOGIST FLAGS: ABNORMAL
VIT B1 PYROPHOSHATE BLD-SCNC: 106 NMOL/L

## 2022-06-27 PROCEDURE — A9585 GADOBUTROL INJECTION: HCPCS | Mod: JW | Performed by: RADIOLOGY

## 2022-06-27 PROCEDURE — 70553 MRI BRAIN STEM W/O & W/DYE: CPT | Mod: TC | Performed by: RADIOLOGY

## 2022-06-27 RX ORDER — CLOPIDOGREL BISULFATE 75 MG/1
75 TABLET ORAL DAILY
Qty: 90 TABLET | Refills: 0 | Status: SHIPPED | OUTPATIENT
Start: 2022-06-27 | End: 2022-06-28

## 2022-06-27 RX ORDER — GADOBUTROL 604.72 MG/ML
10 INJECTION INTRAVENOUS ONCE
Status: COMPLETED | OUTPATIENT
Start: 2022-06-27 | End: 2022-06-27

## 2022-06-27 RX ADMIN — GADOBUTROL 8.5 ML: 604.72 INJECTION INTRAVENOUS at 11:18

## 2022-06-27 NOTE — TELEPHONE ENCOUNTER
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  Bilateral infarcts suggest a central etiology.  He is currently on aspirin in addition to Lipitor.  Recommendation:  1.  Echocardiogram  2.  Carotid ultrasound  3.  Add Plavix 75 mg daily for 90 days, and continue dual antiplatelet therapy with aspirin 81 mg and Plavix 75 mg daily.  After 90 days continue aspirin enteric-coated 325 mg daily  4.  Lipid profile checked on 6/13/2022 showed LDL of 27.  Continue current dose of Lipitor  5.  30-day event monitor  6.  Follow-up as scheduled

## 2022-06-28 ENCOUNTER — HOSPITAL ENCOUNTER (OUTPATIENT)
Dept: ULTRASOUND IMAGING | Facility: HOSPITAL | Age: 72
Discharge: HOME | End: 2022-06-28
Attending: PSYCHIATRY & NEUROLOGY | Admitting: PSYCHIATRY & NEUROLOGY
Payer: COMMERCIAL

## 2022-06-28 ENCOUNTER — OFFICE VISIT (OUTPATIENT)
Dept: NEUROLOGY | Facility: CLINIC | Age: 72
End: 2022-06-28
Payer: COMMERCIAL

## 2022-06-28 VITALS
HEART RATE: 58 BPM | BODY MASS INDEX: 29.86 KG/M2 | DIASTOLIC BLOOD PRESSURE: 70 MMHG | WEIGHT: 197 LBS | SYSTOLIC BLOOD PRESSURE: 133 MMHG | HEIGHT: 68 IN

## 2022-06-28 DIAGNOSIS — G45.0 VERTEBRO-BASILAR ARTERY SYNDROME: ICD-10-CM

## 2022-06-28 DIAGNOSIS — I63.439 CEREBROVASCULAR ACCIDENT (CVA) DUE TO EMBOLISM OF POSTERIOR CEREBRAL ARTERY WITH INFARCTIONS OF BOTH OCCIPITAL LOBES (H): ICD-10-CM

## 2022-06-28 DIAGNOSIS — I63.439 CEREBROVASCULAR ACCIDENT (CVA) DUE TO EMBOLISM OF POSTERIOR CEREBRAL ARTERY WITH INFARCTIONS OF BOTH OCCIPITAL LOBES (H): Primary | ICD-10-CM

## 2022-06-28 DIAGNOSIS — G31.84 MILD NEUROCOGNITIVE DISORDER: ICD-10-CM

## 2022-06-28 DIAGNOSIS — E53.8 FOLIC ACID DEFICIENCY (NON ANEMIC): ICD-10-CM

## 2022-06-28 LAB — METHYLMALONATE SERPL-SCNC: 0.13 UMOL/L (ref 0–0.4)

## 2022-06-28 PROCEDURE — 93880 EXTRACRANIAL BILAT STUDY: CPT

## 2022-06-28 PROCEDURE — 99215 OFFICE O/P EST HI 40 MIN: CPT | Performed by: PSYCHIATRY & NEUROLOGY

## 2022-06-28 NOTE — PROGRESS NOTES
NEUROLOGY FOLLOW UP VISIT  NOTE       [facility] NEUROLOGY [place]  [address] [place] [address]  Tel: [phone]  Fax: [phone]  [email or web address]     Ricky Brown,  1950, MRN [id number]  PCP: Holland Blunt  Date: 2022      ASSESSMENT & PLAN     Visit Diagnosis  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 (H)  Folic acid deficiency (non anemic)  Mild neurocognitive disorder     Bilateral occipital and left parietal infarct  71-year-old male with history of JOSE JUAN, cardiomyopathy, HTN, HLD, CAD, EtOH abuse who was initially evaluated on 2022 for progressive cognitive decline.  Part of work-up included MRI brain that showed left parietal and occipital infarct.  He was recently evaluated at [facility] and had an echocardiogram done that showed a low ejection fraction of 35%.  Patient is somewhat surprised to learn he had a infarct as he does not have any symptoms.  I do suspect this is embolic due to his low ejection fraction.  I have recommended:    1.  Discontinue aspirin and switch to Eliquis 5 mg twice daily  2.  Check 30-day event monitor  3.  Repeat echocardiogram  4.  Continue Lipitor, most recent LDL was 27    Neurocognitive disorder  Patient was initially evaluated for neurocognitive disorder and my concern was if his cognitive issues were related to alcohol use or early Alzheimer's dementia.  However, MRI brain shows multiple small infarcts and vascular dementia is also a possibility.  Additionally lab work showed a low folic acid level and an elevated homocystine and he was started on folic acid supplements.  Rest of the labs were normal.  EEG and neuropsychological testing is pending.  Follow-up will be after those tests.    Thank you again for this referral, please feel free to contact me if you have any questions.    Nathen Ruiz MD  [facility] NEUROLOGY,  [place]  (Formerly, [facility]     HISTORY OF PRESENT ILLNESS     Patient is a 71-year-old male with history of JOSE JUAN, cardiomyopathy, HTN, HLD, CAD, EtOH abuse who was initially evaluated on 6/20/2022 for progressive cognitive decline.  He had a MRI of his brain as part of work-up for neurocognitive decline and showed subacute infarct involving left parietal and bilateral occipital lobes.  Additionally patient had old cortical infarct.  He had echocardiogram done at [facility] on 4/21/2022 that showed an ejection fraction of 35% I had recommended dual antiplatelet therapy and was told to continue on current dose of Lipitor as his LDL was 27.  Also 30-day event monitor and repeat echocardiogram was recommended.  Patient requested follow-up visit to discuss next steps in view of MRI findings    He was initially evaluated on 6/20/2022 for progressive cognitive decline along with brain fog.  Wife had reported that he was extremely sharp but in the last few years there has been a steady decline in his ability to remember.  He had neuropsychological testing done in 2019 and that did not show any abnormality.  He scored 26/30 on MoCA and I had recommended repeat neuropsychological testing.  He also had lab work for common causes of cognitive decline that included a low folate level and was started on folic acid supplement.  Homocystine was borderline elevated.  Methylmalonic acid level, RPR were normal.  In addition to neuropsychological testing, EEG is also pending     PROBLEM LIST   Patient Active Problem List   Diagnosis Code     HL (hearing loss) H91.90     Erectile dysfunction N52.9     Pseudophakia, sutured PCL, od; ACL, os - hx of IOL dislocation, ou Z96.1     Posterior vitreous detachment, od H43.819     Epiretinal membrane, mild, od H35.379     Advanced directives, counseling/discussion Z71.89     JOSE JUAN (obstructive sleep apnea)-severe (AHI 61) G47.33     BCC (basal cell  carcinoma) C44.91     Cardiomyopathy (H) I42.9     RCT (rotator cuff tear) M75.100     Essential hypertension with goal blood pressure less than 140/90 I10     Hyperlipidemia LDL goal <70 E78.5     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     Macular edema, od H35.81     Angina, class II (H) I20.9     Elevated LFTs R79.89     Fatty liver K76.0     Right carpal tunnel syndrome G56.01     CAD (coronary artery disease) I25.10     S/P carpal tunnel release Z98.890     Right hand weakness R29.898     Pillar pain of extremity M79.609     Folic acid deficiency (non anemic) E53.8     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 (H) I63.439         PAST MEDICAL & SURGICAL HISTORY     Past Medical History:   Patient  has a past medical history of Arthritis, BCC (basal cell carcinoma), Cardiomyopathy (H), 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 (H) (6/27/2022), 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    Surgical History:  He  has a past surgical history that includes Extracapsular cataract extration with intraocular lens implant (2005); Exchange intraocular lens implant (2005); Uvulopalatopharyngoplasty (12/11/2013); Turbinoplasty (12/11/2013); Arthroscopy shoulder rotator cuff repair (02/27/2014); Arthroscopy shoulder biceps tenodesis repair (02/27/2014); biopsy; Cardiac surgery; colonoscopy (06/12/2018); and Release carpal tunnel (Right, 08/13/2021).     SOCIAL HISTORY     Reviewed, and he  reports that he quit smoking about 25 years ago. His smoking use included cigarettes. He has a 1.00 pack-year smoking history. He has never used smokeless tobacco. He reports  "current alcohol use of about 8.3 standard drinks of alcohol per week. He reports that he does not use drugs.     FAMILY HISTORY     Reviewed, and family history includes Cerebrovascular Disease in his father; Coronary Artery Disease in his father; Diabetes in his father; Heart Disease in his father; Hypertension in his father; Lipids in his sister; Obesity in his father.     ALLERGIES     Allergies   Allergen Reactions     Lisinopril Cough     Perfume Other (See Comments)         REVIEW OF SYSTEMS     A 12 point review of system was performed and was negative except as outlined in the history of present illness.     HOME MEDICATIONS     Current Outpatient Rx   Medication Sig Dispense Refill     apixaban ANTICOAGULANT (ELIQUIS ANTICOAGULANT) 5 MG tablet Take 1 tablet (5 mg) by mouth 2 times daily 60 tablet 11     atorvastatin (LIPITOR) 10 MG tablet TAKE ONE TABLET BY MOUTH ONE TIME DAILY 90 tablet 0     cyanocobalamin (VITAMIN B-12) 1000 MCG tablet Take 1,000 mcg by mouth daily       diphenoxylate-atropine (LOMOTIL) 2.5-0.025 MG tablet Take 1 tablet by mouth daily as needed for diarrhea Patient only takes 1 tablet as needed 30 tablet 5     folic acid (FOLVITE) 1 MG tablet Take 1 tablet (1 mg) by mouth 2 times daily 60 tablet 4     losartan (COZAAR) 50 MG tablet TAKE ONE TABLET BY MOUTH ONE TIME DAILY 90 tablet 0     metoprolol succinate ER (TOPROL-XL) 50 MG 24 hr tablet Take 1 tablet by mouth daily (Patient taking differently: Taking half of tablet) 90 tablet 0         PHYSICAL EXAM     Vital signs  /70 (BP Location: Left arm, Patient Position: Sitting)   Pulse 58   Ht 1.727 m (5' 8\")   Wt 89.4 kg (197 lb)   BMI 29.95 kg/m      Weight:   197 lbs 0 oz    Elderly gentleman who is alert and oriented x3 no acute distress vital signs were reviewed and are documented in electronic medical record.  Neck supple.  Neurologically speech mentation and affect was normal.  He scored 26/30 on MoCA.  He is hard of hearing " rest of his cranial nerves are intact.  Strength in extremities 5/5 reflexes 1+ absent at brachioradialis and ankles toes downgoing.  He has dysmetria in finger-nose testing.  He has a wide-based gait.  Romberg negative.     PERTINENT DIAGNOSTIC STUDIES     Following studies were reviewed:     CT HEAD 4/30/2021  1.  No hemorrhage, mass, or mass effect.    MRI BRAIN 6/27/2022  1. Abnormal areas of T2 hyperintense signal involving the left  parietal lobe and bilateral occipital lobes with enhancement, likely  subacute cortical infarcts.  2. 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  3. Nonspecific bilateral cerebellar microhemorrhages, likely  incidental.  4. Volume loss and white matter T2 hyperintensities which likely  represent chronic small vessel ischemic change.    ECHOCARDIOGRAM 4/21/2022  Normal left ventricular size; moderate to severely abnormal left ventricular ejection fraction   estimated at 35% (accuracy may be    reduced, not well visualized).    Left ventricular wall thickness mildly increased.    Global left ventricular hypokinesis with abnormal septal motion consistent with an   intraventricular conduction defect.    Mild left atrial dilatation.    The right ventricle is normal in size and function.    Mild aortic regurgitation.     NEUROPSYCHOLOGICAL TESTING 3/17/2019  This 68-year-old man has been having mild cognitive struggles.  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  Medical history is significant for obesity and sleep apnea; he does not tolerate the CPAP well.  Otherwise, the psychiatric and neurological histories are unremarkable.      The test findings are largely within normal limits, aside from mild difficulty on one executive task requiring inductive reasoning, which is borderline impaired.  He was a bit hasty and impulsive on this particular test, which may have lowered the  score.  This was an isolated finding, as other executive abilities, including divided attention and nonverbal planning, were average.  He was a little slow, in the below average range, on a timed transcription task requiring graphomotor learning.  But short and long-term retention of story passages and figural material was actually above average to superior.  Word knowledge and auditory attention span were average, nonverbal reasoning abilities superior.  He was reasonably fast on timed tasks requiring sustained and divided attention.  Psychomotor speeds were grossly intact except for mild slowing of the left hand on a dexterity task, probably due to peripheral factors (he is missing the tip edge of his left thumb with diminished proprioception).  Expressive and receptive language abilities, including speeded word retrieval and confrontation naming, were within normal limits.  There were no signs of visual misperception or hemispatial visual neglect on any of the visually demanding tasks.      The test findings generally contraindicate acquired brain disease.  The difficulty with inductive reasoning raises the possibility of subtle executive impairment, possibly implying mild frontal lobe dysfunction.  But this is rather speculative as there were no other executive impairments.  More likely Mr. Brown is experiencing subtle cognitive inefficiencies of a benign, functional sort.  This may be related to inattentiveness, missed information due to hearing impairment and possibly reduced alertness secondary to sleep apnea.  These findings will serve as a baseline for future comparisons should there be indications of further decline, greater than expected with normal ageing.  In the meantime, Mr. Brown appears to be functioning reasonably well and there are no indications of an emerging dementia.      EMG 3/31/2022  This is an abnormal study, demonstrating electrophysiologic evidence of the following:  - Moderate  right sided median mononeuropathy at the wrist (carpal tunnel syndrome)  - Mild right sided ulnar mononeuropathy at the elbow     Comment: In comparison to EMG from June 2021, there is no significant change in the severity of median and ulnar mononeuropathies of the right upper extremity.     PERTINENT LABS  Following labs were reviewed:  Ancillary Procedure on 06/27/2022   Component Date Value     Radiologist flags 06/27/2022 Lateral subacute infarcts. (A)   Lab on 06/23/2022   Component Date Value     Vitamin B1 Whole Blood L* 06/23/2022 106      Treponema Antibody Total 06/23/2022 Nonreactive      Methylmalonic Acid 06/23/2022 0.13      Homocysteine 06/23/2022 12.1 (A)     Folic Acid 06/23/2022 3.7 (A)     Sodium 06/23/2022 141      Potassium 06/23/2022 4.0      Chloride 06/23/2022 110 (A)     Carbon Dioxide (CO2) 06/23/2022 26      Anion Gap 06/23/2022 5      Urea Nitrogen 06/23/2022 23      Creatinine 06/23/2022 1.10      Calcium 06/23/2022 9.1      Glucose 06/23/2022 100 (A)     GFR Estimate 06/23/2022 73    Office Visit on 06/13/2022   Component Date Value     TSH 06/13/2022 1.60      Vitamin B12 06/13/2022 263      Sodium 06/13/2022 140      Potassium 06/13/2022 4.3      Chloride 06/13/2022 109      Carbon Dioxide (CO2) 06/13/2022 26      Anion Gap 06/13/2022 5      Urea Nitrogen 06/13/2022 14      Creatinine 06/13/2022 0.87      Calcium 06/13/2022 8.7      Glucose 06/13/2022 87      Alkaline Phosphatase 06/13/2022 130      AST 06/13/2022 14      ALT 06/13/2022 20      Protein Total 06/13/2022 7.1      Albumin 06/13/2022 3.9      Bilirubin Total 06/13/2022 0.4      GFR Estimate 06/13/2022 >90      Cholesterol 06/13/2022 92      Triglycerides 06/13/2022 124      Direct Measure HDL 06/13/2022 40      LDL Cholesterol Calculat* 06/13/2022 27      Non HDL Cholesterol 06/13/2022 52      Patient Fasting > 8hrs? 06/13/2022 No      WBC Count 06/13/2022 5.5      RBC Count 06/13/2022 4.72      Hemoglobin 06/13/2022  13.8      Hematocrit 06/13/2022 42.3      MCV 06/13/2022 90      MCH 06/13/2022 29.2      MCHC 06/13/2022 32.6      RDW 06/13/2022 15.3 (A)     Platelet Count 06/13/2022 251      % Neutrophils 06/13/2022 58      % Lymphocytes 06/13/2022 29      % Monocytes 06/13/2022 12      % Eosinophils 06/13/2022 1      % Basophils 06/13/2022 0      Absolute Neutrophils 06/13/2022 3.2      Absolute Lymphocytes 06/13/2022 1.6      Absolute Monocytes 06/13/2022 0.7      Absolute Eosinophils 06/13/2022 0.1      Absolute Basophils 06/13/2022 0.0          Total time spent for face to face visit, reviewing labs/imaging studies, counseling and coordination of care was: 45 Minutes spent on the date of the encounter doing chart review, review of outside records, review of test results, interpretation of tests, patient visit, documentation and discussion with family     This note was dictated using voice recognition software.  Any grammatical or context distortions are unintentional and inherent to the software.    No orders of the defined types were placed in this encounter.     New Prescriptions    APIXABAN ANTICOAGULANT (ELIQUIS ANTICOAGULANT) 5 MG TABLET    Take 1 tablet (5 mg) by mouth 2 times daily     Modified Medications    No medications on file

## 2022-06-28 NOTE — LETTER
2022         RE: Ricky Brown  [address]  [facility] [address]        Dear Colleague,    Thank you for referring your patient, Ricky Brown, to the [facility] NEUROLOG[facility] [place]. Please see a copy of my visit note below.    NEUROLOGY FOLLOW UP VISIT  NOTE       [facility] NEUROLOGY [place]  [address] [place] [address]  Tel: [phone]  Fax: [phone]  [email or web address]     Ricky Brown,  1950, MRN [id number]  PCP: Holland Blunt  Date: 2022      ASSESSMENT & PLAN     Visit Diagnosis  1. 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 (H)  2. Folic acid deficiency (non anemic)  3. Mild neurocognitive disorder     Bilateral occipital and left parietal infarct  71-year-old male with history of JOSE JUAN, cardiomyopathy, HTN, HLD, CAD, EtOH abuse who was initially evaluated on 2022 for progressive cognitive decline.  Part of work-up included MRI brain that showed left parietal and occipital infarct.  He was recently evaluated at [facility] and had an echocardiogram done that showed a low ejection fraction of 35%.  Patient is somewhat surprised to learn he had a infarct as he does not have any symptoms.  I do suspect this is embolic due to his low ejection fraction.  I have recommended:    1.  Discontinue aspirin and switch to Eliquis 5 mg twice daily  2.  Check 30-day event monitor  3.  Repeat echocardiogram  4.  Continue Lipitor, most recent LDL was 27    Neurocognitive disorder  Patient was initially evaluated for neurocognitive disorder and my concern was if his cognitive issues were related to alcohol use or early Alzheimer's dementia.  However, MRI brain shows multiple small infarcts and vascular dementia is also a possibility.  Additionally lab work showed a low folic acid level and an elevated homocystine and he was started on folic  acid supplements.  Rest of the labs were normal.  EEG and neuropsychological testing is pending.  Follow-up will be after those tests.    Thank you again for this referral, please feel free to contact me if you have any questions.    Nathen Ruiz MD  [facility]  (Formerly, [facility]     HISTORY OF PRESENT ILLNESS     Patient is a 71-year-old male with history of JOSE JUAN, cardiomyopathy, HTN, HLD, CAD, EtOH abuse who was initially evaluated on 6/20/2022 for progressive cognitive decline.  He had a MRI of his brain as part of work-up for neurocognitive decline and showed subacute infarct involving left parietal and bilateral occipital lobes.  Additionally patient had old cortical infarct.  He had echocardiogram done at [facility] on 4/21/2022 that showed an ejection fraction of 35% I had recommended dual antiplatelet therapy and was told to continue on current dose of Lipitor as his LDL was 27.  Also 30-day event monitor and repeat echocardiogram was recommended.  Patient requested follow-up visit to discuss next steps in view of MRI findings    He was initially evaluated on 6/20/2022 for progressive cognitive decline along with brain fog.  Wife had reported that he was extremely sharp but in the last few years there has been a steady decline in his ability to remember.  He had neuropsychological testing done in 2019 and that did not show any abnormality.  He scored 26/30 on MoCA and I had recommended repeat neuropsychological testing.  He also had lab work for common causes of cognitive decline that included a low folate level and was started on folic acid supplement.  Homocystine was borderline elevated.  Methylmalonic acid level, RPR were normal.  In addition to neuropsychological testing, EEG is also pending     PROBLEM LIST   Patient Active Problem List   Diagnosis Code     HL (hearing loss) H91.90     Erectile dysfunction N52.9     Pseudophakia,  sutured PCL, od; ACL, os - hx of IOL dislocation, ou Z96.1     Posterior vitreous detachment, od H43.819     Epiretinal membrane, mild, od H35.379     Advanced directives, counseling/discussion Z71.89     JOSE JUAN (obstructive sleep apnea)-severe (AHI 61) G47.33     BCC (basal cell carcinoma) C44.91     Cardiomyopathy (H) I42.9     RCT (rotator cuff tear) M75.100     Essential hypertension with goal blood pressure less than 140/90 I10     Hyperlipidemia LDL goal <70 E78.5     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     Macular edema, od H35.81     Angina, class II (H) I20.9     Elevated LFTs R79.89     Fatty liver K76.0     Right carpal tunnel syndrome G56.01     CAD (coronary artery disease) I25.10     S/P carpal tunnel release Z98.890     Right hand weakness R29.898     Pillar pain of extremity M79.609     Folic acid deficiency (non anemic) E53.8     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 (H) I63.439         PAST MEDICAL & SURGICAL HISTORY     Past Medical History:   Patient  has a past medical history of Arthritis, BCC (basal cell carcinoma), Cardiomyopathy (H), 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 (H) (6/27/2022), 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    Surgical History:  He  has a past surgical history that includes Extracapsular cataract extration with intraocular lens implant (2005); Exchange intraocular lens implant (2005); Uvulopalatopharyngoplasty (12/11/2013); Turbinoplasty (12/11/2013); Arthroscopy shoulder rotator cuff repair (02/27/2014); Arthroscopy shoulder biceps tenodesis repair (02/27/2014); biopsy; Cardiac surgery; colonoscopy  "(06/12/2018); and Release carpal tunnel (Right, 08/13/2021).     SOCIAL HISTORY     Reviewed, and he  reports that he quit smoking about 25 years ago. His smoking use included cigarettes. He has a 1.00 pack-year smoking history. He has never used smokeless tobacco. He reports current alcohol use of about 8.3 standard drinks of alcohol per week. He reports that he does not use drugs.     FAMILY HISTORY     Reviewed, and family history includes Cerebrovascular Disease in his father; Coronary Artery Disease in his father; Diabetes in his father; Heart Disease in his father; Hypertension in his father; Lipids in his sister; Obesity in his father.     ALLERGIES     Allergies   Allergen Reactions     Lisinopril Cough     Perfume Other (See Comments)         REVIEW OF SYSTEMS     A 12 point review of system was performed and was negative except as outlined in the history of present illness.     HOME MEDICATIONS     Current Outpatient Rx   Medication Sig Dispense Refill     apixaban ANTICOAGULANT (ELIQUIS ANTICOAGULANT) 5 MG tablet Take 1 tablet (5 mg) by mouth 2 times daily 60 tablet 11     atorvastatin (LIPITOR) 10 MG tablet TAKE ONE TABLET BY MOUTH ONE TIME DAILY 90 tablet 0     cyanocobalamin (VITAMIN B-12) 1000 MCG tablet Take 1,000 mcg by mouth daily       diphenoxylate-atropine (LOMOTIL) 2.5-0.025 MG tablet Take 1 tablet by mouth daily as needed for diarrhea Patient only takes 1 tablet as needed 30 tablet 5     folic acid (FOLVITE) 1 MG tablet Take 1 tablet (1 mg) by mouth 2 times daily 60 tablet 4     losartan (COZAAR) 50 MG tablet TAKE ONE TABLET BY MOUTH ONE TIME DAILY 90 tablet 0     metoprolol succinate ER (TOPROL-XL) 50 MG 24 hr tablet Take 1 tablet by mouth daily (Patient taking differently: Taking half of tablet) 90 tablet 0         PHYSICAL EXAM     Vital signs  /70 (BP Location: Left arm, Patient Position: Sitting)   Pulse 58   Ht 1.727 m (5' 8\")   Wt 89.4 kg (197 lb)   BMI 29.95 kg/m      Weight: "   197 lbs 0 oz    Elderly gentleman who is alert and oriented x3 no acute distress vital signs were reviewed and are documented in electronic medical record.  Neck supple.  Neurologically speech mentation and affect was normal.  He scored 26/30 on MoCA.  He is hard of hearing rest of his cranial nerves are intact.  Strength in extremities 5/5 reflexes 1+ absent at brachioradialis and ankles toes downgoing.  He has dysmetria in finger-nose testing.  He has a wide-based gait.  Romberg negative.     PERTINENT DIAGNOSTIC STUDIES     Following studies were reviewed:     CT HEAD 4/30/2021  1.  No hemorrhage, mass, or mass effect.    MRI BRAIN 6/27/2022  1. Abnormal areas of T2 hyperintense signal involving the left  parietal lobe and bilateral occipital lobes with enhancement, likely  subacute cortical infarcts.  2. 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  3. Nonspecific bilateral cerebellar microhemorrhages, likely  incidental.  4. Volume loss and white matter T2 hyperintensities which likely  represent chronic small vessel ischemic change.    ECHOCARDIOGRAM 4/21/2022  Normal left ventricular size; moderate to severely abnormal left ventricular ejection fraction   estimated at 35% (accuracy may be    reduced, not well visualized).    Left ventricular wall thickness mildly increased.    Global left ventricular hypokinesis with abnormal septal motion consistent with an   intraventricular conduction defect.    Mild left atrial dilatation.    The right ventricle is normal in size and function.    Mild aortic regurgitation.     NEUROPSYCHOLOGICAL TESTING 3/17/2019  This 68-year-old man has been having mild cognitive struggles.  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  Medical history is significant for obesity and sleep apnea; he does not tolerate the CPAP well.  Otherwise, the psychiatric and neurological histories  are unremarkable.      The test findings are largely within normal limits, aside from mild difficulty on one executive task requiring inductive reasoning, which is borderline impaired.  He was a bit hasty and impulsive on this particular test, which may have lowered the score.  This was an isolated finding, as other executive abilities, including divided attention and nonverbal planning, were average.  He was a little slow, in the below average range, on a timed transcription task requiring graphomotor learning.  But short and long-term retention of story passages and figural material was actually above average to superior.  Word knowledge and auditory attention span were average, nonverbal reasoning abilities superior.  He was reasonably fast on timed tasks requiring sustained and divided attention.  Psychomotor speeds were grossly intact except for mild slowing of the left hand on a dexterity task, probably due to peripheral factors (he is missing the tip edge of his left thumb with diminished proprioception).  Expressive and receptive language abilities, including speeded word retrieval and confrontation naming, were within normal limits.  There were no signs of visual misperception or hemispatial visual neglect on any of the visually demanding tasks.      The test findings generally contraindicate acquired brain disease.  The difficulty with inductive reasoning raises the possibility of subtle executive impairment, possibly implying mild frontal lobe dysfunction.  But this is rather speculative as there were no other executive impairments.  More likely Mr. Brown is experiencing subtle cognitive inefficiencies of a benign, functional sort.  This may be related to inattentiveness, missed information due to hearing impairment and possibly reduced alertness secondary to sleep apnea.  These findings will serve as a baseline for future comparisons should there be indications of further decline, greater than  expected with normal ageing.  In the meantime, Mr. Brown appears to be functioning reasonably well and there are no indications of an emerging dementia.      EMG 3/31/2022  This is an abnormal study, demonstrating electrophysiologic evidence of the following:  - Moderate right sided median mononeuropathy at the wrist (carpal tunnel syndrome)  - Mild right sided ulnar mononeuropathy at the elbow     Comment: In comparison to EMG from June 2021, there is no significant change in the severity of median and ulnar mononeuropathies of the right upper extremity.     PERTINENT LABS  Following labs were reviewed:  Ancillary Procedure on 06/27/2022   Component Date Value     Radiologist flags 06/27/2022 Lateral subacute infarcts. (A)   Lab on 06/23/2022   Component Date Value     Vitamin B1 Whole Blood L* 06/23/2022 106      Treponema Antibody Total 06/23/2022 Nonreactive      Methylmalonic Acid 06/23/2022 0.13      Homocysteine 06/23/2022 12.1 (A)     Folic Acid 06/23/2022 3.7 (A)     Sodium 06/23/2022 141      Potassium 06/23/2022 4.0      Chloride 06/23/2022 110 (A)     Carbon Dioxide (CO2) 06/23/2022 26      Anion Gap 06/23/2022 5      Urea Nitrogen 06/23/2022 23      Creatinine 06/23/2022 1.10      Calcium 06/23/2022 9.1      Glucose 06/23/2022 100 (A)     GFR Estimate 06/23/2022 73    Office Visit on 06/13/2022   Component Date Value     TSH 06/13/2022 1.60      Vitamin B12 06/13/2022 263      Sodium 06/13/2022 140      Potassium 06/13/2022 4.3      Chloride 06/13/2022 109      Carbon Dioxide (CO2) 06/13/2022 26      Anion Gap 06/13/2022 5      Urea Nitrogen 06/13/2022 14      Creatinine 06/13/2022 0.87      Calcium 06/13/2022 8.7      Glucose 06/13/2022 87      Alkaline Phosphatase 06/13/2022 130      AST 06/13/2022 14      ALT 06/13/2022 20      Protein Total 06/13/2022 7.1      Albumin 06/13/2022 3.9      Bilirubin Total 06/13/2022 0.4      GFR Estimate 06/13/2022 >90      Cholesterol 06/13/2022 92       Triglycerides 06/13/2022 124      Direct Measure HDL 06/13/2022 40      LDL Cholesterol Calculat* 06/13/2022 27      Non HDL Cholesterol 06/13/2022 52      Patient Fasting > 8hrs? 06/13/2022 No      WBC Count 06/13/2022 5.5      RBC Count 06/13/2022 4.72      Hemoglobin 06/13/2022 13.8      Hematocrit 06/13/2022 42.3      MCV 06/13/2022 90      MCH 06/13/2022 29.2      MCHC 06/13/2022 32.6      RDW 06/13/2022 15.3 (A)     Platelet Count 06/13/2022 251      % Neutrophils 06/13/2022 58      % Lymphocytes 06/13/2022 29      % Monocytes 06/13/2022 12      % Eosinophils 06/13/2022 1      % Basophils 06/13/2022 0      Absolute Neutrophils 06/13/2022 3.2      Absolute Lymphocytes 06/13/2022 1.6      Absolute Monocytes 06/13/2022 0.7      Absolute Eosinophils 06/13/2022 0.1      Absolute Basophils 06/13/2022 0.0          Total time spent for face to face visit, reviewing labs/imaging studies, counseling and coordination of care was: 45 Minutes spent on the date of the encounter doing chart review, review of outside records, review of test results, interpretation of tests, patient visit, documentation and discussion with family       This note was dictated using voice recognition software.  Any grammatical or context distortions are unintentional and inherent to the software.    No orders of the defined types were placed in this encounter.     New Prescriptions    APIXABAN ANTICOAGULANT (ELIQUIS ANTICOAGULANT) 5 MG TABLET    Take 1 tablet (5 mg) by mouth 2 times daily     Modified Medications    No medications on file                     Again, thank you for allowing me to participate in the care of your patient.        Sincerely,        Nathen Ruiz MD

## 2022-06-28 NOTE — NURSING NOTE
Chief Complaint   Patient presents with     Cerebral Vascular Accident (Cva)     Discuss test results and next steps      Amy Yang CMA on 6/28/2022 at 12:11 PM

## 2022-06-28 NOTE — RESULT ENCOUNTER NOTE
Kris García,   Lab work for common causes of memory decline were normal except for low folic acid level for which you were started on supplements.  Homocystine level is borderline elevated, likely due to low folic acid level.    Nathen Ruiz MD

## 2022-06-29 ENCOUNTER — HOSPITAL ENCOUNTER (OUTPATIENT)
Dept: CARDIOLOGY | Facility: HOSPITAL | Age: 72
Discharge: HOME | End: 2022-06-29
Attending: PSYCHIATRY & NEUROLOGY
Payer: COMMERCIAL

## 2022-06-29 DIAGNOSIS — I63.439 CEREBROVASCULAR ACCIDENT (CVA) DUE TO EMBOLISM OF POSTERIOR CEREBRAL ARTERY WITH INFARCTIONS OF BOTH OCCIPITAL LOBES (H): ICD-10-CM

## 2022-06-29 DIAGNOSIS — I10 ESSENTIAL HYPERTENSION WITH GOAL BLOOD PRESSURE LESS THAN 140/90: ICD-10-CM

## 2022-06-29 DIAGNOSIS — E78.5 HYPERLIPIDEMIA LDL GOAL <70: ICD-10-CM

## 2022-06-29 DIAGNOSIS — E53.8 FOLIC ACID DEFICIENCY (NON ANEMIC): ICD-10-CM

## 2022-06-29 PROBLEM — I35.1 NONRHEUMATIC AORTIC VALVE INSUFFICIENCY: Status: ACTIVE | Noted: 2022-06-29

## 2022-06-29 PROBLEM — I50.20 HFREF (HEART FAILURE WITH REDUCED EJECTION FRACTION) (H): Status: ACTIVE | Noted: 2022-06-29

## 2022-06-29 LAB — LVEF ECHO: NORMAL

## 2022-06-29 PROCEDURE — 93270 REMOTE 30 DAY ECG REV/REPORT: CPT

## 2022-06-29 PROCEDURE — 93306 TTE W/DOPPLER COMPLETE: CPT | Mod: 26 | Performed by: INTERNAL MEDICINE

## 2022-06-29 PROCEDURE — 255N000002 HC RX 255 OP 636: Performed by: PSYCHIATRY & NEUROLOGY

## 2022-06-29 RX ORDER — ATORVASTATIN CALCIUM 10 MG/1
10 TABLET, FILM COATED ORAL DAILY
Qty: 90 TABLET | Refills: 0 | OUTPATIENT
Start: 2022-06-29

## 2022-06-29 RX ORDER — LOSARTAN POTASSIUM 50 MG/1
50 TABLET ORAL DAILY
Qty: 90 TABLET | Refills: 0 | OUTPATIENT
Start: 2022-06-29

## 2022-06-29 RX ORDER — FOLIC ACID 1 MG/1
1 TABLET ORAL 2 TIMES DAILY
Qty: 60 TABLET | Refills: 4 | Status: SHIPPED | OUTPATIENT
Start: 2022-06-29 | End: 2022-11-21

## 2022-06-29 RX ADMIN — PERFLUTREN 4 ML: 6.52 INJECTION, SUSPENSION INTRAVENOUS at 09:36

## 2022-06-29 NOTE — TELEPHONE ENCOUNTER
DIAGNOSIS: Carpal tunnel   APPOINTMENT DATE: 07/08/2022   NOTES STATUS DETAILS   OFFICE NOTE from referring provider Internal 05/24/2022 Dr Lawrence PLAZAFV orders only  02/09/2022 Dr Lawrence BLACK    OFFICE NOTE from other specialist N/A    DISCHARGE SUMMARY from hospital N/A    DISCHARGE REPORT from the ER N/A    OPERATIVE REPORT N/A    EMG report N/A    MEDICATION LIST N/A    MRI Internal 05/19/2022 RT wrist   DEXA (osteoporosis/bone health) N/A    CT SCAN N/A    XRAYS (IMAGES & REPORTS) N/A

## 2022-06-29 NOTE — TELEPHONE ENCOUNTER
Pharmacy change for pt from [facility] to [facility]. Pt is wanting his medications sent to new pharmacy. Will send the medication that Dr. Ruiz fills to pharmacy.     Kristin Mckeon RN on 6/29/2022 at 10:41 AM

## 2022-07-05 ENCOUNTER — MYC MEDICAL ADVICE (OUTPATIENT)
Dept: INTERNAL MEDICINE | Facility: CLINIC | Age: 72
End: 2022-07-05

## 2022-07-06 NOTE — TELEPHONE ENCOUNTER
Can you please help me with this patient question ?     I need help as follows     1. please find who placed these diagnoses he's referring to  2. If necessary , attach office visit notes / test results   3. I need these things to answer his questions and we can schedule this patient for a telephone MD visit in order to answer all these questions [ once I have the requested information in habnd ]    If he prefers , office, video, or telephone visit is all going to work    Holland Blunt MD

## 2022-07-07 NOTE — TELEPHONE ENCOUNTER
Dr. Blunt    Patient notes indicate that heart failure was mentioned as far back as 2013. Patient was seeing a provider through the [facility] clinic during the year 2013. On 6/29/22 patient had an ECHO at [facility] for CVA and the attending was Nathen Ruiz MD which is the same date the diagnosis was entered into patient chart. I am assuming this is the provider that entered the diagnosis and the reason why we are struggling to find more information is because it's through [facility].     Here are result notes:  Interpretation Summary       Bubble study was performed.   No evidence of right to left shunt at rest but evidence of small right to left   shunt following valsalva manuever.   There is mild concentric left ventricular hypertrophy.   There is moderate global hypokinesia of the left ventricle.   The visual ejection fraction is 35-40%.   Mildly decreased right ventricular systolic function   There is trace to mild mitral regurgitation.   The aortic valve is trileaflet with aortic valve sclerosis.   There is mild to moderate (1-2+) aortic regurgitation.   There is trace tricuspid regurgitation.   Compared to the prior study dated 9/29/2020, there are changes as noted. There   has been a decline in LV and RV systolic fuction.   ______________________________________________________________________________   Left Ventricle   The left ventricle is normal in size. There is mild concentric left   ventricular hypertrophy. The visual ejection fraction is 35-40%. Diastolic   Doppler findings (E/E' ratio and/or other parameters) suggest left ventricular   filling pressures are indeterminate. There is moderate global hypokinesia of   the left ventricle.       Right Ventricle   The right ventricle is normal size. Mildly decreased right ventricular   systolic function.       Atria   The left atrium is borderline dilated. Right atrial size is normal. A Valsalva   maneuver was performed. No evidence of right to  left shunt at rest but   evidence of small right to left shunt following valsalva manuever.       Mitral Valve   Mitral valve leaflets appear normal. There is trace to mild mitral   regurgitation.       Tricuspid Valve   Tricuspid valve leaflets appear normal. There is trace tricuspid   regurgitation.       Aortic Valve   The aortic valve is trileaflet with aortic valve sclerosis. There is mild to   moderate (1-2+) aortic regurgitation. No aortic stenosis is present.       Pulmonic Valve   Normal pulmonic valve. There is trace pulmonic valvular regurgitation.       Vessels   The aorta root is normal. Normal size ascending aorta.       Pericardium   There is no pericardial effusion.         Thanks,  Aziza, RN  [facility]

## 2022-07-08 ENCOUNTER — OFFICE VISIT (OUTPATIENT)
Dept: ORTHOPEDICS | Facility: CLINIC | Age: 72
End: 2022-07-08
Payer: COMMERCIAL

## 2022-07-08 ENCOUNTER — PRE VISIT (OUTPATIENT)
Dept: ORTHOPEDICS | Facility: CLINIC | Age: 72
End: 2022-07-08

## 2022-07-08 DIAGNOSIS — G56.01 RIGHT CARPAL TUNNEL SYNDROME: Primary | ICD-10-CM

## 2022-07-08 PROCEDURE — 99203 OFFICE O/P NEW LOW 30 MIN: CPT | Performed by: STUDENT IN AN ORGANIZED HEALTH CARE EDUCATION/TRAINING PROGRAM

## 2022-07-08 NOTE — LETTER
7/8/2022         RE: Ricky Brown  [address]  [facility] [address]        Dear Colleague,    Thank you for referring your patient, Ricky Brown, to the [facility]. Please see a copy of my visit note below.    Ortho Hand    HPI: 71 year old LHD NS hx prior R CTR 8/2021 p/w persistent R hand tingling and numbness. Patient had surgery last August for carpal tunnel syndrome and did not have any relief. Provocative symptoms did not improve. He still did not have any improvement in baseline tingling and numbness. 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 Of note, he has an evolving heart condition and is with a Holter monitor and he has had 2-3 small strokes in the last 2-3 weeks stemming from posterior cerebral artery emboli. He sees cardiology next month.     ROS: Negative, see HPI  PMH: CVA, HTN, HLD, CAD, JOSE JUAN  PSH: R CTR 8/2021  Medications: Eliquis, Lipitor, Lomotil, Losartan, Metoprolol  Allergies: Lisinopril  SH: Nonsmoker, denies any tobacco or nicotine use  FH: No bleeding or clotting issues, or problems with anesthesia    Examination:  Nonlabored breathing  Not distressed  R wrist with positive Tinel's and Durkan's test  R elbow with positive Tinel's and flexion test  Can make a full composite R fist  Strong R hand intrinsics and normal thumb opposition, but may have some early signs of R thenar atrophy compared with the L    NCS/EMG: Moderate R CTS, mild ulnar nerve compressive neuropathy at the elbow, R median APB latencu of 5.1 ms, R ulnar motor CV of 38.4 m/s and 46.8 m/s above the elbow.     A/P: 71 year old LHD NS hx recent CVA on Eliquis s/p R CTR p/w persistent R CTS, mild R CuTS    -Since patient has an evolving heart issue, my recommendation is for the patient to trial elbow pad use since he has not tried. If it helps with the ulnar-distributed symptoms, then perhaps we  can avoid ulnar nerve release at the elbow. In either case, patient should continue wrist splinting and stretching. Also, once he meets with cardiology, can discuss the possibility of needing surgery and can be risk stratified for this. Patient is agreeable.  -Return to clinic in 4-6 weeks  -A total of 30 minutes was devoted to review of chart, direct face-to-face patient counseling and documentation during this encounter    Vignesh Rhodes MD, PhD          Again, thank you for allowing me to participate in the care of your patient.        Sincerely,        Vignesh Rhodes MD

## 2022-07-08 NOTE — NURSING NOTE
Chief Complaint   Patient presents with     Right Wrist - New Patient     Consult       There were no vitals filed for this visit.    There is no height or weight on file to calculate BMI.      SMILEY Ayers NREMT

## 2022-07-08 NOTE — PATIENT INSTRUCTIONS
Dr. Rhodes recommends getting a Heelbo elbow sleeve/pad.    ------------------------------------------------    Thanks for coming today.  Ortho/Sports Medicine Clinic  [address] [place] [address]    To schedule future appointments in Ortho Clinic, you may call [phone].    To schedule ordered imaging or an injection ordered by your provider:  Call Central Imaging Injection scheduling line: [phone]    Emerging Threats available online at:  [email or web address]    Please call if any further questions or concerns ([phone]).  Clinic hours 8 am to 5 pm.    Return to clinic (call) if symptoms worsen or fail to improve.

## 2022-07-09 NOTE — PROGRESS NOTES
Ortho Hand    HPI: 71 year old LHD NS hx prior R CTR 8/2021 p/w persistent R hand tingling and numbness. Patient had surgery last August for carpal tunnel syndrome and did not have any relief. Provocative symptoms did not improve. He still did not have any improvement in baseline tingling and numbness. 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 Of note, he has an evolving heart condition and is with a Holter monitor and he has had 2-3 small strokes in the last 2-3 weeks stemming from posterior cerebral artery emboli. He sees cardiology next month.     ROS: Negative, see HPI  PMH: CVA, HTN, HLD, CAD, JOSE JUAN  PSH: R CTR 8/2021  Medications: Eliquis, Lipitor, Lomotil, Losartan, Metoprolol  Allergies: Lisinopril  SH: Nonsmoker, denies any tobacco or nicotine use  FH: No bleeding or clotting issues, or problems with anesthesia    Examination:  Nonlabored breathing  Not distressed  R wrist with positive Tinel's and Durkan's test  R elbow with positive Tinel's and flexion test  Can make a full composite R fist  Strong R hand intrinsics and normal thumb opposition, but may have some early signs of R thenar atrophy compared with the L    NCS/EMG: Moderate R CTS, mild ulnar nerve compressive neuropathy at the elbow, R median APB latencu of 5.1 ms, R ulnar motor CV of 38.4 m/s and 46.8 m/s above the elbow.     A/P: 71 year old LHD NS hx recent CVA on Eliquis s/p R CTR p/w persistent R CTS, mild R CuTS    -Since patient has an evolving heart issue, my recommendation is for the patient to trial elbow pad use since he has not tried. If it helps with the ulnar-distributed symptoms, then perhaps we can avoid ulnar nerve release at the elbow. In either case, patient should continue wrist splinting and stretching. Also, once he meets with cardiology, can discuss the possibility of needing surgery and can be risk stratified for this. Patient is  agreeable.  -Return to clinic in 4-6 weeks  -A total of 30 minutes was devoted to review of chart, direct face-to-face patient counseling and documentation during this encounter    Vignesh Rhodes MD, PhD

## 2022-08-01 PROCEDURE — 93272 ECG/REVIEW INTERPRET ONLY: CPT | Performed by: INTERNAL MEDICINE

## 2022-08-02 ENCOUNTER — OFFICE VISIT (OUTPATIENT)
Dept: FAMILY MEDICINE | Facility: CLINIC | Age: 72
End: 2022-08-02
Payer: COMMERCIAL

## 2022-08-02 VITALS
HEART RATE: 68 BPM | BODY MASS INDEX: 28.94 KG/M2 | SYSTOLIC BLOOD PRESSURE: 154 MMHG | WEIGHT: 190.3 LBS | TEMPERATURE: 97.5 F | OXYGEN SATURATION: 95 % | DIASTOLIC BLOOD PRESSURE: 82 MMHG

## 2022-08-02 DIAGNOSIS — I10 ESSENTIAL HYPERTENSION WITH GOAL BLOOD PRESSURE LESS THAN 140/90: Chronic | ICD-10-CM

## 2022-08-02 DIAGNOSIS — M54.6 ACUTE RIGHT-SIDED THORACIC BACK PAIN: Primary | ICD-10-CM

## 2022-08-02 DIAGNOSIS — I63.439 CEREBROVASCULAR ACCIDENT (CVA) DUE TO EMBOLISM OF POSTERIOR CEREBRAL ARTERY WITH INFARCTIONS OF BOTH OCCIPITAL LOBES (H): ICD-10-CM

## 2022-08-02 PROCEDURE — 99213 OFFICE O/P EST LOW 20 MIN: CPT | Performed by: PHYSICIAN ASSISTANT

## 2022-08-02 ASSESSMENT — PAIN SCALES - GENERAL: PAINLEVEL: MILD PAIN (3)

## 2022-08-02 NOTE — PATIENT INSTRUCTIONS
Choose either the tylenol or the Marie. Don't use together.   Can you 1000mg of tylenol 3 times per day.     Try over the counter lidocaine patches.   Physical therapy will call you.         BRING YOUR BLOOD PRESSURE CUFF TO YOUR CARDIOLOGY APPOINTMENT TOMORROW.

## 2022-08-02 NOTE — PROGRESS NOTES
"  Assessment & Plan     Acute right-sided thoracic back pain  Will get x-rays and wait for formal read. Patient would benefit from physical therapy. Try lidocaine patches and ice.   - XR Thoracic Spine 2 Views; Future  - Physical Therapy Referral; Future    Essential hypertension with goal blood pressure less than 140/90  High today, has cardiology appointment tomorrow. Bring blood pressure cuff with to appointment.     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 (H)  Updated chart to reflect blood thinner status.   - ASPIRIN NOT PRESCRIBED (INTENTIONAL); Please choose reason not prescribed from choices below.             BMI:   Estimated body mass index is 28.94 kg/m  as calculated from the following:    Height as of 6/28/22: 1.727 m (5' 8\").    Weight as of this encounter: 86.3 kg (190 lb 4.8 oz).           Return in about 4 weeks (around 8/30/2022) for schedule annual wellness visit with nurses. .    Brandee Joshua PA-C  [facility] YAQUELIN Gacría is a 71 year old, presenting for the following health issues:  No chief complaint on file.      History of Present Illness       Back Pain:  He presents for follow up of back pain. Patient's back pain is a recurring problem.  Location of back pain:  Right lower back and right middle of back  Description of back pain: sharp  Back pain spreads: nowhere    Since patient first noticed back pain, pain is: always present, but gets better and worse  Does back pain interfere with his job:  Yes      He eats 0-1 servings of fruits and vegetables daily.He consumes 1 sweetened beverage(s) daily.He exercises with enough effort to increase his heart rate 10 to 19 minutes per day.  He exercises with enough effort to increase his heart rate 4 days per week. He is missing 1 dose(s) of medications per week.  He is not taking prescribed medications regularly due to remembering to take.   "     Pain History:  When did you first notice your pain? - Acute Pain   Have you seen anyone else for your pain? No  Where in your body do you have pain? Back Pain  Onset/Duration:  6 months.   Description:   Location of pain: low back right  Character of pain: sharp and waxing and waning  Pain radiation: none  New numbness or weakness in legs, not attributed to pain: no   Intensity: Currently 3/10, At its worst 9/10  Progression of Symptoms: same  History:   Specific cause: none  Pain interferes with job: does not   History of back problems: no prior back problems  Any previous MRI or X-rays: None  Sees a specialist for back pain: No  Alleviating factors:   Improved by: laying    Precipitating factors:  Worsened by: moving   Therapies tried and outcome: acetaminophen (Tylenol)    Accompanying Signs & Symptoms:  Risk of Fracture: None  Risk of Cauda Equina: None  Risk of Infection: None  Risk of Cancer: None  Risk of Ankylosing Spondylitis: Onset at age <35, male, AND morning back stiffness  no       Has been going to the chiropractor once per week for 10-12 weeks.   More motion in the neck with this.     2-3 weeks ago was picking up about 50# object and had more pain in the right upper ribs. Slightly tender, but improving. Pain with rolling.     Pain is located on the right thoracic area.   Leaning over causes the most pain.     The most comfortable thing is to lay in bed- stays in bed until 12pm.   Taking a 4 ounce seth and tylenol nightly for the pain.     No cough or shortness of breath.       Review of Systems   Constitutional, HEENT, cardiovascular, pulmonary, gi and gu systems are negative, except as otherwise noted.      Objective    BP (!) 154/82   Pulse 68   Temp 97.5  F (36.4  C) (Oral)   Wt 86.3 kg (190 lb 4.8 oz)   SpO2 95%   BMI 28.94 kg/m    Body mass index is 28.94 kg/m .  Physical Exam   GENERAL: healthy, alert and no distress  MS: no gross musculoskeletal defects noted, no edema- pain with  palpation of the right thoracic paraspinal muscles. No thoracic spinous process tenderness.   Reduced extension and external and internal rotation of the right shoulder. full range of motion of the left shoulder.   SKIN: no suspicious lesions or rashes  NEURO: Normal strength and tone, mentation intact and speech normal, deep tendon reflexes intact,  strength normal.                     .  ..

## 2022-08-03 ENCOUNTER — OFFICE VISIT (OUTPATIENT)
Dept: CARDIOLOGY | Facility: CLINIC | Age: 72
End: 2022-08-03
Payer: COMMERCIAL

## 2022-08-03 ENCOUNTER — TELEPHONE (OUTPATIENT)
Dept: CARDIOLOGY | Facility: CLINIC | Age: 72
End: 2022-08-03

## 2022-08-03 ENCOUNTER — DOCUMENTATION ONLY (OUTPATIENT)
Dept: CARDIOLOGY | Facility: CLINIC | Age: 72
End: 2022-08-03

## 2022-08-03 ENCOUNTER — PREP FOR PROCEDURE (OUTPATIENT)
Dept: CARDIOLOGY | Facility: CLINIC | Age: 72
End: 2022-08-03

## 2022-08-03 VITALS
RESPIRATION RATE: 12 BRPM | SYSTOLIC BLOOD PRESSURE: 146 MMHG | HEART RATE: 68 BPM | WEIGHT: 189 LBS | DIASTOLIC BLOOD PRESSURE: 76 MMHG | BODY MASS INDEX: 28.74 KG/M2

## 2022-08-03 DIAGNOSIS — I63.419 CEREBROVASCULAR ACCIDENT (CVA) DUE TO EMBOLISM OF MIDDLE CEREBRAL ARTERY, UNSPECIFIED BLOOD VESSEL LATERALITY (H): Primary | ICD-10-CM

## 2022-08-03 DIAGNOSIS — I42.9 CARDIOMYOPATHY, UNSPECIFIED TYPE (H): ICD-10-CM

## 2022-08-03 DIAGNOSIS — I10 ESSENTIAL HYPERTENSION WITH GOAL BLOOD PRESSURE LESS THAN 140/90: ICD-10-CM

## 2022-08-03 DIAGNOSIS — I25.10 CAD (CORONARY ARTERY DISEASE): ICD-10-CM

## 2022-08-03 DIAGNOSIS — I42.9 CARDIOMYOPATHY, UNSPECIFIED TYPE (H): Primary | ICD-10-CM

## 2022-08-03 PROCEDURE — 99204 OFFICE O/P NEW MOD 45 MIN: CPT | Performed by: INTERNAL MEDICINE

## 2022-08-03 RX ORDER — SODIUM CHLORIDE 9 MG/ML
INJECTION, SOLUTION INTRAVENOUS CONTINUOUS
Status: CANCELLED | OUTPATIENT
Start: 2022-08-08

## 2022-08-03 RX ORDER — DIAZEPAM 5 MG
5 TABLET ORAL
Status: CANCELLED | OUTPATIENT
Start: 2022-08-08

## 2022-08-03 RX ORDER — METOPROLOL SUCCINATE 25 MG/1
25 TABLET, EXTENDED RELEASE ORAL 2 TIMES DAILY
Qty: 180 TABLET | Refills: 11 | Status: SHIPPED | OUTPATIENT
Start: 2022-08-03 | End: 2023-10-25

## 2022-08-03 RX ORDER — ASPIRIN 325 MG
325 TABLET ORAL ONCE
Status: CANCELLED | OUTPATIENT
Start: 2022-08-08 | End: 2022-08-03

## 2022-08-03 RX ORDER — FENTANYL CITRATE 50 UG/ML
25 INJECTION, SOLUTION INTRAMUSCULAR; INTRAVENOUS
Status: CANCELLED | OUTPATIENT
Start: 2022-08-08

## 2022-08-03 RX ORDER — ASPIRIN 81 MG/1
243 TABLET, CHEWABLE ORAL ONCE
Status: CANCELLED | OUTPATIENT
Start: 2022-08-08

## 2022-08-03 RX ORDER — METOPROLOL SUCCINATE 25 MG/1
25 TABLET, EXTENDED RELEASE ORAL 2 TIMES DAILY
Qty: 180 TABLET | Refills: 11 | Status: SHIPPED | OUTPATIENT
Start: 2022-08-03 | End: 2022-08-03

## 2022-08-03 RX ORDER — LIDOCAINE 40 MG/G
CREAM TOPICAL
Status: CANCELLED | OUTPATIENT
Start: 2022-08-03

## 2022-08-03 NOTE — PROGRESS NOTES
----- Message -----  From: Ellen Arias  Sent: 8/3/2022  10:59 AM CDT  To: Caitlin Mackenzie RN  Subject: CJP ORDERING                                     CORS POSS PCI WITH EMG/MY     730AM ADMIT 8/8    H&P: 8/3 WITH CJP     ALERTS: TAHMINA CALZADA HOME ANITGEN TEST TO BE COMPLETED BY PT 2 DAYS PRIOR     THANKS!   -ELLEN

## 2022-08-03 NOTE — LETTER
8/3/2022    Holland Blunt MD  [address]  [place] [address]    RE: Ricky Brown       Dear Colleague,     I had the pleasure of seeing Ricky Brown in the [facility].    Thank you, Dr. Milian ref. provider found, for asking the [facility] team to see Mr. Ricky Brown to evaluate       Assessment/Recommendations   Assessment/Plan:  1. S/p CVA multiple and embolic, 30 day monitor neg for afib, noted PFO, but also hx of CM with LGE, but with concern for higher risk for afib with JOSE JUAN/EtOH would set up second 30 day monitor, he will see JOSE JUAN physician and will hold on PFO closure at this time but cont eliquis  2. CM - EF 40% on MRI with LGE -  noted last LDL 27 on statin and only mild dz on last Cath in 2013, but   but also fatigue on exertion with strong family hx of severe CAD, will start with cors/LVEDP to examine mid to distal dz, if none present consider PET scan to screen for scleroderma, avoid EtOH.   On metoprolol succiante 25mg and try to titrate up to 25mg bid, cont losartan 50mg, may consider entresto/SGLT2 inhibitor (although more expensive).   3. CV prevention at target with atorvastatin LDL< 70  4. AI - mild on exam      Follow up 2 months     History of Present Illness/Subjective    Mr. Ricky Brown is a 71 year old male with CM, EF 42% with LGE nonischemic fibrosis, no ischemia on MR 5/2020, CVA withmild carotid dz, JOSE JUAN, aortic insufficiency mild to mod on echo 5/2022 with EF 35-40%, 2013 cors normal.  Concern for emoblic CVA and started on eliquis recently.   Has JOSE JUAN and can't tolerate CPAP, had been working on wt loss with BMI down to 28, once in a white feels something is not right - pules upto 120 at times, he does report more fatigue and dyspnae on exertion but no chest pain on exertion, he did have an episode of chest pain that is random, pain described as dull and hard to tell if even there.  No syncopal events he can recall but  wife found him unconsious 8-12 weeks ago after drinking 8 ounces seth, saw neurology and found both old and new CVA. No PND/orthopnea, he sleeps flat on his water bed due to back pain for 6 mo.  Had 25d montinor no afib.  Noted fathe r with CAD s/p MI/CABG.            Physical Examination Review of Systems   BP (!) 146/76 (BP Location: Left arm, Patient Position: Sitting, Cuff Size: Adult Regular)   Pulse 68   Resp 12   Wt 85.7 kg (189 lb)   BMI 28.74 kg/m    Body mass index is 28.74 kg/m .  Wt Readings from Last 3 Encounters:   08/03/22 85.7 kg (189 lb)   08/02/22 86.3 kg (190 lb 4.8 oz)   06/28/22 89.4 kg (197 lb)     [unfilled]  General Appearance:   no distress, normal body habitus   ENT/Mouth: membranes moist, no oral lesions or bleeding gums.      EYES:  no scleral icterus, normal conjunctivae   Neck: no carotid bruits or thyromegaly   Chest/Lungs:   lungs are clear to auscultation, no rales or wheezing,  sternal scar, equal chest wall expansion    Cardiovascular:   Regular. Normal first and second heart sounds with no murmurs, rubs, or gallops; the carotid, radial and posterior tibial pulses are intact, Jugular venous pressure , edema bilaterally    Abdomen:  no organomegaly, masses, bruits, or tenderness; bowel sounds are present   Extremities: no cyanosis or clubbing   Skin: no xanthelasma, warm.    Neurologic: normal  bilateral, no tremors     Psychiatric: alert and oriented x3, calm     Review of Systems - 12 points nega other than above      Medical History  Surgical History Family History Social History   Past Medical History:   Diagnosis Date     Arthritis      BCC (basal cell carcinoma)     right shoulder      Cardiomyopathy (H)      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 (H) 06/27/2022     Colon adenomas 09/20/2011     Coronary artery disease      Disorder of bursae and tendons in shoulder region 04/18/2014     ED  (erectile dysfunction)      HFrEF (heart failure with reduced ejection fraction) (H)      History of iritis, os 05/27/2019     HTN (hypertension)      Near syncope 02/10/2014     Noncompliance      Nonrheumatic aortic valve insufficiency      Obesity      JOSE JUAN (obstructive sleep apnea)     Past Surgical History:   Procedure Laterality Date     ARTHROSCOPY SHOULDER BICEPS TENODESIS REPAIR  02/27/2014    Procedure: ARTHROSCOPY SHOULDER BICEPS TENODESIS REPAIR;;  Surgeon: Michael Hagen MD;  [facility]     ARTHROSCOPY SHOULDER ROTATOR CUFF REPAIR  02/27/2014    Procedure: ARTHROSCOPY SHOULDER ROTATOR CUFF REPAIR;  Right Shoulder Arthroscopic Rotator Cuff Repair,  Subacromial Decompression, Biceps Tenodesis, Distal Clavicle Excision   ;  Surgeon: Micahel Hagen MD;  [facility]     BIOPSY       CARDIAC SURGERY       COLONOSCOPY  06/12/2018     EXCHANGE INTRAOCULAR LENS IMPLANT  2005    left eye - first, recentered PCL with iris fixation; then IOLX with ACL     EXTRACAPSULAR CATARACT EXTRATION WITH INTRAOCULAR LENS IMPLANT  2005    both eyes (elsewhere)     RELEASE CARPAL TUNNEL Right 08/13/2021    Procedure: RELEASE, RIGHT CARPAL TUNNEL;  Surgeon: Tony Bravo MD;  Location: [facility]     TURBINOPLASTY  12/11/2013    Procedure: TURBINOPLASTY;;  Surgeon: Lisset Parsons MD;  [facility]: [facility]     UVULOPALATOPHARYNGOPLASTY  12/11/2013    Procedure: UVULOPALATOPHARYNGOPLASTY;  Uvulopalatopharyngoplasty, Turbiante Reduction;  Surgeon: Lisset Parsons MD;  Location: [facility] OR    Family History   Problem Relation Age of Onset     Diabetes Father         Old age Diabetes     Cerebrovascular Disease Father      Obesity Father      Heart Disease Father      Coronary Artery Disease Father      Hypertension Father      Lipids Sister      C.A.D. No family hx of      Cancer No family hx of      Glaucoma No family hx of      Macular Degeneration No family hx of     Social History      Socioeconomic History     Marital status:      Spouse name: Kyung     Number of children: 0     Years of education: 14     Highest education level: Not on file   Occupational History     Occupation: industrial electronics      Employer: SELF   Tobacco Use     Smoking status: Former Smoker     Packs/day: 0.50     Years: 2.00     Pack years: 1.00     Types: Cigarettes     Quit date: 1996     Years since quittin.6     Smokeless tobacco: Never Used   Vaping Use     Vaping Use: Never used   Substance and Sexual Activity     Alcohol use: Yes     Alcohol/week: 8.3 standard drinks     Comment: 1-2 drinks a day     Drug use: No     Sexual activity: Yes     Partners: Female     Birth control/protection: Male Surgical     Comment: 2006 vasectomy   Other Topics Concern     Parent/sibling w/ CABG, MI or angioplasty before 65F 55M? No   Social History Narrative     Not on file     Social Determinants of Health     Financial Resource Strain: Not on file   Food Insecurity: Not on file   Transportation Needs: Not on file   Physical Activity: Not on file   Stress: Not on file   Social Connections: Not on file   Intimate Partner Violence: Not on file   Housing Stability: Not on file          Medications  Allergies   Scheduled Meds:  Continuous Infusions:  PRN Meds:. Allergies   Allergen Reactions     Lisinopril Cough     Perfume Other (See Comments)         Lab Results    Chemistry/lipid CBC Cardiac Enzymes/BNP/TSH/INR   Lab Results   Component Value Date    CHOL 92 2022    HDL 40 2022    TRIG 124 2022    BUN 23 2022     2022    CO2 26 2022    Lab Results   Component Value Date    WBC 5.5 2022    HGB 13.8 2022    HCT 42.3 2022    MCV 90 2022     2022    Lab Results   Component Value Date    TSH 1.60 2022              Manfred Henley MD  Interventional Cardiology  [facility]      Thank you for allowing me to  participate in the care of your patient.      Sincerely,     Manfred Henley MD   [facility]  cc: No referring provider defined for this encounter.

## 2022-08-03 NOTE — PROGRESS NOTES
Ricky Brown  [address]  [place] [address]  [phone] (home)     PCP:  Holland Blunt  H&P completed by:  8/3/22, CJP  Admit date: 8/8/22 Arrival time:  0730  Anticoagulation:  apixaban (ELIQUIS) (Hold for 24 hours prior per Dr. Henley)  Previous PCI: No, Cors 5/28/13  Bypass Grafts: No  Renal Issues: No  Diabetic?: No  Device?: No  Type:  n/a  Ambulation status: independent    Reason for Visit:  Patient seen for pre-procedure education in preparation for: Coronary Angiogram with Possible PCI    Procedure Prep:  EKG results obtained, dated: To be completed on day of cath lab procedure  Hemogram results obtained: To be completed on day of cath lab procedure  Basic Metabolic Panel results obtained: To be completed on day of cath lab procedure  Pertinent cardiac test results: Event monitor, Echo 6/29/22  COVID-19 test results obtained: Home Antigen      Patient Education    1. Your arrival time is 0730.  Location is [facility] - [address] [place] [id number] - Main Entrance of the Hospital  2. Please plan on being at the hospital all day.  3. At any time, emergencies and/or urgent cases may come up which could delay the start of your procedure.    COVID Testing Instructions  *Mandatory COVID Testing:   ALL Patients will need to complete a COVID test no sooner than 4 days prior to their procedure (regardless of vaccination status).      To schedule COVID testing Please call [phone]    If you want to complete this at an outside facility please call them to find out if they will have the results within the appropriate time frame and their fax number.  You will need to provide us with that information so we can send the order.    The facility completing the test will need to fax the results to [phone]    If you are running into and issues please call us.     Pre-procedure instructions  Patient instructed to not Eat or Drink after midnight.  Patient  instructed to shower the evening before or the morning of the procedure.  Patient instructed to arrange for transportation home following procedure from a responsible family member or friend. No driving for at least 24 hours.  Patient instructed to have a responsible adult with them for 24 hours post-procedure.  Post-procedure follow up process.  Conscious sedation discussed.      Pre-procedure medication instructions.  Continue medications as scheduled, with a small amount of water on the day of the procedure unless indicated. (NO Diabetic Medications or Blood Thinners)  Pt instructed not to consume Alcohol, Tobacco, Caffeine, or Carbonated beverages 12 hours prior to procedure.  Patient instructed to take 324 mg of Aspirin the morning of procedure: Yes  Other medication: instructed to HOLD Eliquis 24 hours prior to procedure and all vitamins, mineral and supplements  a.m. of the procedure.             Anticoagulation Medication Instructions     apixaban (ELIQUIS) - Hold 24 hours prior to procedure    Patient states understanding of procedure and agrees to proceed.    *PATIENTS RECORDS AVAILABLE IN [facility] UNLESS OTHERWISE INDICATED*      Patient Active Problem List   Diagnosis     HL (hearing loss)     Erectile dysfunction     Pseudophakia, sutured PCL, od; ACL, os - hx of IOL dislocation, ou     Posterior vitreous detachment, od     Epiretinal membrane, mild, od     Advanced directives, counseling/discussion     JOSE JUAN (obstructive sleep apnea)-severe (AHI 61)     BCC (basal cell carcinoma)     Cardiomyopathy (H)     RCT (rotator cuff tear)     Essential hypertension with goal blood pressure less than 140/90     Hyperlipidemia LDL goal <70     Megalocornea     Macular edema, od     Angina, class II (H)     Elevated LFTs     Fatty liver     Right carpal tunnel syndrome     CAD (coronary artery disease)     S/P carpal tunnel release     Right hand weakness     Pillar pain of extremity     Folic acid deficiency (non  anemic)     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 (H)     HFrEF (heart failure with reduced ejection fraction) (H)     Nonrheumatic aortic valve insufficiency       Current Outpatient Medications   Medication Sig Dispense Refill     apixaban ANTICOAGULANT (ELIQUIS ANTICOAGULANT) 5 MG tablet Take 1 tablet (5 mg) by mouth 2 times daily 60 tablet 11     ASPIRIN NOT PRESCRIBED (INTENTIONAL) Please choose reason not prescribed from choices below.       atorvastatin (LIPITOR) 10 MG tablet TAKE ONE TABLET BY MOUTH ONE TIME DAILY 90 tablet 0     cyanocobalamin (VITAMIN B-12) 1000 MCG tablet Take 1,000 mcg by mouth daily       diphenoxylate-atropine (LOMOTIL) 2.5-0.025 MG tablet Take 1 tablet by mouth daily as needed for diarrhea Patient only takes 1 tablet as needed 30 tablet 5     folic acid (FOLVITE) 1 MG tablet Take 1 tablet (1 mg) by mouth 2 times daily 60 tablet 4     losartan (COZAAR) 50 MG tablet TAKE ONE TABLET BY MOUTH ONE TIME DAILY 90 tablet 0     metoprolol succinate ER (TOPROL XL) 25 MG 24 hr tablet Take 1 tablet (25 mg) by mouth 2 times daily Taking half of tablet 180 tablet 11       Allergies   Allergen Reactions     Lisinopril Cough     Perfume Other (See Comments)       Caitlin Mackenzie, RN, BSN on 8/3/2022 at 11:11 AM

## 2022-08-03 NOTE — PROGRESS NOTES
Thank you, Dr. Milian ref. provider found, for asking the [facility] team to see Mr. Ricky Brown to evaluate       Assessment/Recommendations   Assessment/Plan:  1. S/p CVA multiple and embolic, 30 day monitor neg for afib, noted PFO, but also hx of CM with LGE, but with concern for higher risk for afib with JOSE JUAN/EtOH would set up second 30 day monitor, he will see JOSE JUAN physician and will hold on PFO closure at this time but cont eliquis  2. CM - EF 40% on MRI with LGE -  noted last LDL 27 on statin and only mild dz on last Cath in 2013, but   but also fatigue on exertion with strong family hx of severe CAD, will start with cors/LVEDP to examine mid to distal dz, if none present consider PET scan to screen for scleroderma, avoid EtOH.   On metoprolol succiante 25mg and try to titrate up to 25mg bid, cont losartan 50mg, may consider entresto/SGLT2 inhibitor (although more expensive).   3. CV prevention at target with atorvastatin LDL< 70  4. AI - mild on exam      Follow up 2 months     History of Present Illness/Subjective    Mr. Ricky Brown is a 71 year old male with CM, EF 42% with LGE nonischemic fibrosis, no ischemia on MR 5/2020, CVA withmild carotid dz, JOSE JUAN, aortic insufficiency mild to mod on echo 5/2022 with EF 35-40%, 2013 cors normal.  Concern for emoblic CVA and started on eliquis recently.   Has JOSE JUAN and can't tolerate CPAP, had been working on wt loss with BMI down to 28, once in a white feels something is not right - pules upto 120 at times, he does report more fatigue and dyspnae on exertion but no chest pain on exertion, he did have an episode of chest pain that is random, pain described as dull and hard to tell if even there.  No syncopal events he can recall but wife found him unconsious 8-12 weeks ago after drinking 8 ounces seth, saw neurology and found both old and new CVA. No PND/orthopnea, he sleeps flat on his water bed due to back pain for 6 mo.  Had 25d  montinor no afib.  Noted fathe r with CAD s/p MI/CABG.            Physical Examination Review of Systems   BP (!) 146/76 (BP Location: Left arm, Patient Position: Sitting, Cuff Size: Adult Regular)   Pulse 68   Resp 12   Wt 85.7 kg (189 lb)   BMI 28.74 kg/m    Body mass index is 28.74 kg/m .  Wt Readings from Last 3 Encounters:   08/03/22 85.7 kg (189 lb)   08/02/22 86.3 kg (190 lb 4.8 oz)   06/28/22 89.4 kg (197 lb)     [unfilled]  General Appearance:   no distress, normal body habitus   ENT/Mouth: membranes moist, no oral lesions or bleeding gums.      EYES:  no scleral icterus, normal conjunctivae   Neck: no carotid bruits or thyromegaly   Chest/Lungs:   lungs are clear to auscultation, no rales or wheezing,  sternal scar, equal chest wall expansion    Cardiovascular:   Regular. Normal first and second heart sounds with no murmurs, rubs, or gallops; the carotid, radial and posterior tibial pulses are intact, Jugular venous pressure , edema bilaterally    Abdomen:  no organomegaly, masses, bruits, or tenderness; bowel sounds are present   Extremities: no cyanosis or clubbing   Skin: no xanthelasma, warm.    Neurologic: normal  bilateral, no tremors     Psychiatric: alert and oriented x3, calm     Review of Systems - 12 points nega other than above      Medical History  Surgical History Family History Social History   Past Medical History:   Diagnosis Date     Arthritis      BCC (basal cell carcinoma)     right shoulder      Cardiomyopathy (H)      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 (H) 06/27/2022     Colon adenomas 09/20/2011     Coronary artery disease      Disorder of bursae and tendons in shoulder region 04/18/2014     ED (erectile dysfunction)      HFrEF (heart failure with reduced ejection fraction) (H)      History of iritis, os 05/27/2019     HTN (hypertension)      Near syncope 02/10/2014     Noncompliance       Nonrheumatic aortic valve insufficiency      Obesity      JOSE JUAN (obstructive sleep apnea)     Past Surgical History:   Procedure Laterality Date     ARTHROSCOPY SHOULDER BICEPS TENODESIS REPAIR  02/27/2014    Procedure: ARTHROSCOPY SHOULDER BICEPS TENODESIS REPAIR;;  Surgeon: Michael Hagen MD;  [facility]     ARTHROSCOPY SHOULDER ROTATOR CUFF REPAIR  02/27/2014    Procedure: ARTHROSCOPY SHOULDER ROTATOR CUFF REPAIR;  Right Shoulder Arthroscopic Rotator Cuff Repair,  Subacromial Decompression, Biceps Tenodesis, Distal Clavicle Excision   ;  Surgeon: Michael Hagen MD;  [facility]     BIOPSY       CARDIAC SURGERY       COLONOSCOPY  06/12/2018     EXCHANGE INTRAOCULAR LENS IMPLANT  2005    left eye - first, recentered PCL with iris fixation; then IOLX with ACL     EXTRACAPSULAR CATARACT EXTRATION WITH INTRAOCULAR LENS IMPLANT  2005    both eyes (elsewhere)     RELEASE CARPAL TUNNEL Right 08/13/2021    Procedure: RELEASE, RIGHT CARPAL TUNNEL;  Surgeon: Tony Bravo MD;  [facility]     TURBINOPLASTY  12/11/2013    Procedure: TURBINOPLASTY;;  Surgeon: Lisset Parsons MD;  [facility]     UVULOPALATOPHARYNGOPLASTY  12/11/2013    Procedure: UVULOPALATOPHARYNGOPLASTY;  Uvulopalatopharyngoplasty, Turbiante Reduction;  Surgeon: Lisset Parsons MD;  [facility]    Family History   Problem Relation Age of Onset     Diabetes Father         Old age Diabetes     Cerebrovascular Disease Father      Obesity Father      Heart Disease Father      Coronary Artery Disease Father      Hypertension Father      Lipids Sister      C.A.D. No family hx of      Cancer No family hx of      Glaucoma No family hx of      Macular Degeneration No family hx of     Social History     Socioeconomic History     Marital status:      Spouse name: Kyung     Number of children: 0     Years of education: 14     Highest education level: Not on file   Occupational History      Occupation: industrial electronics      Employer: [employer]   Tobacco Use     Smoking status: Former Smoker     Packs/day: 0.50     Years: 2.00     Pack years: 1.00     Types: Cigarettes     Quit date: 1996     Years since quittin.6     Smokeless tobacco: Never Used   Vaping Use     Vaping Use: Never used   Substance and Sexual Activity     Alcohol use: Yes     Alcohol/week: 8.3 standard drinks     Comment: 1-2 drinks a day     Drug use: No     Sexual activity: Yes     Partners: Female     Birth control/protection: Male Surgical     Comment: 2006 vasectomy   Other Topics Concern     Parent/sibling w/ CABG, MI or angioplasty before 65F 55M? No   Social History Narrative     Not on file     Social Determinants of Health     Financial Resource Strain: Not on file   Food Insecurity: Not on file   Transportation Needs: Not on file   Physical Activity: Not on file   Stress: Not on file   Social Connections: Not on file   Intimate Partner Violence: Not on file   Housing Stability: Not on file          Medications  Allergies   Scheduled Meds:  Continuous Infusions:  PRN Meds:. Allergies   Allergen Reactions     Lisinopril Cough     Perfume Other (See Comments)         Lab Results    Chemistry/lipid CBC Cardiac Enzymes/BNP/TSH/INR   Lab Results   Component Value Date    CHOL 92 2022    HDL 40 2022    TRIG 124 2022    BUN 23 2022     2022    CO2 26 2022    Lab Results   Component Value Date    WBC 5.5 2022    HGB 13.8 2022    HCT 42.3 2022    MCV 90 2022     2022    Lab Results   Component Value Date    TSH 1.60 2022              Manfred Henley MD  Interventional Cardiology  [facility]

## 2022-08-03 NOTE — H&P (VIEW-ONLY)
Thank you, Dr. Milian ref. provider found, for asking the [facility] team to see Mr. Ricky Brown to evaluate       Assessment/Recommendations   Assessment/Plan:  1. S/p CVA multiple and embolic, 30 day monitor neg for afib, noted PFO, but also hx of CM with LGE, but with concern for higher risk for afib with JOSE JUAN/EtOH would set up second 30 day monitor, he will see JOSE JUAN physician and will hold on PFO closure at this time but cont eliquis  2. CM - EF 40% on MRI with LGE -  noted last LDL 27 on statin and only mild dz on last Cath in 2013, but   but also fatigue on exertion with strong family hx of severe CAD, will start with cors/LVEDP to examine mid to distal dz, if none present consider PET scan to screen for scleroderma, avoid EtOH.   On metoprolol succiante 25mg and try to titrate up to 25mg bid, cont losartan 50mg, may consider entresto/SGLT2 inhibitor (although more expensive).   3. CV prevention at target with atorvastatin LDL< 70  4. AI - mild on exam      Follow up 2 months     History of Present Illness/Subjective    Mr. Ricky Brown is a 71 year old male with CM, EF 42% with LGE nonischemic fibrosis, no ischemia on MR 5/2020, CVA withmild carotid dz, JOSE JUAN, aortic insufficiency mild to mod on echo 5/2022 with EF 35-40%, 2013 cors normal.  Concern for emoblic CVA and started on eliquis recently.   Has JOSE JUAN and can't tolerate CPAP, had been working on wt loss with BMI down to 28, once in a white feels something is not right - pules upto 120 at times, he does report more fatigue and dyspnae on exertion but no chest pain on exertion, he did have an episode of chest pain that is random, pain described as dull and hard to tell if even there.  No syncopal events he can recall but wife found him unconsious 8-12 weeks ago after drinking 8 ounces seth, saw neurology and found both old and new CVA. No PND/orthopnea, he sleeps flat on his water bed due to back pain for 6 mo.  Had 25d  montinor no afib.  Noted fathe r with CAD s/p MI/CABG.            Physical Examination Review of Systems   BP (!) 146/76 (BP Location: Left arm, Patient Position: Sitting, Cuff Size: Adult Regular)   Pulse 68   Resp 12   Wt 85.7 kg (189 lb)   BMI 28.74 kg/m    Body mass index is 28.74 kg/m .  Wt Readings from Last 3 Encounters:   08/03/22 85.7 kg (189 lb)   08/02/22 86.3 kg (190 lb 4.8 oz)   06/28/22 89.4 kg (197 lb)     [unfilled]  General Appearance:   no distress, normal body habitus   ENT/Mouth: membranes moist, no oral lesions or bleeding gums.      EYES:  no scleral icterus, normal conjunctivae   Neck: no carotid bruits or thyromegaly   Chest/Lungs:   lungs are clear to auscultation, no rales or wheezing,  sternal scar, equal chest wall expansion    Cardiovascular:   Regular. Normal first and second heart sounds with no murmurs, rubs, or gallops; the carotid, radial and posterior tibial pulses are intact, Jugular venous pressure , edema bilaterally    Abdomen:  no organomegaly, masses, bruits, or tenderness; bowel sounds are present   Extremities: no cyanosis or clubbing   Skin: no xanthelasma, warm.    Neurologic: normal  bilateral, no tremors     Psychiatric: alert and oriented x3, calm     Review of Systems - 12 points nega other than above      Medical History  Surgical History Family History Social History   Past Medical History:   Diagnosis Date     Arthritis      BCC (basal cell carcinoma)     right shoulder      Cardiomyopathy (H)      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 (H) 06/27/2022     Colon adenomas 09/20/2011     Coronary artery disease      Disorder of bursae and tendons in shoulder region 04/18/2014     ED (erectile dysfunction)      HFrEF (heart failure with reduced ejection fraction) (H)      History of iritis, os 05/27/2019     HTN (hypertension)      Near syncope 02/10/2014     Noncompliance       Nonrheumatic aortic valve insufficiency      Obesity      JOSE JUAN (obstructive sleep apnea)     Past Surgical History:   Procedure Laterality Date     ARTHROSCOPY SHOULDER BICEPS TENODESIS REPAIR  02/27/2014    Procedure: ARTHROSCOPY SHOULDER BICEPS TENODESIS REPAIR;;  Surgeon: Michael Hagen MD;  [facility]     ARTHROSCOPY SHOULDER ROTATOR CUFF REPAIR  02/27/2014    Procedure: ARTHROSCOPY SHOULDER ROTATOR CUFF REPAIR;  Right Shoulder Arthroscopic Rotator Cuff Repair,  Subacromial Decompression, Biceps Tenodesis, Distal Clavicle Excision   ;  Surgeon: Michael Hagen MD;  [facility]     BIOPSY       CARDIAC SURGERY       COLONOSCOPY  06/12/2018     EXCHANGE INTRAOCULAR LENS IMPLANT  2005    left eye - first, recentered PCL with iris fixation; then IOLX with ACL     EXTRACAPSULAR CATARACT EXTRATION WITH INTRAOCULAR LENS IMPLANT  2005    both eyes (elsewhere)     RELEASE CARPAL TUNNEL Right 08/13/2021    Procedure: RELEASE, RIGHT CARPAL TUNNEL;  Surgeon: Tony Bravo MD;  [facility]     TURBINOPLASTY  12/11/2013    Procedure: TURBINOPLASTY;;  Surgeon: Lisset Parsons MD;  [facility]     UVULOPALATOPHARYNGOPLASTY  12/11/2013    Procedure: UVULOPALATOPHARYNGOPLASTY;  Uvulopalatopharyngoplasty, Turbiante Reduction;  Surgeon: Lisset Parsons MD;  [facility]    Family History   Problem Relation Age of Onset     Diabetes Father         Old age Diabetes     Cerebrovascular Disease Father      Obesity Father      Heart Disease Father      Coronary Artery Disease Father      Hypertension Father      Lipids Sister      C.A.D. No family hx of      Cancer No family hx of      Glaucoma No family hx of      Macular Degeneration No family hx of     Social History     Socioeconomic History     Marital status:      Spouse name: Kyung     Number of children: 0     Years of education: 14     Highest education level: Not on file   Occupational History      Occupation: industrial electronics      Employer: [employer]   Tobacco Use     Smoking status: Former Smoker     Packs/day: 0.50     Years: 2.00     Pack years: 1.00     Types: Cigarettes     Quit date: 1996     Years since quittin.6     Smokeless tobacco: Never Used   Vaping Use     Vaping Use: Never used   Substance and Sexual Activity     Alcohol use: Yes     Alcohol/week: 8.3 standard drinks     Comment: 1-2 drinks a day     Drug use: No     Sexual activity: Yes     Partners: Female     Birth control/protection: Male Surgical     Comment: 2006 vasectomy   Other Topics Concern     Parent/sibling w/ CABG, MI or angioplasty before 65F 55M? No   Social History Narrative     Not on file     Social Determinants of Health     Financial Resource Strain: Not on file   Food Insecurity: Not on file   Transportation Needs: Not on file   Physical Activity: Not on file   Stress: Not on file   Social Connections: Not on file   Intimate Partner Violence: Not on file   Housing Stability: Not on file          Medications  Allergies   Scheduled Meds:  Continuous Infusions:  PRN Meds:. Allergies   Allergen Reactions     Lisinopril Cough     Perfume Other (See Comments)         Lab Results    Chemistry/lipid CBC Cardiac Enzymes/BNP/TSH/INR   Lab Results   Component Value Date    CHOL 92 2022    HDL 40 2022    TRIG 124 2022    BUN 23 2022     2022    CO2 26 2022    Lab Results   Component Value Date    WBC 5.5 2022    HGB 13.8 2022    HCT 42.3 2022    MCV 90 2022     2022    Lab Results   Component Value Date    TSH 1.60 2022              Manfred Henley MD  Interventional Cardiology  [facility]

## 2022-08-03 NOTE — TELEPHONE ENCOUNTER
Health Call Center    Phone Message    May a detailed message be left on voicemail: yes     Reason for Call: Medication Question or concern regarding medication   Prescription Clarification  Name of Medication: metoprolol succinate ER (TOPROL XL) 25 MG 24 hr tablet  Prescribing Provider: Dr Henley   Pharmacy: [facility] [address] [place] [address]   What on the order needs clarification?     [facility] called because they need clarification on the directions for Ricky's metoprolol succinate ER (TOPROL XL) 25 MG 24 hr tablet medication. Please give them a call back at [phone]. Thank you!    Action Taken: Other: Cardiology    Travel Screening: Not Applicable                                                                      
8/3/22 office visit:    CM - EF 40% on MRI with LGE -  noted last LDL 27 on statin and only mild dz on last Cath in 2013, but   but also fatigue on exertion with strong family hx of severe CAD, will start with cors/LVEDP to examine mid to distal dz, if none present consider PET scan to screen for scleroderma, avoid EtOH.   On metoprolol succiante 25mg and try to titrate up to 25mg bid, cont losartan 50mg, may consider entresto/SGLT2 inhibitor (although more expensive).     Try to raise metoprolol succinate to 25mg twice a day, if not, try 12.5mg in AM and 25mg in PM    Returned call to pharm to update rx.  -igor  
88

## 2022-08-03 NOTE — PATIENT INSTRUCTIONS
Try to raise metoprolol succinate to 25mg twice a day, if not, try 12.5mg in AM and 25mg in PM    30 day monitor    Schedule cath

## 2022-08-08 ENCOUNTER — HOSPITAL ENCOUNTER (OUTPATIENT)
Facility: HOSPITAL | Age: 72
Discharge: HOME | End: 2022-08-08
Attending: INTERNAL MEDICINE | Admitting: INTERNAL MEDICINE
Payer: COMMERCIAL

## 2022-08-08 VITALS
BODY MASS INDEX: 29.78 KG/M2 | SYSTOLIC BLOOD PRESSURE: 155 MMHG | DIASTOLIC BLOOD PRESSURE: 61 MMHG | TEMPERATURE: 97.6 F | WEIGHT: 189.7 LBS | HEIGHT: 67 IN | OXYGEN SATURATION: 95 % | RESPIRATION RATE: 21 BRPM | HEART RATE: 55 BPM

## 2022-08-08 DIAGNOSIS — I25.10 CAD (CORONARY ARTERY DISEASE): ICD-10-CM

## 2022-08-08 DIAGNOSIS — I42.9 CARDIOMYOPATHY, UNSPECIFIED TYPE (H): ICD-10-CM

## 2022-08-08 LAB
ABO/RH(D): NORMAL
ANION GAP SERPL CALCULATED.3IONS-SCNC: 10 MMOL/L (ref 5–18)
ANTIBODY SCREEN: NEGATIVE
ATRIAL RATE - MUSE: 54 BPM
BUN SERPL-MCNC: 19 MG/DL (ref 8–22)
CALCIUM SERPL-MCNC: 8.4 MG/DL (ref 8.5–10.5)
CHLORIDE BLD-SCNC: 108 MMOL/L (ref 98–107)
CO2 SERPL-SCNC: 22 MMOL/L (ref 22–31)
CREAT SERPL-MCNC: 0.88 MG/DL (ref 0.7–1.3)
DIASTOLIC BLOOD PRESSURE - MUSE: NORMAL MMHG
ERYTHROCYTE [DISTWIDTH] IN BLOOD BY AUTOMATED COUNT: 13.4 % (ref 10–15)
GFR SERPL CREATININE-BSD FRML MDRD: >90 ML/MIN/1.73M2
GLUCOSE BLD-MCNC: 95 MG/DL (ref 70–125)
HCT VFR BLD AUTO: 41.7 % (ref 40–53)
HGB BLD-MCNC: 13.8 G/DL (ref 13.3–17.7)
INTERPRETATION ECG - MUSE: NORMAL
MCH RBC QN AUTO: 29.1 PG (ref 26.5–33)
MCHC RBC AUTO-ENTMCNC: 33.1 G/DL (ref 31.5–36.5)
MCV RBC AUTO: 88 FL (ref 78–100)
P AXIS - MUSE: 27 DEGREES
PLATELET # BLD AUTO: 229 10E3/UL (ref 150–450)
POTASSIUM BLD-SCNC: 3.8 MMOL/L (ref 3.5–5)
PR INTERVAL - MUSE: 202 MS
QRS DURATION - MUSE: 104 MS
QT - MUSE: 452 MS
QTC - MUSE: 428 MS
R AXIS - MUSE: -25 DEGREES
RBC # BLD AUTO: 4.74 10E6/UL (ref 4.4–5.9)
SODIUM SERPL-SCNC: 140 MMOL/L (ref 136–145)
SPECIMEN EXPIRATION DATE: NORMAL
SYSTOLIC BLOOD PRESSURE - MUSE: NORMAL MMHG
T AXIS - MUSE: -76 DEGREES
VENTRICULAR RATE- MUSE: 54 BPM
WBC # BLD AUTO: 4.9 10E3/UL (ref 4–11)

## 2022-08-08 PROCEDURE — 255N000002 HC RX 255 OP 636: Performed by: INTERNAL MEDICINE

## 2022-08-08 PROCEDURE — 250N000011 HC RX IP 250 OP 636: Performed by: INTERNAL MEDICINE

## 2022-08-08 PROCEDURE — 258N000003 HC RX IP 258 OP 636: Performed by: INTERNAL MEDICINE

## 2022-08-08 PROCEDURE — C1769 GUIDE WIRE: HCPCS | Performed by: INTERNAL MEDICINE

## 2022-08-08 PROCEDURE — 250N000009 HC RX 250: Performed by: INTERNAL MEDICINE

## 2022-08-08 PROCEDURE — 999N000054 HC STATISTIC EKG NON-CHARGEABLE

## 2022-08-08 PROCEDURE — 250N000013 HC RX MED GY IP 250 OP 250 PS 637: Performed by: INTERNAL MEDICINE

## 2022-08-08 PROCEDURE — 93010 ELECTROCARDIOGRAM REPORT: CPT | Performed by: GENERAL ACUTE CARE HOSPITAL

## 2022-08-08 PROCEDURE — 272N000001 HC OR GENERAL SUPPLY STERILE: Performed by: INTERNAL MEDICINE

## 2022-08-08 PROCEDURE — 86850 RBC ANTIBODY SCREEN: CPT | Performed by: INTERNAL MEDICINE

## 2022-08-08 PROCEDURE — 80048 BASIC METABOLIC PNL TOTAL CA: CPT | Performed by: INTERNAL MEDICINE

## 2022-08-08 PROCEDURE — 36415 COLL VENOUS BLD VENIPUNCTURE: CPT | Performed by: INTERNAL MEDICINE

## 2022-08-08 PROCEDURE — C1894 INTRO/SHEATH, NON-LASER: HCPCS | Performed by: INTERNAL MEDICINE

## 2022-08-08 PROCEDURE — 93005 ELECTROCARDIOGRAM TRACING: CPT

## 2022-08-08 PROCEDURE — C1725 CATH, TRANSLUMIN NON-LASER: HCPCS | Performed by: INTERNAL MEDICINE

## 2022-08-08 PROCEDURE — 93458 L HRT ARTERY/VENTRICLE ANGIO: CPT | Performed by: INTERNAL MEDICINE

## 2022-08-08 PROCEDURE — 85027 COMPLETE CBC AUTOMATED: CPT | Performed by: INTERNAL MEDICINE

## 2022-08-08 PROCEDURE — 93458 L HRT ARTERY/VENTRICLE ANGIO: CPT | Mod: 26 | Performed by: INTERNAL MEDICINE

## 2022-08-08 RX ORDER — OXYCODONE HYDROCHLORIDE 5 MG/1
5 TABLET ORAL EVERY 4 HOURS PRN
Status: DISCONTINUED | OUTPATIENT
Start: 2022-08-08 | End: 2022-08-08 | Stop reason: HOSPADM

## 2022-08-08 RX ORDER — DIAZEPAM 5 MG
5 TABLET ORAL
Status: COMPLETED | OUTPATIENT
Start: 2022-08-08 | End: 2022-08-08

## 2022-08-08 RX ORDER — NALOXONE HYDROCHLORIDE 0.4 MG/ML
0.2 INJECTION, SOLUTION INTRAMUSCULAR; INTRAVENOUS; SUBCUTANEOUS
Status: DISCONTINUED | OUTPATIENT
Start: 2022-08-08 | End: 2022-08-08 | Stop reason: HOSPADM

## 2022-08-08 RX ORDER — LIDOCAINE 40 MG/G
CREAM TOPICAL
Status: DISCONTINUED | OUTPATIENT
Start: 2022-08-08 | End: 2022-08-08 | Stop reason: HOSPADM

## 2022-08-08 RX ORDER — NALOXONE HYDROCHLORIDE 0.4 MG/ML
0.4 INJECTION, SOLUTION INTRAMUSCULAR; INTRAVENOUS; SUBCUTANEOUS
Status: DISCONTINUED | OUTPATIENT
Start: 2022-08-08 | End: 2022-08-08 | Stop reason: HOSPADM

## 2022-08-08 RX ORDER — IODIXANOL 320 MG/ML
INJECTION, SOLUTION INTRAVASCULAR
Status: DISCONTINUED | OUTPATIENT
Start: 2022-08-08 | End: 2022-08-08 | Stop reason: HOSPADM

## 2022-08-08 RX ORDER — OXYCODONE HYDROCHLORIDE 5 MG/1
10 TABLET ORAL EVERY 4 HOURS PRN
Status: DISCONTINUED | OUTPATIENT
Start: 2022-08-08 | End: 2022-08-08 | Stop reason: HOSPADM

## 2022-08-08 RX ORDER — FLUMAZENIL 0.1 MG/ML
0.2 INJECTION, SOLUTION INTRAVENOUS
Status: DISCONTINUED | OUTPATIENT
Start: 2022-08-08 | End: 2022-08-08 | Stop reason: HOSPADM

## 2022-08-08 RX ORDER — ATROPINE SULFATE 0.1 MG/ML
0.5 INJECTION INTRAVENOUS
Status: DISCONTINUED | OUTPATIENT
Start: 2022-08-08 | End: 2022-08-08 | Stop reason: HOSPADM

## 2022-08-08 RX ORDER — FENTANYL CITRATE 50 UG/ML
25 INJECTION, SOLUTION INTRAMUSCULAR; INTRAVENOUS
Status: DISCONTINUED | OUTPATIENT
Start: 2022-08-08 | End: 2022-08-08 | Stop reason: HOSPADM

## 2022-08-08 RX ORDER — ASPIRIN 325 MG
325 TABLET ORAL ONCE
Status: DISCONTINUED | OUTPATIENT
Start: 2022-08-08 | End: 2022-08-08 | Stop reason: HOSPADM

## 2022-08-08 RX ORDER — ACETAMINOPHEN 325 MG/1
650 TABLET ORAL EVERY 4 HOURS PRN
Status: DISCONTINUED | OUTPATIENT
Start: 2022-08-08 | End: 2022-08-08 | Stop reason: HOSPADM

## 2022-08-08 RX ORDER — SODIUM CHLORIDE 9 MG/ML
INJECTION, SOLUTION INTRAVENOUS CONTINUOUS
Status: DISCONTINUED | OUTPATIENT
Start: 2022-08-08 | End: 2022-08-08 | Stop reason: HOSPADM

## 2022-08-08 RX ORDER — FENTANYL CITRATE 50 UG/ML
INJECTION, SOLUTION INTRAMUSCULAR; INTRAVENOUS
Status: DISCONTINUED | OUTPATIENT
Start: 2022-08-08 | End: 2022-08-08 | Stop reason: HOSPADM

## 2022-08-08 RX ORDER — NITROGLYCERIN 0.4 MG/1
TABLET SUBLINGUAL
Status: DISCONTINUED | OUTPATIENT
Start: 2022-08-08 | End: 2022-08-08 | Stop reason: HOSPADM

## 2022-08-08 RX ORDER — ASPIRIN 81 MG/1
243 TABLET, CHEWABLE ORAL ONCE
Status: DISCONTINUED | OUTPATIENT
Start: 2022-08-08 | End: 2022-08-08 | Stop reason: HOSPADM

## 2022-08-08 RX ADMIN — DIAZEPAM 5 MG: 5 TABLET ORAL at 09:53

## 2022-08-08 RX ADMIN — SODIUM CHLORIDE: 9 INJECTION, SOLUTION INTRAVENOUS at 09:55

## 2022-08-08 ASSESSMENT — EJECTION FRACTION: EF_VALUE: .17

## 2022-08-08 NOTE — PRE-PROCEDURE
GENERAL PRE-PROCEDURE:   Procedure:  Coronary angiogram with possible PCI  Date/Time:  8/8/2022 8:53 AM    Written consent obtained?: Yes    Risks and benefits: Risks, benefits and alternatives were discussed    Consent given by:  Patient  Patient states understanding of procedure being performed: Yes    Patient's understanding of procedure matches consent: Yes    Procedure consent matches procedure scheduled: Yes    Expected level of sedation:  Moderate  Appropriately NPO:  Yes  ASA Class:  3 (mild AI, PFO, CM with LGE, hx of multiple embolic CVAs, JOSE JUAN. Borderline Obesity; BMI 29.71kgm2, EtOH abuse)  Mallampati  :  Grade 3- soft palate visible, posterior pharyngeal wall not visible  Lungs:  Lungs clear with good breath sounds bilaterally  Heart:  Diastolic murmur  History & Physical reviewed:  History and physical reviewed and no updates needed  Statement of review:  I have reviewed the lab findings, diagnostic data, medications, and the plan for sedation

## 2022-08-08 NOTE — PROGRESS NOTES
Patient was kept comfortable during post-procedure stay.VSS. Denies pain. Right radial access site remains dry & free from signs of bleeding. Appointment made & included in AVS. Dr. Goddard was able to speak with patient and his wife post-procedure.   Pt able to eat, drink, ambulate and void without problem post-procedure.  Post-op instructions given to patient & spouse. Able to ask questions. Verbalized no concerns. Belongings returned. Discharged to home in stable condition.

## 2022-08-08 NOTE — DISCHARGE INSTRUCTIONS

## 2022-08-08 NOTE — PROGRESS NOTES
Admitted to [facility] for Coronary Angiogram. PT and his wife understand procedure and agree to proceed. Prepped and ready.

## 2022-08-09 ENCOUNTER — MYC MEDICAL ADVICE (OUTPATIENT)
Dept: INTERNAL MEDICINE | Facility: CLINIC | Age: 72
End: 2022-08-09

## 2022-08-10 DIAGNOSIS — I50.20 HFREF (HEART FAILURE WITH REDUCED EJECTION FRACTION) (H): ICD-10-CM

## 2022-08-10 DIAGNOSIS — Z13.9 SCREENING FOR CONDITION: ICD-10-CM

## 2022-08-10 DIAGNOSIS — I42.9 CARDIOMYOPATHY, UNSPECIFIED TYPE (H): ICD-10-CM

## 2022-08-10 DIAGNOSIS — I42.9 CARDIOMYOPATHY (H): Primary | ICD-10-CM

## 2022-08-10 DIAGNOSIS — R06.02 SOB (SHORTNESS OF BREATH): ICD-10-CM

## 2022-08-10 PROBLEM — Z79.899 MEDICAL MARIJUANA USE: Status: ACTIVE | Noted: 2022-08-10

## 2022-08-10 PROBLEM — M54.50 CHRONIC BILATERAL LOW BACK PAIN WITHOUT SCIATICA: Status: ACTIVE | Noted: 2022-08-10

## 2022-08-10 PROBLEM — G89.29 CHRONIC BILATERAL LOW BACK PAIN WITHOUT SCIATICA: Status: ACTIVE | Noted: 2022-08-10

## 2022-08-10 NOTE — TELEPHONE ENCOUNTER
This would be a new Medical Marijuana certification and I need a telephone MD visit versus a virtual office visit versus possibly a face to face encounter     I can do an overbook appointment and lets just pick a time. I am going to suggest 1:00 pm either tomorrow or Friday after that    Please notify patient of this information.     Holland Blunt MD

## 2022-08-10 NOTE — PROGRESS NOTES
FDG PET ordered.  -rah    ===View-only below this line===  ----- Message -----  From: Manfred Henley MD  Sent: 8/9/2022   5:50 PM CDT  To: JOZEF Ivory  He should also have a PET scan to screen for slceroderma  Thanks  Manfred

## 2022-08-11 ENCOUNTER — VIRTUAL VISIT (OUTPATIENT)
Dept: INTERNAL MEDICINE | Facility: CLINIC | Age: 72
End: 2022-08-11
Payer: COMMERCIAL

## 2022-08-11 DIAGNOSIS — M54.50 CHRONIC BILATERAL LOW BACK PAIN WITHOUT SCIATICA: ICD-10-CM

## 2022-08-11 DIAGNOSIS — G89.29 CHRONIC BILATERAL LOW BACK PAIN WITHOUT SCIATICA: ICD-10-CM

## 2022-08-11 DIAGNOSIS — Z79.899 MEDICAL MARIJUANA USE: Primary | ICD-10-CM

## 2022-08-11 PROCEDURE — 99213 OFFICE O/P EST LOW 20 MIN: CPT | Mod: TEL | Performed by: INTERNAL MEDICINE

## 2022-08-11 NOTE — PROGRESS NOTES
"Ricky is a 71 year old who is being evaluated via a billable telephone visit.      What phone number would you like to be contacted at? [phone]  How would you like to obtain your AVS? Kerry     1:09 PM   1:31 PM     Assessment & Plan     Medical marijuana use  This was an interesting appointment. Ostensibly the purpose of this call was to establish the possible role of Medical Marijuana for this patient and especially important was to emphasize that any alcohol is a destructive and corrosive force in this patients life given his healthcare conditions , he had a direct edict from his cardiologist to stop alcohol use. Medical Marijuana was suggested / recommended as an alternative choice for managing chronic pain issues. Mainly it is chronic low back pain. During this appointment I reiterated the myke for alcohol cessation . Patient tells me in plain terms that he \" doesn't need another temperance lecture\" and doesn't exceed more then 4 oz of hard liquor a day. And then we reviewed the information about the [place] Medical Marijuana program and how it is handled, etc. throughout this telephone MD visit , patient expressed a firm stance of skepticism and lack of interest in this. He said at least 4 or 5 times if not more, that he isn't interested. I had a group discussion on the call with patients spouse who seemed more supportive of the idea and yet she readily acknowledges that if Alex says he isn't interested then no further movements forward with this option will happen. I did suggest that if Alex were to change his mind, I would allow this office visit for Medical Marijuana certification to be good for up to a year. If he wishes to proceed forwards to let me know.,     Chronic bilateral low back pain without sciatica  As detailed above         Return in about 1 year (around 8/11/2023), or if symptoms worsen or fail to improve.    Holland Blunt MD  [facility]    Goals of this " office visit is Medical Marijuana certification   Chronic low back pain is a qualifying condition  Answers for HPI/ROS submitted by the patient on 8/2/2022  Your back pain is: recurring  Where is your back pain located? : right lower back, right middle of back  How would you describe your back pain? : sharp  Where does your back pain spread? : nowhere  Since you noticed your back pain, how has it changed? 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  Do NOT count diet or artificially sweetened beverages)?: 1  How many minutes a day do you exercise enough to make your heart beat faster?: 10 to 19  How many days a week do you exercise enough to make your heart beat faster?: 4  How many days per week do you miss taking your medication?: 1  What makes it hard for you to take your medication every day?: remembering to take          Subjective   Ricky is a 71 year old, presenting for the following health issues:  Patient Request (Medical marijuana)    Mostly back with chronic pain   Chiropractor   Don't give me a temperance lecture. Patient is aware of the need for avoiding alcohol.  He has tried some massager  Hid chronic sleepiness  Not more then 4 oz of alcohol in quite some time     History of Present Illness       Back Pain:  He presents for follow up of back pain. Patient's back pain is a recurring problem.  Location of back pain:  Right lower back and right middle of back  Description of back pain: sharp  Back pain spreads: nowhere    Since patient first noticed back pain, pain is: always present, but gets better and worse  Does back pain interfere with his job:  Yes      He eats 0-1 servings of fruits and vegetables daily.He consumes 1 sweetened beverage(s) daily.He  exercises with enough effort to increase his heart rate 10 to 19 minutes per day.  He exercises with enough effort to increase his heart rate 4 days per week. He is missing 1 dose(s) of medications per week.  He is not taking prescribed medications regularly due to remembering to take.         Review of Systems   Constitutional, HEENT, cardiovascular, pulmonary, gi and gu systems are negative, except as otherwise noted.      Objective           Vitals:  No vitals were obtained today due to virtual visit.    Physical Exam   healthy, alert and no distress  PSYCH: Alert and oriented times 3; coherent speech, normal   rate and volume, able to articulate logical thoughts, able   to abstract reason, no tangential thoughts, no hallucinations   or delusions  His affect is normal  RESP: No cough, no audible wheezing, able to talk in full sentences  Remainder of exam unable to be completed due to telephone visits            Phone call duration: 23 minutes    .  ..

## 2022-08-12 ENCOUNTER — HOSPITAL ENCOUNTER (OUTPATIENT)
Dept: CARDIOLOGY | Facility: HOSPITAL | Age: 72
Discharge: HOME | End: 2022-08-12
Attending: INTERNAL MEDICINE | Admitting: INTERNAL MEDICINE
Payer: COMMERCIAL

## 2022-08-12 DIAGNOSIS — I63.419 CEREBROVASCULAR ACCIDENT (CVA) DUE TO EMBOLISM OF MIDDLE CEREBRAL ARTERY, UNSPECIFIED BLOOD VESSEL LATERALITY (H): ICD-10-CM

## 2022-08-12 PROCEDURE — 93270 REMOTE 30 DAY ECG REV/REPORT: CPT

## 2022-08-14 ENCOUNTER — NURSE TRIAGE (OUTPATIENT)
Dept: NURSING | Facility: CLINIC | Age: 72
End: 2022-08-14

## 2022-08-14 ENCOUNTER — APPOINTMENT (OUTPATIENT)
Dept: CT IMAGING | Facility: CLINIC | Age: 72
End: 2022-08-14
Attending: EMERGENCY MEDICINE
Payer: COMMERCIAL

## 2022-08-14 ENCOUNTER — HOSPITAL ENCOUNTER (EMERGENCY)
Facility: CLINIC | Age: 72
Discharge: HOME | End: 2022-08-14
Attending: EMERGENCY MEDICINE | Admitting: EMERGENCY MEDICINE
Payer: COMMERCIAL

## 2022-08-14 VITALS
HEIGHT: 67 IN | WEIGHT: 187 LBS | DIASTOLIC BLOOD PRESSURE: 83 MMHG | SYSTOLIC BLOOD PRESSURE: 173 MMHG | BODY MASS INDEX: 29.35 KG/M2 | RESPIRATION RATE: 16 BRPM | OXYGEN SATURATION: 96 % | TEMPERATURE: 97.7 F | HEART RATE: 54 BPM

## 2022-08-14 DIAGNOSIS — M62.838 MUSCLE SPASMS OF NECK: ICD-10-CM

## 2022-08-14 LAB
ANION GAP SERPL CALCULATED.3IONS-SCNC: 4 MMOL/L (ref 3–14)
BASOPHILS # BLD AUTO: 0 10E3/UL (ref 0–0.2)
BASOPHILS NFR BLD AUTO: 1 %
BUN SERPL-MCNC: 15 MG/DL (ref 7–30)
CALCIUM SERPL-MCNC: 8.8 MG/DL (ref 8.5–10.1)
CHLORIDE BLD-SCNC: 108 MMOL/L (ref 94–109)
CO2 SERPL-SCNC: 28 MMOL/L (ref 20–32)
CREAT SERPL-MCNC: 0.92 MG/DL (ref 0.66–1.25)
EOSINOPHIL # BLD AUTO: 0.1 10E3/UL (ref 0–0.7)
EOSINOPHIL NFR BLD AUTO: 1 %
ERYTHROCYTE [DISTWIDTH] IN BLOOD BY AUTOMATED COUNT: 13.4 % (ref 10–15)
ETHANOL SERPL-MCNC: <0.01 G/DL
GFR SERPL CREATININE-BSD FRML MDRD: 90 ML/MIN/1.73M2
GLUCOSE BLD-MCNC: 102 MG/DL (ref 70–99)
HCT VFR BLD AUTO: 41.3 % (ref 40–53)
HGB BLD-MCNC: 13.9 G/DL (ref 13.3–17.7)
HOLD SPECIMEN: NORMAL
HOLD SPECIMEN: NORMAL
IMM GRANULOCYTES # BLD: 0 10E3/UL
IMM GRANULOCYTES NFR BLD: 1 %
LYMPHOCYTES # BLD AUTO: 1.4 10E3/UL (ref 0.8–5.3)
LYMPHOCYTES NFR BLD AUTO: 24 %
MCH RBC QN AUTO: 29.5 PG (ref 26.5–33)
MCHC RBC AUTO-ENTMCNC: 33.7 G/DL (ref 31.5–36.5)
MCV RBC AUTO: 88 FL (ref 78–100)
MONOCYTES # BLD AUTO: 0.6 10E3/UL (ref 0–1.3)
MONOCYTES NFR BLD AUTO: 11 %
NEUTROPHILS # BLD AUTO: 3.7 10E3/UL (ref 1.6–8.3)
NEUTROPHILS NFR BLD AUTO: 62 %
NRBC # BLD AUTO: 0 10E3/UL
NRBC BLD AUTO-RTO: 0 /100
PLATELET # BLD AUTO: 239 10E3/UL (ref 150–450)
POTASSIUM BLD-SCNC: 4.3 MMOL/L (ref 3.4–5.3)
RBC # BLD AUTO: 4.71 10E6/UL (ref 4.4–5.9)
SODIUM SERPL-SCNC: 140 MMOL/L (ref 133–144)
TROPONIN I SERPL HS-MCNC: 11 NG/L
TROPONIN I SERPL HS-MCNC: 11 NG/L
TSH SERPL DL<=0.005 MIU/L-ACNC: 1.49 MU/L (ref 0.4–4)
WBC # BLD AUTO: 5.8 10E3/UL (ref 4–11)

## 2022-08-14 PROCEDURE — 250N000011 HC RX IP 250 OP 636: Performed by: EMERGENCY MEDICINE

## 2022-08-14 PROCEDURE — 84443 ASSAY THYROID STIM HORMONE: CPT | Performed by: EMERGENCY MEDICINE

## 2022-08-14 PROCEDURE — 93005 ELECTROCARDIOGRAM TRACING: CPT | Performed by: EMERGENCY MEDICINE

## 2022-08-14 PROCEDURE — 93010 ELECTROCARDIOGRAM REPORT: CPT | Performed by: EMERGENCY MEDICINE

## 2022-08-14 PROCEDURE — 80048 BASIC METABOLIC PNL TOTAL CA: CPT | Performed by: EMERGENCY MEDICINE

## 2022-08-14 PROCEDURE — 85025 COMPLETE CBC W/AUTO DIFF WBC: CPT | Performed by: EMERGENCY MEDICINE

## 2022-08-14 PROCEDURE — 99285 EMERGENCY DEPT VISIT HI MDM: CPT | Mod: 25 | Performed by: EMERGENCY MEDICINE

## 2022-08-14 PROCEDURE — 250N000009 HC RX 250: Performed by: EMERGENCY MEDICINE

## 2022-08-14 PROCEDURE — 70498 CT ANGIOGRAPHY NECK: CPT

## 2022-08-14 PROCEDURE — 70496 CT ANGIOGRAPHY HEAD: CPT

## 2022-08-14 PROCEDURE — 250N000013 HC RX MED GY IP 250 OP 250 PS 637: Performed by: EMERGENCY MEDICINE

## 2022-08-14 PROCEDURE — 70450 CT HEAD/BRAIN W/O DYE: CPT

## 2022-08-14 PROCEDURE — 82077 ASSAY SPEC XCP UR&BREATH IA: CPT | Performed by: EMERGENCY MEDICINE

## 2022-08-14 PROCEDURE — 99284 EMERGENCY DEPT VISIT MOD MDM: CPT | Mod: 25 | Performed by: EMERGENCY MEDICINE

## 2022-08-14 PROCEDURE — 84484 ASSAY OF TROPONIN QUANT: CPT | Performed by: EMERGENCY MEDICINE

## 2022-08-14 PROCEDURE — 36415 COLL VENOUS BLD VENIPUNCTURE: CPT | Performed by: EMERGENCY MEDICINE

## 2022-08-14 RX ORDER — METHOCARBAMOL 500 MG/1
1000 TABLET, FILM COATED ORAL ONCE
Status: DISCONTINUED | OUTPATIENT
Start: 2022-08-14 | End: 2022-08-14 | Stop reason: HOSPADM

## 2022-08-14 RX ORDER — METHOCARBAMOL 500 MG/1
1000 TABLET, FILM COATED ORAL 4 TIMES DAILY
Status: DISCONTINUED | OUTPATIENT
Start: 2022-08-14 | End: 2022-08-14

## 2022-08-14 RX ORDER — METHOCARBAMOL 500 MG/1
TABLET, FILM COATED ORAL
Qty: 34 TABLET | Refills: 0 | Status: SHIPPED | OUTPATIENT
Start: 2022-08-14 | End: 2022-08-19

## 2022-08-14 RX ORDER — IOPAMIDOL 755 MG/ML
100 INJECTION, SOLUTION INTRAVASCULAR ONCE
Status: COMPLETED | OUTPATIENT
Start: 2022-08-14 | End: 2022-08-14

## 2022-08-14 RX ADMIN — METHOCARBAMOL 1000 MG: 500 TABLET ORAL at 09:43

## 2022-08-14 RX ADMIN — IOPAMIDOL 75 ML: 755 INJECTION, SOLUTION INTRAVENOUS at 10:00

## 2022-08-14 RX ADMIN — SODIUM CHLORIDE 80 ML: 9 INJECTION, SOLUTION INTRAVENOUS at 10:13

## 2022-08-14 ASSESSMENT — ENCOUNTER SYMPTOMS
HEADACHES: 0
NAUSEA: 0
BACK PAIN: 0
NECK PAIN: 1
NUMBNESS: 0
SHORTNESS OF BREATH: 0
NECK STIFFNESS: 1
VOMITING: 0
SPEECH DIFFICULTY: 0
WEAKNESS: 0
FACIAL ASYMMETRY: 0

## 2022-08-14 ASSESSMENT — ACTIVITIES OF DAILY LIVING (ADL)
ADLS_ACUITY_SCORE: 35
ADLS_ACUITY_SCORE: 35

## 2022-08-14 NOTE — LETTER
August 15, 2022    Ricky Brown  [address]  [facility] [address]          Dear ,    We are writing to inform you of your test results.    All of these tests are within acceptable limits , things look good !       Resulted Orders   Basic metabolic panel   Result Value Ref Range    Sodium 140 133 - 144 mmol/L    Potassium 4.3 3.4 - 5.3 mmol/L    Chloride 108 94 - 109 mmol/L    Carbon Dioxide (CO2) 28 20 - 32 mmol/L    Anion Gap 4 3 - 14 mmol/L    Urea Nitrogen 15 7 - 30 mg/dL    Creatinine 0.92 0.66 - 1.25 mg/dL    Calcium 8.8 8.5 - 10.1 mg/dL    Glucose 102 (H) 70 - 99 mg/dL    GFR Estimate 90 >60 mL/min/1.73m2      Comment:      Effective December 21, 2021 eGFRcr in adults is calculated using the 2021 CKD-EPI creatinine equation which includes age and gender (Neva et al., [place], DOI: 10.1056/PALHai4728280)   Troponin I   Result Value Ref Range    Troponin I High Sensitivity 11 <79 ng/L      Comment:      This Troponin-I result was obtained using a Siemens Dimension Vista High Sensitivity Troponin-I assay (TNIH). Effective 11/23/21, nine labs/sites in the [facility] switched from a Siemens [place] Contemporary Troponin I assay (CTNI) to a Siemens [place] High-Sensitivity Troponin I assay (TNIH).   CBC with platelets and differential   Result Value Ref Range    WBC Count 5.8 4.0 - 11.0 10e3/uL    RBC Count 4.71 4.40 - 5.90 10e6/uL    Hemoglobin 13.9 13.3 - 17.7 g/dL    Hematocrit 41.3 40.0 - 53.0 %    MCV 88 78 - 100 fL    MCH 29.5 26.5 - 33.0 pg    MCHC 33.7 31.5 - 36.5 g/dL    RDW 13.4 10.0 - 15.0 %    Platelet Count 239 150 - 450 10e3/uL    % Neutrophils 62 %    % Lymphocytes 24 %    % Monocytes 11 %    % Eosinophils 1 %    % Basophils 1 %    % Immature Granulocytes 1 %    NRBCs per 100 WBC 0 <1 /100    Absolute Neutrophils 3.7 1.6 - 8.3 10e3/uL    Absolute Lymphocytes 1.4 0.8 - 5.3 10e3/uL    Absolute Monocytes 0.6 0.0 - 1.3 10e3/uL    Absolute Eosinophils 0.1 0.0 - 0.7 10e3/uL     Absolute Basophils 0.0 0.0 - 0.2 10e3/uL    Absolute Immature Granulocytes 0.0 <=0.4 10e3/uL    Absolute NRBCs 0.0 10e3/uL   Extra Blue Top Tube   Result Value Ref Range    Hold Specimen JIC    Extra Red Top Tube   Result Value Ref Range    Hold Specimen JIC    CTA Head Neck w Contrast    Narrative    EXAM: CT HEAD W/O CONTRAST, CTA HEAD NECK W CONTRAST  LOCATION: [facility]  DATE/TIME: 8/14/2022 9:42 AM    INDICATION: Left occipital and neck pain after chiropractic adjustment.  COMPARISON: 06/27/2022 MRI.  CONTRAST: 75mL isovue 370  TECHNIQUE: Head and neck CT angiogram with IV contrast. Noncontrast head CT followed by axial helical CT images of the head and neck vessels obtained during the arterial phase of intravenous contrast administration. Axial 2D reconstructed images and   multiplanar 3D MIP reconstructed images of the head and neck vessels were performed by the technologist. Dose reduction techniques were used. All stenosis measurements made according to NASCET criteria unless otherwise specified.    FINDINGS:   NONCONTRAST HEAD CT:   INTRACRANIAL CONTENTS: No intracranial hemorrhage, extraaxial collection, or mass effect.  No CT evidence of acute infarct. Normal parenchymal attenuation. Normal ventricles and sulci.     VISUALIZED ORBITS/SINUSES/MASTOIDS: No intraorbital abnormality. No paranasal sinus mucosal disease. No middle ear or mastoid effusion.    BONES/SOFT TISSUES: No acute abnormality.    HEAD CTA:  ANTERIOR CIRCULATION: No stenosis/occlusion, aneurysm, or high flow vascular malformation. Standard [place] anatomy.    POSTERIOR CIRCULATION: No stenosis/occlusion, aneurysm, or high flow vascular malformation. Balanced vertebral arteries supply a normal basilar artery.     DURAL VENOUS SINUSES: Expected enhancement of the major dural venous sinuses.    NECK CTA:  RIGHT CAROTID: No measurable stenosis or dissection.    LEFT CAROTID: No  measurable stenosis or dissection.    VERTEBRAL ARTERIES: No focal stenosis or dissection. Balanced vertebral arteries.    AORTIC ARCH: Classic aortic arch anatomy with no significant stenosis at the origin of the great vessels.    NONVASCULAR STRUCTURES: Unremarkable.       Impression    IMPRESSION:   HEAD CT:  1.  No acute intracranial process.    HEAD CTA:   1.  Normal CTA [place].    NECK CTA:  1.  Normal neck CTA.   CT Head w/o Contrast    Narrative    EXAM: CT HEAD W/O CONTRAST, CTA HEAD NECK W CONTRAST  LOCATION: [facility]  DATE/TIME: 8/14/2022 9:42 AM    INDICATION: Left occipital and neck pain after chiropractic adjustment.  COMPARISON: 06/27/2022 MRI.  CONTRAST: 75mL isovue 370  TECHNIQUE: Head and neck CT angiogram with IV contrast. Noncontrast head CT followed by axial helical CT images of the head and neck vessels obtained during the arterial phase of intravenous contrast administration. Axial 2D reconstructed images and   multiplanar 3D MIP reconstructed images of the head and neck vessels were performed by the technologist. Dose reduction techniques were used. All stenosis measurements made according to NASCET criteria unless otherwise specified.    FINDINGS:   NONCONTRAST HEAD CT:   INTRACRANIAL CONTENTS: No intracranial hemorrhage, extraaxial collection, or mass effect.  No CT evidence of acute infarct. Normal parenchymal attenuation. Normal ventricles and sulci.     VISUALIZED ORBITS/SINUSES/MASTOIDS: No intraorbital abnormality. No paranasal sinus mucosal disease. No middle ear or mastoid effusion.    BONES/SOFT TISSUES: No acute abnormality.    HEAD CTA:  ANTERIOR CIRCULATION: No stenosis/occlusion, aneurysm, or high flow vascular malformation. Standard [place] anatomy.    POSTERIOR CIRCULATION: No stenosis/occlusion, aneurysm, or high flow vascular malformation. Balanced vertebral arteries supply a normal basilar artery.     DURAL  VENOUS SINUSES: Expected enhancement of the major dural venous sinuses.    NECK CTA:  RIGHT CAROTID: No measurable stenosis or dissection.    LEFT CAROTID: No measurable stenosis or dissection.    VERTEBRAL ARTERIES: No focal stenosis or dissection. Balanced vertebral arteries.    AORTIC ARCH: Classic aortic arch anatomy with no significant stenosis at the origin of the great vessels.    NONVASCULAR STRUCTURES: Unremarkable.       Impression    IMPRESSION:   HEAD CT:  1.  No acute intracranial process.    HEAD CTA:   1.  Normal CTA [place].    NECK CTA:  1.  Normal neck CTA.   Ethyl Alcohol Level   Result Value Ref Range    Alcohol ethyl <0.01 <=0.01 g/dL   TSH with free T4 reflex   Result Value Ref Range    TSH 1.49 0.40 - 4.00 mU/L   Troponin I   Result Value Ref Range    Troponin I High Sensitivity 11 <79 ng/L      Comment:      This Troponin-I result was obtained using a Siemens Dimension Vista High Sensitivity Troponin-I assay (TNIH). Effective 11/23/21, nine labs/sites in the [facility] switched from a Siemens [place] Contemporary Troponin I assay (CTNI) to a Siemens [place] High-Sensitivity Troponin I assay (TNIH).       If you have any questions or concerns, please call the clinic at the number listed above.       Sincerely,      Holland Blunt MD

## 2022-08-14 NOTE — TELEPHONE ENCOUNTER
Wife calling for patient. Consent to communicate on file. Patient is next to wife answering questions. Wife starting with patient has a history of stroke and heart failure. Patient saw chiropractor on Friday for left side neck pain. Symptoms better on after seeing chiropractor but have returned and worsened. Patient is still complaining of pain in his neck and and now on the left side of his head. Left side head pain in one localized spot. It is on the back of the skull on the left side at the level of the middle of the ear. Patient has had this pain before and the chiropractor has worked on this area before. Patient has taken tylenol and used a back vibrator that has heat. Patient reports pain 10/10 but states it is not a headache in his head but a pain on the outside of his head.   Protocol recommends see HCP within 4 hours.   Page to on call provider Dr. Sean Jiang at 7:39 am for second level triage for recommendation on best disposition.   Dr. Jiang returning page at 7:42 am. Provider directing patient to be evaluated now at ED  Provider Recommendation Follow Up:   Reached patient/caregiver. Informed of provider's recommendations. Patient verbalized understanding and agrees with the plan.     Wife will drive patient to [facility] ED.   Nahomy Wong RN   08/14/22 7:45 AM  [facility] Nurse Advisor    COVID 19 Nurse Triage Plan/Patient Instructions    Please be aware that novel coronavirus (COVID-19) may be circulating in the community. If you develop symptoms such as fever, cough, or SOB or if you have concerns about the presence of another infection including coronavirus (COVID-19), please contact your health care provider or visit [email or web address].     Disposition/Instructions    ED Visit recommended. Follow protocol based instructions.     Bring Your Own Device:  Please also bring your smart device(s) (smart phones, tablets, laptops) and their charging cables for your personal use and  to communicate with your care team during your visit.    Thank you for taking steps to prevent the spread of this virus.  o Limit your contact with others.  o Wear a simple mask to cover your cough.  o Wash your hands well and often.    Resources    [facility]: About COVID-19: [email or web address]    CDC: What to Do If You're Sick: www.cdc.gov/coronavirus/2019-ncov/about/steps-when-sick.html    CDC: Ending Home Isolation: www.cdc.gov/coronavirus/2019-ncov/hcp/disposition-in-home-patients.html     CDC: Caring for Someone: www.cdc.gov/coronavirus/2019-ncov/if-you-are-sick/care-for-someone.html     [facility]: Interim Guidance for Hospital Discharge to Home: www.health.[facility].[place]./diseases/coronavirus/hcp/hospdischarge.pdf    [facility] clinical trials (COVID-19 research studies): [email or web address]     Below are the COVID-19 hotlines at the [place] [facility] ([facility]). Interpreters are available.   o For health questions: Call 213-287-7087 or 1-423.678.8116 (7 a.m. to 7 p.m.)  o For questions about schools and childcare: Call 466-820-1442 or 1-668.996.6059 (7 a.m. to 7 p.m.)     Reason for Disposition    [1] SEVERE headache (e.g., excruciating) AND [2] not improved after 2 hours of pain medicine    Additional Information    Negative: Difficult to awaken or acting confused (e.g., disoriented, slurred speech)    Negative: [1] Weakness of the face, arm or leg on one side of the body AND [2] new-onset    Negative: [1] Numbness of the face, arm or leg on one side of the body AND [2] new-onset    Negative: [1] Loss of speech or garbled speech AND [2] new-onset    Negative: Passed out (i.e., lost consciousness, collapsed and was not responding)    Negative: Sounds like a life-threatening emergency to the triager    Negative: Followed a head injury    Negative: Pregnant    Negative: Postpartum (from 0 to 6 weeks after delivery)    Negative: Traumatic Brain Injury  "(TBI) is suspected    Negative: Unable to walk, or can only walk with assistance (e.g., requires support)    Negative: Stiff neck (can't touch chin to chest)    Negative: Severe pain in one eye    Negative: [1] Other family members (or roommates) with headaches AND [2] possibility of carbon monoxide exposure    Negative: [1] SEVERE headache (e.g., excruciating) AND [2] \"worst headache\" of life    Negative: [1] SEVERE headache AND [2] sudden-onset (i.e., reaching maximum intensity within seconds to 1 hour)    Negative: [1] SEVERE headache AND [2] fever    Negative: Loss of vision or double vision (Exception: same as prior migraines)    Negative: [1] Fever > 100.0 F (37.8 C) AND [2] diabetes mellitus or weak immune system (e.g., HIV positive, cancer chemo, splenectomy, organ transplant, chronic steroids)    Negative: Patient sounds very sick or weak to the triager    Protocols used: HEADACHE-A-AH      "

## 2022-08-14 NOTE — ED PROVIDER NOTES
[facility] EMERGENCY DEPARTMENT ([facility])    8/14/22      History     Chief Complaint   Patient presents with     Neck Pain     Neck pain since last Thursday     The history is provided by the patient and medical records.     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  Patient reports a several month history of neck pain and neck stiffness for which she sees a chiropractor.  He also has back pain.  He had a chiropractic adjustment performed yesterday and felt a lot better with improved range of motion.  However starting last evening he noted worsening neck pain and this morning he could not turn his head.  He has had difficulty moving his neck in the past with this pain.  He denies headache, vision changes aside from transient blurriness in both eyes initially when he woke up this morning.  He has not had weakness, numbness, confusion, slurred speech, facial droop.  He has not been drinking alcohol or doing any drugs recently.  The pain in his neck is located behind the left ear on a bony prominence of his lower skull.  It is more painful with rotation of his neck.  The pain is worse when rotating to the right but also hurts when rotating to the left.  There is also some pain radiating into the upper back.  Denies chest pain or palpitations.  No weakness or numbness in the left hand.    Per chart review, patient was seen by [facility] neurology clinic on 06/28/2022 after being  initially evaluated on 6/20/2022 for progressive cognitive decline.  Part of work-up included MRI brain that showed left parietal and occipital  infarct.  He was recently evaluated at [facility] and had an echocardiogram done that showed a low ejection fraction of 35%. Patient was initially evaluated for neurocognitive disorder and my concern was if his cognitive issues were related to alcohol use or early Alzheimer's dementia.  However, MRI brain showed multiple small infarcts and vascular dementia was also a possibility.  Patient was instructed to discontinue aspirin and switch to Eliquis 5 mg twice daily.  Patient was also placed on 30-day event monitor along with being scheduled for repeat echocardiogram.  Patient was instructed to continue use of Lipitor being that his most recent LDL was 27.    Past Medical History  Past Medical History:   Diagnosis Date     Arthritis      BCC (basal cell carcinoma)     right shoulder      Cardiomyopathy (H)      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 (H) 06/27/2022     Colon adenomas 09/20/2011     Coronary artery disease      Disorder of bursae and tendons in shoulder region 04/18/2014     ED (erectile dysfunction)      HFrEF (heart failure with reduced ejection fraction) (H)      History of iritis, os 05/27/2019     HTN (hypertension)      Near syncope 02/10/2014     Noncompliance      Nonrheumatic aortic valve insufficiency      Obesity      JOSE JUAN (obstructive sleep apnea)      Past Surgical History:   Procedure Laterality Date     ARTHROSCOPY SHOULDER BICEPS TENODESIS REPAIR  02/27/2014    Procedure: ARTHROSCOPY SHOULDER BICEPS TENODESIS REPAIR;;  Surgeon: Michael Hagen MD;  Location: [facility]     ARTHROSCOPY SHOULDER ROTATOR CUFF REPAIR  02/27/2014    Procedure: ARTHROSCOPY SHOULDER ROTATOR CUFF REPAIR;  Right Shoulder Arthroscopic Rotator Cuff Repair,  Subacromial Decompression, Biceps Tenodesis, Distal Clavicle Excision   ;  Surgeon: Michael Hagen MD;  Location: [facility]     BIOPSY       CARDIAC SURGERY        COLONOSCOPY  06/12/2018     CV CORONARY ANGIOGRAM N/A 8/8/2022    Procedure: Coronary Angiogram;  Surgeon: Ida Goddard MD;  [facility] CV     CV LEFT HEART CATH N/A 8/8/2022    Procedure: Left Heart Catheterization;  Surgeon: Ida Goddard MD;  Location: [facility] LAB CV     EXCHANGE INTRAOCULAR LENS IMPLANT  2005    left eye - first, recentered PCL with iris fixation; then IOLX with ACL     EXTRACAPSULAR CATARACT EXTRATION WITH INTRAOCULAR LENS IMPLANT  2005    both eyes (elsewhere)     RELEASE CARPAL TUNNEL Right 08/13/2021    Procedure: RELEASE, RIGHT CARPAL TUNNEL;  Surgeon: Tony Bravo MD;  Location: [facility] OR     TURBINOPLASTY  12/11/2013    Procedure: TURBINOPLASTY;;  Surgeon: Lisset Parsons MD;  Location: [facility] OR     UVULOPALATOPHARYNGOPLASTY  12/11/2013    Procedure: UVULOPALATOPHARYNGOPLASTY;  Uvulopalatopharyngoplasty, [place] Reduction;  Surgeon: Lisset Parsons MD;  Location: [facility] OR     apixaban ANTICOAGULANT (ELIQUIS ANTICOAGULANT) 5 MG tablet  atorvastatin (LIPITOR) 10 MG tablet  cyanocobalamin (VITAMIN B-12) 1000 MCG tablet  diphenoxylate-atropine (LOMOTIL) 2.5-0.025 MG tablet  folic acid (FOLVITE) 1 MG tablet  losartan (COZAAR) 50 MG tablet  methocarbamol (ROBAXIN) 500 MG tablet  metoprolol succinate ER (TOPROL XL) 25 MG 24 hr tablet      Allergies   Allergen Reactions     Lisinopril Cough     Perfume Other (See Comments)     Family History  Family History   Problem Relation Age of Onset     Diabetes Father         Old age Diabetes     Cerebrovascular Disease Father      Obesity Father      Heart Disease Father      Coronary Artery Disease Father      Hypertension Father      Lipids Sister      C.A.D. No family hx of      Cancer No family hx of      Glaucoma No family hx of      Macular Degeneration No family hx of      Social History   Social History     Tobacco Use     Smoking status: Former Smoker     Packs/day: 0.50     Years: 2.00     Pack  "years: 1.00     Types: Cigarettes     Quit date: 1996     Years since quittin.6     Smokeless tobacco: Never Used   Vaping Use     Vaping Use: Never used   Substance Use Topics     Alcohol use: Yes     Alcohol/week: 8.3 standard drinks     Comment: 1-2 drinks a day     Drug use: No      Past medical history, past surgical history, medications, allergies, family history, and social history were reviewed with the patient. No additional pertinent items.       Review of Systems   Eyes: Positive for visual disturbance.   Respiratory: Negative for shortness of breath.    Cardiovascular: Negative for chest pain.   Gastrointestinal: Negative for nausea and vomiting.   Musculoskeletal: Positive for neck pain and neck stiffness. Negative for back pain.   Neurological: Negative for syncope, facial asymmetry, speech difficulty, weakness, numbness and headaches.   All other systems reviewed and are negative.    A complete review of systems was performed with pertinent positives and negatives noted in the HPI, and all other systems negative.    Physical Exam   BP: (!) 173/83  Pulse: 54  Temp: 97.7  F (36.5  C)  Resp: 16  Height: 170.2 cm (5' 7\")  Weight: 84.8 kg (187 lb) (pt refuses to be weighed, stated weight is 187lb)  SpO2: 96 %  Physical Exam  Vitals and nursing note reviewed.   Constitutional:       General: He is not in acute distress.     Appearance: Normal appearance. He is well-developed. He is not ill-appearing or diaphoretic.   HENT:      Head: Normocephalic and atraumatic.      Nose: Nose normal.      Mouth/Throat:      Mouth: Mucous membranes are moist.   Eyes:      General: No scleral icterus.     Extraocular Movements: Extraocular movements intact.      Conjunctiva/sclera: Conjunctivae normal.      Pupils: Pupils are equal, round, and reactive to light.   Neck:      Trachea: Trachea and phonation normal.        Comments: Tender over SCM insertion on left occiput.   Cardiovascular:      Rate and Rhythm: " Normal rate.   Pulmonary:      Effort: Pulmonary effort is normal. No respiratory distress.      Breath sounds: No stridor.   Abdominal:      General: There is no distension.   Musculoskeletal:         General: No deformity or signs of injury.      Cervical back: Torticollis present. No erythema, rigidity or crepitus. Pain with movement and muscular tenderness present. No spinous process tenderness. Decreased range of motion.   Skin:     General: Skin is warm and dry.      Coloration: Skin is not jaundiced or pale.      Findings: No rash.   Neurological:      General: No focal deficit present.      Mental Status: He is alert and oriented to person, place, and time.      GCS: GCS eye subscore is 4. GCS verbal subscore is 5. GCS motor subscore is 6.      Cranial Nerves: No dysarthria or facial asymmetry.      Sensory: Sensation is intact. No sensory deficit.      Motor: No weakness, tremor or seizure activity.      Coordination: Finger-Nose-Finger Test and Heel to Shin Test normal. Rapid alternating movements normal.   Psychiatric:         Attention and Perception: Attention normal.         Mood and Affect: Mood normal.         Speech: Speech is not slurred.         Behavior: Behavior normal. Behavior is not slowed. Behavior is cooperative.         Thought Content: Thought content normal.         ED Course      Procedures            EKG Interpretation:      Interpreted by Sudha Diehl MD  Time reviewed: 8:59  Symptoms at time of EKG: neck pain   Rhythm: sinus bradycardia  Rate: 50-60  Axis: Normal  Ectopy: none  Conduction: normal  ST Segments/ T Waves: T wave inversion Lateral  Q Waves: none  Comparison to prior: Unchanged    Clinical Impression: no acute changes and sinus bradycardia                          Results for orders placed or performed during the hospital encounter of 08/14/22   CTA Head Neck w Contrast     Status: None    Narrative    EXAM: CT HEAD W/O CONTRAST, CTA HEAD NECK W CONTRAST  LOCATION: [facility]  DATE/TIME: 8/14/2022 9:42 AM    INDICATION: Left occipital and neck pain after chiropractic adjustment.  COMPARISON: 06/27/2022 MRI.  CONTRAST: 75mL isovue 370  TECHNIQUE: Head and neck CT angiogram with IV contrast. Noncontrast head CT followed by axial helical CT images of the head and neck vessels obtained during the arterial phase of intravenous contrast administration. Axial 2D reconstructed images and   multiplanar 3D MIP reconstructed images of the head and neck vessels were performed by the technologist. Dose reduction techniques were used. All stenosis measurements made according to NASCET criteria unless otherwise specified.    FINDINGS:   NONCONTRAST HEAD CT:   INTRACRANIAL CONTENTS: No intracranial hemorrhage, extraaxial collection, or mass effect.  No CT evidence of acute infarct. Normal parenchymal attenuation. Normal ventricles and sulci.     VISUALIZED ORBITS/SINUSES/MASTOIDS: No intraorbital abnormality. No paranasal sinus mucosal disease. No middle ear or mastoid effusion.    BONES/SOFT TISSUES: No acute abnormality.    HEAD CTA:  ANTERIOR CIRCULATION: No stenosis/occlusion, aneurysm, or high flow vascular malformation. Standard [place] anatomy.    POSTERIOR CIRCULATION: No stenosis/occlusion, aneurysm, or high flow vascular malformation. Balanced vertebral arteries supply a normal basilar artery.     DURAL VENOUS SINUSES: Expected enhancement of the major dural venous sinuses.    NECK CTA:  RIGHT CAROTID: No measurable stenosis or dissection.    LEFT CAROTID: No measurable stenosis or dissection.    VERTEBRAL ARTERIES: No focal stenosis or dissection. Balanced vertebral arteries.    AORTIC ARCH: Classic aortic arch anatomy with no significant stenosis at the origin of the great vessels.    NONVASCULAR STRUCTURES: Unremarkable.       Impression    IMPRESSION:   HEAD CT:  1.  No acute intracranial process.    HEAD CTA:   1.  Normal CTA  [place].    NECK CTA:  1.  Normal neck CTA.   CT Head w/o Contrast     Status: None    Narrative    EXAM: CT HEAD W/O CONTRAST, CTA HEAD NECK W CONTRAST  LOCATION: [facility]  DATE/TIME: 8/14/2022 9:42 AM    INDICATION: Left occipital and neck pain after chiropractic adjustment.  COMPARISON: 06/27/2022 MRI.  CONTRAST: 75mL isovue 370  TECHNIQUE: Head and neck CT angiogram with IV contrast. Noncontrast head CT followed by axial helical CT images of the head and neck vessels obtained during the arterial phase of intravenous contrast administration. Axial 2D reconstructed images and   multiplanar 3D MIP reconstructed images of the head and neck vessels were performed by the technologist. Dose reduction techniques were used. All stenosis measurements made according to NASCET criteria unless otherwise specified.    FINDINGS:   NONCONTRAST HEAD CT:   INTRACRANIAL CONTENTS: No intracranial hemorrhage, extraaxial collection, or mass effect.  No CT evidence of acute infarct. Normal parenchymal attenuation. Normal ventricles and sulci.     VISUALIZED ORBITS/SINUSES/MASTOIDS: No intraorbital abnormality. No paranasal sinus mucosal disease. No middle ear or mastoid effusion.    BONES/SOFT TISSUES: No acute abnormality.    HEAD CTA:  ANTERIOR CIRCULATION: No stenosis/occlusion, aneurysm, or high flow vascular malformation. Standard [place] anatomy.    POSTERIOR CIRCULATION: No stenosis/occlusion, aneurysm, or high flow vascular malformation. Balanced vertebral arteries supply a normal basilar artery.     DURAL VENOUS SINUSES: Expected enhancement of the major dural venous sinuses.    NECK CTA:  RIGHT CAROTID: No measurable stenosis or dissection.    LEFT CAROTID: No measurable stenosis or dissection.    VERTEBRAL ARTERIES: No focal stenosis or dissection. Balanced vertebral arteries.    AORTIC ARCH: Classic aortic arch anatomy with no significant stenosis at the  origin of the great vessels.    NONVASCULAR STRUCTURES: Unremarkable.       Impression    IMPRESSION:   HEAD CT:  1.  No acute intracranial process.    HEAD CTA:   1.  Normal CTA [place].    NECK CTA:  1.  Normal neck CTA.   Basic metabolic panel     Status: Abnormal   Result Value Ref Range    Sodium 140 133 - 144 mmol/L    Potassium 4.3 3.4 - 5.3 mmol/L    Chloride 108 94 - 109 mmol/L    Carbon Dioxide (CO2) 28 20 - 32 mmol/L    Anion Gap 4 3 - 14 mmol/L    Urea Nitrogen 15 7 - 30 mg/dL    Creatinine 0.92 0.66 - 1.25 mg/dL    Calcium 8.8 8.5 - 10.1 mg/dL    Glucose 102 (H) 70 - 99 mg/dL    GFR Estimate 90 >60 mL/min/1.73m2   Troponin I     Status: Normal   Result Value Ref Range    Troponin I High Sensitivity 11 <79 ng/L   [place] Draw     Status: None    Narrative    The following orders were created for panel order [place] Draw.  Procedure                               Abnormality         Status                     ---------                               -----------         ------                     Extra Blue Top Tube[[id number]]                              Final result               Extra Red Top Tube[[id number]]                               Final result                 Please view results for these tests on the individual orders.   CBC with platelets and differential     Status: None   Result Value Ref Range    WBC Count 5.8 4.0 - 11.0 10e3/uL    RBC Count 4.71 4.40 - 5.90 10e6/uL    Hemoglobin 13.9 13.3 - 17.7 g/dL    Hematocrit 41.3 40.0 - 53.0 %    MCV 88 78 - 100 fL    MCH 29.5 26.5 - 33.0 pg    MCHC 33.7 31.5 - 36.5 g/dL    RDW 13.4 10.0 - 15.0 %    Platelet Count 239 150 - 450 10e3/uL    % Neutrophils 62 %    % Lymphocytes 24 %    % Monocytes 11 %    % Eosinophils 1 %    % Basophils 1 %    % Immature Granulocytes 1 %    NRBCs per 100 WBC 0 <1 /100    Absolute Neutrophils 3.7 1.6 - 8.3 10e3/uL    Absolute Lymphocytes 1.4 0.8 - 5.3 10e3/uL    Absolute Monocytes 0.6 0.0 - 1.3 10e3/uL    Absolute  Eosinophils 0.1 0.0 - 0.7 10e3/uL    Absolute Basophils 0.0 0.0 - 0.2 10e3/uL    Absolute Immature Granulocytes 0.0 <=0.4 10e3/uL    Absolute NRBCs 0.0 10e3/uL   Extra Blue Top Tube     Status: None   Result Value Ref Range    Hold Specimen JIC    Extra Red Top Tube     Status: None   Result Value Ref Range    Hold Specimen JIC    Ethyl Alcohol Level     Status: Normal   Result Value Ref Range    Alcohol ethyl <0.01 <=0.01 g/dL   TSH with free T4 reflex     Status: Normal   Result Value Ref Range    TSH 1.49 0.40 - 4.00 mU/L   Troponin I     Status: Normal   Result Value Ref Range    Troponin I High Sensitivity 11 <79 ng/L   EKG 12-lead, tracing only     Status: None (Preliminary result)   Result Value Ref Range    Systolic Blood Pressure  mmHg    Diastolic Blood Pressure  mmHg    Ventricular Rate 59 BPM    Atrial Rate 59 BPM    IL Interval 204 ms    QRS Duration 108 ms     ms    QTc 451 ms    P Axis 28 degrees    R AXIS -29 degrees    T Axis 158 degrees    Interpretation ECG       Sinus bradycardia  T wave abnormality, consider lateral ischemia  Abnormal ECG     CBC with platelets differential     Status: None    Narrative    The following orders were created for panel order CBC with platelets differential.  Procedure                               Abnormality         Status                     ---------                               -----------         ------                     CBC with platelets and d...[[id number]]                      Final result                 Please view results for these tests on the individual orders.     Medications   sodium chloride 100mL for CT scan flush use (80 mLs Intravenous Given 8/14/22 1013)   iopamidol (ISOVUE-370) solution 100 mL (75 mLs Intravenous Given 8/14/22 1000)       MRI BRAIN WITHOUT AND WITH CONTRAST June 27, 2022 11:46 AM  IMPRESSION:  1. Abnormal areas of T2 hyperintense signal involving the left  parietal lobe and bilateral occipital lobes with enhancement,  likely  subacute cortical infarcts.  2. 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  3. Nonspecific bilateral cerebellar microhemorrhages, likely  incidental.  4. Volume loss and white matter T2 hyperintensities which likely  represent chronic small vessel ischemic change.    Complete Echo Adult  Interpretation Summary  Bubble study was performed.  No evidence of right to left shunt at rest but evidence of small right to left  shunt following valsalva manuever.  There is mild concentric left ventricular hypertrophy.  There is moderate global hypokinesia of the left ventricle.  The visual ejection fraction is 35-40%.  Mildly decreased right ventricular systolic function  There is trace to mild mitral regurgitation.  The aortic valve is trileaflet with aortic valve sclerosis.  There is mild to moderate (1-2+) aortic regurgitation.  There is trace tricuspid regurgitation.  Compared to the prior study dated 9/29/2020, there are changes as noted. There  has been a decline in LV and RV systolic fuction.      US CAROTID BILATERAL 6/28/2022  1.  Mild plaque formation, velocities consistent with less than 50% stenosis in the right internal carotid artery.  2.  Mild plaque formation, velocities consistent with less than 50% stenosis in the left internal carotid artery.  3.  Flow within the vertebral arteries is antegrade.     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    Ddx: carotid artery/vertebral artery dissection, torticollis, ICH hemorrhage, cerebellar hemorrhage, cervicalgia,  cervical radiculopathy    Patient with high risk history for stroke or intracranial hemorrhage given the above.  Hypertensive on arrival.  No focal neurologic deficits.  Symptoms started shortly after patient underwent a chiropractic neck adjustment.  Pain is very reproducible  with palpation localized to the left neck and occiput.  Given methocarbamol for muscle spasm/neck pain. Pain improved.  CT head and CTA of the head and neck within normal limits.  No acute hemorrhage or infarct.  No dissection.  Since the patient has no focal neurologic deficits I am not worried about an acute ischemic or thromboembolic stroke as etiology for patient's neck pain presentation.  Troponin 11.  EKG without acute ischemic changes.  Repeat troponin also 11.  Ruled out for ACS with anginal equivalent.  Patient discharged with course of methocarbamol for neck muscle spasm.  I did discuss with him the potential risk of cervical artery dissection or thromboembolic phenomenon caused by chiropractic neck manipulations.  Advise close primary care follow-up.  Return precautions provided.      I have reviewed the nursing notes. I have reviewed the findings, diagnosis, plan and need for follow up with the patient.    Discharge Medication List as of 8/14/2022 12:49 PM      START taking these medications    Details   methocarbamol (ROBAXIN) 500 MG tablet Take 2 tablets (1,000 mg) by mouth 4 times daily for 2 days, THEN 1.5 tablets (750 mg) 4 times daily for 3 days., Disp-34 tablet, R-0, E-Prescribe             Final diagnoses:   Muscle spasms of neck       --  Sudha Diehl  [facility] EMERGENCY DEPARTMENT  8/14/2022     Sudha Diehl MD  08/14/22 1818

## 2022-08-14 NOTE — LETTER
August 15, 2022    Ricky Brown  [address]  [place] [id number]          Dear ,    We are writing to inform you of your test results.    All of these tests are within acceptable limits , things look good !       Resulted Orders   CTA Head Neck w Contrast    Narrative    EXAM: CT HEAD W/O CONTRAST, CTA HEAD NECK W CONTRAST  LOCATION: [facility]  DATE/TIME: 8/14/2022 9:42 AM    INDICATION: Left occipital and neck pain after chiropractic adjustment.  COMPARISON: 06/27/2022 MRI.  CONTRAST: 75mL isovue 370  TECHNIQUE: Head and neck CT angiogram with IV contrast. Noncontrast head CT followed by axial helical CT images of the head and neck vessels obtained during the arterial phase of intravenous contrast administration. Axial 2D reconstructed images and   multiplanar 3D MIP reconstructed images of the head and neck vessels were performed by the technologist. Dose reduction techniques were used. All stenosis measurements made according to NASCET criteria unless otherwise specified.    FINDINGS:   NONCONTRAST HEAD CT:   INTRACRANIAL CONTENTS: No intracranial hemorrhage, extraaxial collection, or mass effect.  No CT evidence of acute infarct. Normal parenchymal attenuation. Normal ventricles and sulci.     VISUALIZED ORBITS/SINUSES/MASTOIDS: No intraorbital abnormality. No paranasal sinus mucosal disease. No middle ear or mastoid effusion.    BONES/SOFT TISSUES: No acute abnormality.    HEAD CTA:  ANTERIOR CIRCULATION: No stenosis/occlusion, aneurysm, or high flow vascular malformation. Standard [place] anatomy.    POSTERIOR CIRCULATION: No stenosis/occlusion, aneurysm, or high flow vascular malformation. Balanced vertebral arteries supply a normal basilar artery.     DURAL VENOUS SINUSES: Expected enhancement of the major dural venous sinuses.    NECK CTA:  RIGHT CAROTID: No measurable stenosis or dissection.    LEFT CAROTID: No  measurable stenosis or dissection.    VERTEBRAL ARTERIES: No focal stenosis or dissection. Balanced vertebral arteries.    AORTIC ARCH: Classic aortic arch anatomy with no significant stenosis at the origin of the great vessels.    NONVASCULAR STRUCTURES: Unremarkable.       Impression    IMPRESSION:   HEAD CT:  1.  No acute intracranial process.    HEAD CTA:   1.  Normal CTA [place].    NECK CTA:  1.  Normal neck CTA.       If you have any questions or concerns, please call the clinic at the number listed above.       Sincerely,      Holland Blunt MD

## 2022-08-14 NOTE — LETTER
August 15, 2022    Ricky Brown  [address]  [facility] [address]          Dear ,    We are writing to inform you of your test results.    All of these tests are within acceptable limits , things look good !       Resulted Orders   CTA Head Neck w Contrast    Narrative    EXAM: CT HEAD W/O CONTRAST, CTA HEAD NECK W CONTRAST  LOCATION: [facility]  DATE/TIME: 8/14/2022 9:42 AM    INDICATION: Left occipital and neck pain after chiropractic adjustment.  COMPARISON: 06/27/2022 MRI.  CONTRAST: 75mL isovue 370  TECHNIQUE: Head and neck CT angiogram with IV contrast. Noncontrast head CT followed by axial helical CT images of the head and neck vessels obtained during the arterial phase of intravenous contrast administration. Axial 2D reconstructed images and   multiplanar 3D MIP reconstructed images of the head and neck vessels were performed by the technologist. Dose reduction techniques were used. All stenosis measurements made according to NASCET criteria unless otherwise specified.    FINDINGS:   NONCONTRAST HEAD CT:   INTRACRANIAL CONTENTS: No intracranial hemorrhage, extraaxial collection, or mass effect.  No CT evidence of acute infarct. Normal parenchymal attenuation. Normal ventricles and sulci.     VISUALIZED ORBITS/SINUSES/MASTOIDS: No intraorbital abnormality. No paranasal sinus mucosal disease. No middle ear or mastoid effusion.    BONES/SOFT TISSUES: No acute abnormality.    HEAD CTA:  ANTERIOR CIRCULATION: No stenosis/occlusion, aneurysm, or high flow vascular malformation. Standard [place] anatomy.    POSTERIOR CIRCULATION: No stenosis/occlusion, aneurysm, or high flow vascular malformation. Balanced vertebral arteries supply a normal basilar artery.     DURAL VENOUS SINUSES: Expected enhancement of the major dural venous sinuses.    NECK CTA:  RIGHT CAROTID: No measurable stenosis or dissection.    LEFT CAROTID: No  measurable stenosis or dissection.    VERTEBRAL ARTERIES: No focal stenosis or dissection. Balanced vertebral arteries.    AORTIC ARCH: Classic aortic arch anatomy with no significant stenosis at the origin of the great vessels.    NONVASCULAR STRUCTURES: Unremarkable.       Impression    IMPRESSION:   HEAD CT:  1.  No acute intracranial process.    HEAD CTA:   1.  Normal CTA [place].    NECK CTA:  1.  Normal neck CTA.   CT Head w/o Contrast    Narrative    EXAM: CT HEAD W/O CONTRAST, CTA HEAD NECK W CONTRAST  LOCATION: [facility]  DATE/TIME: 8/14/2022 9:42 AM    INDICATION: Left occipital and neck pain after chiropractic adjustment.  COMPARISON: 06/27/2022 MRI.  CONTRAST: 75mL isovue 370  TECHNIQUE: Head and neck CT angiogram with IV contrast. Noncontrast head CT followed by axial helical CT images of the head and neck vessels obtained during the arterial phase of intravenous contrast administration. Axial 2D reconstructed images and   multiplanar 3D MIP reconstructed images of the head and neck vessels were performed by the technologist. Dose reduction techniques were used. All stenosis measurements made according to NASCET criteria unless otherwise specified.    FINDINGS:   NONCONTRAST HEAD CT:   INTRACRANIAL CONTENTS: No intracranial hemorrhage, extraaxial collection, or mass effect.  No CT evidence of acute infarct. Normal parenchymal attenuation. Normal ventricles and sulci.     VISUALIZED ORBITS/SINUSES/MASTOIDS: No intraorbital abnormality. No paranasal sinus mucosal disease. No middle ear or mastoid effusion.    BONES/SOFT TISSUES: No acute abnormality.    HEAD CTA:  ANTERIOR CIRCULATION: No stenosis/occlusion, aneurysm, or high flow vascular malformation. Standard [place] anatomy.    POSTERIOR CIRCULATION: No stenosis/occlusion, aneurysm, or high flow vascular malformation. Balanced vertebral arteries supply a normal basilar artery.     DURAL  VENOUS SINUSES: Expected enhancement of the major dural venous sinuses.    NECK CTA:  RIGHT CAROTID: No measurable stenosis or dissection.    LEFT CAROTID: No measurable stenosis or dissection.    VERTEBRAL ARTERIES: No focal stenosis or dissection. Balanced vertebral arteries.    AORTIC ARCH: Classic aortic arch anatomy with no significant stenosis at the origin of the great vessels.    NONVASCULAR STRUCTURES: Unremarkable.       Impression    IMPRESSION:   HEAD CT:  1.  No acute intracranial process.    HEAD CTA:   1.  Normal CTA [place].    NECK CTA:  1.  Normal neck CTA.       If you have any questions or concerns, please call the clinic at the number listed above.       Sincerely,      Holland Blunt MD

## 2022-08-14 NOTE — ED TRIAGE NOTES
"States that he had a \"stroke\" within the last 2 months, this feels different, had a cardiac cath last monday     Triage Assessment     Row Name 08/14/22 8298       Triage Assessment (Adult)    Airway WDL WDL       Respiratory WDL    Respiratory WDL WDL       Skin Circulation/Temperature WDL    Skin Circulation/Temperature WDL WDL       Cardiac WDL    Cardiac WDL --  is wearing a holter monitor       Peripheral/Neurovascular WDL    Peripheral Neurovascular WDL WDL       Cognitive/Neuro/Behavioral WDL    Cognitive/Neuro/Behavioral WDL WDL              "

## 2022-08-14 NOTE — DISCHARGE INSTRUCTIONS
Please make an appointment to follow up with Your Primary Care Provider as soon as possible unless symptoms completely resolve.    Take Robaxin, as prescribed, for 5 days to treat muscle spasm.    This medication may cause drowsiness.    Return for worsening pain, weakness, numbness, confusion, vision changes, or other neurologic symptoms.

## 2022-08-15 LAB
ATRIAL RATE - MUSE: 59 BPM
DIASTOLIC BLOOD PRESSURE - MUSE: NORMAL MMHG
INTERPRETATION ECG - MUSE: NORMAL
P AXIS - MUSE: 28 DEGREES
PR INTERVAL - MUSE: 204 MS
QRS DURATION - MUSE: 108 MS
QT - MUSE: 456 MS
QTC - MUSE: 451 MS
R AXIS - MUSE: -29 DEGREES
SYSTOLIC BLOOD PRESSURE - MUSE: NORMAL MMHG
T AXIS - MUSE: 158 DEGREES
VENTRICULAR RATE- MUSE: 59 BPM

## 2022-08-19 ENCOUNTER — OFFICE VISIT (OUTPATIENT)
Dept: ORTHOPEDICS | Facility: CLINIC | Age: 72
End: 2022-08-19
Payer: COMMERCIAL

## 2022-08-19 ENCOUNTER — THERAPY VISIT (OUTPATIENT)
Dept: PHYSICAL THERAPY | Facility: CLINIC | Age: 72
End: 2022-08-19
Payer: COMMERCIAL

## 2022-08-19 DIAGNOSIS — Z98.890 S/P CARPAL TUNNEL RELEASE: Primary | ICD-10-CM

## 2022-08-19 DIAGNOSIS — M54.50 LUMBAR SPINE PAIN: Primary | ICD-10-CM

## 2022-08-19 DIAGNOSIS — M54.6 PAIN IN THORACIC SPINE: ICD-10-CM

## 2022-08-19 DIAGNOSIS — M54.6 ACUTE RIGHT-SIDED THORACIC BACK PAIN: ICD-10-CM

## 2022-08-19 PROCEDURE — 97161 PT EVAL LOW COMPLEX 20 MIN: CPT | Mod: GP | Performed by: PHYSICAL THERAPIST

## 2022-08-19 PROCEDURE — 99214 OFFICE O/P EST MOD 30 MIN: CPT | Performed by: STUDENT IN AN ORGANIZED HEALTH CARE EDUCATION/TRAINING PROGRAM

## 2022-08-19 PROCEDURE — 97110 THERAPEUTIC EXERCISES: CPT | Mod: GP | Performed by: PHYSICAL THERAPIST

## 2022-08-19 NOTE — PROGRESS NOTES
"Ortho Hand    Returns to clinic.  Continues to have provocative right median distributed tingling and numbness.  Would like to have surgery.  However, is in the midst of an evolving neurovascular work-up.  He has another visit with neurology on August 31.      On exam, continues to have right wrist Tinel's and positive Durkan's test.  Right elbow with positive Tinel's.    A/P: 71-year-old LHD NS hx recent CVA on Eliquis s/p R CTR p/w persistent R CTS, R CuTS    -Since he has no improvement after R CTR, and is with nerve conduction confirmed carpal tunnel syndrome and ulnar nerve compression at the elbow, it is reasonable to proceed with surgery.  However, patient has other medical comorbidities that make him high risk for sedation.  Discussed the possibility of wide-awake surgery with local anesthesia alone, but patient states that he may not tolerate this.   -Patient will be meeting with neurologist within next few weeks and will inquire regarding suitability of any sedation-requiring surgery during his current evolving neurological workup. It is most safe to wait to schedule any surgery until the status of his neurological issue is clear.   -Will contact his cardiologist regarding suitability for 45-60 minute case that involves IV sedation  -Once neurology and cardiology \"clearance\" obtained, we will consider right revision open carpal tunnel release with hypothenar fat flap and elbow ulnar nerve decompression surgery, unless patient would like this done with wide-awake local-only surgery  -A total of 30 minutes was devoted to review of chart, direct face-to-face patient counseling and documentation during this encounter.    Vignesh Rhodes MD, PhD         "

## 2022-08-19 NOTE — PROGRESS NOTES
Physical Therapy Initial Evaluation  Subjective:  The history is provided by the patient.   Therapist Generated HPI Evaluation  Problem details: Having pain in right upper and middle back. Used to go to the chiropractor and would be good for months. First happened in the 1970's. Was having pretty intense pain in the upper back and neck and went to the ED recently. Now wearing a heart monitor. Pain will usually go from upper and mid back to lumbar spine and sometimes into the neck. Goes for walks and will have pain when walking back home. No pain to the front of the body. No pain down the legs. Lies in bed until 1:00 PM and that feels good..         Type of problem:  Thoracic and lumbar.    This is a recurrent condition.  Condition occurred with:  Insidious onset.    Patient reports pain:  Thoracic spine right and lumbar spine right (Cervical spine right).  and is constant.  Pain radiates to:  No radiation. Pain is the same all the time.  Since onset symptoms are gradually worsening.  Symptoms are exacerbated by walking  and relieved by heat and ice.    Previous treatment includes chiropractic. There was significant improvement following previous treatment.  Barriers include:  None as reported by patient.    Patient Health History  Ricky Brown being seen for Back pain.          Pain is reported as 4/10 on pain scale.  General health as reported by patient is good.  Pertinent medical history includes: heart problems, high blood pressure, overweight and sleep disorder/apnea.   Red flags:  None as reported by patient.         Current medications:  High blood pressure medication and cardiac medication.    Current occupation is Retired.   Primary job tasks include:  Computer work, driving and pushing/pulling.                                    Objective:    Gait:  Reduced heel strike and toe off. Reduced lumbar rotation.                   Lumbar/SI Evaluation  ROM:    AROM Lumbar:   Flexion:          24 inches  from floor  Ext:                    15 degrees   Side Bend:        Left:  Fingertips to knee joint    Right:  Fingertips to knee joint with pain on right thoracic spine  Rotation:           Left:     Right:   Side Glide:        Left:     Right:           Lumbar Myotomes:  normal            Lumbar DTR's:    L4 (Quad):  Left:  2   Right:  2      Lumbar Dermtomes:  normal                Neural Tension/Mobility:      Left side:SLR or SLR w/DF  negative.     Right side:   SLR w/DF or SLR  negative.   Lumbar Palpation:  Palpation (lumbar): Tender to palpation over right lumbar and thoracic paraspinals.          Spinal Segmental Conclusions: Hypomobility of T3-T10 and L1-L5          SI joint/Sacrum:      Provocation:  Negative SI gapping             Cervical/Thoracic Evaluation    AROM:    AROM Thoracic:    Flexion:               80%  Extension:          60%  Rotation:            Left: 60%     Right: 80%                                                                  General     ROS    Assessment/Plan:    Patient is a 71 year old male with thoracic and lumbar complaints.    Patient has the following significant findings with corresponding treatment plan.                Diagnosis 1:  Lumbar spine pain / Thoracic spine pain  Pain -  US, mechanical traction, manual therapy, self management, education and home program  Decreased ROM/flexibility - manual therapy, therapeutic exercise, therapeutic activity and home program  Decreased joint mobility - manual therapy, therapeutic exercise, therapeutic activity and home program  Decreased strength - therapeutic exercise, therapeutic activities and home program  Impaired gait - gait training and home program  Decreased function - therapeutic activities and home program    Therapy Evaluation Codes:     Cumulative Therapy Evaluation is: Low complexity.    Previous and current functional limitations:  (See Goal Flow Sheet for this information)    Short term and Long term goals: (See  Goal Flow Sheet for this information)     Communication ability:  Patient appears to be able to clearly communicate and understand verbal and written communication and follow directions correctly.  Treatment Explanation - The following has been discussed with the patient:   RX ordered/plan of care  Anticipated outcomes  Possible risks and side effects  This patient would benefit from PT intervention to resume normal activities.   Rehab potential is good.    Frequency:  1 X week, once daily  Duration:  for 8 weeks  Discharge Plan:  Achieve all LTG.  Independent in home treatment program.  Reach maximal therapeutic benefit.    Please refer to the daily flowsheet for treatment today, total treatment time and time spent performing 1:1 timed codes.

## 2022-08-19 NOTE — LETTER
"    8/19/2022         RE: Ricky Brown  [address]  [facility] [address]        Dear Colleague,    Thank you for referring your patient, Ricky Brown, to the [facility]. Please see a copy of my visit note below.    Ortho Hand    Returns to clinic.  Continues to have provocative right median distributed tingling and numbness.  Would like to have surgery.  However, is in the midst of an evolving neurovascular work-up.  He has another visit with neurology on August 31.      On exam, continues to have right wrist Tinel's and positive Durkan's test.  Right elbow with positive Tinel's.    A/P: 71-year-old LHD NS hx recent CVA on Eliquis s/p R CTR p/w persistent R CTS, R CuTS    -Since he has no improvement after R CTR, and is with nerve conduction confirmed carpal tunnel syndrome and ulnar nerve compression at the elbow, it is reasonable to proceed with surgery.  However, patient has other medical comorbidities that make him high risk for sedation.  Discussed the possibility of wide-awake surgery with local anesthesia alone, but patient states that he may not tolerate this.   -Patient will be meeting with neurologist within next few weeks and will inquire regarding suitability of any sedation-requiring surgery during his current evolving neurological workup. It is most safe to wait to schedule any surgery until the status of his neurological issue is clear.   -Will contact his cardiologist regarding suitability for 45-60 minute case that involves IV sedation  -Once neurology and cardiology \"clearance\" obtained, we will consider right revision open carpal tunnel release with hypothenar fat flap and elbow ulnar nerve decompression surgery, unless patient would like this done with wide-awake local-only surgery  -A total of 30 minutes was devoted to review of chart, direct face-to-face patient counseling and documentation during this encounter.    Vignesh Rhodes MD, PhD   "           Again, thank you for allowing me to participate in the care of your patient.        Sincerely,        Vignesh Rhodes MD

## 2022-08-19 NOTE — PROGRESS NOTES
[facility]    OUTPATIENT Physical Therapy ORTHOPEDIC EVALUATION  PLAN OF TREATMENT FOR OUTPATIENT REHABILITATION  (COMPLETE FOR INITIAL CLAIMS ONLY)  Patient's Last Name, First Name, M.I.  YOB: 1950  Ricky Brown    Provider s Name:  [facility]   Medical Record No.  [id number]   Start of Care Date:  08/19/22   Onset Date:   08/02/22 (Date of Order)   Type:     _X__PT   ___OT Medical Diagnosis:    Encounter Diagnosis   Name Primary?    Acute right-sided thoracic back pain         Treatment Diagnosis:  Thoracic and lumbar spine pain        Goals:     08/19/22 0500   Body Part   Goals listed below are for Thoracic and Lumbar   Goal #1   Goal #1 ambulation   Previous Functional Level No restrictions   Current Functional Level [place] patient can walk   Performance Level 1 with 4/10 pain   STG Target Performance [place] patient will be able to  walk   Performance Level 1 with 1-2/10 pain   Rationale for safe community ambulation   Due Date 09/09/22    LTG Target Performance [place] patient will be able to  walk   Performance Level 2 with 1-2/10 pain   Rationale for safe community ambulation   Due Date 10/14/22       Therapy Frequency:  1 time per week  Predicted Duration of Therapy Intervention:  8 weeks    Bam Foster, PT                 I CERTIFY THE NEED FOR THESE SERVICES FURNISHED UNDER        THIS PLAN OF TREATMENT AND WHILE UNDER MY CARE     (Physician attestation of this document indicates review and certification of the therapy plan).                     Certification Date From:  08/19/22   Certification Date To:  10/14/22    Referring Provider:  Brandee Joshua    Initial Assessment        See Epic Evaluation SOC Date: 08/19/22

## 2022-08-22 ENCOUNTER — ANCILLARY PROCEDURE (OUTPATIENT)
Dept: PET IMAGING | Facility: CLINIC | Age: 72
End: 2022-08-22
Attending: INTERNAL MEDICINE
Payer: COMMERCIAL

## 2022-08-22 DIAGNOSIS — Z13.9 SCREENING FOR CONDITION: ICD-10-CM

## 2022-08-22 DIAGNOSIS — I42.9 CARDIOMYOPATHY, UNSPECIFIED TYPE (H): ICD-10-CM

## 2022-08-22 DIAGNOSIS — I50.20 HFREF (HEART FAILURE WITH REDUCED EJECTION FRACTION) (H): ICD-10-CM

## 2022-08-22 DIAGNOSIS — R06.02 SOB (SHORTNESS OF BREATH): ICD-10-CM

## 2022-08-22 DIAGNOSIS — I42.9 CARDIOMYOPATHY (H): ICD-10-CM

## 2022-08-22 LAB — GLUCOSE SERPL-MCNC: 117 MG/DL (ref 70–99)

## 2022-08-22 NOTE — PROGRESS NOTES
Chief Complaint   Patient presents with     Ent Problem     Consult [facility]- has lost weight in the last year     History of Present Illness   Ricky Brown is a 71 year old male who presents today for evaluation.  I am seeing this patient in consultation for obstructive sleep apnea at the request of the provider Dr. Blunt. The patient has a long history of obstructive sleep apnea.  The patient underwent a uvulopalatopharyngoplasty and turbinate reduction on 12/11/2013 to help with the CPAP tolerance.  He has tried CPAP multiple times in the past using several different masks but has not been able to tolerate CPAP due to significant aerophagia.  He also would take the CPAP off at night nearly every night but not know he was doing so.  His aerophagia was quite uncomfortable and he would have chest pain up until the early afternoon every day.  They decreased his pressure to less than 3 cm of water but he was still having aerophagia.    The patient underwent a sleep study on 10/26/2021 which showed an overall AHI of 61.4 events per hour.  The patient's BMI the time the study was 38.3 kg/m     Since his most recent sleep study, the patient has lost a significant amount of weight and is now down to a BMI of less than 30 kg/m .  The patient's current BMI is 28.56 kg/m .    Past Medical History  Patient Active Problem List   Diagnosis     HL (hearing loss)     Erectile dysfunction     Pseudophakia, sutured PCL, od; ACL, os - hx of IOL dislocation, ou     Posterior vitreous detachment, od     Epiretinal membrane, mild, od     Advanced directives, counseling/discussion     JOSE JUAN (obstructive sleep apnea)-severe (AHI 61)     BCC (basal cell carcinoma)     Cardiomyopathy (H)     RCT (rotator cuff tear)     Essential hypertension with goal blood pressure less than 140/90     Hyperlipidemia LDL goal <70     Megalocornea     Macular edema, od     Angina, class II (H)     Elevated LFTs     Fatty liver     Right carpal tunnel  syndrome     CAD (coronary artery disease)     S/P carpal tunnel release     Right hand weakness     Pillar pain of extremity     Folic acid deficiency (non anemic)     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 (H)     HFrEF (heart failure with reduced ejection fraction) (H)     Nonrheumatic aortic valve insufficiency     Lumbar spine pain     Medical marijuana use     Pain in thoracic spine     Current Medications     Current Outpatient Medications:      apixaban ANTICOAGULANT (ELIQUIS ANTICOAGULANT) 5 MG tablet, Take 1 tablet (5 mg) by mouth 2 times daily, Disp: 60 tablet, Rfl: 11     atorvastatin (LIPITOR) 10 MG tablet, TAKE ONE TABLET BY MOUTH ONE TIME DAILY, Disp: 90 tablet, Rfl: 0     cyanocobalamin (VITAMIN B-12) 1000 MCG tablet, Take 1,000 mcg by mouth daily, Disp: , Rfl:      diphenoxylate-atropine (LOMOTIL) 2.5-0.025 MG tablet, Take 1 tablet by mouth daily as needed for diarrhea Patient only takes 1 tablet as needed, Disp: 30 tablet, Rfl: 5     folic acid (FOLVITE) 1 MG tablet, Take 1 tablet (1 mg) by mouth 2 times daily, Disp: 60 tablet, Rfl: 4     losartan (COZAAR) 50 MG tablet, TAKE ONE TABLET BY MOUTH ONE TIME DAILY, Disp: 90 tablet, Rfl: 0     metoprolol succinate ER (TOPROL XL) 25 MG 24 hr tablet, Take 1 tablet (25 mg) by mouth 2 times daily, Disp: 180 tablet, Rfl: 11  No current facility-administered medications for this visit.    Facility-Administered Medications Ordered in Other Visits:      sodium chloride (PF) 0.9% PF flush 10 mL, 10 mL, Intravenous, Once, Catrachita Beltran MD    Allergies  Allergies   Allergen Reactions     Lisinopril Cough     Perfume Other (See Comments)       Social History   Social History     Socioeconomic History     Marital status:      Spouse name: Kyung     Number of children: 0     Years of education: 14   Occupational History     Occupation: industrial electronics      Employer: [employer]  "  Tobacco Use     Smoking status: Former Smoker     Packs/day: 0.50     Years: 2.00     Pack years: 1.00     Types: Cigarettes     Quit date: 1996     Years since quittin.7     Smokeless tobacco: Never Used   Vaping Use     Vaping Use: Never used   Substance and Sexual Activity     Alcohol use: Yes     Alcohol/week: 8.3 standard drinks     Comment: 1-2 drinks a day     Drug use: No     Sexual activity: Yes     Partners: Female     Birth control/protection: Male Surgical     Comment:  vasectomy   Other Topics Concern     Parent/sibling w/ CABG, MI or angioplasty before 65F 55M? No       Family History  Family History   Problem Relation Age of Onset     Diabetes Father         Old age Diabetes     Cerebrovascular Disease Father      Obesity Father      Heart Disease Father      Coronary Artery Disease Father      Hypertension Father      Lipids Sister      C.A.D. No family hx of      Cancer No family hx of      Glaucoma No family hx of      Macular Degeneration No family hx of        Review of Systems  As per HPI and PMHx, otherwise 10+ comprehensive system review is negative.    Physical Exam  BP (!) 143/86 (BP Location: Right arm, Patient Position: Sitting, Cuff Size: Adult Regular)   Pulse 77   Temp 98.1  F (36.7  C) (Tympanic)   Ht 1.702 m (5' 7\")   Wt 82.7 kg (182 lb 6 oz)   BMI 28.56 kg/m    GENERAL: Patient is a pleasant, cooperative 71 year old male in no acute distress.  HEAD: Normocephalic, atraumatic.  Hair and scalp are normal.  EYES: Pupils are equal, round, reactive to light and accommodation.  Extraocular movements are intact.  The sclera nonicteric without injection.  The extraocular structures are normal.  EARS: Normal shape and symmetry.  No tenderness when palpating the mastoid or tragal areas bilaterally.   NOSE: Nares are patent.  Nasal mucosa is pink and moist.  Negative anterior rhinoscopy.  ORAL CAVITY: Dentition is in reasonably good repair.  Mucous membranes are moist.  " Tongue is mobile, protrudes to the midline.  Palate elevates symmetrically.  Tonsils are surgically absent with postsurgical changes consistent with a palatoplasty and uvuloplasty.  No erythema or exudate.  No oral cavity or oropharyngeal masses, lesions, ulcerations, leukoplakia.  The patient has a Caldwell tongue/palate position grade 4.  NECK: Supple, trachea is midline.  The patient has 3 fingerbreadths of hyomental distance.  There no palpable cervical lymphadenopathy or masses bilaterally.   NEUROLOGIC: Cranial nerves II through XII are grossly intact.  Voice is strong.  Patient is House-Brackmann I/VI bilaterally.  CARDIOVASCULAR: Extremities are warm and well-perfused.  No significant peripheral edema.  RESPIRATORY: Patient has nonlabored breathing without cough, wheeze, stridor.  PSYCHIATRIC: Patient is alert and oriented.  Mood and affect appear normal.  SKIN: Warm and dry.  No scalp, face, or neck lesions noted.    Assessment and Plan     ICD-10-CM    1. Severe obstructive sleep apnea  G47.33 Case Request: DRUG INDUCED SLEEP ENDOSCOPY   2. Intolerance of continuous positive airway pressure (CPAP) ventilation  Z78.9 Case Request: DRUG INDUCED SLEEP ENDOSCOPY   3. BMI 28.0-28.9,adult  Z68.28 Case Request: DRUG INDUCED SLEEP ENDOSCOPY   4. JOSE JUAN (obstructive sleep apnea)-severe (AHI 61)  G47.33 Adult ENT  Referral   5. Brain fog  R41.89 Adult ENT  Referral   6. Dizziness  R42 Adult ENT  Referral   7. Memory loss  R41.3 Adult ENT  Referral   8. Personal history of fall  Z91.81 Adult ENT  Referral   9. Word finding problem  R47.89 Adult ENT  Referral     It was my pleasure seeing Rickymilton Brown today in clinic.  The patient presents to clinic today with severe obstructive sleep apnea intolerant to CPAP.  The patient's BMI is 28.56 kg/m .  They are interested in the hypoglossal nerve stimulator.  We discussed placement of the hypoglossal nerve stimulator  including postoperative course, activation, need for battery change, need for alteration of airport screening.  We discussed FDA approval for full body MRI imaging.  We discussed the need of drug induced sleep endoscopy to ensure candidacy.       We discussed the risks, benefits, alternatives, options of drug-induced sleep endoscopy including, but not, limited to: risk of general anesthesia, potential need for additional procedures.  We discussed the postoperative course and convalescence and need for  the day of the procedure.  The patient voiced understanding and is willing to proceed.    Ricky to follow up with Primary Care provider regarding elevated blood pressure.    Dashawn Tanner MD  Department of Otolaryngology-Head and Neck Surgery  [facility]

## 2022-08-24 ENCOUNTER — OFFICE VISIT (OUTPATIENT)
Dept: OTOLARYNGOLOGY | Facility: CLINIC | Age: 72
End: 2022-08-24
Attending: INTERNAL MEDICINE
Payer: COMMERCIAL

## 2022-08-24 VITALS
SYSTOLIC BLOOD PRESSURE: 143 MMHG | BODY MASS INDEX: 28.63 KG/M2 | WEIGHT: 182.38 LBS | HEIGHT: 67 IN | HEART RATE: 77 BPM | TEMPERATURE: 98.1 F | DIASTOLIC BLOOD PRESSURE: 86 MMHG

## 2022-08-24 DIAGNOSIS — Z91.81 PERSONAL HISTORY OF FALL: ICD-10-CM

## 2022-08-24 DIAGNOSIS — Z78.9 INTOLERANCE OF CONTINUOUS POSITIVE AIRWAY PRESSURE (CPAP) VENTILATION: ICD-10-CM

## 2022-08-24 DIAGNOSIS — R47.89 WORD FINDING PROBLEM: ICD-10-CM

## 2022-08-24 DIAGNOSIS — R42 DIZZINESS: ICD-10-CM

## 2022-08-24 DIAGNOSIS — R41.89 BRAIN FOG: ICD-10-CM

## 2022-08-24 DIAGNOSIS — G47.33 OSA (OBSTRUCTIVE SLEEP APNEA): ICD-10-CM

## 2022-08-24 DIAGNOSIS — R41.3 MEMORY LOSS: ICD-10-CM

## 2022-08-24 DIAGNOSIS — G47.33 SEVERE OBSTRUCTIVE SLEEP APNEA: Primary | ICD-10-CM

## 2022-08-24 PROCEDURE — 99203 OFFICE O/P NEW LOW 30 MIN: CPT | Performed by: OTOLARYNGOLOGY

## 2022-08-24 NOTE — NURSING NOTE
"Initial BP (!) 143/86 (BP Location: Right arm, Patient Position: Sitting, Cuff Size: Adult Regular)   Pulse 77   Temp 98.1  F (36.7  C) (Tympanic)   Ht 1.702 m (5' 7\")   Wt 82.7 kg (182 lb 6 oz)   BMI 28.56 kg/m   Estimated body mass index is 28.56 kg/m  as calculated from the following:    Height as of this encounter: 1.702 m (5' 7\").    Weight as of this encounter: 82.7 kg (182 lb 6 oz). .    Natividad Arshad CMA      "

## 2022-08-24 NOTE — LETTER
8/24/2022         RE: Ricky Brown  [address]  [facility] [address]        Dear Colleague,    Thank you for referring your patient, Ricyk Brown, to the [facility]. Please see a copy of my visit note below.    Chief Complaint   Patient presents with     Ent Problem     Consult [facility]- has lost weight in the last year     History of Present Illness   Ricky Brown is a 71 year old male who presents today for evaluation.  I am seeing this patient in consultation for obstructive sleep apnea at the request of the provider Dr. Blunt. The patient has a long history of obstructive sleep apnea.  The patient underwent a uvulopalatopharyngoplasty and turbinate reduction on 12/11/2013 to help with the CPAP tolerance.  He has tried CPAP multiple times in the past using several different masks but has not been able to tolerate CPAP due to significant aerophagia.  He also would take the CPAP off at night nearly every night but not know he was doing so.  His aerophagia was quite uncomfortable and he would have chest pain up until the early afternoon every day.  They decreased his pressure to less than 3 cm of water but he was still having aerophagia.    The patient underwent a sleep study on 10/26/2021 which showed an overall AHI of 61.4 events per hour.  The patient's BMI the time the study was 38.3 kg/m     Since his most recent sleep study, the patient has lost a significant amount of weight and is now down to a BMI of less than 30 kg/m .  The patient's current BMI is 28.56 kg/m .    Past Medical History  Patient Active Problem List   Diagnosis     HL (hearing loss)     Erectile dysfunction     Pseudophakia, sutured PCL, od; ACL, os - hx of IOL dislocation, ou     Posterior vitreous detachment, od     Epiretinal membrane, mild, od     Advanced directives, counseling/discussion     JOSE JUAN (obstructive sleep apnea)-severe (AHI 61)     BCC (basal cell carcinoma)      Cardiomyopathy (H)     RCT (rotator cuff tear)     Essential hypertension with goal blood pressure less than 140/90     Hyperlipidemia LDL goal <70     Megalocornea     Macular edema, od     Angina, class II (H)     Elevated LFTs     Fatty liver     Right carpal tunnel syndrome     CAD (coronary artery disease)     S/P carpal tunnel release     Right hand weakness     Pillar pain of extremity     Folic acid deficiency (non anemic)     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 (H)     HFrEF (heart failure with reduced ejection fraction) (H)     Nonrheumatic aortic valve insufficiency     Lumbar spine pain     Medical marijuana use     Pain in thoracic spine     Current Medications     Current Outpatient Medications:      apixaban ANTICOAGULANT (ELIQUIS ANTICOAGULANT) 5 MG tablet, Take 1 tablet (5 mg) by mouth 2 times daily, Disp: 60 tablet, Rfl: 11     atorvastatin (LIPITOR) 10 MG tablet, TAKE ONE TABLET BY MOUTH ONE TIME DAILY, Disp: 90 tablet, Rfl: 0     cyanocobalamin (VITAMIN B-12) 1000 MCG tablet, Take 1,000 mcg by mouth daily, Disp: , Rfl:      diphenoxylate-atropine (LOMOTIL) 2.5-0.025 MG tablet, Take 1 tablet by mouth daily as needed for diarrhea Patient only takes 1 tablet as needed, Disp: 30 tablet, Rfl: 5     folic acid (FOLVITE) 1 MG tablet, Take 1 tablet (1 mg) by mouth 2 times daily, Disp: 60 tablet, Rfl: 4     losartan (COZAAR) 50 MG tablet, TAKE ONE TABLET BY MOUTH ONE TIME DAILY, Disp: 90 tablet, Rfl: 0     metoprolol succinate ER (TOPROL XL) 25 MG 24 hr tablet, Take 1 tablet (25 mg) by mouth 2 times daily, Disp: 180 tablet, Rfl: 11  No current facility-administered medications for this visit.    Facility-Administered Medications Ordered in Other Visits:      sodium chloride (PF) 0.9% PF flush 10 mL, 10 mL, Intravenous, Once, Catrachita Beltran MD    Allergies  Allergies   Allergen Reactions     Lisinopril Cough     Perfume Other  "(See Comments)       Social History   Social History     Socioeconomic History     Marital status:      Spouse name: Kyung     Number of children: 0     Years of education: 14   Occupational History     Occupation: industrial electronics      Employer: [employer]   Tobacco Use     Smoking status: Former Smoker     Packs/day: 0.50     Years: 2.00     Pack years: 1.00     Types: Cigarettes     Quit date: 1996     Years since quittin.7     Smokeless tobacco: Never Used   Vaping Use     Vaping Use: Never used   Substance and Sexual Activity     Alcohol use: Yes     Alcohol/week: 8.3 standard drinks     Comment: 1-2 drinks a day     Drug use: No     Sexual activity: Yes     Partners: Female     Birth control/protection: Male Surgical     Comment:  vasectomy   Other Topics Concern     Parent/sibling w/ CABG, MI or angioplasty before 65F 55M? No       Family History  Family History   Problem Relation Age of Onset     Diabetes Father         Old age Diabetes     Cerebrovascular Disease Father      Obesity Father      Heart Disease Father      Coronary Artery Disease Father      Hypertension Father      Lipids Sister      C.A.D. No family hx of      Cancer No family hx of      Glaucoma No family hx of      Macular Degeneration No family hx of        Review of Systems  As per HPI and PMHx, otherwise 10+ comprehensive system review is negative.    Physical Exam  BP (!) 143/86 (BP Location: Right arm, Patient Position: Sitting, Cuff Size: Adult Regular)   Pulse 77   Temp 98.1  F (36.7  C) (Tympanic)   Ht 1.702 m (5' 7\")   Wt 82.7 kg (182 lb 6 oz)   BMI 28.56 kg/m    GENERAL: Patient is a pleasant, cooperative 71 year old male in no acute distress.  HEAD: Normocephalic, atraumatic.  Hair and scalp are normal.  EYES: Pupils are equal, round, reactive to light and accommodation.  Extraocular movements are intact.  The sclera nonicteric without injection.  The extraocular structures are normal.  EARS: Normal " shape and symmetry.  No tenderness when palpating the mastoid or tragal areas bilaterally.   NOSE: Nares are patent.  Nasal mucosa is pink and moist.  Negative anterior rhinoscopy.  ORAL CAVITY: Dentition is in reasonably good repair.  Mucous membranes are moist.  Tongue is mobile, protrudes to the midline.  Palate elevates symmetrically.  Tonsils are surgically absent with postsurgical changes consistent with a palatoplasty and uvuloplasty.  No erythema or exudate.  No oral cavity or oropharyngeal masses, lesions, ulcerations, leukoplakia.  The patient has a Caldwell tongue/palate position grade 4.  NECK: Supple, trachea is midline.  The patient has 3 fingerbreadths of hyomental distance.  There no palpable cervical lymphadenopathy or masses bilaterally.   NEUROLOGIC: Cranial nerves II through XII are grossly intact.  Voice is strong.  Patient is House-Brackmann I/VI bilaterally.  CARDIOVASCULAR: Extremities are warm and well-perfused.  No significant peripheral edema.  RESPIRATORY: Patient has nonlabored breathing without cough, wheeze, stridor.  PSYCHIATRIC: Patient is alert and oriented.  Mood and affect appear normal.  SKIN: Warm and dry.  No scalp, face, or neck lesions noted.    Assessment and Plan     ICD-10-CM    1. Severe obstructive sleep apnea  G47.33 Case Request: DRUG INDUCED SLEEP ENDOSCOPY   2. Intolerance of continuous positive airway pressure (CPAP) ventilation  Z78.9 Case Request: DRUG INDUCED SLEEP ENDOSCOPY   3. BMI 28.0-28.9,adult  Z68.28 Case Request: DRUG INDUCED SLEEP ENDOSCOPY   4. JOSE JUAN (obstructive sleep apnea)-severe (AHI 61)  G47.33 Adult ENT  Referral   5. Brain fog  R41.89 Adult ENT  Referral   6. Dizziness  R42 Adult ENT  Referral   7. Memory loss  R41.3 Adult ENT  Referral   8. Personal history of fall  Z91.81 Adult ENT  Referral   9. Word finding problem  R47.89 Adult ENT  Referral     It was my pleasure seeing Ricky Brown  today in clinic.  The patient presents to clinic today with severe obstructive sleep apnea intolerant to CPAP.  The patient's BMI is 28.56 kg/m .  They are interested in the hypoglossal nerve stimulator.  We discussed placement of the hypoglossal nerve stimulator including postoperative course, activation, need for battery change, need for alteration of airport screening.  We discussed FDA approval for full body MRI imaging.  We discussed the need of drug induced sleep endoscopy to ensure candidacy.       We discussed the risks, benefits, alternatives, options of drug-induced sleep endoscopy including, but not, limited to: risk of general anesthesia, potential need for additional procedures.  We discussed the postoperative course and convalescence and need for  the day of the procedure.  The patient voiced understanding and is willing to proceed.    Ricky to follow up with Primary Care provider regarding elevated blood pressure.    Dashawn Tanner MD  Department of Otolaryngology-Head and Neck Surgery  [facility]        Again, thank you for allowing me to participate in the care of your patient.        Sincerely,        Dashawn Tanner MD

## 2022-08-24 NOTE — PATIENT INSTRUCTIONS
Per physician instructions      If you have questions or concerns on any instructions given to you by your provider today or if you need to schedule an appointment, you can reach us at [phone].

## 2022-08-26 ENCOUNTER — THERAPY VISIT (OUTPATIENT)
Dept: PHYSICAL THERAPY | Facility: CLINIC | Age: 72
End: 2022-08-26
Payer: COMMERCIAL

## 2022-08-26 ENCOUNTER — MEDICAL CORRESPONDENCE (OUTPATIENT)
Dept: HEALTH INFORMATION MANAGEMENT | Facility: CLINIC | Age: 72
End: 2022-08-26

## 2022-08-26 ENCOUNTER — OFFICE VISIT (OUTPATIENT)
Dept: INTERNAL MEDICINE | Facility: CLINIC | Age: 72
End: 2022-08-26
Payer: COMMERCIAL

## 2022-08-26 VITALS
RESPIRATION RATE: 14 BRPM | DIASTOLIC BLOOD PRESSURE: 76 MMHG | OXYGEN SATURATION: 98 % | HEART RATE: 69 BPM | TEMPERATURE: 98.7 F | SYSTOLIC BLOOD PRESSURE: 132 MMHG

## 2022-08-26 DIAGNOSIS — I42.9 CARDIOMYOPATHY, UNSPECIFIED TYPE (H): ICD-10-CM

## 2022-08-26 DIAGNOSIS — M54.42 CHRONIC BILATERAL LOW BACK PAIN WITH LEFT-SIDED SCIATICA: ICD-10-CM

## 2022-08-26 DIAGNOSIS — M54.6 PAIN IN THORACIC SPINE: Primary | ICD-10-CM

## 2022-08-26 DIAGNOSIS — R91.8 PULMONARY NODULES: Primary | ICD-10-CM

## 2022-08-26 DIAGNOSIS — G47.33 OSA (OBSTRUCTIVE SLEEP APNEA): ICD-10-CM

## 2022-08-26 DIAGNOSIS — M54.50 LUMBAR SPINE PAIN: ICD-10-CM

## 2022-08-26 DIAGNOSIS — G89.29 CHRONIC BILATERAL LOW BACK PAIN WITH LEFT-SIDED SCIATICA: ICD-10-CM

## 2022-08-26 PROCEDURE — 97530 THERAPEUTIC ACTIVITIES: CPT | Mod: GP | Performed by: PHYSICAL THERAPIST

## 2022-08-26 PROCEDURE — 97110 THERAPEUTIC EXERCISES: CPT | Mod: 59 | Performed by: PHYSICAL THERAPIST

## 2022-08-26 PROCEDURE — 99214 OFFICE O/P EST MOD 30 MIN: CPT | Performed by: INTERNAL MEDICINE

## 2022-08-26 ASSESSMENT — ENCOUNTER SYMPTOMS: BACK PAIN: 1

## 2022-08-26 NOTE — PROGRESS NOTES
Assessment & Plan     Pulmonary nodules  I met this am with patient in order to go over a lot of questions he had and actually these were more questions posed by his spouse Kyung, and this was in the form of a typed out letter which is scanned into Epic electronic medical records . Patient has been going through quite an array of different issues including a recent diagnosis of a non-ischemic cardiomyopathy and patient had a cerebrovascular accident [ although no obvious neurological deficits ]  . His overall health is not so great with history of alcohol abuse and coupled with a profound untreated obstructive sleep apnea due to CPAP [ continuous positive airway pressure] intolerance and without a doubt this has had serious consequences with his functional abilities. During this workup he had a positron emission tomography (PET) scan done quite recently and this showed with the non-cardiac portion that he had some pulmonary nodules. Using the Fleischner society pulmonary nodule recommendations these nodules are all under 6 millimeters and this is a non-smoker patient with low risk status and so these don't need further follow up. This is explained to patient but I also expect to hear back from spouse via [employer] messages to further address these questions     Cardiomyopathy, unspecified type (H)  We discussed his diagnosis and reader is referred the cardiology consultation notes    JOSE JUAN (obstructive sleep apnea)-severe (AHI 61)  Has a pending surgery for placement of the equipment necessary for the inspire obstructive sleep apnea device with Dr. Tanner.    Chronic bilateral low back pain with left-sided sciatica  Finally we had a long discussion about back pain. This patient explains how he's gradually developed back pain over approximately the last 6 months. He says he did not have these problems prior to that but the pain has no unusual features and there's no sciatica . It's entirely meeting features of  musculoskeletal back pain. Spouse has posed questions to me about possible need for additional images ? I don't feel this appropriate but do feel his case warrants further scrutiny. So we will obtain xrays and referral to sports medicine specialist  For opinion   - XR Lumbar Spine 2/3 Views; Future  - Orthopedic  Referral; Future    Review of the result(s) of each unique test - xrays  Prescription drug management  35 minutes spent on the date of the encounter doing chart review, history and exam, documentation and further activities per the note      Return in about 6 months (around 2/26/2023).    Holland Blunt MD  [facility] YAQUELIN García is a 71 year oldpresenting for the following health issues:  Results (Pet scan /) and Back Pain      Back Pain     History of Present Illness       Back Pain:  He presents for follow up of back pain. Patient's back pain is a chronic problem.  Location of back pain:  Right upper back  Description of back pain: sharp  Back pain spreads: right shoulder    Since patient first noticed back pain, pain is: gradually worsening  Does back pain interfere with his job:  Yes      He eats 2-3 servings of fruits and vegetables daily.He consumes 0 sweetened beverage(s) daily.He exercises with enough effort to increase his heart rate 20 to 29 minutes per day.  He exercises with enough effort to increase his heart rate 4 days per week. He is missing 1 dose(s) of medications per week.     Last appointment with me was virtual office visit 8-  Health Maintenance Due   Topic Date Due     ADVANCE CARE PLANNING  03/28/2017     ZOSTER IMMUNIZATION (2 of 2) 06/06/2018     MEDICARE ANNUAL WELLNESS VISIT  04/11/2019     EYE EXAM  11/19/2021     COVID-19 Vaccine (4 - Booster for Pfizer series) 05/06/2022     ANNUAL REVIEW OF HM ORDERS  08/06/2022     FALL RISK ASSESSMENT  08/06/2022     INFLUENZA VACCINE (1) 09/01/2022    upcoming placement of inspire device  "  Positron emission tomography (PET) scan was ordered due to symptoms of shortness of breath and diagnosis of heart failure with reduced ejection fraction (HFrEF)     PET/CT demonstrates scattered pulmonary nodules within the bilateral  lung, largest measures 4 mm within the right upper lobe along the  fissure.  No abnormal FDG uptake within the neck, chest, and pelvis.  Mild FDG uptake of the right adrenal 2.2 cm lesion with 10 HU, stable  compared to 7/30/2021, most likely represents adenoma. Scattered  colonic diverticula. Simple cyst left kidney. Small area of decreased  FDG uptake in the right occipital lobe consistent with patient's  recent PCA infarct better demonstrated on MR 6/27/2022.     We then got into a discussion with his low back pain issues     Low back pain comes and goes , with standing, and what cures it is laying flat on his back. He has tried alternating heat and ice treatments without benefit . He's in no pain when laying in his bed and usually doesn't get up prior to 1 pm. He points towards the L1-area with some radiation to his upper back. He tends to get worsened pain with activity. Tiny component with legs but nothing that is consistent with sciatica. He had \" a whole bunch of things\" but nothing has helped. He also has some neck pain issues to the extent that he at times could hardly rotate the neck. He feels his work with a chiropractor helped him with his neck issues but not so much with the back pain.    He showed me a bottle of Methocarbamol (Robaxin) which has been helped  Also when he rubs his back and gets some self-massage this helps too.   6 months ago is when this all started. He says he was strong and healthy and had a good back. We talked through his recent health developments including his recent non-ischemic cardiomyopathy and the positron emission tomography (PET) scan .    Patient denies any awareness of neurological deficits from his recent cerebrovascular accident . He " "had an unremarkable . See imaging section of the Epic electronic medical records . Patient concedes he's been drinking a lot \" to kill the pain\" but not over the last month and has had nothing more then 2 drinks in a month.    He has an appointment with physical therapy later today for his back  He's tried some lidoderm patches   He's tried some THC / cannabinoids         Review of Systems   Musculoskeletal: Positive for back pain.      Constitutional, HEENT, cardiovascular, pulmonary, gi and gu systems are negative, except as otherwise noted.      Objective    /76   Pulse 69   Temp 98.7  F (37.1  C) (Oral)   Resp 14   SpO2 98%   There is no height or weight on file to calculate BMI.  Physical Exam   GENERAL: healthy, alert and no distress  EYES: Eyes grossly normal to inspection, PERRL and conjunctivae and sclerae normal  RESP: lungs clear to auscultation - no rales, rhonchi or wheezes  CV: regular rate and rhythm, normal S1 S2, no S3 or S4, no murmur, click or rub, no peripheral edema and peripheral pulses strong  MS: no gross musculoskeletal defects noted, no edema, easily reproducible pain along the lumbar paraspinous muscles and thoracic paraspinous muscles with no unusual features   NEURO: Normal strength and tone, mentation intact and speech normal, negative straight leg raising   PSYCH: mentation appears normal, affect normal/bright    No orders of the defined types were placed in this encounter.      "

## 2022-08-26 NOTE — PROGRESS NOTES
Answers for HPI/ROS submitted by the patient on 8/26/2022  Your back pain is: chronic  Where is your back pain located? : right upper back  How would you describe your back pain? : sharp  Where does your back pain spread? : right shoulder  Since you noticed your back pain, how has it changed? : gradually worsening  Does your back pain interfere with your job?: Yes  How many servings of fruits and vegetables do you eat daily?: 2-3  On average, how many sweetened beverages do you drink each day (Examples: soda, juice, sweet tea, etc.  Do NOT count diet or artificially sweetened beverages)?: 0  How many minutes a day do you exercise enough to make your heart beat faster?: 20 to 29  How many days a week do you exercise enough to make your heart beat faster?: 4  How many days per week do you miss taking your medication?: 1

## 2022-08-31 ENCOUNTER — OFFICE VISIT (OUTPATIENT)
Dept: NEUROLOGY | Facility: CLINIC | Age: 72
End: 2022-08-31
Attending: PSYCHIATRY & NEUROLOGY
Payer: COMMERCIAL

## 2022-08-31 VITALS
HEART RATE: 53 BPM | BODY MASS INDEX: 28.56 KG/M2 | HEIGHT: 67 IN | WEIGHT: 182 LBS | DIASTOLIC BLOOD PRESSURE: 71 MMHG | SYSTOLIC BLOOD PRESSURE: 134 MMHG

## 2022-08-31 DIAGNOSIS — G31.84 MILD NEUROCOGNITIVE DISORDER: ICD-10-CM

## 2022-08-31 DIAGNOSIS — I63.439 CEREBROVASCULAR ACCIDENT (CVA) DUE TO EMBOLISM OF POSTERIOR CEREBRAL ARTERY WITH INFARCTIONS OF BOTH OCCIPITAL LOBES (H): Primary | ICD-10-CM

## 2022-08-31 DIAGNOSIS — E53.8 FOLIC ACID DEFICIENCY (NON ANEMIC): ICD-10-CM

## 2022-08-31 DIAGNOSIS — G40.209 PARTIAL SYMPTOMATIC EPILEPSY WITH COMPLEX PARTIAL SEIZURES, NOT INTRACTABLE, WITHOUT STATUS EPILEPTICUS (H): ICD-10-CM

## 2022-08-31 PROBLEM — Z79.899 MEDICAL MARIJUANA USE: Status: RESOLVED | Noted: 2022-08-10 | Resolved: 2022-08-31

## 2022-08-31 PROBLEM — F10.10 ETOH ABUSE: Status: ACTIVE | Noted: 2022-08-31

## 2022-08-31 PROCEDURE — 99215 OFFICE O/P EST HI 40 MIN: CPT | Performed by: PSYCHIATRY & NEUROLOGY

## 2022-08-31 RX ORDER — LEVETIRACETAM 500 MG/1
500 TABLET ORAL 2 TIMES DAILY
Qty: 60 TABLET | Refills: 11 | Status: SHIPPED | OUTPATIENT
Start: 2022-08-31 | End: 2023-03-07

## 2022-08-31 NOTE — LETTER
2022         RE: Ricky Brown  [address]  [facility] [address]        Dear Colleague,    Thank you for referring your patient, Ricky Brown, to the [facility] [place]. Please see a copy of my visit note below.    NEUROLOGY FOLLOW UP VISIT  NOTE       [facility] [place]  [address] [place] [address]  Tel: [phone]  Fax: [phone]  [email or web address]     Ricky Brown,  1950, MRN [id number]  PCP: Holland Blunt  Date: 2022      ASSESSMENT & PLAN     Visit Diagnosis  1. 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 (H)  2. Folic acid deficiency (non anemic)  3. Mild neurocognitive disorder  4. Complex partial seizure     Bilateral occipital and left parietal infarct  71-year-old male with history of JOSE JUAN, cardiomyopathy, HTN, HLD, CAD, EtOH abuse who was initially evaluated on 2022 for progressive cognitive decline.  Part of work-up included MRI brain that showed left parietal and occipital infarct.  He was subsequently evaluated by cardiology and had a coronary angiogram and currently has a Holter monitor.  I have asked him to continue on Eliquis and Lipitor    Complex partial seizure  Part of work-up for his cognitive issues included EEG that showed paroxysmal bitemporal slowing with attenuated sharp discharges in the left hemisphere.  He denies any generalized seizures but does report episodes during which he loses touch with his surrounding.  I have told him he likely has complex partial seizures and after explaining on side effect, I have started him on Keppra 500 mg twice daily he will get a Keppra level checked after 2 weeks.  As he never had a grand mal seizure he should be okay for him to continue driving.  Patient is scheduled to have 2 procedures and I have told him it is okay from neurology standpoint to  proceed with those procedure    Neurocognitive disorder  Patient was initially evaluated for neurocognitive disorder in my concern was that his cognitive issues are related to alcohol abuse.  He claims he has cut down on his drinking.  Part of work-up for cognitive decline included MRI scan that showed above-noted multiple small infarct and therefore vascular dementia is also a possibility.  Lab work showed low folic acid and he was started on supplements.  Additionally EEG as noted above showed left temporal sharp activity that can contribute to what is cognitive issues.  Neuropsychological testing is pending.  Follow-up will be after the neuropsychological testing    Thank you again for this referral, please feel free to contact me if you have any questions.    Nathen Ruiz MD  [facility]  (Formerly, [facility]     HISTORY OF PRESENT ILLNESS     Patient is a 71-year-old male with history of JOSE JUAN, cardiomyopathy, HTN, HLD, CAD, EtOH abuse who was initially evaluated on 6/20/2022 for progressive cognitive decline.  MRI brain was done as part of dementia work-up and showed a subacute infarct involving the left parietal and bilateral occipital lobes in addition to old cortical infarct.  Previously, he had a echocardiogram at [facility] in April 2022 that showed a low ejection fraction and I suspected an embolic event with a central origin and he was switched to Eliquis.  Echocardiogram was repeated that showed 35 to 40% ejection fraction with a PFO.  30-day event monitor did not show any atrial fibrillation.He subsequently was evaluated by cardiology and had angiogram that confirmed coronary artery disease.  He was seen in the emergency room on 8/14/2022 for neck spasm after he had chiropractor manipulation done.  He had a CT of the head and CTA that did not show any dissection.  He was started on a muscle relaxant and asked to follow-up with  neurology.    Patient was initially evaluated for cognitive decline and my concern was his cognitive issues were related to alcohol abuse or early Alzheimer's dementia.  However MRI scan showed multiple small infarct and vascular dementia was also a possibility.  Lab work for common causes of cognitive decline showed a low folic acid and an elevated homocysteine and he was started on folic acid supplement.  Rest of the labs were normal.  Neuropsychological testing is still pending.  EEG showed paroxysmal bitemporal slowing with attenuated sharp discharges more so in the left hemisphere.  Clinically no change was reported.  Patient does report there are times when he loses touch with his surrounding but denies any tonic-clonic activity.     PROBLEM LIST   Patient Active Problem List   Diagnosis Code     HL (hearing loss) H91.90     Erectile dysfunction N52.9     Pseudophakia, sutured PCL, od; ACL, os - hx of IOL dislocation, ou Z96.1     Posterior vitreous detachment, od H43.819     Epiretinal membrane, mild, od H35.379     Advanced directives, counseling/discussion Z71.89     JOSE JUAN (obstructive sleep apnea)-severe (AHI 61) G47.33     BCC (basal cell carcinoma) C44.91     Cardiomyopathy (H) I42.9     RCT (rotator cuff tear) M75.100     Essential hypertension with goal blood pressure less than 140/90 I10     Hyperlipidemia LDL goal <70 E78.5     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     Macular edema, od H35.81     Angina, class II (H) I20.9     Elevated LFTs R79.89     Fatty liver K76.0     Right carpal tunnel syndrome G56.01     CAD (coronary artery disease) I25.10     S/P carpal tunnel release Z98.890     Right hand weakness R29.898     Pillar pain of extremity M79.609     Folic acid deficiency (non anemic) E53.8     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 (H) I63.439     HFrEF (heart failure with reduced ejection fraction) (H) I50.20     Nonrheumatic aortic  valve insufficiency I35.1     Lumbar spine pain M54.50     Pain in thoracic spine M54.6     ETOH abuse F10.10         PAST MEDICAL & SURGICAL HISTORY     Past Medical History:   Patient  has a past medical history of Arthritis, BCC (basal cell carcinoma), Cardiomyopathy (H), 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 (H) (06/27/2022), Colon adenomas (09/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    Surgical History:  He  has a past surgical history that includes Extracapsular cataract extration with intraocular lens implant (2005); Exchange intraocular lens implant (2005); Uvulopalatopharyngoplasty (12/11/2013); Turbinoplasty (12/11/2013); Arthroscopy shoulder rotator cuff repair (02/27/2014); Arthroscopy shoulder biceps tenodesis repair (02/27/2014); biopsy; Cardiac surgery; colonoscopy (06/12/2018); Release carpal tunnel (Right, 08/13/2021); Coronary Angiogram (N/A, 8/8/2022); and Left Heart Catheterization (N/A, 8/8/2022).     SOCIAL HISTORY     Reviewed, and he  reports that he quit smoking about 25 years ago. His smoking use included cigarettes. He has a 1.00 pack-year smoking history. He has never used smokeless tobacco. He reports current alcohol use of about 8.3 standard drinks of alcohol per week. He reports that he does not use drugs.     FAMILY HISTORY     Reviewed, and family history includes Cerebrovascular Disease in his father; Coronary Artery Disease in his father; Diabetes in his father; Heart Disease in his father; Hypertension in his father; Lipids in his sister; Obesity in his father.     ALLERGIES  "    Allergies   Allergen Reactions     Lisinopril Cough     Perfume Other (See Comments)         REVIEW OF SYSTEMS     A 12 point review of system was performed and was negative except as outlined in the history of present illness.     HOME MEDICATIONS     Current Outpatient Rx   Medication Sig Dispense Refill     apixaban ANTICOAGULANT (ELIQUIS ANTICOAGULANT) 5 MG tablet Take 1 tablet (5 mg) by mouth 2 times daily 60 tablet 11     atorvastatin (LIPITOR) 10 MG tablet TAKE ONE TABLET BY MOUTH ONE TIME DAILY 90 tablet 0     cyanocobalamin (VITAMIN B-12) 1000 MCG tablet Take 1,000 mcg by mouth daily       diphenoxylate-atropine (LOMOTIL) 2.5-0.025 MG tablet Take 1 tablet by mouth daily as needed for diarrhea Patient only takes 1 tablet as needed 30 tablet 5     folic acid (FOLVITE) 1 MG tablet Take 1 tablet (1 mg) by mouth 2 times daily 60 tablet 4     losartan (COZAAR) 50 MG tablet TAKE ONE TABLET BY MOUTH ONE TIME DAILY 90 tablet 0     metoprolol succinate ER (TOPROL XL) 25 MG 24 hr tablet Take 1 tablet (25 mg) by mouth 2 times daily 180 tablet 11         PHYSICAL EXAM     Vital signs  /71 (BP Location: Left arm, Patient Position: Sitting)   Pulse 53   Ht 1.702 m (5' 7\")   Wt 82.6 kg (182 lb)   BMI 28.51 kg/m      Weight:   182 lbs 0 oz    Elderly gentleman who is alert and oriented x3 no acute distress vital signs were reviewed and are documented in electronic medical record.  Neck supple.  Neurologically speech mentation and affect was normal.  He scored 26/30 on MoCA.  He is hard of hearing rest of his cranial nerves are intact.  Strength in extremities 5/5 reflexes 1+ absent at brachioradialis and ankles toes downgoing.  He has dysmetria in finger-nose testing.  He has a wide-based gait.  Romberg negative     PERTINENT DIAGNOSTIC STUDIES     Following studies were reviewed:     CTA HEAD & NECK 8/14/2022  HEAD CT:  1.  No acute intracranial process.     HEAD CTA:   1.  Normal CTA [place] [facility] " [place].     NECK CTA:  1.  Normal neck CTA.    MRI BRAIN 6/27/2022  1. Abnormal areas of T2 hyperintense signal involving the left  parietal lobe and bilateral occipital lobes with enhancement, likely  subacute cortical infarcts.  2. 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  3. Nonspecific bilateral cerebellar microhemorrhages, likely  incidental.  4. Volume loss and white matter T2 hyperintensities which likely  represent chronic small vessel ischemic change.     ECHOCARDIOGRAM 4/21/2022  Normal left ventricular size; moderate to severely abnormal left ventricular ejection fraction   estimated at 35% (accuracy may be    reduced, not well visualized).    Left ventricular wall thickness mildly increased.    Global left ventricular hypokinesis with abnormal septal motion consistent with an   intraventricular conduction defect.    Mild left atrial dilatation.    The right ventricle is normal in size and function.    Mild aortic regurgitation.     ECHOCARDIOGRAM 6/29/2022  Bubble study was performed.  No evidence of right to left shunt at rest but evidence of small right to left  shunt following valsalva manuever.  There is mild concentric left ventricular hypertrophy.  There is moderate global hypokinesia of the left ventricle.  The visual ejection fraction is 35-40%.  Mildly decreased right ventricular systolic function  There is trace to mild mitral regurgitation.  The aortic valve is trileaflet with aortic valve sclerosis.  There is mild to moderate (1-2+) aortic regurgitation.  There is trace tricuspid regurgitation.  Compared to the prior study dated 9/29/2020, there are changes as noted. There  has been a decline in LV and RV systolic fuction.    30 DAY EVENT MONITOR 6/29/2022  Cardiac event monitoring from 6/30/2022 to 7/29/2022 (monitored duration 25d 18h 25m).  Baseline rhythm was sinus rhythm 52bpm with first degree AV block.    Reported heart rate range 47 (7/21/2022 at 05:46am) to 120bpm,  average 72bpm.  3 symptom triggers (palpitations, chest pain) correlated to sinus rhythm with and without isolated PVCs.  Automated recordings included intermittent PVCs including a brief interval of bigeminy, intermittent PACs.  No sustained tachyarrhythmias.  No atrial fibrillation.  There were no pauses noted.  Supraventricular and ventricular ectopic beat frequency are not reported on this monitoring modality.      NEUROPSYCHOLOGICAL TESTING 3/17/2019  This 68-year-old man has been having mild cognitive struggles.  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  Medical history is significant for obesity and sleep apnea; he does not tolerate the CPAP well.  Otherwise, the psychiatric and neurological histories are unremarkable.      The test findings are largely within normal limits, aside from mild difficulty on one executive task requiring inductive reasoning, which is borderline impaired.  He was a bit hasty and impulsive on this particular test, which may have lowered the score.  This was an isolated finding, as other executive abilities, including divided attention and nonverbal planning, were average.  He was a little slow, in the below average range, on a timed transcription task requiring graphomotor learning.  But short and long-term retention of story passages and figural material was actually above average to superior.  Word knowledge and auditory attention span were average, nonverbal reasoning abilities superior.  He was reasonably fast on timed tasks requiring sustained and divided attention.  Psychomotor speeds were grossly intact except for mild slowing of the left hand on a dexterity task, probably due to peripheral factors (he is missing the tip edge of his left thumb with diminished proprioception).  Expressive and receptive language abilities, including speeded word retrieval and confrontation naming, were  within normal limits.  There were no signs of visual misperception or hemispatial visual neglect on any of the visually demanding tasks.      The test findings generally contraindicate acquired brain disease.  The difficulty with inductive reasoning raises the possibility of subtle executive impairment, possibly implying mild frontal lobe dysfunction.  But this is rather speculative as there were no other executive impairments.  More likely Mr. Brown is experiencing subtle cognitive inefficiencies of a benign, functional sort.  This may be related to inattentiveness, missed information due to hearing impairment and possibly reduced alertness secondary to sleep apnea.  These findings will serve as a baseline for future comparisons should there be indications of further decline, greater than expected with normal ageing.  In the meantime, Mr. Brown appears to be functioning reasonably well and there are no indications of an emerging dementia.      EMG 3/31/2022  This is an abnormal study, demonstrating electrophysiologic evidence of the following:  - Moderate right sided median mononeuropathy at the wrist (carpal tunnel syndrome)  - Mild right sided ulnar mononeuropathy at the elbow     Comment: In comparison to EMG from June 2021, there is no significant change in the severity of median and ulnar mononeuropathies of the right upper extremity     PERTINENT LABS  Following labs were reviewed:  Ancillary Procedure on 08/22/2022   Component Date Value     Glucose 08/22/2022 117 (A)   Admission on 08/14/2022, Discharged on 08/14/2022   Component Date Value     Sodium 08/14/2022 140      Potassium 08/14/2022 4.3      Chloride 08/14/2022 108      Carbon Dioxide (CO2) 08/14/2022 28      Anion Gap 08/14/2022 4      Urea Nitrogen 08/14/2022 15      Creatinine 08/14/2022 0.92      Calcium 08/14/2022 8.8      Glucose 08/14/2022 102 (A)     GFR Estimate 08/14/2022 90      Troponin I High Sensitiv* 08/14/2022 11       Ventricular Rate 08/14/2022 59      Atrial Rate 08/14/2022 59      ND Interval 08/14/2022 204      QRS Duration 08/14/2022 108      QT 08/14/2022 456      QTc 08/14/2022 451      P Axis 08/14/2022 28      R AXIS 08/14/2022 -29      T Axis 08/14/2022 158      Interpretation ECG 08/14/2022                      Value:Sinus bradycardia  T wave abnormality, consider lateral ischemia  Abnormal ECG  Unconfirmed report - interpretation of this ECG is computer generated - see medical record for final interpretation  Confirmed by - EMERGENCY ROOM, PHYSICIAN ([id number]),  TYRELL DE LA GARZA ([id number]) on 8/15/2022 7:46:19 AM       WBC Count 08/14/2022 5.8      RBC Count 08/14/2022 4.71      Hemoglobin 08/14/2022 13.9      Hematocrit 08/14/2022 41.3      MCV 08/14/2022 88      MCH 08/14/2022 29.5      MCHC 08/14/2022 33.7      RDW 08/14/2022 13.4      Platelet Count 08/14/2022 239      % Neutrophils 08/14/2022 62      % Lymphocytes 08/14/2022 24      % Monocytes 08/14/2022 11      % Eosinophils 08/14/2022 1      % Basophils 08/14/2022 1      % Immature Granulocytes 08/14/2022 1      NRBCs per 100 WBC 08/14/2022 0      Absolute Neutrophils 08/14/2022 3.7      Absolute Lymphocytes 08/14/2022 1.4      Absolute Monocytes 08/14/2022 0.6      Absolute Eosinophils 08/14/2022 0.1      Absolute Basophils 08/14/2022 0.0      Absolute Immature Granul* 08/14/2022 0.0      Absolute NRBCs 08/14/2022 0.0      Hold Specimen 08/14/2022 JIC      Hold Specimen 08/14/2022 JIC      Alcohol ethyl 08/14/2022 <0.01      TSH 08/14/2022 1.49      Troponin I High Sensitiv* 08/14/2022 11    Admission on 08/08/2022, Discharged on 08/08/2022   Component Date Value     Sodium 08/08/2022 140      Potassium 08/08/2022 3.8      Chloride 08/08/2022 108 (A)     Carbon Dioxide (CO2) 08/08/2022 22      Anion Gap 08/08/2022 10      Urea Nitrogen 08/08/2022 19      Creatinine 08/08/2022 0.88      Calcium 08/08/2022 8.4 (A)     Glucose 08/08/2022 95      GFR Estimate  08/08/2022 >90      WBC Count 08/08/2022 4.9      RBC Count 08/08/2022 4.74      Hemoglobin 08/08/2022 13.8      Hematocrit 08/08/2022 41.7      MCV 08/08/2022 88      MCH 08/08/2022 29.1      MCHC 08/08/2022 33.1      RDW 08/08/2022 13.4      Platelet Count 08/08/2022 229      Ventricular Rate 08/08/2022 54      Atrial Rate 08/08/2022 54      MI Interval 08/08/2022 202      QRS Duration 08/08/2022 104      QT 08/08/2022 452      QTc 08/08/2022 428      P Axis 08/08/2022 27      R AXIS 08/08/2022 -25      T Axis 08/08/2022 -76      Interpretation ECG 08/08/2022                      Value:Sinus bradycardia  ST & T wave abnormality, consider anterolateral ischemia  Abnormal ECG  When compared with ECG of 08-SEP-2020 17:11,  T wave inversion now evident in Anterolateral leads  Confirmed by EDDI ELLISON MD LOC: ([id number]) on 8/8/2022 4:38:02 PM       ABO/RH(D) 08/08/2022 A POS      Antibody Screen 08/08/2022 Negative      SPECIMEN EXPIRATION DATE 08/08/2022 [id number]    Hospital Outpatient Visit on 06/29/2022   Component Date Value     LVEF  06/29/2022 35-40%    Ancillary Procedure on 06/27/2022   Component Date Value     Radiologist flags 06/27/2022 Lateral subacute infarcts. (A)   Lab on 06/23/2022   Component Date Value     Vitamin B1 Whole Blood L* 06/23/2022 106      Treponema Antibody Total 06/23/2022 Nonreactive      Methylmalonic Acid 06/23/2022 0.13      Homocysteine 06/23/2022 12.1 (A)     Folic Acid 06/23/2022 3.7 (A)     Sodium 06/23/2022 141      Potassium 06/23/2022 4.0      Chloride 06/23/2022 110 (A)     Carbon Dioxide (CO2) 06/23/2022 26      Anion Gap 06/23/2022 5      Urea Nitrogen 06/23/2022 23      Creatinine 06/23/2022 1.10      Calcium 06/23/2022 9.1      Glucose 06/23/2022 100 (A)     GFR Estimate 06/23/2022 73    Office Visit on 06/13/2022   Component Date Value     TSH 06/13/2022 1.60      Vitamin B12 06/13/2022 263      Sodium 06/13/2022 140      Potassium 06/13/2022 4.3      Chloride  06/13/2022 109      Carbon Dioxide (CO2) 06/13/2022 26      Anion Gap 06/13/2022 5      Urea Nitrogen 06/13/2022 14      Creatinine 06/13/2022 0.87      Calcium 06/13/2022 8.7      Glucose 06/13/2022 87      Alkaline Phosphatase 06/13/2022 130      AST 06/13/2022 14      ALT 06/13/2022 20      Protein Total 06/13/2022 7.1      Albumin 06/13/2022 3.9      Bilirubin Total 06/13/2022 0.4      GFR Estimate 06/13/2022 >90      Cholesterol 06/13/2022 92      Triglycerides 06/13/2022 124      Direct Measure HDL 06/13/2022 40      LDL Cholesterol Calculat* 06/13/2022 27      Non HDL Cholesterol 06/13/2022 52      Patient Fasting > 8hrs? 06/13/2022 No      WBC Count 06/13/2022 5.5      RBC Count 06/13/2022 4.72      Hemoglobin 06/13/2022 13.8      Hematocrit 06/13/2022 42.3      MCV 06/13/2022 90      MCH 06/13/2022 29.2      MCHC 06/13/2022 32.6      RDW 06/13/2022 15.3 (A)     Platelet Count 06/13/2022 251      % Neutrophils 06/13/2022 58      % Lymphocytes 06/13/2022 29      % Monocytes 06/13/2022 12      % Eosinophils 06/13/2022 1      % Basophils 06/13/2022 0      Absolute Neutrophils 06/13/2022 3.2      Absolute Lymphocytes 06/13/2022 1.6      Absolute Monocytes 06/13/2022 0.7      Absolute Eosinophils 06/13/2022 0.1      Absolute Basophils 06/13/2022 0.0          Total time spent for face to face visit, reviewing labs/imaging studies, counseling and coordination of care was: 45 Minutes spent on the date of the encounter doing chart review, review of outside records, review of test results, interpretation of tests, patient visit, documentation and discussion with family       This note was dictated using voice recognition software.  Any grammatical or context distortions are unintentional and inherent to the software.    No orders of the defined types were placed in this encounter.     New Prescriptions    No medications on file     Modified Medications    No medications on file                     Again, thank you for  allowing me to participate in the care of your patient.        Sincerely,        Nathen Ruiz MD

## 2022-08-31 NOTE — NURSING NOTE
Chief Complaint   Patient presents with     Dizziness     EEG results      Amy Yang CMA on 8/31/2022 at 2:44 PM

## 2022-08-31 NOTE — PROGRESS NOTES
NEUROLOGY FOLLOW UP VISIT  NOTE       [facility] NEUROLOGY [place]  [address] [place] [address]  Tel: [phone]  Fax: [phone]  [email or web address]     Ricky Brown,  1950, MRN [id number]  PCP: Holland Blunt  Date: 2022      ASSESSMENT & PLAN     Visit Diagnosis  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 (H)  Folic acid deficiency (non anemic)  Mild neurocognitive disorder  Complex partial seizure     Bilateral occipital and left parietal infarct  71-year-old male with history of JOSE JUAN, cardiomyopathy, HTN, HLD, CAD, EtOH abuse who was initially evaluated on 2022 for progressive cognitive decline.  Part of work-up included MRI brain that showed left parietal and occipital infarct.  He was subsequently evaluated by cardiology and had a coronary angiogram and currently has a Holter monitor.  I have asked him to continue on Eliquis and Lipitor    Complex partial seizure  Part of work-up for his cognitive issues included EEG that showed paroxysmal bitemporal slowing with attenuated sharp discharges in the left hemisphere.  He denies any generalized seizures but does report episodes during which he loses touch with his surrounding.  I have told him he likely has complex partial seizures and after explaining on side effect, I have started him on Keppra 500 mg twice daily he will get a Keppra level checked after 2 weeks.  As he never had a grand mal seizure he should be okay for him to continue driving.  Patient is scheduled to have 2 procedures and I have told him it is okay from neurology standpoint to proceed with those procedure    Neurocognitive disorder  Patient was initially evaluated for neurocognitive disorder in my concern was that his cognitive issues are related to alcohol abuse.  He claims he has cut down on his drinking.  Part of work-up for cognitive decline included MRI  scan that showed above-noted multiple small infarct and therefore vascular dementia is also a possibility.  Lab work showed low folic acid and he was started on supplements.  Additionally EEG as noted above showed left temporal sharp activity that can contribute to what is cognitive issues.  Neuropsychological testing is pending.  Follow-up will be after the neuropsychological testing    Thank you again for this referral, please feel free to contact me if you have any questions.    Nathen Ruiz MD  [facility]  (Formerly, [facility]     HISTORY OF PRESENT ILLNESS     Patient is a 71-year-old male with history of JOSE JUAN, cardiomyopathy, HTN, HLD, CAD, EtOH abuse who was initially evaluated on 6/20/2022 for progressive cognitive decline.  MRI brain was done as part of dementia work-up and showed a subacute infarct involving the left parietal and bilateral occipital lobes in addition to old cortical infarct.  Previously, he had a echocardiogram at [facility] in April 2022 that showed a low ejection fraction and I suspected an embolic event with a central origin and he was switched to Eliquis.  Echocardiogram was repeated that showed 35 to 40% ejection fraction with a PFO.  30-day event monitor did not show any atrial fibrillation.He subsequently was evaluated by cardiology and had angiogram that confirmed coronary artery disease.  He was seen in the emergency room on 8/14/2022 for neck spasm after he had chiropractor manipulation done.  He had a CT of the head and CTA that did not show any dissection.  He was started on a muscle relaxant and asked to follow-up with neurology.    Patient was initially evaluated for cognitive decline and my concern was his cognitive issues were related to alcohol abuse or early Alzheimer's dementia.  However MRI scan showed multiple small infarct and vascular dementia was also a possibility.  Lab work for common causes of  cognitive decline showed a low folic acid and an elevated homocysteine and he was started on folic acid supplement.  Rest of the labs were normal.  Neuropsychological testing is still pending.  EEG showed paroxysmal bitemporal slowing with attenuated sharp discharges more so in the left hemisphere.  Clinically no change was reported.  Patient does report there are times when he loses touch with his surrounding but denies any tonic-clonic activity.     PROBLEM LIST   Patient Active Problem List   Diagnosis Code     HL (hearing loss) H91.90     Erectile dysfunction N52.9     Pseudophakia, sutured PCL, od; ACL, os - hx of IOL dislocation, ou Z96.1     Posterior vitreous detachment, od H43.819     Epiretinal membrane, mild, od H35.379     Advanced directives, counseling/discussion Z71.89     JOSE JUAN (obstructive sleep apnea)-severe (AHI 61) G47.33     BCC (basal cell carcinoma) C44.91     Cardiomyopathy (H) I42.9     RCT (rotator cuff tear) M75.100     Essential hypertension with goal blood pressure less than 140/90 I10     Hyperlipidemia LDL goal <70 E78.5     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     Macular edema, od H35.81     Angina, class II (H) I20.9     Elevated LFTs R79.89     Fatty liver K76.0     Right carpal tunnel syndrome G56.01     CAD (coronary artery disease) I25.10     S/P carpal tunnel release Z98.890     Right hand weakness R29.898     Pillar pain of extremity M79.609     Folic acid deficiency (non anemic) E53.8     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 (H) I63.439     HFrEF (heart failure with reduced ejection fraction) (H) I50.20     Nonrheumatic aortic valve insufficiency I35.1     Lumbar spine pain M54.50     Pain in thoracic spine M54.6     ETOH abuse F10.10         PAST MEDICAL & SURGICAL HISTORY     Past Medical History:   Patient  has a past medical history of Arthritis, BCC (basal cell carcinoma), Cardiomyopathy (H), Cerebrovascular  accident (CVA) due to embolism of posterior cerebral artery with infarctions of both occipital lobes (H) (06/27/2022), Colon adenomas (09/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    Surgical History:  He  has a past surgical history that includes Extracapsular cataract extration with intraocular lens implant (2005); Exchange intraocular lens implant (2005); Uvulopalatopharyngoplasty (12/11/2013); Turbinoplasty (12/11/2013); Arthroscopy shoulder rotator cuff repair (02/27/2014); Arthroscopy shoulder biceps tenodesis repair (02/27/2014); biopsy; Cardiac surgery; colonoscopy (06/12/2018); Release carpal tunnel (Right, 08/13/2021); Coronary Angiogram (N/A, 8/8/2022); and Left Heart Catheterization (N/A, 8/8/2022).     SOCIAL HISTORY     Reviewed, and he  reports that he quit smoking about 25 years ago. His smoking use included cigarettes. He has a 1.00 pack-year smoking history. He has never used smokeless tobacco. He reports current alcohol use of about 8.3 standard drinks of alcohol per week. He reports that he does not use drugs.     FAMILY HISTORY     Reviewed, and family history includes Cerebrovascular Disease in his father; Coronary Artery Disease in his father; Diabetes in his father; Heart Disease in his father; Hypertension in his father; Lipids in his sister; Obesity in his father.     ALLERGIES     Allergies   Allergen Reactions     Lisinopril Cough     Perfume Other (See Comments)         REVIEW OF SYSTEMS     A 12 point review of system was performed and was negative except as outlined in the history of present illness.     HOME MEDICATIONS     Current Outpatient Rx   Medication Sig  "Dispense Refill     apixaban ANTICOAGULANT (ELIQUIS ANTICOAGULANT) 5 MG tablet Take 1 tablet (5 mg) by mouth 2 times daily 60 tablet 11     atorvastatin (LIPITOR) 10 MG tablet TAKE ONE TABLET BY MOUTH ONE TIME DAILY 90 tablet 0     cyanocobalamin (VITAMIN B-12) 1000 MCG tablet Take 1,000 mcg by mouth daily       diphenoxylate-atropine (LOMOTIL) 2.5-0.025 MG tablet Take 1 tablet by mouth daily as needed for diarrhea Patient only takes 1 tablet as needed 30 tablet 5     folic acid (FOLVITE) 1 MG tablet Take 1 tablet (1 mg) by mouth 2 times daily 60 tablet 4     losartan (COZAAR) 50 MG tablet TAKE ONE TABLET BY MOUTH ONE TIME DAILY 90 tablet 0     metoprolol succinate ER (TOPROL XL) 25 MG 24 hr tablet Take 1 tablet (25 mg) by mouth 2 times daily 180 tablet 11         PHYSICAL EXAM     Vital signs  /71 (BP Location: Left arm, Patient Position: Sitting)   Pulse 53   Ht 1.702 m (5' 7\")   Wt 82.6 kg (182 lb)   BMI 28.51 kg/m      Weight:   182 lbs 0 oz    Elderly gentleman who is alert and oriented x3 no acute distress vital signs were reviewed and are documented in electronic medical record.  Neck supple.  Neurologically speech mentation and affect was normal.  He scored 26/30 on MoCA.  He is hard of hearing rest of his cranial nerves are intact.  Strength in extremities 5/5 reflexes 1+ absent at brachioradialis and ankles toes downgoing.  He has dysmetria in finger-nose testing.  He has a wide-based gait.  Romberg negative     PERTINENT DIAGNOSTIC STUDIES     Following studies were reviewed:     CTA HEAD & NECK 8/14/2022  HEAD CT:  1.  No acute intracranial process.     HEAD CTA:   1.  Normal CTA [place].     NECK CTA:  1.  Normal neck CTA.    MRI BRAIN 6/27/2022  1. Abnormal areas of T2 hyperintense signal involving the left  parietal lobe and bilateral occipital lobes with enhancement, likely  subacute cortical infarcts.  2. 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  3. Nonspecific " bilateral cerebellar microhemorrhages, likely  incidental.  4. Volume loss and white matter T2 hyperintensities which likely  represent chronic small vessel ischemic change.     ECHOCARDIOGRAM 4/21/2022  Normal left ventricular size; moderate to severely abnormal left ventricular ejection fraction   estimated at 35% (accuracy may be    reduced, not well visualized).    Left ventricular wall thickness mildly increased.    Global left ventricular hypokinesis with abnormal septal motion consistent with an   intraventricular conduction defect.    Mild left atrial dilatation.    The right ventricle is normal in size and function.    Mild aortic regurgitation.     ECHOCARDIOGRAM 6/29/2022  Bubble study was performed.  No evidence of right to left shunt at rest but evidence of small right to left  shunt following valsalva manuever.  There is mild concentric left ventricular hypertrophy.  There is moderate global hypokinesia of the left ventricle.  The visual ejection fraction is 35-40%.  Mildly decreased right ventricular systolic function  There is trace to mild mitral regurgitation.  The aortic valve is trileaflet with aortic valve sclerosis.  There is mild to moderate (1-2+) aortic regurgitation.  There is trace tricuspid regurgitation.  Compared to the prior study dated 9/29/2020, there are changes as noted. There  has been a decline in LV and RV systolic fuction.    30 DAY EVENT MONITOR 6/29/2022  Cardiac event monitoring from 6/30/2022 to 7/29/2022 (monitored duration 25d 18h 25m).  Baseline rhythm was sinus rhythm 52bpm with first degree AV block.    Reported heart rate range 47 (7/21/2022 at 05:46am) to 120bpm, average 72bpm.  3 symptom triggers (palpitations, chest pain) correlated to sinus rhythm with and without isolated PVCs.  Automated recordings included intermittent PVCs including a brief interval of bigeminy, intermittent PACs.  No sustained tachyarrhythmias.  No atrial fibrillation.  There were no pauses  noted.  Supraventricular and ventricular ectopic beat frequency are not reported on this monitoring modality.      NEUROPSYCHOLOGICAL TESTING 3/17/2019  This 68-year-old man has been having mild cognitive struggles.  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  Medical history is significant for obesity and sleep apnea; he does not tolerate the CPAP well.  Otherwise, the psychiatric and neurological histories are unremarkable.      The test findings are largely within normal limits, aside from mild difficulty on one executive task requiring inductive reasoning, which is borderline impaired.  He was a bit hasty and impulsive on this particular test, which may have lowered the score.  This was an isolated finding, as other executive abilities, including divided attention and nonverbal planning, were average.  He was a little slow, in the below average range, on a timed transcription task requiring graphomotor learning.  But short and long-term retention of story passages and figural material was actually above average to superior.  Word knowledge and auditory attention span were average, nonverbal reasoning abilities superior.  He was reasonably fast on timed tasks requiring sustained and divided attention.  Psychomotor speeds were grossly intact except for mild slowing of the left hand on a dexterity task, probably due to peripheral factors (he is missing the tip edge of his left thumb with diminished proprioception).  Expressive and receptive language abilities, including speeded word retrieval and confrontation naming, were within normal limits.  There were no signs of visual misperception or hemispatial visual neglect on any of the visually demanding tasks.      The test findings generally contraindicate acquired brain disease.  The difficulty with inductive reasoning raises the possibility of subtle executive impairment,  possibly implying mild frontal lobe dysfunction.  But this is rather speculative as there were no other executive impairments.  More likely Mr. Brown is experiencing subtle cognitive inefficiencies of a benign, functional sort.  This may be related to inattentiveness, missed information due to hearing impairment and possibly reduced alertness secondary to sleep apnea.  These findings will serve as a baseline for future comparisons should there be indications of further decline, greater than expected with normal ageing.  In the meantime, Mr. Brown appears to be functioning reasonably well and there are no indications of an emerging dementia.      EMG 3/31/2022  This is an abnormal study, demonstrating electrophysiologic evidence of the following:  - Moderate right sided median mononeuropathy at the wrist (carpal tunnel syndrome)  - Mild right sided ulnar mononeuropathy at the elbow     Comment: In comparison to EMG from June 2021, there is no significant change in the severity of median and ulnar mononeuropathies of the right upper extremity     PERTINENT LABS  Following labs were reviewed:  Ancillary Procedure on 08/22/2022   Component Date Value     Glucose 08/22/2022 117 (A)   Admission on 08/14/2022, Discharged on 08/14/2022   Component Date Value     Sodium 08/14/2022 140      Potassium 08/14/2022 4.3      Chloride 08/14/2022 108      Carbon Dioxide (CO2) 08/14/2022 28      Anion Gap 08/14/2022 4      Urea Nitrogen 08/14/2022 15      Creatinine 08/14/2022 0.92      Calcium 08/14/2022 8.8      Glucose 08/14/2022 102 (A)     GFR Estimate 08/14/2022 90      Troponin I High Sensitiv* 08/14/2022 11      Ventricular Rate 08/14/2022 59      Atrial Rate 08/14/2022 59      NV Interval 08/14/2022 204      QRS Duration 08/14/2022 108      QT 08/14/2022 456      QTc 08/14/2022 451      P Axis 08/14/2022 28      R AXIS 08/14/2022 -29      T Axis 08/14/2022 158      Interpretation ECG 08/14/2022                       Value:Sinus bradycardia  T wave abnormality, consider lateral ischemia  Abnormal ECG  Unconfirmed report - interpretation of this ECG is computer generated - see medical record for final interpretation  Confirmed by - EMERGENCY ROOM, PHYSICIAN ([id number]),  TYRELL DE LA GARZA ([id number]) on 8/15/2022 7:46:19 AM       WBC Count 08/14/2022 5.8      RBC Count 08/14/2022 4.71      Hemoglobin 08/14/2022 13.9      Hematocrit 08/14/2022 41.3      MCV 08/14/2022 88      MCH 08/14/2022 29.5      MCHC 08/14/2022 33.7      RDW 08/14/2022 13.4      Platelet Count 08/14/2022 239      % Neutrophils 08/14/2022 62      % Lymphocytes 08/14/2022 24      % Monocytes 08/14/2022 11      % Eosinophils 08/14/2022 1      % Basophils 08/14/2022 1      % Immature Granulocytes 08/14/2022 1      NRBCs per 100 WBC 08/14/2022 0      Absolute Neutrophils 08/14/2022 3.7      Absolute Lymphocytes 08/14/2022 1.4      Absolute Monocytes 08/14/2022 0.6      Absolute Eosinophils 08/14/2022 0.1      Absolute Basophils 08/14/2022 0.0      Absolute Immature Granul* 08/14/2022 0.0      Absolute NRBCs 08/14/2022 0.0      Hold Specimen 08/14/2022 JIC      Hold Specimen 08/14/2022 JIC      Alcohol ethyl 08/14/2022 <0.01      TSH 08/14/2022 1.49      Troponin I High Sensitiv* 08/14/2022 11    Admission on 08/08/2022, Discharged on 08/08/2022   Component Date Value     Sodium 08/08/2022 140      Potassium 08/08/2022 3.8      Chloride 08/08/2022 108 (A)     Carbon Dioxide (CO2) 08/08/2022 22      Anion Gap 08/08/2022 10      Urea Nitrogen 08/08/2022 19      Creatinine 08/08/2022 0.88      Calcium 08/08/2022 8.4 (A)     Glucose 08/08/2022 95      GFR Estimate 08/08/2022 >90      WBC Count 08/08/2022 4.9      RBC Count 08/08/2022 4.74      Hemoglobin 08/08/2022 13.8      Hematocrit 08/08/2022 41.7      MCV 08/08/2022 88      MCH 08/08/2022 29.1      MCHC 08/08/2022 33.1      RDW 08/08/2022 13.4      Platelet Count 08/08/2022 229      Ventricular Rate 08/08/2022 54       Atrial Rate 08/08/2022 54      TX Interval 08/08/2022 202      QRS Duration 08/08/2022 104      QT 08/08/2022 452      QTc 08/08/2022 428      P Axis 08/08/2022 27      R AXIS 08/08/2022 -25      T Axis 08/08/2022 -76      Interpretation ECG 08/08/2022                      Value:Sinus bradycardia  ST & T wave abnormality, consider anterolateral ischemia  Abnormal ECG  When compared with ECG of 08-SEP-2020 17:11,  T wave inversion now evident in Anterolateral leads  Confirmed by EDDI ELLISON MD LOC: ([id number]) on 8/8/2022 4:38:02 PM       ABO/RH(D) 08/08/2022 A POS      Antibody Screen 08/08/2022 Negative      SPECIMEN EXPIRATION DATE 08/08/2022 [id number]    Hospital Outpatient Visit on 06/29/2022   Component Date Value     LVEF  06/29/2022 35-40%    Ancillary Procedure on 06/27/2022   Component Date Value     Radiologist flags 06/27/2022 Lateral subacute infarcts. (A)   Lab on 06/23/2022   Component Date Value     Vitamin B1 Whole Blood L* 06/23/2022 106      Treponema Antibody Total 06/23/2022 Nonreactive      Methylmalonic Acid 06/23/2022 0.13      Homocysteine 06/23/2022 12.1 (A)     Folic Acid 06/23/2022 3.7 (A)     Sodium 06/23/2022 141      Potassium 06/23/2022 4.0      Chloride 06/23/2022 110 (A)     Carbon Dioxide (CO2) 06/23/2022 26      Anion Gap 06/23/2022 5      Urea Nitrogen 06/23/2022 23      Creatinine 06/23/2022 1.10      Calcium 06/23/2022 9.1      Glucose 06/23/2022 100 (A)     GFR Estimate 06/23/2022 73    Office Visit on 06/13/2022   Component Date Value     TSH 06/13/2022 1.60      Vitamin B12 06/13/2022 263      Sodium 06/13/2022 140      Potassium 06/13/2022 4.3      Chloride 06/13/2022 109      Carbon Dioxide (CO2) 06/13/2022 26      Anion Gap 06/13/2022 5      Urea Nitrogen 06/13/2022 14      Creatinine 06/13/2022 0.87      Calcium 06/13/2022 8.7      Glucose 06/13/2022 87      Alkaline Phosphatase 06/13/2022 130      AST 06/13/2022 14      ALT 06/13/2022 20      Protein Total  06/13/2022 7.1      Albumin 06/13/2022 3.9      Bilirubin Total 06/13/2022 0.4      GFR Estimate 06/13/2022 >90      Cholesterol 06/13/2022 92      Triglycerides 06/13/2022 124      Direct Measure HDL 06/13/2022 40      LDL Cholesterol Calculat* 06/13/2022 27      Non HDL Cholesterol 06/13/2022 52      Patient Fasting > 8hrs? 06/13/2022 No      WBC Count 06/13/2022 5.5      RBC Count 06/13/2022 4.72      Hemoglobin 06/13/2022 13.8      Hematocrit 06/13/2022 42.3      MCV 06/13/2022 90      MCH 06/13/2022 29.2      MCHC 06/13/2022 32.6      RDW 06/13/2022 15.3 (A)     Platelet Count 06/13/2022 251      % Neutrophils 06/13/2022 58      % Lymphocytes 06/13/2022 29      % Monocytes 06/13/2022 12      % Eosinophils 06/13/2022 1      % Basophils 06/13/2022 0      Absolute Neutrophils 06/13/2022 3.2      Absolute Lymphocytes 06/13/2022 1.6      Absolute Monocytes 06/13/2022 0.7      Absolute Eosinophils 06/13/2022 0.1      Absolute Basophils 06/13/2022 0.0          Total time spent for face to face visit, reviewing labs/imaging studies, counseling and coordination of care was: 45 Minutes spent on the date of the encounter doing chart review, review of outside records, review of test results, interpretation of tests, patient visit, documentation and discussion with family     This note was dictated using voice recognition software.  Any grammatical or context distortions are unintentional and inherent to the software.    No orders of the defined types were placed in this encounter.     New Prescriptions    No medications on file     Modified Medications    No medications on file

## 2022-09-07 DIAGNOSIS — I42.8 NON-ISCHEMIC CARDIOMYOPATHY (H): Primary | ICD-10-CM

## 2022-09-07 DIAGNOSIS — I42.8 NONISCHEMIC CARDIOMYOPATHY (H): Primary | ICD-10-CM

## 2022-09-07 RX ORDER — SACUBITRIL AND VALSARTAN 24; 26 MG/1; MG/1
1 TABLET, FILM COATED ORAL 2 TIMES DAILY
Qty: 180 TABLET | Refills: 11 | Status: SHIPPED | OUTPATIENT
Start: 2022-09-07 | End: 2023-11-03

## 2022-09-08 ENCOUNTER — OFFICE VISIT (OUTPATIENT)
Dept: FAMILY MEDICINE | Facility: CLINIC | Age: 72
End: 2022-09-08
Payer: COMMERCIAL

## 2022-09-08 VITALS
DIASTOLIC BLOOD PRESSURE: 70 MMHG | HEART RATE: 60 BPM | BODY MASS INDEX: 28.98 KG/M2 | TEMPERATURE: 97.7 F | SYSTOLIC BLOOD PRESSURE: 132 MMHG | OXYGEN SATURATION: 97 % | WEIGHT: 185 LBS

## 2022-09-08 DIAGNOSIS — G47.33 OSA (OBSTRUCTIVE SLEEP APNEA): ICD-10-CM

## 2022-09-08 DIAGNOSIS — Z01.818 PREOP GENERAL PHYSICAL EXAM: Primary | ICD-10-CM

## 2022-09-08 DIAGNOSIS — I25.10 CORONARY ARTERY DISEASE INVOLVING NATIVE CORONARY ARTERY OF NATIVE HEART WITHOUT ANGINA PECTORIS: ICD-10-CM

## 2022-09-08 DIAGNOSIS — I10 ESSENTIAL HYPERTENSION WITH GOAL BLOOD PRESSURE LESS THAN 140/90: ICD-10-CM

## 2022-09-08 DIAGNOSIS — G40.209 PARTIAL SYMPTOMATIC EPILEPSY WITH COMPLEX PARTIAL SEIZURES, NOT INTRACTABLE, WITHOUT STATUS EPILEPTICUS (H): ICD-10-CM

## 2022-09-08 DIAGNOSIS — I42.9 CARDIOMYOPATHY, UNSPECIFIED TYPE (H): ICD-10-CM

## 2022-09-08 DIAGNOSIS — I63.439 CEREBROVASCULAR ACCIDENT (CVA) DUE TO EMBOLISM OF POSTERIOR CEREBRAL ARTERY WITH INFARCTIONS OF BOTH OCCIPITAL LOBES (H): ICD-10-CM

## 2022-09-08 LAB
ANION GAP SERPL CALCULATED.3IONS-SCNC: 4 MMOL/L (ref 3–14)
BUN SERPL-MCNC: 20 MG/DL (ref 7–30)
CALCIUM SERPL-MCNC: 8.8 MG/DL (ref 8.5–10.1)
CHLORIDE BLD-SCNC: 107 MMOL/L (ref 94–109)
CO2 SERPL-SCNC: 28 MMOL/L (ref 20–32)
CREAT SERPL-MCNC: 0.9 MG/DL (ref 0.66–1.25)
GFR SERPL CREATININE-BSD FRML MDRD: >90 ML/MIN/1.73M2
GLUCOSE BLD-MCNC: 106 MG/DL (ref 70–99)
POTASSIUM BLD-SCNC: 3.9 MMOL/L (ref 3.4–5.3)
SODIUM SERPL-SCNC: 139 MMOL/L (ref 133–144)

## 2022-09-08 PROCEDURE — 36415 COLL VENOUS BLD VENIPUNCTURE: CPT | Performed by: PHYSICIAN ASSISTANT

## 2022-09-08 PROCEDURE — 80048 BASIC METABOLIC PNL TOTAL CA: CPT | Performed by: PHYSICIAN ASSISTANT

## 2022-09-08 PROCEDURE — 99214 OFFICE O/P EST MOD 30 MIN: CPT | Performed by: PHYSICIAN ASSISTANT

## 2022-09-08 RX ORDER — ATORVASTATIN CALCIUM 10 MG/1
10 TABLET, FILM COATED ORAL DAILY
Qty: 90 TABLET | Refills: 0 | Status: CANCELLED | OUTPATIENT
Start: 2022-09-08

## 2022-09-08 NOTE — LETTER
September 12, 2022    Ricky Carrollkathie  [address]  [facility] [address]          Dear ,    We are writing to inform you of your test results.    All of these tests are within acceptable limits , things look good !       Resulted Orders   Basic metabolic panel   Result Value Ref Range    Sodium 139 133 - 144 mmol/L    Potassium 3.9 3.4 - 5.3 mmol/L    Chloride 107 94 - 109 mmol/L    Carbon Dioxide (CO2) 28 20 - 32 mmol/L    Anion Gap 4 3 - 14 mmol/L    Urea Nitrogen 20 7 - 30 mg/dL    Creatinine 0.90 0.66 - 1.25 mg/dL    Calcium 8.8 8.5 - 10.1 mg/dL    Glucose 106 (H) 70 - 99 mg/dL    GFR Estimate >90 >60 mL/min/1.73m2      Comment:      Effective December 21, 2021 eGFRcr in adults is calculated using the 2021 CKD-EPI creatinine equation which includes age and gender (Neva et al., NEJM, DOI: 10.1056/OBXMvp7044678)       If you have any questions or concerns, please call the clinic at the number listed above.       Sincerely,      Holland Blunt MD

## 2022-09-08 NOTE — H&P (VIEW-ONLY)
[facility]  [address]  [place] [address]  Phone: [phone]  Primary Provider: Holland Blunt  Pre-op Performing Provider: JENNIFER ALEXANDRE      PREOPERATIVE EVALUATION:  Today's date: 9/8/2022    Ricky Brown is a 71 year old male who presents for a preoperative evaluation.    Surgical Information:  Surgery/Procedure: DRUG INDUCED SLEEP ENDOSCOPY  Surgery Location: [facility]  Surgeon: Dr. Tanner  Surgery Date: 9/16/2022  Time of Surgery: 11:20am   Where patient plans to recover: At home with family  Fax number for surgical facility: Note does not need to be faxed, will be available electronically in Epic.    Type of Anesthesia Anticipated: General    Assessment & Plan     The proposed surgical procedure is considered INTERMEDIATE risk.    1. Preop general physical exam    2. Essential hypertension with goal blood pressure less than 140/90    3. JOSE JUAN (obstructive sleep apnea)-severe (AHI 61)    4. Cardiomyopathy, unspecified type (H)    5. 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 (H)    6. Coronary artery disease involving native coronary artery of native heart without angina pectoris    7. Partial symptomatic epilepsy with complex partial seizures, not intractable, without status epilepticus (H)               Risks and Recommendations:  The patient has the following additional risks and recommendations for perioperative complications:  Obstructive Sleep Apnea:   Monitor post procedure.     Medication Instructions:  Patient is to take all scheduled medications on the day of surgery EXCEPT for modifications listed below:   - apixaban (Eliquis), edoxaban (Savaysa), rivaroxaban (Xarelto): Bleeding risk is low for this procedure AND CrCl (>=) 50 mL/min. HOLD 1 day before surgery.      RECOMMENDATION:  APPROVAL GIVEN to proceed with proposed procedure, without further  diagnostic evaluation.    See clearance from neurology in office visit 8/31/22  See clearance from cardiology in mychart message 8/30/22                        Subjective     HPI related to upcoming procedure: Patient is having a drug induced sleep endoscopy to ensure candidacy for a future hypoglossal nerve stimulator.     Preop Questions 9/7/2022   1. Have you ever had a heart attack or stroke? YES -    2. Have you ever had surgery on your heart or blood vessels, such as a stent placement, a coronary artery bypass, or surgery on an artery in your head, neck, heart, or legs? No   3. Do you have chest pain with activity? No   4. Do you have a history of  heart failure? YES -    5. Do you currently have a cold, bronchitis or symptoms of other infection? No   6. Do you have a cough, shortness of breath, or wheezing? No   7. Do you or anyone in your family have previous history of blood clots? YES -    8. Do you or does anyone in your family have a serious bleeding problem such as prolonged bleeding following surgeries or cuts? No   9. Have you ever had problems with anemia or been told to take iron pills? No   10. Have you had any abnormal blood loss such as black, tarry or bloody stools? No   11. Have you ever had a blood transfusion? No   12. Are you willing to have a blood transfusion if it is medically needed before, during, or after your surgery? Yes   13. Have you or any of your relatives ever had problems with anesthesia? No   14. Do you have sleep apnea, excessive snoring or daytime drowsiness? YES - sleep apnea   14a. Do you have a CPAP machine? No   15. Do you have any artifical heart valves or other implanted medical devices like a pacemaker, defibrillator, or continuous glucose monitor? No   16. Do you have artificial joints? No   17. Are you allergic to latex? No       Health Care Directive:  Patient does not have a Health Care Directive or Living Will: Advance Directive received and scanned. Click on  Code in the patient header to view.    Preoperative Review of :   reviewed - no record of controlled substances prescribed.      Status of Chronic Conditions:  See problem list for active medical problems.  Problems all longstanding and stable, except as noted/documented.  See ROS for pertinent symptoms related to these conditions.      Review of Systems  CONSTITUTIONAL: NEGATIVE for fever, chills, change in weight  INTEGUMENTARY/SKIN: NEGATIVE for worrisome rashes, moles or lesions  ENT/MOUTH: NEGATIVE for ear, mouth and throat problems  RESP: NEGATIVE for significant cough or SOB  CV: NEGATIVE for chest pain, palpitations or peripheral edema  GI: NEGATIVE for nausea, abdominal pain, heartburn, or change in bowel habits  : NEGATIVE for frequency, dysuria, or hematuria  MUSCULOSKELETAL: NEGATIVE for significant arthralgias or myalgia  NEURO: NEGATIVE for weakness, dizziness or paresthesias  HEME: NEGATIVE for bleeding problems  PSYCHIATRIC: NEGATIVE for changes in mood or affect    Patient Active Problem List    Diagnosis Date Noted     ETOH abuse 08/31/2022     Priority: Medium     Pain in thoracic spine 08/19/2022     Priority: Medium     Lumbar spine pain 08/10/2022     Priority: Medium     HFrEF (heart failure with reduced ejection fraction) (H) 06/29/2022     Priority: Medium     Nonrheumatic aortic valve insufficiency 06/29/2022     Priority: Medium     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 (H) 06/27/2022     Priority: Medium     Folic acid deficiency (non anemic) 06/24/2022     Priority: Medium     S/P carpal tunnel release 02/28/2022     Priority: Medium     Right hand weakness 02/28/2022     Priority: Medium     Pillar pain of extremity 02/28/2022     Priority: Medium     CAD (coronary artery disease) 09/02/2021     Priority: Medium     Nonobstructive coronary artery disease with previous coronary angiogram suggesting all  stenoses less or equal to 25%.          Right carpal tunnel syndrome 07/13/2021     Priority: Medium     Added automatically from request for surgery [id number]       Elevated LFTs 05/03/2021     Priority: Medium     Fatty liver 05/03/2021     Priority: Medium     Angina, class II (H) 01/21/2021     Priority: Medium     Macular edema, od 12/24/2019     Priority: Medium     Megalocornea 05/27/2019     Priority: Medium     Hyperlipidemia LDL goal <70 08/18/2016     Priority: Medium     Essential hypertension with goal blood pressure less than 140/90 08/10/2016     Priority: Medium     RCT (rotator cuff tear) 02/12/2014     Priority: Medium     Cardiomyopathy (H) 05/21/2013     Priority: Medium     Nonischemic cardiomyopathy based on serial MRI assessments with probably reduced baseline ejection fraction but recent outside echo suggesting normalization of EF.     Echo 9/29/2020-  Global and regional left ventricular function is normal with an EF of 55-60%. Global right ventricular function is normal. Trileaflet aortic sclerosis without stenosis. Mild aortic insufficiency is  Present. The inferior vena cava was normal in size with preserved respiratory Variability. No pericardial effusion is present.         BCC (basal cell carcinoma)      Priority: Medium     right shoulder        JOSE JUAN (obstructive sleep apnea)-severe (AHI 61)      Priority: Medium     Polysomnogram 1/29/2009 at [facility] [place] - AHI of 72 and O2 rody of 78%.  CPAP was titrated to a final pressure of 11 cm/H20 but only lateral REM  Polysomnogram  6/28/2013 (230.0 lbs)-  AHI 32, RDI 58, O2 rody 85%. CPAP titration was completed during the second portion of the study night.  CPAP was titrated to an effective pressure of 13 cm/H20 with an AHI including REM lateral.   10/26/2021 [facility] (244.0 lbs) - AHI 61.4, RDI 64.3, Supine AHI n/a, REM AHI 69.5, Low O2 61.4%, Time Spent ?88% 29.7 minutes / Time Spent ?89% 38.4  minutes.         Advanced directives, counseling/discussion 03/28/2012     Priority: Low     Pseudophakia, sutured PCL, od; ACL, os - hx of IOL dislocation, ou 10/14/2011     Priority: Low     Posterior vitreous detachment, od 10/14/2011     Priority: Low     Epiretinal membrane, mild, od 10/14/2011     Priority: Low     HL (hearing loss) 04/01/2011     Priority: Low     Erectile dysfunction 04/01/2011     Priority: Low      Past Medical History:   Diagnosis Date     Arthritis      BCC (basal cell carcinoma)     right shoulder      Cardiomyopathy (H)      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 (H) 06/27/2022     Colon adenomas 09/20/2011     Coronary artery disease      Disorder of bursae and tendons in shoulder region 04/18/2014     ED (erectile dysfunction)      HFrEF (heart failure with reduced ejection fraction) (H)      History of iritis, os 05/27/2019     HTN (hypertension)      Near syncope 02/10/2014     Noncompliance      Nonrheumatic aortic valve insufficiency      Obesity      JOSE JUAN (obstructive sleep apnea)      Past Surgical History:   Procedure Laterality Date     ARTHROSCOPY SHOULDER BICEPS TENODESIS REPAIR  02/27/2014    Procedure: ARTHROSCOPY SHOULDER BICEPS TENODESIS REPAIR;;  Surgeon: Michael Hagen MD;  [facility]     ARTHROSCOPY SHOULDER ROTATOR CUFF REPAIR  02/27/2014    Procedure: ARTHROSCOPY SHOULDER ROTATOR CUFF REPAIR;  Right Shoulder Arthroscopic Rotator Cuff Repair,  Subacromial Decompression, Biceps Tenodesis, Distal Clavicle Excision   ;  Surgeon: Michael Hagen MD;  Location: [facility]     BIOPSY       CARDIAC SURGERY       COLONOSCOPY  06/12/2018     CV CORONARY ANGIOGRAM N/A 8/8/2022    Procedure: Coronary Angiogram;  Surgeon: Ida Goddard MD;  Location: [facility] CATH LAB CV     CV LEFT HEART CATH N/A 8/8/2022    Procedure: Left Heart Catheterization;  Surgeon: Ida Goddard MD;   Location: [facility] LAB CV     EXCHANGE INTRAOCULAR LENS IMPLANT      left eye - first, recentered PCL with iris fixation; then IOLX with ACL     EXTRACAPSULAR CATARACT EXTRATION WITH INTRAOCULAR LENS IMPLANT      both eyes (elsewhere)     RELEASE CARPAL TUNNEL Right 2021    Procedure: RELEASE, RIGHT CARPAL TUNNEL;  Surgeon: Tony Bravo MD;  Location: [facility] OR     TURBINOPLASTY  2013    Procedure: TURBINOPLASTY;;  Surgeon: Lisset Parsons MD;  Location: [facility] OR     UVULOPALATOPHARYNGOPLASTY  2013    Procedure: UVULOPALATOPHARYNGOPLASTY;  Uvulopalatopharyngoplasty, Turbiante Reduction;  Surgeon: Lisset Parsons MD;  Location: [facility] OR     Current Outpatient Medications   Medication Sig Dispense Refill     apixaban ANTICOAGULANT (ELIQUIS ANTICOAGULANT) 5 MG tablet Take 1 tablet (5 mg) by mouth 2 times daily 60 tablet 11     atorvastatin (LIPITOR) 10 MG tablet TAKE ONE TABLET BY MOUTH ONE TIME DAILY 90 tablet 0     cyanocobalamin (VITAMIN B-12) 1000 MCG tablet Take 1,000 mcg by mouth daily       diphenoxylate-atropine (LOMOTIL) 2.5-0.025 MG tablet Take 1 tablet by mouth daily as needed for diarrhea Patient only takes 1 tablet as needed 30 tablet 5     folic acid (FOLVITE) 1 MG tablet Take 1 tablet (1 mg) by mouth 2 times daily 60 tablet 4     levETIRAcetam (KEPPRA) 500 MG tablet Take 1 tablet (500 mg) by mouth 2 times daily 60 tablet 11     metoprolol succinate ER (TOPROL XL) 25 MG 24 hr tablet Take 1 tablet (25 mg) by mouth 2 times daily 180 tablet 11     sacubitril-valsartan (ENTRESTO) 24-26 MG per tablet Take 1 tablet by mouth 2 times daily 180 tablet 11       Allergies   Allergen Reactions     Lisinopril Cough     Perfume Other (See Comments)        Social History     Tobacco Use     Smoking status: Former Smoker     Packs/day: 0.50     Years: 2.00     Pack years: 1.00     Types: Cigarettes     Quit date: 1996     Years since quittin.7     Smokeless  tobacco: Never Used   Substance Use Topics     Alcohol use: Yes     Alcohol/week: 8.3 standard drinks     Comment: Evening Marie     Family History   Problem Relation Age of Onset     Diabetes Father         Old age Diabetes     Cerebrovascular Disease Father      Obesity Father      Heart Disease Father      Coronary Artery Disease Father      Hypertension Father      Lipids Sister      C.A.D. No family hx of      Cancer No family hx of      Glaucoma No family hx of      Macular Degeneration No family hx of      History   Drug Use No         Objective     /70 (BP Location: Left arm, Patient Position: Chair, Cuff Size: Adult Regular)   Pulse 60   Temp 97.7  F (36.5  C) (Oral)   Wt 83.9 kg (185 lb)   SpO2 97%   BMI 28.98 kg/m      Physical Exam    GENERAL APPEARANCE: healthy, alert and no distress     EYES: EOMI,  PERRL     HENT: ear canals and TM's normal and nose and mouth without ulcers or lesions     NECK: no adenopathy, no asymmetry, masses, or scars and thyroid normal to palpation     RESP: lungs clear to auscultation - no rales, rhonchi or wheezes     CV: regular rates and rhythm, normal S1 S2, no S3 or S4 and no murmur, click or rub     ABDOMEN:  soft, nontender, no HSM or masses and bowel sounds normal     MS: extremities normal- no gross deformities noted, no evidence of inflammation in joints, FROM in all extremities.     SKIN: no suspicious lesions or rashes     NEURO: Normal strength and tone, sensory exam grossly normal, mentation intact and speech normal     PSYCH: mentation appears normal. and affect normal/bright     LYMPHATICS: No cervical adenopathy    Recent Labs   Lab Test 08/14/22  0914 08/08/22  0818 07/30/21  0159 04/28/21  1212 01/21/21  1733   HGB 13.9 13.8   < >  --   --     229   < >  --   --     140   < >  --   --    POTASSIUM 4.3 3.8   < >  --   --    CR 0.92 0.88   < >  --   --    A1C  --   --   --  5.6 5.7*    < > = values in this interval not displayed.         Diagnostics:  Labs pending at this time.  Results will be reviewed when available.   No EKG this visit, completed in the last 90 days.    Revised Cardiac Risk Index (RCRI):  The patient has the following serious cardiovascular risks for perioperative complications:   - No serious cardiac risks = 0 points     RCRI Interpretation: 0 points: Class I (very low risk - 0.4% complication rate)           Signed Electronically by: Brandee Joshau PA-C  Copy of this evaluation report is provided to requesting physician.

## 2022-09-08 NOTE — PROGRESS NOTES
[facility]  [address]  [place] [address]  Phone: [phone]  Primary Provider: Holland Blunt  Pre-op Performing Provider: JENNIFER ALEXANDRE      PREOPERATIVE EVALUATION:  Today's date: 9/8/2022    Ricky Brown is a 71 year old male who presents for a preoperative evaluation.    Surgical Information:  Surgery/Procedure: DRUG INDUCED SLEEP ENDOSCOPY  Surgery Location: [facility]  Surgeon: Dr. Tanner  Surgery Date: 9/16/2022  Time of Surgery: 11:20am   Where patient plans to recover: At home with family  Fax number for surgical facility: Note does not need to be faxed, will be available electronically in Epic.    Type of Anesthesia Anticipated: General    Assessment & Plan     The proposed surgical procedure is considered INTERMEDIATE risk.    1. Preop general physical exam    2. Essential hypertension with goal blood pressure less than 140/90    3. JOSE JUAN (obstructive sleep apnea)-severe (AHI 61)    4. Cardiomyopathy, unspecified type (H)    5. 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 (H)    6. Coronary artery disease involving native coronary artery of native heart without angina pectoris    7. Partial symptomatic epilepsy with complex partial seizures, not intractable, without status epilepticus (H)               Risks and Recommendations:  The patient has the following additional risks and recommendations for perioperative complications:  Obstructive Sleep Apnea:   Monitor post procedure.     Medication Instructions:  Patient is to take all scheduled medications on the day of surgery EXCEPT for modifications listed below:   - apixaban (Eliquis), edoxaban (Savaysa), rivaroxaban (Xarelto): Bleeding risk is low for this procedure AND CrCl (>=) 50 mL/min. HOLD 1 day before surgery.      RECOMMENDATION:  APPROVAL GIVEN to proceed with proposed procedure, without further  diagnostic evaluation.    See clearance from neurology in office visit 8/31/22  See clearance from cardiology in mychart message 8/30/22                        Subjective     HPI related to upcoming procedure: Patient is having a drug induced sleep endoscopy to ensure candidacy for a future hypoglossal nerve stimulator.     Preop Questions 9/7/2022   1. Have you ever had a heart attack or stroke? YES -    2. Have you ever had surgery on your heart or blood vessels, such as a stent placement, a coronary artery bypass, or surgery on an artery in your head, neck, heart, or legs? No   3. Do you have chest pain with activity? No   4. Do you have a history of  heart failure? YES -    5. Do you currently have a cold, bronchitis or symptoms of other infection? No   6. Do you have a cough, shortness of breath, or wheezing? No   7. Do you or anyone in your family have previous history of blood clots? YES -    8. Do you or does anyone in your family have a serious bleeding problem such as prolonged bleeding following surgeries or cuts? No   9. Have you ever had problems with anemia or been told to take iron pills? No   10. Have you had any abnormal blood loss such as black, tarry or bloody stools? No   11. Have you ever had a blood transfusion? No   12. Are you willing to have a blood transfusion if it is medically needed before, during, or after your surgery? Yes   13. Have you or any of your relatives ever had problems with anesthesia? No   14. Do you have sleep apnea, excessive snoring or daytime drowsiness? YES - sleep apnea   14a. Do you have a CPAP machine? No   15. Do you have any artifical heart valves or other implanted medical devices like a pacemaker, defibrillator, or continuous glucose monitor? No   16. Do you have artificial joints? No   17. Are you allergic to latex? No       Health Care Directive:  Patient does not have a Health Care Directive or Living Will: Advance Directive received and scanned. Click on  Code in the patient header to view.    Preoperative Review of :   reviewed - no record of controlled substances prescribed.      Status of Chronic Conditions:  See problem list for active medical problems.  Problems all longstanding and stable, except as noted/documented.  See ROS for pertinent symptoms related to these conditions.      Review of Systems  CONSTITUTIONAL: NEGATIVE for fever, chills, change in weight  INTEGUMENTARY/SKIN: NEGATIVE for worrisome rashes, moles or lesions  ENT/MOUTH: NEGATIVE for ear, mouth and throat problems  RESP: NEGATIVE for significant cough or SOB  CV: NEGATIVE for chest pain, palpitations or peripheral edema  GI: NEGATIVE for nausea, abdominal pain, heartburn, or change in bowel habits  : NEGATIVE for frequency, dysuria, or hematuria  MUSCULOSKELETAL: NEGATIVE for significant arthralgias or myalgia  NEURO: NEGATIVE for weakness, dizziness or paresthesias  HEME: NEGATIVE for bleeding problems  PSYCHIATRIC: NEGATIVE for changes in mood or affect    Patient Active Problem List    Diagnosis Date Noted     ETOH abuse 08/31/2022     Priority: Medium     Pain in thoracic spine 08/19/2022     Priority: Medium     Lumbar spine pain 08/10/2022     Priority: Medium     HFrEF (heart failure with reduced ejection fraction) (H) 06/29/2022     Priority: Medium     Nonrheumatic aortic valve insufficiency 06/29/2022     Priority: Medium     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 (H) 06/27/2022     Priority: Medium     Folic acid deficiency (non anemic) 06/24/2022     Priority: Medium     S/P carpal tunnel release 02/28/2022     Priority: Medium     Right hand weakness 02/28/2022     Priority: Medium     Pillar pain of extremity 02/28/2022     Priority: Medium     CAD (coronary artery disease) 09/02/2021     Priority: Medium     Nonobstructive coronary artery disease with previous coronary angiogram suggesting all  stenoses less or equal to 25%.          Right carpal tunnel syndrome 07/13/2021     Priority: Medium     Added automatically from request for surgery [id number]       Elevated LFTs 05/03/2021     Priority: Medium     Fatty liver 05/03/2021     Priority: Medium     Angina, class II (H) 01/21/2021     Priority: Medium     Macular edema, od 12/24/2019     Priority: Medium     Megalocornea 05/27/2019     Priority: Medium     Hyperlipidemia LDL goal <70 08/18/2016     Priority: Medium     Essential hypertension with goal blood pressure less than 140/90 08/10/2016     Priority: Medium     RCT (rotator cuff tear) 02/12/2014     Priority: Medium     Cardiomyopathy (H) 05/21/2013     Priority: Medium     Nonischemic cardiomyopathy based on serial MRI assessments with probably reduced baseline ejection fraction but recent outside echo suggesting normalization of EF.     Echo 9/29/2020-  Global and regional left ventricular function is normal with an EF of 55-60%. Global right ventricular function is normal. Trileaflet aortic sclerosis without stenosis. Mild aortic insufficiency is  Present. The inferior vena cava was normal in size with preserved respiratory Variability. No pericardial effusion is present.         BCC (basal cell carcinoma)      Priority: Medium     right shoulder        JOSE JUAN (obstructive sleep apnea)-severe (AHI 61)      Priority: Medium     Polysomnogram 1/29/2009 at [facility] - AHI of 72 and O2 rody of 78%.  CPAP was titrated to a final pressure of 11 cm/H20 but only lateral REM  Polysomnogram  6/28/2013 (230.0 lbs)-  AHI 32, RDI 58, O2 rody 85%. CPAP titration was completed during the second portion of the study night.  CPAP was titrated to an effective pressure of 13 cm/H20 with an AHI including REM lateral.   10/26/2021 [facility] (244.0 lbs) - AHI 61.4, RDI 64.3, Supine AHI n/a, REM AHI 69.5, Low O2 61.4%, Time Spent ?88% 29.7 minutes / Time Spent ?89% 38.4  minutes.         Advanced directives, counseling/discussion 03/28/2012     Priority: Low     Pseudophakia, sutured PCL, od; ACL, os - hx of IOL dislocation, ou 10/14/2011     Priority: Low     Posterior vitreous detachment, od 10/14/2011     Priority: Low     Epiretinal membrane, mild, od 10/14/2011     Priority: Low     HL (hearing loss) 04/01/2011     Priority: Low     Erectile dysfunction 04/01/2011     Priority: Low      Past Medical History:   Diagnosis Date     Arthritis      BCC (basal cell carcinoma)     right shoulder      Cardiomyopathy (H)      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 (H) 06/27/2022     Colon adenomas 09/20/2011     Coronary artery disease      Disorder of bursae and tendons in shoulder region 04/18/2014     ED (erectile dysfunction)      HFrEF (heart failure with reduced ejection fraction) (H)      History of iritis, os 05/27/2019     HTN (hypertension)      Near syncope 02/10/2014     Noncompliance      Nonrheumatic aortic valve insufficiency      Obesity      JOSE JUAN (obstructive sleep apnea)      Past Surgical History:   Procedure Laterality Date     ARTHROSCOPY SHOULDER BICEPS TENODESIS REPAIR  02/27/2014    Procedure: ARTHROSCOPY SHOULDER BICEPS TENODESIS REPAIR;;  Surgeon: Michael Hagen MD;  [facility]     ARTHROSCOPY SHOULDER ROTATOR CUFF REPAIR  02/27/2014    Procedure: ARTHROSCOPY SHOULDER ROTATOR CUFF REPAIR;  Right Shoulder Arthroscopic Rotator Cuff Repair,  Subacromial Decompression, Biceps Tenodesis, Distal Clavicle Excision   ;  Surgeon: Michael Hagen MD;  Location[facility]     BIOPSY       CARDIAC SURGERY       COLONOSCOPY  06/12/2018     CV CORONARY ANGIOGRAM N/A 8/8/2022    Procedure: Coronary Angiogram;  Surgeon: Ida Goddard MD;  Location: [facility] CATH LAB CV     CV LEFT HEART CATH N/A 8/8/2022    Procedure: Left Heart Catheterization;  Surgeon: Ida Goddard MD;   Location: [facility] LAB CV     EXCHANGE INTRAOCULAR LENS IMPLANT      left eye - first, recentered PCL with iris fixation; then IOLX with ACL     EXTRACAPSULAR CATARACT EXTRATION WITH INTRAOCULAR LENS IMPLANT      both eyes (elsewhere)     RELEASE CARPAL TUNNEL Right 2021    Procedure: RELEASE, RIGHT CARPAL TUNNEL;  Surgeon: Tony Bravo MD;  Location: [facility] OR     TURBINOPLASTY  2013    Procedure: TURBINOPLASTY;;  Surgeon: Lisset Parsons MD;  Location: [facility] OR     UVULOPALATOPHARYNGOPLASTY  2013    Procedure: UVULOPALATOPHARYNGOPLASTY;  Uvulopalatopharyngoplasty, Turbiante Reduction;  Surgeon: Lisset Parsons MD;  Location: [facility] OR     Current Outpatient Medications   Medication Sig Dispense Refill     apixaban ANTICOAGULANT (ELIQUIS ANTICOAGULANT) 5 MG tablet Take 1 tablet (5 mg) by mouth 2 times daily 60 tablet 11     atorvastatin (LIPITOR) 10 MG tablet TAKE ONE TABLET BY MOUTH ONE TIME DAILY 90 tablet 0     cyanocobalamin (VITAMIN B-12) 1000 MCG tablet Take 1,000 mcg by mouth daily       diphenoxylate-atropine (LOMOTIL) 2.5-0.025 MG tablet Take 1 tablet by mouth daily as needed for diarrhea Patient only takes 1 tablet as needed 30 tablet 5     folic acid (FOLVITE) 1 MG tablet Take 1 tablet (1 mg) by mouth 2 times daily 60 tablet 4     levETIRAcetam (KEPPRA) 500 MG tablet Take 1 tablet (500 mg) by mouth 2 times daily 60 tablet 11     metoprolol succinate ER (TOPROL XL) 25 MG 24 hr tablet Take 1 tablet (25 mg) by mouth 2 times daily 180 tablet 11     sacubitril-valsartan (ENTRESTO) 24-26 MG per tablet Take 1 tablet by mouth 2 times daily 180 tablet 11       Allergies   Allergen Reactions     Lisinopril Cough     Perfume Other (See Comments)        Social History     Tobacco Use     Smoking status: Former Smoker     Packs/day: 0.50     Years: 2.00     Pack years: 1.00     Types: Cigarettes     Quit date: 1996     Years since quittin.7     Smokeless  tobacco: Never Used   Substance Use Topics     Alcohol use: Yes     Alcohol/week: 8.3 standard drinks     Comment: Evening Marie     Family History   Problem Relation Age of Onset     Diabetes Father         Old age Diabetes     Cerebrovascular Disease Father      Obesity Father      Heart Disease Father      Coronary Artery Disease Father      Hypertension Father      Lipids Sister      C.A.D. No family hx of      Cancer No family hx of      Glaucoma No family hx of      Macular Degeneration No family hx of      History   Drug Use No         Objective     /70 (BP Location: Left arm, Patient Position: Chair, Cuff Size: Adult Regular)   Pulse 60   Temp 97.7  F (36.5  C) (Oral)   Wt 83.9 kg (185 lb)   SpO2 97%   BMI 28.98 kg/m      Physical Exam    GENERAL APPEARANCE: healthy, alert and no distress     EYES: EOMI,  PERRL     HENT: ear canals and TM's normal and nose and mouth without ulcers or lesions     NECK: no adenopathy, no asymmetry, masses, or scars and thyroid normal to palpation     RESP: lungs clear to auscultation - no rales, rhonchi or wheezes     CV: regular rates and rhythm, normal S1 S2, no S3 or S4 and no murmur, click or rub     ABDOMEN:  soft, nontender, no HSM or masses and bowel sounds normal     MS: extremities normal- no gross deformities noted, no evidence of inflammation in joints, FROM in all extremities.     SKIN: no suspicious lesions or rashes     NEURO: Normal strength and tone, sensory exam grossly normal, mentation intact and speech normal     PSYCH: mentation appears normal. and affect normal/bright     LYMPHATICS: No cervical adenopathy    Recent Labs   Lab Test 08/14/22  0914 08/08/22  0818 07/30/21  0159 04/28/21  1212 01/21/21  1733   HGB 13.9 13.8   < >  --   --     229   < >  --   --     140   < >  --   --    POTASSIUM 4.3 3.8   < >  --   --    CR 0.92 0.88   < >  --   --    A1C  --   --   --  5.6 5.7*    < > = values in this interval not displayed.         Diagnostics:  Labs pending at this time.  Results will be reviewed when available.   No EKG this visit, completed in the last 90 days.    Revised Cardiac Risk Index (RCRI):  The patient has the following serious cardiovascular risks for perioperative complications:   - No serious cardiac risks = 0 points     RCRI Interpretation: 0 points: Class I (very low risk - 0.4% complication rate)           Signed Electronically by: Brandee Joshua PA-C  Copy of this evaluation report is provided to requesting physician.

## 2022-09-08 NOTE — PATIENT INSTRUCTIONS
No Eliquis on 9/15/22 or 22 until after the procedure and the surgeon has okay'd the restart.     Ok to take all other medication the morning of the procedure with a small sip of water.     You should stop using any Ibuprofen (Advil), Naproxen (Aleve) or Aspirin containing products 7 days before your procedure. You may use Tylenol (Acetaminophen) as needed.      Patient Education    Preparing for Your Surgery  Getting started  A nurse will call you to review your health history and instructions. They will give you an arrival time based on your scheduled surgery time. Please be ready to share:    Your doctor's clinic name and phone number    Your medical, surgical and anesthesia history    A list of allergies and sensitivities    A list of medicines, including herbal treatments and over-the-counter drugs    Whether the patient has a legal guardian (ask how to send us the papers in advance)  Please tell us if you're pregnant--or if there's any chance you might be pregnant. Some surgeries may injure a fetus (unborn baby), so they require a pregnancy test. Surgeries that are safe for a fetus don't always need a test, and you can choose whether to have one.   If you have a child who's having surgery, please ask for a copy of Preparing for Your Child's Surgery.    Preparing for surgery    Within 30 days of surgery: Have a pre-op exam (sometimes called an H&P, or History and Physical). This can be done at a clinic or pre-operative center.  ? If you're having a , you may not need this exam. Talk to your care team.    At your pre-op exam, talk to your care team about all medicines you take. If you need to stop any medicines before surgery, ask when to start taking them again.  ? We do this for your safety. Many medicines can make you bleed too much during surgery. Some change how well surgery (anesthesia) drugs work.    Call your insurance company to let them know you're having surgery. (If you don't have  insurance, call [phone].)    Call your clinic if there's any change in your health. This includes signs of a cold or flu (sore throat, runny nose, cough, rash, fever). It also includes a scrape or scratch near the surgery site.    If you have questions on the day of surgery, call your hospital or surgery center.  COVID testing  You may need to be tested for COVID-19 before having surgery. If so, we will give you instructions.  Eating and drinking guidelines  For your safety: Unless your surgeon tells you otherwise, follow the guidelines below.    Eat and drink as usual until 8 hours before surgery. After that, no food or milk.    Drink clear liquids until 2 hours before surgery. These are liquids you can see through, like water, Gatorade and Propel Water. You may also have black coffee and tea (no cream or milk).    Nothing by mouth within 2 hours of surgery. This includes gum, candy and breath mints.    If you drink alcohol: Stop drinking it the night before surgery.    If your care team tells you to take medicine on the morning of surgery, it's okay to take it with a sip of water.  Preventing infection    Shower or bathe the night before and morning of your surgery. Follow the instructions your clinic gave you. (If no instructions, use regular soap.)    Don't shave or clip hair near your surgery site. We'll remove the hair if needed.    Don't smoke or vape the morning of surgery. You may chew nicotine gum up to 2 hours before surgery. A nicotine patch is okay.  ? Note: Some surgeries require you to completely quit smoking and nicotine. Check with your surgeon.    Your care team will make every effort to keep you safe from infection. We will:  ? Clean our hands often with soap and water (or an alcohol-based hand rub).  ? Clean the skin at your surgery site with a special soap that kills germs.  ? Give you a special gown to keep you warm. (Cold raises the risk of infection.)  ? Wear special hair covers, masks,  gowns and gloves during surgery.  ? Give antibiotic medicine, if prescribed. Not all surgeries need antibiotics.  What to bring on the day of surgery    Photo ID and insurance card    Copy of your health care directive, if you have one    Glasses and hearing aides (bring cases)  ? You can't wear contacts during surgery    Inhaler and eye drops, if you use them (tell us about these when you arrive)    CPAP machine or breathing device, if you use them    A few personal items, if spending the night    If you have . . .  ? A pacemaker, ICD (cardiac defibrillator) or other implant: Bring the ID card.  ? An implanted stimulator: Bring the remote control.  ? A legal guardian: Bring a copy of the certified (court-stamped) guardianship papers.  Please remove any jewelry, including body piercings. Leave jewelry and other valuables at home.  If you're going home the day of surgery    You must have a responsible adult drive you home. They should stay with you overnight as well.    If you don't have someone to stay with you, and you aren't safe to go home alone, we may keep you overnight. Insurance often won't pay for this.  After surgery  If it's hard to control your pain or you need more pain medicine, please call your surgeon's office.  Questions?   If you have any questions for your care team, list them here: _________________________________________________________________________________________________________________________________________________________________________ ____________________________________ ____________________________________ ____________________________________  For informational purposes only. Not to replace the advice of your health care provider. Copyright   2003, 2019 [facility]. All rights reserved. Clinically reviewed by Malou Garcia MD. [employer] 647016 - REV 07/21.

## 2022-09-12 ENCOUNTER — ANESTHESIA EVENT (OUTPATIENT)
Dept: SURGERY | Facility: CLINIC | Age: 72
End: 2022-09-12
Payer: COMMERCIAL

## 2022-09-12 NOTE — ANESTHESIA PREPROCEDURE EVALUATION
Anesthesia Pre-Procedure Evaluation    Patient: Ricky Brown   MRN: [id number] : 1950        Procedure : Procedure(s):  DRUG INDUCED SLEEP ENDOSCOPY          Past Medical History:   Diagnosis Date     Arthritis      BCC (basal cell carcinoma)     right shoulder      Cardiomyopathy (H)      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 (H) 2022     Colon adenomas 2011     Coronary artery disease      Disorder of bursae and tendons in shoulder region 2014     ED (erectile dysfunction)      HFrEF (heart failure with reduced ejection fraction) (H)      History of iritis, os 2019     HTN (hypertension)      Near syncope 02/10/2014     Noncompliance      Nonrheumatic aortic valve insufficiency      Obesity      JOSE JUAN (obstructive sleep apnea)      Partial symptomatic epilepsy with complex partial seizures, not intractable, without status epilepticus (H) 2022      Past Surgical History:   Procedure Laterality Date     ARTHROSCOPY SHOULDER BICEPS TENODESIS REPAIR  2014    Procedure: ARTHROSCOPY SHOULDER BICEPS TENODESIS REPAIR;;  Surgeon: Michael Hagen MD;  [facility]     ARTHROSCOPY SHOULDER ROTATOR CUFF REPAIR  2014    Procedure: ARTHROSCOPY SHOULDER ROTATOR CUFF REPAIR;  Right Shoulder Arthroscopic Rotator Cuff Repair,  Subacromial Decompression, Biceps Tenodesis, Distal Clavicle Excision   ;  Surgeon: Michael Hagen MD;  [facility]     BIOPSY       CARDIAC SURGERY       COLONOSCOPY  2018     CV CORONARY ANGIOGRAM N/A 2022    Procedure: Coronary Angiogram;  Surgeon: Ida Goddard MD;  Location: [facility] CV     CV LEFT HEART CATH N/A 2022    Procedure: Left Heart Catheterization;  Surgeon: Ida Goddard MD;  Location: [facility] CATH LAB CV     EXCHANGE INTRAOCULAR LENS IMPLANT      left eye - first, recentered PCL with iris fixation;  then IOLX with ACL     EXTRACAPSULAR CATARACT EXTRATION WITH INTRAOCULAR LENS IMPLANT      both eyes (elsewhere)     RELEASE CARPAL TUNNEL Right 2021    Procedure: RELEASE, RIGHT CARPAL TUNNEL;  Surgeon: Tony Bravo MD;  Location: [facility] OR     TURBINOPLASTY  2013    Procedure: TURBINOPLASTY;;  Surgeon: Lisset Parsons MD;  [facility]     UVULOPALATOPHARYNGOPLASTY  2013    Procedure: UVULOPALATOPHARYNGOPLASTY;  Uvulopalatopharyngoplasty, Turbiante Reduction;  Surgeon: Lisset Parsons MD;  Location: [facility] OR      Allergies   Allergen Reactions     Lisinopril Cough     Perfume Other (See Comments)      Social History     Tobacco Use     Smoking status: Former Smoker     Packs/day: 0.50     Years: 2.00     Pack years: 1.00     Types: Cigarettes     Quit date: 1996     Years since quittin.7     Smokeless tobacco: Never Used   Substance Use Topics     Alcohol use: Yes     Alcohol/week: 8.3 standard drinks     Comment: Evening Marie      Wt Readings from Last 1 Encounters:   22 83.9 kg (185 lb)        Anesthesia Evaluation   Pt has had prior anesthetic. Type: General and MAC.    No history of anesthetic complications       ROS/MED HX  ENT/Pulmonary:     (+) sleep apnea, uses CPAP,     Neurologic:     (+) CVA, TIA,     Cardiovascular:     (+) hypertension--CAD ---Taking blood thinners CHF     METS/Exercise Tolerance: 4 - Raking leaves, gardening    Hematologic:       Musculoskeletal:       GI/Hepatic:     (+) liver disease,     Renal/Genitourinary:       Endo:     (+) Obesity,     Psychiatric/Substance Use:     (+) alcohol abuse     Infectious Disease:       Malignancy:       Other:            Physical Exam    Airway        Mallampati: II   TM distance: > 3 FB   Neck ROM: full   Mouth opening: > 3 cm    Respiratory Devices and Support         Dental  no notable dental history         Cardiovascular   cardiovascular exam normal          Pulmonary                    OUTSIDE LABS:  CBC:   Lab Results   Component Value Date    WBC 5.8 08/14/2022    WBC 4.9 08/08/2022    HGB 13.9 08/14/2022    HGB 13.8 08/08/2022    HCT 41.3 08/14/2022    HCT 41.7 08/08/2022     08/14/2022     08/08/2022     BMP:   Lab Results   Component Value Date     09/08/2022     08/14/2022    POTASSIUM 3.9 09/08/2022    POTASSIUM 4.3 08/14/2022    CHLORIDE 107 09/08/2022    CHLORIDE 108 08/14/2022    CO2 28 09/08/2022    CO2 28 08/14/2022    BUN 20 09/08/2022    BUN 15 08/14/2022    CR 0.90 09/08/2022    CR 0.92 08/14/2022     (H) 09/08/2022     (A) 08/22/2022     COAGS: No results found for: PTT, INR, FIBR  POC: No results found for: BGM, HCG, HCGS  HEPATIC:   Lab Results   Component Value Date    ALBUMIN 3.9 06/13/2022    PROTTOTAL 7.1 06/13/2022    ALT 20 06/13/2022    AST 14 06/13/2022    ALKPHOS 130 06/13/2022    BILITOTAL 0.4 06/13/2022     OTHER:   Lab Results   Component Value Date    PH 7.31 (L) 12/11/2013    LACT 1.6 04/11/2020    A1C 5.6 04/28/2021    CATHERINE 8.8 09/08/2022    PHOS 4.0 12/12/2013    MAG 2.2 02/11/2014    LIPASE 129 04/11/2020    TSH 1.49 08/14/2022    SED 8 08/18/2016       Anesthesia Plan    ASA Status:  3   NPO Status:  NPO Appropriate    Anesthesia Type: General.     - Airway: Native airway   Induction: Propofol.           Consents    Anesthesia Plan(s) and associated risks, benefits, and realistic alternatives discussed. Questions answered and patient/representative(s) expressed understanding.     - Discussed: Risks, Benefits and Alternatives for BOTH SEDATION and the PROCEDURE were discussed     - Discussed with:  Patient      - Extended Intubation/Ventilatory Support Discussed: No.      - Patient is DNR/DNI Status: No    Use of blood products discussed: No .     Postoperative Care            Comments:                Alejo Caldwell CRNA, APRN CRNA

## 2022-09-13 PROCEDURE — 93272 ECG/REVIEW INTERPRET ONLY: CPT | Performed by: INTERNAL MEDICINE

## 2022-09-14 ENCOUNTER — LAB (OUTPATIENT)
Dept: LAB | Facility: CLINIC | Age: 72
End: 2022-09-14
Payer: COMMERCIAL

## 2022-09-14 DIAGNOSIS — G40.209 PARTIAL SYMPTOMATIC EPILEPSY WITH COMPLEX PARTIAL SEIZURES, NOT INTRACTABLE, WITHOUT STATUS EPILEPTICUS (H): ICD-10-CM

## 2022-09-14 LAB — LEVETIRACETAM SERPL-MCNC: 23.7 ΜG/ML (ref 10–40)

## 2022-09-14 PROCEDURE — 80177 DRUG SCRN QUAN LEVETIRACETAM: CPT

## 2022-09-14 PROCEDURE — 36415 COLL VENOUS BLD VENIPUNCTURE: CPT

## 2022-09-16 ENCOUNTER — TELEPHONE (OUTPATIENT)
Dept: ORTHOPEDICS | Facility: CLINIC | Age: 72
End: 2022-09-16

## 2022-09-16 ENCOUNTER — ANESTHESIA (OUTPATIENT)
Dept: SURGERY | Facility: CLINIC | Age: 72
End: 2022-09-16
Payer: COMMERCIAL

## 2022-09-16 ENCOUNTER — VIRTUAL VISIT (OUTPATIENT)
Dept: ORTHOPEDICS | Facility: CLINIC | Age: 72
End: 2022-09-16
Payer: COMMERCIAL

## 2022-09-16 ENCOUNTER — HOSPITAL ENCOUNTER (OUTPATIENT)
Facility: CLINIC | Age: 72
Discharge: HOME | End: 2022-09-16
Attending: OTOLARYNGOLOGY | Admitting: OTOLARYNGOLOGY
Payer: COMMERCIAL

## 2022-09-16 VITALS
HEART RATE: 52 BPM | SYSTOLIC BLOOD PRESSURE: 146 MMHG | TEMPERATURE: 98.1 F | WEIGHT: 185 LBS | BODY MASS INDEX: 29.03 KG/M2 | DIASTOLIC BLOOD PRESSURE: 61 MMHG | HEIGHT: 67 IN | OXYGEN SATURATION: 100 % | RESPIRATION RATE: 18 BRPM

## 2022-09-16 DIAGNOSIS — G56.01 CARPAL TUNNEL SYNDROME OF RIGHT WRIST: Primary | ICD-10-CM

## 2022-09-16 LAB — SARS-COV-2 RNA RESP QL NAA+PROBE: NEGATIVE

## 2022-09-16 PROCEDURE — 370N000017 HC ANESTHESIA TECHNICAL FEE, PER MIN: Performed by: OTOLARYNGOLOGY

## 2022-09-16 PROCEDURE — 250N000011 HC RX IP 250 OP 636: Performed by: NURSE ANESTHETIST, CERTIFIED REGISTERED

## 2022-09-16 PROCEDURE — 710N000012 HC RECOVERY PHASE 2, PER MINUTE: Performed by: OTOLARYNGOLOGY

## 2022-09-16 PROCEDURE — 999N000141 HC STATISTIC PRE-PROCEDURE NURSING ASSESSMENT: Performed by: OTOLARYNGOLOGY

## 2022-09-16 PROCEDURE — 258N000003 HC RX IP 258 OP 636: Performed by: NURSE ANESTHETIST, CERTIFIED REGISTERED

## 2022-09-16 PROCEDURE — 99213 OFFICE O/P EST LOW 20 MIN: CPT | Mod: 95 | Performed by: STUDENT IN AN ORGANIZED HEALTH CARE EDUCATION/TRAINING PROGRAM

## 2022-09-16 PROCEDURE — 87635 SARS-COV-2 COVID-19 AMP PRB: CPT | Performed by: OTOLARYNGOLOGY

## 2022-09-16 PROCEDURE — 272N000001 HC OR GENERAL SUPPLY STERILE: Performed by: OTOLARYNGOLOGY

## 2022-09-16 PROCEDURE — 250N000009 HC RX 250: Performed by: NURSE ANESTHETIST, CERTIFIED REGISTERED

## 2022-09-16 PROCEDURE — 42975 DISE EVAL SLP DO BRTH FLX DX: CPT | Performed by: OTOLARYNGOLOGY

## 2022-09-16 PROCEDURE — 360N000075 HC SURGERY LEVEL 2, PER MIN: Performed by: OTOLARYNGOLOGY

## 2022-09-16 RX ORDER — FENTANYL CITRATE 50 UG/ML
25 INJECTION, SOLUTION INTRAMUSCULAR; INTRAVENOUS EVERY 5 MIN PRN
Status: CANCELLED | OUTPATIENT
Start: 2022-09-16

## 2022-09-16 RX ORDER — PROPOFOL 10 MG/ML
INJECTION, EMULSION INTRAVENOUS PRN
Status: DISCONTINUED | OUTPATIENT
Start: 2022-09-16 | End: 2022-09-16

## 2022-09-16 RX ORDER — SODIUM CHLORIDE, SODIUM LACTATE, POTASSIUM CHLORIDE, CALCIUM CHLORIDE 600; 310; 30; 20 MG/100ML; MG/100ML; MG/100ML; MG/100ML
INJECTION, SOLUTION INTRAVENOUS CONTINUOUS
Status: CANCELLED | OUTPATIENT
Start: 2022-09-16

## 2022-09-16 RX ORDER — ONDANSETRON 4 MG/1
4 TABLET, ORALLY DISINTEGRATING ORAL
Status: CANCELLED | OUTPATIENT
Start: 2022-09-16

## 2022-09-16 RX ORDER — HYDROMORPHONE HCL IN WATER/PF 6 MG/30 ML
0.2 PATIENT CONTROLLED ANALGESIA SYRINGE INTRAVENOUS EVERY 5 MIN PRN
Status: CANCELLED | OUTPATIENT
Start: 2022-09-16

## 2022-09-16 RX ORDER — LIDOCAINE 40 MG/G
CREAM TOPICAL
Status: DISCONTINUED | OUTPATIENT
Start: 2022-09-16 | End: 2022-09-16 | Stop reason: HOSPADM

## 2022-09-16 RX ORDER — ONDANSETRON 4 MG/1
4 TABLET, ORALLY DISINTEGRATING ORAL EVERY 30 MIN PRN
Status: CANCELLED | OUTPATIENT
Start: 2022-09-16

## 2022-09-16 RX ORDER — MEPERIDINE HYDROCHLORIDE 25 MG/ML
12.5 INJECTION INTRAMUSCULAR; INTRAVENOUS; SUBCUTANEOUS
Status: CANCELLED | OUTPATIENT
Start: 2022-09-16

## 2022-09-16 RX ORDER — ONDANSETRON 2 MG/ML
4 INJECTION INTRAMUSCULAR; INTRAVENOUS EVERY 30 MIN PRN
Status: CANCELLED | OUTPATIENT
Start: 2022-09-16

## 2022-09-16 RX ORDER — SODIUM CHLORIDE, SODIUM LACTATE, POTASSIUM CHLORIDE, CALCIUM CHLORIDE 600; 310; 30; 20 MG/100ML; MG/100ML; MG/100ML; MG/100ML
INJECTION, SOLUTION INTRAVENOUS CONTINUOUS
Status: DISCONTINUED | OUTPATIENT
Start: 2022-09-16 | End: 2022-09-16 | Stop reason: HOSPADM

## 2022-09-16 RX ORDER — FENTANYL CITRATE 50 UG/ML
25 INJECTION, SOLUTION INTRAMUSCULAR; INTRAVENOUS
Status: CANCELLED | OUTPATIENT
Start: 2022-09-16

## 2022-09-16 RX ORDER — OXYCODONE HYDROCHLORIDE 5 MG/1
5 TABLET ORAL EVERY 4 HOURS PRN
Status: CANCELLED | OUTPATIENT
Start: 2022-09-16

## 2022-09-16 RX ADMIN — SODIUM CHLORIDE, POTASSIUM CHLORIDE, SODIUM LACTATE AND CALCIUM CHLORIDE: 600; 310; 30; 20 INJECTION, SOLUTION INTRAVENOUS at 10:55

## 2022-09-16 RX ADMIN — LIDOCAINE HYDROCHLORIDE 0.1 ML: 10 INJECTION, SOLUTION EPIDURAL; INFILTRATION; INTRACAUDAL; PERINEURAL at 10:56

## 2022-09-16 RX ADMIN — PROPOFOL 10 MG: 10 INJECTION, EMULSION INTRAVENOUS at 11:17

## 2022-09-16 RX ADMIN — PROPOFOL 100 MCG/KG/MIN: 10 INJECTION, EMULSION INTRAVENOUS at 11:18

## 2022-09-16 ASSESSMENT — ACTIVITIES OF DAILY LIVING (ADL): ADLS_ACUITY_SCORE: 35

## 2022-09-16 NOTE — OP NOTE
PREOPERATIVE DIAGNOSES: Severe obstructive sleep apnea, intolerance to CPAP.     POSTOPERATIVE DIAGNOSES: Same.      PROCEDURE PERFORMED:   1. Drug-induced sleep endoscopy     SURGEON: Dashawn Tanner MD      ASSISTANTS: None.     BLOOD LOSS:  None.       COMPLICATIONS: None.      SPECIMENS: None.     ANESTHESIA: General.     GRAFTS, IMPLANTS, DRAINS: None.     INDICATIONS: The patient has a history of severe obstructive sleep apnea and is intolerant to CPAP.  The patient presents today for drug-induced sleep endoscopy to assess candidacy for implantation of upper airway stimulation.     FINDINGS:   1. Primarily anteroposterior collapse of the velopharyngeal airway.   2. Patient is a candidate for hypoglossal nerve upper airway stimulation.     OPERATIVE TECHNIQUE: The patient was brought to the operating room and identified by name clinic number.  They were placed supinely on the operating room table.  The patient was given a loading dose of propofol and then started on IV propofol infusion titrating up to 150 mcg/h to induce sleep state.  The patient was was kept spontaneously breathing.  We waited until the patient was nonresponsive and in a sleep state.  End-tidal CO2 was used to monitor breathing.      Once sleep state was reached, after standard surgical pause, the bilateral nares were anesthetized with 2 mL of 4% lidocaine and phenylephrine.  The nasopharynx was visualized.  The patient had primarily anteroposterior collapse in the velopharynx, primarily anteroposterior collapse of the oropharynx, primarily anteroposterior collapse of the tongue base, primarily anterioposterior collapse of the epiglottis.                       Based on the patient's examination, the patient would be a candidate for the hypoglossal nerve stimulator.     This marked the end of the procedure.  The patient was then turned over to anesthesia for recovery where they were awakened and transferred to the PACU in excellent condition.   There were no complications.  There was minimal blood loss.  All standard operating room protocol and universal precautions were used throughout the procedure.     Dashawn Tanner MD  Department of Otolaryngology-Head and Neck Surgery  [facility]

## 2022-09-16 NOTE — ANESTHESIA CARE TRANSFER NOTE
Patient: Ricky Brown    Procedure: Procedure(s):  DRUG INDUCED SLEEP ENDOSCOPY       Diagnosis: Severe obstructive sleep apnea [G47.33]  Intolerance of continuous positive airway pressure (CPAP) ventilation [Z78.9]  BMI 28.0-28.9,adult [Z68.28]  Diagnosis Additional Information: No value filed.    Anesthesia Type:   General     Note:    Oropharynx: oropharynx clear of all foreign objects and spontaneously breathing  Level of Consciousness: awake  Oxygen Supplementation: room air    Independent Airway: airway patency satisfactory and stable  Dentition: dentition unchanged  Vital Signs Stable: post-procedure vital signs reviewed and stable  Report to RN Given: handoff report given  Patient transferred to: Phase II    Handoff Report: Identifed the Patient, Identified the Reponsible Provider, Reviewed the pertinent medical history, Discussed the surgical course, Reviewed Intra-OP anesthesia mangement and issues during anesthesia, Set expectations for post-procedure period and Allowed opportunity for questions and acknowledgement of understanding      Vitals:  Vitals Value Taken Time   /68 09/16/22 1129   Temp     Pulse 60 09/16/22 1129   Resp     SpO2 95 % 09/16/22 1131   Vitals shown include unvalidated device data.    Electronically Signed By: MANDO Perez CRNA  September 16, 2022  11:31 AM

## 2022-09-16 NOTE — LETTER
"    9/16/2022         RE: Ricky Brown  [address]  [facility] [address]        Dear Colleague,    Thank you for referring your patient, Ricky Brown, to the [facility]. Please see a copy of my visit note below.    Ortho Hand    Telephone start: 1600  Telephone end: 1615  Patient received phone call from home in [place]    Patient states that he has received \"clearance\" from his Neurologist and Cardiologist to proceed with surgery. Continues to have symptoms in the R hand and he would like surgery.     On review of the chart, his neurologist has stated on 8/31: \"Patient is scheduled to have 2 procedures and I have told him it is okay from neurology standpoint to proceed with those procedures\"    Also, via [employer] messaging, his cardiologist has stated on 8/31: \"You are intermediate risk for some type of cardiovascular event - like volume overload post surgery with some dyspnea, fast heart rate for example. You can proceed with both procedures, hold Eliquis 24 hours ahead, do not stop metoprolol, restart Eliquis, when OK per physician caring for you.\"    A/P: 71M hx R CTR p/w persistent symptoms, as well as ulnar neuropathy    Discussed that based on this documentation, it is reasonable to proceed with surgery. We will obtain PAC clearance as well. As for Eliquis, we will plan to stop 24 hours preoperatively and hold for 24-48 hours postoperatively.     Patient is agreeable with the plan. Case request placed for RIGHT revision open carpal tunnel and RIGHT ulnar nerve decompression at the elbow with possible anterior transposition.     Discussed the risks of surgery, including but not limited to: infection, bleeding, pain, poor scarring, injury to the nerves and tendons, neuroma formation, incomplete release, recurrence of compressive neuropathy, complex regional pain syndrome, need for revision surgery, cardiac or neurologic event. Despite these risks, patient " consents to and would like to proceed with the aforementioned procedure.     A total of 20 minutes was devoted to review of chart, direct face-to-face patient counseling and documentation during this encounter, exclusive of any procedure performed.    Vignesh Rhodes MD, PhD        Again, thank you for allowing me to participate in the care of your patient.        Sincerely,        Vignesh Rhodes MD

## 2022-09-16 NOTE — TELEPHONE ENCOUNTER
Need PAC visit (virtual)  Would like Wed in [facility].    Procedure: Right revision open carpal tunnel release with hypothenar fat flap and right elbow ulnar nerve decompression  Facility:  [facility]  Length: 90 minutes  Anesthesia: Interscalene Block, MAC  Post-op appointments needed: 2 weeks provider only, 6 weeks with provider only.  Surgery packet/instructions given to patient?  discussed, to be mailed    Aki Florence RN

## 2022-09-16 NOTE — OR NURSING
Pt states forgot to do a home covid test. Rapid covid test done upon arrival to [facility]. Pt states no s/s and doing well. Wife with pt preop.

## 2022-09-16 NOTE — NURSING NOTE
Pre-Operative Teaching Flowsheet     Person(s) involved in teaching: Patient, Spouse     Motivation Level:  Receptive (willing/able to accept information) and asks appropriate questions where applicable: Yes  Any cultural factors/Yarsani beliefs that may influence understanding or compliance? No     Patient demonstrates understanding of the following:  Pre-operative planning, including the necessary appointments and preparation needed prior to surgery: Yes  Which situations necessitate calling provider and whom to contact: Yes  Pain management techniques pre and post op: Yes  Stoplight tool introduced, questions answered, patient expressed understanding: Yes  How, and when, to access community resources: Yes    Additional Teaching Concerns Addressed:  Post-operative living arrangements and necessary adaptations to living environment.  Instructional Materials Used/Given: Yes, pre-op packet given to patient with additional system forms added as needed depending on type of surgery. Pre-op soap given (if in clinic).     Time spent with patient: 20 minutes.    Aki Florence RN

## 2022-09-16 NOTE — DISCHARGE INSTRUCTIONS
Discharge Instructions                      Same Day Surgery Discharge Instructions  Special Precautions After Surgery - Adult    It is not unusual to feel lightheaded or faint, up to 24 hours after surgery or while taking pain medication.  If you have these symptoms; sit for a few minutes before standing and have someone assist you when getting up.  You should rest and relax for the next 24 hours and must have someone stay with you for at least 24 hours after your discharge.  DO NOT DRIVE any vehicle or operate mechanical equipment for 24 hours following the end of your surgery.  DO NOT DRIVE while taking narcotic pain medications that have been prescribed by your physician.  If you had a limb operated on, you must be able to use it fully to drive.  DO NOT drink alcoholic beverages for 24 hours following surgery or while taking prescription pain medication.  Drink clear liquids (apple juice, ginger ale, broth, 7-Up, etc.).  Progress to your regular diet as you feel able.  Any questions call your physician and do not make important decisions for 24 hours.  __________________________________________________________________________________________________________________________________  IMPORTANT NUMBERS:    [facility] [place] Main Number:  [phone], [phone]  Pharmacy:  [phone]  Same Day Surgery:  [phone], Monday - Friday until 6:30 p.m.  Urgent Care:  [phone]  Emergency Room:  [phone]      Surgery Specialty Clinic:  [phone]         Recovery - Everyone recovers differently from a general anesthetic.  Symptoms such as fatigue, nausea, lightheadedness, and sometimes a low grade fever (up to 100 degrees) are not unusual.  As your body removes the anesthetic drugs from circulation, these symptoms will resolve.  You can resume a normal diet once awaking from anesthesia.  Once anesthesia wears off, you can return to normal activity.    Medications - Usually pain medication is not required after this  procedure.  Over-the-counter pain medicine can be used if needed.    Follow up - We will contact you for follow-up after we submit to insurance.    If there are any questions or issues with the above, or if there are other issues that concern you, always feel free to call the clinic and I am happy to speak with you as soon as feasible.    Dashawn Tanner MD  Department of Otolaryngology-Head and Neck Surgery  [facility]  [phone] or [phone] After hours, [facility] option

## 2022-09-16 NOTE — ANESTHESIA POSTPROCEDURE EVALUATION
Patient: Ricky Brown    Procedure: Procedure(s):  DRUG INDUCED SLEEP ENDOSCOPY       Anesthesia Type:  General    Note:  Disposition: Outpatient   Postop Pain Control: Uneventful            Sign Out: Well controlled pain   PONV: No   Neuro/Psych: Uneventful            Sign Out: Acceptable/Baseline neuro status   Airway/Respiratory: Uneventful            Sign Out: Acceptable/Baseline resp. status   CV/Hemodynamics: Uneventful            Sign Out: Acceptable CV status; No obvious hypovolemia; No obvious fluid overload   Other NRE: NONE   DID A NON-ROUTINE EVENT OCCUR? No           Last vitals:  Vitals Value Taken Time   /68 09/16/22 1132   Temp 36.7  C (98.1  F) 09/16/22 1132   Pulse 62 09/16/22 1132   Resp 18 09/16/22 1132   SpO2 97 % 09/16/22 1136   Vitals shown include unvalidated device data.    Electronically Signed By: MANDO Perez CRNA  September 16, 2022  11:37 AM

## 2022-09-16 NOTE — INTERVAL H&P NOTE
"I have reviewed the surgical (or preoperative) H&P that is linked to this encounter, and examined the patient. There are no significant changes    Clinical Conditions Present on Arrival:  Clinically Significant Risk Factors Present on Admission                 # Coagulation Defect: home medication list includes an anticoagulant medication   # Overweight: Estimated body mass index is 28.98 kg/m  as calculated from the following:    Height as of this encounter: 1.702 m (5' 7\").    Weight as of this encounter: 83.9 kg (185 lb).       "

## 2022-09-17 NOTE — PROGRESS NOTES
"Ortho Hand    Telephone start: 1600  Telephone end: 1615  Patient received phone call from home in [place]    Patient states that he has received \"clearance\" from his Neurologist and Cardiologist to proceed with surgery. Continues to have symptoms in the R hand and he would like surgery.     On review of the chart, his neurologist has stated on 8/31: \"Patient is scheduled to have 2 procedures and I have told him it is okay from neurology standpoint to proceed with those procedures\"    Also, via [employer] messaging, his cardiologist has stated on 8/31: \"You are intermediate risk for some type of cardiovascular event - like volume overload post surgery with some dyspnea, fast heart rate for example. You can proceed with both procedures, hold Eliquis 24 hours ahead, do not stop metoprolol, restart Eliquis, when OK per physician caring for you.\"    A/P: 71M hx R CTR p/w persistent symptoms, as well as ulnar neuropathy    Discussed that based on this documentation, it is reasonable to proceed with surgery. We will obtain PAC clearance as well. As for Eliquis, we will plan to stop 24 hours preoperatively and hold for 24-48 hours postoperatively.     Patient is agreeable with the plan. Case request placed for RIGHT revision open carpal tunnel and RIGHT ulnar nerve decompression at the elbow with possible anterior transposition.     Discussed the risks of surgery, including but not limited to: infection, bleeding, pain, poor scarring, injury to the nerves and tendons, neuroma formation, incomplete release, recurrence of compressive neuropathy, complex regional pain syndrome, need for revision surgery, cardiac or neurologic event. Despite these risks, patient consents to and would like to proceed with the aforementioned procedure.     A total of 20 minutes was devoted to review of chart, direct face-to-face patient counseling and documentation during this encounter, exclusive of any procedure performed.    Vignesh Rhodes, " MD, PhD

## 2022-09-19 ENCOUNTER — HOSPITAL ENCOUNTER (OUTPATIENT)
Facility: AMBULATORY SURGERY CENTER | Age: 72
End: 2022-09-19
Attending: STUDENT IN AN ORGANIZED HEALTH CARE EDUCATION/TRAINING PROGRAM | Admitting: STUDENT IN AN ORGANIZED HEALTH CARE EDUCATION/TRAINING PROGRAM
Payer: COMMERCIAL

## 2022-09-19 NOTE — TELEPHONE ENCOUNTER
Scheduled surgery for 10/26 in [facility] with Dr. Rhodes.    H&P with PAC.    COVID test will be done 1-2 days before the surgery.    Post-op scheduled with Maddy for 11/14.    Surg packet to be mailed - they would like the surg soap as well please.    No further questions/concerns at this time.

## 2022-09-20 NOTE — TELEPHONE ENCOUNTER
FUTURE VISIT INFORMATION      SURGERY INFORMATION:    Date: 10/26/22    Location:  OR    Surgeon:  Vignesh Rhodes MD    Anesthesia Type:  MAC with Block    Procedure: RIGHT REVISION OPEN CARPAL TUNNEL RELEASE, HYPOTHENAR FAT FLAP, RIGHT ULNAR NERVE DECOMPRESSION AT THE ELBOW WITH POSSIBLE ANTERIOR TRANSPOSITION    RECORDS REQUESTED FROM:        Primary Care Provider: Holland Blunt MD- [facility]    Pertinent Medical History: JOSE JUAN, cardiomyopathy, hypertension, CAD    Most recent EKG+ Tracin22    Most recent ECHO: 22    Most recent Cardiac Stress Test: 6/3/22    Most recent Coronary Angiogram: 22    Most recent PFT's: 20    Most recent Sleep Study:  10/26/21

## 2022-09-27 ENCOUNTER — HOSPITAL ENCOUNTER (OUTPATIENT)
Dept: CARDIOLOGY | Facility: CLINIC | Age: 72
Discharge: HOME | End: 2022-09-27
Attending: INTERNAL MEDICINE | Admitting: INTERNAL MEDICINE
Payer: COMMERCIAL

## 2022-09-27 DIAGNOSIS — I42.8 NONISCHEMIC CARDIOMYOPATHY (H): ICD-10-CM

## 2022-09-27 LAB — LVEF ECHO: NORMAL

## 2022-09-27 PROCEDURE — 93321 DOPPLER ECHO F-UP/LMTD STD: CPT | Mod: 26 | Performed by: INTERNAL MEDICINE

## 2022-09-27 PROCEDURE — 93321 DOPPLER ECHO F-UP/LMTD STD: CPT

## 2022-09-27 PROCEDURE — 93325 DOPPLER ECHO COLOR FLOW MAPG: CPT | Mod: 26 | Performed by: INTERNAL MEDICINE

## 2022-09-27 PROCEDURE — 93308 TTE F-UP OR LMTD: CPT | Mod: 26 | Performed by: INTERNAL MEDICINE

## 2022-09-27 PROCEDURE — 93325 DOPPLER ECHO COLOR FLOW MAPG: CPT

## 2022-09-29 DIAGNOSIS — Z78.9 INTOLERANCE OF CONTINUOUS POSITIVE AIRWAY PRESSURE (CPAP) VENTILATION: ICD-10-CM

## 2022-09-29 DIAGNOSIS — G47.33 SEVERE OBSTRUCTIVE SLEEP APNEA: Primary | ICD-10-CM

## 2022-10-10 DIAGNOSIS — I42.9 CARDIOMYOPATHY (H): Primary | Chronic | ICD-10-CM

## 2022-10-10 DIAGNOSIS — I50.20 HFREF (HEART FAILURE WITH REDUCED EJECTION FRACTION) (H): ICD-10-CM

## 2022-10-10 NOTE — PROGRESS NOTES
Follow up ordered.  LVM for pt to call and schedule.  Message sent to .  -rah    ===View-only below this line===  ----- Message -----  From: Manfred Henley MD  Sent: 10/10/2022   9:17 AM CDT  To: JOZEF Ivory  He should be scheduled eiubaldo Mcdonald or with heart failure team  Thanks  Manfred

## 2022-10-13 RX ORDER — ACETAMINOPHEN 500 MG
500-1000 TABLET ORAL EVERY 8 HOURS PRN
COMMUNITY
End: 2022-11-10

## 2022-10-13 NOTE — PROGRESS NOTES
Preoperative Assessment Center Medication History Note    Medication history completed on October 13, 2022 by this writer. See Epic admission navigator for prior to admission medications. Operating room staff will still need to confirm medications and last dose information on day of surgery.     Medication history interview sources  Patient interview: Yes  Care Everywhere records: Yes  Surescripts pharmacy refill records: Yes  Other (if applicable):     Changes made to PTA medication list  Added: acetaminophen,   Deleted: none  Changed: B12 dosing,     Additional medication history information (including reliability of information, actions taken by pharmacist):    -- No recent (within 30 days) course of antibiotics  -- No recent (within 30 days) course of systemic steroids  -- Reports being on blood thinning medications - Eliquis    -- Declines being on any other prescription or over-the-counter medications    Prior to Admission medications    Medication Sig Last Dose Taking? Auth Provider Long Term End Date   acetaminophen (TYLENOL) 500 MG tablet Take 500-1,000 mg by mouth every 8 hours as needed for mild pain Taking Yes Unknown, Entered By History     apixaban ANTICOAGULANT (ELIQUIS ANTICOAGULANT) 5 MG tablet Take 1 tablet (5 mg) by mouth 2 times daily Taking Yes Nathen Ruiz MD     atorvastatin (LIPITOR) 10 MG tablet TAKE ONE TABLET BY MOUTH ONE TIME DAILY  Patient taking differently: Take 10 mg by mouth every evening Taking Yes Holland Blunt MD Yes    cyanocobalamin (VITAMIN B-12) 1000 MCG tablet Take 1,000 mcg by mouth 2 times daily Taking Yes Reported, Patient     folic acid (FOLVITE) 1 MG tablet Take 1 tablet (1 mg) by mouth 2 times daily Taking Yes Nathen Ruiz MD     levETIRAcetam (KEPPRA) 500 MG tablet Take 1 tablet (500 mg) by mouth 2 times daily Taking Yes Nathen Ruiz MD Yes    metoprolol succinate ER (TOPROL XL) 25 MG 24 hr tablet Take 1 tablet (25 mg) by mouth 2 times daily Taking Yes Kale  Manfred Ny MD Yes    sacubitril-valsartan (ENTRESTO) 24-26 MG per tablet Take 1 tablet by mouth 2 times daily Taking Yes Manfred Henley MD Yes    ASPIRIN NOT PRESCRIBED (INTENTIONAL) Please choose reason not prescribed from choices below.   Brandee Joshua, ULISES Yes    diphenoxylate-atropine (LOMOTIL) 2.5-0.025 MG tablet Take 1 tablet by mouth daily as needed for diarrhea Patient only takes 1 tablet as needed  Patient not taking: Reported on 10/13/2022 Not Taking  Holland Blunt MD            Medication history completed by: [place], [facility]

## 2022-10-13 NOTE — PROGRESS NOTES
Anticoagulation Note - Preoperative Assessment Center (PAC) Pharmacist     Patient was interviewed on October 13, 2022 as a part of PAC clinic appointment. The purpose of this note is to document the perioperative anticoagulation plan outlined by the providers caring for Ricky Brown.     Current Regimen  Anticoagulation Regimen as of October 13, 2022: apixaban (ELIQUIS) 5 mg by mouth twice daily     Indication: h/o CVA in June 2022.   Prescriber:  Dr. Ruiz  Current medications that may interact with this include: none    Creatinine   Date Value Ref Range Status   09/08/2022 0.90 0.66 - 1.25 mg/dL Final   01/07/2021 1.05 0.66 - 1.25 mg/dL Final       Perioperative plan  Ricky Brown is scheduled for RIGHT REVISION OPEN CARPAL TUNNEL RELEASE, HYPOTHENAR FAT FLAP; RIGHT ULNAR NERVE DECOMPRESSION AT THE ELBOW WITH POSSIBLE ANTERIOR TRANSPOSITION  on 10/26/22 with Dr. Rhodes and the perioperative anticoagulation plan outlined by Dr. Rhodes is stop 24 hours preoperatively and hold for 24-48 hours postoperatively (see note from 9/16/22 for details).  Last dose to be 10/24/22 PM.     Resumption of anticoagulation after procedure will be based on surgery team assessment of bleeding risks and complications.  This plan may require re-assessment and modification by his primary team in the perioperative setting depending on patients clinical situation.        Von Wets RPH  October 13, 2022  10:26 AM

## 2022-10-14 ENCOUNTER — VIRTUAL VISIT (OUTPATIENT)
Dept: SURGERY | Facility: CLINIC | Age: 72
End: 2022-10-14
Payer: COMMERCIAL

## 2022-10-14 ENCOUNTER — PRE VISIT (OUTPATIENT)
Dept: SURGERY | Facility: CLINIC | Age: 72
End: 2022-10-14

## 2022-10-14 DIAGNOSIS — G56.01 CARPAL TUNNEL SYNDROME OF RIGHT WRIST: Primary | ICD-10-CM

## 2022-10-14 PROCEDURE — 99205 OFFICE O/P NEW HI 60 MIN: CPT | Mod: 95 | Performed by: PHYSICIAN ASSISTANT

## 2022-10-14 ASSESSMENT — ENCOUNTER SYMPTOMS: SEIZURES: 1

## 2022-10-14 ASSESSMENT — PAIN SCALES - GENERAL: PAINLEVEL: MILD PAIN (3)

## 2022-10-14 ASSESSMENT — LIFESTYLE VARIABLES: TOBACCO_USE: 1

## 2022-10-14 NOTE — H&P
Pre-Operative H & P     CC:  Preoperative exam to assess for increased cardiopulmonary risk while undergoing surgery and anesthesia.    Date of Encounter: 10/14/2022  Primary Care Physician:  Holland Blunt     Reason for visit: No diagnosis found.    HPI  Ricky Brown is a 71 year old male who presents for pre-operative H & P in preparation for  Procedure Information     Case: [id number] Date/Time: 10/26/22 0800    Procedures:       RIGHT REVISION OPEN CARPAL TUNNEL RELEASE, HYPOTHENAR FAT FLAP, (Right: Wrist)      RIGHT ULNAR NERVE DECOMPRESSION AT THE ELBOW WITH POSSIBLE ANTERIOR TRANSPOSITION (Right: Arm)    Anesthesia type: MAC with Block    Diagnosis: Carpal tunnel syndrome of right wrist [G56.01]    Pre-op diagnosis: Carpal tunnel syndrome of right wrist [G56.01]    Location:  OR  /  OR    Providers: Vignesh Rhodes MD          The patient is a 71-year-old man with a complex past medical history significant for cardiomyopathy with reduced EF, hypertension, hyperlipidemia, PFO, atrial regurgitation, mitral regurgitation and minimal coronary artery disease, severe sleep apnea, former smoker, hearing loss, anemia, history of stroke, complex partial seizures, cognitive decline, fatty liver, elevated LFTs, erectile dysfunction, history of basal cell carcinoma and history of alcohol abuse.  The patient was seen by Dr. Rhodes for ongoing carpal tunnel syndrome and has been counseled for the procedure as above.    History is obtained from the patient and chart review    Hx of abnormal bleeding or anti-platelet use: Eliquis       Past Medical History  Past Medical History:   Diagnosis Date     Arthritis      BCC (basal cell carcinoma)     right shoulder      Cardiomyopathy (H)      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 (H) 06/27/2022     Colon adenomas 09/20/2011     Coronary artery disease      Disorder of bursae and  tendons in shoulder region 04/18/2014     ED (erectile dysfunction)      HFrEF (heart failure with reduced ejection fraction) (H)      History of iritis, os 05/27/2019     HTN (hypertension)      Near syncope 02/10/2014     Noncompliance      Nonrheumatic aortic valve insufficiency      Obesity      JOSE JUAN (obstructive sleep apnea)      Partial symptomatic epilepsy with complex partial seizures, not intractable, without status epilepticus (H) 9/8/2022       Past Surgical History  Past Surgical History:   Procedure Laterality Date     ARTHROSCOPY SHOULDER BICEPS TENODESIS REPAIR  02/27/2014    Procedure: ARTHROSCOPY SHOULDER BICEPS TENODESIS REPAIR;;  Surgeon: Michael Hagen MD;  [facility]     ARTHROSCOPY SHOULDER ROTATOR CUFF REPAIR  02/27/2014    Procedure: ARTHROSCOPY SHOULDER ROTATOR CUFF REPAIR;  Right Shoulder Arthroscopic Rotator Cuff Repair,  Subacromial Decompression, Biceps Tenodesis, Distal Clavicle Excision   ;  Surgeon: Michael Hagen MD;  Location: [facility]     BIOPSY       CARDIAC SURGERY       COLONOSCOPY  06/12/2018     CV CORONARY ANGIOGRAM N/A 8/8/2022    Procedure: Coronary Angiogram;  Surgeon: Ida Goddard MD;  Location: [facility] CV     CV LEFT HEART CATH N/A 8/8/2022    Procedure: Left Heart Catheterization;  Surgeon: Ida Goddard MD;  Location: [facility] CV     ENDOSCOPY DRUG INDUCED SLEEP N/A 9/16/2022    Procedure: DRUG INDUCED SLEEP ENDOSCOPY;  Surgeon: Dashawn Tanner MD;  Location: [facility] OR     EXCHANGE INTRAOCULAR LENS IMPLANT  2005    left eye - first, recentered PCL with iris fixation; then IOLX with ACL     EXTRACAPSULAR CATARACT EXTRATION WITH INTRAOCULAR LENS IMPLANT  2005    both eyes (elsewhere)     RELEASE CARPAL TUNNEL Right 08/13/2021    Procedure: RELEASE, RIGHT CARPAL TUNNEL;  Surgeon: Tony Bravo MD;  Location: [facility] OR     TURBINOPLASTY  12/11/2013    Procedure: TURBINOPLASTY;;  Surgeon: Lisset Parsons MD;   Location: [facility] OR     UVULOPALATOPHARYNGOPLASTY  12/11/2013    Procedure: UVULOPALATOPHARYNGOPLASTY;  Uvulopalatopharyngoplasty, Turbiante Reduction;  Surgeon: Lisset Parsons MD;  Location:  OR       Prior to Admission Medications  Current Outpatient Medications   Medication Sig Dispense Refill     acetaminophen (TYLENOL) 500 MG tablet Take 500-1,000 mg by mouth every 8 hours as needed for mild pain       apixaban ANTICOAGULANT (ELIQUIS ANTICOAGULANT) 5 MG tablet Take 1 tablet (5 mg) by mouth 2 times daily 60 tablet 11     atorvastatin (LIPITOR) 10 MG tablet TAKE ONE TABLET BY MOUTH ONE TIME DAILY (Patient taking differently: Take 10 mg by mouth every evening) 90 tablet 0     cyanocobalamin (VITAMIN B-12) 1000 MCG tablet Take 1,000 mcg by mouth 2 times daily       folic acid (FOLVITE) 1 MG tablet Take 1 tablet (1 mg) by mouth 2 times daily 60 tablet 4     levETIRAcetam (KEPPRA) 500 MG tablet Take 1 tablet (500 mg) by mouth 2 times daily 60 tablet 11     metoprolol succinate ER (TOPROL XL) 25 MG 24 hr tablet Take 1 tablet (25 mg) by mouth 2 times daily 180 tablet 11     sacubitril-valsartan (ENTRESTO) 24-26 MG per tablet Take 1 tablet by mouth 2 times daily 180 tablet 11     ASPIRIN NOT PRESCRIBED (INTENTIONAL) Please choose reason not prescribed from choices below.       diphenoxylate-atropine (LOMOTIL) 2.5-0.025 MG tablet Take 1 tablet by mouth daily as needed for diarrhea Patient only takes 1 tablet as needed (Patient not taking: Reported on 10/13/2022) 30 tablet 5       Allergies  Allergies   Allergen Reactions     Lisinopril Cough     Perfume Other (See Comments)       Social History  Social History     Socioeconomic History     Marital status:      Spouse name: Kyung     Number of children: 0     Years of education: 14     Highest education level: Not on file   Occupational History     Occupation: industrial electronics      Employer: [employer]   Tobacco Use     Smoking status: Former      Packs/day: 0.50     Years: 2.00     Pack years: 1.00     Types: Cigarettes     Quit date: 1996     Years since quittin.8     Smokeless tobacco: Never   Vaping Use     Vaping Use: Never used   Substance and Sexual Activity     Alcohol use: Yes     Alcohol/week: 8.3 standard drinks     Comment: Evening Marie     Drug use: No     Sexual activity: Yes     Partners: Female     Birth control/protection: Male Surgical     Comment: 2006 vasectomy   Other Topics Concern     Parent/sibling w/ CABG, MI or angioplasty before 65F 55M? No   Social History Narrative     Not on file     Social Determinants of Health     Financial Resource Strain: Not on file   Food Insecurity: Not on file   Transportation Needs: Not on file   Physical Activity: Not on file   Stress: Not on file   Social Connections: Not on file   Intimate Partner Violence: Not on file   Housing Stability: Not on file       Family History  Family History   Problem Relation Age of Onset     Diabetes Father         Old age Diabetes     Cerebrovascular Disease Father      Obesity Father      Heart Disease Father      Coronary Artery Disease Father      Hypertension Father      Lipids Sister      C.A.D. No family hx of      Cancer No family hx of      Glaucoma No family hx of      Macular Degeneration No family hx of        Review of Systems  The complete review of systems is negative other than noted in the HPI or here.   Anesthesia Evaluation   Pt has had prior anesthetic. Type: General and MAC.    History of anesthetic complications       ROS/MED HX  ENT/Pulmonary: Comment: Hearing loss    (+) sleep apnea (s/p uvulectomy, Planned pulse generator placement on 11/3/22), severe, doesn't use CPAP, tobacco use, Past use,     Neurologic: Comment: Cognitive impairment    (+) seizures, features: loses touch with his surrounding, CVA, date: 22, with deficits, - Seen on MRI brain on 22 during work up for cognitive decline. .     Cardiovascular:     (+)  Dyslipidemia hypertension--CAD (minimal ) ---Taking blood thinners Instructions Given to patient: hold 24 hours. CHF etiology: NICM Last EF: 45% date: 9/27/22 valvular problems/murmurs +2AR, +1 MR. 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 Previous cardiac testing   Echo: Date: 9/27/22 Results:    Stress Test: Date: 8/22/22 Results:  Impression:  1. Stress Perfusion Ammonia Cardiac PET   1a.  No ischemia. Coronary flow reserve is normal in all vascular  distributions. (Concordant with cardiac catheterization 8/8/2022)  1b. At rest there is a mild decrease in perfusion in the septal wall  and inferior lateral wall.   This is in a nonvascular distribution.    It does appears to correlate with fibrosis demonstrated on MR 5/7/2020  with late gadolinium enhancement in the septal and inferolateral  walls.  1c. Cardiomyopathy - Global hypokinesis. Decreased ejection fraction  36% at rest and 40% at stress.  (Concordant with echocardiogram  6/29/22)  2. FDG Cardiac PET - No cardiac FDG uptake to suggest active  inflammation.   Cardiac prep was excellent with full myocardial  glucose uptake suppression.    ECG Reviewed:  Date: 8/14/22 Results:  Sinus bradycardia   T wave abnormality, consider lateral ischemia   Abnormal ECG    Cath:  Date: 8/8/22 Results:      METS/Exercise Tolerance:     Hematologic:       Musculoskeletal: Comment: Carpal tunnel syndrome          GI/Hepatic:     (+) liver disease (fatty liver),     Renal/Genitourinary: Comment: ED      Endo:     (+) Obesity,     Psychiatric/Substance Use:     (+) alcohol abuse     Infectious Disease:  - neg infectious disease ROS     Malignancy:   (+) Malignancy, History of Skin.Skin CA Remission status post.        Other:  - neg other ROS          Virtual visit -  No vitals were obtained    Physical Exam  Constitutional: Awake, alert, cooperative, no apparent distress, and appears stated age.  Eyes: Pupils equal  HENT:  Normocephalic  Respiratory: non labored breathing   Neurologic: Awake, alert, oriented to name, place and time.   Neuropsychiatric: Calm, cooperative. Normal affect.      Prior Labs/Diagnostic Studies   All labs and imaging personally reviewed     EKG 8/14/22  Sinus bradycardia   T wave abnormality, consider lateral ischemia   Abnormal ECG      Echo 9/27/22  Interpretation Summary     1. The left ventricle is normal in size. The visual ejection fraction is  estimated at 45%. There is mild global hypokinesia of the left ventricle.  2. Global peak LV longitudinal strain is averaged at -12%. This suggests  abnormal strain (normal <-18%).  3. The right ventricle is normal in structure, function and size.  4. There is moderate (2+) aortic regurgitation.     Echo 6-29-22 showed EF 35-40%, 1-2+ AI.    Echo with bubble study 6/29/22  Interpretation Summary     Bubble study was performed.  No evidence of right to left shunt at rest but evidence of small right to left  shunt following valsalva manuever.  There is mild concentric left ventricular hypertrophy.  There is moderate global hypokinesia of the left ventricle.  The visual ejection fraction is 35-40%.  Mildly decreased right ventricular systolic function  There is trace to mild mitral regurgitation.  The aortic valve is trileaflet with aortic valve sclerosis.  There is mild to moderate (1-2+) aortic regurgitation.  There is trace tricuspid regurgitation.  Compared to the prior study dated 9/29/2020, there are changes as noted. There  has been a decline in LV and RV systolic fuction.    PET myocardial perfusion rest and stress 8/22/22  Impression:  1. Stress Perfusion Ammonia Cardiac PET    1a.  No ischemia. Coronary flow reserve is normal in all vascular  distributions. (Concordant with cardiac catheterization 8/8/2022)  1b. At rest there is a mild decrease in perfusion in the septal wall  and inferior lateral wall.   This is in a nonvascular distribution.    It does  appears to correlate with fibrosis demonstrated on MR 5/7/2020  with late gadolinium enhancement in the septal and inferolateral  walls.  1c. Cardiomyopathy - Global hypokinesis. Decreased ejection fraction  36% at rest and 40% at stress.  (Concordant with echocardiogram  6/29/22)  2. FDG Cardiac PET - No cardiac FDG uptake to suggest active  inflammation.   Cardiac prep was excellent with full myocardial  glucose uptake suppression.    Coronary angiogram 8/8/22  Conclusion      Fatigue, dyspnea and palpitations in a patient with a newly depressed EF 35-40%. He has a family history of multivessel disease and a personal history of an embolic CVA and untreated sleep apnea. EKG today abnormal with anterior-lateral Tw inversions.    Minimal coronary artery disease with no significant stenoses. The arteries are large in size.    LV EDP 16 mmHg. LV-Ao pressure gradient was 8 mmHg by catheter pullback.      MR brain 6/27/22  IMPRESSION:  1. Abnormal areas of T2 hyperintense signal involving the left  parietal lobe and bilateral occipital lobes with enhancement, likely  subacute cortical infarcts.  2. 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  3. Nonspecific bilateral cerebellar microhemorrhages, likely  incidental.  4. Volume loss and white matter T2 hyperintensities which likely  represent chronic small vessel ischemic change.     [Access Center: Lateral subacute infarcts.    PFT Latest Ref Rng & Units 5/7/2020   FVC L 3.29   FEV1 L 2.83   FVC% % 90   FEV1% % 100         The patient's records and results personally reviewed by this provider.     Outside records reviewed from: Care Everywhere      Assessment  {Refresh the note after risk documentation is completed Link to Preoperative Risk Scoring :619652}    Ricky Brown is a 71 year old male seen as a PAC referral for risk assessment and optimization for anesthesia.    Plan/Recommendations  Pt will be optimized for the proposed procedure.  See  below for details on the assessment, risk, and preoperative recommendations    NEUROLOGY  - History of CVA -the patient was being worked up for cognitive decline and had a brain MRI on 6/27/2022 which showed some cortical infarcts as well as old infarct involving the left postcentral gyrus.  The patient was seen by neurology, Dr. Ruiz, on 8/31/2022.  He is on Eliquis for the history of stroke and given his complex partial seizures was started on Keppra.  For cognitive decline he was referred for neuropsych testing.  Dr. Ruiz does note the patient's upcoming procedures and said he was okay to proceed.  -Post Op delirium risk factors:  History of pre-existing cognitive dysfunction    ENT  - No current airway concerns.  Will need to be reassessed day of surgery.  Mallampati: Unable to assess  TM: Unable to assess    CARDIAC  - CAD -the patient had coronary angiogram on 8/8/2022 and was found to have minimal coronary artery disease.  The patient also underwent PET myocardial perfusion scan with rest and stress on 8/22/2022 and no significant abnormalities were seen other than global hypokinesis with cardiomyopathy  Well controlled on home regimen   - CHF  Non- ischemic cardiomyopathy - Without preserved EF -EF 35-40%  - Hypertension  Well controlled  - Hyperlipidemia  Well controlled on home regimen  - +1 MR and +2 AR seen on echo.   ~The patient was found to have no evidence of right to left shunt at rest but evidence of small right to left shunt following valsalva manuever.  - METS (Metabolic Equivalents)  Patient performs 4 or more METS exercise without symptoms            Total Score: 0      RCRI-Moderate risk: Class 3  6.6% complication rate            Total Score: 2    RCRI: CHF    RCRI: Cerebrovascular Disease         PULMONARY  - Obstructive Sleep Apnea -the patient has severe sleep apnea and has been intolerant to CPAP.  He underwent uvulectomy in 2013 and most recently had drug-induced sleep endoscopy.  He  "has been following with ENT and is currently scheduled for a pulse generator placement for his JOSE JUAN on 11/3/2022  JOSE JUAN without home CPAP use.  - Denies asthma or inhaler use  - Tobacco History  {Refresh the note if updates are made Link to Tobacco History :201204}    History   Smoking Status     Former     Packs/day: 0.50     Years: 2.00     Types: Cigarettes     Quit date: 12/12/1996   Smokeless Tobacco     Never       GI  - Fatty liver  PONV Medium Risk  Total Score: 2           1 AN PONV: Patient is not a current smoker    1 AN PONV: Intended Post Op Opioids        /RENAL  - Baseline Creatinine  0.90  ~ ED    ENDOCRINE  {Add pt reported vitals then use <dot>vitalsptreported to pull into your note Link to Pt Reported Vitals Flowsheet :032791}  - BMI: Estimated body mass index is 28.98 kg/m  as calculated from the following:    Height as of 9/16/22: 1.702 m (5' 7\").    Weight as of 9/16/22: 83.9 kg (185 lb).  Overweight (BMI 25.0-29.9)  - No history of Diabetes Mellitus    HEME  VTE Low Risk 0.5%            Total Score: 3    VTE: Greater than 59 yrs old    VTE: Male      - Coagulopathy second to Apixaban (Eliquis) - plan for 24 hour hold      MSK  ~ Carpal tunnel syndrome of right wrist - procedure as above.     PSYCH  - Alcohol abuse    Patient was discussed with Dr. Field    The patient is optimized for their procedure. AVS with information on surgery time/arrival time, meds and NPO status given by nursing staff. No further diagnostic testing indicated.    Please refer to the physical examination documented by the anesthesiologist in the anesthesia record on the day of surgery.    Video-Visit Details    Type of service:  Video Visit    Patient verbally consented to video service today: YES    Video Start Time: {video visit start time for provider to select:133794}  Video End Time (time video stopped): ***    Originating Location (pt. Location): {patient location:426719::\"Home\"}    Distant Location (provider " "location):  [place] [facility]     Mode of Communication:  Video Conference via {platforms:459756::\"Todd\"}  On the day of service:     Prep time: 23 minutes  Visit time: *** minutes  Documentation time: *** minutes  ------------------------------------------  Total time: *** minutes      Hafsa Heart PA-C  Preoperative Assessment Center  [facility]  Phone: [phone]  Fax: [phone]  "

## 2022-10-14 NOTE — PROGRESS NOTES
Ricky is a 71 year old who is being evaluated via a billable video visit.      How would you like to obtain your AVS? MyChart    HPI         Review of Systems         Objective    Vitals - Patient Reported  Pain Score: Mild Pain (3)        Physical Exam     SUSHANT Bruno LPN

## 2022-10-14 NOTE — CONSULTS
Anesthesia Consult Note      Reason for consult:   High Risk consult  (G56.01) Carpal tunnel syndrome of right wrist      Date of Encounter: 10/14/2022  Referring Physician: Dr. Rhodes  Primary Care Physician:  Holland Blunt  The patient is a 71-year-old man with a complex past medical history significant for cardiomyopathy with reduced EF, hypertension, hyperlipidemia, PFO, atrial regurgitation, mitral regurgitation and minimal coronary artery disease, severe sleep apnea, former smoker, hearing loss, anemia, history of stroke, complex partial seizures, cognitive decline, fatty liver, elevated LFTs, erectile dysfunction, history of basal cell carcinoma and history of alcohol abuse.  The patient was seen by Dr. Rhodes for ongoing carpal tunnel syndrome and has been counseled for the procedure as above.    The patient will be having his H&P on 10/21/22 by Brandee Joshua PA-C      History of bleeding/coagulopathy  Eliquis    History of anesthesia issues in patient or 1st degree relative  No previous issues with anesthesia for the patient or a first degree relative    History is obtained from the patient and patient's spouse.     Past Medical History  Past Medical History:   Diagnosis Date     Arthritis      BCC (basal cell carcinoma)     right shoulder      Cardiomyopathy (H)      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 (H) 06/27/2022     Colon adenomas 09/20/2011     Coronary artery disease      Disorder of bursae and tendons in shoulder region 04/18/2014     ED (erectile dysfunction)      Fatty liver      HFrEF (heart failure with reduced ejection fraction) (H)      History of iritis, os 05/27/2019     HTN (hypertension)      Near syncope 02/10/2014     Noncompliance      Nonrheumatic aortic valve insufficiency      Obesity      JOSE JUAN (obstructive sleep apnea)      Partial symptomatic epilepsy with complex partial seizures, not  intractable, without status epilepticus (H) 09/08/2022       Past Surgical History  Past Surgical History:   Procedure Laterality Date     ARTHROSCOPY SHOULDER BICEPS TENODESIS REPAIR  02/27/2014    Procedure: ARTHROSCOPY SHOULDER BICEPS TENODESIS REPAIR;;  Surgeon: Michael Hagen MD;  [facility]     ARTHROSCOPY SHOULDER ROTATOR CUFF REPAIR  02/27/2014    Procedure: ARTHROSCOPY SHOULDER ROTATOR CUFF REPAIR;  Right Shoulder Arthroscopic Rotator Cuff Repair,  Subacromial Decompression, Biceps Tenodesis, Distal Clavicle Excision   ;  Surgeon: Michael Hagen MD;  [facility]     BIOPSY       COLONOSCOPY  06/12/2018     CV CORONARY ANGIOGRAM N/A 08/08/2022    Procedure: Coronary Angiogram;  Surgeon: Ida Goddard MD;  Location: [facility] CV     CV LEFT HEART CATH N/A 08/08/2022    Procedure: Left Heart Catheterization;  Surgeon: Ida Goddard MD;  Location: [facility] CV     ENDOSCOPY DRUG INDUCED SLEEP N/A 09/16/2022    Procedure: DRUG INDUCED SLEEP ENDOSCOPY;  Surgeon: Dashawn Tanner MD;  Location: [facility]     EXCHANGE INTRAOCULAR LENS IMPLANT  2005    left eye - first, recentered PCL with iris fixation; then IOLX with ACL     EXTRACAPSULAR CATARACT EXTRATION WITH INTRAOCULAR LENS IMPLANT  2005    both eyes (elsewhere)     RELEASE CARPAL TUNNEL Right 08/13/2021    Procedure: RELEASE, RIGHT CARPAL TUNNEL;  Surgeon: Tony Bravo MD;  Location: [facility]     TURBINOPLASTY  12/11/2013    Procedure: TURBINOPLASTY;;  Surgeon: Lisset Parsons MD;  Location: [facility]     UVULOPALATOPHARYNGOPLASTY  12/11/2013    Procedure: UVULOPALATOPHARYNGOPLASTY;  Uvulopalatopharyngoplasty, Turbiante Reduction;  Surgeon: Lisset Parsons MD;  Location: [facility] OR       Prior to Admission Medications  Current Outpatient Medications   Medication Sig Dispense Refill     acetaminophen (TYLENOL) 500 MG tablet Take 500-1,000 mg by mouth every 8 hours as needed for mild pain        apixaban ANTICOAGULANT (ELIQUIS ANTICOAGULANT) 5 MG tablet Take 1 tablet (5 mg) by mouth 2 times daily 60 tablet 11     atorvastatin (LIPITOR) 10 MG tablet TAKE ONE TABLET BY MOUTH ONE TIME DAILY (Patient taking differently: Take 10 mg by mouth every evening) 90 tablet 0     cyanocobalamin (VITAMIN B-12) 1000 MCG tablet Take 1,000 mcg by mouth 2 times daily       folic acid (FOLVITE) 1 MG tablet Take 1 tablet (1 mg) by mouth 2 times daily 60 tablet 4     levETIRAcetam (KEPPRA) 500 MG tablet Take 1 tablet (500 mg) by mouth 2 times daily 60 tablet 11     metoprolol succinate ER (TOPROL XL) 25 MG 24 hr tablet Take 1 tablet (25 mg) by mouth 2 times daily 180 tablet 11     sacubitril-valsartan (ENTRESTO) 24-26 MG per tablet Take 1 tablet by mouth 2 times daily 180 tablet 11     ASPIRIN NOT PRESCRIBED (INTENTIONAL) Please choose reason not prescribed from choices below.       diphenoxylate-atropine (LOMOTIL) 2.5-0.025 MG tablet Take 1 tablet by mouth daily as needed for diarrhea Patient only takes 1 tablet as needed (Patient not taking: Reported on 10/13/2022) 30 tablet 5         Allergies  Allergies   Allergen Reactions     Lisinopril Cough     Perfume Other (See Comments)       Social History  Social History     Socioeconomic History     Marital status:      Spouse name: Kyung     Number of children: 0     Years of education: 14     Highest education level: Not on file   Occupational History     Occupation: industrial electronics      Employer: S[employer]F   Tobacco Use     Smoking status: Former     Packs/day: 0.50     Years: 2.00     Pack years: 1.00     Types: Cigarettes     Quit date: 1996     Years since quittin.8     Smokeless tobacco: Never   Vaping Use     Vaping Use: Never used   Substance and Sexual Activity     Alcohol use: Yes     Alcohol/week: 8.3 standard drinks     Comment: Evening Marie     Drug use: No     Sexual activity: Yes     Partners: Female     Birth control/protection: Male Surgical      Comment: 2006 vasectomy   Other Topics Concern     Parent/sibling w/ CABG, MI or angioplasty before 65F 55M? No   Social History Narrative     Not on file     Social Determinants of Health     Financial Resource Strain: Not on file   Food Insecurity: Not on file   Transportation Needs: Not on file   Physical Activity: Not on file   Stress: Not on file   Social Connections: Not on file   Intimate Partner Violence: Not on file   Housing Stability: Not on file       Family History  Family History   Problem Relation Age of Onset     Diabetes Father         Old age Diabetes     Cerebrovascular Disease Father      Obesity Father      Heart Disease Father      Coronary Artery Disease Father      Hypertension Father      Lipids Sister      C.A.D. No family hx of      Cancer No family hx of      Glaucoma No family hx of      Macular Degeneration No family hx of      Anesthesia Reaction No family hx of      Deep Vein Thrombosis (DVT) No family hx of        The complete review of systems is negative other than noted in the HPI or here. Anesthesia Evaluation   Pt has had prior anesthetic. Type: General and MAC.        ROS/MED HX  ENT/Pulmonary:     (+) sleep apnea, severe, doesn't use CPAP,     Neurologic:     (+) seizures, features: loss of time. , CVA, without deficits, - seen on MRI brain on 6/27/22 in work up for cognitive decline.     Cardiovascular:     (+) Dyslipidemia hypertension--CAD ---CHF etiology: NIC Last EF: 45 date: 9/27/22 valvular problems/murmurs +2 AR. +1 MR. congenital heart disease No evidence of right to left shunt at rest but evidence of small right to left. Previous cardiac testing   Echo: Date: 9/27/22 Results:    Stress Test: Date: 8/22/22 Results:    ECG Reviewed: Date: 8/14/22 Results:    Cath: Date: 8/8/22 Results:      METS/Exercise Tolerance:     Hematologic:       Musculoskeletal:       GI/Hepatic:     (+) liver disease (fatty liver),     Renal/Genitourinary:       Endo:        Psychiatric/Substance Use:       Infectious Disease:       Malignancy:       Other:              Virtual visit -  No vitals were obtained    Physical Exam  Constitutional: Awake, alert, cooperative, no apparent distress, and appears stated age.  Eyes: Pupils equal  HENT: Normocephalic  Respiratory: non labored breathing   Neurologic: Awake, alert, oriented to name, place and time.   Neuropsychiatric: Calm, cooperative. Normal affect.      Labs / testing: (personally reviewed)   Latest Reference Range & Units 09/08/22 08:41   Sodium 133 - 144 mmol/L 139   Potassium 3.4 - 5.3 mmol/L 3.9   Chloride 94 - 109 mmol/L 107   Carbon Dioxide 20 - 32 mmol/L 28   Urea Nitrogen 7 - 30 mg/dL 20   Creatinine 0.66 - 1.25 mg/dL 0.90   GFR Estimate >60 mL/min/1.73m2 >90   Calcium 8.5 - 10.1 mg/dL 8.8   Anion Gap 3 - 14 mmol/L 4   Glucose 70 - 99 mg/dL 106 (H)      Latest Reference Range & Units 08/14/22 09:14   WBC 4.0 - 11.0 10e3/uL 5.8   Hemoglobin 13.3 - 17.7 g/dL 13.9   Hematocrit 40.0 - 53.0 % 41.3   Platelet Count 150 - 450 10e3/uL 239   RBC Count 4.40 - 5.90 10e6/uL 4.71   MCV 78 - 100 fL 88   MCH 26.5 - 33.0 pg 29.5   MCHC 31.5 - 36.5 g/dL 33.7   RDW 10.0 - 15.0 % 13.4   % Neutrophils % 62   % Lymphocytes % 24   % Monocytes % 11   % Eosinophils % 1   % Basophils % 1   Absolute Basophils 0.0 - 0.2 10e3/uL 0.0   Absolute Eosinophils 0.0 - 0.7 10e3/uL 0.1   Absolute Immature Granulocytes <=0.4 10e3/uL 0.0   Absolute Lymphocytes 0.8 - 5.3 10e3/uL 1.4   Absolute Monocytes 0.0 - 1.3 10e3/uL 0.6   % Immature Granulocytes % 1   Absolute Neutrophils 1.6 - 8.3 10e3/uL 3.7   Absolute NRBCs 10e3/uL 0.0   NRBCs per 100 WBC <1 /100 0         EKG 8/14/22  Sinus bradycardia   T wave abnormality, consider lateral ischemia   Abnormal ECG      Echo 9/27/22  Interpretation Summary     1. The left ventricle is normal in size. The visual ejection fraction is  estimated at 45%. There is mild global hypokinesia of the left ventricle.  2. Global  peak LV longitudinal strain is averaged at -12%. This suggests  abnormal strain (normal <-18%).  3. The right ventricle is normal in structure, function and size.  4. There is moderate (2+) aortic regurgitation.     Echo 6-29-22 showed EF 35-40%, 1-2+ AI.    Echo with bubble study 6/29/22  Interpretation Summary     Bubble study was performed.  No evidence of right to left shunt at rest but evidence of small right to left  shunt following valsalva manuever.  There is mild concentric left ventricular hypertrophy.  There is moderate global hypokinesia of the left ventricle.  The visual ejection fraction is 35-40%.  Mildly decreased right ventricular systolic function  There is trace to mild mitral regurgitation.  The aortic valve is trileaflet with aortic valve sclerosis.  There is mild to moderate (1-2+) aortic regurgitation.  There is trace tricuspid regurgitation.  Compared to the prior study dated 9/29/2020, there are changes as noted. There  has been a decline in LV and RV systolic fuction.    PET myocardial perfusion rest and stress 8/22/22  Impression:  1. Stress Perfusion Ammonia Cardiac PET    1a.  No ischemia. Coronary flow reserve is normal in all vascular  distributions. (Concordant with cardiac catheterization 8/8/2022)  1b. At rest there is a mild decrease in perfusion in the septal wall  and inferior lateral wall.   This is in a nonvascular distribution.    It does appears to correlate with fibrosis demonstrated on MR 5/7/2020  with late gadolinium enhancement in the septal and inferolateral  walls.  1c. Cardiomyopathy - Global hypokinesis. Decreased ejection fraction  36% at rest and 40% at stress.  (Concordant with echocardiogram  6/29/22)  2. FDG Cardiac PET - No cardiac FDG uptake to suggest active  inflammation.   Cardiac prep was excellent with full myocardial  glucose uptake suppression.    Coronary angiogram 8/8/22  Conclusion      Fatigue, dyspnea and palpitations in a patient with a newly  depressed EF 35-40%. He has a family history of multivessel disease and a personal history of an embolic CVA and untreated sleep apnea. EKG today abnormal with anterior-lateral Tw inversions.    Minimal coronary artery disease with no significant stenoses. The arteries are large in size.    LV EDP 16 mmHg. LV-Ao pressure gradient was 8 mmHg by catheter pullback.      MR brain 6/27/22  IMPRESSION:  1. Abnormal areas of T2 hyperintense signal involving the left  parietal lobe and bilateral occipital lobes with enhancement, likely  subacute cortical infarcts.  2. 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  3. Nonspecific bilateral cerebellar microhemorrhages, likely  incidental.  4. Volume loss and white matter T2 hyperintensities which likely  represent chronic small vessel ischemic change.     [Access Center: Lateral subacute infarcts.    Outside records reviewed from:  Care Everywhere    Assessment      Ricky Brown is a 71 year old male seen as a PAC referral for risk assessment and optimization for anesthesia.    Plan/Recommendations  Pt will be optimized for the proposed procedure.  See below for details on the assessment, risk, and preoperative recommendations    NEUROLOGY  - History of CVA and Seizure -the patient was seen by Dr. Ruiz, on 8/31/2022 as he had had work-up for cognitive decline and was found to have evidence of stroke on brain MRI.  The patient was also diagnosed with complex partial seizures and started on Keppra.  The patient notes that his cognitive decline is with his short-term memory and often forgets names a few moments after hearing Dr. Ruiz does note that the patient had upcoming procedures and felt he was fine to proceed.  -Post Op delirium risk factors:  History of pre-existing cognitive dysfunction    ENT  - No current airway concerns.  Will need to be reassessed day of surgery.  Mallampati: Unable to assess  TM: Unable to assess    CARDIAC  - CAD -patient  underwent coronary angiogram on 8/8/2022 and was found to have minimal coronary artery disease.  Well controlled on home regimen  - CHF  Non- ischemic cardiomyopathy - Without preserved EF -the patient an echo on 9/27/2022 which showed EF of 45%.  The patient will continue Entresto given asymptomatic procedure  - Hypertension  Well controlled  - Hyperlipidemia  Well controlled on home regimen  - +2 AR, +1 MR  ~2 note on the patient's bubble study echo in 6/2022 there was no evidence of right to left shunt at rest but evidence of small right to left shunt following valsalva manuever.  - METS (Metabolic Equivalents)  Patient performs 4 or more METS exercise without symptoms            Total Score: 0      RCRI-Moderate risk: Class 3  6.6% complication rate            Total Score: 2    RCRI: CHF    RCRI: Cerebrovascular Disease     ~The patient reports he walks 2 miles every single day with his dog.  He does go down to [place] and up the hill.  He feels more fatigued but does not have to stop or have symptoms of chest pain tightness or new shortness of breath.    PULMONARY  - Obstructive Sleep Apnea -patient has severe sleep apnea and is intolerant to CPAP.  He is undergone a uvulectomy in 2013 but continues to have issues.  He is followed by ENT and just had a drug-induced sleep endoscopy.  He is currently scheduled for a pulse generator replacement on 11/3/2022.  JOSE JUAN without home CPAP use.  - The patient reports when going outside he does sometimes get a runny nose and feels this is due to seasonal allergies.  - Tobacco History      History   Smoking Status     Former     Packs/day: 0.50     Years: 2.00     Types: Cigarettes     Quit date: 12/12/1996   Smokeless Tobacco     Never       GI  - Fatty liver  PONV Medium Risk  Total Score: 2           1 AN PONV: Patient is not a current smoker    1 AN PONV: Intended Post Op Opioids        /RENAL  - Baseline Creatinine  0.90  ~ ED - not on medications    ENDOCRINE   -  "BMI: Estimated body mass index is 28.98 kg/m  as calculated from the following:    Height as of 9/16/22: 1.702 m (5' 7\").    Weight as of 9/16/22: 83.9 kg (185 lb).  Overweight (BMI 25.0-29.9)  -patient reports that he has been working on weight loss      HEME  VTE Low Risk 0.5%            Total Score: 3    VTE: Greater than 59 yrs old    VTE: Male      - Coagulopathy second to Apixaban (Eliquis) -plan for 24-hour hold.  Please see Pharm.D. note      MSK  ~History of rotator cuff repair  ~ No evidence of right to left shunt at rest but evidence of small right to left  shunt following valsalva manuever.    PSYCH  -The patient has a nightly drink of seth.  We discussed no alcohol for 24 hours prior.    The patient was discussed with Dr. Field. The patient is currently scheduled at the [facility]. He does have HFrEF at 45% which is new from his carpal tunnel surgery on 8/2021. He as well has now been diagnosed with a CVA seen on brain MRI and seizures. The patient has severe untreated JOSE JUAN. At this time would recommend that he be done in the hospital setting at the [facility] or [facility]. Message sent to surgical team as well as updated the patient and his wife.     The patient is optimized and acceptable candidate for proposed procedure.  We discussed anesthesia instructions with NPO timing and medication recommendations.  He was instructed that due to the location of his surgery that the [facility] staff will be contacting him with further preop instructions      Please refer to the physical examination documented by the anesthesiologist in the anesthesia record on the day of surgery.    Video-Visit Details    Type of service:  Video Visit    Patient verbally consented to video service today: YES    Video Start Time: 10:05  Video End Time (time video stopped): 10:27    Originating Location (pt. Location): Home    Distant Location (provider location):  [facility]OPER[facility]     Mode of " Communication:  Video Conference via Z2  On the day of service:     Prep time: 23 minutes  Visit time: 24 minutes  Documentation time: 15 minutes  ------------------------------------------  Total time: 62 minutes    Hafsa Heart PA-C    Preoperative Assessment Center  [facility]  Office phone: [phone]  Fax: [phone]

## 2022-10-19 ENCOUNTER — PREP FOR PROCEDURE (OUTPATIENT)
Dept: NURSING | Facility: CLINIC | Age: 72
End: 2022-10-19

## 2022-10-19 ENCOUNTER — TELEPHONE (OUTPATIENT)
Dept: ORTHOPEDICS | Facility: CLINIC | Age: 72
End: 2022-10-19

## 2022-10-19 NOTE — TELEPHONE ENCOUNTER
Spoke with surgeon. He is ok doing local only. Rescheduled surgery to 10/26.    No H&P needed - local only.    COVID test will be done 1-2 days beforehand.    Post-op scheduled 11/14.    No further questions/concerns.

## 2022-10-19 NOTE — TELEPHONE ENCOUNTER
Spoke with patient to reschedule to [facility] per PAC. Patient noted he is willing to do the case under local if it means he can get in sooner. He has had GA with other cases and is wondering why we cannot do GA now. Explained our next opening is 1/12 if we do this at [facility].    Sent follow up message to RN and surgeon asking about local vs GA.

## 2022-10-21 ENCOUNTER — OFFICE VISIT (OUTPATIENT)
Dept: FAMILY MEDICINE | Facility: CLINIC | Age: 72
End: 2022-10-21
Payer: COMMERCIAL

## 2022-10-21 VITALS
OXYGEN SATURATION: 96 % | TEMPERATURE: 98.2 F | HEART RATE: 59 BPM | SYSTOLIC BLOOD PRESSURE: 106 MMHG | DIASTOLIC BLOOD PRESSURE: 67 MMHG

## 2022-10-21 DIAGNOSIS — E78.5 HYPERLIPIDEMIA LDL GOAL <70: Chronic | ICD-10-CM

## 2022-10-21 DIAGNOSIS — Z01.818 PREOP GENERAL PHYSICAL EXAM: Primary | ICD-10-CM

## 2022-10-21 DIAGNOSIS — I42.9 CARDIOMYOPATHY, UNSPECIFIED TYPE (H): Chronic | ICD-10-CM

## 2022-10-21 DIAGNOSIS — I10 ESSENTIAL HYPERTENSION WITH GOAL BLOOD PRESSURE LESS THAN 140/90: Chronic | ICD-10-CM

## 2022-10-21 DIAGNOSIS — G47.33 OSA (OBSTRUCTIVE SLEEP APNEA): Chronic | ICD-10-CM

## 2022-10-21 DIAGNOSIS — I63.439 CEREBROVASCULAR ACCIDENT (CVA) DUE TO EMBOLISM OF POSTERIOR CEREBRAL ARTERY WITH INFARCTIONS OF BOTH OCCIPITAL LOBES (H): ICD-10-CM

## 2022-10-21 DIAGNOSIS — G40.209 PARTIAL SYMPTOMATIC EPILEPSY WITH COMPLEX PARTIAL SEIZURES, NOT INTRACTABLE, WITHOUT STATUS EPILEPTICUS (H): ICD-10-CM

## 2022-10-21 LAB
ANION GAP SERPL CALCULATED.3IONS-SCNC: 5 MMOL/L (ref 3–14)
BUN SERPL-MCNC: 19 MG/DL (ref 7–30)
CALCIUM SERPL-MCNC: 9.2 MG/DL (ref 8.5–10.1)
CHLORIDE BLD-SCNC: 111 MMOL/L (ref 94–109)
CO2 SERPL-SCNC: 27 MMOL/L (ref 20–32)
CREAT SERPL-MCNC: 0.94 MG/DL (ref 0.66–1.25)
GFR SERPL CREATININE-BSD FRML MDRD: 88 ML/MIN/1.73M2
GLUCOSE BLD-MCNC: 108 MG/DL (ref 70–99)
POTASSIUM BLD-SCNC: 4.8 MMOL/L (ref 3.4–5.3)
SODIUM SERPL-SCNC: 143 MMOL/L (ref 133–144)

## 2022-10-21 PROCEDURE — 99214 OFFICE O/P EST MOD 30 MIN: CPT | Performed by: PHYSICIAN ASSISTANT

## 2022-10-21 PROCEDURE — 36415 COLL VENOUS BLD VENIPUNCTURE: CPT | Performed by: PHYSICIAN ASSISTANT

## 2022-10-21 PROCEDURE — 80048 BASIC METABOLIC PNL TOTAL CA: CPT | Performed by: PHYSICIAN ASSISTANT

## 2022-10-21 NOTE — PATIENT INSTRUCTIONS
Preparing for Your Surgery  Getting started  A nurse will call you to review your health history and instructions. They will give you an arrival time based on your scheduled surgery time. Please be ready to share:    Your doctor's clinic name and phone number    Your medical, surgical and anesthesia history    A list of allergies and sensitivities    A list of medicines, including herbal treatments and over-the-counter drugs    Whether the patient has a legal guardian (ask how to send us the papers in advance)  Please tell us if you're pregnant--or if there's any chance you might be pregnant. Some surgeries may injure a fetus (unborn baby), so they require a pregnancy test. Surgeries that are safe for a fetus don't always need a test, and you can choose whether to have one.   If you have a child who's having surgery, please ask for a copy of Preparing for Your Child's Surgery.    Preparing for surgery    Within 10 to 30 days of surgery: Have a pre-op exam (sometimes called an H&P, or History and Physical). This can be done at a clinic or pre-operative center.  ? If you're having a , you may not need this exam. Talk to your care team.    At your pre-op exam, talk to your care team about all medicines you take. If you need to stop any medicines before surgery, ask when to start taking them again.  ? We do this for your safety. Many medicines can make you bleed too much during surgery. Some change how well surgery (anesthesia) drugs work.    Call your insurance company to let them know you're having surgery. (If you don't have insurance, call [phone].)    Call your clinic if there's any change in your health. This includes signs of a cold or flu (sore throat, runny nose, cough, rash, fever). It also includes a scrape or scratch near the surgery site.    If you have questions on the day of surgery, call your hospital or surgery center.  COVID testing  You may need to be tested for COVID-19 before having  surgery. If so, we will give you instructions (or click here).  Eating and drinking guidelines  For your safety: Unless your surgeon tells you otherwise, follow the guidelines below.    Eat and drink as usual until 8 hours before surgery. After that, no food or milk.    Drink clear liquids until 2 hours before surgery. These are liquids you can see through, like water, Gatorade and Propel Water. You may also have black coffee and tea (no cream or milk).    Nothing by mouth within 2 hours of surgery. This includes gum, candy and breath mints.    If you drink alcohol: Stop drinking it the night before surgery.    If your care team tells you to take medicine on the morning of surgery, it's okay to take it with a sip of water.  Preventing infection    Shower or bathe the night before and morning of your surgery. Follow the instructions your clinic gave you. (If no instructions, use regular soap.)    Don't shave or clip hair near your surgery site. We'll remove the hair if needed.    Don't smoke or vape the morning of surgery. You may chew nicotine gum up to 2 hours before surgery. A nicotine patch is okay.  ? Note: Some surgeries require you to completely quit smoking and nicotine. Check with your surgeon.    Your care team will make every effort to keep you safe from infection. We will:  ? Clean our hands often with soap and water (or an alcohol-based hand rub).  ? Clean the skin at your surgery site with a special soap that kills germs.  ? Give you a special gown to keep you warm. (Cold raises the risk of infection.)  ? Wear special hair covers, masks, gowns and gloves during surgery.  ? Give antibiotic medicine, if prescribed. Not all surgeries need antibiotics.  What to bring on the day of surgery    Photo ID and insurance card    Copy of your health care directive, if you have one    Glasses and hearing aides (bring cases)  ? You can't wear contacts during surgery    Inhaler and eye drops, if you use them (tell us  about these when you arrive)    CPAP machine or breathing device, if you use them    A few personal items, if spending the night    If you have . . .  ? A pacemaker, ICD (cardiac defibrillator) or other implant: Bring the ID card.  ? An implanted stimulator: Bring the remote control.  ? A legal guardian: Bring a copy of the certified (court-stamped) guardianship papers.  Please remove any jewelry, including body piercings. Leave jewelry and other valuables at home.  If you're going home the day of surgery    You must have a responsible adult drive you home. They should stay with you overnight as well.    If you don't have someone to stay with you, and you aren't safe to go home alone, we may keep you overnight. Insurance often won't pay for this.  After surgery  If it's hard to control your pain or you need more pain medicine, please call your surgeon's office.  Questions?   If you have any questions for your care team, list them here: _________________________________________________________________________________________________________________________________________________________________________ ____________________________________ ____________________________________ ____________________________________  For informational purposes only. Not to replace the advice of your health care provider. Copyright   2003, 2019 [facility]. All rights reserved. Clinically reviewed by Malou Garcia MD. [employer] [id number] - REV 07/22.

## 2022-10-21 NOTE — PROGRESS NOTES
[facility]  [address] [place] [address]  [place] [address]  Phone: [phone]  Primary Provider: Holland Blunt  Pre-op Performing Provider: JENNIFER ALEXANDRE      PREOPERATIVE EVALUATION:  Today's date: 10/21/2022    Rikcy Brown is a 71 year old male who presents for a preoperative evaluation.    Surgical Information:  Surgery/Procedure: Insertion, pulse generator,neurostimulator   Surgery Location: [facility]   Surgeon    fuad Tamez   Surgery Date: 11-3-22    Time of Surgery:    Where patient plans to recover: At home with family  Fax number for surgical facility:     Type of Anesthesia Anticipated: General     Assessment & Plan     The proposed surgical procedure is considered LOW risk.    Preop general physical exam  Labs today  - Basic metabolic panel; Future  - Basic metabolic panel    Cardiomyopathy, unspecified type (H)  Labs Today. Cleared through cardiology for procedure.  - Basic metabolic panel; Future  - Basic metabolic panel    JOSE JUAN (obstructive sleep apnea)-severe (AHI 61)  Surgery is for stimulation implant for sleep apnea    Essential hypertension with goal blood pressure less than 140/90  Continue Medications    Hyperlipidemia LDL goal <70  Continue Medications    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 (H)  Will hold eliquis the day prior to surgery and restart upon surgery providers instructions.  Approval given from Neurology for procedure.    Partial symptomatic epilepsy with complex partial seizures, not intractable, without status epilepticus (H)  Continue medication      RECOMMENDATION:  APPROVAL GIVEN to proceed with proposed procedure, without further diagnostic evaluation.  Hold Eliquis x1 day prior to surgery.     See clearance from neurology in office visit 8/31/22  See clearance from cardiology in [employer] message 8/30/22        Subjective     HPI related to upcoming procedure: Sever  Obstructive Sleep Apnea- Surgery 11/3/22    Preop Questions 10/21/2022   1. Have you ever had a heart attack or stroke? YES -    2. Have you ever had surgery on your heart or blood vessels, such as a stent placement, a coronary artery bypass, or surgery on an artery in your head, neck, heart, or legs? No   3. Do you have chest pain with activity? No   4. Do you have a history of  heart failure? YES - managed by cardiology   5. Do you currently have a cold, bronchitis or symptoms of other infection? No   6. Do you have a cough, shortness of breath, or wheezing? No   7. Do you or anyone in your family have previous history of blood clots? No   8. Do you or does anyone in your family have a serious bleeding problem such as prolonged bleeding following surgeries or cuts? No   9. Have you ever had problems with anemia or been told to take iron pills? No   10. Have you had any abnormal blood loss such as black, tarry or bloody stools? No   11. Have you ever had a blood transfusion? No   12. Are you willing to have a blood transfusion if it is medically needed before, during, or after your surgery? Yes   13. Have you or any of your relatives ever had problems with anesthesia? No   14. Do you have sleep apnea, excessive snoring or daytime drowsiness? YES - JOSE JUAN   14a. Do you have a CPAP machine? No   15. Do you have any artifical heart valves or other implanted medical devices like a pacemaker, defibrillator, or continuous glucose monitor? No   16. Do you have artificial joints? No   17. Are you allergic to latex? No       Health Care Directive:  Patient does not have a Health Care Directive or Living Will: yes     Preoperative Review of :   reviewed - no record of controlled substances prescribed.          Review of Systems  CONSTITUTIONAL: NEGATIVE for fever, chills, change in weight  INTEGUMENTARY/SKIN: NEGATIVE for worrisome rashes, moles or lesions  ENT/MOUTH: NEGATIVE for ear, mouth and throat problems  RESP:  NEGATIVE for significant cough or SOB  CV: NEGATIVE for chest pain, palpitations or peripheral edema  GI: NEGATIVE for nausea, abdominal pain, heartburn, or change in bowel habits  : NEGATIVE for frequency, dysuria, or hematuria  MUSCULOSKELETAL: NEGATIVE for significant arthralgias or myalgia  NEURO: NEGATIVE for weakness, dizziness or paresthesias  HEME: NEGATIVE for bleeding problems  PSYCHIATRIC: NEGATIVE for changes in mood or affect    Patient Active Problem List    Diagnosis Date Noted     Partial symptomatic epilepsy with complex partial seizures, not intractable, without status epilepticus (H) 09/08/2022     Priority: Medium     ETOH abuse 08/31/2022     Priority: Medium     Pain in thoracic spine 08/19/2022     Priority: Medium     Lumbar spine pain 08/10/2022     Priority: Medium     HFrEF (heart failure with reduced ejection fraction) (H) 06/29/2022     Priority: Medium     Nonrheumatic aortic valve insufficiency 06/29/2022     Priority: Medium     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 (H) 06/27/2022     Priority: Medium     Folic acid deficiency (non anemic) 06/24/2022     Priority: Medium     S/P carpal tunnel release 02/28/2022     Priority: Medium     Right hand weakness 02/28/2022     Priority: Medium     Pillar pain of extremity 02/28/2022     Priority: Medium     CAD (coronary artery disease) 09/02/2021     Priority: Medium     Nonobstructive coronary artery disease with previous coronary angiogram suggesting all stenoses less or equal to 25%.          Right carpal tunnel syndrome 07/13/2021     Priority: Medium     Added automatically from request for surgery [id number]       Elevated LFTs 05/03/2021     Priority: Medium     Fatty liver 05/03/2021     Priority: Medium     Angina, class II (H) 01/21/2021     Priority: Medium     Macular edema, od 12/24/2019     Priority: Medium     Megalocornea 05/27/2019     Priority:  Medium     Hyperlipidemia LDL goal <70 08/18/2016     Priority: Medium     Essential hypertension with goal blood pressure less than 140/90 08/10/2016     Priority: Medium     RCT (rotator cuff tear) 02/12/2014     Priority: Medium     Cardiomyopathy (H) 05/21/2013     Priority: Medium     Nonischemic cardiomyopathy based on serial MRI assessments with probably reduced baseline ejection fraction but recent outside echo suggesting normalization of EF.     Echo 9/29/2020-  Global and regional left ventricular function is normal with an EF of 55-60%. Global right ventricular function is normal. Trileaflet aortic sclerosis without stenosis. Mild aortic insufficiency is  Present. The inferior vena cava was normal in size with preserved respiratory Variability. No pericardial effusion is present.         BCC (basal cell carcinoma)      Priority: Medium     right shoulder        JOSE JUAN (obstructive sleep apnea)-severe (AHI 61)      Priority: Medium     Polysomnogram 1/29/2009 at [facility] - AHI of 72 and O2 rody of 78%.  CPAP was titrated to a final pressure of 11 cm/H20 but only lateral REM  Polysomnogram  6/28/2013 (230.0 lbs)-  AHI 32, RDI 58, O2 rody 85%. CPAP titration was completed during the second portion of the study night.  CPAP was titrated to an effective pressure of 13 cm/H20 with an AHI including REM lateral.   10/26/2021 [facility] Sleep Study (244.0 lbs) - AHI 61.4, RDI 64.3, Supine AHI n/a, REM AHI 69.5, Low O2 61.4%, Time Spent ?88% 29.7 minutes / Time Spent ?89% 38.4 minutes.         Advanced directives, counseling/discussion 03/28/2012     Priority: Low     Pseudophakia, sutured PCL, od; ACL, os - hx of IOL dislocation, ou 10/14/2011     Priority: Low     Posterior vitreous detachment, od 10/14/2011     Priority: Low     Epiretinal membrane, mild, od 10/14/2011     Priority: Low     HL (hearing loss) 04/01/2011     Priority: Low     Erectile dysfunction 04/01/2011      Priority: Low      Past Medical History:   Diagnosis Date     Arthritis      BCC (basal cell carcinoma)     right shoulder      Cardiomyopathy (H)      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 (H) 06/27/2022     Colon adenomas 09/20/2011     Coronary artery disease      Disorder of bursae and tendons in shoulder region 04/18/2014     ED (erectile dysfunction)      Fatty liver      HFrEF (heart failure with reduced ejection fraction) (H)      History of iritis, os 05/27/2019     HTN (hypertension)      Near syncope 02/10/2014     Noncompliance      Nonrheumatic aortic valve insufficiency      Obesity      JOSE JUAN (obstructive sleep apnea)      Partial symptomatic epilepsy with complex partial seizures, not intractable, without status epilepticus (H) 09/08/2022     Past Surgical History:   Procedure Laterality Date     ARTHROSCOPY SHOULDER BICEPS TENODESIS REPAIR  02/27/2014    Procedure: ARTHROSCOPY SHOULDER BICEPS TENODESIS REPAIR;;  Surgeon: Michael Hagen MD;  [facility]     ARTHROSCOPY SHOULDER ROTATOR CUFF REPAIR  02/27/2014    Procedure: ARTHROSCOPY SHOULDER ROTATOR CUFF REPAIR;  Right Shoulder Arthroscopic Rotator Cuff Repair,  Subacromial Decompression, Biceps Tenodesis, Distal Clavicle Excision   ;  Surgeon: Michael Hagen MD;  Location: [facility]     BIOPSY       COLONOSCOPY  06/12/2018     CV CORONARY ANGIOGRAM N/A 08/08/2022    Procedure: Coronary Angiogram;  Surgeon: Ida Goddard MD;  Location: [facility] CV     CV LEFT HEART CATH N/A 08/08/2022    Procedure: Left Heart Catheterization;  Surgeon: Ida Goddard MD;  Location: [facility] CATH LAB CV     ENDOSCOPY DRUG INDUCED SLEEP N/A 09/16/2022    Procedure: DRUG INDUCED SLEEP ENDOSCOPY;  Surgeon: Dashawn Tanner MD;  Location: [facility] OR     EXCHANGE INTRAOCULAR LENS IMPLANT  2005    left eye - first, recentered PCL with iris fixation; then IOLX with ACL      EXTRACAPSULAR CATARACT EXTRATION WITH INTRAOCULAR LENS IMPLANT  2005    both eyes (elsewhere)     RELEASE CARPAL TUNNEL Right 08/13/2021    Procedure: RELEASE, RIGHT CARPAL TUNNEL;  Surgeon: Tony Bravo MD;  Location:  OR     TURBINOPLASTY  12/11/2013    Procedure: TURBINOPLASTY;;  Surgeon: Lisset Parsons MD;  Location: [facility] OR     UVULOPALATOPHARYNGOPLASTY  12/11/2013    Procedure: UVULOPALATOPHARYNGOPLASTY;  Uvulopalatopharyngoplasty, Turbiante Reduction;  Surgeon: Lisset Parsons MD;  Location: [facility] OR     Current Outpatient Medications   Medication Sig Dispense Refill     acetaminophen (TYLENOL) 500 MG tablet Take 500-1,000 mg by mouth every 8 hours as needed for mild pain       apixaban ANTICOAGULANT (ELIQUIS ANTICOAGULANT) 5 MG tablet Take 1 tablet (5 mg) by mouth 2 times daily 60 tablet 11     ASPIRIN NOT PRESCRIBED (INTENTIONAL) Please choose reason not prescribed from choices below.       atorvastatin (LIPITOR) 10 MG tablet TAKE ONE TABLET BY MOUTH ONE TIME DAILY (Patient taking differently: Take 10 mg by mouth every evening) 90 tablet 0     cyanocobalamin (VITAMIN B-12) 1000 MCG tablet Take 1,000 mcg by mouth 2 times daily       folic acid (FOLVITE) 1 MG tablet Take 1 tablet (1 mg) by mouth 2 times daily 60 tablet 4     levETIRAcetam (KEPPRA) 500 MG tablet Take 1 tablet (500 mg) by mouth 2 times daily 60 tablet 11     metoprolol succinate ER (TOPROL XL) 25 MG 24 hr tablet Take 1 tablet (25 mg) by mouth 2 times daily 180 tablet 11     sacubitril-valsartan (ENTRESTO) 24-26 MG per tablet Take 1 tablet by mouth 2 times daily 180 tablet 11     diphenoxylate-atropine (LOMOTIL) 2.5-0.025 MG tablet Take 1 tablet by mouth daily as needed for diarrhea Patient only takes 1 tablet as needed (Patient not taking: Reported on 10/13/2022) 30 tablet 5       Allergies   Allergen Reactions     Lisinopril Cough     Perfume Other (See Comments)        Social History     Tobacco Use     Smoking  status: Former     Packs/day: 0.50     Years: 2.00     Pack years: 1.00     Types: Cigarettes     Quit date: 1996     Years since quittin.8     Smokeless tobacco: Never   Substance Use Topics     Alcohol use: Yes     Alcohol/week: 8.3 standard drinks     Comment: Evening Marie     Family History   Problem Relation Age of Onset     Diabetes Father         Old age Diabetes     Cerebrovascular Disease Father      Obesity Father      Heart Disease Father      Coronary Artery Disease Father      Hypertension Father      Lipids Sister      C.A.D. No family hx of      Cancer No family hx of      Glaucoma No family hx of      Macular Degeneration No family hx of      Anesthesia Reaction No family hx of      Deep Vein Thrombosis (DVT) No family hx of      History   Drug Use No         Objective     /67   Pulse 59   Temp 98.2  F (36.8  C) (Oral)   SpO2 96%     Physical Exam    GENERAL APPEARANCE: healthy, alert and no distress     EYES: EOMI,  PERRL     HENT: ear canals and TM's normal and nose and mouth without ulcers or lesions     NECK: no adenopathy, no asymmetry, masses, or scars     RESP: lungs clear to auscultation - no rales, rhonchi or wheezes     CV: regular rates and rhythm, normal S1 S2, no S3 or S4 and no murmur, click or rub     ABDOMEN:  soft, nontender, no HSM or masses     MS: extremities normal- no gross deformities noted, no evidence of inflammation in joints, FROM in all extremities.     SKIN: no suspicious lesions or rashes     NEURO: Normal strength and tone, sensory exam grossly normal, mentation intact and speech normal     PSYCH: mentation appears normal. and affect normal/bright     LYMPHATICS: No cervical adenopathy    Recent Labs   Lab Test 22  0841 22  0914 22  0818 21  0159 21  1212 21  1733   HGB  --  13.9 13.8   < >  --   --    PLT  --  239 229   < >  --   --     140 140   < >  --   --    POTASSIUM 3.9 4.3 3.8   < >  --   --    CR  0.90 0.92 0.88   < >  --   --    A1C  --   --   --   --  5.6 5.7*    < > = values in this interval not displayed.        Diagnostics:  Labs pending at this time.  Results will be reviewed when available.   No EKG this visit, completed in the last 90 days.    Revised Cardiac Risk Index (RCRI):  The patient has the following serious cardiovascular risks for perioperative complications:   - No serious cardiac risks = 0 points     RCRI Interpretation: 0 points: Class I (very low risk - 0.4% complication rate)           Signed Electronically by: Brandee Joshua PA-C  Copy of this evaluation report is provided to requesting physician.  The student CATHERINE Melendez acted as a scribe and the encounter documented above was completely performed by myself and the documentation reflects the work I have performed today.   Brandee Joshua PA-C

## 2022-10-24 ENCOUNTER — TELEPHONE (OUTPATIENT)
Dept: ORTHOPEDICS | Facility: CLINIC | Age: 72
End: 2022-10-24

## 2022-10-24 NOTE — RESULT ENCOUNTER NOTE
Ricky Brown    Electrolytes and kidney function are within normal limits.     Sincerely,       CHARLY MOSQUERA M.D.

## 2022-10-24 NOTE — TELEPHONE ENCOUNTER
Patients wife called in and is wondering if something can be prescribed for the patient to help offset his nerves the day of surgery since we are doing this local only.    Sending to clinic.

## 2022-10-26 ENCOUNTER — HOSPITAL ENCOUNTER (OUTPATIENT)
Facility: AMBULATORY SURGERY CENTER | Age: 72
Discharge: HOME | End: 2022-10-26
Attending: STUDENT IN AN ORGANIZED HEALTH CARE EDUCATION/TRAINING PROGRAM | Admitting: STUDENT IN AN ORGANIZED HEALTH CARE EDUCATION/TRAINING PROGRAM
Payer: COMMERCIAL

## 2022-10-26 VITALS
TEMPERATURE: 97.6 F | SYSTOLIC BLOOD PRESSURE: 138 MMHG | DIASTOLIC BLOOD PRESSURE: 74 MMHG | RESPIRATION RATE: 16 BRPM | OXYGEN SATURATION: 97 %

## 2022-10-26 DIAGNOSIS — G56.01 RIGHT CARPAL TUNNEL SYNDROME: Primary | ICD-10-CM

## 2022-10-26 PROCEDURE — 64718 REVISE ULNAR NERVE AT ELBOW: CPT | Mod: RT | Performed by: STUDENT IN AN ORGANIZED HEALTH CARE EDUCATION/TRAINING PROGRAM

## 2022-10-26 PROCEDURE — 14020 TIS TRNFR S/A/L 10 SQ CM/<: CPT

## 2022-10-26 PROCEDURE — G8918 PT W/O PREOP ORDER IV AB PRO: HCPCS

## 2022-10-26 PROCEDURE — 15736 MUSCLE-SKIN GRAFT ARM: CPT | Mod: RT | Performed by: STUDENT IN AN ORGANIZED HEALTH CARE EDUCATION/TRAINING PROGRAM

## 2022-10-26 PROCEDURE — 64718 REVISE ULNAR NERVE AT ELBOW: CPT | Mod: RT

## 2022-10-26 PROCEDURE — G8907 PT DOC NO EVENTS ON DISCHARG: HCPCS

## 2022-10-26 PROCEDURE — 64721 CARPAL TUNNEL SURGERY: CPT | Mod: RT

## 2022-10-26 PROCEDURE — 64721 CARPAL TUNNEL SURGERY: CPT | Mod: 59 | Performed by: STUDENT IN AN ORGANIZED HEALTH CARE EDUCATION/TRAINING PROGRAM

## 2022-10-26 RX ORDER — OXYCODONE HYDROCHLORIDE 5 MG/1
5 TABLET ORAL EVERY 6 HOURS PRN
Qty: 12 TABLET | Refills: 0 | Status: SHIPPED | OUTPATIENT
Start: 2022-10-26 | End: 2022-10-29

## 2022-10-26 RX ORDER — CEFAZOLIN SODIUM 2 G/100ML
2 INJECTION, SOLUTION INTRAVENOUS
Status: DISCONTINUED | OUTPATIENT
Start: 2022-10-26 | End: 2022-10-27 | Stop reason: HOSPADM

## 2022-10-26 RX ORDER — CEPHALEXIN 500 MG/1
500 CAPSULE ORAL 4 TIMES DAILY
Qty: 12 CAPSULE | Refills: 0 | Status: SHIPPED | OUTPATIENT
Start: 2022-10-26 | End: 2022-11-09

## 2022-10-26 RX ORDER — CEFAZOLIN SODIUM 2 G/100ML
2 INJECTION, SOLUTION INTRAVENOUS SEE ADMIN INSTRUCTIONS
Status: DISCONTINUED | OUTPATIENT
Start: 2022-10-26 | End: 2022-10-27 | Stop reason: HOSPADM

## 2022-10-26 NOTE — DISCHARGE INSTRUCTIONS
Hand Surgery Discharge Instructions    Patient has been treated for right carpal and cubital tunnel syndrome with Dr. Rhodes on 10/26/2022.     Care: Please keep the splint/cast/dressing clean and dry at all times. Should it get wet, please call the clinic to schedule an appointment - as it may need to be replaced. Do not hesitate to use the sling to help protect the affected extremity and in particular in the setting of a nerve block.     Medications: You can take Ibuprofen 400-800 mg and Tylenol 650 mg for pain relief. Please take each every 6 hours, and for optimal pain relief - please stagger the medications so that you are taking one or the other every 3 hours. If you had a nerve block, the effects may last 8-12 hours. If you have been prescribed additional pain medications, please take as instructed and as needed. If you are taking additional pain medications, please do not exceed 4000 mg of Tylenol daily from all sources. Also, if you are taking narcotic medications, please do not operate heavy machinery or drive. If you have been prescribed antibiotics, please also take as instructed.     Diet: Start with clear liquids and slow down if nauseated. Advance the diet as tolerated.    Weight-bearing/Activities: Move your fingers to prevent stiffness. Elevate the affected extremity to improve throbbing sensation or pain. No strenuous activities and do not raise your heart rate above 100 bpm for the first few weeks. You can limit your weight-bearing with the affected extremity to 2-3 pounds unless otherwise specified.    ER Instructions: Please call the office and/or consider return to the ER if you experience worsening pain not relieved by medications, increased swelling, redness or high fevers >101F or if there are unexpected problems like shortness of breath.    Post-op follow-up: Clinic in 10-14 days with Dr. Rhodes at the [facility]    Vignesh Rhodes MD, PhD    For questions or concerns regarding  your procedure, please contact Dr. Rhodes at [phone].

## 2022-10-26 NOTE — BRIEF OP NOTE
[facility]    Brief Operative Note    Pre-operative diagnosis: Carpal tunnel syndrome of right wrist [G56.01]  Post-operative diagnosis Same as pre-operative diagnosis    Procedure: Procedure(s):  RIGHT REVISION OPEN CARPAL TUNNEL RELEASE, HYPOTHENAR FAT FLAP,  RIGHT ULNAR NERVE DECOMPRESSION AT THE ELBOW  Surgeon: Surgeon(s) and Role:     * Vignesh Rhodes MD - Primary     * Maddy Olivas PA-C - Assisting  Anesthesia: Local   Estimated Blood Loss: 2mL    Drains: None  Specimens: * No specimens in log *  Findings:   None.  Complications: None.  Implants: * No implants in log *    Maddy Olivas PA-C on 10/26/2022 at 11:43 AM

## 2022-10-26 NOTE — PROGRESS NOTES
Ortho Hand    No changes in history or exam. Cleared from Neurology and Cardiology standpoint. However, he did not qualify for sedation here at [place], so he would like to proceed wide-awake with local-anesthesia alone.     A/P: 71M p/w recurrent R CTS, R ulnar nerve compression at the elbow    -OR today for RIGHT revision carpal tunnel release, RIGHT ulnar nerve decompression at the elbow with possible anterior transposition  -Discussed the risks of surgery, including but not limited to: infection, bleeding, pain, poor scarring, injury to the nerves and tendons, neuroma formation, incomplete release, recurrence of compressive neuropathy, complex regional pain syndrome, need for revision surgery, cardiac or neurologic event. Despite these risks, patient consents to and would like to proceed with the aforementioned procedure.  -Clinic in 10-14 days  -Discharge from PACU    Vignesh Rhodes MD, PhD

## 2022-10-27 ENCOUNTER — TELEPHONE (OUTPATIENT)
Dept: ORTHOPEDICS | Facility: CLINIC | Age: 72
End: 2022-10-27

## 2022-10-27 NOTE — TELEPHONE ENCOUNTER
Patients wife called in and would like to move the post-op appointment up to 11/4 instead of 11/8. Patient does not want to keep the bandage on for 14 days. Routing to clinic as a FYI.

## 2022-10-27 NOTE — OP NOTE
HAND SURGERY OPERATIVE REPORT     Date of Surgery: 10/26/2022  Surgical Service: Ortho Hand     Preoperative Diagnosis:   1. Right recurrent carpal tunnel syndrome  2. Right ulnar neuropathy at the elbow     Postoperative Diagnosis: Same as preoperative diagnosis     Procedures Performed:   1. Right revision open carpal tunnel release  2. Right hypothenar fat flap, adjacent tissue transfer (<10 cm squared)  3. Right in situ ulnar nerve decompression at the elbow     Attending:  RAYNA Rhodes MD, PhD  Assistant: JODI Olivas PA-C     Complications: None apparent  Specimens: None  Implants: None  Estimated blood loss: < 5 mL  Tourniquet time: 22 minutes  Wound classification: Clean  Anesthesia: Wide-awake surgery with local-anesthesia (1% lidocaine with epinephrine mixed 1:1 with 0.25% bupivicaine plain)     Indications for Procedure: 71 year-old left handed non-smoker with history of prior carpal tunnel release presenting with recurrence of carpal tunnel syndrome and ulnar neuropathy at the elbow. Given higher neurologic and cardiac risk, patient agreed to undergo wide-awake surgery to revise the carpal tunnel release with use of a hypothenar fat flap, and to perform the ulnar nerve decompression at the elbow. Discussed the risks of surgery, including but not limited to: infection, bleeding, pain, poor scarring, injury to the nerves and tendons, neuroma formation, incomplete release, recurrence of compressive neuropathy, complex regional pain syndrome, need for revision surgery, cardiac or neurologic event. Despite these risks, patient consents to and would like to proceed with the aforementioned procedure.     Intraoperative findings: Very scarred in median nerve at the wrist. Viable hypothenar fat flap was interposed between the median nerve and the radial leaflet of the transverse carpal ligament. Significant compression underneath Rodriguez's ligament. No subluxation of the ulnar nerve with passive extreme elbow flexion.  MABC branches preserved.     Description of Procedure: Patient was seen in the preoperative holding area.  Consent was verified.  The right palm and elbow was marked.  All additional questions were answered. Discussed the risks of surgery, including but not limited to: infection, bleeding, pain, poor scarring, injury to the nerves and tendons, neuroma formation, incomplete release, recurrence of compressive neuropathy, complex regional pain syndrome, need for revision surgery, cardiac or neurologic event. Despite these risks, patient consents to and would like to proceed with the aforementioned procedure. Prior to transfer to the operating room, the right palm and planned elbow incision was infiltrated with local anesthesia containing epinephrine. Patient was then transferred to the operating room and placed supine on the operating table.  All pressure points were padded.  Sequential compression devices were placed on bilateral lower extremities and verified to be operational.  IV antibiotic prophylaxis was given.  Preinduction timeout was performed. Nursing care was commenced.  At the start of the case, the right upper extremity was prepped and draped in usual sterile fashion. A sterile tourniquet was placed on the right arm.    The revision carpal tunnel was done first. Prior to inflating the tourniquet, the longitudinally-oriented incision was made at the base of the palm, extending from the distal wrist crease along the radial aspect of the fourth ray and ending at the Rivas's cardinal line. The proximal extent of the incision was carried in a hockey-stick configuration and obliquely oriented in the ulnar direction. Once through skin, the antebrachial fascia was encountered proximally and divided longitudinally using a blade. The median nerve was then visualized. Once done, the dense scar over the median nerve was divided distally to fully release the median nerve at the reconstituted scarred transverse carpal  ligament. The full extent of the median nerve within the carpal tunnel was released distal to the palmar fat pad. Next, the median nerve was gently  from the underside of the radial aspect of the transverse carpal ligament. Next, the hypothenar fat flap was sharply  from the ulnar-palmar skin flap and then mobilized to easily be advanced into the carpal tunnel. Next, 4-0 Chromic suture was used to secure the advanced hypothenar fat flap to the underside of the radial leaflet of the transverse carpal ligament. This was done with interrupted half-buried horizontal mattress suture. There was healthy bleeding from the hypothenar fat flap. The surgical wound was irrigated and the incision was closed with interrupted 4-0 Nylon suture.     At this point, the cubital tunnel release was done. The right extremity was exsanguinated and the tourniquet was inflated to 200 mm Hg. Next, a 6-7 cm incision was made, centered over the interval between the medial humeral epicondyle and olecranon, extending proximally and distally. Once through skin, a MABC branches were identified over the deep fascia, dissected with tenotomies and protected. Next, the ulnar nerve was found proximally between the triceps and medial brachial septum. The deep fascia was unroofed over the nerve and dissection carried proximally until a thickened [place] was encountered, and which was divided. Next, the dissection was carried distally and the thickened Rodriguez's ligament was divided. There was a significant degree of compression at the cubital tunnel. Next, the flexor-pronator fascia was sharply divided - at the interval between the two heads of the FCU. Once done, a gentle spread was done to expose the ulnar nerve. The ulnar nerve was fully decompressed without over-doing a neurolysis. Next, the elbow was passively flexed to reveal no subluxation of the nerve about the medial humeral epicondyle. The tourniquet was deflated.  "Hemostasis was obtained with bipolar electrocautery. Next, the incision was closed with 3-0 Vicryl and 3-0 Monocryl deep dermal suture, as well as a running 3-0 Monocryl intracuticular suture. The palmar incision was dressed with xeroform, bacitracin, 4 x 4\" gauze and the elbow incision was dressed with skin adhesive and steri-strips. A 4 x 4\" gauze and ABD was used to pad the elbow, and the entire extremity was gently wrapped with an ACE bandage. The patient tolerated the procedure and was transferred to the post-anesthesia care unit with no events.      Postoperative plan: Clinic in 10-14 days.      Vignesh Rhodes MD, PhD    "

## 2022-11-02 ENCOUNTER — ANESTHESIA EVENT (OUTPATIENT)
Dept: SURGERY | Facility: CLINIC | Age: 72
End: 2022-11-02
Payer: COMMERCIAL

## 2022-11-02 ASSESSMENT — LIFESTYLE VARIABLES: TOBACCO_USE: 1

## 2022-11-02 NOTE — ANESTHESIA PREPROCEDURE EVALUATION
Anesthesia Pre-Procedure Evaluation    Patient: Ricky Brown   MRN: [id number] : 1950        Procedure : Procedure(s):  INSERTION, PULSE GENERATOR, NEUROSTIMULATOR          Past Medical History:   Diagnosis Date     Arthritis      BCC (basal cell carcinoma)     right shoulder      Cardiomyopathy (H)      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 (H) 2022     Colon adenomas 2011     Coronary artery disease      Disorder of bursae and tendons in shoulder region 2014     ED (erectile dysfunction)      Fatty liver      HFrEF (heart failure with reduced ejection fraction) (H)      History of iritis, os 2019     HTN (hypertension)      Near syncope 02/10/2014     Noncompliance      Nonrheumatic aortic valve insufficiency      Obesity      JOSE JUAN (obstructive sleep apnea)      Partial symptomatic epilepsy with complex partial seizures, not intractable, without status epilepticus (H) 2022      Past Surgical History:   Procedure Laterality Date     ARTHROSCOPY SHOULDER BICEPS TENODESIS REPAIR  2014    Procedure: ARTHROSCOPY SHOULDER BICEPS TENODESIS REPAIR;;  Surgeon: Michael Hagen MD;  [facility]     ARTHROSCOPY SHOULDER ROTATOR CUFF REPAIR  2014    Procedure: ARTHROSCOPY SHOULDER ROTATOR CUFF REPAIR;  Right Shoulder Arthroscopic Rotator Cuff Repair,  Subacromial Decompression, Biceps Tenodesis, Distal Clavicle Excision   ;  Surgeon: Michael Hagen MD;  Location: [facility]     BIOPSY       COLONOSCOPY  2018     CV CORONARY ANGIOGRAM N/A 2022    Procedure: Coronary Angiogram;  Surgeon: Ida Goddard MD;  Location: [facility] CV     CV LEFT HEART CATH N/A 2022    Procedure: Left Heart Catheterization;  Surgeon: Ida Goddard MD;  Location: [facility] CATH LAB CV     ENDOSCOPY DRUG INDUCED SLEEP N/A 2022    Procedure: DRUG INDUCED SLEEP  ENDOSCOPY;  Surgeon: Dashawn Tanner MD;  Location: [facility] OR     EXCHANGE INTRAOCULAR LENS IMPLANT      left eye - first, recentered PCL with iris fixation; then IOLX with ACL     EXTRACAPSULAR CATARACT EXTRATION WITH INTRAOCULAR LENS IMPLANT      both eyes (elsewhere)     RELEASE CARPAL TUNNEL Right 2021    Procedure: RELEASE, RIGHT CARPAL TUNNEL;  Surgeon: Tony Bravo MD;  Location: [facility] OR     RELEASE CARPAL TUNNEL Right 10/26/2022    Procedure: RIGHT REVISION OPEN CARPAL TUNNEL RELEASE, HYPOTHENAR FAT FLAP,;  Surgeon: Vignesh Rhodes MD;  Location: [facility] OR     TRANSPOSITION ULNAR NERVE (ELBOW) Right 10/26/2022    Procedure: RIGHT ULNAR NERVE DECOMPRESSION AT THE ELBOW;  Surgeon: Vignesh Rhodes MD;  Location: [facility]     TURBINOPLASTY  2013    Procedure: TURBINOPLASTY;;  Surgeon: Lisset Parsons MD;  Location: [facility]     UVULOPALATOPHARYNGOPLASTY  2013    Procedure: UVULOPALATOPHARYNGOPLASTY;  Uvulopalatopharyngoplasty, Turbiante Reduction;  Surgeon: Lisset Parsons MD;  Location: [facility] OR      Allergies   Allergen Reactions     Lisinopril Cough     Perfume Other (See Comments)      Social History     Tobacco Use     Smoking status: Former     Packs/day: 0.50     Years: 2.00     Pack years: 1.00     Types: Cigarettes     Quit date: 1996     Years since quittin.9     Smokeless tobacco: Never   Substance Use Topics     Alcohol use: Yes     Alcohol/week: 8.3 standard drinks     Comment: Evening Marie      Wt Readings from Last 1 Encounters:   22 83.9 kg (185 lb)        Anesthesia Evaluation   Pt has had prior anesthetic. Type: Regional, MAC and General.    No history of anesthetic complications       ROS/MED HX  ENT/Pulmonary:     (+) sleep apnea, doesn't use CPAP, tobacco use, Past use,     Neurologic: Comment: R hand weakness    (+) seizures, CVA, date: , with deficits, - memory. TIA,     Cardiovascular: Comment: cardiomyopathy    (+)  Dyslipidemia hypertension--CAD angina---Taking blood thinners fainting (syncope). valvular problems/murmurs type: AI Previous cardiac testing   Echo: Date: 9-2022 Results:  Interpretation Summary     1. The left ventricle is normal in size. The visual ejection fraction is  estimated at 45%. There is mild global hypokinesia of the left ventricle.  2. Global peak LV longitudinal strain is averaged at -12%. This suggests  abnormal strain (normal <-18%).  3. The right ventricle is normal in structure, function and size.  4. There is moderate (2+) aortic regurgitation.     Echo 6-29-22 showed EF 35-40%, 1-2+ AI.  Stress Test: Date: Results:    ECG Reviewed: Date: 8-2022 Results:  Sinus bradycardia   T wave abnormality, consider lateral ischemia   Abnormal ECG  Cath: Date: Results:      METS/Exercise Tolerance:     Hematologic:     (+) History of blood clots, pt is anticoagulated,     Musculoskeletal: Comment: Back pain  (+) arthritis,     GI/Hepatic:     (+) liver disease,     Renal/Genitourinary:  - neg Renal ROS     Endo: Comment: overweight      Psychiatric/Substance Use:     (+) alcohol abuse     Infectious Disease:  - neg infectious disease ROS     Malignancy:   (+) Malignancy, History of Skin.Skin CA Remission status post Surgery.        Other:  - neg other ROS          Physical Exam    Airway        Mallampati: III   TM distance: > 3 FB   Neck ROM: limited   Mouth opening: > 3 cm    Respiratory Devices and Support         Dental       (+) caps      Cardiovascular   cardiovascular exam normal          Pulmonary   pulmonary exam normal                OUTSIDE LABS:  CBC:   Lab Results   Component Value Date    WBC 5.8 08/14/2022    WBC 4.9 08/08/2022    HGB 13.9 08/14/2022    HGB 13.8 08/08/2022    HCT 41.3 08/14/2022    HCT 41.7 08/08/2022     08/14/2022     08/08/2022     BMP:   Lab Results   Component Value Date     10/21/2022     09/08/2022    POTASSIUM 4.8 10/21/2022    POTASSIUM 3.9  09/08/2022    CHLORIDE 111 (H) 10/21/2022    CHLORIDE 107 09/08/2022    CO2 27 10/21/2022    CO2 28 09/08/2022    BUN 19 10/21/2022    BUN 20 09/08/2022    CR 0.94 10/21/2022    CR 0.90 09/08/2022     (H) 10/21/2022     (H) 09/08/2022     COAGS: No results found for: PTT, INR, FIBR  POC: No results found for: BGM, HCG, HCGS  HEPATIC:   Lab Results   Component Value Date    ALBUMIN 3.9 06/13/2022    PROTTOTAL 7.1 06/13/2022    ALT 20 06/13/2022    AST 14 06/13/2022    ALKPHOS 130 06/13/2022    BILITOTAL 0.4 06/13/2022     OTHER:   Lab Results   Component Value Date    PH 7.31 (L) 12/11/2013    LACT 1.6 04/11/2020    A1C 5.6 04/28/2021    CATHERINE 9.2 10/21/2022    PHOS 4.0 12/12/2013    MAG 2.2 02/11/2014    LIPASE 129 04/11/2020    TSH 1.49 08/14/2022    SED 8 08/18/2016       Anesthesia Plan    ASA Status:  3   NPO Status:  NPO Appropriate    Anesthesia Type: General.     - Airway: ETT   Induction: Intravenous.   Maintenance: Balanced.        Consents    Anesthesia Plan(s) and associated risks, benefits, and realistic alternatives discussed. Questions answered and patient/representative(s) expressed understanding.     - Discussed: Risks, Benefits and Alternatives for BOTH SEDATION and the PROCEDURE were discussed     - Discussed with:  Patient         Postoperative Care    Pain management: IV analgesics, Oral pain medications, Multi-modal analgesia.   PONV prophylaxis: Ondansetron (or other 5HT-3), Dexamethasone or Solumedrol     Comments:           H&P reviewed: Unable to attach H&P to encounter due to EHR limitations. H&P Update: appropriate H&P reviewed, patient examined. No interval changes since H&P (within 30 days).         Michelle Becerra, MANDO CRNA

## 2022-11-03 ENCOUNTER — APPOINTMENT (OUTPATIENT)
Dept: GENERAL RADIOLOGY | Facility: CLINIC | Age: 72
End: 2022-11-03
Attending: OTOLARYNGOLOGY
Payer: COMMERCIAL

## 2022-11-03 ENCOUNTER — ANESTHESIA (OUTPATIENT)
Dept: SURGERY | Facility: CLINIC | Age: 72
End: 2022-11-03
Payer: COMMERCIAL

## 2022-11-03 ENCOUNTER — HOSPITAL ENCOUNTER (OUTPATIENT)
Facility: CLINIC | Age: 72
Discharge: HOME | End: 2022-11-03
Attending: OTOLARYNGOLOGY | Admitting: OTOLARYNGOLOGY
Payer: COMMERCIAL

## 2022-11-03 VITALS
BODY MASS INDEX: 28.09 KG/M2 | OXYGEN SATURATION: 95 % | HEART RATE: 75 BPM | DIASTOLIC BLOOD PRESSURE: 69 MMHG | SYSTOLIC BLOOD PRESSURE: 137 MMHG | TEMPERATURE: 98 F | HEIGHT: 67 IN | RESPIRATION RATE: 14 BRPM | WEIGHT: 179 LBS

## 2022-11-03 DIAGNOSIS — Z96.82 STATUS POST INSERTION OF HYPOGLOSSAL NERVE STIMULATOR: Primary | ICD-10-CM

## 2022-11-03 DIAGNOSIS — I63.439 CEREBROVASCULAR ACCIDENT (CVA) DUE TO EMBOLISM OF POSTERIOR CEREBRAL ARTERY WITH INFARCTIONS OF BOTH OCCIPITAL LOBES (H): ICD-10-CM

## 2022-11-03 PROCEDURE — 999N000141 HC STATISTIC PRE-PROCEDURE NURSING ASSESSMENT: Performed by: OTOLARYNGOLOGY

## 2022-11-03 PROCEDURE — 250N000011 HC RX IP 250 OP 636: Performed by: NURSE ANESTHETIST, CERTIFIED REGISTERED

## 2022-11-03 PROCEDURE — 258N000003 HC RX IP 258 OP 636: Performed by: NURSE ANESTHETIST, CERTIFIED REGISTERED

## 2022-11-03 PROCEDURE — 250N000009 HC RX 250: Performed by: OTOLARYNGOLOGY

## 2022-11-03 PROCEDURE — 250N000013 HC RX MED GY IP 250 OP 250 PS 637: Performed by: OTOLARYNGOLOGY

## 2022-11-03 PROCEDURE — 250N000009 HC RX 250: Performed by: NURSE ANESTHETIST, CERTIFIED REGISTERED

## 2022-11-03 PROCEDURE — C1767 GENERATOR, NEURO NON-RECHARG: HCPCS | Performed by: OTOLARYNGOLOGY

## 2022-11-03 PROCEDURE — 64582 OPN MPLTJ HPGLSL NSTM ARY PG: CPT | Mod: 79 | Performed by: OTOLARYNGOLOGY

## 2022-11-03 PROCEDURE — 710N000012 HC RECOVERY PHASE 2, PER MINUTE: Performed by: OTOLARYNGOLOGY

## 2022-11-03 PROCEDURE — 250N000011 HC RX IP 250 OP 636: Performed by: OTOLARYNGOLOGY

## 2022-11-03 PROCEDURE — 272N000001 HC OR GENERAL SUPPLY STERILE: Performed by: OTOLARYNGOLOGY

## 2022-11-03 PROCEDURE — 370N000017 HC ANESTHESIA TECHNICAL FEE, PER MIN: Performed by: OTOLARYNGOLOGY

## 2022-11-03 PROCEDURE — C1778 LEAD, NEUROSTIMULATOR: HCPCS | Performed by: OTOLARYNGOLOGY

## 2022-11-03 PROCEDURE — 250N000025 HC SEVOFLURANE, PER MIN: Performed by: OTOLARYNGOLOGY

## 2022-11-03 PROCEDURE — 250N000013 HC RX MED GY IP 250 OP 250 PS 637: Performed by: NURSE ANESTHETIST, CERTIFIED REGISTERED

## 2022-11-03 PROCEDURE — 710N000009 HC RECOVERY PHASE 1, LEVEL 1, PER MIN: Performed by: OTOLARYNGOLOGY

## 2022-11-03 PROCEDURE — 360N000076 HC SURGERY LEVEL 3, PER MIN: Performed by: OTOLARYNGOLOGY

## 2022-11-03 PROCEDURE — 999N000065 XR CHEST PORT 1 VIEW

## 2022-11-03 PROCEDURE — 999N000065 XR NECK SOFT TISSUE PORT

## 2022-11-03 DEVICE — LEAD STIMULATION INSPIRE 4063-45: Type: IMPLANTABLE DEVICE | Site: CHEST | Status: FUNCTIONAL

## 2022-11-03 DEVICE — LEAD SENSING INSPIRE RESPIRATORY 4340: Type: IMPLANTABLE DEVICE | Site: CHEST | Status: FUNCTIONAL

## 2022-11-03 DEVICE — GENERATOR PULSE INSPIRE CPAP 3028: Type: IMPLANTABLE DEVICE | Site: CHEST | Status: FUNCTIONAL

## 2022-11-03 RX ORDER — OXYCODONE HYDROCHLORIDE 5 MG/1
5 TABLET ORAL EVERY 4 HOURS PRN
Qty: 15 TABLET | Refills: 0 | Status: SHIPPED | OUTPATIENT
Start: 2022-11-03 | End: 2022-11-09

## 2022-11-03 RX ORDER — SODIUM CHLORIDE, SODIUM LACTATE, POTASSIUM CHLORIDE, CALCIUM CHLORIDE 600; 310; 30; 20 MG/100ML; MG/100ML; MG/100ML; MG/100ML
INJECTION, SOLUTION INTRAVENOUS CONTINUOUS
Status: DISCONTINUED | OUTPATIENT
Start: 2022-11-03 | End: 2022-11-03 | Stop reason: HOSPADM

## 2022-11-03 RX ORDER — NALOXONE HYDROCHLORIDE 0.4 MG/ML
0.4 INJECTION, SOLUTION INTRAMUSCULAR; INTRAVENOUS; SUBCUTANEOUS
Status: DISCONTINUED | OUTPATIENT
Start: 2022-11-03 | End: 2022-11-03 | Stop reason: HOSPADM

## 2022-11-03 RX ORDER — ONDANSETRON 4 MG/1
4 TABLET, ORALLY DISINTEGRATING ORAL
Status: DISCONTINUED | OUTPATIENT
Start: 2022-11-03 | End: 2022-11-03 | Stop reason: HOSPADM

## 2022-11-03 RX ORDER — HYDROXYZINE HYDROCHLORIDE 50 MG/1
50 TABLET, FILM COATED ORAL EVERY 6 HOURS PRN
Status: DISCONTINUED | OUTPATIENT
Start: 2022-11-03 | End: 2022-11-03 | Stop reason: HOSPADM

## 2022-11-03 RX ORDER — CEFAZOLIN SODIUM/WATER 2 G/20 ML
2 SYRINGE (ML) INTRAVENOUS
Status: COMPLETED | OUTPATIENT
Start: 2022-11-03 | End: 2022-11-03

## 2022-11-03 RX ORDER — NALOXONE HYDROCHLORIDE 0.4 MG/ML
0.2 INJECTION, SOLUTION INTRAMUSCULAR; INTRAVENOUS; SUBCUTANEOUS
Status: DISCONTINUED | OUTPATIENT
Start: 2022-11-03 | End: 2022-11-03 | Stop reason: HOSPADM

## 2022-11-03 RX ORDER — MEPERIDINE HYDROCHLORIDE 25 MG/ML
12.5 INJECTION INTRAMUSCULAR; INTRAVENOUS; SUBCUTANEOUS
Status: DISCONTINUED | OUTPATIENT
Start: 2022-11-03 | End: 2022-11-03 | Stop reason: HOSPADM

## 2022-11-03 RX ORDER — DEXAMETHASONE SODIUM PHOSPHATE 4 MG/ML
INJECTION, SOLUTION INTRA-ARTICULAR; INTRALESIONAL; INTRAMUSCULAR; INTRAVENOUS; SOFT TISSUE PRN
Status: DISCONTINUED | OUTPATIENT
Start: 2022-11-03 | End: 2022-11-03

## 2022-11-03 RX ORDER — MAGNESIUM SULFATE HEPTAHYDRATE 40 MG/ML
2 INJECTION, SOLUTION INTRAVENOUS ONCE
Status: COMPLETED | OUTPATIENT
Start: 2022-11-03 | End: 2022-11-03

## 2022-11-03 RX ORDER — PROPOFOL 10 MG/ML
INJECTION, EMULSION INTRAVENOUS PRN
Status: DISCONTINUED | OUTPATIENT
Start: 2022-11-03 | End: 2022-11-03

## 2022-11-03 RX ORDER — FENTANYL CITRATE 50 UG/ML
25 INJECTION, SOLUTION INTRAMUSCULAR; INTRAVENOUS EVERY 5 MIN PRN
Status: DISCONTINUED | OUTPATIENT
Start: 2022-11-03 | End: 2022-11-03 | Stop reason: HOSPADM

## 2022-11-03 RX ORDER — ACETAMINOPHEN 325 MG/1
975 TABLET ORAL ONCE
Status: COMPLETED | OUTPATIENT
Start: 2022-11-03 | End: 2022-11-03

## 2022-11-03 RX ORDER — ONDANSETRON 2 MG/ML
INJECTION INTRAMUSCULAR; INTRAVENOUS PRN
Status: DISCONTINUED | OUTPATIENT
Start: 2022-11-03 | End: 2022-11-03

## 2022-11-03 RX ORDER — ONDANSETRON 2 MG/ML
4 INJECTION INTRAMUSCULAR; INTRAVENOUS EVERY 30 MIN PRN
Status: DISCONTINUED | OUTPATIENT
Start: 2022-11-03 | End: 2022-11-03 | Stop reason: HOSPADM

## 2022-11-03 RX ORDER — ACETAMINOPHEN 325 MG/1
975 TABLET ORAL ONCE
Status: DISCONTINUED | OUTPATIENT
Start: 2022-11-03 | End: 2022-11-03

## 2022-11-03 RX ORDER — DIMENHYDRINATE 50 MG/ML
25 INJECTION, SOLUTION INTRAMUSCULAR; INTRAVENOUS
Status: DISCONTINUED | OUTPATIENT
Start: 2022-11-03 | End: 2022-11-03 | Stop reason: HOSPADM

## 2022-11-03 RX ORDER — HYDROXYZINE HYDROCHLORIDE 25 MG/1
25 TABLET, FILM COATED ORAL EVERY 6 HOURS PRN
Status: DISCONTINUED | OUTPATIENT
Start: 2022-11-03 | End: 2022-11-03 | Stop reason: HOSPADM

## 2022-11-03 RX ORDER — OXYCODONE HYDROCHLORIDE 5 MG/1
5 TABLET ORAL EVERY 4 HOURS PRN
Status: DISCONTINUED | OUTPATIENT
Start: 2022-11-03 | End: 2022-11-03 | Stop reason: HOSPADM

## 2022-11-03 RX ORDER — CEFAZOLIN SODIUM/WATER 2 G/20 ML
2 SYRINGE (ML) INTRAVENOUS SEE ADMIN INSTRUCTIONS
Status: DISCONTINUED | OUTPATIENT
Start: 2022-11-03 | End: 2022-11-03 | Stop reason: HOSPADM

## 2022-11-03 RX ORDER — LIDOCAINE 40 MG/G
CREAM TOPICAL
Status: DISCONTINUED | OUTPATIENT
Start: 2022-11-03 | End: 2022-11-03 | Stop reason: HOSPADM

## 2022-11-03 RX ORDER — IBUPROFEN 600 MG/1
600 TABLET, FILM COATED ORAL
Status: DISCONTINUED | OUTPATIENT
Start: 2022-11-03 | End: 2022-11-03 | Stop reason: HOSPADM

## 2022-11-03 RX ORDER — SENNA AND DOCUSATE SODIUM 50; 8.6 MG/1; MG/1
1 TABLET, FILM COATED ORAL AT BEDTIME
Qty: 30 TABLET | Refills: 1 | Status: SHIPPED | OUTPATIENT
Start: 2022-11-03 | End: 2023-11-03

## 2022-11-03 RX ORDER — ACETAMINOPHEN 500 MG
1000 TABLET ORAL EVERY 6 HOURS PRN
Qty: 200 TABLET | Refills: 1 | Status: SHIPPED | OUTPATIENT
Start: 2022-11-03 | End: 2022-11-13

## 2022-11-03 RX ORDER — FENTANYL CITRATE 50 UG/ML
INJECTION, SOLUTION INTRAMUSCULAR; INTRAVENOUS PRN
Status: DISCONTINUED | OUTPATIENT
Start: 2022-11-03 | End: 2022-11-03

## 2022-11-03 RX ORDER — GLYCOPYRROLATE 0.2 MG/ML
INJECTION, SOLUTION INTRAMUSCULAR; INTRAVENOUS PRN
Status: DISCONTINUED | OUTPATIENT
Start: 2022-11-03 | End: 2022-11-03

## 2022-11-03 RX ORDER — OXYCODONE HYDROCHLORIDE 5 MG/1
5 TABLET ORAL
Status: DISCONTINUED | OUTPATIENT
Start: 2022-11-03 | End: 2022-11-03 | Stop reason: HOSPADM

## 2022-11-03 RX ORDER — ONDANSETRON 4 MG/1
4 TABLET, ORALLY DISINTEGRATING ORAL EVERY 30 MIN PRN
Status: DISCONTINUED | OUTPATIENT
Start: 2022-11-03 | End: 2022-11-03 | Stop reason: HOSPADM

## 2022-11-03 RX ORDER — HYDROMORPHONE HCL IN WATER/PF 6 MG/30 ML
0.4 PATIENT CONTROLLED ANALGESIA SYRINGE INTRAVENOUS EVERY 5 MIN PRN
Status: DISCONTINUED | OUTPATIENT
Start: 2022-11-03 | End: 2022-11-03 | Stop reason: HOSPADM

## 2022-11-03 RX ORDER — LIDOCAINE HYDROCHLORIDE 10 MG/ML
INJECTION, SOLUTION INFILTRATION; PERINEURAL PRN
Status: DISCONTINUED | OUTPATIENT
Start: 2022-11-03 | End: 2022-11-03

## 2022-11-03 RX ORDER — LIDOCAINE HYDROCHLORIDE AND EPINEPHRINE 10; 10 MG/ML; UG/ML
INJECTION, SOLUTION INFILTRATION; PERINEURAL PRN
Status: DISCONTINUED | OUTPATIENT
Start: 2022-11-03 | End: 2022-11-03 | Stop reason: HOSPADM

## 2022-11-03 RX ADMIN — Medication 2 G: at 11:23

## 2022-11-03 RX ADMIN — PHENYLEPHRINE HYDROCHLORIDE 100 MCG: 10 INJECTION INTRAVENOUS at 11:47

## 2022-11-03 RX ADMIN — ACETAMINOPHEN 975 MG: 325 TABLET, FILM COATED ORAL at 10:04

## 2022-11-03 RX ADMIN — SUCCINYLCHOLINE CHLORIDE 120 MG: 20 INJECTION, SOLUTION INTRAMUSCULAR; INTRAVENOUS at 11:27

## 2022-11-03 RX ADMIN — PROPOFOL 60 MG: 10 INJECTION, EMULSION INTRAVENOUS at 11:43

## 2022-11-03 RX ADMIN — PROPOFOL 140 MG: 10 INJECTION, EMULSION INTRAVENOUS at 11:27

## 2022-11-03 RX ADMIN — ONDANSETRON 4 MG: 2 INJECTION INTRAMUSCULAR; INTRAVENOUS at 13:01

## 2022-11-03 RX ADMIN — MIDAZOLAM 1 MG: 1 INJECTION INTRAMUSCULAR; INTRAVENOUS at 11:27

## 2022-11-03 RX ADMIN — PHENYLEPHRINE HYDROCHLORIDE 0.5 MCG/KG/MIN: 10 INJECTION INTRAVENOUS at 11:47

## 2022-11-03 RX ADMIN — FENTANYL CITRATE 25 MCG: 50 INJECTION, SOLUTION INTRAMUSCULAR; INTRAVENOUS at 14:18

## 2022-11-03 RX ADMIN — LIDOCAINE HYDROCHLORIDE 0.1 ML: 10 INJECTION, SOLUTION EPIDURAL; INFILTRATION; INTRACAUDAL; PERINEURAL at 10:14

## 2022-11-03 RX ADMIN — FENTANYL CITRATE 25 MCG: 50 INJECTION, SOLUTION INTRAMUSCULAR; INTRAVENOUS at 13:44

## 2022-11-03 RX ADMIN — FENTANYL CITRATE 50 MCG: 50 INJECTION, SOLUTION INTRAMUSCULAR; INTRAVENOUS at 11:27

## 2022-11-03 RX ADMIN — MIDAZOLAM 1 MG: 1 INJECTION INTRAMUSCULAR; INTRAVENOUS at 11:23

## 2022-11-03 RX ADMIN — FENTANYL CITRATE 25 MCG: 50 INJECTION, SOLUTION INTRAMUSCULAR; INTRAVENOUS at 14:08

## 2022-11-03 RX ADMIN — LIDOCAINE HYDROCHLORIDE 100 MG: 10 INJECTION, SOLUTION INFILTRATION; PERINEURAL at 11:27

## 2022-11-03 RX ADMIN — GLYCOPYRROLATE 0.1 MG: 0.2 INJECTION, SOLUTION INTRAMUSCULAR; INTRAVENOUS at 11:23

## 2022-11-03 RX ADMIN — FENTANYL CITRATE 100 MCG: 50 INJECTION, SOLUTION INTRAMUSCULAR; INTRAVENOUS at 11:41

## 2022-11-03 RX ADMIN — PHENYLEPHRINE HYDROCHLORIDE 100 MCG: 10 INJECTION INTRAVENOUS at 11:44

## 2022-11-03 RX ADMIN — SODIUM CHLORIDE, POTASSIUM CHLORIDE, SODIUM LACTATE AND CALCIUM CHLORIDE: 600; 310; 30; 20 INJECTION, SOLUTION INTRAVENOUS at 10:14

## 2022-11-03 RX ADMIN — SODIUM CHLORIDE, POTASSIUM CHLORIDE, SODIUM LACTATE AND CALCIUM CHLORIDE: 600; 310; 30; 20 INJECTION, SOLUTION INTRAVENOUS at 11:27

## 2022-11-03 RX ADMIN — OXYCODONE HYDROCHLORIDE 5 MG: 5 TABLET ORAL at 14:58

## 2022-11-03 RX ADMIN — DEXAMETHASONE SODIUM PHOSPHATE 4 MG: 4 INJECTION, SOLUTION INTRA-ARTICULAR; INTRALESIONAL; INTRAMUSCULAR; INTRAVENOUS; SOFT TISSUE at 11:27

## 2022-11-03 RX ADMIN — MAGNESIUM SULFATE HEPTAHYDRATE 1 G: 40 INJECTION, SOLUTION INTRAVENOUS at 11:50

## 2022-11-03 RX ADMIN — GLYCOPYRROLATE 0.1 MG: 0.2 INJECTION, SOLUTION INTRAMUSCULAR; INTRAVENOUS at 11:27

## 2022-11-03 RX ADMIN — FENTANYL CITRATE 25 MCG: 50 INJECTION, SOLUTION INTRAMUSCULAR; INTRAVENOUS at 14:00

## 2022-11-03 RX ADMIN — ROCURONIUM BROMIDE 10 MG: 50 INJECTION, SOLUTION INTRAVENOUS at 11:27

## 2022-11-03 ASSESSMENT — ACTIVITIES OF DAILY LIVING (ADL)
ADLS_ACUITY_SCORE: 35
ADLS_ACUITY_SCORE: 33
ADLS_ACUITY_SCORE: 35
ADLS_ACUITY_SCORE: 35

## 2022-11-03 ASSESSMENT — ENCOUNTER SYMPTOMS: SEIZURES: 1

## 2022-11-03 NOTE — ANESTHESIA CARE TRANSFER NOTE
Patient: Ricky Brown    Procedure: Procedure(s):  INSERTION, PULSE GENERATOR, NEUROSTIMULATOR       Diagnosis: Severe obstructive sleep apnea [G47.33]  Intolerance of continuous positive airway pressure (CPAP) ventilation [Z78.9]  BMI 28.0-28.9,adult [Z68.28]  Diagnosis Additional Information: No value filed.    Anesthesia Type:   General     Note:    Oropharynx: oropharynx clear of all foreign objects and spontaneously breathing  Level of Consciousness: drowsy  Oxygen Supplementation: room air    Independent Airway: airway patency satisfactory and stable  Dentition: dentition unchanged  Vital Signs Stable: post-procedure vital signs reviewed and stable  Report to RN Given: handoff report given  Patient transferred to: PACU    Handoff Report: Identifed the Patient, Identified the Reponsible Provider, Reviewed the pertinent medical history, Discussed the surgical course, Reviewed Intra-OP anesthesia mangement and issues during anesthesia, Set expectations for post-procedure period and Allowed opportunity for questions and acknowledgement of understanding      Vitals:  Vitals Value Taken Time   /63 11/03/22 1319   Temp 36.7  C (98  F) 11/03/22 1319   Pulse 81 11/03/22 1325   Resp 5 11/03/22 1325   SpO2 92 % 11/03/22 1325   Vitals shown include unvalidated device data.    Electronically Signed By: MANDO Michaud CRNA  November 3, 2022  1:26 PM

## 2022-11-03 NOTE — PROGRESS NOTES
Pt. Discharged via wheelchair at 1642, Avs given and went over with before leaving, Rxs filled and picked up by wife, pt doing well reports 3/4 pain on room air, no nausea Scott Sturges, RN on 11/3/2022 at 4:42 PM

## 2022-11-03 NOTE — OP NOTE
PREOPERATIVE DIAGNOSES: Severe obstructive sleep apnea, intolerance to CPAP.    POSTOPERATIVE DIAGNOSES: Same.     PROCEDURE PERFORMED:   1. Placement of 12th cranial nerve (hypoglossal) stimulation implant  2. Placement of right pleural respiration sensor   3. Electronic analysis of the implanted neurostimulator pulse generator system  4. Hypoglossal nerve monitoring using NIM EMG  5. Microsurgical techniques using the operating microscope    SURGEON: Dashawn Tanner MD     ASSISTANTS: Toby Geronimo PA-C    BLOOD LOSS: 10 mL.    COMPLICATIONS: None.     SPECIMENS: None.     ANESTHESIA: General.    GRAFTS, IMPLANTS, DRAINS: Inspire Hypoglossal Nerve Stimulation Implant, Hypoglossal Nerve Stimulation Lead, Respiratory Sensor Lead    INDICATIONS: Improve obstructive apnea, treatment.    FINDINGS:   1. Normal anatomic branching pattern of the right hypoglossal nerve.    OPERATIVE TECHNIQUE: The patient was brought to the operating room and identified by name clinic number.  They were placed supinely on the operating room table and general endotracheal anesthesia was induced by the anesthesia service.  The bed was rotated 180 degrees and the patient was prepped and draped in standard fashion.  Prior to prepping and draping, electrodes were placed in the genioglossus and styloglossus muscle for intraoperative nerve monitoring. The EMG hypoglossal nerve monitor was confirmed functional.      A modified sub-mandibular incision was made in the right upper neck approximately 2 cm below the mandible in the natural skin crease. Dissection was carried down through the subcutaneous tissue and platysma. The inferior border of the submandibular gland was identified as well as the digastric tendon. The submandibular gland and the overlying fascia with the marginal mandibular nerve were retracted superiorly. The digastric was retracted inferiorly. Dissection was carried down into the digastric triangle where the hypoglossal nerve  was identified in its usual fashion. The mylohyoid muscle was retracted anteriorally, and the hypoglossal nerve was dissected up towards the floor of the mouth.  The operating microscope was used for microsurgical dissection of the distal hypoglossal nerve branches.  The exclusion branches to retrusor muscles were identified, and tested intra-operatively using the EMG stimulator. The cuff electrode for the hypoglossal nerve stimulator was placed distally to the exclusion branches on the medial nerve branches to the genioglossus muscle. The C1 branch was included in the cuff.  The neck anchor was secured with 3-0 interrupted silk suture.    Attention was then turned to the chest. A 5 cm incision was made in the right upper chest approximately 3 cm below the clavicle and 3 cm lateral from the sternum.  Dissection was carried down to the pectoralis muscle. An inferior pocket was created deep to the subcutaneous layer and superficial to the pectoralis muscle. Dissection was carried down through the medial pectoralis muscle bluntly.  The subpectoral fat pad was identified and swept laterally. The external oblique muscle and fascia were identified inferior to the second rib space. We entered the plane between the external and internal intercostals bluntly. The pleural respiration sensor was placed into the pocket from medial to lateral.  The distal anchor was sutured medially to the external oblique facia using interrupted 3-0 silk sutures.  The pectoralis muscle was then returned to anatomic position and the proximal was secured to the pectoralis major muscle facia using interrupted 3-0 silk sutures.     The stimulation lead was then tunneled in a subplatysmal plane and brought out into the sub-clavicular pocket.  Both the cuff electrode and the respiration sensing lead were connected to the implantable pulse generator. Diagnostic evaluation was performed confirming good respiration sensing signal as well as good tongue  protrusion stimulation.     The implantable pulse generator was placed in the subclavicular pocket and secured loosely to the pectoralis fascia using 2-0 silk interrupted sutures. All the wounds were copiously irrigated with normal saline. Valsalva was performed and hemostasis was assured.  The incisions were then closed with deep interrupted 3-0 Vicryl, dermal 4-0 Monocryl sutures, and Dermabond for skin closure.    This marked the end of the procedure.  The patient was then turned over to anesthesia for recovery where they were awakened, extubated, and transferred to the PACU in excellent condition.  Neck and chest x-rays were performed in PACU to confirm placement and document the absence of a pneumothorax.  There were no complications.  There was minimal blood loss.  All standard operating room protocol and universal precautions were used throughout the procedure. Toby Geronimo PA-C assisted for the procedure and was required for patient positioning, prepping, draping, surgical retraction, excellent tissue handling, and closure.     Dashawn Tanner MD  Department of Otolaryngology-Head and Neck Surgery  [facility]

## 2022-11-03 NOTE — ANESTHESIA POSTPROCEDURE EVALUATION
Patient: Ricky Brown    Procedure: Procedure(s):  INSERTION, PULSE GENERATOR, NEUROSTIMULATOR       Anesthesia Type:  General    Note:  Disposition: Outpatient   Postop Pain Control: Uneventful            Sign Out: Well controlled pain   PONV: No   Neuro/Psych: Uneventful            Sign Out: Acceptable/Baseline neuro status   Airway/Respiratory: Uneventful            Sign Out: Acceptable/Baseline resp. status   CV/Hemodynamics: Uneventful            Sign Out: Acceptable CV status; No obvious hypovolemia; No obvious fluid overload   Other NRE: NONE   DID A NON-ROUTINE EVENT OCCUR? No           Last vitals:  Vitals Value Taken Time   /63 11/03/22 1437   Temp 36.7  C (98  F) 11/03/22 1319   Pulse 74 11/03/22 1437   Resp 9 11/03/22 1430   SpO2 94 % 11/03/22 1439   Vitals shown include unvalidated device data.    Electronically Signed By: MANDO Cheney CRNA  November 3, 2022  4:25 PM

## 2022-11-03 NOTE — DISCHARGE INSTRUCTIONS
Discharge Instructions for Inspire Implant    Recovery - Everyone recovers differently from a general anesthetic.  Symptoms such as fatigue, nausea, lightheadedness, and sometimes a low grade fever (up to 101 degrees) are not unusual.  As your body removes the anesthetic drugs from circulation, these symptoms will resolve.  The incisions will be sore after surgery, and a mild amount of swelling and redness is normal.  Use ice packs as needed.  There are no diet restrictions after placement of the Inspire implant, and you can resume your home medications, except blood thinners such as aspirin or coumadin, which should not be taken for 1-5 days after surgery. Clarify the length of time with your surgeon.  Limit your activity to no strenuous activities until I see you for the first follow up visit, and sleep with your head and neck elevated.  I recommend no heavy lifting greater than 15-20 pounds for the first 14 days following surgery.  You can shower and let the water run over the incision 48 hours after surgery. Do not scrub the incision, but pat it dry when you are finished. Also, do not submerge the incision in a tub until it is entirely healed (approximately 4 weeks following surgery).       Medications - You were sent home with narcotic pain medication.  If you can tolerate the discomfort during your recovery by using Tylenol or Ibuprofen (advil), please do so.  However, do not hesitate to use the stronger pain medication if needed. Restart Eliquis on 11/8/2022.    Complications - Problems related to Inspire implant placement are usually either due to infection or bleeding.  Signs of infection such as redness, increasing pain, swelling, pus at the incision, and fever should be reported as soon as possible.  If there is a fast growing swelling of the surgical area and difficulty breathing or swallowing (like being choked), this is an emergency.  If it is slow but persistent, be taken to an emergency room.  If you  feel like your breathing is being blocked - Call 911.    Follow up - At your first follow-up, I will examine the incision sites to make sure there are no troubles with healing.  You will also have another appointment with sleep medicine 4 weeks after implant placement to activate the device.      If there are any questions or issues with the above, or if there are other issues that concern you, always feel free to call the clinic and I am happy to speak with you as soon as feasible.    Dashawn Tanner MD  Department of Otolaryngology-Head and Neck Surgery  [facility]  [phone] or [phone] After hours, [facility] option

## 2022-11-04 ENCOUNTER — OFFICE VISIT (OUTPATIENT)
Dept: ORTHOPEDICS | Facility: CLINIC | Age: 72
End: 2022-11-04
Payer: COMMERCIAL

## 2022-11-04 DIAGNOSIS — G56.01 CARPAL TUNNEL SYNDROME OF RIGHT WRIST: Primary | ICD-10-CM

## 2022-11-04 PROCEDURE — 99024 POSTOP FOLLOW-UP VISIT: CPT | Performed by: STUDENT IN AN ORGANIZED HEALTH CARE EDUCATION/TRAINING PROGRAM

## 2022-11-04 NOTE — LETTER
11/4/2022         RE: Ricky Brown  [address]  [facility] [address]        Dear Colleague,    Thank you for referring your patient, Ricky Brown, to the [facility]. Please see a copy of my visit note below.    Ortho Hand    No issues.  No pain.  No fevers or chills.  Already feeling better.  Very happy with the result.  Can feel the fingertips better than before surgery.    On exam, right palm and elbow incisions are well-healing with no signs of infection.  Right thumb with normal opposition.  Preserved right hand intrinsic strength.  Sensation in median and ulnar distributed right hand.    A/P: 71-year-old male 9 days status post right revision open carpal tunnel release with hypothenar fat flap, right ulnar nerve decompression at the elbow, doing well    -Sutures out today  -Steri-Strips placed  -Patient can initiate scar treatment with silicone-based ointment or Bio-oil next week.    -Limit lifting to 7-10 pounds for the next 2 weeks, followed by 15-20 pounds for postoperative weeks 5-6  -Instructed patient to use the fingers to prevent stiffness  -Counseled patient to understand that the baseline tingling and numbness will improve over the next several months  -Return to clinic in 4 weeks for reevaluation    Vignesh Rhodes MD, PhD        Again, thank you for allowing me to participate in the care of your patient.        Sincerely,        Vignesh Rhodes MD

## 2022-11-04 NOTE — PROGRESS NOTES
Ortho Hand    No issues.  No pain.  No fevers or chills.  Already feeling better.  Very happy with the result.  Can feel the fingertips better than before surgery.    On exam, right palm and elbow incisions are well-healing with no signs of infection.  Right thumb with normal opposition.  Preserved right hand intrinsic strength.  Sensation in median and ulnar distributed right hand.    A/P: 71-year-old male 9 days status post right revision open carpal tunnel release with hypothenar fat flap, right ulnar nerve decompression at the elbow, doing well    -Sutures out today  -Steri-Strips placed  -Patient can initiate scar treatment with silicone-based ointment or Bio-oil next week.    -Limit lifting to 7-10 pounds for the next 2 weeks, followed by 15-20 pounds for postoperative weeks 5-6  -Instructed patient to use the fingers to prevent stiffness  -Counseled patient to understand that the baseline tingling and numbness will improve over the next several months  -Return to clinic in 4 weeks for reevaluation    Vignesh Rhodes MD, PhD

## 2022-11-06 NOTE — PROGRESS NOTES
Chief Complaint   Patient presents with     Post-op Visit     Post op Inspire - 1 week check     History of Present Illness  Ricky Brown is a 71 year old male who presents today for follow-up.  The patient went to the operating room on 11/3/2022 and underwent placement of the hypoglossal nerve stimulator.  He and his wife contact me over the weekend with some concerns of redness near the incision and also in his right lateral chest.  He had no other signs of infection and was otherwise feeling well.  I felt that observation was warranted. The patient has done well postoperatively with minimal pain. The patient denies any problems with the incisions.  The patient presents today for follow-up.     Past Medical History  Patient Active Problem List   Diagnosis     HL (hearing loss)     Erectile dysfunction     Pseudophakia, sutured PCL, od; ACL, os - hx of IOL dislocation, ou     Posterior vitreous detachment, od     Epiretinal membrane, mild, od     Advanced directives, counseling/discussion     JOSE JUAN (obstructive sleep apnea)-severe (AHI 61)     BCC (basal cell carcinoma)     Cardiomyopathy (H)     RCT (rotator cuff tear)     Essential hypertension with goal blood pressure less than 140/90     Hyperlipidemia LDL goal <70     Megalocornea     Macular edema, od     Angina, class II (H)     Elevated LFTs     Fatty liver     Right carpal tunnel syndrome     CAD (coronary artery disease)     S/P carpal tunnel release     Right hand weakness     Pillar pain of extremity     Folic acid deficiency (non anemic)     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 (H)     HFrEF (heart failure with reduced ejection fraction) (H)     Nonrheumatic aortic valve insufficiency     Lumbar spine pain     Pain in thoracic spine     ETOH abuse     Partial symptomatic epilepsy with complex partial seizures, not intractable, without status epilepticus (H)     Current  Medications    Current Outpatient Medications:      acetaminophen (TYLENOL) 500 MG tablet, Take 2 tablets (1,000 mg) by mouth every 6 hours as needed for pain or fever Every 6 hours as needed for post-op pain.  Alternate with ibuprofen., Disp: 200 tablet, Rfl: 1     acetaminophen (TYLENOL) 500 MG tablet, Take 500-1,000 mg by mouth every 8 hours as needed for mild pain, Disp: , Rfl:      apixaban ANTICOAGULANT (ELIQUIS) 5 MG tablet, Take 1 tablet (5 mg) by mouth 2 times daily, Disp: 60 tablet, Rfl: 11     ASPIRIN NOT PRESCRIBED (INTENTIONAL), Please choose reason not prescribed from choices below., Disp: , Rfl:      atorvastatin (LIPITOR) 10 MG tablet, TAKE ONE TABLET BY MOUTH ONE TIME DAILY (Patient taking differently: Take 10 mg by mouth every evening), Disp: 90 tablet, Rfl: 0     cyanocobalamin (VITAMIN B-12) 1000 MCG tablet, Take 1,000 mcg by mouth 2 times daily, Disp: , Rfl:      diphenoxylate-atropine (LOMOTIL) 2.5-0.025 MG tablet, Take 1 tablet by mouth daily as needed for diarrhea Patient only takes 1 tablet as needed, Disp: 30 tablet, Rfl: 5     folic acid (FOLVITE) 1 MG tablet, Take 1 tablet (1 mg) by mouth 2 times daily, Disp: 60 tablet, Rfl: 4     levETIRAcetam (KEPPRA) 500 MG tablet, Take 1 tablet (500 mg) by mouth 2 times daily, Disp: 60 tablet, Rfl: 11     metoprolol succinate ER (TOPROL XL) 25 MG 24 hr tablet, Take 1 tablet (25 mg) by mouth 2 times daily, Disp: 180 tablet, Rfl: 11     sacubitril-valsartan (ENTRESTO) 24-26 MG per tablet, Take 1 tablet by mouth 2 times daily, Disp: 180 tablet, Rfl: 11     SENNA-docusate sodium (SENNA S) 8.6-50 MG tablet, Take 1 tablet by mouth At Bedtime Use while taking narcotic pain medications.  Hold for diarrhea. (Patient not taking: Reported on 11/9/2022), Disp: 30 tablet, Rfl: 1  No current facility-administered medications for this visit.    Facility-Administered Medications Ordered in Other Visits:      sodium chloride (PF) 0.9% PF flush 10 mL, 10 mL,  "Intravenous, Once, Catrachita Beltran MD    Allergies  Allergies   Allergen Reactions     Lisinopril Cough     Perfume Other (See Comments)       Social History  Social History     Socioeconomic History     Marital status:      Spouse name: Kyung     Number of children: 0     Years of education: 14   Occupational History     Occupation: industrial electronics      Employer: [employer]   Tobacco Use     Smoking status: Former     Packs/day: 0.50     Years: 2.00     Pack years: 1.00     Types: Cigarettes     Quit date: 1996     Years since quittin.9     Smokeless tobacco: Never   Vaping Use     Vaping Use: Never used   Substance and Sexual Activity     Alcohol use: Yes     Alcohol/week: 8.3 standard drinks     Comment: Evening Marie     Drug use: No     Sexual activity: Yes     Partners: Female     Birth control/protection: Male Surgical     Comment:  vasectomy   Other Topics Concern     Parent/sibling w/ CABG, MI or angioplasty before 65F 55M? No       Family History  Family History   Problem Relation Age of Onset     Diabetes Father         Old age Diabetes     Cerebrovascular Disease Father      Obesity Father      Heart Disease Father      Coronary Artery Disease Father      Hypertension Father      Lipids Sister      C.A.D. No family hx of      Cancer No family hx of      Glaucoma No family hx of      Macular Degeneration No family hx of      Anesthesia Reaction No family hx of      Deep Vein Thrombosis (DVT) No family hx of        Review of Systems  As per HPI and PMHx, otherwise 10 system review including the head and neck, constitutional, eyes, respiratory, GI, skin, neurologic, lymphatic, endocrine, and allergy systems is negative.    Physical Exam  BP (!) 144/84 (BP Location: Left arm, Patient Position: Sitting, Cuff Size: Adult Regular)   Pulse 66   Temp 98  F (36.7  C) (Tympanic)   Ht 1.702 m (5' 7\")   BMI 28.04 kg/m    GENERAL: Patient is a pleasant, cooperative 71 year old male in no " acute distress.  HEAD: Normocephalic, atraumatic.  Hair and scalp are normal.  EYES: Pupils are equal, round, reactive to light and accommodation.  Extraocular movements are intact.  The sclera nonicteric without injection.  The extraocular structures are normal.  EARS: Normal shape and symmetry.  No tenderness when palpating the mastoid or tragal areas bilaterally.   NOSE: Nares are patent.  Nasal mucosa is pink and moist.  Negative anterior rhinoscopy.  ORAL CAVITY: Dentition is in good repair.  Mucous membranes are moist.  Tongue is mobile, protrudes to the midline.  Palate elevates symmetrically.  NECK: Supple, trachea is midline.  The right submental incision is clean, dry, intact.  No erythema or fluctuance.  No evidence of hematoma or seroma.    CHEST: The right upper chest incision is clean, dry, intact.  No evidence of hematoma or seroma.  No erythema or fluctuance.  NEUROLOGIC: Cranial nerves II through XII are grossly intact.  Voice is strong.  Patient is House-Brackmann I/VI on the right and House-Brackmann I/VI on the left.  CARDIOVASCULAR: Extremities are warm and well-perfused.  No significant peripheral edema.  RESPIRATORY: Patient has nonlabored breathing without cough, wheeze, stridor.  PSYCHIATRIC: Patient is alert and oriented.  Mood and affect appear normal.  SKIN: Warm and dry.  No scalp, face, or neck lesions noted.    Assessment and Plan     ICD-10-CM    1. Severe obstructive sleep apnea  G47.33       2. Intolerance of continuous positive airway pressure (CPAP) ventilation  Z78.9       3. BMI 28.0-28.9,adult  Z68.28       4. Status post insertion of hypoglossal nerve stimulator  Z96.82       5. Postoperative state  Z98.890          It was my pleasure seeing Ricky ARI Brown today in clinic.  The patient is healing well after placement of the hypoglossal nerve stimulator.  We discussed lifting and activity restrictions.  We discussed incision care and follow-up. I would like to see the  patient back in 3 to 4 weeks for recheck.  The patient will need to meet with sleep medicine (Dr. Mera for activation 1 month after placement of the device. The paient knows to contact me sooner with any problems or concerns.    Ricky to follow up with Primary Care provider regarding elevated blood pressure.    Dashawn Tanner MD  Department of Otolaryngology-Head and Neck Surgery  [facility]

## 2022-11-07 ENCOUNTER — TELEPHONE (OUTPATIENT)
Dept: OTOLARYNGOLOGY | Facility: CLINIC | Age: 72
End: 2022-11-07

## 2022-11-07 NOTE — TELEPHONE ENCOUNTER
"Patient stated his wife put gypsy cream on his red areas on his chest and under his arm this morning and he hasn't looked at it since.  Offered him fariba appointment this afternoon per Dr. Tanner.  Patient declined appointment and stated the \"he is not that concerned about it.\"    Elham Pete, LPN on 11/7/2022 at 2:19 PM    "

## 2022-11-07 NOTE — PROGRESS NOTES
No response from pt.  Pt is scheduled with [facility]H 11/10/22 and can be address at that appt.  -[facility]

## 2022-11-09 ENCOUNTER — OFFICE VISIT (OUTPATIENT)
Dept: OTOLARYNGOLOGY | Facility: CLINIC | Age: 72
End: 2022-11-09
Payer: COMMERCIAL

## 2022-11-09 VITALS
DIASTOLIC BLOOD PRESSURE: 84 MMHG | BODY MASS INDEX: 28.04 KG/M2 | SYSTOLIC BLOOD PRESSURE: 144 MMHG | HEART RATE: 66 BPM | HEIGHT: 67 IN | TEMPERATURE: 98 F

## 2022-11-09 DIAGNOSIS — Z98.890 POSTOPERATIVE STATE: ICD-10-CM

## 2022-11-09 DIAGNOSIS — G47.33 SEVERE OBSTRUCTIVE SLEEP APNEA: Primary | ICD-10-CM

## 2022-11-09 DIAGNOSIS — Z96.82 STATUS POST INSERTION OF HYPOGLOSSAL NERVE STIMULATOR: ICD-10-CM

## 2022-11-09 DIAGNOSIS — Z78.9 INTOLERANCE OF CONTINUOUS POSITIVE AIRWAY PRESSURE (CPAP) VENTILATION: ICD-10-CM

## 2022-11-09 PROCEDURE — 99024 POSTOP FOLLOW-UP VISIT: CPT | Performed by: OTOLARYNGOLOGY

## 2022-11-09 NOTE — NURSING NOTE
"Initial BP (!) 144/84 (BP Location: Left arm, Patient Position: Sitting, Cuff Size: Adult Regular)   Pulse 66   Temp 98  F (36.7  C) (Tympanic)   Ht 1.702 m (5' 7\")   BMI 28.04 kg/m   Estimated body mass index is 28.04 kg/m  as calculated from the following:    Height as of this encounter: 1.702 m (5' 7\").    Weight as of 11/3/22: 81.2 kg (179 lb). .    Natividad Arshad CMA    "

## 2022-11-09 NOTE — LETTER
11/9/2022         RE: Ricky Brown  [address]  [facility] [address]        Dear Colleague,    Thank you for referring your patient, Ricky Brown, to the [facility]. Please see a copy of my visit note below.    Chief Complaint   Patient presents with     Post-op Visit     Post op Inspire - 1 week check     History of Present Illness  Ricky Brown is a 71 year old male who presents today for follow-up.  The patient went to the operating room on 11/3/2022 and underwent placement of the hypoglossal nerve stimulator.  He and his wife contact me over the weekend with some concerns of redness near the incision and also in his right lateral chest.  He had no other signs of infection and was otherwise feeling well.  I felt that observation was warranted. The patient has done well postoperatively with minimal pain. The patient denies any problems with the incisions.  The patient presents today for follow-up.     Past Medical History  Patient Active Problem List   Diagnosis     HL (hearing loss)     Erectile dysfunction     Pseudophakia, sutured PCL, od; ACL, os - hx of IOL dislocation, ou     Posterior vitreous detachment, od     Epiretinal membrane, mild, od     Advanced directives, counseling/discussion     JOSE JUAN (obstructive sleep apnea)-severe (AHI 61)     BCC (basal cell carcinoma)     Cardiomyopathy (H)     RCT (rotator cuff tear)     Essential hypertension with goal blood pressure less than 140/90     Hyperlipidemia LDL goal <70     Megalocornea     Macular edema, od     Angina, class II (H)     Elevated LFTs     Fatty liver     Right carpal tunnel syndrome     CAD (coronary artery disease)     S/P carpal tunnel release     Right hand weakness     Pillar pain of extremity     Folic acid deficiency (non anemic)     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 (H)     HFrEF (heart failure with  reduced ejection fraction) (H)     Nonrheumatic aortic valve insufficiency     Lumbar spine pain     Pain in thoracic spine     ETOH abuse     Partial symptomatic epilepsy with complex partial seizures, not intractable, without status epilepticus (H)     Current Medications    Current Outpatient Medications:      acetaminophen (TYLENOL) 500 MG tablet, Take 2 tablets (1,000 mg) by mouth every 6 hours as needed for pain or fever Every 6 hours as needed for post-op pain.  Alternate with ibuprofen., Disp: 200 tablet, Rfl: 1     acetaminophen (TYLENOL) 500 MG tablet, Take 500-1,000 mg by mouth every 8 hours as needed for mild pain, Disp: , Rfl:      apixaban ANTICOAGULANT (ELIQUIS) 5 MG tablet, Take 1 tablet (5 mg) by mouth 2 times daily, Disp: 60 tablet, Rfl: 11     ASPIRIN NOT PRESCRIBED (INTENTIONAL), Please choose reason not prescribed from choices below., Disp: , Rfl:      atorvastatin (LIPITOR) 10 MG tablet, TAKE ONE TABLET BY MOUTH ONE TIME DAILY (Patient taking differently: Take 10 mg by mouth every evening), Disp: 90 tablet, Rfl: 0     cyanocobalamin (VITAMIN B-12) 1000 MCG tablet, Take 1,000 mcg by mouth 2 times daily, Disp: , Rfl:      diphenoxylate-atropine (LOMOTIL) 2.5-0.025 MG tablet, Take 1 tablet by mouth daily as needed for diarrhea Patient only takes 1 tablet as needed, Disp: 30 tablet, Rfl: 5     folic acid (FOLVITE) 1 MG tablet, Take 1 tablet (1 mg) by mouth 2 times daily, Disp: 60 tablet, Rfl: 4     levETIRAcetam (KEPPRA) 500 MG tablet, Take 1 tablet (500 mg) by mouth 2 times daily, Disp: 60 tablet, Rfl: 11     metoprolol succinate ER (TOPROL XL) 25 MG 24 hr tablet, Take 1 tablet (25 mg) by mouth 2 times daily, Disp: 180 tablet, Rfl: 11     sacubitril-valsartan (ENTRESTO) 24-26 MG per tablet, Take 1 tablet by mouth 2 times daily, Disp: 180 tablet, Rfl: 11     SENNA-docusate sodium (SENNA S) 8.6-50 MG tablet, Take 1 tablet by mouth At Bedtime Use while taking narcotic pain medications.  Hold for  diarrhea. (Patient not taking: Reported on 2022), Disp: 30 tablet, Rfl: 1  No current facility-administered medications for this visit.    Facility-Administered Medications Ordered in Other Visits:      sodium chloride (PF) 0.9% PF flush 10 mL, 10 mL, Intravenous, Once, Catrachita Beltran MD    Allergies  Allergies   Allergen Reactions     Lisinopril Cough     Perfume Other (See Comments)       Social History  Social History     Socioeconomic History     Marital status:      Spouse name: Kyung     Number of children: 0     Years of education: 14   Occupational History     Occupation: industrial electronics      Employer: [employer]   Tobacco Use     Smoking status: Former     Packs/day: 0.50     Years: 2.00     Pack years: 1.00     Types: Cigarettes     Quit date: 1996     Years since quittin.9     Smokeless tobacco: Never   Vaping Use     Vaping Use: Never used   Substance and Sexual Activity     Alcohol use: Yes     Alcohol/week: 8.3 standard drinks     Comment: Evening Marie     Drug use: No     Sexual activity: Yes     Partners: Female     Birth control/protection: Male Surgical     Comment:  vasectomy   Other Topics Concern     Parent/sibling w/ CABG, MI or angioplasty before 65F 55M? No       Family History  Family History   Problem Relation Age of Onset     Diabetes Father         Old age Diabetes     Cerebrovascular Disease Father      Obesity Father      Heart Disease Father      Coronary Artery Disease Father      Hypertension Father      Lipids Sister      C.A.D. No family hx of      Cancer No family hx of      Glaucoma No family hx of      Macular Degeneration No family hx of      Anesthesia Reaction No family hx of      Deep Vein Thrombosis (DVT) No family hx of        Review of Systems  As per HPI and PMHx, otherwise 10 system review including the head and neck, constitutional, eyes, respiratory, GI, skin, neurologic, lymphatic, endocrine, and allergy systems is negative.    Physical  "Exam  BP (!) 144/84 (BP Location: Left arm, Patient Position: Sitting, Cuff Size: Adult Regular)   Pulse 66   Temp 98  F (36.7  C) (Tympanic)   Ht 1.702 m (5' 7\")   BMI 28.04 kg/m    GENERAL: Patient is a pleasant, cooperative 71 year old male in no acute distress.  HEAD: Normocephalic, atraumatic.  Hair and scalp are normal.  EYES: Pupils are equal, round, reactive to light and accommodation.  Extraocular movements are intact.  The sclera nonicteric without injection.  The extraocular structures are normal.  EARS: Normal shape and symmetry.  No tenderness when palpating the mastoid or tragal areas bilaterally.   NOSE: Nares are patent.  Nasal mucosa is pink and moist.  Negative anterior rhinoscopy.  ORAL CAVITY: Dentition is in good repair.  Mucous membranes are moist.  Tongue is mobile, protrudes to the midline.  Palate elevates symmetrically.  NECK: Supple, trachea is midline.  The right submental incision is clean, dry, intact.  No erythema or fluctuance.  No evidence of hematoma or seroma.    CHEST: The right upper chest incision is clean, dry, intact.  No evidence of hematoma or seroma.  No erythema or fluctuance.  NEUROLOGIC: Cranial nerves II through XII are grossly intact.  Voice is strong.  Patient is House-Brackmann I/VI on the right and House-Brackmann I/VI on the left.  CARDIOVASCULAR: Extremities are warm and well-perfused.  No significant peripheral edema.  RESPIRATORY: Patient has nonlabored breathing without cough, wheeze, stridor.  PSYCHIATRIC: Patient is alert and oriented.  Mood and affect appear normal.  SKIN: Warm and dry.  No scalp, face, or neck lesions noted.    Assessment and Plan     ICD-10-CM    1. Severe obstructive sleep apnea  G47.33       2. Intolerance of continuous positive airway pressure (CPAP) ventilation  Z78.9       3. BMI 28.0-28.9,adult  Z68.28       4. Status post insertion of hypoglossal nerve stimulator  Z96.82       5. Postoperative state  Z98.890          It was my " pleasure seeing Ricky Brown today in clinic.  The patient is healing well after placement of the hypoglossal nerve stimulator.  We discussed lifting and activity restrictions.  We discussed incision care and follow-up. I would like to see the patient back in 3 to 4 weeks for recheck.  The patient will need to meet with sleep medicine ( Friandrea for activation 1 month after placement of the device. The paient knows to contact me sooner with any problems or concerns.    Ricky to follow up with Primary Care provider regarding elevated blood pressure.    Dashawn Tanner MD  Department of Otolaryngology-Head and Neck Surgery  [facility]         Again, thank you for allowing me to participate in the care of your patient.        Sincerely,        Dashawn Tanner MD

## 2022-11-09 NOTE — PATIENT INSTRUCTIONS
Per physician instructions      If you have questions or concerns on any instructions given to you by your provider today or if you need to schedule an appointment, you can reach us at [phone].

## 2022-11-10 ENCOUNTER — OFFICE VISIT (OUTPATIENT)
Dept: CARDIOLOGY | Facility: CLINIC | Age: 72
End: 2022-11-10
Payer: COMMERCIAL

## 2022-11-10 VITALS
WEIGHT: 179.4 LBS | DIASTOLIC BLOOD PRESSURE: 72 MMHG | BODY MASS INDEX: 28.16 KG/M2 | RESPIRATION RATE: 16 BRPM | HEIGHT: 67 IN | HEART RATE: 69 BPM | OXYGEN SATURATION: 95 % | SYSTOLIC BLOOD PRESSURE: 124 MMHG

## 2022-11-10 DIAGNOSIS — I35.1 NONRHEUMATIC AORTIC VALVE INSUFFICIENCY: ICD-10-CM

## 2022-11-10 DIAGNOSIS — I42.9 CARDIOMYOPATHY, UNSPECIFIED TYPE (H): Primary | ICD-10-CM

## 2022-11-10 DIAGNOSIS — G47.33 OSA (OBSTRUCTIVE SLEEP APNEA): ICD-10-CM

## 2022-11-10 PROCEDURE — 99214 OFFICE O/P EST MOD 30 MIN: CPT | Performed by: INTERNAL MEDICINE

## 2022-11-10 NOTE — LETTER
11/10/2022    Holland Blunt MD  [address] [facility] [place] [address]    RE: Ricky Brown       Dear Colleague,     I had the pleasure of seeing Ricky Brown in the [facility].         [facility]   [address], [place] [address]   [email or web address]   OFFICE: [phone]          Thank you Holland Lundberg for asking the [facility] team to participate in the care of your patient, Ricky Brown.     Impression and Plan     1.  Cardiomyopathy (nonischemic).  Ricky has a history of nonischemic cardiomyopathy.  Most recent assessment of left ventricular systolic performance by echocardiogram 27 September 2022 revealed mild depression and left ventricular systolic performance with ejection fraction of 45%.    Medical therapy is appropriate.  Did consider increasing metoprolol further though heart rate in the 60s in the clinic and he reports some heart rates on his watch monitor in the 40s at night and therefore will defer.  He appears volume neutral on exam.    2.  Aortic insufficiency.  This was felt moderate in degree on echocardiogram 27 September 2022.    3.  History of CVA.  Ricky has a history of CVA felt embolic in nature.  He has had 2 ambulatory monitors that have not revealed any evidence of atrial fibrillation.  He does have documented patent foramen ovale (PFO) based on Dr. Manfred Henley's note from 3 August 2022.  He has been maintained on apixaban.    4.  Obstructive sleep apnea.  Ricky has a history of obstructive sleep apnea though historically has been not tolerant of CPAP.  He recently underwent corrective surgery.    Plan on follow-up in approximately 6 months.      35 minutes spent reviewing prior records (including documentation, laboratory studies, cardiac testing/imaging), interview with patient along with physical exam, planning, and subsequent documentation/crafting of note).            History of Present Illness    Once again I would like to thank you again for asking me to participate in the care of your patient, Ricky Brown.  As you know, but to reiterate for my own records, Ricky Brown is a 72 year old male with history of nonischemic cardiomyopathy.  Most recent assessment of left ventricular systolic performance by echocardiogram 27 September 2022 revealed mild depression and left ventricular systolic performance with ejection fraction of 45%.    On interview today, Faraz states that his breathing is fairly comfortable.  He still tries to walk on a regular basis.  Indeed he usually walks 2 miles a day with his dog.  He denies shortness of breath at night to suggest PND/orthopnea.  He was recently found to have evidence of sleep apnea and is soon to undergo treatment for this.  He denies any palpitations or lightheadedness.    Further review of systems is otherwise negative/noncontributory (medical record and 13 point review of systems reviewed as well and pertinent positives noted).         Cardiac Diagnostics      Echocardiogram 27 September 2022:  1. Normal left ventricular size with mildly reduced left ventricular systolic performance.  Ejection fraction 45%.  2. Moderate aortic insufficiency.  3. Normal right ventricular size and systolic performance.  4. Normal aortic dimensions.    Cardiac positron emission tomography 22 August 2022:  1. Stress Perfusion Ammonia Cardiac PET    2. No ischemia. Coronary flow reserve is normal in all vascular distributions. (Concordant with cardiac catheterization 8/8/2022)  3. At rest there is a mild decrease in perfusion in the septal wall and inferior lateral wall.   This is in a nonvascular distribution. It does appears to correlate with fibrosis demonstrated on MR 7 May 2020 with late gadolinium enhancement in the septal and inferolateralwalls.  4. Cardiomyopathy - Global hypokinesis. Decreased ejection fraction 36% at rest  and 40% at stress.  (Concordant with echocardiogram 29 June 2022)  5. FDG Cardiac PET - No cardiac FDG uptake to suggest active inflammation.   Cardiac prep was excellent with full myocardial glucose uptake suppression.    Cardiac MRI 7 May 2020:  1. The left ventricle is normal in cavity size and wall thickness. The global systolic function is mildly  2. reduced. The LVEF is 42%. There is mild diffuse hypokinesis.  3. The right ventricle is normal in cavity size. The global systolic function is mildly reduced. The RVEF is 49%.   4. Both atria are normal in size.  5. There is mild aortic regurgitation.  6. There is mid-myocardial late gadolinium enhancement in the basal inferolateral and basal inferoseptal segments consistent with non ischemic fibrosis.   7. Regadenoson stress perfusion imaging shows no ischemia.  8. There is no pericardial effusion or thickening.  9. There is no intracardiac thrombus.    Coronary angiogram 8 August 2022:  1. Minimal coronary artery disease with no significant stenosis.  Arteries are of large size.  2. Left ventricular end-diastolic pressures 16 mmHg.    Ambulatory monitor x25  days from 12 August 2022 through 12 September 2022:  1. Baseline rhythm was sinus rhythm 63bpm.    2. Reported heart rate range 37 (E8 August 2022 at 06:03am) to 1120bpm, average 68bpm.  3. 3 symptom triggers (chest pain, lightheaded, dyspnea, other) correlated to sinus rhythm 62-101bpm.  4. Automated recordings included intervals of sinus bradycardia with intermittent first degree atrioventricular block (six recordings, all during early morning hours), isolated PVCs.  5. No sustained tachyarrhythmias.  6. No atrial fibrillation.  7. There were no pauses noted.  8. Supraventricular and ventricular ectopic beat frequency are not reported on this monitoring modality.             Physical Examination       /72 (BP Location: Left arm, Patient Position: Sitting, Cuff Size: Adult Regular)   Pulse 69   Resp  "16    1.702 m (5' 7\")   Wt 81.4 kg (179 lb 6.4 oz)   SpO2 95%   BMI 28.10 kg/m          Wt Readings from Last 3 Encounters:   11/10/22 81.4 kg (179 lb 6.4 oz)   11/03/22 81.2 kg (179 lb)   09/16/22 83.9 kg (185 lb)       The patient is alert and oriented times three. Sclerae are anicteric. Mucosal membranes are moist. Jugular venous pressure is normal. No significant adenopathy/thyromegally appreciated. Lungs are clear with good expansion. On cardiovascular exam, the patient has a regular S1 and S2.  There is a 2/6 diastolic murmur.  Abdomen is soft and non-tender. Extremities reveal no clubbing, cyanosis.       Medications  Allergies   Current Outpatient Medications   Medication Sig Dispense Refill     acetaminophen (TYLENOL) 500 MG tablet Take 2 tablets (1,000 mg) by mouth every 6 hours as needed for pain or fever Every 6 hours as needed for post-op pain.  Alternate with ibuprofen. 200 tablet 1     apixaban ANTICOAGULANT (ELIQUIS) 5 MG tablet Take 1 tablet (5 mg) by mouth 2 times daily 60 tablet 11     ASPIRIN NOT PRESCRIBED (INTENTIONAL) Please choose reason not prescribed from choices below.       atorvastatin (LIPITOR) 10 MG tablet TAKE ONE TABLET BY MOUTH ONE TIME DAILY (Patient taking differently: Take 10 mg by mouth every evening) 90 tablet 0     cyanocobalamin (VITAMIN B-12) 1000 MCG tablet Take 1,000 mcg by mouth 2 times daily       diphenoxylate-atropine (LOMOTIL) 2.5-0.025 MG tablet Take 1 tablet by mouth daily as needed for diarrhea Patient only takes 1 tablet as needed 30 tablet 5     folic acid (FOLVITE) 1 MG tablet Take 1 tablet (1 mg) by mouth 2 times daily 60 tablet 4     levETIRAcetam (KEPPRA) 500 MG tablet Take 1 tablet (500 mg) by mouth 2 times daily 60 tablet 11     metoprolol succinate ER (TOPROL XL) 25 MG 24 hr tablet Take 1 tablet (25 mg) by mouth 2 times daily 180 tablet 11     sacubitril-valsartan (ENTRESTO) 24-26 MG per tablet Take 1 tablet by mouth 2 times daily 180 tablet 11     " SENNA-docusate sodium (SENNA S) 8.6-50 MG tablet Take 1 tablet by mouth At Bedtime Use while taking narcotic pain medications.  Hold for diarrhea. 30 tablet 1       Allergies   Allergen Reactions     Lisinopril Cough     Perfume Other (See Comments)          Lab Results    Chemistry/lipid CBC Cardiac Enzymes/BNP/TSH/INR   Recent Labs   Lab Test 06/13/22  1629 08/10/16  0934 04/14/15  1058   CHOL 92   < > 105   HDL 40   < > 39*   LDL 27   < > 18   TRIG 124   < > 241*   CHOLHDLRATIO  --   --  2.7    < > = values in this interval not displayed.     Recent Labs   Lab Test 06/13/22  1629 12/11/20  0929 07/23/19  0853   LDL 27 42 16     Recent Labs   Lab Test 10/21/22  1026      POTASSIUM 4.8   CHLORIDE 111*   CO2 27   *   BUN 19   CR 0.94   GFRESTIMATED 88   CATHERINE 9.2     Recent Labs   Lab Test 10/21/22  1026 09/08/22  0841 08/14/22  0914   CR 0.94 0.90 0.92     Recent Labs   Lab Test 04/28/21  1212 01/21/21  1733 02/25/20  1523   A1C 5.6 5.7* 5.6          Recent Labs   Lab Test 08/14/22  0914   WBC 5.8   HGB 13.9   HCT 41.3   MCV 88        Recent Labs   Lab Test 08/14/22  0914 08/08/22  0818 06/13/22  1629   HGB 13.9 13.8 13.8    No results for input(s): TROPONINI in the last 62673 hours.  Recent Labs   Lab Test 09/08/20  1743 05/18/20  1211   NTBNPI 731  --    NTBNP  --  194*     Recent Labs   Lab Test 08/14/22  0914   TSH 1.49     No results for input(s): INR in the last 15438 hours.     Medical History  Surgical History Family History Social History   Past Medical History:   Diagnosis Date     Arthritis      BCC (basal cell carcinoma)     right shoulder      Cardiomyopathy (H)      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 (H) 06/27/2022     Colon adenomas 09/20/2011     Coronary artery disease      Disorder of bursae and tendons in shoulder region 04/18/2014     ED (erectile dysfunction)      Fatty liver      HFrEF (heart  failure with reduced ejection fraction) (H)      History of iritis, os 05/27/2019     HTN (hypertension)      Near syncope 02/10/2014     Noncompliance      Nonrheumatic aortic valve insufficiency      Obesity      JOSE JUAN (obstructive sleep apnea)      Partial symptomatic epilepsy with complex partial seizures, not intractable, without status epilepticus (H) 09/08/2022     Past Surgical History:   Procedure Laterality Date     ARTHROSCOPY SHOULDER BICEPS TENODESIS REPAIR  02/27/2014    Procedure: ARTHROSCOPY SHOULDER BICEPS TENODESIS REPAIR;;  Surgeon: Michael Hagen MD;  [facility]     ARTHROSCOPY SHOULDER ROTATOR CUFF REPAIR  02/27/2014    Procedure: ARTHROSCOPY SHOULDER ROTATOR CUFF REPAIR;  Right Shoulder Arthroscopic Rotator Cuff Repair,  Subacromial Decompression, Biceps Tenodesis, Distal Clavicle Excision   ;  Surgeon: Michael Hagen MD;  Location: [facility]     BIOPSY       COLONOSCOPY  06/12/2018     CV CORONARY ANGIOGRAM N/A 08/08/2022    Procedure: Coronary Angiogram;  Surgeon: Ida Goddard MD;  Location: [facility] CATH LAB CV     CV LEFT HEART CATH N/A 08/08/2022    Procedure: Left Heart Catheterization;  Surgeon: Ida Goddard MD;  Location: [facility] CATH LAB CV     ENDOSCOPY DRUG INDUCED SLEEP N/A 09/16/2022    Procedure: DRUG INDUCED SLEEP ENDOSCOPY;  Surgeon: Dashawn Tanner MD;  Location: [facility]     EXCHANGE INTRAOCULAR LENS IMPLANT  2005    left eye - first, recentered PCL with iris fixation; then IOLX with ACL     EXTRACAPSULAR CATARACT EXTRATION WITH INTRAOCULAR LENS IMPLANT  2005    both eyes (elsewhere)     IMPLANT GENERATOR STIMULATOR (LOCATION) N/A 11/3/2022    Procedure: INSERTION, PULSE GENERATOR, NEUROSTIMULATOR;  Surgeon: Dashawn Tanner MD;  Location: [facility]     RELEASE CARPAL TUNNEL Right 08/13/2021    Procedure: RELEASE, RIGHT CARPAL TUNNEL;  Surgeon: Tony Bravo MD;  Location: [facility]     RELEASE CARPAL TUNNEL Right 10/26/2022    Procedure: RIGHT REVISION  OPEN CARPAL TUNNEL RELEASE, HYPOTHENAR FAT FLAP,;  Surgeon: Vignesh Rhodes MD;  Location: [facility]     TRANSPOSITION ULNAR NERVE (ELBOW) Right 10/26/2022    Procedure: RIGHT ULNAR NERVE DECOMPRESSION AT THE ELBOW;  Surgeon: Vignesh Rhodes MD;  Location: [facility]     TURBINOPLASTY  2013    Procedure: TURBINOPLASTY;;  Surgeon: Lisset Parsons MD;  Location: [facility]     UVULOPALATOPHARYNGOPLASTY  2013    Procedure: UVULOPALATOPHARYNGOPLASTY;  Uvulopalatopharyngoplasty, Turbiante Reduction;  Surgeon: Lisset Parsons MD;  Location: [facility] OR     Family History   Problem Relation Age of Onset     Diabetes Father         Old age Diabetes     Cerebrovascular Disease Father      Obesity Father      Heart Disease Father      Coronary Artery Disease Father      Hypertension Father      Lipids Sister      C.A.D. No family hx of      Cancer No family hx of      Glaucoma No family hx of      Macular Degeneration No family hx of      Anesthesia Reaction No family hx of      Deep Vein Thrombosis (DVT) No family hx of         Social History     Socioeconomic History     Marital status:      Spouse name: Kyung     Number of children: 0     Years of education: 14     Highest education level: Not on file   Occupational History     Occupation: industrial electronics      Employer: [employer]   Tobacco Use     Smoking status: Former     Packs/day: 0.50     Years: 2.00     Pack years: 1.00     Types: Cigarettes     Quit date: 1996     Years since quittin.9     Smokeless tobacco: Never   Vaping Use     Vaping Use: Never used   Substance and Sexual Activity     Alcohol use: Yes     Alcohol/week: 8.3 standard drinks     Comment: Evening Marie     Drug use: No     Sexual activity: Yes     Partners: Female     Birth control/protection: Male Surgical     Comment: 2006 vasectomy   Other Topics Concern     Parent/sibling w/ CABG, MI or angioplasty before 65F 55M? No   Social History Narrative     Not  on file     Social Determinants of Health     Financial Resource Strain: Not on file   Food Insecurity: Not on file   Transportation Needs: Not on file   Physical Activity: Not on file   Stress: Not on file   Social Connections: Not on file   Intimate Partner Violence: Not on file   Housing Stability: Not on file                    Thank you for allowing me to participate in the care of your patient.      Sincerely,     Hilario Meza MD     [facility]  cc:   No referring provider defined for this encounter.

## 2022-11-10 NOTE — PROGRESS NOTES
[facility] HEART CARE   [address], [place] [address]   [email or web address]   OFFICE: [phone]          Thank you Holland Lundberg for asking the [facility] Heart Care team to participate in the care of your patient, Ricky Brown.     Impression and Plan     1.  Cardiomyopathy (nonischemic).  Ricky has a history of nonischemic cardiomyopathy.  Most recent assessment of left ventricular systolic performance by echocardiogram 27 September 2022 revealed mild depression and left ventricular systolic performance with ejection fraction of 45%.    Medical therapy is appropriate.  Did consider increasing metoprolol further though heart rate in the 60s in the clinic and he reports some heart rates on his watch monitor in the 40s at night and therefore will defer.  He appears volume neutral on exam.    2.  Aortic insufficiency.  This was felt moderate in degree on echocardiogram 27 September 2022.    3.  History of CVA.  Ricky has a history of CVA felt embolic in nature.  He has had 2 ambulatory monitors that have not revealed any evidence of atrial fibrillation.  He does have documented patent foramen ovale (PFO) based on Dr. Manfred Henley's note from 3 August 2022.  He has been maintained on apixaban.    4.  Obstructive sleep apnea.  Ricky has a history of obstructive sleep apnea though historically has been not tolerant of CPAP.  He recently underwent corrective surgery.    Plan on follow-up in approximately 6 months.      35 minutes spent reviewing prior records (including documentation, laboratory studies, cardiac testing/imaging), interview with patient along with physical exam, planning, and subsequent documentation/crafting of note).           History of Present Illness    Once again I would like to thank you again for asking me to participate in the care of your patient, Ricky Brown.  As you know, but to reiterate for my own records, Ricky  ARI Brown is a 72 year old male with history of nonischemic cardiomyopathy.  Most recent assessment of left ventricular systolic performance by echocardiogram 27 September 2022 revealed mild depression and left ventricular systolic performance with ejection fraction of 45%.    On interview today, Faraz states that his breathing is fairly comfortable.  He still tries to walk on a regular basis.  Indeed he usually walks 2 miles a day with his dog.  He denies shortness of breath at night to suggest PND/orthopnea.  He was recently found to have evidence of sleep apnea and is soon to undergo treatment for this.  He denies any palpitations or lightheadedness.    Further review of systems is otherwise negative/noncontributory (medical record and 13 point review of systems reviewed as well and pertinent positives noted).         Cardiac Diagnostics      Echocardiogram 27 September 2022:  Normal left ventricular size with mildly reduced left ventricular systolic performance.  Ejection fraction 45%.  Moderate aortic insufficiency.  Normal right ventricular size and systolic performance.  Normal aortic dimensions.    Cardiac positron emission tomography 22 August 2022:  Stress Perfusion Ammonia Cardiac PET    No ischemia. Coronary flow reserve is normal in all vascular distributions. (Concordant with cardiac catheterization 8/8/2022)  At rest there is a mild decrease in perfusion in the septal wall and inferior lateral wall.   This is in a nonvascular distribution. It does appears to correlate with fibrosis demonstrated on MR 7 May 2020 with late gadolinium enhancement in the septal and inferolateralwalls.  Cardiomyopathy - Global hypokinesis. Decreased ejection fraction 36% at rest and 40% at stress.  (Concordant with echocardiogram 29 June 2022)  FDG Cardiac PET - No cardiac FDG uptake to suggest active inflammation.   Cardiac prep was excellent with full myocardial glucose uptake suppression.    Cardiac MRI 7 May  "2020:  The left ventricle is normal in cavity size and wall thickness. The global systolic function is mildly  reduced. The LVEF is 42%. There is mild diffuse hypokinesis.  The right ventricle is normal in cavity size. The global systolic function is mildly reduced. The RVEF is 49%.   Both atria are normal in size.  There is mild aortic regurgitation.  There is mid-myocardial late gadolinium enhancement in the basal inferolateral and basal inferoseptal segments consistent with non ischemic fibrosis.   Regadenoson stress perfusion imaging shows no ischemia.  There is no pericardial effusion or thickening.  There is no intracardiac thrombus.    Coronary angiogram 8 August 2022:  Minimal coronary artery disease with no significant stenosis.  Arteries are of large size.  Left ventricular end-diastolic pressures 16 mmHg.    Ambulatory monitor x25  days from 12 August 2022 through 12 September 2022:  Baseline rhythm was sinus rhythm 63bpm.    Reported heart rate range 37 (E8 August 2022 at 06:03am) to 1120bpm, average 68bpm.  3 symptom triggers (chest pain, lightheaded, dyspnea, other) correlated to sinus rhythm 62-101bpm.  Automated recordings included intervals of sinus bradycardia with intermittent first degree atrioventricular block (six recordings, all during early morning hours), isolated PVCs.  No sustained tachyarrhythmias.  No atrial fibrillation.  There were no pauses noted.  Supraventricular and ventricular ectopic beat frequency are not reported on this monitoring modality.             Physical Examination       /72 (BP Location: Left arm, Patient Position: Sitting, Cuff Size: Adult Regular)   Pulse 69   Resp 16   Ht 1.702 m (5' 7\")   Wt 81.4 kg (179 lb 6.4 oz)   SpO2 95%   BMI 28.10 kg/m          Wt Readings from Last 3 Encounters:   11/10/22 81.4 kg (179 lb 6.4 oz)   11/03/22 81.2 kg (179 lb)   09/16/22 83.9 kg (185 lb)       The patient is alert and oriented times three. Sclerae are anicteric. " Mucosal membranes are moist. Jugular venous pressure is normal. No significant adenopathy/thyromegally appreciated. Lungs are clear with good expansion. On cardiovascular exam, the patient has a regular S1 and S2.  There is a 2/6 diastolic murmur.  Abdomen is soft and non-tender. Extremities reveal no clubbing, cyanosis.       Medications  Allergies   Current Outpatient Medications   Medication Sig Dispense Refill     acetaminophen (TYLENOL) 500 MG tablet Take 2 tablets (1,000 mg) by mouth every 6 hours as needed for pain or fever Every 6 hours as needed for post-op pain.  Alternate with ibuprofen. 200 tablet 1     apixaban ANTICOAGULANT (ELIQUIS) 5 MG tablet Take 1 tablet (5 mg) by mouth 2 times daily 60 tablet 11     ASPIRIN NOT PRESCRIBED (INTENTIONAL) Please choose reason not prescribed from choices below.       atorvastatin (LIPITOR) 10 MG tablet TAKE ONE TABLET BY MOUTH ONE TIME DAILY (Patient taking differently: Take 10 mg by mouth every evening) 90 tablet 0     cyanocobalamin (VITAMIN B-12) 1000 MCG tablet Take 1,000 mcg by mouth 2 times daily       diphenoxylate-atropine (LOMOTIL) 2.5-0.025 MG tablet Take 1 tablet by mouth daily as needed for diarrhea Patient only takes 1 tablet as needed 30 tablet 5     folic acid (FOLVITE) 1 MG tablet Take 1 tablet (1 mg) by mouth 2 times daily 60 tablet 4     levETIRAcetam (KEPPRA) 500 MG tablet Take 1 tablet (500 mg) by mouth 2 times daily 60 tablet 11     metoprolol succinate ER (TOPROL XL) 25 MG 24 hr tablet Take 1 tablet (25 mg) by mouth 2 times daily 180 tablet 11     sacubitril-valsartan (ENTRESTO) 24-26 MG per tablet Take 1 tablet by mouth 2 times daily 180 tablet 11     SENNA-docusate sodium (SENNA S) 8.6-50 MG tablet Take 1 tablet by mouth At Bedtime Use while taking narcotic pain medications.  Hold for diarrhea. 30 tablet 1       Allergies   Allergen Reactions     Lisinopril Cough     Perfume Other (See Comments)          Lab Results    Chemistry/lipid CBC  Cardiac Enzymes/BNP/TSH/INR   Recent Labs   Lab Test 06/13/22  1629 08/10/16  0934 04/14/15  1058   CHOL 92   < > 105   HDL 40   < > 39*   LDL 27   < > 18   TRIG 124   < > 241*   CHOLHDLRATIO  --   --  2.7    < > = values in this interval not displayed.     Recent Labs   Lab Test 06/13/22  1629 12/11/20  0929 07/23/19  0853   LDL 27 42 16     Recent Labs   Lab Test 10/21/22  1026      POTASSIUM 4.8   CHLORIDE 111*   CO2 27   *   BUN 19   CR 0.94   GFRESTIMATED 88   CATHERINE 9.2     Recent Labs   Lab Test 10/21/22  1026 09/08/22  0841 08/14/22  0914   CR 0.94 0.90 0.92     Recent Labs   Lab Test 04/28/21  1212 01/21/21  1733 02/25/20  1523   A1C 5.6 5.7* 5.6          Recent Labs   Lab Test 08/14/22  0914   WBC 5.8   HGB 13.9   HCT 41.3   MCV 88        Recent Labs   Lab Test 08/14/22  0914 08/08/22  0818 06/13/22  1629   HGB 13.9 13.8 13.8    No results for input(s): TROPONINI in the last 12886 hours.  Recent Labs   Lab Test 09/08/20  1743 05/18/20  1211   NTBNPI 731  --    NTBNP  --  194*     Recent Labs   Lab Test 08/14/22  0914   TSH 1.49     No results for input(s): INR in the last 64351 hours.     Medical History  Surgical History Family History Social History   Past Medical History:   Diagnosis Date     Arthritis      BCC (basal cell carcinoma)     right shoulder      Cardiomyopathy (H)      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 (H) 06/27/2022     Colon adenomas 09/20/2011     Coronary artery disease      Disorder of bursae and tendons in shoulder region 04/18/2014     ED (erectile dysfunction)      Fatty liver      HFrEF (heart failure with reduced ejection fraction) (H)      History of iritis, os 05/27/2019     HTN (hypertension)      Near syncope 02/10/2014     Noncompliance      Nonrheumatic aortic valve insufficiency      Obesity      JOSE JUAN (obstructive sleep apnea)      Partial symptomatic epilepsy with complex  partial seizures, not intractable, without status epilepticus (H) 09/08/2022     Past Surgical History:   Procedure Laterality Date     ARTHROSCOPY SHOULDER BICEPS TENODESIS REPAIR  02/27/2014    Procedure: ARTHROSCOPY SHOULDER BICEPS TENODESIS REPAIR;;  Surgeon: Michael Hagen MD;  [facility]     ARTHROSCOPY SHOULDER ROTATOR CUFF REPAIR  02/27/2014    Procedure: ARTHROSCOPY SHOULDER ROTATOR CUFF REPAIR;  Right Shoulder Arthroscopic Rotator Cuff Repair,  Subacromial Decompression, Biceps Tenodesis, Distal Clavicle Excision   ;  Surgeon: Michael Hagen MD;  Location: [facility]     BIOPSY       COLONOSCOPY  06/12/2018     CV CORONARY ANGIOGRAM N/A 08/08/2022    Procedure: Coronary Angiogram;  Surgeon: Ida Goddard MD;  Location: [facility] CATH LAB CV     CV LEFT HEART CATH N/A 08/08/2022    Procedure: Left Heart Catheterization;  Surgeon: Ida Goddard MD;  Location: [facility] LAB CV     ENDOSCOPY DRUG INDUCED SLEEP N/A 09/16/2022    Procedure: DRUG INDUCED SLEEP ENDOSCOPY;  Surgeon: Dashawn Tanner MD;  Location: [facility]     EXCHANGE INTRAOCULAR LENS IMPLANT  2005    left eye - first, recentered PCL with iris fixation; then IOLX with ACL     EXTRACAPSULAR CATARACT EXTRATION WITH INTRAOCULAR LENS IMPLANT  2005    both eyes (elsewhere)     IMPLANT GENERATOR STIMULATOR (LOCATION) N/A 11/3/2022    Procedure: INSERTION, PULSE GENERATOR, NEUROSTIMULATOR;  Surgeon: Dashawn Tanner MD;  Location: [facility] OR     RELEASE CARPAL TUNNEL Right 08/13/2021    Procedure: RELEASE, RIGHT CARPAL TUNNEL;  Surgeon: Tony Bravo MD;  Location: [facility] OR     RELEASE CARPAL TUNNEL Right 10/26/2022    Procedure: RIGHT REVISION OPEN CARPAL TUNNEL RELEASE, HYPOTHENAR FAT FLAP,;  Surgeon: Vignesh Rhodes MD;  Location: [facility] OR     TRANSPOSITION ULNAR NERVE (ELBOW) Right 10/26/2022    Procedure: RIGHT ULNAR NERVE DECOMPRESSION AT THE ELBOW;  Surgeon: Vignesh Rhodes MD;  Location: [facility]     TURBINOPLASTY   2013    Procedure: TURBINOPLASTY;;  Surgeon: Lisset Parsons MD;  Location: [facility]     UVULOPALATOPHARYNGOPLASTY  2013    Procedure: UVULOPALATOPHARYNGOPLASTY;  Uvulopalatopharyngoplasty, Turbiante Reduction;  Surgeon: Lisset Parsons MD;  Location: [facility] OR     Family History   Problem Relation Age of Onset     Diabetes Father         Old age Diabetes     Cerebrovascular Disease Father      Obesity Father      Heart Disease Father      Coronary Artery Disease Father      Hypertension Father      Lipids Sister      C.A.D. No family hx of      Cancer No family hx of      Glaucoma No family hx of      Macular Degeneration No family hx of      Anesthesia Reaction No family hx of      Deep Vein Thrombosis (DVT) No family hx of         Social History     Socioeconomic History     Marital status:      Spouse name: Kyung     Number of children: 0     Years of education: 14     Highest education level: Not on file   Occupational History     Occupation: industrial electronics      Employer: [employer]   Tobacco Use     Smoking status: Former     Packs/day: 0.50     Years: 2.00     Pack years: 1.00     Types: Cigarettes     Quit date: 1996     Years since quittin.9     Smokeless tobacco: Never   Vaping Use     Vaping Use: Never used   Substance and Sexual Activity     Alcohol use: Yes     Alcohol/week: 8.3 standard drinks     Comment: Evening Marie     Drug use: No     Sexual activity: Yes     Partners: Female     Birth control/protection: Male Surgical     Comment: 2006 vasectomy   Other Topics Concern     Parent/sibling w/ CABG, MI or angioplasty before 65F 55M? No   Social History Narrative     Not on file     Social Determinants of Health     Financial Resource Strain: Not on file   Food Insecurity: Not on file   Transportation Needs: Not on file   Physical Activity: Not on file   Stress: Not on file   Social Connections: Not on file   Intimate Partner Violence: Not on file    Housing Stability: Not on file

## 2022-11-14 ENCOUNTER — OFFICE VISIT (OUTPATIENT)
Dept: ORTHOPEDICS | Facility: CLINIC | Age: 72
End: 2022-11-14
Payer: COMMERCIAL

## 2022-11-14 DIAGNOSIS — G56.01 CARPAL TUNNEL SYNDROME OF RIGHT WRIST: Primary | ICD-10-CM

## 2022-11-14 DIAGNOSIS — G56.21 CUBITAL TUNNEL SYNDROME ON RIGHT: ICD-10-CM

## 2022-11-14 PROCEDURE — 99024 POSTOP FOLLOW-UP VISIT: CPT | Performed by: PHYSICIAN ASSISTANT

## 2022-11-14 NOTE — LETTER
11/14/2022         RE: Ricky Brown  [address]  [place] [id number]        Dear Colleague,    Thank you for referring your patient, Ricky Brown, to the [facility]. Please see a copy of my visit note below.    Orthopedic Hand Note  11/14/2022    HPI:   Ricky presents for follow up. He is doing well. His wife is here with him. He feels that his sensation is slowly improving. He is able to button his shirt, which he could not do previously. He notes that his sensation is not so well improved that he can play piano well. He has no other issues or complaints.     O:   Gen: well appearing, NAD  MSK: right hand and elbow with incisions well healed, no signs of infection. Sensation to median and ulnar distribution of right hand present.     Assessment/Plan:   72 year old male s/p revision open carpal tunnel release with hypothenar fat flap, right ulnar nerve decompression at the elbow on 10/26/2022    Begin 15-20lb lifting at 5 weeks from surgery, following this, unrestricted  Continue to use hand and elbow to prevent stiffness  Counseled patient to understand that the baseline tingling and numbness will continue to improve over the next several months  Follow up if any concerns or further questions     Maddy Olivas PA-C on 11/14/2022 at 12:26 PM        Again, thank you for allowing me to participate in the care of your patient.        Sincerely,        Maddy Olivas PA-C

## 2022-11-14 NOTE — PROGRESS NOTES
Orthopedic Hand Note  11/14/2022    HPI:   Ricky presents for follow up. He is doing well. His wife is here with him. He feels that his sensation is slowly improving. He is able to button his shirt, which he could not do previously. He notes that his sensation is not so well improved that he can play piano well. He has no other issues or complaints.     O:   Gen: well appearing, NAD  MSK: right hand and elbow with incisions well healed, no signs of infection. Sensation to median and ulnar distribution of right hand present.     Assessment/Plan:   72 year old male s/p revision open carpal tunnel release with hypothenar fat flap, right ulnar nerve decompression at the elbow on 10/26/2022    Begin 15-20lb lifting at 5 weeks from surgery, following this, unrestricted  Continue to use hand and elbow to prevent stiffness  Counseled patient to understand that the baseline tingling and numbness will continue to improve over the next several months  Follow up if any concerns or further questions     Maddy Olivas PA-C on 11/14/2022 at 12:26 PM

## 2022-11-20 DIAGNOSIS — E53.8 FOLIC ACID DEFICIENCY (NON ANEMIC): ICD-10-CM

## 2022-11-21 RX ORDER — FOLIC ACID 1 MG/1
1 TABLET ORAL 2 TIMES DAILY
Qty: 180 TABLET | Refills: 0 | Status: SHIPPED | OUTPATIENT
Start: 2022-11-21 | End: 2023-02-19

## 2022-11-21 NOTE — TELEPHONE ENCOUNTER
Refill request for: folic acid (FOLVITE) 1 MG tablet  Directions: Take 1 tablet (1 mg) by mouth 2 times daily    LOV: 8/31/22  NOV: 12/30/22    90 day supply with 0 refills Medication T'd for review and signature  Amy Yang CMA on 11/21/2022 at 3:40 PM

## 2022-11-29 NOTE — PROGRESS NOTES
Chief Complaint   Patient presents with     Post-op Visit     Post op 4 week Inspire      History of Present Illness  Ricky Brown is a 72 year old male who presents today for follow-up.  The patient went to the operating room on 11/3/2022 and underwent placement of the hypoglossal nerve stimulator.  The patient was seen for follow up on 11/9/2022 and he was healing well. He has done well postoperatively with minimal pain. The patient denies any problems with the incisions.  The patient presents today for follow-up.     Past Medical History  Patient Active Problem List   Diagnosis     HL (hearing loss)     Erectile dysfunction     Pseudophakia, sutured PCL, od; ACL, os - hx of IOL dislocation, ou     Posterior vitreous detachment, od     Epiretinal membrane, mild, od     Advanced directives, counseling/discussion     JOSE JUAN (obstructive sleep apnea)-severe (AHI 61)     BCC (basal cell carcinoma)     Cardiomyopathy (H)     RCT (rotator cuff tear)     Essential hypertension with goal blood pressure less than 140/90     Hyperlipidemia LDL goal <70     Megalocornea     Macular edema, od     Angina, class II (H)     Elevated LFTs     Fatty liver     Right carpal tunnel syndrome     CAD (coronary artery disease)     S/P carpal tunnel release     Right hand weakness     Pillar pain of extremity     Folic acid deficiency (non anemic)     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 (H)     HFrEF (heart failure with reduced ejection fraction) (H)     Nonrheumatic aortic valve insufficiency     Lumbar spine pain     Pain in thoracic spine     ETOH abuse     Partial symptomatic epilepsy with complex partial seizures, not intractable, without status epilepticus (H)     Current Medications    Current Outpatient Medications:      apixaban ANTICOAGULANT (ELIQUIS) 5 MG tablet, Take 1 tablet (5 mg) by mouth 2 times daily, Disp: 60 tablet, Rfl: 11     ASPIRIN  NOT PRESCRIBED (INTENTIONAL), Please choose reason not prescribed from choices below., Disp: , Rfl:      atorvastatin (LIPITOR) 10 MG tablet, TAKE ONE TABLET BY MOUTH ONE TIME DAILY (Patient taking differently: Take 10 mg by mouth every evening), Disp: 90 tablet, Rfl: 0     cyanocobalamin (VITAMIN B-12) 1000 MCG tablet, Take 1,000 mcg by mouth 2 times daily, Disp: , Rfl:      diphenoxylate-atropine (LOMOTIL) 2.5-0.025 MG tablet, Take 1 tablet by mouth daily as needed for diarrhea Patient only takes 1 tablet as needed, Disp: 30 tablet, Rfl: 5     folic acid (FOLVITE) 1 MG tablet, Take 1 tablet (1 mg) by mouth 2 times daily for 90 days, Disp: 180 tablet, Rfl: 0     levETIRAcetam (KEPPRA) 500 MG tablet, Take 1 tablet (500 mg) by mouth 2 times daily, Disp: 60 tablet, Rfl: 11     metoprolol succinate ER (TOPROL XL) 25 MG 24 hr tablet, Take 1 tablet (25 mg) by mouth 2 times daily, Disp: 180 tablet, Rfl: 11     sacubitril-valsartan (ENTRESTO) 24-26 MG per tablet, Take 1 tablet by mouth 2 times daily, Disp: 180 tablet, Rfl: 11     SENNA-docusate sodium (SENNA S) 8.6-50 MG tablet, Take 1 tablet by mouth At Bedtime Use while taking narcotic pain medications.  Hold for diarrhea., Disp: 30 tablet, Rfl: 1  No current facility-administered medications for this visit.    Facility-Administered Medications Ordered in Other Visits:      sodium chloride (PF) 0.9% PF flush 10 mL, 10 mL, Intravenous, Once, Catrachita Beltran MD    Allergies  Allergies   Allergen Reactions     Lisinopril Cough     Perfume Other (See Comments)       Social History  Social History     Socioeconomic History     Marital status:      Spouse name: Kyung     Number of children: 0     Years of education: 14   Occupational History     Occupation: industrial electronics      Employer: [employer]   Tobacco Use     Smoking status: Former     Packs/day: 0.50     Years: 2.00     Pack years: 1.00     Types: Cigarettes     Quit date: 12/12/1996     Years since quitting:  "25.9     Smokeless tobacco: Never   Vaping Use     Vaping Use: Never used   Substance and Sexual Activity     Alcohol use: Yes     Alcohol/week: 8.3 standard drinks     Comment: Evening Marie     Drug use: No     Sexual activity: Yes     Partners: Female     Birth control/protection: Male Surgical     Comment: 2006 vasectomy   Other Topics Concern     Parent/sibling w/ CABG, MI or angioplasty before 65F 55M? No       Family History  Family History   Problem Relation Age of Onset     Diabetes Father         Old age Diabetes     Cerebrovascular Disease Father      Obesity Father      Heart Disease Father      Coronary Artery Disease Father      Hypertension Father      Lipids Sister      C.A.D. No family hx of      Cancer No family hx of      Glaucoma No family hx of      Macular Degeneration No family hx of      Anesthesia Reaction No family hx of      Deep Vein Thrombosis (DVT) No family hx of        Review of Systems  As per HPI and PMHx, otherwise 10 system review including the head and neck, constitutional, eyes, respiratory, GI, skin, neurologic, lymphatic, endocrine, and allergy systems is negative.    Physical Exam  /82 (BP Location: Right arm, Patient Position: Sitting, Cuff Size: Adult Regular)   Pulse 64   Temp 97.8  F (36.6  C) (Tympanic)   Ht 1.702 m (5' 7\")   Wt 81.2 kg (179 lb)   BMI 28.04 kg/m    GENERAL: Patient is a pleasant, cooperative 72 year old male in no acute distress.  HEAD: Normocephalic, atraumatic.  Hair and scalp are normal.  EYES: Pupils are equal, round, reactive to light and accommodation.  Extraocular movements are intact.  The sclera nonicteric without injection.  The extraocular structures are normal.  EARS: Normal shape and symmetry.  No tenderness when palpating the mastoid or tragal areas bilaterally.    NOSE: Nares are patent.  Nasal mucosa is pink and moist.  Negative anterior rhinoscopy.  ORAL CAVITY: Dentition is in good repair.  Mucous membranes are moist.  " Tongue is mobile, protrudes to the midline.  Palate elevates symmetrically.  NECK: Supple, trachea is midline.  The right submental incision is clean, dry, intact.  No erythema or fluctuance.  He does have a moderate healing ridge.  No evidence of hematoma or seroma.  There is some slight tethering of his lead.  CHEST: The right upper chest incision is clean, dry, intact.  No evidence of hematoma or seroma.  No erythema or fluctuance.  NEUROLOGIC: Cranial nerves II through XII are grossly intact.  Voice is strong.  Patient is House-Brackmann I/VI on the right and House-Brackmann I/VI on the left.  CARDIOVASCULAR: Extremities are warm and well-perfused.  No significant peripheral edema.  RESPIRATORY: Patient has nonlabored breathing without cough, wheeze, stridor.  PSYCHIATRIC: Patient is alert and oriented.  Mood and affect appear normal.  SKIN: Warm and dry.  No scalp, face, or neck lesions noted.    Assessment and Plan     ICD-10-CM    1. Severe obstructive sleep apnea  G47.33       2. Intolerance of continuous positive airway pressure (CPAP) ventilation  Z78.9       3. BMI 28.0-28.9,adult  Z68.28       4. Status post insertion of hypoglossal nerve stimulator  Z96.82       5. Postoperative state  Z98.890          It was my pleasure seeing Ricky CHAPMAN Stephanie today in clinic.  The patient has healed well after placement of the hypoglossal nerve stimulator.  We discussed stopping lifting and activity restrictions.  We discussed incision care and follow-up.  I do think he benefit from some submental scar massage and massaging of the lead site.   He is cleared for activation.  The patient will meet with sleep medicine ( Friday) for activation.    The paient knows to contact me in the future with any problems or concerns.    Dashawn Tanner MD  Department of Otolaryngology-Head and Neck Surgery  [facility]

## 2022-11-30 ENCOUNTER — OFFICE VISIT (OUTPATIENT)
Dept: OTOLARYNGOLOGY | Facility: CLINIC | Age: 72
End: 2022-11-30
Payer: COMMERCIAL

## 2022-11-30 VITALS
BODY MASS INDEX: 28.09 KG/M2 | DIASTOLIC BLOOD PRESSURE: 82 MMHG | TEMPERATURE: 97.8 F | HEIGHT: 67 IN | HEART RATE: 64 BPM | WEIGHT: 179 LBS | SYSTOLIC BLOOD PRESSURE: 133 MMHG

## 2022-11-30 DIAGNOSIS — Z96.82 STATUS POST INSERTION OF HYPOGLOSSAL NERVE STIMULATOR: ICD-10-CM

## 2022-11-30 DIAGNOSIS — Z78.9 INTOLERANCE OF CONTINUOUS POSITIVE AIRWAY PRESSURE (CPAP) VENTILATION: ICD-10-CM

## 2022-11-30 DIAGNOSIS — G47.33 SEVERE OBSTRUCTIVE SLEEP APNEA: Primary | ICD-10-CM

## 2022-11-30 DIAGNOSIS — Z98.890 POSTOPERATIVE STATE: ICD-10-CM

## 2022-11-30 PROCEDURE — 99024 POSTOP FOLLOW-UP VISIT: CPT | Performed by: OTOLARYNGOLOGY

## 2022-11-30 NOTE — PATIENT INSTRUCTIONS
Per physician instructions      If you have questions or concerns on any instructions given to you by your provider today or if you need to schedule an appointment, you can reach us at [phone].

## 2022-11-30 NOTE — LETTER
11/30/2022         RE: Ricky Brown  [address]  [place] [facility] [address]        Dear Colleague,    Thank you for referring your patient, Ricky Brown, to the [facility]. Please see a copy of my visit note below.    Chief Complaint   Patient presents with     Post-op Visit     Post op 4 week Inspire      History of Present Illness  Ricky Brown is a 72 year old male who presents today for follow-up.  The patient went to the operating room on 11/3/2022 and underwent placement of the hypoglossal nerve stimulator.  The patient was seen for follow up on 11/9/2022 and he was healing well. He has done well postoperatively with minimal pain. The patient denies any problems with the incisions.  The patient presents today for follow-up.     Past Medical History  Patient Active Problem List   Diagnosis     HL (hearing loss)     Erectile dysfunction     Pseudophakia, sutured PCL, od; ACL, os - hx of IOL dislocation, ou     Posterior vitreous detachment, od     Epiretinal membrane, mild, od     Advanced directives, counseling/discussion     JOSE JUAN (obstructive sleep apnea)-severe (AHI 61)     BCC (basal cell carcinoma)     Cardiomyopathy (H)     RCT (rotator cuff tear)     Essential hypertension with goal blood pressure less than 140/90     Hyperlipidemia LDL goal <70     Megalocornea     Macular edema, od     Angina, class II (H)     Elevated LFTs     Fatty liver     Right carpal tunnel syndrome     CAD (coronary artery disease)     S/P carpal tunnel release     Right hand weakness     Pillar pain of extremity     Folic acid deficiency (non anemic)     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 (H)     HFrEF (heart failure with reduced ejection fraction) (H)     Nonrheumatic aortic valve insufficiency     Lumbar spine pain     Pain in thoracic spine     ETOH abuse     Partial symptomatic epilepsy with  complex partial seizures, not intractable, without status epilepticus (H)     Current Medications    Current Outpatient Medications:      apixaban ANTICOAGULANT (ELIQUIS) 5 MG tablet, Take 1 tablet (5 mg) by mouth 2 times daily, Disp: 60 tablet, Rfl: 11     ASPIRIN NOT PRESCRIBED (INTENTIONAL), Please choose reason not prescribed from choices below., Disp: , Rfl:      atorvastatin (LIPITOR) 10 MG tablet, TAKE ONE TABLET BY MOUTH ONE TIME DAILY (Patient taking differently: Take 10 mg by mouth every evening), Disp: 90 tablet, Rfl: 0     cyanocobalamin (VITAMIN B-12) 1000 MCG tablet, Take 1,000 mcg by mouth 2 times daily, Disp: , Rfl:      diphenoxylate-atropine (LOMOTIL) 2.5-0.025 MG tablet, Take 1 tablet by mouth daily as needed for diarrhea Patient only takes 1 tablet as needed, Disp: 30 tablet, Rfl: 5     folic acid (FOLVITE) 1 MG tablet, Take 1 tablet (1 mg) by mouth 2 times daily for 90 days, Disp: 180 tablet, Rfl: 0     levETIRAcetam (KEPPRA) 500 MG tablet, Take 1 tablet (500 mg) by mouth 2 times daily, Disp: 60 tablet, Rfl: 11     metoprolol succinate ER (TOPROL XL) 25 MG 24 hr tablet, Take 1 tablet (25 mg) by mouth 2 times daily, Disp: 180 tablet, Rfl: 11     sacubitril-valsartan (ENTRESTO) 24-26 MG per tablet, Take 1 tablet by mouth 2 times daily, Disp: 180 tablet, Rfl: 11     SENNA-docusate sodium (SENNA S) 8.6-50 MG tablet, Take 1 tablet by mouth At Bedtime Use while taking narcotic pain medications.  Hold for diarrhea., Disp: 30 tablet, Rfl: 1  No current facility-administered medications for this visit.    Facility-Administered Medications Ordered in Other Visits:      sodium chloride (PF) 0.9% PF flush 10 mL, 10 mL, Intravenous, Once, Catrachita Beltran MD    Allergies  Allergies   Allergen Reactions     Lisinopril Cough     Perfume Other (See Comments)       Social History  Social History     Socioeconomic History     Marital status:      Spouse name: Kyung     Number of children: 0     Years of  "education: 14   Occupational History     Occupation: industrial electronics      Employer: [employer]   Tobacco Use     Smoking status: Former     Packs/day: 0.50     Years: 2.00     Pack years: 1.00     Types: Cigarettes     Quit date: 1996     Years since quittin.9     Smokeless tobacco: Never   Vaping Use     Vaping Use: Never used   Substance and Sexual Activity     Alcohol use: Yes     Alcohol/week: 8.3 standard drinks     Comment: Evening Marie     Drug use: No     Sexual activity: Yes     Partners: Female     Birth control/protection: Male Surgical     Comment:  vasectomy   Other Topics Concern     Parent/sibling w/ CABG, MI or angioplasty before 65F 55M? No       Family History  Family History   Problem Relation Age of Onset     Diabetes Father         Old age Diabetes     Cerebrovascular Disease Father      Obesity Father      Heart Disease Father      Coronary Artery Disease Father      Hypertension Father      Lipids Sister      C.A.D. No family hx of      Cancer No family hx of      Glaucoma No family hx of      Macular Degeneration No family hx of      Anesthesia Reaction No family hx of      Deep Vein Thrombosis (DVT) No family hx of        Review of Systems  As per HPI and PMHx, otherwise 10 system review including the head and neck, constitutional, eyes, respiratory, GI, skin, neurologic, lymphatic, endocrine, and allergy systems is negative.    Physical Exam  /82 (BP Location: Right arm, Patient Position: Sitting, Cuff Size: Adult Regular)   Pulse 64   Temp 97.8  F (36.6  C) (Tympanic)   Ht 1.702 m (5' 7\")   Wt 81.2 kg (179 lb)   BMI 28.04 kg/m    GENERAL: Patient is a pleasant, cooperative 72 year old male in no acute distress.  HEAD: Normocephalic, atraumatic.  Hair and scalp are normal.  EYES: Pupils are equal, round, reactive to light and accommodation.  Extraocular movements are intact.  The sclera nonicteric without injection.  The extraocular structures are normal.  EARS: " Normal shape and symmetry.  No tenderness when palpating the mastoid or tragal areas bilaterally.    NOSE: Nares are patent.  Nasal mucosa is pink and moist.  Negative anterior rhinoscopy.  ORAL CAVITY: Dentition is in good repair.  Mucous membranes are moist.  Tongue is mobile, protrudes to the midline.  Palate elevates symmetrically.  NECK: Supple, trachea is midline.  The right submental incision is clean, dry, intact.  No erythema or fluctuance.  He does have a moderate healing ridge.  No evidence of hematoma or seroma.  There is some slight tethering of his lead.  CHEST: The right upper chest incision is clean, dry, intact.  No evidence of hematoma or seroma.  No erythema or fluctuance.  NEUROLOGIC: Cranial nerves II through XII are grossly intact.  Voice is strong.  Patient is House-Brackmann I/VI on the right and House-Brackmann I/VI on the left.  CARDIOVASCULAR: Extremities are warm and well-perfused.  No significant peripheral edema.  RESPIRATORY: Patient has nonlabored breathing without cough, wheeze, stridor.  PSYCHIATRIC: Patient is alert and oriented.  Mood and affect appear normal.  SKIN: Warm and dry.  No scalp, face, or neck lesions noted.    Assessment and Plan     ICD-10-CM    1. Severe obstructive sleep apnea  G47.33       2. Intolerance of continuous positive airway pressure (CPAP) ventilation  Z78.9       3. BMI 28.0-28.9,adult  Z68.28       4. Status post insertion of hypoglossal nerve stimulator  Z96.82       5. Postoperative state  Z98.890          It was my pleasure seeing Ricky Brown today in clinic.  The patient has healed well after placement of the hypoglossal nerve stimulator.  We discussed stopping lifting and activity restrictions.  We discussed incision care and follow-up.  I do think he benefit from some submental scar massage and massaging of the lead site.   He is cleared for activation.  The patient will meet with sleep medicine ( Friday) for activation.    The genaro  knows to contact me in the future with any problems or concerns.    Dashawn Tanner MD  Department of Otolaryngology-Head and Neck Surgery  [facility]         Again, thank you for allowing me to participate in the care of your patient.        Sincerely,        Dashawn Tanner MD

## 2022-11-30 NOTE — NURSING NOTE
"Initial /82 (BP Location: Right arm, Patient Position: Sitting, Cuff Size: Adult Regular)   Pulse 64   Temp 97.8  F (36.6  C) (Tympanic)   Ht 1.702 m (5' 7\")   Wt 81.2 kg (179 lb)   BMI 28.04 kg/m   Estimated body mass index is 28.04 kg/m  as calculated from the following:    Height as of this encounter: 1.702 m (5' 7\").    Weight as of this encounter: 81.2 kg (179 lb). .    Natividad Arshad CMA    "

## 2022-12-02 ENCOUNTER — OFFICE VISIT (OUTPATIENT)
Dept: NEUROPSYCHOLOGY | Facility: CLINIC | Age: 72
End: 2022-12-02
Payer: COMMERCIAL

## 2022-12-02 DIAGNOSIS — R41.3 COMPLAINTS OF MEMORY DISTURBANCE: Primary | ICD-10-CM

## 2022-12-02 PROCEDURE — 90791 PSYCH DIAGNOSTIC EVALUATION: CPT

## 2022-12-02 PROCEDURE — 96139 PSYCL/NRPSYC TST TECH EA: CPT

## 2022-12-02 PROCEDURE — 96132 NRPSYC TST EVAL PHYS/QHP 1ST: CPT

## 2022-12-02 PROCEDURE — 96133 NRPSYC TST EVAL PHYS/QHP EA: CPT

## 2022-12-02 PROCEDURE — 96138 PSYCL/NRPSYC TECH 1ST: CPT

## 2022-12-02 NOTE — LETTER
2022       RE: Ricky Brown  [address]  [facility] [address]     Dear Colleague,    Thank you for referring your patient, Ricky Brown, to the [facility] [place] at [facility]. Please see a copy of my visit note below.    NEUROPSYCHOLOGICAL EVALUATION  **CONFIDENTIAL**    Name: Ricky Brown Education: 12 years    (age): 1950 (72 years) TOM:  2022   Referral: Nathen Ruiz MD  Department of Neurology Handedness:  MRN (Epic): Left  [id number]     IDENTIFYING INFORMATION AND REASON FOR REFERRAL   Mr. Brown is a 72-year-old, left-handed White male with a history of hypertension, JOSE JUAN, and possible complex partial seizures. This evaluation was requested to provide a comprehensive assessment of his current neuropsychological status to inform treatment planning.     IMPRESSION  See below for relevant background information and detailed test results. See separate abstract for supporting documentation including a list of neuropsychological measures and test scores.    Mr. Brown underwent neuropsychological testing on 3/13/2019 which revealed test findings are largely within normal limits, aside from mild difficulty on one executive task requiring inductive reasoning, which is borderline impaired. The results of the current neuropsychological evaluation revealed mildly impaired performance on select executive functioning tasks (i.e., cognitive inhibition, planning/organization) with notable tangentiality in conversation, in the context of other well preserved cognitive abilities. Performances were within expectation and stable across other cognitive tests including immediate auditory attention and working memory, speeded processing, language, visuospatial processing, and mental flexibility. Performances were also within expectation on tests of learning and memory, abstract reasoning, and  knowledge of health and safety. Although direct comparisons could not be made across memory tests, there was no evidence to suggest decline compared to prior testing. A strength was noted on a test of confrontation naming with performance in the exceptionally high range. Assessment of current psychological and emotional status revealed mild symptoms of anxiety and the absence of clinically significant depressive mood symptoms.    Overall, Mr. Brown is functioning quite well from a neuropsychological standpoint with only mild impairment on select executive functioning tasks without evidence of cognitive decline since his prior evaluation in 2019. The cognitive profile is not suggestive of a neurocognitive diagnosis at this time and does not raise concerns about an underlying neurodegenerative process. The pattern of performances characterized by variable executive functioning is diagnostically nonspecific but could be consistent with effects of cerebrovascular factors (i.e., hypertension, JOSE JUAN, infarcts noted on neuroimaging). Possible seizure activity, sleep disturbance, heavy alcohol use, and mild symptoms of anxiety are also likely contributory.    RECOMMENDATIONS  1. Mr. Brown is encouraged to live a healthy lifestyle that promotes brain health including getting appropriate exercise, as advised by his healthcare providers, reducing alcohol use, and eating healthy. The CDC recommends limiting alcohol intake to 2 drinks or less per day for men. (Note: 1 drink = 1.5oz of liquor).   2. Mr. Brown is encouraged to continue follow-up with a sleep specialist regarding treatment of his JOSE JUAN. Additionally, he can work towards improving quality of sleep by following a simple set of guidelines as outlined here: https://www.sleepfoundation.org/sleep-topics/sleep-hygiene.  3. Mr. Brown is encouraged to utilize the following behavioral strategies to maximize cognitive functioning in his daily life:   -Eliminate  distraction. Eliminating environmental distracters can facilitate attention and memory performance. For example, turning off music or the television while having a conversation, so that all of your attention is focused on the task at hand.  -Work on decreasing interference from intrusive thoughts. When intrusive thoughts begin to interfere with your thinking, do not concentrate on them. Instead, observe them passively and calmly redirect your attention back to task.   -Engage in calming activities that increase focus on the present. Incorporating mindfulness techniques such as meditation, relaxation, yoga, and moderate cardiovascular exercise, into your daily routine will help keep you present-oriented and consequently improve attention and memory.   -Pay mindful attention. You may find that you need to make a conscious effort to pay attention to conversations or when learning new information. When attention is not focused, it is difficult for the brain to learn new information. Thus, making a mindful effort to pay attention as you go through daily tasks will assist and facilitate memory recall later on.  -Use external aids to increase structure in daily life. Ongoing use of compensatory strategies such as notes, lists, and a centralized calendar, are supported. Tools such as smart phones with alarms and reminders are helpful in bringing structure to daily activities.  4. The current evaluation will serve as an objective cognitive baseline against which results of future evaluations may be compared. Mr. Brown may be referred for a repeat neuropsychological evaluation if there is significant change in cognitive status in the future.     Thank you for the opportunity to participate in Mr. Brown s care.  These findings and recommendations were reviewed with the patient and his wife in a separate feedback session on 12/7/2022 and all their questions were answered. If you have any questions regarding this  evaluation, please do not hesitate to contact me.       Carrol Orr, Ph.D.,   Clinical Neuropsychologist    RELEVANT BACKGROUND INFORMATION AND SUPPORTING DOCUMENTATION  Gathered from a clinical interview with the patient and reviews of electronic medical record. Patient presented with a note for this provider, written by his wife, outlining her concerns.      History of Presenting Problem(s)  Mr. Brown presented with a history of hypertension and JOSE JUAN. He underwent neuropsychological testing on 3/13/2019 which revealed findings largely within normal limits with mild difficulty on one executive task requiring inductive reasoning. At the time of this evaluation, he reported increased difficulty recalling names of people he has not seen in a while and continued difficulty recalling details of conversations, repeating himself, and trouble getting his thoughts together. His wife wrote a note to this provider, presented by the patient, which stated she has noticed changes in memory and processing speed. Functionally, he reported he may forget his medications once per week but denied trouble setting up his pillbox. His wife manages his medical appointments, as she is an employee of the health care system, and she manages the family finances, but he stated he could manage these independently. He is independent with other activities of daily living including meal preparation, driving, and basic self-care. 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 Physically, he reported numbness in his right hand, that has been improving, and constant tinnitus. He denied other physical complaints or sensorimotor disturbances. Emotionally, he reported feeling disappointed at times due to physical limitations hindering him from doing things he used to do, but he otherwise denied symptoms of low mood, anxiety, or psychological distress. His wife noted increased proneness to anger  outbursts. He denied other behavioral or personality changes.     Neurodiagnostic Findings   ? EEG (8/31/2022) Impression: This is an abnormal EEG due to paroxysmal slowing of the background and theta range associated with left hemispheric sharp activity that suggests a nonspecific cerebral dysfunction with low threshold for focal seizure  ? Brain MRI w/wo contrast (6/27/2022) IMPRESSION: 1. Abnormal areas of T2 hyperintense signal involving the left parietal lobe and bilateral occipital lobes with enhancement, likely subacute cortical infarcts. 2. 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 3. Nonspecific bilateral cerebellar microhemorrhages, likely incidental. 4. Volume loss and white matter T2 hyperintensities which likely represent chronic small vessel ischemic change.  ? Prior neuropsychological evaluation with Dr. Mercer (3/13/2019): CONCLUSIONS AND RECOMMENDATIONS:  This 68-year-old man has been having mild cognitive struggles.  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  Medical history is significant for obesity and sleep apnea; he does not tolerate the CPAP well.  Otherwise, the psychiatric and neurological histories are unremarkable.   The test findings are largely within normal limits, aside from mild difficulty on one executive task requiring inductive reasoning, which is borderline impaired.  He was a bit hasty and impulsive on this particular test, which may have lowered the score.  This was an isolated finding, as other executive abilities, including divided attention and nonverbal planning, were average.  He was a little slow, in the below average range, on a timed transcription task requiring graphomotor learning.  But short and long-term retention of story passages and figural material was actually above average to superior.  Word knowledge and auditory attention span were average, nonverbal  reasoning abilities superior.  He was reasonably fast on timed tasks requiring sustained and divided attention.  Psychomotor speeds were grossly intact except for mild slowing of the left hand on a dexterity task, probably due to peripheral factors (he is missing the tip edge of his left thumb with diminished proprioception).  Expressive and receptive language abilities, including speeded word retrieval and confrontation naming, were within normal limits.  There were no signs of visual misperception or hemispatial visual neglect on any of the visually demanding tasks.   The test findings generally contraindicate acquired brain disease.  The difficulty with inductive reasoning raises the possibility of subtle executive impairment, possibly implying mild frontal lobe dysfunction.  But this is rather speculative as there were no other executive impairments.  More likely Mr. Brown is experiencing subtle cognitive inefficiencies of a benign, functional sort.  This may be related to inattentiveness, missed information due to hearing impairment and possibly reduced alertness secondary to sleep apnea.  These findings will serve as a baseline for future comparisons should there be indications of further decline, greater than expected with normal ageing.  In the meantime, Mr. Brown appears to be functioning reasonably well and there are no indications of an emerging dementia.    Medical History  ? Hypertension  ? Possible seizure activity; per neurology note dated 8/31/2022: EEG that showed paroxysmal bitemporal slowing with attenuated sharp discharges in the left hemisphere. He denies any generalized seizures but does report episodes during which he loses touch with his surrounding. I have told him he likely has complex partial seizures and after explaining on side effect, I have started him on Keppra 500 mg twice daily.    Health Behaviors   ? Sleep: Variable sleep quality (3-8 hours of sleep nightly); denied  trouble with sleep onset but reported trouble with sleep maintenance/frequent wakening with difficulty returning to sleep; reported snoring and history of JOSE JUAN with difficulty maintaining compliance with PAP devices; he underwent uvulopalatopharyngoplasty (UPPP) in 2013 with Dr. Parsons with initial improvement in snoring immediately post-surgery but snoring has since returned; s/p Inspire Hypoglossal Nerve Stimulation Implant.  ? Exercise: Walking dog 2 miles per day.  ? Pain: Occasional right hand discomfort related to numbness that has been improving; denied pain at the time of this evaluation.    Psychiatric History  ? Mr. Brown reported mood is disappointed at times because he is unable to do things he used to do but he denied recent symptoms of depression, anxiety, or psychological distress.   ? He denied history of significantly depressed mood, excessive anxiety, alteration of sensory perceptual processes or disordered thought.  ? Suicidality/ Self-harm: He adamantly denied any suicidal ideation, plan, or intent.  ? Psychiatric treatment: None currently or in the past    Substance Use History  ? Alcohol: 4oz of seth per day  ? Nicotine: None; quit in 1996  ? Cannabis: None  ? Problematic Substance Use: Denied history of problematic alcohol use but reported DWI on 5/22/1985 after which he attended an alcohol education course.    Current Medications (per EMR)    apixaban ANTICOAGULANT (ELIQUIS) 5 MG tablet     ASPIRIN NOT PRESCRIBED (INTENTIONAL)     atorvastatin (LIPITOR) 10 MG tablet     cyanocobalamin (VITAMIN B-12) 1000 MCG tablet     diphenoxylate-atropine (LOMOTIL) 2.5-0.025 MG tablet     folic acid (FOLVITE) 1 MG tablet     levETIRAcetam (KEPPRA) 500 MG tablet     metoprolol succinate ER (TOPROL XL) 25 MG 24 hr tablet     sacubitril-valsartan (ENTRESTO) 24-26 MG per tablet     SENNA-docusate sodium (SENNA S) 8.6-50 MG tablet     Educational, & Occupational History  ? Education: Graduated high school on  time and attended 1 year of trade school  ? Occupation: Business owner; industrial electronics    Psychosocial History  ? Born and raised in [place], [place]   ? Marital:  to second wife  ? Children: None  ? Housing: Lives with wife and dog  ? Social support:  Very good  social support network of friends and neighbors    RESULTS  See separate abstract for list of neuropsychological measures and test scores. Descriptive ranges are based on American Academy of Clinical Neuropsychology (2020) consensus guidelines, or test manuals where appropriate.     PRE-MORBID ABILITY: Premorbid abilities were estimated within the average range based on single word reading ability and demographic factors including sex, ethnicity, education, occupation, and geographic region.  GENERAL COGNITIVE STATUS: Orientation was within expectation for general personal information, time, place, and cultural information.  Performance on a test of general fund of knowledge was average. Within the practical domain, performance was within expectation on a measure of conceptual understanding of issues pertaining to health and safety. His score was consistent with individuals functioning at a moderate level of independence in the community.  ATTENTION/EXECUTIVE FUNCTIONS: Immediate auditory attention and working memory performances were average and stable compared to prior testing. Verbal abstract reasoning performance was average. Performance on a test of set-shifting/cognitive flexibility was average and stable. Copy of a complex figure was below average and notable for a dysexecutive approach with poor planning and organization. Response inhibition performance was below average and notable for several errors. Psychomotor processing speed performances were low average to average and stable.  LANGUAGE: Confrontation naming performance was exceptionally high and stable. Letter-cue verbal fluency performance was average and stable. Semantic  verbal fluency performance was low average.   VISUOSPATIAL PROCESSING: On a visuoconstruction task involving reproducing patterns with blocks, performance was high average and stable. Performance on a spatial perception task requiring judgement of angled lines was average. Copy of increasingly intricate figures was high average. Copy of a complex figure was below average but the overall figural gestalt was preserved.   LEARNING AND MEMORY: Initial encoding of a word list over multiple learning trials was average. Delayed recall of the list was average. Recognition of the word list was below average with several false positive responses. Initial encoding of contextualized verbal information in the form of short stories was average and delayed retrieval was average. Recognition of story details was average. Encoding of visual information was average and delayed recall was high average. Recognition among distractors was average.   PSYCHOLOGICAL AND BEHAVIORAL: He endorsed overall mild symptoms of anxiety and specifically reported moderate somatic symptoms and mild cognitive and affective symptoms of anxiety. He endorsed minimal symptoms of depression.   PERFORMANCE VALIDITY: Performance on neuropsychological tests is dependent upon a number of factors, including sufficient engagement and motivation, to reliably establish an examinee s level of cognitive functioning.  Based upon observations made throughout the evaluation, the patient did not appear to deliberately perform in a suboptimal manner and demonstrated good frustration tolerance on cognitively challenging tasks. Score on an imbedded index of performance validity was in the valid range. Overall, test results are believed to accurately represent the patient s current neurocognitive status.    BEHAVIORAL OBSERVATIONS  ? Presented on time and was unaccompanied  ? Alert, oriented to self, time, place, and circumstance; attentive and focused while undergoing  testing  ? Appearance: Well-groomed, casually dressed  ? Gait/Posture: Slow, wide-based gait  ? Sensorimotor: Dropped pencil multiple times; no other sensorimotor abnormalities noted  ? Behavior: Cooperative, pleasant, no behavioral abnormalities noted  ? Speech: Fluent, articulate, normal rate, prosody, and volume; no conversational word finding difficulty  ? Thought process: Tangential, though redirectable with verbal cues  ? Thought content: Generally logical, appropriate; repetitive at times  ? Affect: Broad, responsive, consistent with reported mood; good eye contact  ? Mood: Euthymic  ? Insight and Judgment: Intact  ? Approach to testing: Efficient, deliberate; good frustration on cognitively demanding tasks  ? Rapport: Easily established and maintained      Activities included in this evaluation: CPT Code #Units Time (min)   Psychiatric diagnostic interview 51169 1 --   Test evaluation services by professional; first hour. [id number] [phone]   Test evaluation services by professional, additional hour (+) [id number] [phone]    Test administration and scoring by technician, first 30 mins [id number] [phone]   Test administration and scoring by technician, additional 30 mins (+) [id number]    Dx: R41.3        Again, thank you for allowing me to participate in the care of your patient.      Sincerely,    LAUREN SANDERS, PhD

## 2022-12-05 NOTE — NURSING NOTE
Pt was seen for neuropsychological evaluation at the request of Nathen Ruiz MD for the purposes of diagnostic clarification and treatment planning. 242 minutes of test administration and scoring were provided by this writer. Please see Dr. Carrol Orr's report for a full interpretation of the findings.    Sweetie Sotelo  Psychometrist

## 2022-12-07 NOTE — PROGRESS NOTES
Provider: CE      Tech: EDMAR      Patient Name: Ricky Brown     : 54      MRN: [id number]      TOM: 22      Age: 72      Education: 12      Ethnicity: C      Handedness: Left      Station: OP             NEUROPSYCHOLOGICAL TESTS RAW SCORE STANDARDIZED SCORE* DESCRIPTIVE RANGE**   PREMORBID ABILITY       WAIS-IV ACS Test of Premorbid Functioning (Estimated FSIQ) 35 SS= 95 Average     Estimated FSIQ = 102 Average          ATTENTION AND EXECUTIVE FUNCTIONS RAW SCORE STANDARDIZED SCORE* DESCRIPTIVE RANGE**   Wechsler Adult Intelligence Scale - 4th Edition (WAIS-IV)      Digit Span Forward 11 ss= 12 High Average   Digit Span Backward 7 ss= 9 Average   Digit Span Sequencing 7 ss= 10 Average   Digit Span Total 25 ss= 10 Average   Coding 35 ss= 7 Low Average   Similarities 26 ss= 11 Average   Trail Making Test (Time/errors)        Part A s,r,e 40/0 TS= 46 Average   Part B s,r,e 119/1 TS= 46 Average   Stroop       Word e 63 TS= 26 Exceptionally Low   Color e 45 TS= 28 Exceptionally Low   Color/Word e 11 TS= 30 Below Average   Interference e -14 TS= 36 Below Average   Independent Living Scales (ILS)       Health and Safety 34 TS= 47 Moderate          LANGUAGE RAW SCORE STANDARDIZED SCORE* DESCRIPTIVE RANGE**   [place] Naming Test s,r,e 59 TS= 71 Exceptionally High   Letter-Cue Verbal Fluency (FAS) s,r,e 13-15-16 (44) TS= 56 Average   Animal Fluency s,r,e 12 TS=  38 Low Average          VISUOSPATIAL PROCESSING RAW SCORE STANDARDIZED SCORE* DESCRIPTIVE RANGE**   WAIS-IV        Block Design 43 ss= 13 High Average   Juancarlos Complex Figure Test (RCFT) Copy 28 %ile= 6-10 Below Average   RBANS; Form B       Line Orientation 16 %ile= 26-50 Average   WMS-IV Visual Reproduction Copy 43 %= >75 High Average          LEARNING & MEMORY RAW SCORE STANDARDIZED SCORE* DESCRIPTIVE RANGE**   Wechsler Memory Scale-4th Edition (WMS-IV)      Logical Memory I 29 ss= 9 Average   Logical Memory II 15 ss= 9 Average   Logical Memory  Recognition 19 %= 51-75 Average   Visual Reproduction I 32 ss= 11 Average   Visual Reproduction II 23 ss= 12 High Average   Visual Reproduction Recognition 5 %= 51-75 Average   Lopez Verbal Learning Test - Revised (HVLT-R; Form 1)     Total Trials 1-3 6-8-10 (24) TS= 50 Average   Delayed Recall 7 TS= 44 Average   Retention (%) 70% TS= 41 Low Average   Recognition Hits 12 --     Recognition False Alarms 4 --     Recognition Discriminability 8 TS= 36 Below Average          PSYCHOLOGICAL & BEHAVIORAL SYMPTOMS RAW SCORE STANDARDIZED SCORE* DESCRIPTIVE RANGE**   Geriatric Depression Scale (GDS) 4 --  Minimal   Geriatric Anxiey Scale (GAS)       Somatic 10 --  Moderate   Cognitive 3 --  Mild   Affective 4 --  Mild   Total 17 --  Mild          PERFORMANCE VALIDITY RAW SCORE   DESCRIPTIVE RANGE**   RDS 9   Valid Range          * All standardized scores are adjusted for age. Superscripts denote additional adjustment for (s)ex, (r)ace/ethnicity, (l)anguage, and/or (e)ducation.   ** Descriptive ranges are based on American Academy of Clinical Neuropsychology (2020) consensus guidelines, or test manuals where appropriate.    WNL = within normal limits. DC = discontinued due to patient s inability to complete the test.     Standardized scores: TS = T-score; mean = 50, standard deviation =10; z = z-score; mean = 0, standard deviation = 1; ss = scaled score; mean = 10, standard deviation = 3; MAS = Dorris Older Adult Normative Study age adjusted scaled score; mean = 10, standard deviation = 3; SS = standard score; mean = 100, standard deviation = 15.

## 2022-12-07 NOTE — PROGRESS NOTES
NEUROPSYCHOLOGICAL EVALUATION  **CONFIDENTIAL**    Name: Ricky Brown Education: 12 years    (age): 1950 (72 years) TOM:  2022   Referral: Nathen Ruiz MD  Department of Neurology Handedness:  MRN (Epic): Left  [id number]     IDENTIFYING INFORMATION AND REASON FOR REFERRAL   Mr. Brown is a 72-year-old, left-handed White male with a history of hypertension, JOSE JUAN, and possible complex partial seizures. This evaluation was requested to provide a comprehensive assessment of his current neuropsychological status to inform treatment planning.     IMPRESSION  See below for relevant background information and detailed test results. See separate abstract for supporting documentation including a list of neuropsychological measures and test scores.    Mr. Brown underwent neuropsychological testing on 3/13/2019 which revealed test findings are largely within normal limits, aside from mild difficulty on one executive task requiring inductive reasoning, which is borderline impaired. The results of the current neuropsychological evaluation revealed mildly impaired performance on select executive functioning tasks (i.e., cognitive inhibition, planning/organization) with notable tangentiality in conversation, in the context of other well preserved cognitive abilities. Performances were within expectation and stable across other cognitive tests including immediate auditory attention and working memory, speeded processing, language, visuospatial processing, and mental flexibility. Performances were also within expectation on tests of learning and memory, abstract reasoning, and knowledge of health and safety. Although direct comparisons could not be made across memory tests, there was no evidence to suggest decline compared to prior testing. A strength was noted on a test of confrontation naming with performance in the exceptionally high range. Assessment of current psychological and emotional status revealed  mild symptoms of anxiety and the absence of clinically significant depressive mood symptoms.    Overall, Mr. Brown is functioning quite well from a neuropsychological standpoint with only mild impairment on select executive functioning tasks without evidence of cognitive decline since his prior evaluation in 2019. The cognitive profile is not suggestive of a neurocognitive diagnosis at this time and does not raise concerns about an underlying neurodegenerative process. The pattern of performances characterized by variable executive functioning is diagnostically nonspecific but could be consistent with effects of cerebrovascular factors (i.e., hypertension, JOSE JUAN, infarcts noted on neuroimaging). Possible seizure activity, sleep disturbance, heavy alcohol use, and mild symptoms of anxiety are also likely contributory.    RECOMMENDATIONS  1. Mr. Brown is encouraged to live a healthy lifestyle that promotes brain health including getting appropriate exercise, as advised by his healthcare providers, reducing alcohol use, and eating healthy. The CDC recommends limiting alcohol intake to 2 drinks or less per day for men. (Note: 1 drink = 1.5oz of liquor).   2. Mr. Brown is encouraged to continue follow-up with a sleep specialist regarding treatment of his JOSE JUAN. Additionally, he can work towards improving quality of sleep by following a simple set of guidelines as outlined here: https://www.sleepfoundation.org/sleep-topics/sleep-hygiene.  3. Mr. Brown is encouraged to utilize the following behavioral strategies to maximize cognitive functioning in his daily life:   -Eliminate distraction. Eliminating environmental distracters can facilitate attention and memory performance. For example, turning off music or the television while having a conversation, so that all of your attention is focused on the task at hand.  -Work on decreasing interference from intrusive thoughts. When intrusive thoughts begin to  interfere with your thinking, do not concentrate on them. Instead, observe them passively and calmly redirect your attention back to task.   -Engage in calming activities that increase focus on the present. Incorporating mindfulness techniques such as meditation, relaxation, yoga, and moderate cardiovascular exercise, into your daily routine will help keep you present-oriented and consequently improve attention and memory.   -Pay mindful attention. You may find that you need to make a conscious effort to pay attention to conversations or when learning new information. When attention is not focused, it is difficult for the brain to learn new information. Thus, making a mindful effort to pay attention as you go through daily tasks will assist and facilitate memory recall later on.  -Use external aids to increase structure in daily life. Ongoing use of compensatory strategies such as notes, lists, and a centralized calendar, are supported. Tools such as smart phones with alarms and reminders are helpful in bringing structure to daily activities.  4. The current evaluation will serve as an objective cognitive baseline against which results of future evaluations may be compared. Mr. Brown may be referred for a repeat neuropsychological evaluation if there is significant change in cognitive status in the future.     Thank you for the opportunity to participate in Mr. Brown s care.  These findings and recommendations were reviewed with the patient and his wife in a separate feedback session on 12/7/2022 and all their questions were answered. If you have any questions regarding this evaluation, please do not hesitate to contact me.       Carrol Orr, Ph.D.,   Clinical Neuropsychologist    RELEVANT BACKGROUND INFORMATION AND SUPPORTING DOCUMENTATION  Gathered from a clinical interview with the patient and reviews of electronic medical record. Patient presented with a note for this provider, written by his  wife, outlining her concerns.      History of Presenting Problem(s)  Mr. Brown presented with a history of hypertension and JOSE JUAN. He underwent neuropsychological testing on 3/13/2019 which revealed findings largely within normal limits with mild difficulty on one executive task requiring inductive reasoning. At the time of this evaluation, he reported increased difficulty recalling names of people he has not seen in a while and continued difficulty recalling details of conversations, repeating himself, and trouble getting his thoughts together. His wife wrote a note to this provider, presented by the patient, which stated she has noticed changes in memory and processing speed. Functionally, he reported he may forget his medications once per week but denied trouble setting up his pillbox. His wife manages his medical appointments, as she is an employee of the health care system, and she manages the family finances, but he stated he could manage these independently. He is independent with other activities of daily living including meal preparation, driving, and basic self-care. 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 Physically, he reported numbness in his right hand, that has been improving, and constant tinnitus. He denied other physical complaints or sensorimotor disturbances. Emotionally, he reported feeling disappointed at times due to physical limitations hindering him from doing things he used to do, but he otherwise denied symptoms of low mood, anxiety, or psychological distress. His wife noted increased proneness to anger outbursts. He denied other behavioral or personality changes.     Neurodiagnostic Findings   ? EEG (8/31/2022) Impression: This is an abnormal EEG due to paroxysmal slowing of the background and theta range associated with left hemispheric sharp activity that suggests a nonspecific cerebral dysfunction with low threshold for focal  seizure  ? Brain MRI w/wo contrast (6/27/2022) IMPRESSION: 1. Abnormal areas of T2 hyperintense signal involving the left parietal lobe and bilateral occipital lobes with enhancement, likely subacute cortical infarcts. 2. 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 3. Nonspecific bilateral cerebellar microhemorrhages, likely incidental. 4. Volume loss and white matter T2 hyperintensities which likely represent chronic small vessel ischemic change.  ? Prior neuropsychological evaluation with Dr. Mercer (3/13/2019): CONCLUSIONS AND RECOMMENDATIONS:  This 68-year-old man has been having mild cognitive struggles.  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  Medical history is significant for obesity and sleep apnea; he does not tolerate the CPAP well.  Otherwise, the psychiatric and neurological histories are unremarkable.   The test findings are largely within normal limits, aside from mild difficulty on one executive task requiring inductive reasoning, which is borderline impaired.  He was a bit hasty and impulsive on this particular test, which may have lowered the score.  This was an isolated finding, as other executive abilities, including divided attention and nonverbal planning, were average.  He was a little slow, in the below average range, on a timed transcription task requiring graphomotor learning.  But short and long-term retention of story passages and figural material was actually above average to superior.  Word knowledge and auditory attention span were average, nonverbal reasoning abilities superior.  He was reasonably fast on timed tasks requiring sustained and divided attention.  Psychomotor speeds were grossly intact except for mild slowing of the left hand on a dexterity task, probably due to peripheral factors (he is missing the tip edge of his left thumb with diminished proprioception).   Expressive and receptive language abilities, including speeded word retrieval and confrontation naming, were within normal limits.  There were no signs of visual misperception or hemispatial visual neglect on any of the visually demanding tasks.   The test findings generally contraindicate acquired brain disease.  The difficulty with inductive reasoning raises the possibility of subtle executive impairment, possibly implying mild frontal lobe dysfunction.  But this is rather speculative as there were no other executive impairments.  More likely Mr. Brown is experiencing subtle cognitive inefficiencies of a benign, functional sort.  This may be related to inattentiveness, missed information due to hearing impairment and possibly reduced alertness secondary to sleep apnea.  These findings will serve as a baseline for future comparisons should there be indications of further decline, greater than expected with normal ageing.  In the meantime, Mr. Brown appears to be functioning reasonably well and there are no indications of an emerging dementia.    Medical History  ? Hypertension  ? Possible seizure activity; per neurology note dated 8/31/2022: EEG that showed paroxysmal bitemporal slowing with attenuated sharp discharges in the left hemisphere. He denies any generalized seizures but does report episodes during which he loses touch with his surrounding. I have told him he likely has complex partial seizures and after explaining on side effect, I have started him on Keppra 500 mg twice daily.    Health Behaviors   ? Sleep: Variable sleep quality (3-8 hours of sleep nightly); denied trouble with sleep onset but reported trouble with sleep maintenance/frequent wakening with difficulty returning to sleep; reported snoring and history of JOSE JUAN with difficulty maintaining compliance with PAP devices; he underwent uvulopalatopharyngoplasty (UPPP) in 2013 with Dr. Parsons with initial improvement in snoring immediately  post-surgery but snoring has since returned; s/p Inspire Hypoglossal Nerve Stimulation Implant.  ? Exercise: Walking dog 2 miles per day.  ? Pain: Occasional right hand discomfort related to numbness that has been improving; denied pain at the time of this evaluation.    Psychiatric History  ? Mr. Brown reported mood is disappointed at times because he is unable to do things he used to do but he denied recent symptoms of depression, anxiety, or psychological distress.   ? He denied history of significantly depressed mood, excessive anxiety, alteration of sensory perceptual processes or disordered thought.  ? Suicidality/ Self-harm: He adamantly denied any suicidal ideation, plan, or intent.  ? Psychiatric treatment: None currently or in the past    Substance Use History  ? Alcohol: 4oz of seth per day  ? Nicotine: None; quit in 1996  ? Cannabis: None  ? Problematic Substance Use: Denied history of problematic alcohol use but reported DWI on 5/22/1985 after which he attended an alcohol education course.    Current Medications (per EMR)    apixaban ANTICOAGULANT (ELIQUIS) 5 MG tablet     ASPIRIN NOT PRESCRIBED (INTENTIONAL)     atorvastatin (LIPITOR) 10 MG tablet     cyanocobalamin (VITAMIN B-12) 1000 MCG tablet     diphenoxylate-atropine (LOMOTIL) 2.5-0.025 MG tablet     folic acid (FOLVITE) 1 MG tablet     levETIRAcetam (KEPPRA) 500 MG tablet     metoprolol succinate ER (TOPROL XL) 25 MG 24 hr tablet     sacubitril-valsartan (ENTRESTO) 24-26 MG per tablet     SENNA-docusate sodium (SENNA S) 8.6-50 MG tablet     Educational, & Occupational History  ? Education: Graduated high school on time and attended 1 year of trade school  ? Occupation: Business owner; industrial electronics    Psychosocial History  ? Born and raised in [place], [place]   ? Marital:  to second wife  ? Children: None  ? Housing: Lives with wife and dog  ? Social support:  Very good  social support network of friends and  neighbors    RESULTS  See separate abstract for list of neuropsychological measures and test scores. Descriptive ranges are based on American Academy of Clinical Neuropsychology (2020) consensus guidelines, or test manuals where appropriate.     PRE-MORBID ABILITY: Premorbid abilities were estimated within the average range based on single word reading ability and demographic factors including sex, ethnicity, education, occupation, and geographic region.  GENERAL COGNITIVE STATUS: Orientation was within expectation for general personal information, time, place, and cultural information.  Performance on a test of general fund of knowledge was average. Within the practical domain, performance was within expectation on a measure of conceptual understanding of issues pertaining to health and safety. His score was consistent with individuals functioning at a moderate level of independence in the community.  ATTENTION/EXECUTIVE FUNCTIONS: Immediate auditory attention and working memory performances were average and stable compared to prior testing. Verbal abstract reasoning performance was average. Performance on a test of set-shifting/cognitive flexibility was average and stable. Copy of a complex figure was below average and notable for a dysexecutive approach with poor planning and organization. Response inhibition performance was below average and notable for several errors. Psychomotor processing speed performances were low average to average and stable.  LANGUAGE: Confrontation naming performance was exceptionally high and stable. Letter-cue verbal fluency performance was average and stable. Semantic verbal fluency performance was low average.   VISUOSPATIAL PROCESSING: On a visuoconstruction task involving reproducing patterns with blocks, performance was high average and stable. Performance on a spatial perception task requiring judgement of angled lines was average. Copy of increasingly intricate figures was high  average. Copy of a complex figure was below average but the overall figural gestalt was preserved.   LEARNING AND MEMORY: Initial encoding of a word list over multiple learning trials was average. Delayed recall of the list was average. Recognition of the word list was below average with several false positive responses. Initial encoding of contextualized verbal information in the form of short stories was average and delayed retrieval was average. Recognition of story details was average. Encoding of visual information was average and delayed recall was high average. Recognition among distractors was average.   PSYCHOLOGICAL AND BEHAVIORAL: He endorsed overall mild symptoms of anxiety and specifically reported moderate somatic symptoms and mild cognitive and affective symptoms of anxiety. He endorsed minimal symptoms of depression.   PERFORMANCE VALIDITY: Performance on neuropsychological tests is dependent upon a number of factors, including sufficient engagement and motivation, to reliably establish an examinee s level of cognitive functioning.  Based upon observations made throughout the evaluation, the patient did not appear to deliberately perform in a suboptimal manner and demonstrated good frustration tolerance on cognitively challenging tasks. Score on an imbedded index of performance validity was in the valid range. Overall, test results are believed to accurately represent the patient s current neurocognitive status.    BEHAVIORAL OBSERVATIONS  ? Presented on time and was unaccompanied  ? Alert, oriented to self, time, place, and circumstance; attentive and focused while undergoing testing  ? Appearance: Well-groomed, casually dressed  ? Gait/Posture: Slow, wide-based gait  ? Sensorimotor: Dropped pencil multiple times; no other sensorimotor abnormalities noted  ? Behavior: Cooperative, pleasant, no behavioral abnormalities noted  ? Speech: Fluent, articulate, normal rate, prosody, and volume; no  conversational word finding difficulty  ? Thought process: Tangential, though redirectable with verbal cues  ? Thought content: Generally logical, appropriate; repetitive at times  ? Affect: Broad, responsive, consistent with reported mood; good eye contact  ? Mood: Euthymic  ? Insight and Judgment: Intact  ? Approach to testing: Efficient, deliberate; good frustration on cognitively demanding tasks  ? Rapport: Easily established and maintained      Activities included in this evaluation: CPT Code #Units Time (min)   Psychiatric diagnostic interview 51656 1 --   Test evaluation services by professional; first hour. [id number] [phone]   Test evaluation services by professional, additional hour (+) [id number] 2    Test administration and scoring by technician, first 30 mins [id number] [phone]   Test administration and scoring by technician, additional 30 mins (+) [id number]    Dx: R41.3

## 2022-12-16 ENCOUNTER — OFFICE VISIT (OUTPATIENT)
Dept: ORTHOPEDICS | Facility: CLINIC | Age: 72
End: 2022-12-16
Payer: COMMERCIAL

## 2022-12-16 DIAGNOSIS — G56.01 CARPAL TUNNEL SYNDROME OF RIGHT WRIST: Primary | ICD-10-CM

## 2022-12-16 PROCEDURE — 99024 POSTOP FOLLOW-UP VISIT: CPT | Performed by: STUDENT IN AN ORGANIZED HEALTH CARE EDUCATION/TRAINING PROGRAM

## 2022-12-16 NOTE — NURSING NOTE
Recent redness/heat in area around incision + ring and pinky digit. Pain in area, minor swelling.

## 2022-12-16 NOTE — LETTER
12/16/2022         RE: Ricky Brown  [address]  [facility] [address]        Dear Colleague,    Thank you for referring your patient, Ricky Brown, to the [facility]. Please see a copy of my visit note below.    Ortho Hand    Doing well overall, but states that the palm is slightly tender and that maybe his sensation at the fingertips is worse than early after surgery. He does state that the surgery has dramatically improved his symptoms. No more provocative or nighttime symptoms.     On exam, well-healed R elbow and palm scars. The palm scar is thickened and mildly tender with some swelling, but no signs of infection. R ulnar and median-dist sensation intact and strong R thumb opposition as well as hand intrinsic function.     A/P: 72M ~6-7 weeks s/p R rev open CTR, R ulnar nerve decompression at the elbow    -Reiterated that the palm scar is expectantly thickened at this stage of the healing, and is contributing to the discomfort. It may also be that there is some fat necrosis/atrophy that is contributing to the swelling in the palm, which may be more painful, and can result in some median-distributed intermittent symptoms as he is describing. We did discuss staged NCS/EMG if it were needed. Since this is early, my recommendation is to continue gentle scar massage and give the scar more time to remodel. Patient is agreeable with the plan.   -Return to clinic at 3 months post-operatively    Vignesh Rhodes MD, PhD      Again, thank you for allowing me to participate in the care of your patient.        Sincerely,        Vignesh Rhodes MD

## 2022-12-17 NOTE — PROGRESS NOTES
Ortho Hand    Doing well overall, but states that the palm is slightly tender and that maybe his sensation at the fingertips is worse than early after surgery. He does state that the surgery has dramatically improved his symptoms. No more provocative or nighttime symptoms.     On exam, well-healed R elbow and palm scars. The palm scar is thickened and mildly tender with some swelling, but no signs of infection. R ulnar and median-dist sensation intact and strong R thumb opposition as well as hand intrinsic function.     A/P: 72M ~6-7 weeks s/p R rev open CTR, R ulnar nerve decompression at the elbow    -Reiterated that the palm scar is expectantly thickened at this stage of the healing, and is contributing to the discomfort. It may also be that there is some fat necrosis/atrophy that is contributing to the swelling in the palm, which may be more painful, and can result in some median-distributed intermittent symptoms as he is describing. We did discuss staged NCS/EMG if it were needed. Since this is early, my recommendation is to continue gentle scar massage and give the scar more time to remodel. Patient is agreeable with the plan.   -Return to clinic at 3 months post-operatively    Vignesh Rhodes MD, PhD

## 2022-12-22 PROBLEM — M54.50 LUMBAR SPINE PAIN: Status: RESOLVED | Noted: 2022-08-10 | Resolved: 2022-12-22

## 2022-12-22 PROBLEM — M54.6 PAIN IN THORACIC SPINE: Status: RESOLVED | Noted: 2022-08-19 | Resolved: 2022-12-22

## 2022-12-22 NOTE — PROGRESS NOTES
"  DISCHARGE REPORT    Progress reporting period is from 8/19/2022 to 8/26/2022.       SUBJECTIVE  Subjective: Having more pain right now. Exercises haven't helped. Has tried lidocane and THC pills and those work once \"If I don't have some relief by the time I leave today, I'm cancelling my appointments.\"    Current Pain level: 7/10.     Initial Pain level: 4/10.   Changes in function:  None  Adverse reaction to treatment or activity: None    OBJECTIVE  Changes noted in objective findings:  The objective findings below are from DOS 8/26/2022.    Observation - Cueing required for all HEP exercises     ASSESSMENT/PLAN  Updated problem list and treatment plan: Diagnosis 1:  Thoracic and lumbar spine pain  STG/LTGs have been met or progress has been made towards goals:  NoneNone  Assessment of Progress: The patient's condition is unchanged.  Self Management Plans:  Patient has been instructed in a home treatment program.  I have re-evaluated this patient and find that the nature, scope, duration and intensity of the therapy is appropriate for the medical condition of the patient.    Recommendations:  The patient has not returned to physical therapy in over 1 month and will be discharged to [facility].    Please refer to the daily flowsheet for treatment today, total treatment time and time spent performing 1:1 timed codes.          "

## 2023-01-20 ENCOUNTER — OFFICE VISIT (OUTPATIENT)
Dept: ORTHOPEDICS | Facility: CLINIC | Age: 73
End: 2023-01-20
Payer: COMMERCIAL

## 2023-01-20 DIAGNOSIS — G56.01 CARPAL TUNNEL SYNDROME OF RIGHT WRIST: Primary | ICD-10-CM

## 2023-01-20 PROCEDURE — 99024 POSTOP FOLLOW-UP VISIT: CPT | Performed by: STUDENT IN AN ORGANIZED HEALTH CARE EDUCATION/TRAINING PROGRAM

## 2023-01-20 NOTE — LETTER
"    1/20/2023         RE: Ricky Brown  [address]  [facility] [address]        Dear Colleague,    Thank you for referring your patient, Ricky Brown, to the [facility]. Please see a copy of my visit note below.    Ortho Hand    Returns with improved scar of the R palm but an area of the palm that he states he does not feel well. He states that he feels that there is a \"blister\" in the palm and an infection. He also does not think that he is improving from the surgery yet. He states that he is no worse than before but he has not improved.    On exam, the right elbow and palm incision was well-healed with no Tinel's. The palm and hypothenar scar is softer than at the last visit and there is less swelling. No blistering. No signs of infection. R hand intrinsic and thumb opposition function/strength is intact and 5/5. R median and ulnar distributed fingertip sensation is intact. There may be a small area over the ulnar aspect of the palm (hypothenar) skin with slightly less sensation.     A/P: 72M 2.5 months s/p R rev CTR, R ulnar nerve decompression at the elbow    -Reiterated that the nerve may take >12 months to fully recover after decompression, especially the ulnar nerve. Explained that there is no blistering or sign of infection. The slightly decreased sensation in the hypothenar region is likely related to swelling in that area, which has improved. Explained that we can obtain a NCS/EMG at >3 months to evaluate conduction and muscle function.   -NCS/EMG ordered  -Return to clinic after study is done    Vignesh Rhodes MD, PhD      Again, thank you for allowing me to participate in the care of your patient.        Sincerely,        Vignesh Rhodes MD    "

## 2023-01-20 NOTE — NURSING NOTE
Fluid buildup on lateral aspect of howard side of hand. Little to no feeling in same area, but tingling sensation. Difficulty buttoning shirt due to feeling and weakness.

## 2023-01-21 NOTE — PROGRESS NOTES
"Ortho Hand    Returns with improved scar of the R palm but an area of the palm that he states he does not feel well. He states that he feels that there is a \"blister\" in the palm and an infection. He also does not think that he is improving from the surgery yet. He states that he is no worse than before but he has not improved.    On exam, the right elbow and palm incision was well-healed with no Tinel's. The palm and hypothenar scar is softer than at the last visit and there is less swelling. No blistering. No signs of infection. R hand intrinsic and thumb opposition function/strength is intact and 5/5. R median and ulnar distributed fingertip sensation is intact. There may be a small area over the ulnar aspect of the palm (hypothenar) skin with slightly less sensation.     A/P: 72M 2.5 months s/p R rev CTR, R ulnar nerve decompression at the elbow    -Reiterated that the nerve may take >12 months to fully recover after decompression, especially the ulnar nerve. Explained that there is no blistering or sign of infection. The slightly decreased sensation in the hypothenar region is likely related to swelling in that area, which has improved. Explained that we can obtain a NCS/EMG at >3 months to evaluate conduction and muscle function.   -NCS/EMG ordered  -Return to clinic after study is done    Vignesh Rhodes MD, PhD  "

## 2023-03-06 PROBLEM — R29.898 RIGHT HAND WEAKNESS: Status: RESOLVED | Noted: 2022-02-28 | Resolved: 2023-03-06

## 2023-03-06 PROBLEM — R41.3 MEMORY IMPAIRMENT: Status: ACTIVE | Noted: 2022-12-07

## 2023-03-06 PROBLEM — Z86.73 HISTORY OF CEREBROVASCULAR ACCIDENT: Status: ACTIVE | Noted: 2022-12-07

## 2023-03-07 ENCOUNTER — OFFICE VISIT (OUTPATIENT)
Dept: NEUROLOGY | Facility: CLINIC | Age: 73
End: 2023-03-07
Payer: COMMERCIAL

## 2023-03-07 ENCOUNTER — LAB (OUTPATIENT)
Dept: LAB | Facility: HOSPITAL | Age: 73
End: 2023-03-07
Payer: COMMERCIAL

## 2023-03-07 VITALS
SYSTOLIC BLOOD PRESSURE: 145 MMHG | DIASTOLIC BLOOD PRESSURE: 75 MMHG | BODY MASS INDEX: 26.57 KG/M2 | HEIGHT: 67 IN | WEIGHT: 169.3 LBS | HEART RATE: 72 BPM

## 2023-03-07 DIAGNOSIS — G40.209 PARTIAL SYMPTOMATIC EPILEPSY WITH COMPLEX PARTIAL SEIZURES, NOT INTRACTABLE, WITHOUT STATUS EPILEPTICUS (H): ICD-10-CM

## 2023-03-07 DIAGNOSIS — I63.439 CEREBROVASCULAR ACCIDENT (CVA) DUE TO EMBOLISM OF POSTERIOR CEREBRAL ARTERY WITH INFARCTIONS OF BOTH OCCIPITAL LOBES (H): Primary | ICD-10-CM

## 2023-03-07 LAB — LEVETIRACETAM SERPL-MCNC: 11.3 ΜG/ML (ref 10–40)

## 2023-03-07 PROCEDURE — 36415 COLL VENOUS BLD VENIPUNCTURE: CPT

## 2023-03-07 PROCEDURE — 99214 OFFICE O/P EST MOD 30 MIN: CPT | Performed by: PSYCHIATRY & NEUROLOGY

## 2023-03-07 PROCEDURE — 80177 DRUG SCRN QUAN LEVETIRACETAM: CPT

## 2023-03-07 RX ORDER — LEVETIRACETAM 500 MG/1
500 TABLET ORAL 2 TIMES DAILY
Qty: 60 TABLET | Refills: 11 | Status: SHIPPED | OUTPATIENT
Start: 2023-03-07 | End: 2024-07-19

## 2023-03-07 NOTE — LETTER
3/7/2023         RE: Ricky Brown  [address]  [facility] [address]        Dear Colleague,    Thank you for referring your patient, Ricky Brown, to the [facility] [place]. Please see a copy of my visit note below.    NEUROLOGY FOLLOW UP VISIT  NOTE       [facility] [place]  [address] [place] [address]  Tel: [phone]  Fax: [phone]  [email or web address]     Ricky Brown,  1950, MRN [id number]  PCP: Holland Blunt  Date: 2023      ASSESSMENT & PLAN     Visit Diagnosis  1.  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 (H)  2. Partial symptomatic epilepsy with complex partial seizures, not intractable, without status epilepticus (H)     Complex partial seizure  72-year-old male with history of JOSE JUAN, cardiomyopathy, HTN, HLD, CAD and alcohol abuse who was evaluated for progressive cognitive decline.  Work-up included MRI brain that showed left parietal occipital infarct and was started on Eliquis.  Lab work for common causes of cognitive decline showed a low folic acid and was started on supplement.  EEG surprisingly showed left temporal sharp activity and he was started on Keppra.  Neuropsychological testing surprisingly was unchanged compared to 2019.  I have recommended:    1.  Continue Keppra 500 mg twice daily  2.  Check Keppra level  3.  Patient was encouraged to quit drinking  4.  As noted below he is  his wife and does not want his medical records discussed with her  5.  Follow-up in 1 year    Thank you again for this referral, please feel free to contact me if you have any questions.    Nathen Ruiz MD  [facility]  (Formerly, [facility]     HISTORY OF PRESENT ILLNESS     Patient is a 72-year-old male with history of JOSE JUAN, cardiomyopathy, HTN, HLD, CAD,  alcohol abuse who was initially evaluated on 6/20/2022 for progressive cognitive decline.  Work-up included MRI brain that showed left parietal and occipital infarct.  He was subsequently evaluated by cardiology and had a coronary angiogram and was continued on Eliquis and Lipitor.    Patient reported neurocognitive decline MRI concern was it was related to alcohol abuse he had MRI of the brain that showed multiple small infarct and possibility of vascular dementia was raised.  Lab work showed a low folic acid and he was started on supplement.  EEG showed left temporal sharp activity and he was started on Keppra.  Neuropsychological testing was unremarkable and no change noted compared to neuropsychological testing done in 2019.  Patient does not think he has any memory issues.  He is  his wife and does not want his medical issues discussed with her     PROBLEM LIST   Patient Active Problem List   Diagnosis Code     HL (hearing loss) H91.90     Erectile dysfunction N52.9     Pseudophakia, sutured PCL, od; ACL, os - hx of IOL dislocation, ou Z96.1     Posterior vitreous detachment, od H43.819     Epiretinal membrane, mild, od H35.379     Advanced directives, counseling/discussion Z71.89     JOSE JUAN (obstructive sleep apnea)-severe (AHI 61) G47.33     BCC (basal cell carcinoma) C44.91     Cardiomyopathy (H) I42.9     RCT (rotator cuff tear) M75.100     Essential hypertension with goal blood pressure less than 140/90 I10     Hyperlipidemia LDL goal <70 E78.5     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     Macular edema, od H35.81     Angina, class II (H) I20.9     Elevated LFTs R79.89     Fatty liver K76.0     Right carpal tunnel syndrome G56.01     CAD (coronary artery disease) I25.10     S/P carpal tunnel release Z98.890     Pillar pain of extremity M79.609     Folic acid deficiency (non anemic) E53.8     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 (H) I63.439      HFrEF (heart failure with reduced ejection fraction) (H) I50.20     Nonrheumatic aortic valve insufficiency I35.1     ETOH abuse F10.10     Partial symptomatic epilepsy with complex partial seizures, not intractable, without status epilepticus (H) G40.209     Memory impairment R41.3     History of cerebrovascular accident Z86.73         PAST MEDICAL & SURGICAL HISTORY     Past Medical History:   Patient  has a past medical history of Arthritis, BCC (basal cell carcinoma), Cardiomyopathy (H), 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 (H) (06/27/2022), 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    Surgical History:  He  has a past surgical history that includes Extracapsular cataract extration with intraocular lens implant (2005); Exchange intraocular lens implant (2005); Uvulopalatopharyngoplasty (12/11/2013); Turbinoplasty (12/11/2013); Arthroscopy shoulder rotator cuff repair (02/27/2014); Arthroscopy shoulder biceps tenodesis repair (02/27/2014); biopsy; colonoscopy (06/12/2018); Release carpal tunnel (Right, 08/13/2021); Coronary Angiogram (N/A, 08/08/2022); Left Heart Catheterization (N/A, 08/08/2022); Endoscopy Drug Induced Sleep (N/A, 09/16/2022); Release carpal tunnel (Right, 10/26/2022); Transposition ulnar nerve (elbow) (Right, 10/26/2022); and Implant Generator Stimulator (Location) (N/A, 11/3/2022).     SOCIAL HISTORY     Reviewed, and he  reports that he  quit smoking about 26 years ago. His smoking use included cigarettes. He has a 1.00 pack-year smoking history. He has never used smokeless tobacco. He reports current alcohol use of about 8.3 standard drinks per week. He reports that he does not use drugs.     FAMILY HISTORY     Reviewed, and family history includes Cerebrovascular Disease in his father; Coronary Artery Disease in his father; Diabetes in his father; Heart Disease in his father; Hypertension in his father; Lipids in his sister; Obesity in his father.     ALLERGIES     Allergies   Allergen Reactions     Lisinopril Cough     Perfume Other (See Comments)         REVIEW OF SYSTEMS     A 12 point review of system was performed and was negative except as outlined in the history of present illness.     HOME MEDICATIONS     Current Outpatient Rx   Medication Sig Dispense Refill     apixaban ANTICOAGULANT (ELIQUIS) 5 MG tablet Take 1 tablet (5 mg) by mouth 2 times daily 60 tablet 11     atorvastatin (LIPITOR) 10 MG tablet TAKE ONE TABLET BY MOUTH ONE TIME DAILY (Patient taking differently: Take 10 mg by mouth every evening) 90 tablet 0     cyanocobalamin (VITAMIN B-12) 1000 MCG tablet Take 1,000 mcg by mouth 2 times daily       diphenoxylate-atropine (LOMOTIL) 2.5-0.025 MG tablet Take 1 tablet by mouth daily as needed for diarrhea Patient only takes 1 tablet as needed 30 tablet 5     levETIRAcetam (KEPPRA) 500 MG tablet Take 1 tablet (500 mg) by mouth 2 times daily 60 tablet 11     metoprolol succinate ER (TOPROL XL) 25 MG 24 hr tablet Take 1 tablet (25 mg) by mouth 2 times daily 180 tablet 11     sacubitril-valsartan (ENTRESTO) 24-26 MG per tablet Take 1 tablet by mouth 2 times daily 180 tablet 11     SENNA-docusate sodium (SENNA S) 8.6-50 MG tablet Take 1 tablet by mouth At Bedtime Use while taking narcotic pain medications.  Hold for diarrhea. 30 tablet 1         PHYSICAL EXAM     Vital signs  BP (!) 145/75 (BP Location: Left arm, Patient Position:  "Sitting)   Pulse 72   Ht 1.702 m (5' 7\")   Wt 76.8 kg (169 lb 4.8 oz)   BMI 26.52 kg/m      Weight:   169 lbs 4.8 oz    Elderly gentleman who is alert and oriented x3 no acute distress vital signs were reviewed and are documented in electronic medical record.  Neck supple.  Neurologically speech mentation and affect was normal.  He scored 26/30 on MoCA.  He is hard of hearing rest of his cranial nerves are intact.  Strength in extremities 5/5 reflexes 1+ absent at brachioradialis and ankles toes downgoing.  He has dysmetria in finger-nose testing.  He has a wide-based gait.  Romberg negative     PERTINENT DIAGNOSTIC STUDIES     Following studies were reviewed:     CTA HEAD & NECK 8/14/2022  HEAD CT:  1.  No acute intracranial process.     HEAD CTA:   1.  Normal CTA [place] [facility].     NECK CTA:  1.  Normal neck CTA.     MRI BRAIN 6/27/2022  1. Abnormal areas of T2 hyperintense signal involving the left  parietal lobe and bilateral occipital lobes with enhancement, likely  subacute cortical infarcts.  2. 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  3. Nonspecific bilateral cerebellar microhemorrhages, likely  incidental.  4. Volume loss and white matter T2 hyperintensities which likely  represent chronic small vessel ischemic change.     ECHOCARDIOGRAM 4/21/2022  Normal left ventricular size; moderate to severely abnormal left ventricular ejection fraction   estimated at 35% (accuracy may be    reduced, not well visualized).    Left ventricular wall thickness mildly increased.    Global left ventricular hypokinesis with abnormal septal motion consistent with an   intraventricular conduction defect.    Mild left atrial dilatation.    The right ventricle is normal in size and function.    Mild aortic regurgitation.      ECHOCARDIOGRAM 6/29/2022  Bubble study was performed.  No evidence of right to left shunt at rest but evidence of small right to left  shunt following valsalva manuever.  There is " mild concentric left ventricular hypertrophy.  There is moderate global hypokinesia of the left ventricle.  The visual ejection fraction is 35-40%.  Mildly decreased right ventricular systolic function  There is trace to mild mitral regurgitation.  The aortic valve is trileaflet with aortic valve sclerosis.  There is mild to moderate (1-2+) aortic regurgitation.  There is trace tricuspid regurgitation.  Compared to the prior study dated 9/29/2020, there are changes as noted. There  has been a decline in LV and RV systolic fuction.     30 DAY EVENT MONITOR 6/29/2022  Cardiac event monitoring from 6/30/2022 to 7/29/2022 (monitored duration 25d 18h 25m).  Baseline rhythm was sinus rhythm 52bpm with first degree AV block.    Reported heart rate range 47 (7/21/2022 at 05:46am) to 120bpm, average 72bpm.  3 symptom triggers (palpitations, chest pain) correlated to sinus rhythm with and without isolated PVCs.  Automated recordings included intermittent PVCs including a brief interval of bigeminy, intermittent PACs.  No sustained tachyarrhythmias.  No atrial fibrillation.  There were no pauses noted.  Supraventricular and ventricular ectopic beat frequency are not reported on this monitoring modality.       NEUROPSYCHOLOGICAL TESTING 12/2/2022  Overall, Mr. Brown is functioning quite well from a neuropsychological standpoint with only mild impairment on select executive functioning tasks without evidence of cognitive decline since his prior evaluation in 2019. The cognitive profile is not suggestive of a neurocognitive diagnosis at this time and does not raise concerns about an underlying neurodegenerative process. The pattern of performances characterized by variable executive functioning is diagnostically nonspecific but could be consistent with effects of cerebrovascular factors (i.e., hypertension, JOSE JUAN, infarcts noted on neuroimaging). Possible seizure activity, sleep disturbance, heavy alcohol use, and mild symptoms  of anxiety are also likely contributory.     RECOMMENDATIONS  1. Mr. Brown is encouraged to live a healthy lifestyle that promotes brain health including getting appropriate exercise, as advised by his healthcare providers, reducing alcohol use, and eating healthy. The CDC recommends limiting alcohol intake to 2 drinks or less per day for men. (Note: 1 drink = 1.5oz of liquor).   2. Mr. Brown is encouraged to continue follow-up with a sleep specialist regarding treatment of his JOSE JUAN. Additionally, he can work towards improving quality of sleep by following a simple set of guidelines as outlined here: https://www.sleepfoundation.org/sleep-topics/sleep-hygiene.  3. Mr. Brown is encouraged to utilize the following behavioral strategies to maximize cognitive functioning in his daily life:   -Eliminate distraction. Eliminating environmental distracters can facilitate attention and memory performance. For example, turning off music or the television while having a conversation, so that all of your attention is focused on the task at hand.  -Work on decreasing interference from intrusive thoughts. When intrusive thoughts begin to interfere with your thinking, do not concentrate on them. Instead, observe them passively and calmly redirect your attention back to task.   -Engage in calming activities that increase focus on the present. Incorporating mindfulness techniques such as meditation, relaxation, yoga, and moderate cardiovascular exercise, into your daily routine will help keep you present-oriented and consequently improve attention and memory.   -Pay mindful attention. You may find that you need to make a conscious effort to pay attention to conversations or when learning new information. When attention is not focused, it is difficult for the brain to learn new information. Thus, making a mindful effort to pay attention as you go through daily tasks will assist and facilitate memory recall later on.  -Use  external aids to increase structure in daily life. Ongoing use of compensatory strategies such as notes, lists, and a centralized calendar, are supported. Tools such as smart phones with alarms and reminders are helpful in bringing structure to daily activities.  4. The current evaluation will serve as an objective cognitive baseline against which results of future evaluations may be compared. Mr. Brown may be referred for a repeat neuropsychological evaluation if there is significant change in cognitive status in the future    NEUROPSYCHOLOGICAL TESTING 3/17/2019  This 68-year-old man has been having mild cognitive struggles.  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  Medical history is significant for obesity and sleep apnea; he does not tolerate the CPAP well.  Otherwise, the psychiatric and neurological histories are unremarkable.      The test findings are largely within normal limits, aside from mild difficulty on one executive task requiring inductive reasoning, which is borderline impaired.  He was a bit hasty and impulsive on this particular test, which may have lowered the score.  This was an isolated finding, as other executive abilities, including divided attention and nonverbal planning, were average.  He was a little slow, in the below average range, on a timed transcription task requiring graphomotor learning.  But short and long-term retention of story passages and figural material was actually above average to superior.  Word knowledge and auditory attention span were average, nonverbal reasoning abilities superior.  He was reasonably fast on timed tasks requiring sustained and divided attention.  Psychomotor speeds were grossly intact except for mild slowing of the left hand on a dexterity task, probably due to peripheral factors (he is missing the tip edge of his left thumb with diminished  proprioception).  Expressive and receptive language abilities, including speeded word retrieval and confrontation naming, were within normal limits.  There were no signs of visual misperception or hemispatial visual neglect on any of the visually demanding tasks.      The test findings generally contraindicate acquired brain disease.  The difficulty with inductive reasoning raises the possibility of subtle executive impairment, possibly implying mild frontal lobe dysfunction.  But this is rather speculative as there were no other executive impairments.  More likely Mr. Brown is experiencing subtle cognitive inefficiencies of a benign, functional sort.  This may be related to inattentiveness, missed information due to hearing impairment and possibly reduced alertness secondary to sleep apnea.  These findings will serve as a baseline for future comparisons should there be indications of further decline, greater than expected with normal ageing.  In the meantime, Mr. Brown appears to be functioning reasonably well and there are no indications of an emerging dementia.      EMG 3/31/2022  This is an abnormal study, demonstrating electrophysiologic evidence of the following:  - Moderate right sided median mononeuropathy at the wrist (carpal tunnel syndrome)  - Mild right sided ulnar mononeuropathy at the elbow     Comment: In comparison to EMG from June 2021, there is no significant change in the severity of median and ulnar mononeuropathies of the right upper extremity     PERTINENT LABS  Following labs were reviewed:  No visits with results within 3 Month(s) from this visit.   Latest known visit with results is:   Office Visit on 10/21/2022   Component Date Value     Sodium 10/21/2022 143      Potassium 10/21/2022 4.8      Chloride 10/21/2022 111 (H)      Carbon Dioxide (CO2) 10/21/2022 27      Anion Gap 10/21/2022 5      Urea Nitrogen 10/21/2022 19      Creatinine 10/21/2022 0.94      Calcium 10/21/2022 9.2       Glucose 10/21/2022 108 (H)      GFR Estimate 10/21/2022 88          Total time spent for face to face visit, reviewing labs/imaging studies, counseling and coordination of care was: 30 Minutes spent on the date of the encounter doing chart review, review of outside records, review of test results, interpretation of tests, patient visit and documentation       This note was dictated using voice recognition software.  Any grammatical or context distortions are unintentional and inherent to the software.    Orders Placed This Encounter   Procedures     Keppra (Levetiracetam) Level      New Prescriptions    No medications on file     Modified Medications    Modified Medication Previous Medication    LEVETIRACETAM (KEPPRA) 500 MG TABLET levETIRAcetam (KEPPRA) 500 MG tablet       Take 1 tablet (500 mg) by mouth 2 times daily    Take 1 tablet (500 mg) by mouth 2 times daily                     Again, thank you for allowing me to participate in the care of your patient.        Sincerely,        Nathen Ruiz MD

## 2023-03-07 NOTE — NURSING NOTE
Chief Complaint   Patient presents with     Neurocognitive disorder     Discuss neuropsych results      Amy Yang CMA on 3/7/2023 at 2:01 PM

## 2023-03-07 NOTE — PROGRESS NOTES
NEUROLOGY FOLLOW UP VISIT  NOTE       [facility] NEUROLOGY [place]  [address] [place] [address]  Tel: [phone]  Fax: [phone]  [email or web address]     Ricky Brown,  1950, MRN [id number]  PCP: Holland Blunt  Date: 2023      ASSESSMENT & PLAN     Visit Diagnosis  1.  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 (H)  2. Partial symptomatic epilepsy with complex partial seizures, not intractable, without status epilepticus (H)     Complex partial seizure  72-year-old male with history of JOSE JUAN, cardiomyopathy, HTN, HLD, CAD and alcohol abuse who was evaluated for progressive cognitive decline.  Work-up included MRI brain that showed left parietal occipital infarct and was started on Eliquis.  Lab work for common causes of cognitive decline showed a low folic acid and was started on supplement.  EEG surprisingly showed left temporal sharp activity and he was started on Keppra.  Neuropsychological testing surprisingly was unchanged compared to 2019.  I have recommended:    1.  Continue Keppra 500 mg twice daily  2.  Check Keppra level  3.  Patient was encouraged to quit drinking  4.  As noted below he is  his wife and does not want his medical records discussed with her  5.  Follow-up in 1 year    Thank you again for this referral, please feel free to contact me if you have any questions.    Nathen Ruiz MD  [facility]  (Formerly, [facility]     HISTORY OF PRESENT ILLNESS     Patient is a 72-year-old male with history of JOSE JUAN, cardiomyopathy, HTN, HLD, CAD, alcohol abuse who was initially evaluated on 2022 for progressive cognitive decline.  Work-up included MRI brain that showed left parietal and occipital infarct.  He was subsequently evaluated by cardiology and had a coronary angiogram and was continued on Eliquis and  Lipitor.    Patient reported neurocognitive decline MRI concern was it was related to alcohol abuse he had MRI of the brain that showed multiple small infarct and possibility of vascular dementia was raised.  Lab work showed a low folic acid and he was started on supplement.  EEG showed left temporal sharp activity and he was started on Keppra.  Neuropsychological testing was unremarkable and no change noted compared to neuropsychological testing done in 2019.  Patient does not think he has any memory issues.  He is  his wife and does not want his medical issues discussed with her     PROBLEM LIST   Patient Active Problem List   Diagnosis Code     HL (hearing loss) H91.90     Erectile dysfunction N52.9     Pseudophakia, sutured PCL, od; ACL, os - hx of IOL dislocation, ou Z96.1     Posterior vitreous detachment, od H43.819     Epiretinal membrane, mild, od H35.379     Advanced directives, counseling/discussion Z71.89     JOSE JUAN (obstructive sleep apnea)-severe (AHI 61) G47.33     BCC (basal cell carcinoma) C44.91     Cardiomyopathy (H) I42.9     RCT (rotator cuff tear) M75.100     Essential hypertension with goal blood pressure less than 140/90 I10     Hyperlipidemia LDL goal <70 E78.5     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     Macular edema, od H35.81     Angina, class II (H) I20.9     Elevated LFTs R79.89     Fatty liver K76.0     Right carpal tunnel syndrome G56.01     CAD (coronary artery disease) I25.10     S/P carpal tunnel release Z98.890     Pillar pain of extremity M79.609     Folic acid deficiency (non anemic) E53.8     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 (H) I63.439     HFrEF (heart failure with reduced ejection fraction) (H) I50.20     Nonrheumatic aortic valve insufficiency I35.1     ETOH abuse F10.10     Partial symptomatic epilepsy with complex partial seizures, not intractable, without status epilepticus (H) G40.209     Memory  impairment R41.3     History of cerebrovascular accident Z86.73         PAST MEDICAL & SURGICAL HISTORY     Past Medical History:   Patient  has a past medical history of Arthritis, BCC (basal cell carcinoma), Cardiomyopathy (H), 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 (H) (06/27/2022), 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    Surgical History:  He  has a past surgical history that includes Extracapsular cataract extration with intraocular lens implant (2005); Exchange intraocular lens implant (2005); Uvulopalatopharyngoplasty (12/11/2013); Turbinoplasty (12/11/2013); Arthroscopy shoulder rotator cuff repair (02/27/2014); Arthroscopy shoulder biceps tenodesis repair (02/27/2014); biopsy; colonoscopy (06/12/2018); Release carpal tunnel (Right, 08/13/2021); Coronary Angiogram (N/A, 08/08/2022); Left Heart Catheterization (N/A, 08/08/2022); Endoscopy Drug Induced Sleep (N/A, 09/16/2022); Release carpal tunnel (Right, 10/26/2022); Transposition ulnar nerve (elbow) (Right, 10/26/2022); and Implant Generator Stimulator (Location) (N/A, 11/3/2022).     SOCIAL HISTORY     Reviewed, and he  reports that he quit smoking about 26 years ago. His smoking use included cigarettes. He has a 1.00 pack-year smoking history. He has never used smokeless tobacco. He reports current alcohol use of about 8.3 standard drinks per week. He reports that he does not use drugs.     FAMILY  "HISTORY     Reviewed, and family history includes Cerebrovascular Disease in his father; Coronary Artery Disease in his father; Diabetes in his father; Heart Disease in his father; Hypertension in his father; Lipids in his sister; Obesity in his father.     ALLERGIES     Allergies   Allergen Reactions     Lisinopril Cough     Perfume Other (See Comments)         REVIEW OF SYSTEMS     A 12 point review of system was performed and was negative except as outlined in the history of present illness.     HOME MEDICATIONS     Current Outpatient Rx   Medication Sig Dispense Refill     apixaban ANTICOAGULANT (ELIQUIS) 5 MG tablet Take 1 tablet (5 mg) by mouth 2 times daily 60 tablet 11     atorvastatin (LIPITOR) 10 MG tablet TAKE ONE TABLET BY MOUTH ONE TIME DAILY (Patient taking differently: Take 10 mg by mouth every evening) 90 tablet 0     cyanocobalamin (VITAMIN B-12) 1000 MCG tablet Take 1,000 mcg by mouth 2 times daily       diphenoxylate-atropine (LOMOTIL) 2.5-0.025 MG tablet Take 1 tablet by mouth daily as needed for diarrhea Patient only takes 1 tablet as needed 30 tablet 5     levETIRAcetam (KEPPRA) 500 MG tablet Take 1 tablet (500 mg) by mouth 2 times daily 60 tablet 11     metoprolol succinate ER (TOPROL XL) 25 MG 24 hr tablet Take 1 tablet (25 mg) by mouth 2 times daily 180 tablet 11     sacubitril-valsartan (ENTRESTO) 24-26 MG per tablet Take 1 tablet by mouth 2 times daily 180 tablet 11     SENNA-docusate sodium (SENNA S) 8.6-50 MG tablet Take 1 tablet by mouth At Bedtime Use while taking narcotic pain medications.  Hold for diarrhea. 30 tablet 1         PHYSICAL EXAM     Vital signs  BP (!) 145/75 (BP Location: Left arm, Patient Position: Sitting)   Pulse 72   Ht 1.702 m (5' 7\")   Wt 76.8 kg (169 lb 4.8 oz)   BMI 26.52 kg/m      Weight:   169 lbs 4.8 oz    Elderly gentleman who is alert and oriented x3 no acute distress vital signs were reviewed and are documented in electronic medical record.  Neck " supple.  Neurologically speech mentation and affect was normal.  He scored 26/30 on MoCA.  He is hard of hearing rest of his cranial nerves are intact.  Strength in extremities 5/5 reflexes 1+ absent at brachioradialis and ankles toes downgoing.  He has dysmetria in finger-nose testing.  He has a wide-based gait.  Romberg negative     PERTINENT DIAGNOSTIC STUDIES     Following studies were reviewed:     CTA HEAD & NECK 8/14/2022  HEAD CT:  1.  No acute intracranial process.     HEAD CTA:   1.  Normal CTA [place] [facility] [place].     NECK CTA:  1.  Normal neck CTA.     MRI BRAIN 6/27/2022  1. Abnormal areas of T2 hyperintense signal involving the left  parietal lobe and bilateral occipital lobes with enhancement, likely  subacute cortical infarcts.  2. 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  3. Nonspecific bilateral cerebellar microhemorrhages, likely  incidental.  4. Volume loss and white matter T2 hyperintensities which likely  represent chronic small vessel ischemic change.     ECHOCARDIOGRAM 4/21/2022  Normal left ventricular size; moderate to severely abnormal left ventricular ejection fraction   estimated at 35% (accuracy may be    reduced, not well visualized).    Left ventricular wall thickness mildly increased.    Global left ventricular hypokinesis with abnormal septal motion consistent with an   intraventricular conduction defect.    Mild left atrial dilatation.    The right ventricle is normal in size and function.    Mild aortic regurgitation.      ECHOCARDIOGRAM 6/29/2022  Bubble study was performed.  No evidence of right to left shunt at rest but evidence of small right to left  shunt following valsalva manuever.  There is mild concentric left ventricular hypertrophy.  There is moderate global hypokinesia of the left ventricle.  The visual ejection fraction is 35-40%.  Mildly decreased right ventricular systolic function  There is trace to mild mitral regurgitation.  The aortic valve is  trileaflet with aortic valve sclerosis.  There is mild to moderate (1-2+) aortic regurgitation.  There is trace tricuspid regurgitation.  Compared to the prior study dated 9/29/2020, there are changes as noted. There  has been a decline in LV and RV systolic fuction.     30 DAY EVENT MONITOR 6/29/2022  Cardiac event monitoring from 6/30/2022 to 7/29/2022 (monitored duration 25d 18h 25m).  Baseline rhythm was sinus rhythm 52bpm with first degree AV block.    Reported heart rate range 47 (7/21/2022 at 05:46am) to 120bpm, average 72bpm.  3 symptom triggers (palpitations, chest pain) correlated to sinus rhythm with and without isolated PVCs.  Automated recordings included intermittent PVCs including a brief interval of bigeminy, intermittent PACs.  No sustained tachyarrhythmias.  No atrial fibrillation.  There were no pauses noted.  Supraventricular and ventricular ectopic beat frequency are not reported on this monitoring modality.       NEUROPSYCHOLOGICAL TESTING 12/2/2022  Overall, Mr. Brown is functioning quite well from a neuropsychological standpoint with only mild impairment on select executive functioning tasks without evidence of cognitive decline since his prior evaluation in 2019. The cognitive profile is not suggestive of a neurocognitive diagnosis at this time and does not raise concerns about an underlying neurodegenerative process. The pattern of performances characterized by variable executive functioning is diagnostically nonspecific but could be consistent with effects of cerebrovascular factors (i.e., hypertension, JOSE JUAN, infarcts noted on neuroimaging). Possible seizure activity, sleep disturbance, heavy alcohol use, and mild symptoms of anxiety are also likely contributory.     RECOMMENDATIONS  1. Mr. Brown is encouraged to live a healthy lifestyle that promotes brain health including getting appropriate exercise, as advised by his healthcare providers, reducing alcohol use, and eating  healthy. The CDC recommends limiting alcohol intake to 2 drinks or less per day for men. (Note: 1 drink = 1.5oz of liquor).   2. Mr. Brown is encouraged to continue follow-up with a sleep specialist regarding treatment of his JOSE JUAN. Additionally, he can work towards improving quality of sleep by following a simple set of guidelines as outlined here: https://www.sleepfoundation.org/sleep-topics/sleep-hygiene.  3. Mr. Brown is encouraged to utilize the following behavioral strategies to maximize cognitive functioning in his daily life:   -Eliminate distraction. Eliminating environmental distracters can facilitate attention and memory performance. For example, turning off music or the television while having a conversation, so that all of your attention is focused on the task at hand.  -Work on decreasing interference from intrusive thoughts. When intrusive thoughts begin to interfere with your thinking, do not concentrate on them. Instead, observe them passively and calmly redirect your attention back to task.   -Engage in calming activities that increase focus on the present. Incorporating mindfulness techniques such as meditation, relaxation, yoga, and moderate cardiovascular exercise, into your daily routine will help keep you present-oriented and consequently improve attention and memory.   -Pay mindful attention. You may find that you need to make a conscious effort to pay attention to conversations or when learning new information. When attention is not focused, it is difficult for the brain to learn new information. Thus, making a mindful effort to pay attention as you go through daily tasks will assist and facilitate memory recall later on.  -Use external aids to increase structure in daily life. Ongoing use of compensatory strategies such as notes, lists, and a centralized calendar, are supported. Tools such as smart phones with alarms and reminders are helpful in bringing structure to daily  activities.  4. The current evaluation will serve as an objective cognitive baseline against which results of future evaluations may be compared. Mr. Brown may be referred for a repeat neuropsychological evaluation if there is significant change in cognitive status in the future    NEUROPSYCHOLOGICAL TESTING 3/17/2019  This 68-year-old man has been having mild cognitive struggles.  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  Medical history is significant for obesity and sleep apnea; he does not tolerate the CPAP well.  Otherwise, the psychiatric and neurological histories are unremarkable.      The test findings are largely within normal limits, aside from mild difficulty on one executive task requiring inductive reasoning, which is borderline impaired.  He was a bit hasty and impulsive on this particular test, which may have lowered the score.  This was an isolated finding, as other executive abilities, including divided attention and nonverbal planning, were average.  He was a little slow, in the below average range, on a timed transcription task requiring graphomotor learning.  But short and long-term retention of story passages and figural material was actually above average to superior.  Word knowledge and auditory attention span were average, nonverbal reasoning abilities superior.  He was reasonably fast on timed tasks requiring sustained and divided attention.  Psychomotor speeds were grossly intact except for mild slowing of the left hand on a dexterity task, probably due to peripheral factors (he is missing the tip edge of his left thumb with diminished proprioception).  Expressive and receptive language abilities, including speeded word retrieval and confrontation naming, were within normal limits.  There were no signs of visual misperception or hemispatial visual neglect on any of the visually demanding tasks.       The test findings generally contraindicate acquired brain disease.  The difficulty with inductive reasoning raises the possibility of subtle executive impairment, possibly implying mild frontal lobe dysfunction.  But this is rather speculative as there were no other executive impairments.  More likely Mr. Brown is experiencing subtle cognitive inefficiencies of a benign, functional sort.  This may be related to inattentiveness, missed information due to hearing impairment and possibly reduced alertness secondary to sleep apnea.  These findings will serve as a baseline for future comparisons should there be indications of further decline, greater than expected with normal ageing.  In the meantime, Mr. Brown appears to be functioning reasonably well and there are no indications of an emerging dementia.      EMG 3/31/2022  This is an abnormal study, demonstrating electrophysiologic evidence of the following:  - Moderate right sided median mononeuropathy at the wrist (carpal tunnel syndrome)  - Mild right sided ulnar mononeuropathy at the elbow     Comment: In comparison to EMG from June 2021, there is no significant change in the severity of median and ulnar mononeuropathies of the right upper extremity     PERTINENT LABS  Following labs were reviewed:  No visits with results within 3 Month(s) from this visit.   Latest known visit with results is:   Office Visit on 10/21/2022   Component Date Value     Sodium 10/21/2022 143      Potassium 10/21/2022 4.8      Chloride 10/21/2022 111 (H)      Carbon Dioxide (CO2) 10/21/2022 27      Anion Gap 10/21/2022 5      Urea Nitrogen 10/21/2022 19      Creatinine 10/21/2022 0.94      Calcium 10/21/2022 9.2      Glucose 10/21/2022 108 (H)      GFR Estimate 10/21/2022 88          Total time spent for face to face visit, reviewing labs/imaging studies, counseling and coordination of care was: 30 Minutes spent on the date of the encounter doing chart review, review of  outside records, review of test results, interpretation of tests, patient visit and documentation       This note was dictated using voice recognition software.  Any grammatical or context distortions are unintentional and inherent to the software.    Orders Placed This Encounter   Procedures     Keppra (Levetiracetam) Level      New Prescriptions    No medications on file     Modified Medications    Modified Medication Previous Medication    LEVETIRACETAM (KEPPRA) 500 MG TABLET levETIRAcetam (KEPPRA) 500 MG tablet       Take 1 tablet (500 mg) by mouth 2 times daily    Take 1 tablet (500 mg) by mouth 2 times daily

## 2023-03-15 ENCOUNTER — TELEPHONE (OUTPATIENT)
Dept: INTERNAL MEDICINE | Facility: CLINIC | Age: 73
End: 2023-03-15
Payer: COMMERCIAL

## 2023-03-15 NOTE — TELEPHONE ENCOUNTER
Patient said the DMV indicates the he had a stroke.  Patient denies having a stroke.    Loss of Consciousness or Voluntary Control form received and given to Dr. Blunt to complete and sign.    Vanesa Rene,

## 2023-03-15 NOTE — TELEPHONE ENCOUNTER
Reason for Call:  Form, our goal is to have forms completed with 72 hours, however, some forms may require a visit or additional information.    Type of letter, form or note:  medical    Who is the form from?: [place] [employer] (if other please explain)    Where did the form come from: Patient or family brought in       What clinic location was the form placed at?: [facility]    Where the form was placed: Given to physician    What number is listed as a contact on the form?: [phone]       Call taken on 3/15/2023 at 2:08 PM by Vanesa Rene

## 2023-03-17 NOTE — TELEPHONE ENCOUNTER
Per Dr. Blunt, his neurologist should fill out this form.    LM for patient informing him of this and asked if he would like to  the form at the  or have it mailed to his home.    Vanesa Rene,

## 2023-03-21 NOTE — TELEPHONE ENCOUNTER
Patient called and informed that Dr. Blunt would like his neurologist to complete the form.  He asked that we mail it to that provider.    Form mailed to: Dr. Nathen Ruiz  [facility] NEUROLOG[facility], [address] [place] [address].    Vanesa Rene,

## 2023-03-24 ENCOUNTER — OFFICE VISIT (OUTPATIENT)
Dept: NEUROLOGY | Facility: CLINIC | Age: 73
End: 2023-03-24
Attending: STUDENT IN AN ORGANIZED HEALTH CARE EDUCATION/TRAINING PROGRAM
Payer: COMMERCIAL

## 2023-03-24 DIAGNOSIS — G56.01 CARPAL TUNNEL SYNDROME OF RIGHT WRIST: ICD-10-CM

## 2023-03-24 DIAGNOSIS — G56.21 ULNAR NEUROPATHY OF RIGHT UPPER EXTREMITY: Primary | ICD-10-CM

## 2023-03-24 PROCEDURE — 95886 MUSC TEST DONE W/N TEST COMP: CPT | Performed by: PHYSICAL MEDICINE & REHABILITATION

## 2023-03-24 PROCEDURE — 95911 NRV CNDJ TEST 9-10 STUDIES: CPT | Performed by: PHYSICAL MEDICINE & REHABILITATION

## 2023-03-24 NOTE — PROGRESS NOTES
[facility]  Electrodiagnostic Laboratory                 Department of Neurology                                                                                                         Test Date:  3/24/2023    Patient: Ricky Brown : 1950 Physician: Mickie Pfeiffer MD   Sex: Male AGE: 72 year Ref Phys: Vignesh Rhodes MD     ID#: [id number]   Technician: Ruby Barfield       Clinical Information:  Ricky Brown is a 71 yo male who presents with persistent paresthesia in his right hand following surgical release of right median nerve and decompression of right ulnar nerve on 10/26/2022. His PMH is significant for JOSE JUAN, cardiomyopathy, HTN, HLD, CAD, alcohol abuse and left parietal occipital infarct. Query comparison to prior studies (3/2022 and 2021).       Techniques:  Motor and sensory conduction studies were done with surface recording electrodes. EMG was done with a concentric needle electrode.       Results:  Evaluation of the right Median (APB) motor nerve showed reduced amplitude (Wrist, 4.4 mV) and reduced amplitude (Elbow, 2.9 mV). Right ulnar motor NCS recording from the first dorsal interosseous muscle showed normal distal latency, normal CMAP amplitudes, and attenuated conduction velocity across the elbow.     The right median sensory nerve showed decreased conduction velocity (40 m/s).  The right Median-Ulnar Palmar sensory nerve showed abnormal peak latency difference ((Median Palm)-(Ulnar Palm), 0.82 ms).  The right ulnar sensory nerve showed reduced amplitude (6  V) and reduced amplitude (2  V).      All remaining nerves (as indicated in the following tables) were within normal limits.      All F Wave latencies were within normal limits.      Needle evaluation of the right First Dorsal Interosseous and the right Abductor Pollicis Brevis muscles showed slightly increased motor unit amplitude, slightly increased motor unit duration, and reduced  recruitment.  All remaining muscles (as indicated in the following table) showed no evidence of electrical instability.        Interpretation:  Abnormal study.     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 The findings have improved as compared to the previous study in 3/2022.      ___________________________  Mickie Pfeiffer MD        Nerve Conduction Studies  Motor Sites      Latency Amplitude Neg. Amp Diff Segment Distance Velocity Neg. Dur Neg Area Diff Temperature Comment   Site (ms) Norm (mV) Norm %  cm m/s Norm ms %  C    Right Median (APB) Motor   Wrist 4.4  < 4.4 4.4  > 5.0  Wrist-APB 8   6.0  32.8    Elbow 8.9 - 2.9  > 5.0 -34.1 Elbow-Wrist 22.5 50  > 48 6.5 -14.3 31.1    Right Ulnar (ADM) Motor   Wrist 2.5  < 3.5 7.6  > 5.0  Wrist-ADM 8   4.9  32.7    Bel Elbow 5.5 - 6.9 - -9.2 Bel Elbow-Wrist 18 60  > 48 5.2 -3.6 32.7    Abv Elbow 7.7 - 6.3 - -8.7 Abv Elbow-Bel Elbow 12 55  > 48 5.8 -2.2 32.6    Up Arm 9.1 - 6.6 - 4.8 Up Arm-Abv Elbow 8.5 61 - 5.8 -3.8 32.4    Right Ulnar (FDI) Motor   Wrist 2.9 - 14.4 -      5.1  32.3    Bel Elbow 5.6 - 13.8 - -4.2 Bel Elbow-Wrist 18 67  > 48 5.4 -1.89 32.3    Abv Elbow 8.1 - 11.9 - -13.8 Abv Elbow-Bel Elbow 12 48  > 48 5.6 -11.5 32.3    Up Arm 9.4 - 13.8 - 16.0 Up Arm-Abv Elbow 8.5 65 - 5.3 19.1 32.3      F-Wave Sites      Min F-Lat Max-Min F-Lat Mean F-Lat   Site (ms) ms ms   Right Median F-Wave   Wrist 27.5 3.6 29.3   Right Ulnar F-Wave   Wrist 27.9 2.3 29.2     Sensory Sites      Onset Lat Peak Lat Amp (O-P) Amp (P-P) Segment Distance Velocity Temperature Comment   Site ms ms  V Norm  V  cm m/s Norm  C    Right Median Sensory   Wrist-Dig II 3.5 4.3 12  > 10 17 Wrist-Dig II 14 40  > 48 32.2    Right Median-Ulnar Palmar Sensory         Median   Palm-Wrist 2.0 2.7 18 - 24 Palm-Wrist 8 40 - 32.3         Ulnar   Palm-Wrist 1.35 1.88 11 - 12 Palm-Wrist 8 59 - 32.3    Right Radial Sensory   Forearm-Wrist 1.38 1.98 30  > 15 38 Forearm-Wrist 10 72 - 32.4    Right Ulnar Sensory   Wrist-Dig V 2.6 3.3 6  > 8 2 Wrist-Dig V 12.5 48  > 48 31.8      Inter-Nerve Comparisons     Nerve 1 Value 1 Nerve 2 Value 2 Parameter Result Normal   Sensory Sites   R Median Palm-Wrist 2.7 ms R Ulnar Palm-Wrist 1.9 ms Peak Lat Diff 0.82 ms <0.40       Electromyography     Side Muscle Ins Act Fibs/PSW Fasc HF Amp Dur Poly Recrt Int Pat   Right Deltoid Nml None Nml 0 Nml Nml 0 Nml Nml   Right Biceps Nml None Nml 0 Nml Nml 0 Nml Nml   Right Pronator Teres Nml None Nml 0 Nml Nml 0 Nml Nml   Right Triceps Nml None Nml 0 Nml Nml 0 Nml Nml   Right FDI Nml None Nml 0 1+ 1+ 0 Yassine Nml   Right APB Nml None Nml 0 1+ 1+ 0 Yassine Nml   Right EIP Nml None Nml 0 Nml Nml 0 Nml Nml         NCS Waveforms:    Motor           F-Wave         Sensory

## 2023-03-24 NOTE — LETTER
3/24/2023       RE: Ricky Brown  [address]  [facility] [address]     Dear Colleague,    Thank you for referring your patient, Ricky Brown, to the [facility] EMG CLINIC [place] at [facility]. Please see a copy of my visit note below.                          [facility]  Electrodiagnostic Laboratory                 Department of Neurology                                                                                                         Test Date:  3/24/2023    Patient: Ricky Brown : 1950 Physician: Mickie Pfeiffer MD   Sex: Male AGE: 72 year Ref Phys: Vignesh Rhodes MD     ID#: [id number]   Technician: Ruby Barfield       Clinical Information:  Ricky Brown is a 71 yo male who presents with persistent paresthesia in his right hand following surgical release of right median nerve and decompression of right ulnar nerve on 10/26/2022. His PMH is significant for JOSE JUAN, cardiomyopathy, HTN, HLD, CAD, alcohol abuse and left parietal occipital infarct. Query comparison to prior studies (3/2022 and 2021).       Techniques:  Motor and sensory conduction studies were done with surface recording electrodes. EMG was done with a concentric needle electrode.       Results:  Evaluation of the right Median (APB) motor nerve showed reduced amplitude (Wrist, 4.4 mV) and reduced amplitude (Elbow, 2.9 mV). Right ulnar motor NCS recording from the first dorsal interosseous muscle showed normal distal latency, normal CMAP amplitudes, and attenuated conduction velocity across the elbow.     The right median sensory nerve showed decreased conduction velocity (40 m/s).  The right Median-Ulnar Palmar sensory nerve showed abnormal peak latency difference ((Median Palm)-(Ulnar Palm), 0.82 ms).  The right ulnar sensory nerve showed reduced amplitude (6  V) and reduced amplitude (2  V).      All remaining nerves (as  indicated in the following tables) were within normal limits.      All F Wave latencies were within normal limits.      Needle evaluation of the right First Dorsal Interosseous and the right Abductor Pollicis Brevis muscles showed slightly increased motor unit amplitude, slightly increased motor unit duration, and reduced recruitment.  All remaining muscles (as indicated in the following table) showed no evidence of electrical instability.        Interpretation:  Abnormal study.     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 The findings have improved as compared to the previous study in 3/2022.  ________________________  Mickie Pfeiffer MD        Nerve Conduction Studies  Motor Sites      Latency Amplitude Neg. Amp Diff Segment Distance Velocity Neg. Dur Neg Area Diff Temperature Comment   Site (ms) Norm (mV) Norm %  cm m/s Norm ms %  C    Right Median (APB) Motor   Wrist 4.4  < 4.4 4.4  > 5.0  Wrist-APB 8   6.0  32.8    Elbow 8.9 - 2.9  > 5.0 -34.1 Elbow-Wrist 22.5 50  > 48 6.5 -14.3 31.1    Right Ulnar (ADM) Motor   Wrist 2.5  < 3.5 7.6  > 5.0  Wrist-ADM 8   4.9  32.7    Bel Elbow 5.5 - 6.9 - -9.2 Bel Elbow-Wrist 18 60  > 48 5.2 -3.6 32.7    Abv Elbow 7.7 - 6.3 - -8.7 Abv Elbow-Bel Elbow 12 55  > 48 5.8 -2.2 32.6    Up Arm 9.1 - 6.6 - 4.8 Up Arm-Abv Elbow 8.5 61 - 5.8 -3.8 32.4    Right Ulnar (FDI) Motor   Wrist 2.9 - 14.4 -      5.1  32.3    Bel Elbow 5.6 - 13.8 - -4.2 Bel Elbow-Wrist 18 67  > 48 5.4 -1.89 32.3    Abv Elbow 8.1 - 11.9 - -13.8 Abv Elbow-Bel Elbow 12 48  > 48 5.6 -11.5 32.3    Up Arm 9.4 - 13.8 - 16.0 Up Arm-Abv Elbow 8.5 65 - 5.3 19.1 32.3      F-Wave Sites      Min F-Lat Max-Min F-Lat Mean F-Lat   Site (ms) ms ms   Right Median F-Wave   Wrist 27.5 3.6 29.3   Right  Ulnar F-Wave   Wrist 27.9 2.3 29.2     Sensory Sites      Onset Lat Peak Lat Amp (O-P) Amp (P-P) Segment Distance Velocity Temperature Comment   Site ms ms  V Norm  V  cm m/s Norm  C    Right Median Sensory   Wrist-Dig II 3.5 4.3 12  > 10 17 Wrist-Dig II 14 40  > 48 32.2    Right Median-Ulnar Palmar Sensory        Median   Palm-Wrist 2.0 2.7 18 - 24 Palm-Wrist 8 40 - 32.3         Ulnar   Palm-Wrist 1.35 1.88 11 - 12 Palm-Wrist 8 59 - 32.3    Right Radial Sensory   Forearm-Wrist 1.38 1.98 30  > 15 38 Forearm-Wrist 10 72 - 32.4    Right Ulnar Sensory   Wrist-Dig V 2.6 3.3 6  > 8 2 Wrist-Dig V 12.5 48  > 48 31.8      Inter-Nerve Comparisons     Nerve 1 Value 1 Nerve 2 Value 2 Parameter Result Normal   Sensory Sites   R Median Palm-Wrist 2.7 ms R Ulnar Palm-Wrist 1.9 ms Peak Lat Diff 0.82 ms <0.40       Electromyography     Side Muscle Ins Act Fibs/PSW Fasc HF Amp Dur Poly Recrt Int Pat   Right Deltoid Nml None Nml 0 Nml Nml 0 Nml Nml   Right Biceps Nml None Nml 0 Nml Nml 0 Nml Nml   Right Pronator Teres Nml None Nml 0 Nml Nml 0 Nml Nml   Right Triceps Nml None Nml 0 Nml Nml 0 Nml Nml   Right FDI Nml None Nml 0 1+ 1+ 0 Yassine Nml   Right APB Nml None Nml 0 1+ 1+ 0 Yassine Nml   Right EIP Nml None Nml 0 Nml Nml 0 Nml Nml         NCS Waveforms:    Motor           F-Wave         Sensory                                Again, thank you for allowing me to participate in the care of your patient.      Sincerely,    Mickie Pfeiffer MD

## 2023-04-02 DIAGNOSIS — E53.8 FOLIC ACID DEFICIENCY (NON ANEMIC): ICD-10-CM

## 2023-04-02 DIAGNOSIS — G56.01 CARPAL TUNNEL SYNDROME OF RIGHT WRIST: Primary | ICD-10-CM

## 2023-04-03 RX ORDER — FOLIC ACID 1 MG/1
TABLET ORAL
Qty: 180 TABLET | Refills: 3 | Status: SHIPPED | OUTPATIENT
Start: 2023-04-03 | End: 2023-06-20

## 2023-04-03 NOTE — TELEPHONE ENCOUNTER
Refill request for: folic acid 1mg   Directions: Take 1 tablet (1 mg) by mouth 2 times daily   .  LOV: 03/07/23  NOV: No future apt scheduled. Not due until March 2024.    90 day supply with 3 refills Medication T'd for review and signature    Romina Matos LPN on 4/3/2023 at 10:21 AM

## 2023-04-07 ENCOUNTER — MEDICAL CORRESPONDENCE (OUTPATIENT)
Dept: HEALTH INFORMATION MANAGEMENT | Facility: CLINIC | Age: 73
End: 2023-04-07
Payer: COMMERCIAL

## 2023-04-10 DIAGNOSIS — R06.83 SNORING: ICD-10-CM

## 2023-04-10 DIAGNOSIS — Z86.79 PERSONAL HISTORY OF SUBDURAL HEMATOMA: ICD-10-CM

## 2023-04-10 DIAGNOSIS — G47.61 PERIODIC LIMB MOVEMENT DISORDER: Primary | ICD-10-CM

## 2023-04-10 DIAGNOSIS — G47.33 OBSTRUCTIVE SLEEP APNEA: ICD-10-CM

## 2023-04-10 DIAGNOSIS — R41.3 MEMORY IMPAIRMENT: ICD-10-CM

## 2023-04-18 ENCOUNTER — OFFICE VISIT (OUTPATIENT)
Dept: NEUROLOGY | Facility: CLINIC | Age: 73
End: 2023-04-18
Payer: COMMERCIAL

## 2023-04-18 DIAGNOSIS — R29.898 OTHER SYMPTOMS AND SIGNS INVOLVING THE MUSCULOSKELETAL SYSTEM: ICD-10-CM

## 2023-04-18 DIAGNOSIS — G56.01 CARPAL TUNNEL SYNDROME OF RIGHT WRIST: ICD-10-CM

## 2023-04-18 DIAGNOSIS — G56.21 ULNAR NEUROPATHY OF RIGHT UPPER EXTREMITY: Primary | ICD-10-CM

## 2023-04-18 PROCEDURE — 76882 US LMTD JT/FCL EVL NVASC XTR: CPT | Performed by: PSYCHIATRY & NEUROLOGY

## 2023-04-18 NOTE — LETTER
4/18/2023       RE: Ricky Brown  [address]  [facility] [address]     Dear Colleague,    Thank you for referring your patient, Ricky Brown, to the [facility] EMG CLINIC [place] at [facility]. Please see a copy of my visit note below.    Neuromuscular Ultrasound Report   Patient: Ricky Brown  Patient ID: [id number]  YOB: 1950  Age: 72 years  Referring Physician: Mickie Pfeiffer MD    History & Examination:   72-year-old man with chief complaint of numbness of the entire right hand over all 5 fingers.  He began having those symptoms a few years ago.  In June 2021 he underwent an open right carpal tunnel release surgery, but this did not lead to any improvement of his symptoms.  There were 2 EMG studies available, one prior to surgery from 6/2021, and the second one after surgery in 3/2022.  The two studies showed equally prolonged right median motor distal latency at the wrist, and equally attenuated right median sensory conduction velocity at the wrist, without improvement after the surgery.  He then underwent a repeat carpal tunnel open release surgery in October 2022, with with hypothenar flap creation, and also in situ decompression of the right ulnar nerve at the cubital tunnel.  Unfortunately, he had minimal to no improvement of his symptoms even after the second surgery.  A third EMG study was performed on March 24, 2023.  The right median motor distal latency appeared somewhat improved compared to the previous studies.  The right median antidromic sensory nerve conduction study was same or minimally better.  Ultrasonographic study of the right median and ulnar nerves was requested to clarify if there is any residual structural nerve lesion at the right wrist or elbow, or additional lesions of the median and ulnar nerves at different sites.  Techniques:   Ultrasound of the right median and ulnar nerves from  the distal wrist, to the upper arm, was performed with a 15 Mhz transducer.   Results:   The right median nerve showed mildly increased cross-sectional area (CSA) at the wrist/carpal tunnel.  CSA was 0.11-0.12 cm  with normal being <0.11 cm .  The right median nerve was bifid at the wrist and there was a very small artery between the 2 branches.  The epineurium of the median nerve at the wrist was markedly thickened.  The flexor tendons of the carpal tunnel showed mild tenosynovitis.  The transverse carpal ligament was adequately released, and this was confirmed on sagittal images.  There was no other structural lesion of the median nerve at the wrist, such as abnormal is muscle/tendon, ganglion cyst, or tumor.  The right median nerve showed normal cross-sectional area, echogenicity, and fascicular architecture at all other sites imaged, including several spots of the forearm, the pronator tunnel, the elbow, under the lacertus fibrosus, and the upper arm.  The right ulnar nerve showed normal CSA, and fascicular architecture at the wrist (Guyon's canal).  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  The right ulnar nerve showed normal CSA and fascicular architecture at the cubital tunnel, with adequate release of the overlying ligament.  The nerve was hypoechoic and mildly enlarged at the retrocondylar groove of the elbow (CSA 0.11 cm , normal is <0.09 cm ).  The right ulnar nerve showed normal CSA, echogenicity, and fascicular architecture at multiple sites at the forearm, as well as of the upper arm.  Images were stored and are available for review.   Interpretation:   Abnormal study.  There is ultrasonographic evidence of 1) A bifid median nerve at the wrist (normal anatomic variant), but also marked thickening of the epineurium at the carpal  tunnel.  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  Based on literature review, patients with this finding tend to show overall poor responses after carpal tunnel surgery.  There is adequate release of the transverse carpal ligament, and no additional structural lesion of the median nerve at the wrist that would be amenable to repeat surgery.  There is no ultrasonographic evidence of right proximal median neuropathy. 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  The patient had a negative ulnar Tinel's sign at the right wrist.  Therefore, it is possible that this abnormality is incidental, and not necessarily causative of the patient's symptoms.  There is alsoultrasonographic evidence of prior decompression of the right ulnar nerve at the cubital tunnel, with adequate release of overlying ligament.  There is no residual structural lesion of the ulnar nerve at the elbow, amenable to repeat surgery, and no additional ulnar nerve lesions at the forearm or upper arm.  Ultrasonographer:   Yves Fontana MD          Again, thank you for allowing me to participate in the care of your patient.      Sincerely,    Yves Fontana MD

## 2023-04-18 NOTE — PROGRESS NOTES
Neuromuscular Ultrasound Report   Patient: Ricky Brown  Patient ID: [id number]  YOB: 1950  Age: 72 years  Referring Physician: Mickie Pfeiffer MD    History & Examination:   72-year-old man with chief complaint of numbness of the entire right hand over all 5 fingers.  He began having those symptoms a few years ago.  In June 2021 he underwent an open right carpal tunnel release surgery, but this did not lead to any improvement of his symptoms.  There were 2 EMG studies available, one prior to surgery from 6/2021, and the second one after surgery in 3/2022.  The two studies showed equally prolonged right median motor distal latency at the wrist, and equally attenuated right median sensory conduction velocity at the wrist, without improvement after the surgery.  He then underwent a repeat carpal tunnel open release surgery in October 2022, with with hypothenar flap creation, and also in situ decompression of the right ulnar nerve at the cubital tunnel.  Unfortunately, he had minimal to no improvement of his symptoms even after the second surgery.  A third EMG study was performed on March 24, 2023.  The right median motor distal latency appeared somewhat improved compared to the previous studies.  The right median antidromic sensory nerve conduction study was same or minimally better.  Ultrasonographic study of the right median and ulnar nerves was requested to clarify if there is any residual structural nerve lesion at the right wrist or elbow, or additional lesions of the median and ulnar nerves at different sites.  Techniques:   Ultrasound of the right median and ulnar nerves from the distal wrist, to the upper arm, was performed with a 15 Mhz transducer.   Results:   The right median nerve showed mildly increased cross-sectional area (CSA) at the wrist/carpal tunnel.  CSA was 0.11-0.12 cm  with normal being <0.11 cm .  The right median nerve was bifid at the wrist and there was a very small  artery between the 2 branches.  The epineurium of the median nerve at the wrist was markedly thickened.  The flexor tendons of the carpal tunnel showed mild tenosynovitis.  The transverse carpal ligament was adequately released, and this was confirmed on sagittal images.  There was no other structural lesion of the median nerve at the wrist, such as abnormal is muscle/tendon, ganglion cyst, or tumor.  The right median nerve showed normal cross-sectional area, echogenicity, and fascicular architecture at all other sites imaged, including several spots of the forearm, the pronator tunnel, the elbow, under the lacertus fibrosus, and the upper arm.  The right ulnar nerve showed normal CSA, and fascicular architecture at the wrist (Guyon's canal).  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  The right ulnar nerve showed normal CSA and fascicular architecture at the cubital tunnel, with adequate release of the overlying ligament.  The nerve was hypoechoic and mildly enlarged at the retrocondylar groove of the elbow (CSA 0.11 cm , normal is <0.09 cm ).  The right ulnar nerve showed normal CSA, echogenicity, and fascicular architecture at multiple sites at the forearm, as well as of the upper arm.  Images were stored and are available for review.   Interpretation:   Abnormal study.  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  Based on literature review, patients with this finding tend to show overall poor responses after carpal tunnel surgery.  There is adequate release of the transverse carpal ligament, and no  additional structural lesion of the median nerve at the wrist that would be amenable to repeat surgery.  There is no ultrasonographic evidence of right proximal median neuropathy. 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  The patient had a negative ulnar Tinel's sign at the right wrist.  Therefore, it is possible that this abnormality is incidental, and not necessarily causative of the patient's symptoms.  There is alsoultrasonographic evidence of prior decompression of the right ulnar nerve at the cubital tunnel, with adequate release of overlying ligament.  There is no residual structural lesion of the ulnar nerve at the elbow, amenable to repeat surgery, and no additional ulnar nerve lesions at the forearm or upper arm.  Ultrasonographer:   Yves Fontana MD

## 2023-04-23 ENCOUNTER — HEALTH MAINTENANCE LETTER (OUTPATIENT)
Age: 73
End: 2023-04-23

## 2023-04-26 ENCOUNTER — TELEPHONE (OUTPATIENT)
Dept: CARDIOLOGY | Facility: CLINIC | Age: 73
End: 2023-04-26
Payer: COMMERCIAL

## 2023-04-26 NOTE — TELEPHONE ENCOUNTER
Called patient to review recent elevated blood pressure reading.  Per [place] Community Me[place] guidelines, patients blood pressure is out of parameters and recheck blood pressure is recommended.    Last Blood Pressure: 145/75  Last Heart Rate: 72  Date: 3/7/23  Location: Other Specialty    Today's Blood Pressure: 156/84  Today's Heart Rate: 61  Location: Home BP    Patient reported blood pressure updated in Epic. Blood pressure continues to fall outside of the [place] Community Me[place] guidelines.  Message sent to primary cardiology team for further review.     4/23/23 116/57  4/5/23  153/81  4/22/23 140/74  3/21/23 138/80  4/16/23 115/83    Just some of his blood pressures, he has an omron wrist cuff  ALFONSO Keith

## 2023-06-02 ENCOUNTER — HEALTH MAINTENANCE LETTER (OUTPATIENT)
Age: 73
End: 2023-06-02

## 2023-06-02 ENCOUNTER — OFFICE VISIT (OUTPATIENT)
Dept: INTERNAL MEDICINE | Facility: CLINIC | Age: 73
End: 2023-06-02
Payer: COMMERCIAL

## 2023-06-02 VITALS
HEART RATE: 59 BPM | SYSTOLIC BLOOD PRESSURE: 155 MMHG | DIASTOLIC BLOOD PRESSURE: 83 MMHG | OXYGEN SATURATION: 98 % | RESPIRATION RATE: 18 BRPM

## 2023-06-02 DIAGNOSIS — R73.09 ELEVATED GLUCOSE: Primary | ICD-10-CM

## 2023-06-02 DIAGNOSIS — Z12.11 SCREEN FOR COLON CANCER: ICD-10-CM
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
## 2023-06-02 DIAGNOSIS — S00.461A TICK BITE OF RIGHT EAR, INITIAL ENCOUNTER: ICD-10-CM

## 2023-06-02 DIAGNOSIS — I42.9 CARDIOMYOPATHY, UNSPECIFIED TYPE (H): ICD-10-CM

## 2023-06-02 DIAGNOSIS — I63.439 CEREBROVASCULAR ACCIDENT (CVA) DUE TO EMBOLISM OF POSTERIOR CEREBRAL ARTERY WITH INFARCTIONS OF BOTH OCCIPITAL LOBES (H): ICD-10-CM

## 2023-06-02 DIAGNOSIS — I10 ESSENTIAL HYPERTENSION WITH GOAL BLOOD PRESSURE LESS THAN 140/90: Chronic | ICD-10-CM

## 2023-06-02 DIAGNOSIS — E78.5 HYPERLIPIDEMIA LDL GOAL <70: ICD-10-CM

## 2023-06-02 DIAGNOSIS — G47.33 OSA (OBSTRUCTIVE SLEEP APNEA): Chronic | ICD-10-CM

## 2023-06-02 PROBLEM — I20.9 ANGINA, CLASS II (H): Status: RESOLVED | Noted: 2021-01-21 | Resolved: 2023-06-02

## 2023-06-02 LAB
BASOPHILS # BLD AUTO: 0 10E3/UL (ref 0–0.2)
BASOPHILS NFR BLD AUTO: 0 %
EOSINOPHIL # BLD AUTO: 0 10E3/UL (ref 0–0.7)
EOSINOPHIL NFR BLD AUTO: 0 %
ERYTHROCYTE [DISTWIDTH] IN BLOOD BY AUTOMATED COUNT: 13.1 % (ref 10–15)
HBA1C MFR BLD: 5.1 % (ref 0–5.6)
HCT VFR BLD AUTO: 40.9 % (ref 40–53)
HGB BLD-MCNC: 13.6 G/DL (ref 13.3–17.7)
IMM GRANULOCYTES # BLD: 0 10E3/UL
IMM GRANULOCYTES NFR BLD: 0 %
LYMPHOCYTES # BLD AUTO: 1 10E3/UL (ref 0.8–5.3)
LYMPHOCYTES NFR BLD AUTO: 22 %
MCH RBC QN AUTO: 30.2 PG (ref 26.5–33)
MCHC RBC AUTO-ENTMCNC: 33.3 G/DL (ref 31.5–36.5)
MCV RBC AUTO: 91 FL (ref 78–100)
MONOCYTES # BLD AUTO: 0.5 10E3/UL (ref 0–1.3)
MONOCYTES NFR BLD AUTO: 10 %
NEUTROPHILS # BLD AUTO: 3.1 10E3/UL (ref 1.6–8.3)
NEUTROPHILS NFR BLD AUTO: 67 %
PLATELET # BLD AUTO: 236 10E3/UL (ref 150–450)
RBC # BLD AUTO: 4.5 10E6/UL (ref 4.4–5.9)
WBC # BLD AUTO: 4.7 10E3/UL (ref 4–11)

## 2023-06-02 PROCEDURE — 83036 HEMOGLOBIN GLYCOSYLATED A1C: CPT | Performed by: INTERNAL MEDICINE

## 2023-06-02 PROCEDURE — 85025 COMPLETE CBC W/AUTO DIFF WBC: CPT | Performed by: INTERNAL MEDICINE

## 2023-06-02 PROCEDURE — 80048 BASIC METABOLIC PNL TOTAL CA: CPT | Performed by: INTERNAL MEDICINE

## 2023-06-02 PROCEDURE — 36415 COLL VENOUS BLD VENIPUNCTURE: CPT | Performed by: INTERNAL MEDICINE

## 2023-06-02 PROCEDURE — 99214 OFFICE O/P EST MOD 30 MIN: CPT | Performed by: INTERNAL MEDICINE

## 2023-06-02 PROCEDURE — 80061 LIPID PANEL: CPT | Performed by: INTERNAL MEDICINE

## 2023-06-02 RX ORDER — ATORVASTATIN CALCIUM 10 MG/1
10 TABLET, FILM COATED ORAL EVERY EVENING
Qty: 90 TABLET | Refills: 3 | Status: SHIPPED | OUTPATIENT
Start: 2023-06-02 | End: 2024-07-19

## 2023-06-02 NOTE — LETTER
June 5, 2023    Ricky Brown  [address]  [facility] [address]          Dear ,    We are writing to inform you of your test results.  All of these tests are within acceptable limits , things look good !     Resulted Orders   Lipid panel reflex to direct LDL Fasting   Result Value Ref Range    Cholesterol 118 <200 mg/dL    Triglycerides 95 <150 mg/dL    Direct Measure HDL 47 >=40 mg/dL    LDL Cholesterol Calculated 52 <=100 mg/dL    Non HDL Cholesterol 71 <130 mg/dL    Narrative    Cholesterol  Desirable:  <200 mg/dL    Triglycerides  Normal:  Less than 150 mg/dL  Borderline High:  150-199 mg/dL  High:  200-499 mg/dL  Very High:  Greater than or equal to 500 mg/dL    Direct Measure HDL  Female:  Greater than or equal to 50 mg/dL   Male:  Greater than or equal to 40 mg/dL    LDL Cholesterol  Desirable:  <100mg/dL  Above Desirable:  100-129 mg/dL   Borderline High:  130-159 mg/dL   High:  160-189 mg/dL   Very High:  >= 190 mg/dL    Non HDL Cholesterol  Desirable:  130 mg/dL  Above Desirable:  130-159 mg/dL  Borderline High:  160-189 mg/dL  High:  190-219 mg/dL  Very High:  Greater than or equal to 220 mg/dL   Basic metabolic panel  (Ca, Cl, CO2, Creat, Gluc, K, Na, BUN)   Result Value Ref Range    Sodium 144 136 - 145 mmol/L    Potassium 4.0 3.4 - 5.3 mmol/L    Chloride 108 (H) 98 - 107 mmol/L    Carbon Dioxide (CO2) 25 22 - 29 mmol/L    Anion Gap 11 7 - 15 mmol/L    Urea Nitrogen 18.1 8.0 - 23.0 mg/dL    Creatinine 0.74 0.67 - 1.17 mg/dL    Calcium 9.0 8.8 - 10.2 mg/dL    Glucose 101 (H) 70 - 99 mg/dL    GFR Estimate >90 >60 mL/min/1.73m2      Comment:      eGFR calculated using 2021 CKD-EPI equation.   Hemoglobin A1c   Result Value Ref Range    Hemoglobin A1C 5.1 0.0 - 5.6 %      Comment:      Normal <5.7%   Prediabetes 5.7-6.4%    Diabetes 6.5% or higher     Note: Adopted from ADA consensus guidelines.   CBC with platelets and differential   Result Value Ref Range    WBC Count 4.7 4.0  - 11.0 10e3/uL    RBC Count 4.50 4.40 - 5.90 10e6/uL    Hemoglobin 13.6 13.3 - 17.7 g/dL    Hematocrit 40.9 40.0 - 53.0 %    MCV 91 78 - 100 fL    MCH 30.2 26.5 - 33.0 pg    MCHC 33.3 31.5 - 36.5 g/dL    RDW 13.1 10.0 - 15.0 %    Platelet Count 236 150 - 450 10e3/uL    % Neutrophils 67 %    % Lymphocytes 22 %    % Monocytes 10 %    % Eosinophils 0 %    % Basophils 0 %    % Immature Granulocytes 0 %    Absolute Neutrophils 3.1 1.6 - 8.3 10e3/uL    Absolute Lymphocytes 1.0 0.8 - 5.3 10e3/uL    Absolute Monocytes 0.5 0.0 - 1.3 10e3/uL    Absolute Eosinophils 0.0 0.0 - 0.7 10e3/uL    Absolute Basophils 0.0 0.0 - 0.2 10e3/uL    Absolute Immature Granulocytes 0.0 <=0.4 10e3/uL             If you have any questions or concerns, please call the clinic at the number listed above.       Sincerely,      Holland Blunt MD

## 2023-06-02 NOTE — PROGRESS NOTES
Assessment & Plan     Elevated glucose  Patient well known to me. He's in for follow up and for the first time there are some changes noted. First of all he's lost a significant amount of weight. According to the chart we see the following   Wt Readings from Last 5 Encounters:   03/07/23 76.8 kg (169 lb 4.8 oz)   11/30/22 81.2 kg (179 lb)   11/10/22 81.4 kg (179 lb 6.4 oz)   11/03/22 81.2 kg (179 lb)   09/16/22 83.9 kg (185 lb)     But he claims going back over the last year he has actually lost a lot more weight. Indeed his weights on 12- shows a much greater weight loss roughly 60+ pounds. Patient claims credit for this from his efforts although it's not so clear what he's doing besides using now the [employer] [facility] Device for his severe, prior untreated obstructive sleep apnea.     02/09/22 98.4 kg (217 lb)   01/06/22 98.6 kg (217 lb 4.8 oz)   12/13/21 100.9 kg (222 lb 6.4 oz)   11/09/21 101.2 kg (223 lb 1.6 oz)   09/03/21 101.8 kg (224 lb 6.4 oz)        - Adult Eye  Referral; Future  - REVIEW OF HEALTH MAINTENANCE PROTOCOL ORDERS  - Hemoglobin A1c; Future  - Basic metabolic panel  (Ca, Cl, CO2, Creat, Gluc, K, Na, BUN); Future  - Basic metabolic panel  (Ca, Cl, CO2, Creat, Gluc, K, Na, BUN)  - Hemoglobin A1c    Cardiomyopathy, unspecified type (H)  Problem is stable and ongoing monitoring  . Ongoing follow up with cardiologist   - CBC with platelets and differential; Future  - CBC with platelets and differential    Screen for colon cancer  He's agreed to a screening colonoscopy today   - REVIEW OF HEALTH MAINTENANCE PROTOCOL ORDERS  - Colonoscopy Screening  Referral; Future    Hyperlipidemia LDL goal <70  Due for recheck and further follow up depending on the test results   - atorvastatin (LIPITOR) 10 MG tablet; Take 1 tablet (10 mg) by mouth every evening  - Lipid panel reflex to direct LDL Fasting; Future  - Lipid panel reflex to direct LDL Fasting    Essential  "hypertension with goal blood pressure less than 140/90  BP Readings from Last 6 Encounters:   06/02/23 (!) 155/83   03/07/23 (!) 145/75   11/30/22 133/82   11/10/22 124/72   11/09/22 (!) 144/84   11/03/22 137/69     We reviewed these blood pressure reading and he's requesting not to have medication changes today     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 (H)    - ASPIRIN NOT PRESCRIBED (INTENTIONAL); Please choose reason not prescribed from choices below.  - CBC with platelets and differential; Future  - CBC with platelets and differential  Continue treatment with Apixaban (Eliquis)      JOSE JUAN (obstructive sleep apnea)-severe (AHI 61)  As detailed above. Continue treatment with [employer] Sleep Apnea [place] Device     Tick Bite  See as detailed below     Review of the result(s) of each unique test - todays tests, colonoscopy  Prescription drug management  24 minutes spent by me on the date of the encounter doing chart review, history and exam, documentation and further activities per the note    Holland Blunt MD  [facility] YAQUELIN García is a 72 year old, presenting for the following health issues:  No chief complaint on file.         View : No data to display.              History of Present Illness       Reason for visit:  A1c test    He eats 2-3 servings of fruits and vegetables daily.He consumes 3 sweetened beverage(s) daily.He exercises with enough effort to increase his heart rate 60 or more minutes per day.  He exercises with enough effort to increase his heart rate 4 days per week. He is missing 3 dose(s) of medications per week.     Patient says his wife left him recently and he's not telling me all the details but is chaste and sanguine today . he's ok just to be with his dog, and she's been \" flaky for sometime\" according to patient..    Patient had the [employer] Sleep Apnea [employer] Device " "implanted and to some extent his obstructive sleep apnea is now treated . Each night he  Has to turn on the machine.    Today he says his weight is 164  January 2022 215 pounds !   Wt Readings from Last 5 Encounters:   03/07/23 76.8 kg (169 lb 4.8 oz)   11/30/22 81.2 kg (179 lb)   11/10/22 81.4 kg (179 lb 6.4 oz)   11/03/22 81.2 kg (179 lb)   09/16/22 83.9 kg (185 lb)     He had a purportedly a recorded heart rate of 33 through the night last night noted on his machine. He's had also some recorded oxygen desaturations still apparently into the 70-the machine actually is saying \" 70-99%\"    He has a prescription that ran out and could not however tell me what the prescription was that he needed. He's found this prescription was from the [facility] [place] . He's trying to get his prescription but he doesn't know what it is. Even despite apparently surgeries for carpal tunnel syndrome 2 times and for ulnar neuritis once and these surgeries did not help apparently with his ongoing hand symptoms [ see office visit note with orthopedist surgeon ]    4- with Dr. Keenan Siu neurologist right hand tingling and numbess type sensation     Last appointment with me was 8-26-22, 10 months ago    Had neuropsychiatric tests 12-2-2022 -per the neuropsychiatrist  Overall, Mr. Brown is functioning quite well from a neuropsychological standpoint with only mild impairment on select executive functioning tasks without evidence of cognitive decline since his prior evaluation in 2019.    He intends now to keep all his healthcare within [facility] and his last cardiology appointment was with Dr. Meza 11- - per her office visit notes :    1.  Cardiomyopathy (nonischemic).  Ricky has a history of nonischemic cardiomyopathy.  Most recent assessment of left ventricular systolic performance by echocardiogram 27 September 2022 revealed mild depression and left ventricular systolic " performance with ejection fraction of 45%.     Medical therapy is appropriate.  Did consider increasing metoprolol further though heart rate in the 60s in the clinic and he reports some heart rates on his watch monitor in the 40s at night and therefore will defer.  He appears volume neutral on exam.     2.  Aortic insufficiency.  This was felt moderate in degree on echocardiogram 27 September 2022.     3.  History of CVA.  Ricky has a history of CVA felt embolic in nature.  He has had 2 ambulatory monitors that have not revealed any evidence of atrial fibrillation.  He does have documented patent foramen ovale (PFO) based on Dr. Manfred Henley's note from 3 August 2022.  He has been maintained on apixaban.       Review of Systems   Constitutional, HEENT, cardiovascular, pulmonary, gi and gu systems are negative, except as otherwise noted.      Objective    BP (!) 155/83   Pulse 59   Resp 18   SpO2 98%   There is no height or weight on file to calculate BMI.  Physical Exam   GENERAL: healthy, alert and no distress  EYES: Eyes grossly normal to inspection, PERRL and conjunctivae and sclerae normal  HENT: on this patients right external ear he had two wood ticks. One was obviously alive and was not even attached to the skin, it was sitting on the skin just underneath the aperture and this was plucked off with forceps, more concerning was a firmly attached markedly engorged tick on the upper ear in the triangular fossa . This was the size of a small grape. Patient was completely unaware of this ; this was also removed with forceps  RESP: lungs clear to auscultation - no rales, rhonchi or wheezes  CV: regular rate and rhythm, normal S1 S2, no S3 or S4, no murmur, click or rub, no peripheral edema and peripheral pulses strong  MS: no gross musculoskeletal defects noted, no edema    Orders Placed This Encounter   Procedures     REVIEW OF HEALTH MAINTENANCE PROTOCOL ORDERS     Hemoglobin A1c     Basic metabolic panel   (Ca, Cl, CO2, Creat, Gluc, K, Na, BUN)     Lipid panel reflex to direct LDL Fasting     CBC with platelets and differential     Adult Eye  Referral     Colonoscopy Screening  Referral     CBC with platelets and differential

## 2023-06-03 LAB
ANION GAP SERPL CALCULATED.3IONS-SCNC: 11 MMOL/L (ref 7–15)
BUN SERPL-MCNC: 18.1 MG/DL (ref 8–23)
CALCIUM SERPL-MCNC: 9 MG/DL (ref 8.8–10.2)
CHLORIDE SERPL-SCNC: 108 MMOL/L (ref 98–107)
CHOLEST SERPL-MCNC: 118 MG/DL
CREAT SERPL-MCNC: 0.74 MG/DL (ref 0.67–1.17)
DEPRECATED HCO3 PLAS-SCNC: 25 MMOL/L (ref 22–29)
GFR SERPL CREATININE-BSD FRML MDRD: >90 ML/MIN/1.73M2
GLUCOSE SERPL-MCNC: 101 MG/DL (ref 70–99)
HDLC SERPL-MCNC: 47 MG/DL
LDLC SERPL CALC-MCNC: 52 MG/DL
NONHDLC SERPL-MCNC: 71 MG/DL
POTASSIUM SERPL-SCNC: 4 MMOL/L (ref 3.4–5.3)
SODIUM SERPL-SCNC: 144 MMOL/L (ref 136–145)
TRIGL SERPL-MCNC: 95 MG/DL

## 2023-06-20 ENCOUNTER — TELEPHONE (OUTPATIENT)
Dept: FAMILY MEDICINE | Facility: CLINIC | Age: 73
End: 2023-06-20
Payer: COMMERCIAL

## 2023-06-20 DIAGNOSIS — E53.8 FOLIC ACID DEFICIENCY (NON ANEMIC): ICD-10-CM

## 2023-06-20 RX ORDER — FOLIC ACID 1 MG/1
1000 TABLET ORAL 2 TIMES DAILY
Qty: 180 TABLET | Refills: 3 | Status: SHIPPED | OUTPATIENT
Start: 2023-06-20 | End: 2024-02-26

## 2023-06-20 NOTE — TELEPHONE ENCOUNTER
Patient is requesting a refill for RX  Folic Acid. He uses [facility] in [place]. Any Questions please call patient at [phone].

## 2023-07-01 DIAGNOSIS — I63.439 CEREBROVASCULAR ACCIDENT (CVA) DUE TO EMBOLISM OF POSTERIOR CEREBRAL ARTERY WITH INFARCTIONS OF BOTH OCCIPITAL LOBES (H): ICD-10-CM

## 2023-07-03 RX ORDER — APIXABAN 5 MG/1
TABLET, FILM COATED ORAL
Qty: 180 TABLET | Refills: 2 | Status: SHIPPED | OUTPATIENT
Start: 2023-07-03 | End: 2023-08-30

## 2023-07-03 NOTE — TELEPHONE ENCOUNTER
Refill request for: Eliquis 5mg  Directions: Take 1 tablet (5 mg) by mouth 2 times daily     LOV: 03/07/23  NOV: Due for follow up 03/2024    90 day supply with 2 refills Medication T'd for review and signature    Romina Matos LPN on 7/3/2023 at 9:59 AM

## 2023-07-13 ENCOUNTER — OFFICE VISIT (OUTPATIENT)
Dept: INTERNAL MEDICINE | Facility: CLINIC | Age: 73
End: 2023-07-13
Payer: COMMERCIAL

## 2023-07-13 VITALS
RESPIRATION RATE: 18 BRPM | DIASTOLIC BLOOD PRESSURE: 78 MMHG | HEART RATE: 53 BPM | BODY MASS INDEX: 26.52 KG/M2 | OXYGEN SATURATION: 98 % | SYSTOLIC BLOOD PRESSURE: 154 MMHG | TEMPERATURE: 98.2 F | HEIGHT: 67 IN

## 2023-07-13 DIAGNOSIS — R39.15 URINARY URGENCY: ICD-10-CM

## 2023-07-13 DIAGNOSIS — Z00.00 ENCOUNTER FOR ANNUAL WELLNESS EXAM IN MEDICARE PATIENT: Primary | ICD-10-CM

## 2023-07-13 DIAGNOSIS — R35.1 NOCTURIA: ICD-10-CM

## 2023-07-13 DIAGNOSIS — R32 URINARY INCONTINENCE, UNSPECIFIED TYPE: ICD-10-CM

## 2023-07-13 DIAGNOSIS — G56.01 RIGHT CARPAL TUNNEL SYNDROME: ICD-10-CM

## 2023-07-13 DIAGNOSIS — F10.10 ETOH ABUSE: ICD-10-CM

## 2023-07-13 DIAGNOSIS — N40.1 BENIGN PROSTATIC HYPERPLASIA WITH URINARY FREQUENCY: ICD-10-CM

## 2023-07-13 DIAGNOSIS — Z12.5 SCREENING PSA (PROSTATE SPECIFIC ANTIGEN): ICD-10-CM

## 2023-07-13 DIAGNOSIS — Z12.11 SCREEN FOR COLON CANCER: ICD-10-CM

## 2023-07-13 DIAGNOSIS — R41.3 MEMORY LOSS: ICD-10-CM

## 2023-07-13 DIAGNOSIS — I50.20 HFREF (HEART FAILURE WITH REDUCED EJECTION FRACTION) (H): ICD-10-CM

## 2023-07-13 DIAGNOSIS — I42.9 CARDIOMYOPATHY, IDIOPATHIC (H): ICD-10-CM

## 2023-07-13 DIAGNOSIS — I63.439 CEREBROVASCULAR ACCIDENT (CVA) DUE TO EMBOLISM OF POSTERIOR CEREBRAL ARTERY WITH INFARCTIONS OF BOTH OCCIPITAL LOBES (H): ICD-10-CM

## 2023-07-13 DIAGNOSIS — G47.33 OSA (OBSTRUCTIVE SLEEP APNEA): ICD-10-CM

## 2023-07-13 DIAGNOSIS — G40.209 PARTIAL SYMPTOMATIC EPILEPSY WITH COMPLEX PARTIAL SEIZURES, NOT INTRACTABLE, WITHOUT STATUS EPILEPTICUS (H): ICD-10-CM

## 2023-07-13 DIAGNOSIS — R35.0 BENIGN PROSTATIC HYPERPLASIA WITH URINARY FREQUENCY: ICD-10-CM

## 2023-07-13 DIAGNOSIS — I10 ESSENTIAL HYPERTENSION WITH GOAL BLOOD PRESSURE LESS THAN 140/90: ICD-10-CM

## 2023-07-13 LAB
ALBUMIN SERPL BCG-MCNC: 4.5 G/DL (ref 3.5–5.2)
ALP SERPL-CCNC: 63 U/L (ref 40–129)
ALT SERPL W P-5'-P-CCNC: 20 U/L (ref 0–70)
AST SERPL W P-5'-P-CCNC: 23 U/L (ref 0–45)
BILIRUB DIRECT SERPL-MCNC: <0.2 MG/DL (ref 0–0.3)
BILIRUB SERPL-MCNC: 0.7 MG/DL
PROT SERPL-MCNC: 6.9 G/DL (ref 6.4–8.3)
PSA SERPL DL<=0.01 NG/ML-MCNC: 0.41 NG/ML (ref 0–6.5)

## 2023-07-13 PROCEDURE — 80076 HEPATIC FUNCTION PANEL: CPT | Performed by: INTERNAL MEDICINE

## 2023-07-13 PROCEDURE — 36415 COLL VENOUS BLD VENIPUNCTURE: CPT | Performed by: INTERNAL MEDICINE

## 2023-07-13 PROCEDURE — 99213 OFFICE O/P EST LOW 20 MIN: CPT | Mod: 25 | Performed by: INTERNAL MEDICINE

## 2023-07-13 PROCEDURE — G0103 PSA SCREENING: HCPCS | Performed by: INTERNAL MEDICINE

## 2023-07-13 PROCEDURE — G0438 PPPS, INITIAL VISIT: HCPCS | Performed by: INTERNAL MEDICINE

## 2023-07-13 RX ORDER — TAMSULOSIN HYDROCHLORIDE 0.4 MG/1
0.4 CAPSULE ORAL DAILY
Qty: 90 CAPSULE | Refills: 1 | Status: SHIPPED | OUTPATIENT
Start: 2023-07-13 | End: 2023-12-12

## 2023-07-13 ASSESSMENT — ENCOUNTER SYMPTOMS
SINUS PRESSURE: 0
FREQUENCY: 1
WOUND: 1
SLEEP DISTURBANCE: 1
ABDOMINAL PAIN: 0
PALPITATIONS: 0
DECREASED CONCENTRATION: 1
FLANK PAIN: 0
NAUSEA: 0
NUMBNESS: 1
SHORTNESS OF BREATH: 0
DYSURIA: 0
CONSTIPATION: 0
FEVER: 0
UNEXPECTED WEIGHT CHANGE: 0
WEAKNESS: 1
VOMITING: 0
SINUS PAIN: 0
DIARRHEA: 0
WHEEZING: 0
RHINORRHEA: 1
CHILLS: 0
PARESTHESIAS: 0
SORE THROAT: 0
BACK PAIN: 1
COUGH: 0
ADENOPATHY: 0
FATIGUE: 0

## 2023-07-13 ASSESSMENT — ACTIVITIES OF DAILY LIVING (ADL): CURRENT_FUNCTION: NO ASSISTANCE NEEDED

## 2023-07-13 NOTE — Clinical Note
Pt is struggling to afford Entresto, BP (150s/80s) is elevated despite entresto and metoprolol use. Will defer to cardiology regarding medication/dose adjustments.

## 2023-07-13 NOTE — LETTER
July 17, 2023    Ricky CHAPMAN Stephanie  [address]  [facility] [address]          Dear ,    We are writing to inform you of your test results.  All of these tests are within acceptable limits , things look good !         Resulted Orders   PSA, screen   Result Value Ref Range    Prostate Specific Antigen Screen 0.41 0.00 - 6.50 ng/mL    Narrative    This result is obtained using the Roche Elecsys total PSA method on the michael [id number] immunoassay analyzer. Results obtained with different assay methods or kits cannot be used interchangeably.   Hepatic panel (Albumin, ALT, AST, Bili, Alk Phos, TP)   Result Value Ref Range    Protein Total 6.9 6.4 - 8.3 g/dL    Albumin 4.5 3.5 - 5.2 g/dL    Bilirubin Total 0.7 <=1.2 mg/dL    Alkaline Phosphatase 63 40 - 129 U/L    AST 23 0 - 45 U/L      Comment:      Reference intervals for this test were updated on 6/12/2023 to more accurately reflect our healthy population. There may be differences in the flagging of prior results with similar values performed with this method. Interpretation of those prior results can be made in the context of the updated reference intervals.    ALT 20 0 - 70 U/L      Comment:      Reference intervals for this test were updated on 6/12/2023 to more accurately reflect our healthy population. There may be differences in the flagging of prior results with similar values performed with this method. Interpretation of those prior results can be made in the context of the updated reference intervals.      Bilirubin Direct <0.20 0.00 - 0.30 mg/dL       If you have any questions or concerns, please call the clinic at the number listed above.       Sincerely,      Holland Blunt MD

## 2023-07-13 NOTE — PROGRESS NOTES
"SUBJECTIVE:   Ricky is a 72 year old who presents for Preventive Visit.       No data to display                Are you in the first 12 months of your Medicare coverage?  No      Patient presents for annual physical. Reports wife left him in last year, reports sadness surrounding this, but identifies good social support, has dog that helps him.     Reports new symptoms of urinary urgency, nocturia every 1-2 hours, and reduced amount of urine with weak stream over last several months. Concerned about issues with prostate. Reports occasional urine leakage as well. No burning, pain, blood with urination. No fevers/chills. A1C checked at 5.1 6/2/23.     Has lost 60+ pounds since 2021.. attributes this to changes in diet [ primary health care provider suspects it's secondary to effective use with his [employer] Sleep Apnea [place] Device which has been operational with patient for roughly 8-10 months now]. Walks dog 1-2 miles daily, often to [employer]. Has a double cheeseburger every day but otherwise eats mostly homemade soups and cottage cheese.     PSA   Date Value Ref Range Status   02/25/2020 0.30 0 - 4 ug/L Final     Comment:     Assay Method:  Chemiluminescence using Siemens Vista analyzer           Healthy Habits:     In general, how would you rate your overall health?  Excellent    Frequency of exercise:  2-3 days/week    Duration of exercise:  Greater than 60 minutes    Do you usually eat at least 4 servings of fruit and vegetables a day, include whole grains    & fiber and avoid regularly eating high fat or \"junk\" foods?  Yes    Taking medications regularly:  No    Barriers to taking medications:  Cost of medication and Problems remembering to take them    Medication side effects:  None    Ability to successfully perform activities of daily living:  No assistance needed    Home Safety:  No safety concerns identified    Hearing Impairment:  Difficulty following a conversation in a noisy restaurant or crowded " room, difficult to understand a speaker at a public meeting or Confucianism service, need to ask people to speak up or repeat themselves and difficulty understanding speech on the telephone    In the past 6 months, have you been bothered by leaking of urine? Yes    In general, how would you rate your overall mental or emotional health?  Excellent    Additional concerns today:  No        Have you ever done Advance Care Planning? (For example, a Health Directive, POLST, or a discussion with a medical provider or your loved ones about your wishes): Yes, advance care planning is on file.      Fall risk  Fallen 2 or more times in the past year?: No  Any fall with injury in the past year?: No    Cognitive Screening   1) Repeat 3 items (Leader, Season, Table)    2) Clock draw: NORMAL  3) 3 item recall: Recalls NO objects   Results: 0 items recalled: PROBABLE COGNITIVE IMPAIRMENT, **INFORM PROVIDER**    Mini-CogTM Copyright SAVANNAH Bergeron. Licensed by the author for use in [facility]; reprinted with permission (loyda@[facility]). All rights reserved.      Do you have sleep apnea, excessive snoring or daytime drowsiness?: yes    Reviewed and updated as needed this visit by clinical staff                  Reviewed and updated as needed this visit by Provider                 Social History     Tobacco Use     Smoking status: Former     Packs/day: 0.50     Years: 2.00     Pack years: 1.00     Types: Cigarettes     Quit date: 1996     Years since quittin.6     Smokeless tobacco: Never   Substance Use Topics     Alcohol use: Yes     Alcohol/week: 8.3 standard drinks of alcohol     Comment: Evening Marie             2023    10:08 AM   Alcohol Use   Prescreen: >3 drinks/day or >7 drinks/week? No     Do you have a current opioid prescription? No  Do you use any other controlled substances or medications that are not prescribed by a provider? None          Current providers sharing in care for this patient include:    Patient Care Team:  Holland Blunt MD as PCP - General (Internal Medicine)  Lisset Cedillo, RN as Specialty Care Coordinator (Cardiology)  Teresa Morris NP as Nurse Practitioner (Nurse Practitioner - Gerontology)  Carroll Ortiz MD as MD (Cardiology)  Seema Olsen MD as MD (Clinical Cardiac Electrophysiology)  Holland Blunt MD as Assigned PCP  Nathen Ruiz MD as MD (Neurology)  Nathen Ruiz MD as MD (Neurology)  Dashawn Tanner MD as MD (Otolaryngology)  Holland Blunt MD as Referring Physician (Internal Medicine)  Nathen Ruiz MD as Assigned Neuroscience Provider  Hilario Meza MD as Assigned Heart and Vascular Provider  Carrol Orr, PhD as Assigned Behavioral Health Provider  Vignesh Rhodes MD as MD (Surgery)  Mickie Pfeiffer MD as MD (Physical Medicine and Rehabilitation)  Maddy Olivas PA-C as Assigned Musculoskeletal Provider  Vignesh Rhodes MD as Assigned Surgical Provider    The following health maintenance items are reviewed in Epic and correct as of today:  Health Maintenance   Topic Date Due     ADVANCE CARE PLANNING  03/28/2017     ZOSTER IMMUNIZATION (2 of 2) 06/06/2018     MEDICARE ANNUAL WELLNESS VISIT  04/11/2019     EYE EXAM  11/19/2021     COVID-19 Vaccine (4 - Pfizer series) 03/03/2022     COLORECTAL CANCER SCREENING  06/12/2023     INFLUENZA VACCINE (1) 09/01/2023     LIPID  06/02/2024     ANNUAL REVIEW OF HM ORDERS  06/02/2024     FALL RISK ASSESSMENT  07/13/2024     DTAP/TDAP/TD IMMUNIZATION (3 - Td or Tdap) 11/01/2028     HEPATITIS C SCREENING  Completed     PHQ-2 (once per calendar year)  Completed     Pneumococcal Vaccine: 65+ Years  Completed     AORTIC ANEURYSM SCREENING (SYSTEM ASSIGNED)  Completed     IPV IMMUNIZATION  Aged Out     MENINGITIS IMMUNIZATION  Aged Out     Lab work is in process  BP Readings from Last 3 Encounters:   07/13/23 (!) 154/78   06/02/23 (!) 155/83   03/07/23 (!) 145/75    Wt Readings from Last 3 Encounters:  "  03/07/23 76.8 kg (169 lb 4.8 oz)   11/30/22 81.2 kg (179 lb)   11/10/22 81.4 kg (179 lb 6.4 oz)               Health Maintenance Due   Topic Date Due     ZOSTER IMMUNIZATION (2 of 2) 06/06/2018     MEDICARE ANNUAL WELLNESS VISIT  04/11/2019     EYE EXAM  11/19/2021     COVID-19 Vaccine (4 - Pfizer series) 03/03/2022     COLORECTAL CANCER SCREENING  06/12/2023                 Review of Systems   Constitutional: Negative for chills, fatigue, fever and unexpected weight change.   HENT: Positive for ear pain, hearing loss and rhinorrhea. Negative for congestion, sinus pressure, sinus pain and sore throat.    Respiratory: Negative for cough, shortness of breath and wheezing.    Cardiovascular: Negative for chest pain, palpitations and peripheral edema.   Gastrointestinal: Negative for abdominal pain, constipation, diarrhea, nausea and vomiting.   Genitourinary: Positive for frequency and urgency. Negative for dysuria, flank pain and impotence.   Musculoskeletal: Positive for back pain. Negative for gait problem.   Skin: Positive for wound.   Neurological: Positive for weakness and numbness (R arm- chronic). Negative for paresthesias.   Hematological: Negative for adenopathy.   Psychiatric/Behavioral: Positive for decreased concentration and sleep disturbance.       OBJECTIVE:   BP (!) 154/78   Pulse 53   Temp 98.2  F (36.8  C) (Temporal)   Resp 18   Ht 1.702 m (5' 7\")   SpO2 98%   BMI 26.52 kg/m   Estimated body mass index is 26.52 kg/m  as calculated from the following:    Height as of 3/7/23: 1.702 m (5' 7\").    Weight as of 3/7/23: 76.8 kg (169 lb 4.8 oz).  Physical Exam  Constitutional:       General: He is not in acute distress.  HENT:      Right Ear: Tympanic membrane and ear canal normal. Decreased hearing noted. There is no impacted cerumen.      Left Ear: Tympanic membrane and ear canal normal. Decreased hearing noted. Tenderness present. There is no impacted cerumen.      Ears:     Eyes:      " Extraocular Movements: Extraocular movements intact.      Conjunctiva/sclera: Conjunctivae normal.      Pupils: Pupils are equal, round, and reactive to light.   Neck:      Thyroid: No thyromegaly.      Trachea: Trachea normal.   Cardiovascular:      Rate and Rhythm: Regular rhythm. Bradycardia present.      Pulses:           Dorsalis pedis pulses are 2+ on the right side and 2+ on the left side.      Heart sounds: S1 normal and S2 normal. Murmur heard.       Diastolic murmur is present with a grade of 2/4.     No S3 sounds.   Pulmonary:      Effort: Pulmonary effort is normal.      Breath sounds: Normal breath sounds. No wheezing, rhonchi or rales.   Abdominal:      General: Bowel sounds are normal. There is no abdominal bruit.      Palpations: Abdomen is soft. There is no hepatomegaly.      Tenderness: There is no abdominal tenderness.   Musculoskeletal:      Cervical back: Normal range of motion and neck supple.      Right lower leg: No edema.      Left lower leg: No edema.   Lymphadenopathy:      Cervical: No cervical adenopathy.   Skin:     General: Skin is warm and dry.      Capillary Refill: Capillary refill takes less than 2 seconds.   Neurological:      General: No focal deficit present.      Mental Status: He is alert.      Sensory: Sensory deficit (R arm sensation decreased relative to left ) present.   Psychiatric:         Attention and Perception: Attention and perception normal.         Mood and Affect: Mood and affect normal.         Speech: Speech normal.         Behavior: Behavior normal. Behavior is cooperative.           Diagnostic Test Results:  Labs reviewed in Epic    Orders Placed This Encounter   Procedures     US Abdomen Complete     PSA, screen     Hepatic panel (Albumin, ALT, AST, Bili, Alk Phos, TP)     Colonoscopy Screening  Referral         ASSESSMENT / PLAN:   (Z00.00) Encounter for annual wellness exam in Medicare patient  (primary encounter diagnosis)      (N40.1,  R35.0)  Benign prostatic hyperplasia with urinary frequency  (R39.15) Urinary urgency  (R35.1) Nocturia  (R32) Urinary incontinence, unspecified type    Comment: Symptoms began in last 2-3 months with urgency, frequency, nocturia every 2 hours, dribbling stream, and urinary leakage. Pt denies dysuria, fevers, chills. No family history of prostate cancer. A1C from 6/2023 5.1. Concerning given severity of symptoms and new onset for bladder outflow obstruction vs. BPH. Plan to obtain ultrasound to rule out obstructive process, screen for PSA, and start flomax. Further intervention pending ultrasound results.   Plan: US Abdomen Complete. tamsulosin (FLOMAX) 0.4 MG capsule      (I10) Essential hypertension with goal blood pressure less than 140/90  Comment: BP persistently 150s/80s in clinic and at home. Pt reports having trouble paying for entresto. Pt is amenable to more medication, but concerned about the cost.   Plan: Pt to discuss alternative treatment options with cardiologist to determine if there is any financial assistance for Entresto or if an alternative medication combo may be required. Pt likely needs either a dose increase or additional antihypertensive agent. Defer decision to cardiology. Message sent to Dr. Meza    (I50.20) HFrEF (heart failure with reduced ejection fraction) (H)  (I42.9) Cardiomyopathy, idiopathic (H)  Comment: EF 9/22 45%. Diastolic murmur heard on exam, 2/6 consistent with 2+ mitral regurgitation seen on echo.   Plan: Follow with cardiology for management.     (Z12.11) Screen for colon cancer  Comment: Pt has history of adenamatous polyps- due for colonoscopy q 5 years  Plan: Colonoscopy Screening  Referral        (F10.10) ETOH abuse  Comment: Pt reports drinking 2-4 oz seth per night. Hx of elevated liver enzymes. No desire to reduce alcohol consumption at this time.  No hepatomegaly on exam. Rescreen liver enzymes.   Plan: Hepatic panel (Albumin, ALT, AST, Bili, Alk          "Phos, TP)      (Z12.5) Screening PSA (prostate specific antigen)  Comment:   Plan: PSA, screen      (G47.33) JOSE JUAN (obstructive sleep apnea)-severe (AHI 61)  Comment: Pt uses [employer] Sleep Apnea [place] Device with reported occasional desaturations into the 70s. Overall, pt reports improvement in symptoms of fatigue and sleep quality.   Plan: Continue using inspire sleep adpnea device and f/u with sleep clinic    (I63.439) 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 (H)  Comment: No residual deficits. Pt was never symptomatic with previous 2 strokes.   Plan: Continue eliquis and defer to cardiology for better hypertension management.     (R41.3) Memory loss  Comment: Pt has mild neurocognitive impairment that has been stable since 2019. Had neuropsychiatric tests 12-2-2022 -per the neuropsychiatrist  Overall, Mr. Brown is functioning quite well from a neuropsychological standpoint with only mild impairment on select executive functioning tasks without evidence of cognitive decline since his prior evaluation in 2019.  Plan: Follow up if symptoms worsen    (G40.209) Partial symptomatic epilepsy with complex partial seizures, not intractable, without status epilepticus (H)  Comment: Stable. Managed with Keppra  Plan: Continue to follow with neurology    (G56.01) Right carpal tunnel syndrome  Comment: Pt has had 2 carpal tunnel release surgeries, yet symptoms of numbness persist in right forearm to hand. Results from resent ultrasound as follows \"There is ultrasonographic evidence of 1) A bifid median nerve at the wrist (normal anatomic variant), but also marked thickening of the epineurium at the carpal tunnel.  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  Based on literature review, patients with this finding tend to show overall poor responses after carpal tunnel surgery.  " "There is adequate release of the transverse carpal ligament, and no additional structural lesion of the median nerve at the wrist that would be amenable to repeat surgery.  There is no ultrasonographic evidence of right proximal median neuropathy. 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  The patient had a negative ulnar Tinel's sign at the right wrist.  Therefore, it is possible that this abnormality is incidental, and not necessarily causative of the patient's symptoms\"  Plan: Followup with orthopedics for further treatment options. Unsure what these would be at this time.           COUNSELING:  Reviewed preventive health counseling, as reflected in patient instructions       Regular exercise       Healthy diet/nutrition       Vision screening       Hearing screening       Bladder control       Alcohol Use        Folic Acid       Colon cancer screening      BMI:   Estimated body mass index is 26.52 kg/m  as calculated from the following:    Height as of 3/7/23: 1.702 m (5' 7\").    Weight as of 3/7/23: 76.8 kg (169 lb 4.8 oz).   Weight management plan: Discussed healthy diet and exercise guidelines      He reports that he quit smoking about 26 years ago. His smoking use included cigarettes. He has a 1.00 pack-year smoking history. He has never used smokeless tobacco.      Appropriate preventive services were discussed with this patient, including applicable screening as appropriate for cardiovascular disease, diabetes, osteopenia/osteoporosis, and glaucoma.  As appropriate for age/gender, discussed screening for colorectal cancer, prostate cancer, breast cancer, and cervical cancer. Checklist reviewing preventive services available has been given to the patient.    Reviewed patients plan of care and provided an AVS. The Basic Care Plan (routine screening as documented in Health Maintenance) for Ricky meets " the Care Plan requirement. This Care Plan has been established and reviewed with the Patient.          Holland Blunt MD  [facility]    Identified Health Risks:    I have reviewed Opioid Use Disorder and Substance Use Disorder risk factors and made any needed referrals.

## 2023-07-13 NOTE — PATIENT INSTRUCTIONS
Schedule with cardiology to discuss affordibility of entresto and need for increased dose/additional BP medication    Start flomax for urinary symptoms. Schedule Ultrasound for bladder at [phone]

## 2023-07-18 ENCOUNTER — OFFICE VISIT (OUTPATIENT)
Dept: OPHTHALMOLOGY | Facility: CLINIC | Age: 73
End: 2023-07-18
Attending: OPHTHALMOLOGY
Payer: COMMERCIAL

## 2023-07-18 DIAGNOSIS — R73.09 ELEVATED GLUCOSE: ICD-10-CM

## 2023-07-18 PROCEDURE — 99214 OFFICE O/P EST MOD 30 MIN: CPT | Performed by: OPHTHALMOLOGY

## 2023-07-18 PROCEDURE — G0463 HOSPITAL OUTPT CLINIC VISIT: HCPCS | Performed by: OPHTHALMOLOGY

## 2023-07-18 ASSESSMENT — REFRACTION_WEARINGRX
OS_AXIS: 155
OD_ADD: +3.00
OD_CYLINDER: +1.75
OD_SPHERE: -0.25
OD_AXIS: 098
SPECS_TYPE: PAL
OS_CYLINDER: +1.00
OS_ADD: +3.00
OS_SPHERE: -1.00

## 2023-07-18 ASSESSMENT — VISUAL ACUITY
OS_CC: 20/20
OD_CC: 20/40
CORRECTION_TYPE: GLASSES
OS_CC+: -2
OD_CC+: +1
METHOD: SNELLEN - LINEAR

## 2023-07-18 ASSESSMENT — TONOMETRY
IOP_METHOD: ICARE
OD_IOP_MMHG: 10
OS_IOP_MMHG: 11

## 2023-07-18 ASSESSMENT — SLIT LAMP EXAM - LIDS
COMMENTS: NORMAL
COMMENTS: NORMAL

## 2023-07-18 ASSESSMENT — CONF VISUAL FIELD
OS_INFERIOR_NASAL_RESTRICTION: 0
OD_INFERIOR_NASAL_RESTRICTION: 0
METHOD: COUNTING FINGERS
OS_SUPERIOR_TEMPORAL_RESTRICTION: 0
OD_INFERIOR_TEMPORAL_RESTRICTION: 0
OS_INFERIOR_TEMPORAL_RESTRICTION: 0
OS_NORMAL: 1
OD_NORMAL: 1
OD_SUPERIOR_NASAL_RESTRICTION: 0
OD_SUPERIOR_TEMPORAL_RESTRICTION: 0
OS_SUPERIOR_NASAL_RESTRICTION: 0

## 2023-07-18 ASSESSMENT — EXTERNAL EXAM - LEFT EYE: OS_EXAM: NORMAL

## 2023-07-18 ASSESSMENT — EXTERNAL EXAM - RIGHT EYE: OD_EXAM: NORMAL

## 2023-07-18 NOTE — PROGRESS NOTES
CC:  Blurry vision right eye, referral from Dr. Dewey     HPI:  69 M with PMHx JOSE JUAN, HLD, BCC, HTN, CHF and POHx IOL dislocation each eye (sutured PCIOL right eye, ACIOL left eye), macular edema right eye, mild ERM right eye, keraglobus? Presents as referral from Dr. De Dios for evaluation of blurry vision right eye.     Patient desires ACIOL right eye like he has in his left eye.      Patient reports no change since he saw Dr. Dewey in July 2020. Patient reports a lot of light sensitivity in the right eye. No tearing or discharge. No new floaters or flashes of light. Patient feels that his two eyes are not working together. Patient saw a retina doctor who does not think his problem is from the posterior segment.     Interval:  Trouble with current glasses being able to both see distance and seeing the dashboard.     Trialed contact lenses with Dr. Bryan with no subjective improvement.       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     Last eye exam: 7/24/2020  Glasses: yes  CL: Former CL wearer    Fam hx of eye disease:     Gtts:  None    All:  Lisinopril     A/P:      ##  Pt's ,main complaint I now is light sensitivity (no cell): Wiil start a trial of bromonidine right eye BID prn for light sensitvity.    1) History of Dislocated IOL each eye s/p sutured PCIOL right eye, ACIOL left eye   - now with blurry vision right eye BCVA 20/25   - had to have special order ACIOL given eye size left eye .    UBM: lens appears to be mildly decentered and tilted, however more likely superior displacement of pupil right eye   Topography: right eye: significant inferior steepening; I-S ~3; left eye mild inferior steepening    Previous seng with significant astigmatism.    Now s/p contact lens evaluation with Dr. Bryan, did not feel CL will help, no subjective improvement.     PLAN:  - discussed IOL  exchange as very high risk surgery  - follow up with Dr. 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  Pentacam with inferior steepening right eye.  - will trial contact lenses as above.    3) Epiretinal Membrane right eye:  - mild per VRS Dr. Peterson 2/2020 -- does not think is cause of patient's symptoms.  Was 20/25-1 at this visit.  - symptoms haven't changed significantly since that time.   Disc high risk lens (IOL exchange)    Follow up: DOMO Mcclure MD    Attending Physician Attestation:  Complete documentation of historical and exam elements from today's encounter can be found in the full encounter summary report (not reduplicated in this progress note).  I personally obtained the chief complaint(s) and history of present illness.  I confirmed and edited as necessary the review of systems, past medical/surgical history, family history, social history, and examination findings as documented by others; and I examined the patient myself.  I personally reviewed the relevant tests, images, and reports as documented above.  I formulated and edited as necessary the assessment and plan and discussed the findings and management plan with the patient and family. - Ricky Mcclure MD

## 2023-07-20 ENCOUNTER — TELEPHONE (OUTPATIENT)
Dept: CARDIOLOGY | Facility: CLINIC | Age: 73
End: 2023-07-20
Payer: COMMERCIAL

## 2023-07-20 DIAGNOSIS — I42.8 NON-ISCHEMIC CARDIOMYOPATHY (H): ICD-10-CM

## 2023-07-20 NOTE — TELEPHONE ENCOUNTER
Called patient to review recent elevated blood pressure reading.  Per [place] Community Me[place] guidelines, patients blood pressure is out of parameters and recheck blood pressure is recommended.    Last Blood Pressure: 155/83, 154/78 (7/13/23)  Last Heart Rate: 53  Date: 6/2/23  Location: Other Specialty    Today's Blood Pressure: 159/92  Today's Heart Rate: 93  Location: Home BP    Patient reported blood pressure updated in Epic. Blood pressure continues to fall outside of the [place] Community Measures guidelines.  Message sent to primary cardiology team for further review.     Patient stated his PCP is monitoring his Blood Pressure.

## 2023-07-21 NOTE — TELEPHONE ENCOUNTER
I need help understanding a question. I think his anti-hypertensive medication needs adjustment.    Am I to do this or is it through cardiology   ?    Based on the complexity of his current medication list I prefer his cardiologist review this such as ad to Entresto [sacubitril/valsartan (Rx)] etc.    Reroute if additional input requested from me     Holland Blunt MD     

## 2023-07-21 NOTE — TELEPHONE ENCOUNTER
I want to thank the cardiologist for answering these questions and adjusting the Entresto [sacubitril/valsartan (Rx)]     I no longer see an actionable item and you can close this encounter .    Otherwise Reroute if additional input requested from me     DON BETTENCOURT MD  .

## 2023-07-24 NOTE — TELEPHONE ENCOUNTER
Patient reports BP is running low and he does not believe the higher readings are accurate.   Informed patient to bring in both BP home monitoring devices from home to follow up for comparison to our BP monitor readings and further recommendations for BP medications.   Reported BP's are running 100/60. Patient paid out of pocket $600 for his last 3 month supply of entresto. Patient cannot afford doubling this dose.   Informed patient Dr. Meza is out of the clinic this week and confirmed plan to bring BP devices into clinic follow up for comparison and recommendations.    Follow up is scheduled 8/8/23.    Message received 7/21/23 @ 4:09  Hilario Meza MD Fleischhacker, Kelly, RN  Cc: Holland Blunt MD  Caller: Unspecified (4 days ago, 11:46 AM)  Please see note forwarded by Dr. Blunt about Mcdaniel elevated blood pressure.  Lets have him increase the Entresto to 49 mg / 51 mg twice daily (currently what appear to be on 24 mg / 26 mg twice daily).  Thanks.

## 2023-07-26 ENCOUNTER — TELEPHONE (OUTPATIENT)
Dept: GASTROENTEROLOGY | Facility: CLINIC | Age: 73
End: 2023-07-26
Payer: COMMERCIAL

## 2023-07-26 ENCOUNTER — HOSPITAL ENCOUNTER (OUTPATIENT)
Facility: AMBULATORY SURGERY CENTER | Age: 73
End: 2023-07-26
Attending: INTERNAL MEDICINE
Payer: COMMERCIAL

## 2023-07-26 NOTE — TELEPHONE ENCOUNTER
"Endoscopy Scheduling Screen    Have you had a positive Covid test in the last 14 days?  No    Are you active on MyChart?   Yes    What insurance is in the chart?  Other:  [facility]     Ordering/Referring Provider: DON BETTENCOURT    (If ordering provider performs procedure, schedule with ordering provider unless otherwise instructed. )    BMI: Estimated body mass index is 26.52 kg/m  as calculated from the following:    Height as of 7/13/23: 1.702 m (5' 7\").    Weight as of 3/7/23: 76.8 kg (169 lb 4.8 oz).     Sedation Ordered  moderate sedation.   If patient BMI > 50 do not schedule in ASC.    Are you taking any prescription medications for pain?   No    Are you taking methadone or Suboxone?  No    Do you have a history of malignant hyperthermia or adverse reaction to anesthesia?  No    (Females) Are you currently pregnant?   No     Have you been diagnosed or told you have pulmonary hypertension?   No    Do you have an LVAD?  No    Have you been told you have moderate to severe sleep apnea?  Yes (RN Review required for scheduling unless scheduling in Hospital.) [facility]     Have you been told you have COPD, asthma, or any other lung disease?  No    Do you have any heart conditions?  No      Have you ever had or are you awaiting a heart or lung transplant?   No    Have you had a stroke or transient ischemic attack (TIA aka \"mini stroke\" in the last 6 months?   No UNSURE/PT STATES THAT POSSIBLE STROKE WITHIN 6 MONTHS (BLOOD MARKER/TRACER)     Have you been diagnosed with or been told you have cirrhosis of the liver?   No    Are you currently on dialysis?   No    Do you need assistance transferring?   No    BMI: Estimated body mass index is 26.52 kg/m  as calculated from the following:    Height as of 7/13/23: 1.702 m (5' 7\").    Weight as of 3/7/23: 76.8 kg (169 lb 4.8 oz).     Is patients BMI > 40 and scheduling location [facility]?  No    Do you take the medication Phentermine, Ozempic or Wegovy?  No    Do you take the " medication Naltrexone?  No    Do you take blood thinners?  Yes      Are you taking Effient/Prasugrel?  No, you must contact your prescribing provider for direction on holding or bridging with a different medication.      Prep   Are you currently on dialysis or do you have chronic kidney disease?  No    Do you have a diagnosis of diabetes?  No    Do you have a diagnosis of cystic fibrosis (CF)?  No    On a regular basis do you go 3 -5 days between bowel movements?  No    BMI > 40?  No    Preferred Pharmacy:    [facility] - [facility] [address]  [address]  [facility] [address]  Phone: [phone] Fax: [phone]        Final Scheduling Details   Colonoscopy prep sent?  MiraLAX (No Mag Citrate)    Procedure scheduled  Colonoscopy    Surgeon:  JESSA      Date of procedure:  08/10/2023     Schedule PAC:   No    Location  [facility] - [facility]    Sedation   Moderate Sedation    Patient Reminders:   You will receive a call from a Nurse to review instructions and health history.  This assessment must be completed prior to your procedure.  Failure to complete the Nurse assessment may result in the procedure being cancelled.      On the day of your procedure, please designate an adult(s) who can drive you home stay with you for the next 24 hours. The medicines used in the exam will make you sleepy. You will not be able to drive.      You cannot take public transportation, ride share services, or non-medical taxi service without a responsible caregiver.  Medical transport services are allowed with the requirement that a responsible caregiver will receive you at your destination.  We require that drivers and caregivers are confirmed prior to your procedure.

## 2023-07-27 ENCOUNTER — TELEPHONE (OUTPATIENT)
Dept: GASTROENTEROLOGY | Facility: CLINIC | Age: 73
End: 2023-07-27

## 2023-07-27 NOTE — TELEPHONE ENCOUNTER
Pt has severe JOSE JUAN on problems list. Will send staff message to site for review.     Harika Barry RN   Endoscopy Procedure Pre Assessment RN

## 2023-07-28 ENCOUNTER — TELEPHONE (OUTPATIENT)
Dept: GASTROENTEROLOGY | Facility: CLINIC | Age: 73
End: 2023-07-28
Payer: COMMERCIAL

## 2023-07-28 NOTE — TELEPHONE ENCOUNTER
----- Message from Harika Barry RN sent at 7/28/2023  7:46 AM CDT -----  Regarding: FW: JOSE JUAN review  Routing to scheduling,     Pt approved for [facility], but needs [facility]. Currently scheduled with [facility]. Can you help him reschedule please? Thank you.     JOZEF Shabazz   ----- Message -----  From: Bam Giles MD  Sent: 7/27/2023   2:09 PM CDT  To: Harika Barry RN  Subject: RE: JOSE JUAN review                                   Approved for [facility] only    Maikel  ----- Message -----  From: Harika Barry RN  Sent: 7/27/2023   1:10 PM CDT  To: Harika Barry RN; Mg Anes Review  Subject: JOSE JUAN review                                       Please review and advise to determine if patient can proceed with scheduled procedure or if a hospital setting is recommended.     Patient is scheduled for a Colonoscopy   Date of procedure: 8/10/23  Indication: Previous Polyps  Sedation type: Conscious sedation     Reason for review: Pt has severe JOSE JUAN on problems list (AHI of 61) Sleep study was done in 2009.       Thank you,   Harika Barry RN  Endoscopy Procedure Pre Assessment RN  [phone] option 4

## 2023-07-28 NOTE — TELEPHONE ENCOUNTER
Caller: Ricky  Reason for Reschedule/Cancellation (please be detailed, any staff messages or encounters to note?): per note belwo pt needs to be scheduled with MAC at [facility] due to JOSE JUAN      Prior to reschedule please review:  Ordering Provider:     Holland Blunt MD in  INTERNAL MEDICINE     Sedation per order: modertae  Does patient have any ASC Exclusions, please identify?: y JOSE JUAN      Notes on Cancelled Procedure:  Procedure: Lower Endoscopy [Colonoscopy]   Date: 08/10/2023  Location: [facility]; [address] [place] [address] [address], [place] [address]  Surgeon: Annette      Rescheduled: No

## 2023-08-02 ENCOUNTER — OFFICE VISIT (OUTPATIENT)
Dept: OPHTHALMOLOGY | Facility: CLINIC | Age: 73
End: 2023-08-02
Payer: COMMERCIAL

## 2023-08-02 DIAGNOSIS — Q15.8 MEGALOCORNEA: ICD-10-CM

## 2023-08-02 DIAGNOSIS — Z96.1 PSEUDOPHAKIA: Primary | ICD-10-CM

## 2023-08-02 PROCEDURE — 92012 INTRM OPH EXAM EST PATIENT: CPT | Performed by: OPHTHALMOLOGY

## 2023-08-02 ASSESSMENT — VISUAL ACUITY
OD_CC: 20/40
OD_CC+: -1
OS_CC+: -2
CORRECTION_TYPE: GLASSES
OS_CC: 20/25
METHOD: SNELLEN - LINEAR

## 2023-08-02 ASSESSMENT — SLIT LAMP EXAM - LIDS
COMMENTS: NORMAL
COMMENTS: NORMAL

## 2023-08-02 ASSESSMENT — REFRACTION_WEARINGRX
OS_SPHERE: -0.50
OS_CYLINDER: +1.25
OS_AXIS: 001
SPECS_TYPE: PAL
OD_SPHERE: -0.25
OD_ADD: +3.00
OS_ADD: +3.00
OD_AXIS: 098
OD_CYLINDER: +2.00

## 2023-08-02 ASSESSMENT — REFRACTION_MANIFEST
OD_ADD: +3.00
OD_CYLINDER: +1.50
OD_AXIS: 098
OS_SPHERE: -0.75
OD_SPHERE: PLANO
OD_SPHERE: +0.25
METHOD_AUTOREFRACTION: 1
OS_AXIS: 148
OD_CYLINDER: +1.75
OS_SPHERE: -1.25
OS_AXIS: 143
OD_AXIS: 098
OS_CYLINDER: +1.25
OS_CYLINDER: +2.00
OS_ADD: +3.00

## 2023-08-02 ASSESSMENT — TONOMETRY
OD_IOP_MMHG: 10
IOP_METHOD: APPLANATION
OS_IOP_MMHG: 11

## 2023-08-02 ASSESSMENT — EXTERNAL EXAM - LEFT EYE: OS_EXAM: NORMAL

## 2023-08-02 ASSESSMENT — EXTERNAL EXAM - RIGHT EYE: OD_EXAM: NORMAL

## 2023-08-02 NOTE — PATIENT INSTRUCTIONS
"Glasses prescription given - optional  May use artificial tears up to four times a day (like Refresh Optive, Systane Balance, TheraTears, or generic artificial tears are ok. Avoid \"get the red out\" drops).   Call in April 2024 for an appointment in August 2024 for Complete Exam    Dr. Dewey [phone]    "

## 2023-08-02 NOTE — LETTER
"    8/2/2023         RE: Ricky Brown  [address]  [facility] [address]        Dear Colleague,    Thank you for referring your patient, Ricky Brown, to the [facility]. Please see a copy of my visit note below.     Current Eye Medications:  None     Subjective:  MR recheck. Vision is little blurry right eye. Vision is doing well left eye. No eye pain or discomfort in either eye.   Family history of Diabetes, not diabetic yet.    Lab Results   Component Value Date    A1C 5.1 06/02/2023    A1C 5.6 04/28/2021    A1C 5.7 01/21/2021    A1C 5.6 02/25/2020    A1C 5.4 07/25/2019    A1C 5.9 04/03/2019        Objective:  See Ophthalmology Exam.       Assessment:  Stable anterior segment exam both eyes in patient with sutured PCL right eye; ACL left eye, and megalocornea/keratoglobus.  Mild corneal edema left eye.      Plan:  Glasses prescription given - optional  May use artificial tears up to four times a day (like Refresh Optive, Systane Balance, TheraTears, or generic artificial tears are ok. Avoid \"get the red out\" drops).   Call in April 2024 for an appointment in August 2024 for Complete Exam    Dr. Dewey [phone]      Again, thank you for allowing me to participate in the care of your patient.        Sincerely,        Jose Dewey MD  "
show

## 2023-08-02 NOTE — PROGRESS NOTES
" Current Eye Medications:  None     Subjective:  MR recheck. Vision is little blurry right eye. Vision is doing well left eye. No eye pain or discomfort in either eye.   Family history of Diabetes, not diabetic yet.    Lab Results   Component Value Date    A1C 5.1 06/02/2023    A1C 5.6 04/28/2021    A1C 5.7 01/21/2021    A1C 5.6 02/25/2020    A1C 5.4 07/25/2019    A1C 5.9 04/03/2019        Objective:  See Ophthalmology Exam.       Assessment:  Stable anterior segment exam both eyes in patient with sutured PCL right eye; ACL left eye, and megalocornea/keratoglobus.  Mild corneal edema left eye.      Plan:  Glasses prescription given - optional  May use artificial tears up to four times a day (like Refresh Optive, Systane Balance, TheraTears, or generic artificial tears are ok. Avoid \"get the red out\" drops).   Call in April 2024 for an appointment in August 2024 for Complete Exam    Dr. Dewey [phone]      "

## 2023-08-08 ENCOUNTER — OFFICE VISIT (OUTPATIENT)
Dept: CARDIOLOGY | Facility: CLINIC | Age: 73
End: 2023-08-08
Payer: COMMERCIAL

## 2023-08-08 VITALS
DIASTOLIC BLOOD PRESSURE: 60 MMHG | SYSTOLIC BLOOD PRESSURE: 136 MMHG | HEART RATE: 72 BPM | BODY MASS INDEX: 26 KG/M2 | RESPIRATION RATE: 16 BRPM | WEIGHT: 166 LBS

## 2023-08-08 DIAGNOSIS — G47.33 OSA (OBSTRUCTIVE SLEEP APNEA): ICD-10-CM

## 2023-08-08 DIAGNOSIS — I42.9 CARDIOMYOPATHY, UNSPECIFIED TYPE (H): Primary | ICD-10-CM

## 2023-08-08 DIAGNOSIS — I67.9 CEREBROVASCULAR DISEASE: ICD-10-CM

## 2023-08-08 DIAGNOSIS — Q21.12 PATENT FORAMEN OVALE: ICD-10-CM

## 2023-08-08 PROCEDURE — 99214 OFFICE O/P EST MOD 30 MIN: CPT | Performed by: INTERNAL MEDICINE

## 2023-08-08 NOTE — PROGRESS NOTES
[facility] [address], [place] [address]   [email or web address]   OFFICE: [phone]            Impression and Plan     1.  Cardiomyopathy (nonischemic).  Ricky has a history of nonischemic cardiomyopathy.  Most recent assessment of left ventricular systolic performance by echocardiogram 27 September 2022 revealed mild depression and left ventricular systolic performance with ejection fraction of 45%.     Appears volume neutral on exam.  Medical therapy is appropriate.      2.  Aortic insufficiency.  This was felt moderate in degree on echocardiogram 27 September 2022.     3.  History of CVA.  Ricky has a history of CVA felt embolic in nature.  He has had 2 ambulatory monitors that have not revealed any evidence of atrial fibrillation.  He does have documented patent foramen ovale (PFO) based on Dr. Manfred Henley's note from 3 August 2022.  He has been maintained on apixaban.     4.  Obstructive sleep apnea.  Ricky currently is pleased with how he is performing in this regard.     Plan on follow-up in approximately one year.      35 minutes spent reviewing prior records (including documentation, laboratory studies, cardiac testing/imaging), interview with patient along with physical exam, planning, and subsequent documentation/crafting of note).           History of Present Illness    Once again I would like to thank you again for asking me to participate in the care of your patient, Ricky Brown.  As you know, but to reiterate for my own records, Ricky Brown is a 72 year old male with history of nonischemic cardiomyopathy.  Most recent assessment of left ventricular systolic performance by echocardiogram 27 September 2022 revealed mild depression and left ventricular systolic performance with ejection fraction of 45%.     On interview today, Faraz states that his breathing is fairly comfortable.  He still tries to walk on a  regular basis.  Indeed he usually walks 2 miles a day with his dog.  He denies shortness of breath at night to suggest PND/orthopnea.  At time of last visit, he was noted to have evidence of sleep apnea and is now undergoing treatment.  He is quite pleased with the treatment of his sleep apnea.    Further review of systems is otherwise negative/noncontributory (medical record and 13 point review of systems reviewed as well and pertinent positives noted).         Cardiac Diagnostics      Echocardiogram 27 September 2022:  Normal left ventricular size with mildly reduced left ventricular systolic performance.  Ejection fraction 45%.  Moderate aortic insufficiency.  Normal right ventricular size and systolic performance.  Normal aortic dimensions.    Cardiac positron emission tomography 22 August 2022:  Stress Perfusion Ammonia Cardiac PET    No ischemia. Coronary flow reserve is normal in all vascular distributions. (Concordant with cardiac catheterization 8/8/2022)  At rest there is a mild decrease in perfusion in the septal wall and inferior lateral wall.   This is in a nonvascular distribution. It does appears to correlate with fibrosis demonstrated on MR 7 May 2020 with late gadolinium enhancement in the septal and inferolateralwalls.  Cardiomyopathy - Global hypokinesis. Decreased ejection fraction 36% at rest and 40% at stress.  (Concordant with echocardiogram 29 June 2022)  FDG Cardiac PET - No cardiac FDG uptake to suggest active inflammation.   Cardiac prep was excellent with full myocardial glucose uptake suppression.    Cardiac MRI 7 May 2020:  The left ventricle is normal in cavity size and wall thickness. The global systolic function is mildly  reduced. The LVEF is 42%. There is mild diffuse hypokinesis.  The right ventricle is normal in cavity size. The global systolic function is mildly reduced. The RVEF is 49%.   Both atria are normal in size.  There is mild aortic regurgitation.  There is  mid-myocardial late gadolinium enhancement in the basal inferolateral and basal inferoseptal segments consistent with non ischemic fibrosis.   Regadenoson stress perfusion imaging shows no ischemia.  There is no pericardial effusion or thickening.  There is no intracardiac thrombus.    Coronary angiogram 8 August 2022:  Minimal coronary artery disease with no significant stenosis.  Arteries are of large size.  Left ventricular end-diastolic pressures 16 mmHg.    Ambulatory monitor x25  days from 12 August 2022 through 12 September 2022:  Baseline rhythm was sinus rhythm 63bpm.    Reported heart rate range 37 (E8 August 2022 at 06:03am) to 1120bpm, average 68bpm.  3 symptom triggers (chest pain, lightheaded, dyspnea, other) correlated to sinus rhythm 62-101bpm.  Automated recordings included intervals of sinus bradycardia with intermittent first degree atrioventricular block (six recordings, all during early morning hours), isolated PVCs.  No sustained tachyarrhythmias.  No atrial fibrillation.  There were no pauses noted.  Supraventricular and ventricular ectopic beat frequency are not reported on this monitoring modality.                 Physical Examination       /60 (BP Location: Left arm, Patient Position: Sitting, Cuff Size: Adult Regular)   Pulse 72   Resp 16   Wt 75.3 kg (166 lb)   BMI 26.00 kg/m          Wt Readings from Last 3 Encounters:   08/08/23 75.3 kg (166 lb)   03/07/23 76.8 kg (169 lb 4.8 oz)   11/30/22 81.2 kg (179 lb)       The patient is alert and oriented times three. Sclerae are anicteric. Mucosal membranes are moist. Jugular venous pressure is normal. No significant adenopathy/thyromegally appreciated. Lungs are clear with good expansion. On cardiovascular exam, the patient has a regular S1 and S2. Abdomen is soft and non-tender. Extremities reveal no clubbing, cyanosis, or edema.       Family History/Social History/Risk Factors   Patient does not smoke.  Family history reviewed, and  family history includes Cerebrovascular Disease in his father; Coronary Artery Disease in his father; Diabetes in his father; Heart Disease in his father; Hypertension in his father; Lipids in his sister; Obesity in his father.         Medical History  Surgical History Family History Social History   Past Medical History:   Diagnosis Date    Arthritis     BCC (basal cell carcinoma)     right shoulder     Cardiomyopathy (H)     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 (H) 06/27/2022    Colon adenomas 09/20/2011    Coronary artery disease     Disorder of bursae and tendons in shoulder region 04/18/2014    ED (erectile dysfunction)     Fatty liver     HFrEF (heart failure with reduced ejection fraction) (H)     History of iritis, os 05/27/2019    HTN (hypertension)     Near syncope 02/10/2014    Noncompliance     Nonrheumatic aortic valve insufficiency     Obesity     JOSE JUAN (obstructive sleep apnea)     Partial symptomatic epilepsy with complex partial seizures, not intractable, without status epilepticus (H) 09/08/2022     Past Surgical History:   Procedure Laterality Date    ARTHROSCOPY SHOULDER BICEPS TENODESIS REPAIR  02/27/2014    Procedure: ARTHROSCOPY SHOULDER BICEPS TENODESIS REPAIR;;  Surgeon: Michael Hagen MD;  [facility]    ARTHROSCOPY SHOULDER ROTATOR CUFF REPAIR  02/27/2014    Procedure: ARTHROSCOPY SHOULDER ROTATOR CUFF REPAIR;  Right Shoulder Arthroscopic Rotator Cuff Repair,  Subacromial Decompression, Biceps Tenodesis, Distal Clavicle Excision   ;  Surgeon: Michael Hagen MD;  [facility]    BIOPSY      COLONOSCOPY  06/12/2018    CV CORONARY ANGIOGRAM N/A 08/08/2022    Procedure: Coronary Angiogram;  Surgeon: Ida Goddard MD;  Location: [facility] CATH LAB CV    CV LEFT HEART CATH N/A 08/08/2022    Procedure: Left Heart Catheterization;  Surgeon: Ida Goddard MD;  Location: [facility] CATH LAB  CV    ENDOSCOPY DRUG INDUCED SLEEP N/A 09/16/2022    Procedure: DRUG INDUCED SLEEP ENDOSCOPY;  Surgeon: Dashawn Tanner MD;  Location: [facility]    EXCHANGE INTRAOCULAR LENS IMPLANT  2005    left eye - first, recentered PCL with iris fixation; then IOLX with ACL    EXTRACAPSULAR CATARACT EXTRATION WITH INTRAOCULAR LENS IMPLANT  2005    both eyes (elsewhere)    IMPLANT GENERATOR STIMULATOR (LOCATION) N/A 11/3/2022    Procedure: INSERTION, PULSE GENERATOR, NEUROSTIMULATOR;  Surgeon: Dashawn Tanner MD;  Location: [facility]    RELEASE CARPAL TUNNEL Right 08/13/2021    Procedure: RELEASE, RIGHT CARPAL TUNNEL;  Surgeon: Tony Bravo MD;  Location: [facility]    RELEASE CARPAL TUNNEL Right 10/26/2022    Procedure: RIGHT REVISION OPEN CARPAL TUNNEL RELEASE, HYPOTHENAR FAT FLAP,;  Surgeon: Vignesh Rhodes MD;  Location: [facility]    TRANSPOSITION ULNAR NERVE (ELBOW) Right 10/26/2022    Procedure: RIGHT ULNAR NERVE DECOMPRESSION AT THE ELBOW;  Surgeon: Vignesh Rhodes MD;  Location: [facility]    TURBINOPLASTY  12/11/2013    Procedure: TURBINOPLASTY;;  Surgeon: Lisset Parsons MD;  Location: [facility]    UVULOPALATOPHARYNGOPLASTY  12/11/2013    Procedure: UVULOPALATOPHARYNGOPLASTY;  Uvulopalatopharyngoplasty, Turbiante Reduction;  Surgeon: Lisset Parsons MD;  Location: [facility] OR     Family History   Problem Relation Age of Onset    Diabetes Father         Old age Diabetes    Cerebrovascular Disease Father     Obesity Father     Heart Disease Father     Coronary Artery Disease Father     Hypertension Father     Lipids Sister     C.A.D. No family hx of     Cancer No family hx of     Glaucoma No family hx of     Macular Degeneration No family hx of     Anesthesia Reaction No family hx of     Deep Vein Thrombosis (DVT) No family hx of         Social History     Socioeconomic History    Marital status:      Spouse name: Kyung    Number of children: 0    Years of education: 14    Highest education level: Not on file    Occupational History    Occupation: industrial electronics      Employer: SELF   Tobacco Use    Smoking status: Former     Packs/day: 0.50     Years: 2.00     Pack years: 1.00     Types: Cigarettes     Quit date: 1996     Years since quittin.6    Smokeless tobacco: Never   Vaping Use    Vaping Use: Never used   Substance and Sexual Activity    Alcohol use: Yes     Alcohol/week: 8.3 standard drinks of alcohol     Comment: Evening Marie    Drug use: No    Sexual activity: Yes     Partners: Female     Birth control/protection: Male Surgical     Comment:  vasectomy   Other Topics Concern    Parent/sibling w/ CABG, MI or angioplasty before 65F 55M? No   Social History Narrative    Not on file     Social Determinants of Health     Financial Resource Strain: Not on file   Food Insecurity: Not on file   Transportation Needs: Not on file   Physical Activity: Not on file   Stress: Not on file   Social Connections: Not on file   Intimate Partner Violence: Not on file   Housing Stability: Not on file           Medications  Allergies   Current Outpatient Medications   Medication Sig Dispense Refill    atorvastatin (LIPITOR) 10 MG tablet Take 1 tablet (10 mg) by mouth every evening 90 tablet 3    cyanocobalamin (VITAMIN B-12) 1000 MCG tablet Take 1,000 mcg by mouth 2 times daily      ELIQUIS ANTICOAGULANT 5 MG tablet TAKE 1 TABLET BY MOUTH  TWICE DAILY 180 tablet 2    folic acid (FOLVITE) 1 MG tablet Take 1 tablet (1,000 mcg) by mouth 2 times daily 180 tablet 3    levETIRAcetam (KEPPRA) 500 MG tablet Take 1 tablet (500 mg) by mouth 2 times daily 60 tablet 11    metoprolol succinate ER (TOPROL XL) 25 MG 24 hr tablet Take 1 tablet (25 mg) by mouth 2 times daily 180 tablet 11    sacubitril-valsartan (ENTRESTO) 24-26 MG per tablet Take 1 tablet by mouth 2 times daily 180 tablet 11    SENNA-docusate sodium (SENNA S) 8.6-50 MG tablet Take 1 tablet by mouth At Bedtime Use while taking narcotic pain medications.  Hold  for diarrhea. 30 tablet 1    tamsulosin (FLOMAX) 0.4 MG capsule Take 1 capsule (0.4 mg) by mouth daily 90 capsule 1    ASPIRIN NOT PRESCRIBED (INTENTIONAL) Please choose reason not prescribed from choices below. (Patient not taking: Reported on 8/8/2023) 1 each 0    diphenoxylate-atropine (LOMOTIL) 2.5-0.025 MG tablet Take 1 tablet by mouth daily as needed for diarrhea Patient only takes 1 tablet as needed (Patient not taking: Reported on 8/8/2023) 30 tablet 5       Allergies   Allergen Reactions    Lisinopril Cough    Perfume Other (See Comments)          Lab Results    Chemistry/lipid CBC Cardiac Enzymes/BNP/TSH/INR   Recent Labs   Lab Test 06/02/23  1205   CHOL 118   HDL 47   LDL 52   TRIG 95     Recent Labs   Lab Test 06/02/23  1205 06/13/22  1629 12/11/20  0929   LDL 52 27 42     Recent Labs   Lab Test 06/02/23  1205      POTASSIUM 4.0   CHLORIDE 108*   CO2 25   *   BUN 18.1   CR 0.74   GFRESTIMATED >90   CATHERINE 9.0     Recent Labs   Lab Test 06/02/23  1205 10/21/22  1026 09/08/22  0841   CR 0.74 0.94 0.90     Recent Labs   Lab Test 06/02/23  1205 04/28/21  1212 01/21/21  1733   A1C 5.1 5.6 5.7*          Recent Labs   Lab Test 06/02/23  1205   WBC 4.7   HGB 13.6   HCT 40.9   MCV 91        Recent Labs   Lab Test 06/02/23  1205 08/14/22  0914 08/08/22  0818   HGB 13.6 13.9 13.8    No results for input(s): TROPONINI in the last 59840 hours.  Recent Labs   Lab Test 09/08/20  1743 05/18/20  1211   NTBNPI 731  --    NTBNP  --  194*     Recent Labs   Lab Test 08/14/22  0914   TSH 1.49     No results for input(s): INR in the last 35079 hours.       Medications  Allergies   Current Outpatient Medications   Medication Sig Dispense Refill    atorvastatin (LIPITOR) 10 MG tablet Take 1 tablet (10 mg) by mouth every evening 90 tablet 3    cyanocobalamin (VITAMIN B-12) 1000 MCG tablet Take 1,000 mcg by mouth 2 times daily      ELIQUIS ANTICOAGULANT 5 MG tablet TAKE 1 TABLET BY MOUTH  TWICE DAILY 180 tablet 2     folic acid (FOLVITE) 1 MG tablet Take 1 tablet (1,000 mcg) by mouth 2 times daily 180 tablet 3    levETIRAcetam (KEPPRA) 500 MG tablet Take 1 tablet (500 mg) by mouth 2 times daily 60 tablet 11    metoprolol succinate ER (TOPROL XL) 25 MG 24 hr tablet Take 1 tablet (25 mg) by mouth 2 times daily 180 tablet 11    sacubitril-valsartan (ENTRESTO) 24-26 MG per tablet Take 1 tablet by mouth 2 times daily 180 tablet 11    SENNA-docusate sodium (SENNA S) 8.6-50 MG tablet Take 1 tablet by mouth At Bedtime Use while taking narcotic pain medications.  Hold for diarrhea. 30 tablet 1    tamsulosin (FLOMAX) 0.4 MG capsule Take 1 capsule (0.4 mg) by mouth daily 90 capsule 1    ASPIRIN NOT PRESCRIBED (INTENTIONAL) Please choose reason not prescribed from choices below. (Patient not taking: Reported on 8/8/2023) 1 each 0    diphenoxylate-atropine (LOMOTIL) 2.5-0.025 MG tablet Take 1 tablet by mouth daily as needed for diarrhea Patient only takes 1 tablet as needed (Patient not taking: Reported on 8/8/2023) 30 tablet 5      Allergies   Allergen Reactions    Lisinopril Cough    Perfume Other (See Comments)          Lab Results   Lab Results   Component Value Date     06/02/2023     01/07/2021    CO2 25 06/02/2023    CO2 27 10/21/2022    CO2 25 01/07/2021    BUN 18.1 06/02/2023    BUN 19 10/21/2022    BUN 21 01/07/2021     Lab Results   Component Value Date    WBC 4.7 06/02/2023    WBC 5.3 09/08/2020    HGB 13.6 06/02/2023    HGB 14.3 09/08/2020    HCT 40.9 06/02/2023    HCT 43.6 09/08/2020    MCV 91 06/02/2023    MCV 91 09/08/2020     06/02/2023     09/08/2020     Lab Results   Component Value Date    CHOL 118 06/02/2023    CHOL 129 12/11/2020    TRIG 95 06/02/2023    TRIG 238 12/11/2020    HDL 47 06/02/2023    HDL 39 12/11/2020       Lab Results   Component Value Date    TSH 1.49 08/14/2022    TSH 1.38 02/21/2019

## 2023-08-08 NOTE — LETTER
8/8/2023    Holland Blunt MD  [address] [facility] [place] [address]    RE: Ricky Brown       Dear Colleague,     I had the pleasure of seeing Ricky Brown in the [facility].         [facility]   [address], [place] [address]   [email or web address]   OFFICE: [phone]            Impression and Plan     1.  Cardiomyopathy (nonischemic).  Ricky has a history of nonischemic cardiomyopathy.  Most recent assessment of left ventricular systolic performance by echocardiogram 27 September 2022 revealed mild depression and left ventricular systolic performance with ejection fraction of 45%.     Appears volume neutral on exam.  Medical therapy is appropriate.      2.  Aortic insufficiency.  This was felt moderate in degree on echocardiogram 27 September 2022.     3.  History of CVA.  Ricky has a history of CVA felt embolic in nature.  He has had 2 ambulatory monitors that have not revealed any evidence of atrial fibrillation.  He does have documented patent foramen ovale (PFO) based on Dr. Manfred Henley's note from 3 August 2022.  He has been maintained on apixaban.     4.  Obstructive sleep apnea.  Ricky currently is pleased with how he is performing in this regard.     Plan on follow-up in approximately one year.      35 minutes spent reviewing prior records (including documentation, laboratory studies, cardiac testing/imaging), interview with patient along with physical exam, planning, and subsequent documentation/crafting of note).           History of Present Illness    Once again I would like to thank you again for asking me to participate in the care of your patient, Ricky Brown.  As you know, but to reiterate for my own records, Ricky Brown is a 72 year old male with history of nonischemic cardiomyopathy.  Most recent assessment of left ventricular systolic performance by echocardiogram 27 September 2022  revealed mild depression and left ventricular systolic performance with ejection fraction of 45%.     On interview today, Faraz states that his breathing is fairly comfortable.  He still tries to walk on a regular basis.  Indeed he usually walks 2 miles a day with his dog.  He denies shortness of breath at night to suggest PND/orthopnea.  At time of last visit, he was noted to have evidence of sleep apnea and is now undergoing treatment.  He is quite pleased with the treatment of his sleep apnea.    Further review of systems is otherwise negative/noncontributory (medical record and 13 point review of systems reviewed as well and pertinent positives noted).         Cardiac Diagnostics      Echocardiogram 27 September 2022:  Normal left ventricular size with mildly reduced left ventricular systolic performance.  Ejection fraction 45%.  Moderate aortic insufficiency.  Normal right ventricular size and systolic performance.  Normal aortic dimensions.    Cardiac positron emission tomography 22 August 2022:  Stress Perfusion Ammonia Cardiac PET    No ischemia. Coronary flow reserve is normal in all vascular distributions. (Concordant with cardiac catheterization 8/8/2022)  At rest there is a mild decrease in perfusion in the septal wall and inferior lateral wall.   This is in a nonvascular distribution. It does appears to correlate with fibrosis demonstrated on MR 7 May 2020 with late gadolinium enhancement in the septal and inferolateralwalls.  Cardiomyopathy - Global hypokinesis. Decreased ejection fraction 36% at rest and 40% at stress.  (Concordant with echocardiogram 29 June 2022)  FDG Cardiac PET - No cardiac FDG uptake to suggest active inflammation.   Cardiac prep was excellent with full myocardial glucose uptake suppression.    Cardiac MRI 7 May 2020:  The left ventricle is normal in cavity size and wall thickness. The global systolic function is mildly  reduced. The LVEF is 42%. There is mild diffuse  hypokinesis.  The right ventricle is normal in cavity size. The global systolic function is mildly reduced. The RVEF is 49%.   Both atria are normal in size.  There is mild aortic regurgitation.  There is mid-myocardial late gadolinium enhancement in the basal inferolateral and basal inferoseptal segments consistent with non ischemic fibrosis.   Regadenoson stress perfusion imaging shows no ischemia.  There is no pericardial effusion or thickening.  There is no intracardiac thrombus.    Coronary angiogram 8 August 2022:  Minimal coronary artery disease with no significant stenosis.  Arteries are of large size.  Left ventricular end-diastolic pressures 16 mmHg.    Ambulatory monitor x25  days from 12 August 2022 through 12 September 2022:  Baseline rhythm was sinus rhythm 63bpm.    Reported heart rate range 37 (E8 August 2022 at 06:03am) to 1120bpm, average 68bpm.  3 symptom triggers (chest pain, lightheaded, dyspnea, other) correlated to sinus rhythm 62-101bpm.  Automated recordings included intervals of sinus bradycardia with intermittent first degree atrioventricular block (six recordings, all during early morning hours), isolated PVCs.  No sustained tachyarrhythmias.  No atrial fibrillation.  There were no pauses noted.  Supraventricular and ventricular ectopic beat frequency are not reported on this monitoring modality.                 Physical Examination       /60 (BP Location: Left arm, Patient Position: Sitting, Cuff Size: Adult Regular)   Pulse 72   Resp 16   Wt 75.3 kg (166 lb)   BMI 26.00 kg/m          Wt Readings from Last 3 Encounters:   08/08/23 75.3 kg (166 lb)   03/07/23 76.8 kg (169 lb 4.8 oz)   11/30/22 81.2 kg (179 lb)       The patient is alert and oriented times three. Sclerae are anicteric. Mucosal membranes are moist. Jugular venous pressure is normal. No significant adenopathy/thyromegally appreciated. Lungs are clear with good expansion. On cardiovascular exam, the patient has a  regular S1 and S2. Abdomen is soft and non-tender. Extremities reveal no clubbing, cyanosis, or edema.       Family History/Social History/Risk Factors   Patient does not smoke.  Family history reviewed, and family history includes Cerebrovascular Disease in his father; Coronary Artery Disease in his father; Diabetes in his father; Heart Disease in his father; Hypertension in his father; Lipids in his sister; Obesity in his father.         Medical History  Surgical History Family History Social History   Past Medical History:   Diagnosis Date    Arthritis     BCC (basal cell carcinoma)     right shoulder     Cardiomyopathy (H)     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 (H) 06/27/2022    Colon adenomas 09/20/2011    Coronary artery disease     Disorder of bursae and tendons in shoulder region 04/18/2014    ED (erectile dysfunction)     Fatty liver     HFrEF (heart failure with reduced ejection fraction) (H)     History of iritis, os 05/27/2019    HTN (hypertension)     Near syncope 02/10/2014    Noncompliance     Nonrheumatic aortic valve insufficiency     Obesity     JOSE JUAN (obstructive sleep apnea)     Partial symptomatic epilepsy with complex partial seizures, not intractable, without status epilepticus (H) 09/08/2022     Past Surgical History:   Procedure Laterality Date    ARTHROSCOPY SHOULDER BICEPS TENODESIS REPAIR  02/27/2014    Procedure: ARTHROSCOPY SHOULDER BICEPS TENODESIS REPAIR;;  Surgeon: Michael Hagen MD;  [facility]    ARTHROSCOPY SHOULDER ROTATOR CUFF REPAIR  02/27/2014    Procedure: ARTHROSCOPY SHOULDER ROTATOR CUFF REPAIR;  Right Shoulder Arthroscopic Rotator Cuff Repair,  Subacromial Decompression, Biceps Tenodesis, Distal Clavicle Excision   ;  Surgeon: Michael Hagen MD;  [facility]    BIOPSY      COLONOSCOPY  06/12/2018    CV CORONARY ANGIOGRAM N/A 08/08/2022    Procedure: Coronary  Angiogram;  Surgeon: Ida Goddard MD;  Location: [facility] CV    CV LEFT HEART CATH N/A 08/08/2022    Procedure: Left Heart Catheterization;  Surgeon: Ida Goddard MD;  Location: [facility] CV    ENDOSCOPY DRUG INDUCED SLEEP N/A 09/16/2022    Procedure: DRUG INDUCED SLEEP ENDOSCOPY;  Surgeon: Dashawn Tanner MD;  Location: [facility]    EXCHANGE INTRAOCULAR LENS IMPLANT  2005    left eye - first, recentered PCL with iris fixation; then IOLX with ACL    EXTRACAPSULAR CATARACT EXTRATION WITH INTRAOCULAR LENS IMPLANT  2005    both eyes (elsewhere)    IMPLANT GENERATOR STIMULATOR (LOCATION) N/A 11/3/2022    Procedure: INSERTION, PULSE GENERATOR, NEUROSTIMULATOR;  Surgeon: Dashawn Tanner MD;  Location: [facility]    RELEASE CARPAL TUNNEL Right 08/13/2021    Procedure: RELEASE, RIGHT CARPAL TUNNEL;  Surgeon: Tony Bravo MD;  Location: [facility]    RELEASE CARPAL TUNNEL Right 10/26/2022    Procedure: RIGHT REVISION OPEN CARPAL TUNNEL RELEASE, HYPOTHENAR FAT FLAP,;  Surgeon: Vignesh Rhodes MD;  Location: [facility]    TRANSPOSITION ULNAR NERVE (ELBOW) Right 10/26/2022    Procedure: RIGHT ULNAR NERVE DECOMPRESSION AT THE ELBOW;  Surgeon: Vignesh Rhodes MD;  Location: [facility]    TURBINOPLASTY  12/11/2013    Procedure: TURBINOPLASTY;;  Surgeon: Lisset Parsons MD;  [facility]    UVULOPALATOPHARYNGOPLASTY  12/11/2013    Procedure: UVULOPALATOPHARYNGOPLASTY;  Uvulopalatopharyngoplasty, Turbiante Reduction;  Surgeon: Lisset Parsons MD;  Location: [facility] OR     Family History   Problem Relation Age of Onset    Diabetes Father         Old age Diabetes    Cerebrovascular Disease Father     Obesity Father     Heart Disease Father     Coronary Artery Disease Father     Hypertension Father     Lipids Sister     C.A.D. No family hx of     Cancer No family hx of     Glaucoma No family hx of     Macular Degeneration No family hx of     Anesthesia Reaction No family hx of     Deep Vein  Thrombosis (DVT) No family hx of         Social History     Socioeconomic History    Marital status:      Spouse name: Kyung    Number of children: 0    Years of education: 14    Highest education level: Not on file   Occupational History    Occupation: industrial electronics      Employer: SELF   Tobacco Use    Smoking status: Former     Packs/day: 0.50     Years: 2.00     Pack years: 1.00     Types: Cigarettes     Quit date: 1996     Years since quittin.6    Smokeless tobacco: Never   Vaping Use    Vaping Use: Never used   Substance and Sexual Activity    Alcohol use: Yes     Alcohol/week: 8.3 standard drinks of alcohol     Comment: Evening Marie    Drug use: No    Sexual activity: Yes     Partners: Female     Birth control/protection: Male Surgical     Comment:  vasectomy   Other Topics Concern    Parent/sibling w/ CABG, MI or angioplasty before 65F 55M? No   Social History Narrative    Not on file     Social Determinants of Health     Financial Resource Strain: Not on file   Food Insecurity: Not on file   Transportation Needs: Not on file   Physical Activity: Not on file   Stress: Not on file   Social Connections: Not on file   Intimate Partner Violence: Not on file   Housing Stability: Not on file           Medications  Allergies   Current Outpatient Medications   Medication Sig Dispense Refill    atorvastatin (LIPITOR) 10 MG tablet Take 1 tablet (10 mg) by mouth every evening 90 tablet 3    cyanocobalamin (VITAMIN B-12) 1000 MCG tablet Take 1,000 mcg by mouth 2 times daily      ELIQUIS ANTICOAGULANT 5 MG tablet TAKE 1 TABLET BY MOUTH  TWICE DAILY 180 tablet 2    folic acid (FOLVITE) 1 MG tablet Take 1 tablet (1,000 mcg) by mouth 2 times daily 180 tablet 3    levETIRAcetam (KEPPRA) 500 MG tablet Take 1 tablet (500 mg) by mouth 2 times daily 60 tablet 11    metoprolol succinate ER (TOPROL XL) 25 MG 24 hr tablet Take 1 tablet (25 mg) by mouth 2 times daily 180 tablet 11     sacubitril-valsartan (ENTRESTO) 24-26 MG per tablet Take 1 tablet by mouth 2 times daily 180 tablet 11    SENNA-docusate sodium (SENNA S) 8.6-50 MG tablet Take 1 tablet by mouth At Bedtime Use while taking narcotic pain medications.  Hold for diarrhea. 30 tablet 1    tamsulosin (FLOMAX) 0.4 MG capsule Take 1 capsule (0.4 mg) by mouth daily 90 capsule 1    ASPIRIN NOT PRESCRIBED (INTENTIONAL) Please choose reason not prescribed from choices below. (Patient not taking: Reported on 8/8/2023) 1 each 0    diphenoxylate-atropine (LOMOTIL) 2.5-0.025 MG tablet Take 1 tablet by mouth daily as needed for diarrhea Patient only takes 1 tablet as needed (Patient not taking: Reported on 8/8/2023) 30 tablet 5       Allergies   Allergen Reactions    Lisinopril Cough    Perfume Other (See Comments)          Lab Results    Chemistry/lipid CBC Cardiac Enzymes/BNP/TSH/INR   Recent Labs   Lab Test 06/02/23  1205   CHOL 118   HDL 47   LDL 52   TRIG 95     Recent Labs   Lab Test 06/02/23  1205 06/13/22  1629 12/11/20  0929   LDL 52 27 42     Recent Labs   Lab Test 06/02/23  1205      POTASSIUM 4.0   CHLORIDE 108*   CO2 25   *   BUN 18.1   CR 0.74   GFRESTIMATED >90   CATHERINE 9.0     Recent Labs   Lab Test 06/02/23  1205 10/21/22  1026 09/08/22  0841   CR 0.74 0.94 0.90     Recent Labs   Lab Test 06/02/23  1205 04/28/21  1212 01/21/21  1733   A1C 5.1 5.6 5.7*          Recent Labs   Lab Test 06/02/23  1205   WBC 4.7   HGB 13.6   HCT 40.9   MCV 91        Recent Labs   Lab Test 06/02/23  1205 08/14/22  0914 08/08/22  0818   HGB 13.6 13.9 13.8    No results for input(s): TROPONINI in the last 98109 hours.  Recent Labs   Lab Test 09/08/20  1743 05/18/20  1211   NTBNPI 731  --    NTBNP  --  194*     Recent Labs   Lab Test 08/14/22  0914   TSH 1.49     No results for input(s): INR in the last 51813 hours.       Medications  Allergies   Current Outpatient Medications   Medication Sig Dispense Refill    atorvastatin (LIPITOR) 10 MG  tablet Take 1 tablet (10 mg) by mouth every evening 90 tablet 3    cyanocobalamin (VITAMIN B-12) 1000 MCG tablet Take 1,000 mcg by mouth 2 times daily      ELIQUIS ANTICOAGULANT 5 MG tablet TAKE 1 TABLET BY MOUTH  TWICE DAILY 180 tablet 2    folic acid (FOLVITE) 1 MG tablet Take 1 tablet (1,000 mcg) by mouth 2 times daily 180 tablet 3    levETIRAcetam (KEPPRA) 500 MG tablet Take 1 tablet (500 mg) by mouth 2 times daily 60 tablet 11    metoprolol succinate ER (TOPROL XL) 25 MG 24 hr tablet Take 1 tablet (25 mg) by mouth 2 times daily 180 tablet 11    sacubitril-valsartan (ENTRESTO) 24-26 MG per tablet Take 1 tablet by mouth 2 times daily 180 tablet 11    SENNA-docusate sodium (SENNA S) 8.6-50 MG tablet Take 1 tablet by mouth At Bedtime Use while taking narcotic pain medications.  Hold for diarrhea. 30 tablet 1    tamsulosin (FLOMAX) 0.4 MG capsule Take 1 capsule (0.4 mg) by mouth daily 90 capsule 1    ASPIRIN NOT PRESCRIBED (INTENTIONAL) Please choose reason not prescribed from choices below. (Patient not taking: Reported on 8/8/2023) 1 each 0    diphenoxylate-atropine (LOMOTIL) 2.5-0.025 MG tablet Take 1 tablet by mouth daily as needed for diarrhea Patient only takes 1 tablet as needed (Patient not taking: Reported on 8/8/2023) 30 tablet 5      Allergies   Allergen Reactions    Lisinopril Cough    Perfume Other (See Comments)          Lab Results   Lab Results   Component Value Date     06/02/2023     01/07/2021    CO2 25 06/02/2023    CO2 27 10/21/2022    CO2 25 01/07/2021    BUN 18.1 06/02/2023    BUN 19 10/21/2022    BUN 21 01/07/2021     Lab Results   Component Value Date    WBC 4.7 06/02/2023    WBC 5.3 09/08/2020    HGB 13.6 06/02/2023    HGB 14.3 09/08/2020    HCT 40.9 06/02/2023    HCT 43.6 09/08/2020    MCV 91 06/02/2023    MCV 91 09/08/2020     06/02/2023     09/08/2020     Lab Results   Component Value Date    CHOL 118 06/02/2023    CHOL 129 12/11/2020    TRIG 95 06/02/2023     TRIG 238 12/11/2020    HDL 47 06/02/2023    HDL 39 12/11/2020       Lab Results   Component Value Date    TSH 1.49 08/14/2022    TSH 1.38 02/21/2019                      Thank you for allowing me to participate in the care of your patient.      Sincerely,     Hilario Meza MD     [facility]  cc:   No referring provider defined for this encounter.

## 2023-08-09 NOTE — TELEPHONE ENCOUNTER
Patient did call back and he is rescheduled to 10/5/23 at  with . He didn't want to wait for mac at  so was fine going anywhere.

## 2023-08-28 ENCOUNTER — TELEPHONE (OUTPATIENT)
Dept: FAMILY MEDICINE | Facility: CLINIC | Age: 73
End: 2023-08-28
Payer: COMMERCIAL

## 2023-08-28 DIAGNOSIS — I63.439 CEREBROVASCULAR ACCIDENT (CVA) DUE TO EMBOLISM OF POSTERIOR CEREBRAL ARTERY WITH INFARCTIONS OF BOTH OCCIPITAL LOBES (H): Primary | ICD-10-CM

## 2023-08-28 NOTE — TELEPHONE ENCOUNTER
Patient called is on ELIQUIS ANTICOAGULANT 5 MG tablet  would like something cheaper.Please call and advise.  Aimee Cade   [facility]

## 2023-08-29 NOTE — TELEPHONE ENCOUNTER
Called pharmacy, cost for Eliquis is 450$ for 90 day supply per [facility] [address] pharmacist.     Dr. Ruiz do you have cheaper recommended alternative for anticoagulation for patient? He cannot afford the high cost of Eliquis.     Alfredo Chin RN, BSN  [facility]

## 2023-08-30 ENCOUNTER — TELEPHONE (OUTPATIENT)
Dept: ANTICOAGULATION | Facility: CLINIC | Age: 73
End: 2023-08-30
Payer: COMMERCIAL

## 2023-08-30 DIAGNOSIS — I63.439 CEREBROVASCULAR ACCIDENT (CVA) DUE TO EMBOLISM OF POSTERIOR CEREBRAL ARTERY WITH INFARCTIONS OF BOTH OCCIPITAL LOBES (H): Primary | ICD-10-CM

## 2023-08-30 RX ORDER — WARFARIN SODIUM 2.5 MG/1
TABLET ORAL
Qty: 90 TABLET | Refills: 1 | Status: SHIPPED | OUTPATIENT
Start: 2023-08-30 | End: 2023-10-09

## 2023-08-30 NOTE — TELEPHONE ENCOUNTER
If he cannot afford Eliquis he will need to be on Coumadin with goal of INR between 2-3.  He should contact his primary care physician to switch him to Coumadin and aim for an INR between 2-3.  He cannot be on aspirin or Plavix as his ejection fraction as low and he needs either Eliquis or Coumadin

## 2023-08-30 NOTE — TELEPHONE ENCOUNTER
Inr clinic referral is placed and signed    If anything further is needed reroute otherwise you can close this encounter      I wasn't sure if he had coumadin medications yet    Holland Blunt MD     22

## 2023-08-30 NOTE — TELEPHONE ENCOUNTER
Pt agreeable to start Coumadin, he only has 2.5 days of Eliquis left, can not afford to stay on eliquis as monthly cost is 400-500$.     Routing to Dr. Blunt (pcp) to assist with switch from eliquis to coumadin and to assist with coordination of INRs labs. PCP management of medication and labs is being recommended by Dr. Ruiz (neurology).     Please let our team know if you have any questions or concerns. I did tell pt that he should contact neurology clinic tomorrow afternoon if he has not heard from Dr. Blunt and care team. Pt agreeable.     Alfredo Chin, RN, BSN  [facility] Neurology

## 2023-08-30 NOTE — TELEPHONE ENCOUNTER
[facility] will order the warfarin and coordinate the transition with patient.     Thank you,     Marta Steel RN, BSN  [facility]  [phone]

## 2023-09-01 ENCOUNTER — ANTICOAGULATION THERAPY VISIT (OUTPATIENT)
Dept: ANTICOAGULATION | Facility: CLINIC | Age: 73
End: 2023-09-01

## 2023-09-01 ENCOUNTER — LAB (OUTPATIENT)
Dept: LAB | Facility: CLINIC | Age: 73
End: 2023-09-01
Payer: COMMERCIAL

## 2023-09-01 ENCOUNTER — TELEPHONE (OUTPATIENT)
Dept: ANTICOAGULATION | Facility: CLINIC | Age: 73
End: 2023-09-01

## 2023-09-01 DIAGNOSIS — I63.439 CEREBROVASCULAR ACCIDENT (CVA) DUE TO EMBOLISM OF POSTERIOR CEREBRAL ARTERY WITH INFARCTIONS OF BOTH OCCIPITAL LOBES (H): Primary | ICD-10-CM

## 2023-09-01 DIAGNOSIS — I63.439 CEREBROVASCULAR ACCIDENT (CVA) DUE TO EMBOLISM OF POSTERIOR CEREBRAL ARTERY WITH INFARCTIONS OF BOTH OCCIPITAL LOBES (H): ICD-10-CM

## 2023-09-01 LAB — INR BLD: 1.3 (ref 0.9–1.1)

## 2023-09-01 PROCEDURE — 85610 PROTHROMBIN TIME: CPT

## 2023-09-01 PROCEDURE — 36415 COLL VENOUS BLD VENIPUNCTURE: CPT

## 2023-09-01 NOTE — PROGRESS NOTES
"ANTICOAGULATION MANAGEMENT     Ricky Brown 72 year old male is on warfarin with subtherapeutic INR result. (Goal INR 2.0-3.0)    Recent labs: (last 7 days)     09/01/23  1402   INR 1.3*       ASSESSMENT     Source(s): Chart Review and Patient/Caregiver Call     Warfarin doses taken: Warfarin taken as instructed  Diet: No new diet changes identified  Medication/supplement changes: None noted  New illness, injury, or hospitalization: No  Signs or symptoms of bleeding or clotting: No  Previous result:  this is initial INR  Additional findings: New start on day 3 of warfarin and Bridging with eliquis until INR >= 2.3 or INR >= 2.0 x 2 ([facility] protocol goal INR 2-3 new start) per new enroll encounter. Patient only has 1 or 2 pills of eliquis left. He states he cannot afford a refill and \"the plan was to overlap eliquis and warfarin until I ran out\". Writer did review stopping eliquis with Deidra Alexander RPh.       PLAN     Recommended plan for temporary change(s) of new start up and eliquis overlap affecting INR     Dosing Instructions: Increase your warfarin dose (14.3% change) Continue bridging with eliquis until you run out  with next INR in 4 days       Summary  As of 9/1/2023      Full warfarin instructions:  3.75 mg every Tue, Fri; 2.5 mg all other days   Next INR check:  9/5/2023               Telephone call with Ricky who verbalizes understanding and agrees to plan    Lab visit scheduled    Education provided:   Please call back if any changes to your diet, medications or how you've been taking warfarin  Contact [phone]  with any changes, questions or concerns.     Plan made with [facility] Pharmacist Deidra Garcia, RN  Anticoagulation Clinic  9/1/2023    _______________________________________________________________________     Anticoagulation Episode Summary       Current INR goal:  2.0-3.0   TTR:  --   Target end date:  Indefinite   Send INR reminders to:  ADONAY JAMISON" Indications    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 (H) [I63.439]             Comments:               Anticoagulation Care Providers       Provider Role Specialty Phone number    Holland Blunt MD Referring Internal Medicine [phone]

## 2023-09-01 NOTE — TELEPHONE ENCOUNTER
This new warfarin patient has a colonoscopy coming up 10-5-23. Please advise on anticoagulation plan for this.    Jess Garcia RN, BSN, PHN

## 2023-09-05 ENCOUNTER — LAB (OUTPATIENT)
Dept: LAB | Facility: CLINIC | Age: 73
End: 2023-09-05
Payer: COMMERCIAL

## 2023-09-05 ENCOUNTER — ANTICOAGULATION THERAPY VISIT (OUTPATIENT)
Dept: ANTICOAGULATION | Facility: CLINIC | Age: 73
End: 2023-09-05

## 2023-09-05 DIAGNOSIS — I63.439 CEREBROVASCULAR ACCIDENT (CVA) DUE TO EMBOLISM OF POSTERIOR CEREBRAL ARTERY WITH INFARCTIONS OF BOTH OCCIPITAL LOBES (H): Primary | ICD-10-CM

## 2023-09-05 DIAGNOSIS — I63.439 CEREBROVASCULAR ACCIDENT (CVA) DUE TO EMBOLISM OF POSTERIOR CEREBRAL ARTERY WITH INFARCTIONS OF BOTH OCCIPITAL LOBES (H): ICD-10-CM

## 2023-09-05 LAB — INR BLD: 1.3 (ref 0.9–1.1)

## 2023-09-05 PROCEDURE — 36416 COLLJ CAPILLARY BLOOD SPEC: CPT

## 2023-09-05 PROCEDURE — 85610 PROTHROMBIN TIME: CPT

## 2023-09-05 NOTE — PROGRESS NOTES
ANTICOAGULATION MANAGEMENT     Ricky Brown 72 year old male is on warfarin with subtherapeutic INR result. (Goal INR 2.0-3.0)    Recent labs: (last 7 days)     09/05/23  1706   INR 1.3*       ASSESSMENT     Source(s): Chart Review and Patient/Caregiver Call     Warfarin doses taken: More warfarin taken than planned which may be affecting INR was taking 3.75 mg daily  Diet: No new diet changes identified  Medication/supplement changes: None noted  New illness, injury, or hospitalization: No  Signs or symptoms of bleeding or clotting: No  Previous result: Subtherapeutic  Additional findings: None       PLAN     Recommended plan for no diet, medication or health factor changes affecting INR     Dosing Instructions: Increase your warfarin dose (50% change)  with next INR in 3 days       Summary  As of 9/5/2023      Full warfarin instructions:  9/5: 5 mg; Otherwise 5 mg every Tue, Thu, Sat; 3.75 mg all other days   Next INR check:  9/8/2023               Telephone call with Ricky who verbalizes understanding and agrees to plan    Lab visit scheduled    Education provided:   Goal range and lab monitoring: goal range and significance of current result    Plan made with [facility] Pharmacist Deidra Jenkins RN  Anticoagulation Clinic  9/5/2023    _______________________________________________________________________     Anticoagulation Episode Summary       Current INR goal:  2.0-3.0   TTR:  --   Target end date:  Indefinite   Send INR reminders to:  ADONAY JAMISON    Indications    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 (H) [I20.269]             Comments:               Anticoagulation Care Providers       Provider Role Specialty Phone number    Holland Blunt MD Referring Internal Medicine [phone]

## 2023-09-08 ENCOUNTER — ANTICOAGULATION THERAPY VISIT (OUTPATIENT)
Dept: ANTICOAGULATION | Facility: CLINIC | Age: 73
End: 2023-09-08

## 2023-09-08 ENCOUNTER — LAB (OUTPATIENT)
Dept: LAB | Facility: CLINIC | Age: 73
End: 2023-09-08
Payer: COMMERCIAL

## 2023-09-08 DIAGNOSIS — I63.439 CEREBROVASCULAR ACCIDENT (CVA) DUE TO EMBOLISM OF POSTERIOR CEREBRAL ARTERY WITH INFARCTIONS OF BOTH OCCIPITAL LOBES (H): ICD-10-CM

## 2023-09-08 DIAGNOSIS — I63.439 CEREBROVASCULAR ACCIDENT (CVA) DUE TO EMBOLISM OF POSTERIOR CEREBRAL ARTERY WITH INFARCTIONS OF BOTH OCCIPITAL LOBES (H): Primary | ICD-10-CM

## 2023-09-08 LAB — INR BLD: 2.2 (ref 0.9–1.1)

## 2023-09-08 PROCEDURE — 36416 COLLJ CAPILLARY BLOOD SPEC: CPT

## 2023-09-08 PROCEDURE — 85610 PROTHROMBIN TIME: CPT

## 2023-09-08 NOTE — PROGRESS NOTES
ANTICOAGULATION MANAGEMENT     Ricky Brown 72 year old male is on warfarin with therapeutic INR result. (Goal INR 2.0-3.0)    Recent labs: (last 7 days)     09/08/23  1443   INR 2.2*       ASSESSMENT     Source(s): Chart Review and Patient/Caregiver Call     Warfarin doses taken: Warfarin taken as instructed  Diet: No new diet changes identified  Medication/supplement changes: None noted  New illness, injury, or hospitalization: No  Signs or symptoms of bleeding or clotting: No  Previous result: Subtherapeutic  Additional findings: New start on day 10 of warfarin       PLAN     Recommended plan for temporary change(s) affecting INR     Dosing Instructions: decrease your warfarin dose (4.2% change) with next INR in 4 days       Summary  As of 9/8/2023      Full warfarin instructions:  5 mg every Sun, Thu; 3.75 mg all other days   Next INR check:  9/12/2023               Telephone call with Ricky who verbalizes understanding and agrees to plan    Lab visit scheduled    Education provided:   Please call back if any changes to your diet, medications or how you've been taking warfarin  Contact [phone]  with any changes, questions or concerns.     Plan made with [facility] Pharmacist Deidra Garcia, RN  Anticoagulation Clinic  9/8/2023    _______________________________________________________________________     Anticoagulation Episode Summary       Current INR goal:  2.0-3.0   TTR:  --   Target end date:  Indefinite   Send INR reminders to:  ADONAY JAMISON    Indications    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 (H) [I00.439]             Comments:               Anticoagulation Care Providers       Provider Role Specialty Phone number    Holland Blunt MD Referring Internal Medicine [phone]

## 2023-09-12 ENCOUNTER — ANTICOAGULATION THERAPY VISIT (OUTPATIENT)
Dept: ANTICOAGULATION | Facility: CLINIC | Age: 73
End: 2023-09-12

## 2023-09-12 ENCOUNTER — LAB (OUTPATIENT)
Dept: LAB | Facility: CLINIC | Age: 73
End: 2023-09-12
Payer: COMMERCIAL

## 2023-09-12 DIAGNOSIS — I63.439 CEREBROVASCULAR ACCIDENT (CVA) DUE TO EMBOLISM OF POSTERIOR CEREBRAL ARTERY WITH INFARCTIONS OF BOTH OCCIPITAL LOBES (H): ICD-10-CM

## 2023-09-12 DIAGNOSIS — I63.439 CEREBROVASCULAR ACCIDENT (CVA) DUE TO EMBOLISM OF POSTERIOR CEREBRAL ARTERY WITH INFARCTIONS OF BOTH OCCIPITAL LOBES (H): Primary | ICD-10-CM

## 2023-09-12 LAB — INR BLD: 1.4 (ref 0.9–1.1)

## 2023-09-12 PROCEDURE — 85610 PROTHROMBIN TIME: CPT

## 2023-09-12 PROCEDURE — 36415 COLL VENOUS BLD VENIPUNCTURE: CPT

## 2023-09-12 NOTE — PROGRESS NOTES
ANTICOAGULATION MANAGEMENT     Ricky Brown 72 year old male is on warfarin with subtherapeutic INR result. (Goal INR 2.0-3.0)    Recent labs: (last 7 days)     09/12/23  1309   INR 1.4*       ASSESSMENT     Source(s): Chart Review and Patient/Caregiver Call     Warfarin doses taken:  dosing taken as advised, however he did take last nights dose later than usual  Diet: No new diet changes identified  Medication/supplement changes: None noted  New illness, injury, or hospitalization: No  Signs or symptoms of bleeding or clotting: No  Previous result: Therapeutic last visit; previously outside of goal range  Additional findings: New start on day 14 of warfarin       PLAN     Recommended plan for no diet, medication or health factor changes affecting INR     Dosing Instructions: booster dose then Increase your warfarin dose (21.7% change) with next INR in 3 days       Summary  As of 9/12/2023      Full warfarin instructions:  9/12: 7.5 mg; Otherwise 5 mg every day   Next INR check:  9/15/2023               Telephone call with Ricky who verbalizes understanding and agrees to plan    Lab visit scheduled    Education provided:   Please call back if any changes to your diet, medications or how you've been taking warfarin  Contact [phone]  with any changes, questions or concerns.     Plan made with [facility] Pharmacist Deidra Garcia, RN  Anticoagulation Clinic  9/12/2023    _______________________________________________________________________     Anticoagulation Episode Summary       Current INR goal:  2.0-3.0   TTR:  16.8 % (3 d)   Target end date:  Indefinite   Send INR reminders to:  ADONAY JAMISON    Indications    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 (H) [I63.439]             Comments:               Anticoagulation Care Providers       Provider Role Specialty Phone number    Holland Blunt MD Referring  Internal Medicine [phone]

## 2023-09-15 ENCOUNTER — LAB (OUTPATIENT)
Dept: LAB | Facility: CLINIC | Age: 73
End: 2023-09-15
Payer: COMMERCIAL

## 2023-09-15 ENCOUNTER — ANTICOAGULATION THERAPY VISIT (OUTPATIENT)
Dept: ANTICOAGULATION | Facility: CLINIC | Age: 73
End: 2023-09-15

## 2023-09-15 DIAGNOSIS — I63.439 CEREBROVASCULAR ACCIDENT (CVA) DUE TO EMBOLISM OF POSTERIOR CEREBRAL ARTERY WITH INFARCTIONS OF BOTH OCCIPITAL LOBES (H): Primary | ICD-10-CM

## 2023-09-15 DIAGNOSIS — I63.439 CEREBROVASCULAR ACCIDENT (CVA) DUE TO EMBOLISM OF POSTERIOR CEREBRAL ARTERY WITH INFARCTIONS OF BOTH OCCIPITAL LOBES (H): ICD-10-CM

## 2023-09-15 LAB — INR BLD: 1.4 (ref 0.9–1.1)

## 2023-09-15 PROCEDURE — 85610 PROTHROMBIN TIME: CPT

## 2023-09-15 PROCEDURE — 36416 COLLJ CAPILLARY BLOOD SPEC: CPT

## 2023-09-15 NOTE — PROGRESS NOTES
ANTICOAGULATION MANAGEMENT     Ricky Brown 72 year old male is on warfarin with subtherapeutic INR result. (Goal INR 2.0-3.0)    Recent labs: (last 7 days)     09/15/23  1339   INR 1.4*       ASSESSMENT     Source(s): Chart Review and Patient/Caregiver Call     Warfarin doses taken: Warfarin taken as instructed  Diet: No new diet changes identified  Medication/supplement changes: None noted  New illness, injury, or hospitalization: No  Signs or symptoms of bleeding or clotting: No  Previous result: Subtherapeutic  Additional findings: New start on day 17 of warfarin       PLAN     Recommended plan for no diet, medication or health factor changes affecting INR     Dosing Instructions: Increase your warfarin dose (21.4% change) with next INR in 4 days       Summary  As of 9/15/2023      Full warfarin instructions:  7.5 mg every Mon, Wed, Fri; 5 mg all other days   Next INR check:  9/19/2023               Telephone call with Ricky who verbalizes understanding and agrees to plan    Lab visit scheduled    Education provided:   Please call back if any changes to your diet, medications or how you've been taking warfarin  Goal range and lab monitoring: goal range and significance of current result, Importance of therapeutic range, and Importance of following up at instructed interval    Plan made per [facility] anticoagulation protocol    Sis Musa RN  Anticoagulation Clinic  9/15/2023    _______________________________________________________________________     Anticoagulation Episode Summary       Current INR goal:  2.0-3.0   TTR:  9.1 % (6 d)   Target end date:  Indefinite   Send INR reminders to:  ADONAY JAMISON    Indications    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 (H) [I63.439]             Comments:               Anticoagulation Care Providers       Provider Role Specialty Phone number    Holland Blunt MD Referring Internal  [facility] [phone]

## 2023-09-19 ENCOUNTER — LAB (OUTPATIENT)
Dept: LAB | Facility: CLINIC | Age: 73
End: 2023-09-19
Payer: COMMERCIAL

## 2023-09-19 ENCOUNTER — ANTICOAGULATION THERAPY VISIT (OUTPATIENT)
Dept: ANTICOAGULATION | Facility: CLINIC | Age: 73
End: 2023-09-19

## 2023-09-19 ENCOUNTER — TELEPHONE (OUTPATIENT)
Dept: FAMILY MEDICINE | Facility: CLINIC | Age: 73
End: 2023-09-19

## 2023-09-19 DIAGNOSIS — I42.9 CARDIOMYOPATHY, UNSPECIFIED TYPE (H): ICD-10-CM

## 2023-09-19 DIAGNOSIS — E53.8 FOLIC ACID DEFICIENCY (NON ANEMIC): ICD-10-CM

## 2023-09-19 DIAGNOSIS — I63.439 CEREBROVASCULAR ACCIDENT (CVA) DUE TO EMBOLISM OF POSTERIOR CEREBRAL ARTERY WITH INFARCTIONS OF BOTH OCCIPITAL LOBES (H): ICD-10-CM

## 2023-09-19 DIAGNOSIS — I50.20 HFREF (HEART FAILURE WITH REDUCED EJECTION FRACTION) (H): ICD-10-CM

## 2023-09-19 DIAGNOSIS — I63.439 CEREBROVASCULAR ACCIDENT (CVA) DUE TO EMBOLISM OF POSTERIOR CEREBRAL ARTERY WITH INFARCTIONS OF BOTH OCCIPITAL LOBES (H): Primary | ICD-10-CM

## 2023-09-19 LAB — INR BLD: 1.5 (ref 0.9–1.1)

## 2023-09-19 PROCEDURE — 36415 COLL VENOUS BLD VENIPUNCTURE: CPT

## 2023-09-19 PROCEDURE — 85610 PROTHROMBIN TIME: CPT | Mod: QW

## 2023-09-19 NOTE — PROGRESS NOTES
ANTICOAGULATION MANAGEMENT     Ricky Brown 72 year old male is on warfarin with subtherapeutic INR result. (Goal INR 2.0-3.0)    Recent labs: (last 7 days)     09/19/23  1336   INR 1.5*       ASSESSMENT     Source(s): Chart Review  Previous INR was Subtherapeutic  Medication, diet, health changes since last INR chart reviewed; none identified         PLAN     Unable to reach Ricky today.    LMTCB    Follow up required to confirm warfarin dose taken and assess for changes and discuss out of range result     Plan increase to 7.5 mg daily and boost today of 10 mg. Plan made with Deidra Alexander [facility]    Kendra Jenkins, JOZEF  Anticoagulation Clinic  9/19/2023

## 2023-09-19 NOTE — PROGRESS NOTES
ANTICOAGULATION MANAGEMENT     Ricky Brown 72 year old male is on warfarin with subtherapeutic INR result. (Goal INR 2.0-3.0)    Recent labs: (last 7 days)     09/19/23  1336   INR 1.5*       ASSESSMENT     Source(s): Chart Review and Patient/Caregiver Call     Warfarin doses taken: Missed dose(s) may be affecting INR  Diet: No new diet changes identified  Medication/supplement changes: None noted  New illness, injury, or hospitalization: No  Signs or symptoms of bleeding or clotting: No  Previous result: Subtherapeutic  Additional findings: None       PLAN     Recommended plan for no diet, medication or health factor changes affecting INR     Dosing Instructions: booster dose then Increase your warfarin dose (23.5% change) with next INR in 3 days       Summary  As of 9/19/2023      Full warfarin instructions:  9/19: 10 mg; Otherwise 7.5 mg every day   Next INR check:  9/22/2023               Telephone call with Ricky who verbalizes understanding and agrees to plan    Lab visit scheduled    Education provided:   Goal range and lab monitoring: goal range and significance of current result    Plan made with [facility] Pharmacist Deidra Jenkins RN  Anticoagulation Clinic  9/19/2023    _______________________________________________________________________     Anticoagulation Episode Summary       Current INR goal:  2.0-3.0   TTR:  5.6 % (1.4 wk)   Target end date:  Indefinite   Send INR reminders to:  ADONAY JAMISON    Indications    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 (H) [I63.439]             Comments:               Anticoagulation Care Providers       Provider Role Specialty Phone number    Holland Blunt MD Referring Internal Medicine [phone]

## 2023-09-19 NOTE — TELEPHONE ENCOUNTER
Patient reporting he is out of Entresto and cannot afford to fill it due to it being about $600 to fill.    Please call and advise patient.    Kendra Jenkins RN   [facility] Anticoagulation [facility]

## 2023-09-20 NOTE — TELEPHONE ENCOUNTER
Talked with Ricky briefly and will discuss again on Friday as he is still increasing dosing with warfarin

## 2023-09-20 NOTE — TELEPHONE ENCOUNTER
I am not the manager nor the prescriber of Entresto [sacubitril/valsartan (Rx)] and don't refill this , it's a cardiology medication. This requires input from the managing cardiologist as his questions would be pertaining to the possibility of hum developing congestive heart failure of this medication. Again, needs to go to cardiologist     Holland Blunt MD

## 2023-09-20 NOTE — TELEPHONE ENCOUNTER
"MARY-PROCEDURAL ANTICOAGULATION  MANAGEMENT    ASSESSMENT     Warfarin interruption plan for colonoscopy on 10/05/2023.    Indication for Anticoagulation: Stroke    CVA 06/2022  PFO      Mary-Procedure Risk stratification for thromboembolism: not well defined    There are currently no guidelines addressing mary-procedural bridging in this indication. The decision to bridging is based on the risk of bleeding weighed against the risk of clotting.       RECOMMENDATION     Pre-Procedure:  Hold warfarin for 4 days, until after procedure starting: 10/01/2023   No Bridge    Post-Procedure:  Resume warfarin dose if okay with provider doing procedure on night of procedure, 10/05/2023 PM: 15mg & 10/06/2023 15mg  Recheck INR ~ 7 days after resuming warfarin     Plan routed to referring provider for approval  ?   Deidra Alexander [facility]    SUBJECTIVE/OBJECTIVE     Ricky ARI Brown, a 72 year old male    Goal INR Range: 2.0-3.0     Patient bridged in past: No    Pertinent History: recent transition from DOAC to warfarin, not yet at steady state    Wt Readings from Last 3 Encounters:   08/08/23 75.3 kg (166 lb)   03/07/23 76.8 kg (169 lb 4.8 oz)   11/30/22 81.2 kg (179 lb)      Ideal body weight: 66.1 kg (145 lb 11.6 oz)  Adjusted ideal body weight: 69.8 kg (153 lb 13.3 oz)     Estimated body mass index is 26 kg/m  as calculated from the following:    Height as of 7/13/23: 1.702 m (5' 7\").    Weight as of 8/8/23: 75.3 kg (166 lb).    Lab Results   Component Value Date    INR 1.5 (H) 09/19/2023    INR 1.4 (H) 09/15/2023    INR 1.4 (H) 09/12/2023     Lab Results   Component Value Date    HGB 13.6 06/02/2023    HCT 40.9 06/02/2023     06/02/2023     Lab Results   Component Value Date    CR 0.74 06/02/2023    CR 0.94 10/21/2022    CR 0.90 09/08/2022     Estimated Creatinine Clearance: 98.9 mL/min (based on SCr of 0.74 mg/dL).    "

## 2023-09-22 ENCOUNTER — ANTICOAGULATION THERAPY VISIT (OUTPATIENT)
Dept: ANTICOAGULATION | Facility: CLINIC | Age: 73
End: 2023-09-22

## 2023-09-22 ENCOUNTER — LAB (OUTPATIENT)
Dept: LAB | Facility: CLINIC | Age: 73
End: 2023-09-22
Payer: COMMERCIAL

## 2023-09-22 DIAGNOSIS — I63.439 CEREBROVASCULAR ACCIDENT (CVA) DUE TO EMBOLISM OF POSTERIOR CEREBRAL ARTERY WITH INFARCTIONS OF BOTH OCCIPITAL LOBES (H): Primary | ICD-10-CM

## 2023-09-22 DIAGNOSIS — I63.439 CEREBROVASCULAR ACCIDENT (CVA) DUE TO EMBOLISM OF POSTERIOR CEREBRAL ARTERY WITH INFARCTIONS OF BOTH OCCIPITAL LOBES (H): ICD-10-CM

## 2023-09-22 LAB — INR BLD: 1.8 (ref 0.9–1.1)

## 2023-09-22 PROCEDURE — 36416 COLLJ CAPILLARY BLOOD SPEC: CPT

## 2023-09-22 PROCEDURE — 85610 PROTHROMBIN TIME: CPT

## 2023-09-22 NOTE — PROGRESS NOTES
ANTICOAGULATION MANAGEMENT     Ricky Brown 72 year old male is on warfarin with subtherapeutic INR result. (Goal INR 2.0-3.0)    Recent labs: (last 7 days)     09/22/23  1348   INR 1.8*       ASSESSMENT     Source(s): Chart Review and Patient/Caregiver Call     Warfarin doses taken: Booster dose(s) recently taken which may be affecting INR  Diet: No new diet changes identified  Medication/supplement changes: None noted  New illness, injury, or hospitalization: No  Signs or symptoms of bleeding or clotting: No  Previous result: Subtherapeutic with MD increase of 23.5%  Additional findings: Upcoming surgery/procedure 10-5-23 colonoscopy. Writer reviewed patient will start holding on 10-1-23.       PLAN     Recommended plan for temporary change(s) affecting INR     Dosing Instructions: booster dose then continue your current warfarin dose with next INR in 3 days       Summary  As of 9/22/2023      Full warfarin instructions:  9/22: 10 mg; 10/1: Hold; 10/2: Hold; 10/3: Hold; 10/4: Hold; 10/5: 15 mg; 10/6: 15 mg; Otherwise 7.5 mg every day   Next INR check:  9/25/2023               Telephone call with Ricky who verbalizes understanding and agrees to plan    Lab visit scheduled    Education provided:   Please call back if any changes to your diet, medications or how you've been taking warfarin  Contact [phone]  with any changes, questions or concerns.     Plan made per [facility] anticoagulation protocol    Jess Garcia RN  Anticoagulation Clinic  9/22/2023    _______________________________________________________________________     Anticoagulation Episode Summary       Current INR goal:  2.0-3.0   TTR:  4.4 % (1.9 wk)   Target end date:  Indefinite   Send INR reminders to:  ADONAY JAMISON    Indications    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 (H) [I63.439]             Comments:               Anticoagulation Care Providers        Provider Role Specialty Phone number    Holland Blunt MD Referring Internal Medicine [phone]

## 2023-09-25 ENCOUNTER — LAB (OUTPATIENT)
Dept: LAB | Facility: CLINIC | Age: 73
End: 2023-09-25
Payer: COMMERCIAL

## 2023-09-25 ENCOUNTER — ANTICOAGULATION THERAPY VISIT (OUTPATIENT)
Dept: ANTICOAGULATION | Facility: CLINIC | Age: 73
End: 2023-09-25

## 2023-09-25 DIAGNOSIS — I63.439 CEREBROVASCULAR ACCIDENT (CVA) DUE TO EMBOLISM OF POSTERIOR CEREBRAL ARTERY WITH INFARCTIONS OF BOTH OCCIPITAL LOBES (H): ICD-10-CM

## 2023-09-25 DIAGNOSIS — I63.439 CEREBROVASCULAR ACCIDENT (CVA) DUE TO EMBOLISM OF POSTERIOR CEREBRAL ARTERY WITH INFARCTIONS OF BOTH OCCIPITAL LOBES (H): Primary | ICD-10-CM

## 2023-09-25 LAB — INR BLD: 2.3 (ref 0.9–1.1)

## 2023-09-25 PROCEDURE — 85610 PROTHROMBIN TIME: CPT

## 2023-09-25 PROCEDURE — 36416 COLLJ CAPILLARY BLOOD SPEC: CPT

## 2023-09-25 NOTE — PROGRESS NOTES
ANTICOAGULATION MANAGEMENT     Ricky Brown 72 year old male is on warfarin with therapeutic INR result. (Goal INR 2.0-3.0)    Recent labs: (last 7 days)     09/25/23  1355   INR 2.3*       ASSESSMENT     Source(s): Chart Review and Patient/Caregiver Call     Warfarin doses taken:  recent missed dose last week   Diet:  patient did eat vitamin K  Medication/supplement changes: None noted  New illness, injury, or hospitalization: No  Signs or symptoms of bleeding or clotting: No  Previous result: Subtherapeutic  Additional findings:  Hold warfarin for 4 days prior to procedure (see TE 9/1/23)     PLAN     Recommended plan for temporary change(s) affecting INR     Dosing Instructions: Continue your current warfarin dose with next INR in 3 days       Summary  As of 9/25/2023      Full warfarin instructions:  10/1: Hold; 10/2: Hold; 10/3: Hold; 10/4: Hold; 10/5: 15 mg; 10/6: 15 mg; Otherwise 7.5 mg every day   Next INR check:  9/28/2023               Telephone call with Ricky who verbalizes understanding and agrees to plan    Lab visit scheduled    Education provided:   Please call back if any changes to your diet, medications or how you've been taking warfarin  Symptom monitoring: monitoring for bleeding signs and symptoms, monitoring for clotting signs and symptoms, and monitoring for stroke signs and symptoms    Plan made with [facility] Pharmacist Deidra Arauz RN  Anticoagulation Clinic  9/25/2023    _______________________________________________________________________     Anticoagulation Episode Summary       Current INR goal:  2.0-3.0   TTR:  14.5 % (2.3 wk)   Target end date:  Indefinite   Send INR reminders to:  ADONAY JAMISON    Indications    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 (H) [I63.439]             Comments:               Anticoagulation Care Providers       Provider Role Specialty Phone number     Holland Blunt MD [facility] Internal Medicine [phone]

## 2023-09-25 NOTE — TELEPHONE ENCOUNTER
Noted. Order placed and message sent to scheduling to arrange.  -----------------------------------------  Hilario Meza MD  You15 minutes ago (1:19 PM)     FR Aguilar Betancourt,  Please see note regarding Ricky's medications for heart failure.  If he is willing, lets simply make arrangements for him to follow-up with one of our Nurse Practitioners in the Heart Failure Clinic to discuss alternative options.  Thanks.

## 2023-09-28 ENCOUNTER — LAB (OUTPATIENT)
Dept: LAB | Facility: CLINIC | Age: 73
End: 2023-09-28
Payer: COMMERCIAL

## 2023-09-28 ENCOUNTER — ANTICOAGULATION THERAPY VISIT (OUTPATIENT)
Dept: ANTICOAGULATION | Facility: CLINIC | Age: 73
End: 2023-09-28

## 2023-09-28 DIAGNOSIS — I63.439 CEREBROVASCULAR ACCIDENT (CVA) DUE TO EMBOLISM OF POSTERIOR CEREBRAL ARTERY WITH INFARCTIONS OF BOTH OCCIPITAL LOBES (H): Primary | ICD-10-CM

## 2023-09-28 DIAGNOSIS — I63.439 CEREBROVASCULAR ACCIDENT (CVA) DUE TO EMBOLISM OF POSTERIOR CEREBRAL ARTERY WITH INFARCTIONS OF BOTH OCCIPITAL LOBES (H): ICD-10-CM

## 2023-09-28 LAB — INR BLD: 2.7 (ref 0.9–1.1)

## 2023-09-28 PROCEDURE — 36416 COLLJ CAPILLARY BLOOD SPEC: CPT

## 2023-09-28 PROCEDURE — 85610 PROTHROMBIN TIME: CPT

## 2023-09-28 NOTE — PROGRESS NOTES
ANTICOAGULATION MANAGEMENT     Ricky Brown 72 year old male is on warfarin with therapeutic INR result. (Goal INR 2.0-3.0)    Recent labs: (last 7 days)     09/28/23  1601   INR 2.7*       ASSESSMENT     Source(s): Chart Review  Previous INR was Therapeutic last 2(+) visits  Medication, diet, health changes since last INR chart reviewed; none identified         PLAN     Unable to reach Ricky today.    Left message to continue current dose of warfarin 7.5 mg tonight. Request call back for assessment.    Follow up required to confirm warfarin dose taken and assess for changes    Kendra Jenkins RN  Anticoagulation Clinic  9/28/2023

## 2023-09-29 NOTE — PROGRESS NOTES
ANTICOAGULATION MANAGEMENT     Ricky Brown 72 year old male is on warfarin with therapeutic INR result. (Goal INR 2.0-3.0)    Recent labs: (last 7 days)     09/28/23  1601   INR 2.7*       ASSESSMENT     Source(s): Chart Review and Patient/Caregiver Call     Warfarin doses taken: Warfarin taken as instructed  Diet: No new diet changes identified  Medication/supplement changes: None noted  New illness, injury, or hospitalization: No  Signs or symptoms of bleeding or clotting: No  Previous result: Therapeutic last 2(+) visits  Additional findings: Upcoming surgery/procedure colonoscopy on 11/5       PLAN     Recommended plan for no diet, medication or health factor changes affecting INR     Dosing Instructions:  take 7.5 mg daily except for 10/1-10/4 hold dose and 10/5 and 10/6 take 15 mg  with next INR in 2 weeks       Summary  As of 9/28/2023      Full warfarin instructions:  10/1: Hold; 10/2: Hold; 10/3: Hold; 10/4: Hold; 10/5: 15 mg; 10/6: 15 mg; Otherwise 7.5 mg every day   Next INR check:  10/12/2023               Telephone call with Ricky who verbalizes understanding and agrees to plan    Lab visit scheduled    Education provided:   Goal range and lab monitoring: goal range and significance of current result    Plan made per [facility] anticoagulation protocol    Kendra Jenkins RN  Anticoagulation Clinic  9/29/2023    _______________________________________________________________________     Anticoagulation Episode Summary       Current INR goal:  2.0-3.0   TTR:  27.9 % (2.7 wk)   Target end date:  Indefinite   Send INR reminders to:  ADONAY JAMISON    Indications    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 (H) [I63.439]             Comments:               Anticoagulation Care Providers       Provider Role Specialty Phone number    Holland Blunt MD Referring Internal Medicine [phone]

## 2023-10-05 ENCOUNTER — DOCUMENTATION ONLY (OUTPATIENT)
Dept: ANTICOAGULATION | Facility: CLINIC | Age: 73
End: 2023-10-05
Payer: COMMERCIAL

## 2023-10-05 ENCOUNTER — HOSPITAL ENCOUNTER (OUTPATIENT)
Facility: CLINIC | Age: 73
Discharge: HOME | End: 2023-10-05
Attending: COLON & RECTAL SURGERY | Admitting: COLON & RECTAL SURGERY
Payer: COMMERCIAL

## 2023-10-05 VITALS
OXYGEN SATURATION: 95 % | HEART RATE: 73 BPM | RESPIRATION RATE: 40 BRPM | SYSTOLIC BLOOD PRESSURE: 138 MMHG | DIASTOLIC BLOOD PRESSURE: 81 MMHG

## 2023-10-05 DIAGNOSIS — I63.439 CEREBROVASCULAR ACCIDENT (CVA) DUE TO EMBOLISM OF POSTERIOR CEREBRAL ARTERY WITH INFARCTIONS OF BOTH OCCIPITAL LOBES (H): Primary | ICD-10-CM

## 2023-10-05 LAB — COLONOSCOPY: NORMAL

## 2023-10-05 PROCEDURE — 88305 TISSUE EXAM BY PATHOLOGIST: CPT | Mod: TC | Performed by: COLON & RECTAL SURGERY

## 2023-10-05 PROCEDURE — 250N000011 HC RX IP 250 OP 636: Performed by: COLON & RECTAL SURGERY

## 2023-10-05 PROCEDURE — 45385 COLONOSCOPY W/LESION REMOVAL: CPT | Performed by: COLON & RECTAL SURGERY

## 2023-10-05 PROCEDURE — G0500 MOD SEDAT ENDO SERVICE >5YRS: HCPCS | Performed by: COLON & RECTAL SURGERY

## 2023-10-05 PROCEDURE — 88305 TISSUE EXAM BY PATHOLOGIST: CPT | Mod: 26 | Performed by: PATHOLOGY

## 2023-10-05 RX ORDER — LIDOCAINE 40 MG/G
CREAM TOPICAL
Status: DISCONTINUED | OUTPATIENT
Start: 2023-10-05 | End: 2023-10-05 | Stop reason: HOSPADM

## 2023-10-05 RX ORDER — FENTANYL CITRATE 50 UG/ML
INJECTION, SOLUTION INTRAMUSCULAR; INTRAVENOUS PRN
Status: DISCONTINUED | OUTPATIENT
Start: 2023-10-05 | End: 2023-10-05 | Stop reason: HOSPADM

## 2023-10-05 RX ORDER — ONDANSETRON 2 MG/ML
4 INJECTION INTRAMUSCULAR; INTRAVENOUS
Status: DISCONTINUED | OUTPATIENT
Start: 2023-10-05 | End: 2023-10-05 | Stop reason: HOSPADM

## 2023-10-05 ASSESSMENT — ACTIVITIES OF DAILY LIVING (ADL): ADLS_ACUITY_SCORE: 35

## 2023-10-05 NOTE — H&P
Colon & Rectal Surgery History and Physical  Pre-Endoscopy Procedure Note    History of Present Illness   I have been asked by Dr. Blunt to evaluate this 72 year old male for colorectal polyp surveillance. He currently denies any abdominal pain, weight loss, bleeding per rectum, or recent change in bowel habits.    Past Medical History  Diagnosis Date    Arthritis     BCC (basal cell carcinoma)     right shoulder     Cardiomyopathy     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 06/27/2022    Colon adenomas 09/20/2011    Coronary artery disease     Disorder of bursae and tendons in shoulder region 04/18/2014    ED (erectile dysfunction)     Fatty liver     HFrEF (heart failure with reduced ejection fraction)     History of iritis, os 05/27/2019    HTN (hypertension)     Near syncope 02/10/2014    Noncompliance     Nonrheumatic aortic valve insufficiency     Obesity     JOSE JUAN (obstructive sleep apnea)     Partial symptomatic epilepsy with complex partial seizures, not intractable, without status epilepticus 09/08/2022       Past Surgical History  Procedure Laterality Date    ARTHROSCOPY SHOULDER BICEPS TENODESIS REPAIR  02/27/2014    Procedure: ARTHROSCOPY SHOULDER BICEPS TENODESIS REPAIR;;  Surgeon: Michael Hgaen MD;  [facility]    ARTHROSCOPY SHOULDER ROTATOR CUFF REPAIR  02/27/2014    Procedure: ARTHROSCOPY SHOULDER ROTATOR CUFF REPAIR;  Right Shoulder Arthroscopic Rotator Cuff Repair,  Subacromial Decompression, Biceps Tenodesis, Distal Clavicle Excision   ;  Surgeon: Michael Hagen MD;  [facility]    BIOPSY      CV CORONARY ANGIOGRAM N/A 08/08/2022    Procedure: Coronary Angiogram;  Surgeon: Ida Goddard MD;  Location: [facility] LAB CV    CV LEFT HEART CATH N/A 08/08/2022    Procedure: Left Heart Catheterization;  Surgeon: Ida Goddard MD;  Location: [facility] CATH LAB CV    ENDOSCOPY DRUG INDUCED  SLEEP N/A 09/16/2022    Procedure: DRUG INDUCED SLEEP ENDOSCOPY;  Surgeon: Dashawn Tanner MD;  Location: [facility]    EXCHANGE INTRAOCULAR LENS IMPLANT  2005    left eye - first, recentered PCL with iris fixation; then IOLX with ACL    EXTRACAPSULAR CATARACT EXTRATION WITH INTRAOCULAR LENS IMPLANT  2005    both eyes (elsewhere)    IMPLANT GENERATOR STIMULATOR (LOCATION) N/A 11/3/2022    Procedure: INSERTION, PULSE GENERATOR, NEUROSTIMULATOR;  Surgeon: Dashawn Tanner MD;  Location: [facility]    RELEASE CARPAL TUNNEL Right 08/13/2021    Procedure: RELEASE, RIGHT CARPAL TUNNEL;  Surgeon: Tony Bravo MD;  Location: [facility] OR    RELEASE CARPAL TUNNEL Right 10/26/2022    Procedure: RIGHT REVISION OPEN CARPAL TUNNEL RELEASE, HYPOTHENAR FAT FLAP,;  Surgeon: Vignesh Rhodes MD;  Location: [facility]    TRANSPOSITION ULNAR NERVE (ELBOW) Right 10/26/2022    Procedure: RIGHT ULNAR NERVE DECOMPRESSION AT THE ELBOW;  Surgeon: Vignesh Rhodes MD;  Location: [facility]    TURBINOPLASTY  12/11/2013    Procedure: TURBINOPLASTY;;  Surgeon: Lisset Parsons MD;  [facility]    UVULOPALATOPHARYNGOPLASTY  12/11/2013    Procedure: UVULOPALATOPHARYNGOPLASTY;  Uvulopalatopharyngoplasty, Turbiante Reduction;  Surgeon: Lisset Parsons MD;  Location: [facility] OR        Medications  Medications Prior to Admission   Medication Sig    atorvastatin (LIPITOR) 10 MG tablet Take 1 tablet (10 mg) by mouth every evening    cyanocobalamin (VITAMIN B-12) 1000 MCG tablet Take 1,000 mcg by mouth 2 times daily    folic acid (FOLVITE) 1 MG tablet Take 1 tablet (1,000 mcg) by mouth 2 times daily    levETIRAcetam (KEPPRA) 500 MG tablet Take 1 tablet (500 mg) by mouth 2 times daily    metoprolol succinate ER (TOPROL XL) 25 MG 24 hr tablet Take 1 tablet (25 mg) by mouth 2 times daily    sacubitril-valsartan (ENTRESTO) 24-26 MG per tablet Take 1 tablet by mouth 2 times daily    SENNA-docusate sodium (SENNA S) 8.6-50 MG tablet Take 1 tablet by mouth  At Bedtime Use while taking narcotic pain medications.  Hold for diarrhea.    tamsulosin (FLOMAX) 0.4 MG capsule Take 1 capsule (0.4 mg) by mouth daily    ASPIRIN NOT PRESCRIBED (INTENTIONAL) Please choose reason not prescribed from choices below. (Patient not taking: Reported on 8/8/2023)    diphenoxylate-atropine (LOMOTIL) 2.5-0.025 MG tablet Take 1 tablet by mouth daily as needed for diarrhea Patient only takes 1 tablet as needed (Patient not taking: Reported on 8/8/2023)    warfarin ANTICOAGULANT (COUMADIN) 2.5 MG tablet Take 2.5 mg daily or as directed by Anticoagulation Clinic, dose adjusted based on INR results       Allergies   Allergen Reactions    Lisinopril Cough    Perfume Other (See Comments)        Family History   Family history includes Cerebrovascular Disease in his father; Coronary Artery Disease in his father; Diabetes in his father; Heart Disease in his father; Hypertension in his father; Lipid Disorder in his sister; Obesity in his father.     Social History   He reports that he quit smoking about 26 years ago. His smoking use included cigarettes. He has a 1.00 pack-year smoking history. He has never used smokeless tobacco. He reports current alcohol use of about 8.3 standard drinks of alcohol per week. He reports that he does not use drugs.    Review of Systems   Constitutional:  No fever, weight change or fatigue.    Eyes:     No dry eyes or vision changes.   Ears/Nose/Throat/Neck:  No oral ulcers, sore throat or voice change.    Cardiovascular:   No palpitations, syncope, angina or edema.   Respiratory:    No chest pain, excessive sleepiness, shortness of breath or hemoptysis.    Gastrointestinal:   No abdominal pain, nausea, vomiting, diarrhea or heartburn.    Genitourinary:   No dysuria, hematuria, urinary retention or urinary frequency.   Musculoskeletal:  No joint swelling or arthralgias.    Dermatologic:  No skin rash or other skin changes.   Neurologic:    No focal weakness or numbness.  No neuropathy.   Psychiatric:    No depression, anxiety, suicidal ideation, or paranoid ideation.   Endocrine:   No cold or heat intolerance, polydipsia, hirsutism, change in libido, or flushing.   Hematology/Lymphatic:  No bleeding or lymphadenopathy.    Allergy/Immunology:  No rhinitis or hives.     Physical Exam   Vitals:  Blood pressure 130/79, pulse 62, resp. rate 16, SpO2 97 %.    General:  Alert and oriented to person, place and time   Airway: Normal oropharyngeal airway and neck mobility   Lungs:  Clear bilaterally   Heart:  Regular rate and rhythm   Abdomen: Soft, NT, ND, no masses   Extremities: Warm, good capillary refill    ASA Grade: II (mild systemic disease)    Impression: Cleared for use of conscious sedation for colorectal polyp surveillance    Plan: Proceed with colonoscopy     Mary Floyd MD  [facility]  [phone]

## 2023-10-05 NOTE — PROGRESS NOTES
"ANTICOAGULATION  MANAGEMENT: Discharge Review    Ricky Brown chart reviewed for anticoagulation continuity of care    Outpatient surgery/procedure on 10-5 for colonoscopy.    Discharge disposition: Home    Results:    No results for input(s): INR, [id number], F2, ALMWH, AAUFH in the last 168 hours.  Anticoagulation inpatient management:     not applicable     Anticoagulation discharge instructions:     Warfarin dosing:  reviewed on 9-28-23 encounter   Bridging: No   INR goal change: No      Medication changes affecting anticoagulation: No    Additional factors affecting anticoagulation:    \"take 7.5 mg daily except for 10/1-10/4 hold dose and 10/5 and 10/6 take 15 mg  with next INR in 2 weeks\"        PLAN     No adjustment to anticoagulation plan needed    Patient not contacted    No adjustment to Anticoagulation Calendar was required    Jess Garcia RN    "

## 2023-10-09 ENCOUNTER — LAB (OUTPATIENT)
Dept: LAB | Facility: CLINIC | Age: 73
End: 2023-10-09
Payer: COMMERCIAL

## 2023-10-09 ENCOUNTER — ANTICOAGULATION THERAPY VISIT (OUTPATIENT)
Dept: ANTICOAGULATION | Facility: CLINIC | Age: 73
End: 2023-10-09

## 2023-10-09 DIAGNOSIS — I63.439 CEREBROVASCULAR ACCIDENT (CVA) DUE TO EMBOLISM OF POSTERIOR CEREBRAL ARTERY WITH INFARCTIONS OF BOTH OCCIPITAL LOBES (H): ICD-10-CM

## 2023-10-09 DIAGNOSIS — I63.439 CEREBROVASCULAR ACCIDENT (CVA) DUE TO EMBOLISM OF POSTERIOR CEREBRAL ARTERY WITH INFARCTIONS OF BOTH OCCIPITAL LOBES (H): Primary | ICD-10-CM

## 2023-10-09 LAB
INR BLD: 2.5 (ref 0.9–1.1)
PATH REPORT.COMMENTS IMP SPEC: NORMAL
PATH REPORT.COMMENTS IMP SPEC: NORMAL
PATH REPORT.FINAL DX SPEC: NORMAL
PATH REPORT.GROSS SPEC: NORMAL
PATH REPORT.MICROSCOPIC SPEC OTHER STN: NORMAL
PATH REPORT.RELEVANT HX SPEC: NORMAL
PHOTO IMAGE: NORMAL

## 2023-10-09 PROCEDURE — 85610 PROTHROMBIN TIME: CPT | Mod: QW

## 2023-10-09 PROCEDURE — 36415 COLL VENOUS BLD VENIPUNCTURE: CPT

## 2023-10-09 RX ORDER — WARFARIN SODIUM 2.5 MG/1
TABLET ORAL
Qty: 270 TABLET | Refills: 1 | Status: SHIPPED | OUTPATIENT
Start: 2023-10-09 | End: 2024-07-19

## 2023-10-09 NOTE — PROGRESS NOTES
ANTICOAGULATION MANAGEMENT     Ricky Brown 72 year old male is on warfarin with therapeutic INR result. (Goal INR 2.0-3.0)    Recent labs: (last 7 days)     10/09/23  1431   INR 2.5*       ASSESSMENT     Source(s): Chart Review and Patient/Caregiver Call     Warfarin doses taken: Booster dose(s) recently taken which may be affecting INR  Diet: No new diet changes identified  Medication/supplement changes: None noted  New illness, injury, or hospitalization: No  Signs or symptoms of bleeding or clotting: No  Previous result: Therapeutic last 2(+) visits  Additional findings: Refill needed today. Ricky meets all criteria for refill (current [facility] referral, office visit with referring provider/group in last 1 year unless directed to return in 2 years in last referring provider visit note, lab monitoring up to date or not exceeding 2 weeks overdue). Rx instructions and quantity supplied updated to match patient's current dosing plan. Warfarin 90 day supply with 1 refill granted per [facility] protocol        PLAN     Recommended plan for temporary change(s) affecting INR     Dosing Instructions: Continue your current warfarin dose with next INR in 3 days       Summary  As of 10/9/2023      Full warfarin instructions:  7.5 mg every day   Next INR check:  10/12/2023               Telephone call with Ricky who verbalizes understanding and agrees to plan    Lab visit scheduled    Education provided:   Please call back if any changes to your diet, medications or how you've been taking warfarin  Contact [phone]  with any changes, questions or concerns.     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

## 2023-10-12 ENCOUNTER — ANTICOAGULATION THERAPY VISIT (OUTPATIENT)
Dept: ANTICOAGULATION | Facility: CLINIC | Age: 73
End: 2023-10-12

## 2023-10-12 ENCOUNTER — LAB (OUTPATIENT)
Dept: LAB | Facility: CLINIC | Age: 73
End: 2023-10-12
Payer: COMMERCIAL

## 2023-10-12 DIAGNOSIS — I63.439 CEREBROVASCULAR ACCIDENT (CVA) DUE TO EMBOLISM OF POSTERIOR CEREBRAL ARTERY WITH INFARCTIONS OF BOTH OCCIPITAL LOBES (H): Primary | ICD-10-CM

## 2023-10-12 DIAGNOSIS — I63.439 CEREBROVASCULAR ACCIDENT (CVA) DUE TO EMBOLISM OF POSTERIOR CEREBRAL ARTERY WITH INFARCTIONS OF BOTH OCCIPITAL LOBES (H): ICD-10-CM

## 2023-10-12 LAB — INR BLD: 2.7 (ref 0.9–1.1)

## 2023-10-12 PROCEDURE — 36416 COLLJ CAPILLARY BLOOD SPEC: CPT

## 2023-10-12 PROCEDURE — 85610 PROTHROMBIN TIME: CPT

## 2023-10-12 NOTE — PROGRESS NOTES
ANTICOAGULATION MANAGEMENT     Ricky Brown 72 year old male is on warfarin with therapeutic INR result. (Goal INR 2.0-3.0)    Recent labs: (last 7 days)     10/12/23  1324   INR 2.7*       ASSESSMENT     Source(s): Chart Review and Patient/Caregiver Call     Warfarin doses taken: Booster dose(s) recently taken which may be affecting INR  Diet: No new diet changes identified  Medication/supplement changes: None noted  New illness, injury, or hospitalization: No  Signs or symptoms of bleeding or clotting: No  Previous result: Therapeutic last 2(+) visits  Additional findings: None       PLAN     Recommended plan for temporary change(s) affecting INR     Dosing Instructions: Continue your current warfarin dose with next INR in 1 week       Summary  As of 10/12/2023      Full warfarin instructions:  7.5 mg every day   Next INR check:  10/19/2023               Telephone call with Ricky who verbalizes understanding and agrees to plan    Lab visit scheduled    Education provided:   Please call back if any changes to your diet, medications or how you've been taking warfarin  Contact [phone]  with any changes, questions or concerns.     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

## 2023-10-19 ENCOUNTER — ANTICOAGULATION THERAPY VISIT (OUTPATIENT)
Dept: ANTICOAGULATION | Facility: CLINIC | Age: 73
End: 2023-10-19

## 2023-10-19 ENCOUNTER — LAB (OUTPATIENT)
Dept: LAB | Facility: CLINIC | Age: 73
End: 2023-10-19
Payer: COMMERCIAL

## 2023-10-19 DIAGNOSIS — I63.439 CEREBROVASCULAR ACCIDENT (CVA) DUE TO EMBOLISM OF POSTERIOR CEREBRAL ARTERY WITH INFARCTIONS OF BOTH OCCIPITAL LOBES (H): Primary | ICD-10-CM

## 2023-10-19 DIAGNOSIS — I63.439 CEREBROVASCULAR ACCIDENT (CVA) DUE TO EMBOLISM OF POSTERIOR CEREBRAL ARTERY WITH INFARCTIONS OF BOTH OCCIPITAL LOBES (H): ICD-10-CM

## 2023-10-19 LAB — INR BLD: 3.3 (ref 0.9–1.1)

## 2023-10-19 PROCEDURE — 85610 PROTHROMBIN TIME: CPT

## 2023-10-19 PROCEDURE — 36416 COLLJ CAPILLARY BLOOD SPEC: CPT

## 2023-10-19 NOTE — PROGRESS NOTES
ANTICOAGULATION MANAGEMENT     Ricky Brown 72 year old male is on warfarin with supratherapeutic INR result. (Goal INR 2.0-3.0)    Recent labs: (last 7 days)     10/19/23  1334   INR 3.3*       ASSESSMENT     Source(s): Chart Review and Patient/Caregiver Call     Warfarin doses taken: Warfarin taken as instructed  Diet: No new diet changes identified  Medication/supplement changes: None noted  New illness, injury, or hospitalization: No  Signs or symptoms of bleeding or clotting: No  Previous result: Therapeutic last 2(+) visits  Additional findings: None       PLAN     Recommended plan for no diet, medication or health factor changes affecting INR     Dosing Instructions: Continue your current warfarin dose with next INR in 1 week       Summary  As of 10/19/2023      Full warfarin instructions:  7.5 mg every day   Next INR check:  10/26/2023               Telephone call with Ricky who verbalizes understanding and agrees to plan    Lab visit scheduled    Education provided:   Goal range and lab monitoring: goal range and significance of current result    Plan made per [facility] anticoagulation protocol    Kendra Jenkins RN  Anticoagulation Clinic  10/19/2023    _______________________________________________________________________     Anticoagulation Episode Summary       Current INR goal:  2.0-3.0   TTR:  56.4% (1.3 mo)   Target end date:  Indefinite   Send INR reminders to:  ADONAY JAMISON    Indications    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

## 2023-10-24 DIAGNOSIS — I10 ESSENTIAL HYPERTENSION WITH GOAL BLOOD PRESSURE LESS THAN 140/90: ICD-10-CM

## 2023-10-24 DIAGNOSIS — I42.9 CARDIOMYOPATHY, UNSPECIFIED TYPE (H): ICD-10-CM

## 2023-10-24 NOTE — INTERVAL H&P NOTE
"I have reviewed the surgical (or preoperative) H&P that is linked to this encounter, and examined the patient. There are no significant changes    Clinical Conditions Present on Arrival:  Clinically Significant Risk Factors Present on Admission                 # Coagulation Defect: home medication list includes an anticoagulant medication   # Overweight: Estimated body mass index is 29.71 kg/m  as calculated from the following:    Height as of this encounter: 1.702 m (5' 7\").    Weight as of this encounter: 86 kg (189 lb 11.2 oz).       " Clinic Care Coordination Contact  [facility]/Voicemail    Clinical Data: Care Coordinator Outreach. Pt was hospitalized at [facility] from 10/19-10/23 with osteomyelitis of toe. He was discharged home with oral abx.    Outreach attempt x1.   Left message on patient's voicemail with call back information and requested return call.    Plan:  Care Coordinator will try to reach patient again in 3-5 business days.    Sarah Allen [facility]  Social Work Care Coordinator  Phone: [phone]

## 2023-10-25 RX ORDER — METOPROLOL SUCCINATE 25 MG/1
25 TABLET, EXTENDED RELEASE ORAL 2 TIMES DAILY
Qty: 180 TABLET | Refills: 2 | Status: SHIPPED | OUTPATIENT
Start: 2023-10-25 | End: 2024-07-19

## 2023-10-26 ENCOUNTER — LAB (OUTPATIENT)
Dept: LAB | Facility: CLINIC | Age: 73
End: 2023-10-26
Payer: COMMERCIAL

## 2023-10-26 ENCOUNTER — TRANSFERRED RECORDS (OUTPATIENT)
Dept: HEALTH INFORMATION MANAGEMENT | Facility: CLINIC | Age: 73
End: 2023-10-26

## 2023-10-26 ENCOUNTER — ANTICOAGULATION THERAPY VISIT (OUTPATIENT)
Dept: ANTICOAGULATION | Facility: CLINIC | Age: 73
End: 2023-10-26

## 2023-10-26 DIAGNOSIS — I63.439 CEREBROVASCULAR ACCIDENT (CVA) DUE TO EMBOLISM OF POSTERIOR CEREBRAL ARTERY WITH INFARCTIONS OF BOTH OCCIPITAL LOBES (H): ICD-10-CM

## 2023-10-26 DIAGNOSIS — I63.439 CEREBROVASCULAR ACCIDENT (CVA) DUE TO EMBOLISM OF POSTERIOR CEREBRAL ARTERY WITH INFARCTIONS OF BOTH OCCIPITAL LOBES (H): Primary | ICD-10-CM

## 2023-10-26 LAB — INR BLD: 3.7 (ref 0.9–1.1)

## 2023-10-26 PROCEDURE — 85610 PROTHROMBIN TIME: CPT

## 2023-10-26 PROCEDURE — 36416 COLLJ CAPILLARY BLOOD SPEC: CPT

## 2023-10-26 NOTE — PROGRESS NOTES
ANTICOAGULATION MANAGEMENT     Ricky Brown 72 year old male is on warfarin with supratherapeutic INR result. (Goal INR 2.0-3.0)    Recent labs: (last 7 days)     10/26/23  1248   INR 3.7*       ASSESSMENT     Source(s): Chart Review  Previous INR was Supratherapeutic  Medication, diet, health changes since last INR chart reviewed; none identified  Pended a 9.5% maintenance dose decrease per protocol pending assessment         PLAN     Unable to reach Ricky today.    Left message to take reduced dose of warfarin, 5 mg tonight. Request call back for assessment.    Follow up required to discuss out of range result     Yvonne Brown RN  Anticoagulation Clinic  10/26/2023

## 2023-10-27 NOTE — PROGRESS NOTES
ANTICOAGULATION MANAGEMENT     Ricky Brown 72 year old male is on warfarin with supratherapeutic INR result. (Goal INR 2.0-3.0)    Recent labs: (last 7 days)     10/26/23  1248   INR 3.7*       ASSESSMENT     Source(s): Chart Review and Patient/Caregiver Call     Warfarin doses taken: More warfarin taken than planned which may be affecting INR  Diet: No new diet changes identified  Medication/supplement changes: None noted  New illness, injury, or hospitalization: No  Signs or symptoms of bleeding or clotting: No  Previous result: Supratherapeutic  Additional findings:  patient mistakenly took 10 mg yesterday. He did not get our VM.        PLAN     Recommended plan for temporary change(s) and ongoing change(s) (ongoing elevated INR) affecting INR     Dosing Instructions: partial hold then decrease your warfarin dose (9.5% change) with next INR in 1 week       Summary  As of 10/26/2023      Full warfarin instructions:  10/26: 10 mg; 10/27: 2.5 mg; Otherwise 5 mg every Sun, Thu; 7.5 mg all other days   Next INR check:  11/2/2023               Telephone call with Ricky who verbalizes understanding and agrees to plan    Lab visit scheduled    Education provided:   Please call back if any changes to your diet, medications or how you've been taking warfarin  Contact [phone]  with any changes, questions or concerns.     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

## 2023-11-02 ENCOUNTER — ANTICOAGULATION THERAPY VISIT (OUTPATIENT)
Dept: ANTICOAGULATION | Facility: CLINIC | Age: 73
End: 2023-11-02

## 2023-11-02 ENCOUNTER — LAB (OUTPATIENT)
Dept: LAB | Facility: CLINIC | Age: 73
End: 2023-11-02
Payer: COMMERCIAL

## 2023-11-02 DIAGNOSIS — I63.439 CEREBROVASCULAR ACCIDENT (CVA) DUE TO EMBOLISM OF POSTERIOR CEREBRAL ARTERY WITH INFARCTIONS OF BOTH OCCIPITAL LOBES (H): Primary | ICD-10-CM

## 2023-11-02 DIAGNOSIS — I63.439 CEREBROVASCULAR ACCIDENT (CVA) DUE TO EMBOLISM OF POSTERIOR CEREBRAL ARTERY WITH INFARCTIONS OF BOTH OCCIPITAL LOBES (H): ICD-10-CM

## 2023-11-02 LAB — INR BLD: 3.3 (ref 0.9–1.1)

## 2023-11-02 PROCEDURE — 36416 COLLJ CAPILLARY BLOOD SPEC: CPT

## 2023-11-02 PROCEDURE — 85610 PROTHROMBIN TIME: CPT

## 2023-11-02 NOTE — PROGRESS NOTES
ANTICOAGULATION MANAGEMENT     Ricky Brown 72 year old male is on warfarin with supratherapeutic INR result. (Goal INR 2.0-3.0)    Recent labs: (last 7 days)     11/02/23  1421   INR 3.3*       ASSESSMENT     Source(s): Chart Review and Patient/Caregiver Call     Warfarin doses taken: Warfarin taken as instructed  Diet: No new diet changes identified  Medication/supplement changes: None noted  New illness, injury, or hospitalization: Yes: states blacked out a couple days ago due to dehydration  Signs or symptoms of bleeding or clotting: No  Previous result: Supratherapeutic  Additional findings: None       PLAN     Recommended plan for no diet, medication or health factor changes affecting INR     Dosing Instructions: decrease your warfarin dose (5.3% change) with next INR in 1 week       Summary  As of 11/2/2023      Full warfarin instructions:  5 mg every Sun, Tue, Thu; 7.5 mg all other days   Next INR check:  11/9/2023               Telephone call with Ricky who verbalizes understanding and agrees to plan    Lab visit scheduled    Education provided:   Goal range and lab monitoring: goal range and significance of current result    Plan made per [facility] anticoagulation protocol    Kendra Jenkins RN  Anticoagulation Clinic  11/2/2023    _______________________________________________________________________     Anticoagulation Episode Summary       Current INR goal:  2.0-3.0   TTR:  41.9% (1.8 mo)   Target end date:  Indefinite   Send INR reminders to:  ADONAY JAMISON    Indications    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

## 2023-11-03 ENCOUNTER — OFFICE VISIT (OUTPATIENT)
Dept: INTERNAL MEDICINE | Facility: CLINIC | Age: 73
End: 2023-11-03
Payer: COMMERCIAL

## 2023-11-03 VITALS
HEART RATE: 62 BPM | RESPIRATION RATE: 17 BRPM | OXYGEN SATURATION: 96 % | DIASTOLIC BLOOD PRESSURE: 86 MMHG | TEMPERATURE: 98.2 F | SYSTOLIC BLOOD PRESSURE: 139 MMHG | BODY MASS INDEX: 25.41 KG/M2 | WEIGHT: 161.9 LBS | HEIGHT: 67 IN

## 2023-11-03 DIAGNOSIS — R39.12 WEAK URINE STREAM: ICD-10-CM

## 2023-11-03 DIAGNOSIS — I63.439 CEREBROVASCULAR ACCIDENT (CVA) DUE TO EMBOLISM OF POSTERIOR CEREBRAL ARTERY WITH INFARCTIONS OF BOTH OCCIPITAL LOBES (H): Primary | ICD-10-CM

## 2023-11-03 DIAGNOSIS — R55 SYNCOPE, UNSPECIFIED SYNCOPE TYPE: ICD-10-CM

## 2023-11-03 LAB
ALBUMIN UR-MCNC: NEGATIVE MG/DL
APPEARANCE UR: CLEAR
BACTERIA #/AREA URNS HPF: ABNORMAL /HPF
BASOPHILS # BLD AUTO: 0 10E3/UL (ref 0–0.2)
BASOPHILS NFR BLD AUTO: 0 %
BILIRUB UR QL STRIP: NEGATIVE
COLOR UR AUTO: YELLOW
EOSINOPHIL # BLD AUTO: 0 10E3/UL (ref 0–0.7)
EOSINOPHIL NFR BLD AUTO: 1 %
ERYTHROCYTE [DISTWIDTH] IN BLOOD BY AUTOMATED COUNT: 13.1 % (ref 10–15)
ERYTHROCYTE [SEDIMENTATION RATE] IN BLOOD BY WESTERGREN METHOD: 8 MM/HR (ref 0–20)
GLUCOSE UR STRIP-MCNC: NEGATIVE MG/DL
HCT VFR BLD AUTO: 43.3 % (ref 40–53)
HGB BLD-MCNC: 14.6 G/DL (ref 13.3–17.7)
HGB UR QL STRIP: ABNORMAL
IMM GRANULOCYTES # BLD: 0 10E3/UL
IMM GRANULOCYTES NFR BLD: 0 %
KETONES UR STRIP-MCNC: NEGATIVE MG/DL
LEUKOCYTE ESTERASE UR QL STRIP: NEGATIVE
LYMPHOCYTES # BLD AUTO: 1.6 10E3/UL (ref 0.8–5.3)
LYMPHOCYTES NFR BLD AUTO: 30 %
MCH RBC QN AUTO: 30.2 PG (ref 26.5–33)
MCHC RBC AUTO-ENTMCNC: 33.7 G/DL (ref 31.5–36.5)
MCV RBC AUTO: 90 FL (ref 78–100)
MONOCYTES # BLD AUTO: 0.6 10E3/UL (ref 0–1.3)
MONOCYTES NFR BLD AUTO: 11 %
NEUTROPHILS # BLD AUTO: 2.9 10E3/UL (ref 1.6–8.3)
NEUTROPHILS NFR BLD AUTO: 57 %
NITRATE UR QL: NEGATIVE
PH UR STRIP: 5.5 [PH] (ref 5–7)
PLATELET # BLD AUTO: 232 10E3/UL (ref 150–450)
RBC # BLD AUTO: 4.83 10E6/UL (ref 4.4–5.9)
RBC #/AREA URNS AUTO: ABNORMAL /HPF
SP GR UR STRIP: >=1.03 (ref 1–1.03)
SQUAMOUS #/AREA URNS AUTO: ABNORMAL /LPF
UROBILINOGEN UR STRIP-ACNC: 0.2 E.U./DL
WBC # BLD AUTO: 5.1 10E3/UL (ref 4–11)
WBC #/AREA URNS AUTO: ABNORMAL /HPF

## 2023-11-03 PROCEDURE — 81001 URINALYSIS AUTO W/SCOPE: CPT | Performed by: INTERNAL MEDICINE

## 2023-11-03 PROCEDURE — 85652 RBC SED RATE AUTOMATED: CPT | Performed by: INTERNAL MEDICINE

## 2023-11-03 PROCEDURE — 99214 OFFICE O/P EST MOD 30 MIN: CPT | Performed by: INTERNAL MEDICINE

## 2023-11-03 PROCEDURE — 80053 COMPREHEN METABOLIC PANEL: CPT | Performed by: INTERNAL MEDICINE

## 2023-11-03 PROCEDURE — 85025 COMPLETE CBC W/AUTO DIFF WBC: CPT | Performed by: INTERNAL MEDICINE

## 2023-11-03 PROCEDURE — 36415 COLL VENOUS BLD VENIPUNCTURE: CPT | Performed by: INTERNAL MEDICINE

## 2023-11-03 PROCEDURE — 86140 C-REACTIVE PROTEIN: CPT | Performed by: INTERNAL MEDICINE

## 2023-11-03 ASSESSMENT — PAIN SCALES - GENERAL: PAINLEVEL: NO PAIN (0)

## 2023-11-03 NOTE — LETTER
November 6, 2023    Ricky Brown  [address]  [facility] [address]          Dear ,    We are writing to inform you of your test results.    All of these tests are within acceptable limits , things look OK ! Please write back if you'd like to discuss this more or call or MyChart message me.       Resulted Orders   Comprehensive metabolic panel (BMP + Alb, Alk Phos, ALT, AST, Total. Bili, TP)   Result Value Ref Range    Sodium 141 135 - 145 mmol/L      Comment:      Reference intervals for this test were updated on 09/26/2023 to more accurately reflect our healthy population. There may be differences in the flagging of prior results with similar values performed with this method. Interpretation of those prior results can be made in the context of the updated reference intervals.     Potassium 4.0 3.4 - 5.3 mmol/L    Carbon Dioxide (CO2) 25 22 - 29 mmol/L    Anion Gap 11 7 - 15 mmol/L    Urea Nitrogen 17.0 8.0 - 23.0 mg/dL    Creatinine 0.78 0.67 - 1.17 mg/dL    GFR Estimate >90 >60 mL/min/1.73m2    Calcium 9.4 8.8 - 10.2 mg/dL    Chloride 105 98 - 107 mmol/L    Glucose 97 70 - 99 mg/dL    Alkaline Phosphatase 72 40 - 129 U/L    AST 35 0 - 45 U/L      Comment:      Reference intervals for this test were updated on 6/12/2023 to more accurately reflect our healthy population. There may be differences in the flagging of prior results with similar values performed with this method. Interpretation of those prior results can be made in the context of the updated reference intervals.    ALT 45 0 - 70 U/L      Comment:      Reference intervals for this test were updated on 6/12/2023 to more accurately reflect our healthy population. There may be differences in the flagging of prior results with similar values performed with this method. Interpretation of those prior results can be made in the context of the updated reference intervals.      Protein Total 7.1 6.4 - 8.3 g/dL    Albumin 4.4 3.5 -  5.2 g/dL    Bilirubin Total 0.7 <=1.2 mg/dL   ESR: Erythrocyte sedimentation rate   Result Value Ref Range    Erythrocyte Sedimentation Rate 8 0 - 20 mm/hr   CRP, inflammation   Result Value Ref Range    CRP Inflammation <3.00 <5.00 mg/L   UA with Microscopic reflex to Culture - lab collect   Result Value Ref Range    Color Urine Yellow Colorless, Straw, Light Yellow, Yellow    Appearance Urine Clear Clear    Glucose Urine Negative Negative mg/dL    Bilirubin Urine Negative Negative    Ketones Urine Negative Negative mg/dL    Specific Gravity Urine >=1.030 1.003 - 1.035    Blood Urine Small (A) Negative    pH Urine 5.5 5.0 - 7.0    Protein Albumin Urine Negative Negative mg/dL    Urobilinogen Urine 0.2 0.2, 1.0 E.U./dL    Nitrite Urine Negative Negative    Leukocyte Esterase Urine Negative Negative   CBC with platelets and differential   Result Value Ref Range    WBC Count 5.1 4.0 - 11.0 10e3/uL    RBC Count 4.83 4.40 - 5.90 10e6/uL    Hemoglobin 14.6 13.3 - 17.7 g/dL    Hematocrit 43.3 40.0 - 53.0 %    MCV 90 78 - 100 fL    MCH 30.2 26.5 - 33.0 pg    MCHC 33.7 31.5 - 36.5 g/dL    RDW 13.1 10.0 - 15.0 %    Platelet Count 232 150 - 450 10e3/uL    % Neutrophils 57 %    % Lymphocytes 30 %    % Monocytes 11 %    % Eosinophils 1 %    % Basophils 0 %    % Immature Granulocytes 0 %    Absolute Neutrophils 2.9 1.6 - 8.3 10e3/uL    Absolute Lymphocytes 1.6 0.8 - 5.3 10e3/uL    Absolute Monocytes 0.6 0.0 - 1.3 10e3/uL    Absolute Eosinophils 0.0 0.0 - 0.7 10e3/uL    Absolute Basophils 0.0 0.0 - 0.2 10e3/uL    Absolute Immature Granulocytes 0.0 <=0.4 10e3/uL   UA Microscopic with Reflex to Culture   Result Value Ref Range    Bacteria Urine Few (A) None Seen /HPF    RBC Urine 5-10 (A) 0-2 /HPF /HPF    WBC Urine 0-5 0-5 /HPF /HPF    Squamous Epithelials Urine Few (A) None Seen /LPF    Narrative    Urine Culture not indicated       If you have any questions or concerns, please call the clinic at the number listed above.        Sincerely,      Holland Blunt MD

## 2023-11-03 NOTE — PROGRESS NOTES
Chief Complaint   Patient presents with    Prostate Problem    Medication Problem     Pt states he recently stopped taking Eliquis because he can not afford it. He stopped taking it a couple months ago.    He also discontinued the Entresto [sacubitril/valsartan (Rx)] which I just want to make sure his cardiologists are aware of this    His syncope was 3-4 days ago and no more often then since  He has some persistent right hand tingling and numbess type sensation with especially the 4th and 5th fingers    Assessment & Plan     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 (H)  I am seeing this patient in order to discussed a series of different questions he has. With the cerebrovascular accident, I brought this up to him and he mentions that he has some skeptical attitude towards this cerebrovascular accident and isn't sure it is real. This led us to display the formal official overread of the film per radiology , the brain MRI he had that is included in this chart note. He's not only had one stroke but several. Overall this is not a new issue, he continues with coumadin and INR / coumadin monitoring, the Apixaban (Eliquis) was stopped secondary to financial hardship per patient     Syncope, unspecified syncope type  This is an event he had with a brief fainting spell while in his yard. This led to coming back into his house and was evaluated also by one of his neighbors. He himself, being an intelligent fellow, recognized this as due to being dehydrated and he's increased fluid intake and he has no adverse symptoms or clinical consequences from his fall. We agreed with some testing today just to play it safe   - Comprehensive metabolic panel (BMP + Alb, Alk Phos, ALT, AST, Total. Bili, TP); Future  - ESR: Erythrocyte sedimentation rate; Future  - CRP, inflammation; Future  - UA with Microscopic reflex to Culture - lab collect; Future  - CBC with  platelets and differential; Future  - US Renal Limited; Future  - Comprehensive metabolic panel (BMP + Alb, Alk Phos, ALT, AST, Total. Bili, TP)  - ESR: Erythrocyte sedimentation rate  - CRP, inflammation  - UA with Microscopic reflex to Culture - lab collect  - CBC with platelets and differential  - UA Microscopic with Reflex to Culture    Weak urine stream  He's concerned with a sense that his urination is not normal. His description is one of going more frequently then he is accustomed to, and these symptoms have only been for around 2 days or so. He agrees there is some slowing of the urinary stream  but no burning symptoms with urination and no abnormal look to the urine. No prior history of this. He had a normal prostate specific antigen 3 months ago . At this point we need more workup and that's the laboratory studies, a urine analysis and a renal ultrasound study with further follow up depending on the test results   - Comprehensive metabolic panel (BMP + Alb, Alk Phos, ALT, AST, Total. Bili, TP); Future  - ESR: Erythrocyte sedimentation rate; Future  - CRP, inflammation; Future  - UA with Microscopic reflex to Culture - lab collect; Future  - CBC with platelets and differential; Future  - US Renal Limited; Future  - Comprehensive metabolic panel (BMP + Alb, Alk Phos, ALT, AST, Total. Bili, TP)  - ESR: Erythrocyte sedimentation rate  - CRP, inflammation  - UA with Microscopic reflex to Culture - lab collect  - CBC with platelets and differential  - UA Microscopic with Reflex to Culture    Review of the result(s) of each unique test - today's tests  Ordering of each unique test  32 minutes spent by me on the date of the encounter doing chart review, history and exam, documentation and further activities per the note      Holland Blunt MD  [facility] YAQUELIN García is a 72 year old, presenting for the following health issues:  Prostate Problem      11/3/2023     1:24 PM  "  Additional Questions   Roomed by Freya EL LPN   Accompanied by Self       HPI     Entresto [sacubitril/valsartan (Rx)] and Apixaban (Eliquis) were both stopped due to patients concerns about financial hardship     Genitourinary - Male  Onset/Duration: 2 weeks -  8 times today he has had urination frequent and of small volumes.  Also has slowing of the urinary stream      No prior diagnosis of bladder issues or prostate issues   Description:   Dysuria (painful urination): No}  Hematuria (blood in urine): No  Frequency: YES  Waking at night to urinate: YES  Hesitancy (delay in urine): No  Retention (unable to empty): No  Decrease in urinary flow: YES  Incontinence: YES  Progression of Symptoms:  worsening  Accompanying Signs & Symptoms:  Fever: No  Back/Flank pain: YES  Urethral discharge: No  Testicle lumps/masses/pain: No  Nausea and/or vomiting: No  Abdominal pain: No  History:   History of frequent UTI s: No  History of kidney stones: No  History of hernias: No  Personal or Family history of Prostate problems: No  Sexually active: No  Precipitating or alleviating factors: None  Therapies tried and outcome: none    Concern - Pt states he passed out 2 days ago.  Onset: 2 days ago  Description: pt lost eye sight and motor skills for \"awhile\" he fell/ passed out and couldn't see and was disoriented  Progression of Symptoms:  improving  Accompanying Signs & Symptoms: pt pulse went up to 128 on watch he states he thinks he was dehydrated, came inside had fluid intake increased and waited for a bit and went back outside and had a recurrence of these same symptoms. At this point he called it as day and went back inside, actually at one point he needed to lay flat on the floor , his neighbor came over and did a glucose [ sugar test ] check but this was ok. He had a reading of 129. This was a never before event. Patient says he found out this was dehydrated status, and notes his ongoing weight loss     Wt Readings from " Last 5 Encounters:   11/03/23 73.4 kg (161 lb 14.4 oz)   08/08/23 75.3 kg (166 lb)   03/07/23 76.8 kg (169 lb 4.8 oz)   11/30/22 81.2 kg (179 lb)   11/10/22 81.4 kg (179 lb 6.4 oz)     Patient stopped Apixaban (Eliquis) . We needed to search a bit for a reason  He has been using the Kanmu implantable treatment for his obstructive sleep apnea and has lost overall 50-60 pounds , he assigns responsibility  for this weight loss not to his obstructive sleep apnea treatment but his own diet efforts     Previous history of similar problem: no  Precipitating factors:        Worsened by: nothing  Alleviating factors:        Improved by: nothing  Therapies tried and outcome: nothing    MRI BRAIN WITHOUT AND WITH CONTRAST June 27, 2022 11:46 AM     HISTORY: Memory Loss. Dizziness.      TECHNIQUE: Multiplanar, multisequence MRI of the brain without and  with 8.5 mL Gadavist.     COMPARISON: None.     FINDINGS: Mild volume loss is present. Scattered frontal parietal  predominantly white matter T2 hyperintensities likely represent  chronic small vessel ischemic change. Small old cortical infarct  involving the left postcentral gyrus. Abnormal area of T2 hyperintense  signal and probable subtle enhancement involving the left parietal  lobe at the precuneus. Abnormal areas of T2 hyperintense signal are  present involving the bilateral occipital lobes near the cortex with  corresponding linear T1 hyperintense signal and contrast enhancement.  Nonspecific chronic microhemorrhages involving the bilateral  cerebellar hemispheres.     Marrow signal is within normal limits. Mild paranasal sinus mucosal  thickening. The visualized tympanic and mastoid cavities are  unremarkable. Bilateral lens replacements.                                                                      IMPRESSION:  1. Abnormal areas of T2 hyperintense signal involving the left  parietal lobe and bilateral occipital lobes with enhancement,  "likely  subacute cortical infarcts.  2. 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  3. Nonspecific bilateral cerebellar microhemorrhages, likely  incidental.  4. Volume loss and white matter T2 hyperintensities which likely  represent chronic small vessel ischemic change.     [Access Center: Lateral subacute infarcts.     This report will be copied to the [facility] to ensure a  provider acknowledges the finding. Access Center is available Monday  through Friday 8am-3:30 pm.      NICOL BLAND MD         SYSTEM ID:  [id number]    Review of Systems   Constitutional, HEENT, cardiovascular, pulmonary, gi and gu systems are negative, except as otherwise noted.      Objective    /86   Pulse 62   Temp 98.2  F (36.8  C) (Temporal)   Resp 17   Ht 1.702 m (5' 7\")   Wt 73.4 kg (161 lb 14.4 oz)   SpO2 96%   BMI 25.36 kg/m    Body mass index is 25.36 kg/m .  Physical Exam   GENERAL: healthy, alert and no distress  EYES: Eyes grossly normal to inspection, PERRL and conjunctivae and sclerae normal  RESP: lungs clear to auscultation - no rales, rhonchi or wheezes  CV: regular rate and rhythm, normal S1 S2, no S3 or S4, no murmur, click or rub, no peripheral edema and peripheral pulses strong  ABDOMEN: soft, nontender, no hepatosplenomegaly, no masses and bowel sounds normal  MS: no gross musculoskeletal defects noted, no edema    Orders Placed This Encounter   Procedures    US Renal Limited    Comprehensive metabolic panel (BMP + Alb, Alk Phos, ALT, AST, Total. Bili, TP)    ESR: Erythrocyte sedimentation rate    CRP, inflammation    UA with Microscopic reflex to Culture - lab collect    CBC with platelets and differential    UA Microscopic with Reflex to Culture    CBC with platelets and differential             "

## 2023-11-04 LAB
ALBUMIN SERPL BCG-MCNC: 4.4 G/DL (ref 3.5–5.2)
ALP SERPL-CCNC: 72 U/L (ref 40–129)
ALT SERPL W P-5'-P-CCNC: 45 U/L (ref 0–70)
ANION GAP SERPL CALCULATED.3IONS-SCNC: 11 MMOL/L (ref 7–15)
AST SERPL W P-5'-P-CCNC: 35 U/L (ref 0–45)
BILIRUB SERPL-MCNC: 0.7 MG/DL
BUN SERPL-MCNC: 17 MG/DL (ref 8–23)
CALCIUM SERPL-MCNC: 9.4 MG/DL (ref 8.8–10.2)
CHLORIDE SERPL-SCNC: 105 MMOL/L (ref 98–107)
CREAT SERPL-MCNC: 0.78 MG/DL (ref 0.67–1.17)
CRP SERPL-MCNC: <3 MG/L
DEPRECATED HCO3 PLAS-SCNC: 25 MMOL/L (ref 22–29)
EGFRCR SERPLBLD CKD-EPI 2021: >90 ML/MIN/1.73M2
GLUCOSE SERPL-MCNC: 97 MG/DL (ref 70–99)
POTASSIUM SERPL-SCNC: 4 MMOL/L (ref 3.4–5.3)
PROT SERPL-MCNC: 7.1 G/DL (ref 6.4–8.3)
SODIUM SERPL-SCNC: 141 MMOL/L (ref 135–145)

## 2023-11-09 ENCOUNTER — ANCILLARY PROCEDURE (OUTPATIENT)
Dept: ULTRASOUND IMAGING | Facility: CLINIC | Age: 73
End: 2023-11-09
Attending: INTERNAL MEDICINE
Payer: COMMERCIAL

## 2023-11-09 ENCOUNTER — ANTICOAGULATION THERAPY VISIT (OUTPATIENT)
Dept: ANTICOAGULATION | Facility: CLINIC | Age: 73
End: 2023-11-09

## 2023-11-09 ENCOUNTER — LAB (OUTPATIENT)
Dept: LAB | Facility: CLINIC | Age: 73
End: 2023-11-09
Payer: COMMERCIAL

## 2023-11-09 DIAGNOSIS — I63.439 CEREBROVASCULAR ACCIDENT (CVA) DUE TO EMBOLISM OF POSTERIOR CEREBRAL ARTERY WITH INFARCTIONS OF BOTH OCCIPITAL LOBES (H): ICD-10-CM

## 2023-11-09 DIAGNOSIS — R55 SYNCOPE, UNSPECIFIED SYNCOPE TYPE: ICD-10-CM

## 2023-11-09 DIAGNOSIS — I63.439 CEREBROVASCULAR ACCIDENT (CVA) DUE TO EMBOLISM OF POSTERIOR CEREBRAL ARTERY WITH INFARCTIONS OF BOTH OCCIPITAL LOBES (H): Primary | ICD-10-CM

## 2023-11-09 DIAGNOSIS — R39.12 WEAK URINE STREAM: ICD-10-CM

## 2023-11-09 LAB — INR BLD: 3.9 (ref 0.9–1.1)

## 2023-11-09 PROCEDURE — 85610 PROTHROMBIN TIME: CPT

## 2023-11-09 PROCEDURE — 36416 COLLJ CAPILLARY BLOOD SPEC: CPT

## 2023-11-09 PROCEDURE — 76770 US EXAM ABDO BACK WALL COMP: CPT | Mod: TC | Performed by: RADIOLOGY

## 2023-11-09 NOTE — PROGRESS NOTES
ANTICOAGULATION MANAGEMENT     Ricky Brown 73 year old male is on warfarin with supratherapeutic INR result. (Goal INR 2.0-3.0)    Recent labs: (last 7 days)     11/09/23  1024   INR 3.9*       ASSESSMENT     Source(s): Chart Review  Previous INR was Supratherapeutic  Medication, diet, health changes since last INR chart reviewed; none identified         PLAN     Unable to reach Ricky today.    LMTCB    Follow up required to confirm warfarin dose taken and assess for changes and discuss out of range result     Kendra Jenkins RN  Anticoagulation Clinic  11/9/2023

## 2023-11-09 NOTE — PROGRESS NOTES
ANTICOAGULATION MANAGEMENT     Ricky Brown 73 year old male is on warfarin with supratherapeutic INR result. (Goal INR 2.0-3.0)    Recent labs: (last 7 days)     11/09/23  1024   INR 3.9*       ASSESSMENT     Source(s): Chart Review and Patient/Caregiver Call     Warfarin doses taken: Warfarin taken as instructed  Diet: No new diet changes identified  Medication/supplement changes: None noted  New illness, injury, or hospitalization: No  Signs or symptoms of bleeding or clotting: No  Previous result: Supratherapeutic  Additional findings: None       PLAN     Recommended plan for no diet, medication or health factor changes affecting INR     Dosing Instructions: hold dose then decrease your warfarin dose (11.1% change) with next INR in 1 week       Summary  As of 11/9/2023      Full warfarin instructions:  11/9: Hold; Otherwise 7.5 mg every Mon, Fri; 5 mg all other days   Next INR check:  11/16/2023               Telephone call with Ricky who verbalizes understanding and agrees to plan    Lab visit scheduled    Education provided:   Goal range and lab monitoring: goal range and significance of current result    Plan made per [facility] anticoagulation protocol    Kendra Jenkins RN  Anticoagulation Clinic  11/9/2023    _______________________________________________________________________     Anticoagulation Episode Summary       Current INR goal:  2.0-3.0   TTR:  37.2% (2 mo)   Target end date:  Indefinite   Send INR reminders to:  ADONAY JAMISON    Indications    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 (H) [I63.439]             Comments:               Anticoagulation Care Providers       Provider Role Specialty Phone number    Holland Blunt MD Referring Internal Medicine [phone]

## 2023-11-10 ENCOUNTER — TELEPHONE (OUTPATIENT)
Dept: FAMILY MEDICINE | Facility: CLINIC | Age: 73
End: 2023-11-10
Payer: COMMERCIAL

## 2023-11-10 NOTE — LETTER
November 15, 2023      Ricky Brown  [address]  [facility] [address]        Dear Ricky:    We have tried to reach you by phone, but were unable to do so.    Enclosed is a copy of your recent study.  What we were looking for was urinary retention or bladder distension, and we don't see this at all, so I am reassured here. The nonspecific findings of:    1. Possible non-obstructing kidney stone is not worthy of concern here.    2. Bladder wall thickening is also considered nonspecific and doesn't mean anything specifically.     There is no specific plan of follow up care necessary at all, but if you have questions etc, we could refer you to the urologist.    US RENAL COMPLETE NON-VASCULAR 11/9/2023 10:06 AM     CLINICAL HISTORY: Weak urine stream.     TECHNIQUE: Routine Bilateral Renal and Bladder Ultrasound.     COMPARISON: None.     FINDINGS:     RIGHT KIDNEY: 10.5 x 4.4 x 5.2 cm. A 1.4 cm cyst in the upper pole of  the right kidney would require no specific follow-up. Otherwise  unremarkable without hydronephrosis or masses.      LEFT KIDNEY: 9.7 x 6.5 x 5.5 cm. A 2.8 cm cyst in the upper pole of  the left kidney would require no specific follow-up. A 0.4 cm  echogenic focus in the interpolar region of the left kidney may  represent a nonobstructing stone. No hydronephrosis.      BLADDER: The bladder wall appears diffusely thickened measuring up to  1 cm. No bladder stones or masses are identified. Prevoid bladder  volume is measured at 37.2 mL                                                                      IMPRESSION:  1.  Diffuse bladder wall thickening is nonspecific, but could be seen  with cystitis.  2.  A 0.4 cm echogenic focus in the interpolar of the left kidney may  represent a nonobstructing stone.     KAY DAS MD     Please feel free to contact us with any questions or concerns.    Sincerely,      Holland Blunt MD/magaly

## 2023-11-10 NOTE — TELEPHONE ENCOUNTER
"Called patient, left message to call back at [phone]. Called to relay result message from Renal US below:    \"What we were looking for was urinary retention or bladder distension and we don't see this at all so I am reassured here. The nonspecific findings of a 1. Possible non-obstructing kidney stone is not worthy of concern here and 2. Bladder wall thickening is also considered nonspecific and doesn't mean anything specifically.     Please reviewed these findings with patient. No specific plan of follow up care is necessary at all but if patient has questions etc, we could send him to the urologist [ he may already have a pending appointment with them].     Holland Blunt MD \"      SOPHIA Wise RN  [facility]  "

## 2023-11-13 NOTE — TELEPHONE ENCOUNTER
[employer] message sent to patient.     Thanks,  JOZEF Ervin  [facility]

## 2023-11-14 NOTE — TELEPHONE ENCOUNTER
"Unable to reach patient.     Please mail imaging result letter (ultrasound)     \"\"What we were looking for was urinary retention or bladder distension and we don't see this at all so I am reassured here. The nonspecific findings of a 1. Possible non-obstructing kidney stone is not worthy of concern here and 2. Bladder wall thickening is also considered nonspecific and doesn't mean anything specifically.     Please reviewed these findings with patient. No specific plan of follow up care is necessary at all but if patient has questions etc, we could send him to the urologist [ he may already have a pending appointment with them].\"      Jess Crocker RN  [facility]    "

## 2023-11-16 ENCOUNTER — ANTICOAGULATION THERAPY VISIT (OUTPATIENT)
Dept: ANTICOAGULATION | Facility: CLINIC | Age: 73
End: 2023-11-16

## 2023-11-16 ENCOUNTER — LAB (OUTPATIENT)
Dept: LAB | Facility: CLINIC | Age: 73
End: 2023-11-16
Payer: COMMERCIAL

## 2023-11-16 DIAGNOSIS — I63.439 CEREBROVASCULAR ACCIDENT (CVA) DUE TO EMBOLISM OF POSTERIOR CEREBRAL ARTERY WITH INFARCTIONS OF BOTH OCCIPITAL LOBES (H): Primary | ICD-10-CM

## 2023-11-16 DIAGNOSIS — I63.439 CEREBROVASCULAR ACCIDENT (CVA) DUE TO EMBOLISM OF POSTERIOR CEREBRAL ARTERY WITH INFARCTIONS OF BOTH OCCIPITAL LOBES (H): ICD-10-CM

## 2023-11-16 LAB — INR BLD: 1.9 (ref 0.9–1.1)

## 2023-11-16 PROCEDURE — 36416 COLLJ CAPILLARY BLOOD SPEC: CPT

## 2023-11-16 PROCEDURE — 85610 PROTHROMBIN TIME: CPT

## 2023-11-16 NOTE — PROGRESS NOTES
ANTICOAGULATION MANAGEMENT     Ricky Brown 73 year old male is on warfarin with subtherapeutic INR result. (Goal INR 2.0-3.0)    Recent labs: (last 7 days)     11/16/23  1453   INR 1.9*       ASSESSMENT     Source(s): Chart Review     Warfarin doses taken: Held for supra therapeutic INR  recently which may be affecting INR  Previous result: Supratherapeutic  Additional findings:  note on chart:  10-27-23 Do NOT leave VM. He cannot check it. Keep calling please.     PLAN     Unable to reach pt today.    No instructions provided. Unable to leave voicemail. (Chart states not to leave VM)    Follow up required to confirm warfarin dose taken and assess for changes and discuss out of range result     Kathryn Arauz RN  Anticoagulation Clinic  11/16/2023

## 2023-11-17 NOTE — PROGRESS NOTES
ANTICOAGULATION MANAGEMENT     Ricky Brown 73 year old male is on warfarin with subtherapeutic INR result. (Goal INR 2.0-3.0)    Recent labs: (last 7 days)     11/16/23  1453   INR 1.9*       ASSESSMENT     Source(s): Chart Review and Patient/Caregiver Call     Warfarin doses taken: Held for suprtherapuetic  recently which may be affecting INR  Diet: No new diet changes identified  Medication/supplement changes: None noted  New illness, injury, or hospitalization: No  Signs or symptoms of bleeding or clotting: No  Previous result: Supratherapeutic  Additional findings: None       PLAN     Recommended plan for temporary change(s) affecting INR     Dosing Instructions: Continue your current warfarin dose with next INR in 1 week       Summary  As of 11/16/2023      Full warfarin instructions:  7.5 mg every Mon, Fri; 5 mg all other days   Next INR check:  11/24/2023               Telephone call with Ricky who verbalizes understanding and agrees to plan    Lab visit scheduled    Education provided:   Goal range and lab monitoring: goal range and significance of current result    Plan made per [facility] anticoagulation protocol    Kendra Jenkins RN  Anticoagulation Clinic  11/17/2023    _______________________________________________________________________     Anticoagulation Episode Summary       Current INR goal:  2.0-3.0   TTR:  38.6% (2.3 mo)   Target end date:  Indefinite   Send INR reminders to:  ADONAY JAMISON    Indications    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 (H) [I63.439]             Comments:               Anticoagulation Care Providers       Provider Role Specialty Phone number    Holland Blunt MD Referring Internal Medicine [phone]

## 2023-11-17 NOTE — PROGRESS NOTES
Unable to reach Ricky today.    No instructions provided. Unable to leave voicemail.    Follow up required to confirm warfarin dose taken and assess for changes    Kendra Jenkins RN  Anticoagulation Clinic  11/17/2023

## 2023-11-24 ENCOUNTER — LAB (OUTPATIENT)
Dept: LAB | Facility: CLINIC | Age: 73
End: 2023-11-24
Payer: COMMERCIAL

## 2023-11-24 ENCOUNTER — ANTICOAGULATION THERAPY VISIT (OUTPATIENT)
Dept: ANTICOAGULATION | Facility: CLINIC | Age: 73
End: 2023-11-24

## 2023-11-24 DIAGNOSIS — I63.439 CEREBROVASCULAR ACCIDENT (CVA) DUE TO EMBOLISM OF POSTERIOR CEREBRAL ARTERY WITH INFARCTIONS OF BOTH OCCIPITAL LOBES (H): ICD-10-CM

## 2023-11-24 DIAGNOSIS — I63.439 CEREBROVASCULAR ACCIDENT (CVA) DUE TO EMBOLISM OF POSTERIOR CEREBRAL ARTERY WITH INFARCTIONS OF BOTH OCCIPITAL LOBES (H): Primary | ICD-10-CM

## 2023-11-24 LAB — INR BLD: 2.2 (ref 0.9–1.1)

## 2023-11-24 PROCEDURE — 36416 COLLJ CAPILLARY BLOOD SPEC: CPT

## 2023-11-24 PROCEDURE — 85610 PROTHROMBIN TIME: CPT

## 2023-11-24 NOTE — PROGRESS NOTES
ANTICOAGULATION MANAGEMENT     Ricky Brown 73 year old male is on warfarin with therapeutic INR result. (Goal INR 2.0-3.0)    Recent labs: (last 7 days)     11/24/23  1320   INR 2.2*       ASSESSMENT     Source(s): Chart Review and Patient/Caregiver Call     Warfarin doses taken: Warfarin taken as instructed  Diet: No new diet changes identified  Medication/supplement changes: None noted   See [employer] message of 11/13/23, was recommended to increase Flomax from 0.4 to 0.8 daily to see if there is improvement with urinating.  Will start taking 2 tabs per day, to help with urination.  New illness, injury, or hospitalization: No   BPH issues.  Signs or symptoms of bleeding or clotting: No  Previous result: Subtherapeutic at 1.9 on 11/16/23.  Additional findings:  Scheduled  appt with Urology on 12/12/23.       PLAN     Recommended plan for no diet, medication or health factor changes affecting INR     Dosing Instructions:  - patient stated he would like to take 6.25mg warfarin daily vs. taking 7.5mg 2 days per and 5mg all other days.  It would be easier for him to take same dose daily.  - did inform patient that is an increase of 9.4%.  - recheck INR in 1-2 wks.         Summary  As of 11/24/2023      Full warfarin instructions:  6.25 mg every day   Next INR check:  12/8/2023               Telephone call with Ricky who verbalizes understanding and agrees to plan.   - he did agree, if INR goes higher, then we can adjust dose.    Lab visit scheduled - INR scheduled on 11/30/23 @ [facility].    Education provided:   Taking warfarin: Importance of taking warfarin as instructed  Goal range and lab monitoring: goal range and significance of current result    Plan made per [facility] anticoagulation protocol    Renetta Conley, RN  Anticoagulation Clinic  11/24/2023    _______________________________________________________________________     Anticoagulation Episode Summary       Current INR goal:  2.0-3.0   TTR:  41.5%  (2.5 mo)   Target end date:  Indefinite   Send INR reminders to:  ADONAY JAMISON    Indications    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 (H) [I63.439]             Comments:               Anticoagulation Care Providers       Provider Role Specialty Phone number    Holland Blunt MD Referring Internal Medicine [phone]

## 2023-11-30 ENCOUNTER — LAB (OUTPATIENT)
Dept: LAB | Facility: CLINIC | Age: 73
End: 2023-11-30
Payer: COMMERCIAL

## 2023-11-30 ENCOUNTER — ANTICOAGULATION THERAPY VISIT (OUTPATIENT)
Dept: ANTICOAGULATION | Facility: CLINIC | Age: 73
End: 2023-11-30

## 2023-11-30 DIAGNOSIS — I63.439 CEREBROVASCULAR ACCIDENT (CVA) DUE TO EMBOLISM OF POSTERIOR CEREBRAL ARTERY WITH INFARCTIONS OF BOTH OCCIPITAL LOBES (H): Primary | ICD-10-CM

## 2023-11-30 DIAGNOSIS — I63.439 CEREBROVASCULAR ACCIDENT (CVA) DUE TO EMBOLISM OF POSTERIOR CEREBRAL ARTERY WITH INFARCTIONS OF BOTH OCCIPITAL LOBES (H): ICD-10-CM

## 2023-11-30 LAB — INR BLD: 2 (ref 0.9–1.1)

## 2023-11-30 PROCEDURE — 36416 COLLJ CAPILLARY BLOOD SPEC: CPT

## 2023-11-30 PROCEDURE — 85610 PROTHROMBIN TIME: CPT

## 2023-11-30 NOTE — PROGRESS NOTES
ANTICOAGULATION MANAGEMENT     Ricky Brown 73 year old male is on warfarin with therapeutic INR result. (Goal INR 2.0-3.0)    Recent labs: (last 7 days)     11/30/23  1349   INR 2.0*       ASSESSMENT     Source(s): Chart Review and Patient/Caregiver Call     Warfarin doses taken: Warfarin taken as instructed  Diet: No new diet changes identified  Medication/supplement changes: None noted  New illness, injury, or hospitalization: No  Signs or symptoms of bleeding or clotting: No  Previous result: Therapeutic last visit; previously outside of goal range  Additional findings: None       PLAN     Recommended plan for no diet, medication or health factor changes affecting INR     Dosing Instructions: Continue your current warfarin dose with next INR in 1 week       Summary  As of 11/30/2023      Full warfarin instructions:  6.25 mg every day   Next INR check:  12/7/2023               Telephone call with Ricky who verbalizes understanding and agrees to plan    Lab visit scheduled    Education provided:   Please call back if any changes to your diet, medications or how you've been taking warfarin  Contact [phone]  with any changes, questions or concerns.     Plan made per [facility] anticoagulation protocol    Jess Garcia RN  Anticoagulation Clinic  11/30/2023    _______________________________________________________________________     Anticoagulation Episode Summary       Current INR goal:  2.0-3.0   TTR:  45.8% (2.7 mo)   Target end date:  Indefinite   Send INR reminders to:  ADONAY JAMISON    Indications    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 (H) [I63.439]             Comments:               Anticoagulation Care Providers       Provider Role Specialty Phone number    Holland Blunt MD Referring Internal Medicine [phone]

## 2023-12-07 ENCOUNTER — ANTICOAGULATION THERAPY VISIT (OUTPATIENT)
Dept: ANTICOAGULATION | Facility: CLINIC | Age: 73
End: 2023-12-07

## 2023-12-07 ENCOUNTER — LAB (OUTPATIENT)
Dept: LAB | Facility: CLINIC | Age: 73
End: 2023-12-07
Payer: COMMERCIAL

## 2023-12-07 DIAGNOSIS — I63.439 CEREBROVASCULAR ACCIDENT (CVA) DUE TO EMBOLISM OF POSTERIOR CEREBRAL ARTERY WITH INFARCTIONS OF BOTH OCCIPITAL LOBES (H): ICD-10-CM

## 2023-12-07 DIAGNOSIS — I63.439 CEREBROVASCULAR ACCIDENT (CVA) DUE TO EMBOLISM OF POSTERIOR CEREBRAL ARTERY WITH INFARCTIONS OF BOTH OCCIPITAL LOBES (H): Primary | ICD-10-CM

## 2023-12-07 LAB — INR BLD: 2.2 (ref 0.9–1.1)

## 2023-12-07 PROCEDURE — 85610 PROTHROMBIN TIME: CPT

## 2023-12-07 PROCEDURE — 36416 COLLJ CAPILLARY BLOOD SPEC: CPT

## 2023-12-07 NOTE — PROGRESS NOTES
ANTICOAGULATION MANAGEMENT     Ricky Brown 73 year old male is on warfarin with therapeutic INR result. (Goal INR 2.0-3.0)    Recent labs: (last 7 days)     12/07/23  1330   INR 2.2*       ASSESSMENT     Source(s): Chart Review and Patient/Caregiver Call     Warfarin doses taken: Warfarin taken as instructed  Diet: No new diet changes identified  Medication/supplement changes: None noted  New illness, injury, or hospitalization: No  Signs or symptoms of bleeding or clotting: No  Previous result: Therapeutic last 2(+) visits  Additional findings:  patient interested in a home meter but states he wants to talk to PCP about it first. Writer advised to let [facility] know if he is interested after talking to his provider.  Do not buy one off [employer]bay as patient stated he had been looking on there.       PLAN     Recommended plan for no diet, medication or health factor changes affecting INR     Dosing Instructions: Continue your current warfarin dose with next INR in 2 weeks       Summary  As of 12/7/2023      Full warfarin instructions:  6.25 mg every day   Next INR check:  12/21/2023               Telephone call with Ricky who verbalizes understanding and agrees to plan    Lab visit scheduled    Education provided:   Please call back if any changes to your diet, medications or how you've been taking warfarin  Contact [phone]  with any changes, questions or concerns.     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

## 2023-12-11 ENCOUNTER — MYC MEDICAL ADVICE (OUTPATIENT)
Dept: UROLOGY | Facility: CLINIC | Age: 73
End: 2023-12-11
Payer: COMMERCIAL

## 2023-12-12 ENCOUNTER — OFFICE VISIT (OUTPATIENT)
Dept: UROLOGY | Facility: CLINIC | Age: 73
End: 2023-12-12
Attending: UROLOGY
Payer: COMMERCIAL

## 2023-12-12 VITALS
WEIGHT: 161.4 LBS | DIASTOLIC BLOOD PRESSURE: 78 MMHG | SYSTOLIC BLOOD PRESSURE: 144 MMHG | BODY MASS INDEX: 25.28 KG/M2 | OXYGEN SATURATION: 95 % | HEART RATE: 65 BPM

## 2023-12-12 DIAGNOSIS — N40.1 BENIGN PROSTATIC HYPERPLASIA WITH URINARY FREQUENCY: ICD-10-CM

## 2023-12-12 DIAGNOSIS — I10 ESSENTIAL HYPERTENSION WITH GOAL BLOOD PRESSURE LESS THAN 140/90: Chronic | ICD-10-CM

## 2023-12-12 DIAGNOSIS — R39.9 LOWER URINARY TRACT SYMPTOMS (LUTS): Primary | ICD-10-CM

## 2023-12-12 DIAGNOSIS — R35.0 BENIGN PROSTATIC HYPERPLASIA WITH URINARY FREQUENCY: ICD-10-CM

## 2023-12-12 LAB
ALBUMIN UR-MCNC: NEGATIVE MG/DL
APPEARANCE UR: CLEAR
BILIRUB UR QL STRIP: NEGATIVE
COLOR UR AUTO: YELLOW
GLUCOSE UR STRIP-MCNC: NEGATIVE MG/DL
HGB UR QL STRIP: ABNORMAL
KETONES UR STRIP-MCNC: 15 MG/DL
LEUKOCYTE ESTERASE UR QL STRIP: NEGATIVE
NITRATE UR QL: NEGATIVE
PH UR STRIP: 5 [PH] (ref 5–7)
RBC #/AREA URNS AUTO: NORMAL /HPF
SP GR UR STRIP: 1.02 (ref 1–1.03)
UROBILINOGEN UR STRIP-ACNC: 0.2 E.U./DL
WBC #/AREA URNS AUTO: NORMAL /HPF

## 2023-12-12 PROCEDURE — 51798 US URINE CAPACITY MEASURE: CPT | Performed by: UROLOGY

## 2023-12-12 PROCEDURE — 81001 URINALYSIS AUTO W/SCOPE: CPT | Performed by: UROLOGY

## 2023-12-12 PROCEDURE — 99214 OFFICE O/P EST MOD 30 MIN: CPT | Mod: 25 | Performed by: UROLOGY

## 2023-12-12 RX ORDER — TOLTERODINE 4 MG/1
4 CAPSULE, EXTENDED RELEASE ORAL DAILY
Qty: 90 CAPSULE | Refills: 3 | Status: SHIPPED | OUTPATIENT
Start: 2023-12-12 | End: 2024-07-19

## 2023-12-12 RX ORDER — TAMSULOSIN HYDROCHLORIDE 0.4 MG/1
0.4 CAPSULE ORAL DAILY
Qty: 90 CAPSULE | Refills: 3 | Status: SHIPPED | OUTPATIENT
Start: 2023-12-12 | End: 2024-02-05

## 2023-12-12 NOTE — PROGRESS NOTES
Chief Complaint   Patient presents with    Follow Up    Urinary Frequency       Ricky Brown is a 73 year old male who presents today for follow up of   Chief Complaint   Patient presents with    Follow Up    Urinary Frequency    Follow up for LUTS.  He is on flomax but still gets up several times at night.  Flow is weak.  He has some urinary urgency/frequency.    Current Outpatient Medications   Medication Sig Dispense Refill    atorvastatin (LIPITOR) 10 MG tablet Take 1 tablet (10 mg) by mouth every evening 90 tablet 3    cyanocobalamin (VITAMIN B-12) 1000 MCG tablet Take 1,000 mcg by mouth 2 times daily      diphenoxylate-atropine (LOMOTIL) 2.5-0.025 MG tablet Take 1 tablet by mouth daily as needed for diarrhea Patient only takes 1 tablet as needed 30 tablet 5    folic acid (FOLVITE) 1 MG tablet Take 1 tablet (1,000 mcg) by mouth 2 times daily 180 tablet 3    levETIRAcetam (KEPPRA) 500 MG tablet Take 1 tablet (500 mg) by mouth 2 times daily 60 tablet 11    metoprolol succinate ER (TOPROL XL) 25 MG 24 hr tablet TAKE 1 TABLET (25 MG) BY MOUTH 2 TIMES DAILY. 180 tablet 2    tamsulosin (FLOMAX) 0.4 MG capsule Take 1 capsule (0.4 mg) by mouth daily 90 capsule 3    tolterodine ER (DETROL LA) 4 MG 24 hr capsule Take 1 capsule (4 mg) by mouth daily 90 capsule 3    warfarin ANTICOAGULANT (COUMADIN) 2.5 MG tablet Take 7.5 mg daily, dose adjusted based on INR results 270 tablet 1     Allergies   Allergen Reactions    Lisinopril Cough    Perfume Other (See Comments)      Past Medical History:   Diagnosis Date    Arthritis     BCC (basal cell carcinoma)     right shoulder     Cardiomyopathy (H)     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 (H) 06/27/2022    Colon adenomas 09/20/2011    Coronary artery disease     Disorder of bursae and tendons in shoulder region 04/18/2014    ED (erectile dysfunction)     Fatty liver     HFrEF (heart failure  with reduced ejection fraction) (H)     History of iritis, os 05/27/2019    HTN (hypertension)     Near syncope 02/10/2014    Noncompliance     Nonrheumatic aortic valve insufficiency     Obesity     JOSE JUAN (obstructive sleep apnea)     Partial symptomatic epilepsy with complex partial seizures, not intractable, without status epilepticus (H) 09/08/2022     Past Surgical History:   Procedure Laterality Date    ARTHROSCOPY SHOULDER BICEPS TENODESIS REPAIR  02/27/2014    Procedure: ARTHROSCOPY SHOULDER BICEPS TENODESIS REPAIR;;  Surgeon: Michael Hagen MD;  [facility]    ARTHROSCOPY SHOULDER ROTATOR CUFF REPAIR  02/27/2014    Procedure: ARTHROSCOPY SHOULDER ROTATOR CUFF REPAIR;  Right Shoulder Arthroscopic Rotator Cuff Repair,  Subacromial Decompression, Biceps Tenodesis, Distal Clavicle Excision   ;  Surgeon: Michael Hagen MD;  Location: [facility]    BIOPSY      COLONOSCOPY  06/12/2018    COLONOSCOPY N/A 10/5/2023    Procedure: Colonoscopy;  Surgeon: Mary Floyd MD;  Location:  GI    CV CORONARY ANGIOGRAM N/A 08/08/2022    Procedure: Coronary Angiogram;  Surgeon: Ida Goddard MD;  Location: [facility] CV    CV LEFT HEART CATH N/A 08/08/2022    Procedure: Left Heart Catheterization;  Surgeon: Ida Goddard MD;  Location: [facility] LAB CV    ENDOSCOPY DRUG INDUCED SLEEP N/A 09/16/2022    Procedure: DRUG INDUCED SLEEP ENDOSCOPY;  Surgeon: Dashawn Tanner MD;  Location: [facility] OR    EXCHANGE INTRAOCULAR LENS IMPLANT  2005    left eye - first, recentered PCL with iris fixation; then IOLX with ACL    EXTRACAPSULAR CATARACT EXTRATION WITH INTRAOCULAR LENS IMPLANT  2005    both eyes (elsewhere)    IMPLANT GENERATOR STIMULATOR (LOCATION) N/A 11/3/2022    Procedure: INSERTION, PULSE GENERATOR, NEUROSTIMULATOR;  Surgeon: Dashawn Tanner MD;  Location: [facility] OR    RELEASE CARPAL TUNNEL Right 08/13/2021    Procedure: RELEASE, RIGHT CARPAL TUNNEL;  Surgeon: Tony Bravo MD;   Location: [facility] OR    RELEASE CARPAL TUNNEL Right 10/26/2022    Procedure: RIGHT REVISION OPEN CARPAL TUNNEL RELEASE, HYPOTHENAR FAT FLAP,;  Surgeon: Vignesh Rhodes MD;  Location: [facility] OR    TRANSPOSITION ULNAR NERVE (ELBOW) Right 10/26/2022    Procedure: RIGHT ULNAR NERVE DECOMPRESSION AT THE ELBOW;  Surgeon: Vignesh Rhodes MD;  Location: [facility] OR    TURBINOPLASTY  2013    Procedure: TURBINOPLASTY;;  Surgeon: Lisset Parsons MD;  Location: [facility]    UVULOPALATOPHARYNGOPLASTY  2013    Procedure: UVULOPALATOPHARYNGOPLASTY;  Uvulopalatopharyngoplasty, Turbiante Reduction;  Surgeon: Lisset Parsons MD;  Location: [facility] OR     Family History   Problem Relation Age of Onset    Diabetes Father         Old age Diabetes    Cerebrovascular Disease Father     Obesity Father     Heart Disease Father     Coronary Artery Disease Father     Hypertension Father     Lipids Sister     C.A.D. No family hx of     Cancer No family hx of     Glaucoma No family hx of     Macular Degeneration No family hx of     Anesthesia Reaction No family hx of     Deep Vein Thrombosis (DVT) No family hx of      Social History     Socioeconomic History    Marital status:      Spouse name: Kyung    Number of children: 0    Years of education: 14    Highest education level: None   Occupational History    Occupation: industrial electronics      Employer: S[employer]F   Tobacco Use    Smoking status: Former     Packs/day: 0.50     Years: 2.00     Additional pack years: 0.00     Total pack years: 1.00     Types: Cigarettes     Quit date: 1996     Years since quittin.0    Smokeless tobacco: Never   Vaping Use    Vaping Use: Never used   Substance and Sexual Activity    Alcohol use: Yes     Alcohol/week: 8.3 standard drinks of alcohol     Comment: Evening Marie    Drug use: No    Sexual activity: Yes     Partners: Female     Birth control/protection: Male Surgical     Comment: 2006 vasectomy   Other Topics Concern     Parent/sibling w/ CABG, MI or angioplasty before 65F 55M? No     Social Determinants of Health     Interpersonal Safety: Low Risk  (11/3/2023)    Interpersonal Safety     Do you feel physically and emotionally safe where you currently live?: Yes     Within the past 12 months, have you been hit, slapped, kicked or otherwise physically hurt by someone?: No     Within the past 12 months, have you been humiliated or emotionally abused in other ways by your partner or ex-partner?: No       REVIEW OF SYSTEMS  =================  C: NEGATIVE for fever, chills, change in weight  I: NEGATIVE for worrisome rashes, moles or lesions  E/M: NEGATIVE for ear, mouth and throat problems  R: NEGATIVE for significant cough or SHORTNESS OF BREATH  CV:  NEGATIVE for chest pain, palpitations or peripheral edema  GI: NEGATIVE for nausea, abdominal pain, heartburn, or change in bowel habits  NEURO: NEGATIVE numbness/weakness  : see HPI  PSYCH: NEGATIVE depression/anxiety  LYmph: no new enlarged lymph nodes  Ortho: no new trauma/movements    Physical Exam:  Blood pressure (!) 144/78, pulse 65, weight 73.2 kg (161 lb 6.4 oz), SpO2 95%.    GENERAL: healthy, alert and no distress  EYES: Eyes grossly normal to inspection, conjunctivae and sclerae normal  RESP: no audible wheeze, cough, or visible cyanosis.  No visible retractions or increased work of breathing.  Able to speak fully in complete sentences.  NEURO: Cranial nerves grossly intact, mentation intact and speech normal  PSYCH: mentation appears normal, affect normal/bright, judgement and insight intact, normal speech and appearance well-groomed    Assessment/Plan:   (R39.9) Lower urinary tract symptoms (LUTS)  (primary encounter diagnosis)  Comment: PVR 45 ml  Plan: add detrol to flomax    (N40.1,  R35.0) Benign prostatic hyperplasia with urinary frequency  Comment:    Plan: tamsulosin (FLOMAX) 0.4 MG capsule          If not effective, rtc for cysto/uroflow next    (I10) Essential  hypertension with goal blood pressure less than 140/90  Comment:    Plan: Ricky to follow up with Primary Care provider regarding elevated blood pressure.

## 2023-12-12 NOTE — PATIENT INSTRUCTIONS
Please continue on Flomax  Please  Detrol that Dr. Chand prescribed for you.  Please notify us how your symptoms are in 2 months. If not better, we will have you return to clinic for a uroflow/cystoscopy.

## 2023-12-12 NOTE — PROGRESS NOTES
Bladder Scan performed. 45mL maximum residual urine detected after 3 scans. MD informed     Jaja Bravo MA

## 2023-12-21 ENCOUNTER — LAB (OUTPATIENT)
Dept: LAB | Facility: CLINIC | Age: 73
End: 2023-12-21
Payer: COMMERCIAL

## 2023-12-21 ENCOUNTER — TELEPHONE (OUTPATIENT)
Dept: FAMILY MEDICINE | Facility: CLINIC | Age: 73
End: 2023-12-21

## 2023-12-21 ENCOUNTER — ANTICOAGULATION THERAPY VISIT (OUTPATIENT)
Dept: ANTICOAGULATION | Facility: CLINIC | Age: 73
End: 2023-12-21

## 2023-12-21 DIAGNOSIS — R39.12 WEAK URINARY STREAM: Primary | ICD-10-CM

## 2023-12-21 DIAGNOSIS — I63.439 CEREBROVASCULAR ACCIDENT (CVA) DUE TO EMBOLISM OF POSTERIOR CEREBRAL ARTERY WITH INFARCTIONS OF BOTH OCCIPITAL LOBES (H): ICD-10-CM

## 2023-12-21 DIAGNOSIS — I63.439 CEREBROVASCULAR ACCIDENT (CVA) DUE TO EMBOLISM OF POSTERIOR CEREBRAL ARTERY WITH INFARCTIONS OF BOTH OCCIPITAL LOBES (H): Primary | ICD-10-CM

## 2023-12-21 LAB — INR BLD: 2.1 (ref 0.9–1.1)

## 2023-12-21 PROCEDURE — 85610 PROTHROMBIN TIME: CPT

## 2023-12-21 PROCEDURE — 36416 COLLJ CAPILLARY BLOOD SPEC: CPT

## 2023-12-21 RX ORDER — TAMSULOSIN HYDROCHLORIDE 0.4 MG/1
0.8 CAPSULE ORAL DAILY
Qty: 180 CAPSULE | Refills: 1 | Status: SHIPPED | OUTPATIENT
Start: 2023-12-21 | End: 2024-07-19

## 2023-12-21 NOTE — TELEPHONE ENCOUNTER
Patient states his flomax dose was double to 0.8 mg daily but the script says 0.4 mg daily. Patient is wondering what dose he should be taking and if it's for the 0.8 mg he needs a new prescription sent into the pharmacy    Kendra Jenkins RN   [facility]

## 2023-12-21 NOTE — PROGRESS NOTES
ANTICOAGULATION MANAGEMENT     Ricky Brown 73 year old male is on warfarin with therapeutic INR result. (Goal INR 2.0-3.0)    Recent labs: (last 7 days)     12/21/23  1557   INR 2.1*       ASSESSMENT     Source(s): Chart Review and Patient/Caregiver Call     Warfarin doses taken: Warfarin taken as instructed  Diet: No new diet changes identified  Medication/supplement changes: None noted  New illness, injury, or hospitalization: No  Signs or symptoms of bleeding or clotting: No  Previous result: Therapeutic last 2(+) visits  Additional findings: None       PLAN     Recommended plan for no diet, medication or health factor changes affecting INR     Dosing Instructions: Continue your current warfarin dose with next INR in 3 weeks       Summary  As of 12/21/2023      Full warfarin instructions:  6.25 mg every day   Next INR check:  1/11/2024               Telephone call with Ricky who verbalizes understanding and agrees to plan    Lab visit scheduled    Education provided:   Goal range and lab monitoring: goal range and significance of current result    Plan made per [facility] anticoagulation protocol    Kendra Jenkins RN  Anticoagulation Clinic  12/21/2023    _______________________________________________________________________     Anticoagulation Episode Summary       Current INR goal:  2.0-3.0   TTR:  56.8% (3.4 mo)   Target end date:  Indefinite   Send INR reminders to:  ADONAY JAMISON    Indications    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 (H) [I63.439]             Comments:               Anticoagulation Care Providers       Provider Role Specialty Phone number    Holland Blunt MD Referring Internal Medicine [phone]

## 2023-12-22 NOTE — TELEPHONE ENCOUNTER
Spoke with patient and notified. Patient will check back with pharm but stated the 0.4 mg was $75 and he cannot afford that at this time. Per pt he will call back if any concerns with new script sent over.     I told pt if he does not feel a difference in urinary functioning to please let us know and we will put in urology referral.     Verbalized understanding.     Thanks,  Aziza RN  [facility]

## 2023-12-22 NOTE — TELEPHONE ENCOUNTER
We discussed his weak urinary stream at the appointment on 11-3-2023. The increase of Flomax [ tamsulosin ] to 0.4 milligrams 2 tabs a day or up to 0.8 milligrams is something we possibly discussed and I may have failed on this one but I can send that increased dose now [ done] but add, please read my conclusions with patient from our appointment, the idea being that if he does not notice significant benefit from the increased dose  [ and he'd joel that within the next week or so, then he'd be advised to see a urologist     Please Reroute if additional input requested from me     Holland Blunt MD      Weak urine stream  He's concerned with a sense that his urination is not normal. His description is one of going more frequently then he is accustomed to, and these symptoms have only been for around 2 days or so. He agrees there is some slowing of the urinary stream  but no burning symptoms with urination and no abnormal look to the urine. No prior history of this. He had a normal prostate specific antigen 3 months ago . At this point we need more workup and that's the laboratory studies, a urine analysis and a renal ultrasound study with further follow up depending on the test results   - Comprehensive metabolic panel (BMP + Alb, Alk Phos, ALT, AST, Total. Bili, TP); Future  - ESR: Erythrocyte sedimentation rate; Future  - CRP, inflammation; Future  - UA with Microscopic reflex to Culture - lab collect; Future  - CBC with platelets and differential; Future  - US Renal Limited; Future  - Comprehensive metabolic panel (BMP + Alb, Alk Phos, ALT, AST, Total. Bili, TP)  - ESR: Erythrocyte sedimentation rate  - CRP, inflammation  - UA with Microscopic reflex to Culture - lab collect  - CBC with platelets and differential  - UA Microscopic with Reflex to Culture

## 2024-01-11 ENCOUNTER — LAB (OUTPATIENT)
Dept: LAB | Facility: CLINIC | Age: 74
End: 2024-01-11
Payer: COMMERCIAL

## 2024-01-11 ENCOUNTER — ANTICOAGULATION THERAPY VISIT (OUTPATIENT)
Dept: ANTICOAGULATION | Facility: CLINIC | Age: 74
End: 2024-01-11

## 2024-01-11 DIAGNOSIS — I63.439 CEREBROVASCULAR ACCIDENT (CVA) DUE TO EMBOLISM OF POSTERIOR CEREBRAL ARTERY WITH INFARCTIONS OF BOTH OCCIPITAL LOBES (H): Primary | ICD-10-CM

## 2024-01-11 DIAGNOSIS — I63.439 CEREBROVASCULAR ACCIDENT (CVA) DUE TO EMBOLISM OF POSTERIOR CEREBRAL ARTERY WITH INFARCTIONS OF BOTH OCCIPITAL LOBES (H): ICD-10-CM

## 2024-01-11 LAB — INR BLD: 1.6 (ref 0.9–1.1)

## 2024-01-11 PROCEDURE — 85610 PROTHROMBIN TIME: CPT

## 2024-01-11 PROCEDURE — 36416 COLLJ CAPILLARY BLOOD SPEC: CPT

## 2024-01-11 NOTE — PROGRESS NOTES
ANTICOAGULATION MANAGEMENT     Ricky Brown 73 year old male is on warfarin with subtherapeutic INR result. (Goal INR 2.0-3.0)    Recent labs: (last 7 days)     01/11/24  1417   INR 1.6*       ASSESSMENT     Source(s): Chart Review and Patient/Caregiver Call     Warfarin doses taken: Warfarin taken as instructed  Diet: No new diet changes identified  Medication/supplement changes: None noted  New illness, injury, or hospitalization: No  Signs or symptoms of bleeding or clotting: No  Previous result: Therapeutic last 2(+) visits  Additional findings:  patient wants to increase warfarin to 3 tablets daily     PLAN     Recommended plan for no diet, medication or health factor changes affecting INR     Dosing Instructions: Increase your warfarin dose (14.3% change) with next INR in 2 weeks       Discussed dose in detail with patient and patient is to increase warfarin dose 14.3%     Summary  As of 1/11/2024      Full warfarin instructions:  6.25 mg every Sun, Wed; 7.5 mg all other days   Next INR check:  1/25/2024               Telephone call with Ricky who verbalizes understanding and agrees to plan    Lab visit scheduled    Education provided:   Please call back if any changes to your diet, medications or how you've been taking warfarin  Symptom monitoring: monitoring for clotting signs and symptoms and monitoring for stroke signs and symptoms    Plan made per [facility] anticoagulation protocol    Kathryn Arauz RN  Anticoagulation Clinic  1/11/2024    _______________________________________________________________________     Anticoagulation Episode Summary       Current INR goal:  2.0-3.0   TTR:  50.6% (4.1 mo)   Target end date:  Indefinite   Send INR reminders to:  ADONAY JAMISON    Indications    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 (H) [I63.439]             Comments:               Anticoagulation Care Providers       Provider  Role Specialty Phone number    Holland Blunt MD Referring Internal Medicine [phone]

## 2024-01-25 ENCOUNTER — LAB (OUTPATIENT)
Dept: LAB | Facility: CLINIC | Age: 74
End: 2024-01-25
Payer: COMMERCIAL

## 2024-01-25 ENCOUNTER — ANTICOAGULATION THERAPY VISIT (OUTPATIENT)
Dept: ANTICOAGULATION | Facility: CLINIC | Age: 74
End: 2024-01-25

## 2024-01-25 DIAGNOSIS — I63.439 CEREBROVASCULAR ACCIDENT (CVA) DUE TO EMBOLISM OF POSTERIOR CEREBRAL ARTERY WITH INFARCTIONS OF BOTH OCCIPITAL LOBES (H): Primary | ICD-10-CM

## 2024-01-25 DIAGNOSIS — I63.439 CEREBROVASCULAR ACCIDENT (CVA) DUE TO EMBOLISM OF POSTERIOR CEREBRAL ARTERY WITH INFARCTIONS OF BOTH OCCIPITAL LOBES (H): ICD-10-CM

## 2024-01-25 LAB — INR BLD: 2 (ref 0.9–1.1)

## 2024-01-25 PROCEDURE — 85610 PROTHROMBIN TIME: CPT

## 2024-01-25 PROCEDURE — 36415 COLL VENOUS BLD VENIPUNCTURE: CPT

## 2024-01-25 NOTE — PROGRESS NOTES
ANTICOAGULATION MANAGEMENT     Ricky Brown 73 year old male is on warfarin with therapeutic INR result. (Goal INR 2.0-3.0)    Recent labs: (last 7 days)     01/25/24  1637   INR 2.0*       ASSESSMENT     Source(s): Chart Review and Patient/Caregiver Call     Warfarin doses taken: Warfarin taken as instructed  Diet: No new diet changes identified  Medication/supplement changes: None noted  New illness, injury, or hospitalization: No  Signs or symptoms of bleeding or clotting: No  Previous result: Subtherapeutic  Additional findings: None       PLAN     Recommended plan for no diet, medication or health factor changes affecting INR     Dosing Instructions: Continue your current warfarin dose with next INR in 2 weeks       Summary  As of 1/25/2024      Full warfarin instructions:  6.25 mg every Sun, Wed; 7.5 mg all other days   Next INR check:  2/5/2024               Telephone call with Ricky who verbalizes understanding and agrees to plan and who agrees to plan and repeated back plan correctly    Lab visit scheduled    Education provided:   Contact [phone]  with any changes, questions or concerns.     Plan made per [facility] anticoagulation protocol    Oneida Matos, RN  Anticoagulation Clinic  1/25/2024    _______________________________________________________________________     Anticoagulation Episode Summary       Current INR goal:  2.0-3.0   TTR:  45.5% (4.6 mo)   Target end date:  Indefinite   Send INR reminders to:  ADONAY JAMISON    Indications    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 (H) [I63.439]             Comments:               Anticoagulation Care Providers       Provider Role Specialty Phone number    Holland Blunt MD Referring Internal Medicine [phone]

## 2024-02-05 ENCOUNTER — ANTICOAGULATION THERAPY VISIT (OUTPATIENT)
Dept: ANTICOAGULATION | Facility: CLINIC | Age: 74
End: 2024-02-05

## 2024-02-05 ENCOUNTER — OFFICE VISIT (OUTPATIENT)
Dept: INTERNAL MEDICINE | Facility: CLINIC | Age: 74
End: 2024-02-05
Payer: COMMERCIAL

## 2024-02-05 ENCOUNTER — LAB (OUTPATIENT)
Dept: LAB | Facility: CLINIC | Age: 74
End: 2024-02-05
Payer: COMMERCIAL

## 2024-02-05 VITALS
WEIGHT: 158 LBS | HEART RATE: 64 BPM | SYSTOLIC BLOOD PRESSURE: 138 MMHG | TEMPERATURE: 97.7 F | DIASTOLIC BLOOD PRESSURE: 74 MMHG | BODY MASS INDEX: 24.75 KG/M2 | OXYGEN SATURATION: 98 % | RESPIRATION RATE: 14 BRPM

## 2024-02-05 DIAGNOSIS — I50.20 HFREF (HEART FAILURE WITH REDUCED EJECTION FRACTION) (H): ICD-10-CM

## 2024-02-05 DIAGNOSIS — G40.209 PARTIAL SYMPTOMATIC EPILEPSY WITH COMPLEX PARTIAL SEIZURES, NOT INTRACTABLE, WITHOUT STATUS EPILEPTICUS (H): ICD-10-CM

## 2024-02-05 DIAGNOSIS — Z29.11 NEED FOR VACCINATION AGAINST RESPIRATORY SYNCYTIAL VIRUS: ICD-10-CM

## 2024-02-05 DIAGNOSIS — I63.439 CEREBROVASCULAR ACCIDENT (CVA) DUE TO EMBOLISM OF POSTERIOR CEREBRAL ARTERY WITH INFARCTIONS OF BOTH OCCIPITAL LOBES (H): Primary | ICD-10-CM

## 2024-02-05 DIAGNOSIS — G47.33 OSA (OBSTRUCTIVE SLEEP APNEA): Chronic | ICD-10-CM

## 2024-02-05 DIAGNOSIS — Z23 NEED FOR SHINGLES VACCINE: ICD-10-CM

## 2024-02-05 DIAGNOSIS — I63.439 CEREBROVASCULAR ACCIDENT (CVA) DUE TO EMBOLISM OF POSTERIOR CEREBRAL ARTERY WITH INFARCTIONS OF BOTH OCCIPITAL LOBES (H): ICD-10-CM

## 2024-02-05 DIAGNOSIS — I42.9 CARDIOMYOPATHY, UNSPECIFIED TYPE (H): ICD-10-CM

## 2024-02-05 DIAGNOSIS — G56.01 RIGHT CARPAL TUNNEL SYNDROME: Primary | ICD-10-CM

## 2024-02-05 DIAGNOSIS — R39.9 LOWER URINARY TRACT SYMPTOMS (LUTS): ICD-10-CM

## 2024-02-05 LAB — INR BLD: 1.5 (ref 0.9–1.1)

## 2024-02-05 PROCEDURE — 99214 OFFICE O/P EST MOD 30 MIN: CPT | Performed by: INTERNAL MEDICINE

## 2024-02-05 PROCEDURE — 85610 PROTHROMBIN TIME: CPT

## 2024-02-05 PROCEDURE — 36416 COLLJ CAPILLARY BLOOD SPEC: CPT

## 2024-02-05 RX ORDER — RESPIRATORY SYNCYTIAL VIRUS VACCINE 120MCG/0.5
0.5 KIT INTRAMUSCULAR ONCE
Qty: 1 EACH | Refills: 0 | Status: CANCELLED | OUTPATIENT
Start: 2024-02-05 | End: 2024-02-05

## 2024-02-05 ASSESSMENT — ENCOUNTER SYMPTOMS: NUMBNESS: 1

## 2024-02-05 NOTE — PATIENT INSTRUCTIONS
We referred you to the [facility] for further evaluation and management of your persistent hand neurological symptoms  , if you don't hear from them to get an appointment within the next week , you are recommended to call    [facility] , number is [phone]

## 2024-02-05 NOTE — PROGRESS NOTES
Subjective   Ricky is a 73 year old, presenting in clinic w/ ongoing numbness radiating from R elbow to R hand and all 5 digits. Pt notes that it makes it difficult to differentiate items in pocket, unbutton shirt, zip coat, etc. Uninterested in OT consult at this time as he feels that he is able to manage symptoms at this time.     Of note upon chart review, pt underwent  two R carpal tunnel release surgery in June '21 and Oct '22, and EMG study in Mar '23. Unsure if there are any management options that we can offer in clinic at this time.     Pt notes he is open to neurology consult.     History of Present Illness       Back Pain:  He presents for follow up of back pain. Patient's back pain is a recurring problem.  Location of back pain:  Right middle of back and left middle of back  Description of back pain: dull ache  Back pain spreads: nowhere    Since patient first noticed back pain, pain is: always present, but gets better and worse  Does back pain interfere with his job:  Yes       CKD: He is not using over the counter pain medicine.     Heart Failure:  He presents for follow up of heart failure. He is experiencing shortness of breath with activity only, which is improved. He is not experiencing any lower extremity edema.   He has orthopenea and coughs at night. Patient is checking weight daily. He has recently had a weight decrease.  He has side effects from medications including cough.  He has had no other medical visits for heart failure since the last visit.    Hypertension: He presents for follow up of hypertension.  He does check blood pressure  regularly outside of the clinic. Outside blood pressures have been over 140/90. He follows a low salt diet.     He eats 2-3 servings of fruits and vegetables daily.He consumes 0 sweetened beverage(s) daily.He exercises with enough effort to increase his heart rate 60 or more minutes per day.  He exercises with enough effort to increase his heart rate 4  days per week. He is missing 1 dose(s) of medications per week.     Last Echo: Echo result w/o MOPS: Interpretation Summary 1. The left ventricle is normal in size. The visual ejection fraction isestimated at 45%. There is mild global hypokinesia of the left ventricle.2. Global peak LV longitudinal strain is averaged at -12%. This suggestsabnormal strain (normal <-18%).3. The right ventricle is normal in structure, function and size.4. There is moderate (2+) aortic regurgitation. Echo 6-29-22 showed EF 35-40%, 1-2+ AI.          Review of Systems  Constitutional, HEENT, cardiovascular, pulmonary, gi and gu systems are negative, except as otherwise noted.      Objective    /74   Pulse 64   Temp 97.7  F (36.5  C) (Temporal)   Resp 14   Wt 71.7 kg (158 lb)   SpO2 98%   BMI 24.75 kg/m    Body mass index is 24.75 kg/m .  Physical Exam   GENERAL: alert and no distress  NEURO: Decreased sensation and paresthesia to R hand and forearm; equal strength in both hands  MSK: normal muscle tone, no cyanosis, clubbing, or edema, no edema, peripheral pulses normal  PSYCH: Speech tangential at times and requiring redirection by provider    (G56.01) Right carpal tunnel syndrome  (primary encounter diagnosis)  Comment: Pt agreeable to referral  Plan: Continue to monitor and offer supportive therapies. Referral placed to [facility] for additional support     (I50.20) HFrEF (heart failure with reduced ejection fraction) (H)  Comment: Stable. Managed by cardiology.  Plan: Continue w/ current POC.    (I42.9) Cardiomyopathy, unspecified type (H)  Comment: Stable. Managed by cardiology.  Plan: Continue w/ current POC    (G40.209) Partial symptomatic epilepsy with complex partial seizures, not intractable, without status epilepticus (H)  Comment: Stable. Managed by neurology.  Plan: Continue w/ current POC    (Z23) Need for shingles vaccine  Comment: Pt uninterested in immunization at this time.  Plan: Plan to reassess at later  date.    (Z29.11) Need for vaccination against respiratory syncytial virus  Comment: Pt uninterested immunization at this time.  Plan: Plan to readdress at a later date.    Susie Carranza NP Student  Signed Electronically by: Holland Blunt MD    This patient office visit today was staffed with me, I did review the entire clinical presentation and history, exam and medical decision making with DNP student Susie Carranza  I agree with and have approved the office visit entirely. Patient seen with me and DARIEL student Susie Carranza together today. I agree with assessment and plans.

## 2024-02-05 NOTE — PROGRESS NOTES
72-year-old man with chief complaint of numbness of the entire right hand over all 5 fingers.  He began having those symptoms a few years ago.  In June 2021 he underwent an open right carpal tunnel release surgery, but this did not lead to any improvement of his symptoms.  There were 2 EMG studies available, one prior to surgery from 6/2021, and the second one after surgery in 3/2022.  The two studies showed equally prolonged right median motor distal latency at the wrist, and equally attenuated right median sensory conduction velocity at the wrist, without improvement after the surgery.  He then underwent a repeat carpal tunnel open release surgery in October 2022, with with hypothenar flap creation, and also in situ decompression of the right ulnar nerve at the cubital tunnel.  Unfortunately, he had minimal to no improvement of his symptoms even after the second surgery.  A third EMG study was performed on March 24, 2023.  The right median motor distal latency appeared somewhat improved compared to the previous studies.  The right median antidromic sensory nerve conduction study was same or minimally better.  Ultrasonographic study of the right median and ulnar nerves was requested to clarify if there is any residual structural nerve lesion at the right wrist or elbow, or additional lesions of the median and ulnar nerves at different sites.     Wt Readings from Last 5 Encounters:   02/05/24 71.7 kg (158 lb)   12/12/23 73.2 kg (161 lb 6.4 oz)   11/03/23 73.4 kg (161 lb 14.4 oz)   08/08/23 75.3 kg (166 lb)   03/07/23 76.8 kg (169 lb 4.8 oz)     Body mass index is 24.75 kg/m .    During this discussion he received and took a telephone call to him from the INR / coumadin monitoring nurse and they discussed his INR and coumadin dosing.    He discusses his vitamin B 12 and he will be taking 800 micrograms now [ with over the counter nonprescription medication ]    He feels his use of the [employer]  implantable treatment is working well.    We discussed the RSV vaccine and the ShingRx vaccination against herpes zoster whic he declines today     Answers submitted by the patient for this visit:  Hypertension Visit (Submitted on 2/5/2024)  Chief Complaint: Chronic problems general questions HPI Form  Do you check your blood pressure regularly outside of the clinic?: Yes  Are your blood pressures ever more than 140 on the top number (systolic) OR more than 90 on the bottom number (diastolic)? (For example, greater than 140/90): Yes  Are you following a low salt diet?: Yes  Heart Failure Visit (Submitted on 2/5/2024)  Chief Complaint: Chronic problems general questions HPI Form  Dyspnea:: with activity only  Status:: improved  Edema:: No  Are you using more pillows than usual at night?: Yes  Do you cough at night?: Yes  Checking weight daily:: Yes  Weight change recently:: weight decrease  Heart Medication Side Effects:: cough  Frequency:: None  CKD Visit (Submitted on 2/5/2024)  Chief Complaint: Chronic problems general questions HPI Form  Do you take any over the counter pain medicine?  : No  Back Pain Visit Questionnaire (Submitted on 2/5/2024)  Your back pain is: recurring  Chronic or Recurring Back Pain Visit Questionnaire (Submitted on 2/5/2024)  Where is your back pain located? : right middle of back, left middle of back  How would you describe your back pain? : dull ache  Where does your back pain spread? : nowhere  Since you noticed your back pain, how has it changed? : always present, but gets better and worse  Does your back pain interfere with your job?: Yes  General Questionnaire (Submitted on 2/5/2024)  Chief Complaint: Chronic problems general questions HPI Form  How many servings of fruits and vegetables do you eat daily?: 2-3  On average, how many sweetened beverages do you drink each day (Examples: soda, juice, sweet tea, etc.  Do NOT count diet or artificially sweetened beverages)?: 0  How many  minutes a day do you exercise enough to make your heart beat faster?: 60 or more  How many days a week do you exercise enough to make your heart beat faster?: 4  How many days per week do you miss taking your medication?: 1

## 2024-02-05 NOTE — PROGRESS NOTES
ANTICOAGULATION MANAGEMENT     Ricky Brown 73 year old male is on warfarin with subtherapeutic INR result. (Goal INR 2.0-3.0)    Recent labs: (last 7 days)     02/05/24  1254   INR 1.5*       ASSESSMENT     Source(s): Chart Review and Patient/Caregiver Call     Warfarin doses taken: More warfarin taken than planned which may be affecting INR and Missed dose(s) may be affecting INR  Diet: No new diet changes identified  Medication/supplement changes: None noted  New illness, injury, or hospitalization: No  Signs or symptoms of bleeding or clotting: No  Previous result: Therapeutic last visit; previously outside of goal range  Additional findings: None       PLAN     Recommended plan for temporary change(s) affecting INR     Dosing Instructions: booster dose then continue your current warfarin dose with next INR in 2 weeks . Patient reports he has been taking 7.5 mg daily.     Summary  As of 2/5/2024      Full warfarin instructions:  2/5: 12.5 mg; Otherwise 7.5 mg every day   Next INR check:  2/15/2024               Telephone call with Ricky who verbalizes understanding and agrees to plan    Lab visit scheduled    Education provided:   Contact [phone]  with any changes, questions or concerns.     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

## 2024-02-15 ENCOUNTER — LAB (OUTPATIENT)
Dept: LAB | Facility: CLINIC | Age: 74
End: 2024-02-15
Payer: COMMERCIAL

## 2024-02-15 ENCOUNTER — ANTICOAGULATION THERAPY VISIT (OUTPATIENT)
Dept: ANTICOAGULATION | Facility: CLINIC | Age: 74
End: 2024-02-15

## 2024-02-15 DIAGNOSIS — I63.439 CEREBROVASCULAR ACCIDENT (CVA) DUE TO EMBOLISM OF POSTERIOR CEREBRAL ARTERY WITH INFARCTIONS OF BOTH OCCIPITAL LOBES (H): Primary | ICD-10-CM

## 2024-02-15 DIAGNOSIS — I63.439 CEREBROVASCULAR ACCIDENT (CVA) DUE TO EMBOLISM OF POSTERIOR CEREBRAL ARTERY WITH INFARCTIONS OF BOTH OCCIPITAL LOBES (H): ICD-10-CM

## 2024-02-15 LAB — INR BLD: 2.4 (ref 0.9–1.1)

## 2024-02-15 PROCEDURE — 85610 PROTHROMBIN TIME: CPT

## 2024-02-15 PROCEDURE — 36416 COLLJ CAPILLARY BLOOD SPEC: CPT

## 2024-02-15 NOTE — PROGRESS NOTES
Patient called back. No changes and he has been taking his warfarin correctly. Next INR lab has been scheduled.    Jess Garcia RN, BSN, PHN

## 2024-02-15 NOTE — PROGRESS NOTES
ANTICOAGULATION MANAGEMENT     Ricky Brown 73 year old male is on warfarin with therapeutic INR result. (Goal INR 2.0-3.0)    Recent labs: (last 7 days)     02/15/24  1417   INR 2.4*       ASSESSMENT     Source(s): Chart Review  Previous INR was Subtherapeutic  Medication, diet, health changes since last INR chart reviewed; none identified         PLAN     Recommended plan for no diet, medication or health factor changes affecting INR     Dosing Instructions: Continue your current warfarin dose with next INR in 2-3 weeks       Summary  As of 2/15/2024      Full warfarin instructions:  7.5 mg every day   Next INR check:  3/7/2024               Detailed voice message left for Ricky with dosing instructions and follow up date.   [employer] message with dosing and follow up instructions    Contact [phone]  to schedule and with any changes, questions or concerns.     Education provided:   Please call back if any changes to your diet, medications or how you've been taking warfarin  Contact [phone]  with any changes, questions or concerns.     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

## 2024-02-26 ENCOUNTER — OFFICE VISIT (OUTPATIENT)
Dept: INTERNAL MEDICINE | Facility: CLINIC | Age: 74
End: 2024-02-26
Payer: COMMERCIAL

## 2024-02-26 VITALS
HEART RATE: 80 BPM | DIASTOLIC BLOOD PRESSURE: 73 MMHG | SYSTOLIC BLOOD PRESSURE: 137 MMHG | TEMPERATURE: 99.3 F | RESPIRATION RATE: 16 BRPM | OXYGEN SATURATION: 97 %

## 2024-02-26 DIAGNOSIS — I63.439 CEREBROVASCULAR ACCIDENT (CVA) DUE TO EMBOLISM OF POSTERIOR CEREBRAL ARTERY WITH INFARCTIONS OF BOTH OCCIPITAL LOBES (H): ICD-10-CM

## 2024-02-26 DIAGNOSIS — R48.0 DYSLEXIA: ICD-10-CM

## 2024-02-26 DIAGNOSIS — E53.8 LOW SERUM VITAMIN B12: ICD-10-CM

## 2024-02-26 DIAGNOSIS — Z23 NEED FOR COVID-19 VACCINE: ICD-10-CM

## 2024-02-26 DIAGNOSIS — Z29.11 NEED FOR VACCINATION AGAINST RESPIRATORY SYNCYTIAL VIRUS: ICD-10-CM

## 2024-02-26 DIAGNOSIS — F10.10 ETOH ABUSE: ICD-10-CM

## 2024-02-26 DIAGNOSIS — R41.3 MEMORY LOSS: Primary | ICD-10-CM

## 2024-02-26 LAB
TSH SERPL DL<=0.005 MIU/L-ACNC: 1.35 UIU/ML (ref 0.3–4.2)
VIT B12 SERPL-MCNC: 1028 PG/ML (ref 232–1245)

## 2024-02-26 PROCEDURE — 99214 OFFICE O/P EST MOD 30 MIN: CPT | Performed by: INTERNAL MEDICINE

## 2024-02-26 PROCEDURE — 82607 VITAMIN B-12: CPT | Performed by: INTERNAL MEDICINE

## 2024-02-26 PROCEDURE — 36415 COLL VENOUS BLD VENIPUNCTURE: CPT | Performed by: INTERNAL MEDICINE

## 2024-02-26 PROCEDURE — 84443 ASSAY THYROID STIM HORMONE: CPT | Performed by: INTERNAL MEDICINE

## 2024-02-26 RX ORDER — RESPIRATORY SYNCYTIAL VIRUS VACCINE 120MCG/0.5
0.5 KIT INTRAMUSCULAR ONCE
Qty: 1 EACH | Refills: 0 | Status: CANCELLED | OUTPATIENT
Start: 2024-02-26 | End: 2024-02-26

## 2024-02-26 NOTE — NURSING NOTE
Six Item Cognitive Impairment Test   (6CIT):    What year is it?                               Correct - 0 points  What month is it?                               Correct - 0 points    Give the patient an address to remember with five components:   [place] ( first and last name - 2 components)   [address]  (number and name of street - 2 components)   [place] ( city - 1 component)    About what time is it (within the hour)? Correct - 0 points  Count backwards from 20 to 1:   Correct - 0 points  Say the months of the year in reverse: Correct - 0 points  Repeat the address phrase:   4 errors - 8 points    Total 6CIT Score:      7/28    Interpretation: The 6CIT uses an inverse score and questions are weighted to produce a total out of 28. Scores of 0-7 are considered normal and 8 or more significant.    Advantages The test has high sensitivity without compromising specificity even in mild dementia. It is easy to translate linguistically and culturally.  Disadvantages The main disadvantage is in the scoring and weighting of the test, which is initially confusing, however computer models have simplified this greatly.    Probability Statistics: At the 7/8 cut off: Overall figures sensitivity 90% specificity 100%, in mild dementia sensitivity = 78% , specificity = 100%    Copyright 2000 The [facility], [facility], [place]. Courtesy of Dr. Dony Ruiz

## 2024-02-26 NOTE — PROGRESS NOTES
Assessment & Plan     Memory loss  Ricky Brown is a patient well known to me. He's had some ongoing concerns with his memory. We never have ordered actual neuropsychiatric tests before that I could find anyway. This patient has a history of such complaints before and  harbor a suspicion that his symptoms arose out of a history of significant Alcohol Use Disorder [AUD] . He has he says nothing more then 2 alcoholic beverages per day nowadays . We reviewed his 6 CIT dementia screen today which was slightly off [ an 8/28 score which is one step away from what is the accepted normal ]. Patient is interested to have the vitamin B 12  and TSH ( thyroid test ) tests done and to be referral for neuropsychiatric tests   - Adult Neuropsychology  Referral; Future  - TSH with free T4 reflex; Future  - Vitamin B12; Future  - TSH with free T4 reflex  - Vitamin B12    Dyslexia  Added as a point of historical importance   - Adult Neuropsychology  Referral; Future  - TSH with free T4 reflex; Future  - Vitamin B12; Future  - TSH with free T4 reflex  - Vitamin B12    history of ETOH abuse  As detailed above   - Adult Neuropsychology  Referral; Future  - TSH with free T4 reflex; Future  - Vitamin B12; Future  - TSH with free T4 reflex  - Vitamin B12    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 (H)  He takes warfarin which was prescribed for his cerebrovascular accident , chronic and enrolled in the anticoagulation clinic   - Adult Neuropsychology  Referral; Future  - TSH with free T4 reflex; Future  - Vitamin B12; Future  - TSH with free T4 reflex  - Vitamin B12    Low serum vitamin B12  Possibly a contributing factor to his cognitive dysfunction   - Adult Neuropsychology  Referral; Future  - TSH with free T4 reflex; Future  - Vitamin B12; Future  - TSH with free T4 reflex  - Vitamin B12    Need for  "vaccination against respiratory syncytial virus  Declines     Need for COVID-19 vaccine  Declines     Review of the result(s) of each unique test - neuropsychiatric tests   Prescription drug management  39 minutes spent by me on the date of the encounter doing chart review, history and exam, documentation and further activities per the note      BMI  Estimated body mass index is 24.75 kg/m  as calculated from the following:    Height as of 11/3/23: 1.702 m (5' 7\").    Weight as of 2/5/24: 71.7 kg (158 lb).         MEDICATIONS:  Continue current medications without change    Subjective   Ricky is a 73 year old, presenting for the following health issues:  Memory Loss        11/3/2023     1:24 PM   Additional Questions   Roomed by Freya EL LPN   Accompanied by Self     History of Present Illness       Reason for visit:  Memory    He eats 2-3 servings of fruits and vegetables daily.He consumes 0 sweetened beverage(s) daily.He exercises with enough effort to increase his heart rate 30 to 60 minutes per day.  He exercises with enough effort to increase his heart rate 4 days per week. He is missing 1 dose(s) of medications per week.     Memory concerns   Maybe few months 6 or more, it's getting worse  Forgetful around medications and computer passwords  Patient has concerns about is he getting dementia he asks ??  He does have a pill tray    Today we conducted a 6 CIT dementia screen and he scored an 8 which is nominally abnormal but according to the test form any abnormal score is considered valid.  Especially peoples names  Patient has 2 drinks a day. 2 drinks he is \" HAMMERED\".  He has a breathilyzer     Wt Readings from Last 5 Encounters:   02/05/24 71.7 kg (158 lb)   12/12/23 73.2 kg (161 lb 6.4 oz)   11/03/23 73.4 kg (161 lb 14.4 oz)   08/08/23 75.3 kg (166 lb)   03/07/23 76.8 kg (169 lb 4.8 oz)     Heaviest weight was 249 several years ago, 221 2 years ago. He explained to me this is intentional weight loss "       Review of Systems  Constitutional, HEENT, cardiovascular, pulmonary, gi and gu systems are negative, except as otherwise noted.      Objective    /73   Pulse 80   Temp 99.3  F (37.4  C) (Temporal)   Resp 16   SpO2 97%   There is no height or weight on file to calculate BMI.  Physical Exam   GENERAL: alert and no distress  EYES: Eyes grossly normal to inspection, PERRL and conjunctivae and sclerae normal  RESP: lungs clear to auscultation - no rales, rhonchi or wheezes  CV: regular rate and rhythm, normal S1 S2, no S3 or S4, no murmur, click or rub, no peripheral edema  MS: no gross musculoskeletal defects noted, no edema    Orders Placed This Encounter   Procedures    TSH with free T4 reflex    Vitamin B12    Adult Neuropsychology  Referral             Signed Electronically by: Holland Blunt MD

## 2024-02-26 NOTE — LETTER
February 28, 2024    Ricky ARI Brown  [address] [facility] [address]          Dear ,    We are writing to inform you of your test results.  All of these tests are within acceptable limits , things look good !       Resulted Orders   TSH with free T4 reflex   Result Value Ref Range    TSH 1.35 0.30 - 4.20 uIU/mL   Vitamin B12   Result Value Ref Range    Vitamin B12 1,028 232 - 1,245 pg/mL       If you have any questions or concerns, please call the clinic at the number listed above.       Sincerely,      Holland Blunt MD

## 2024-02-29 ENCOUNTER — OFFICE VISIT (OUTPATIENT)
Dept: INTERNAL MEDICINE | Facility: CLINIC | Age: 74
End: 2024-02-29
Payer: COMMERCIAL

## 2024-02-29 ENCOUNTER — TELEPHONE (OUTPATIENT)
Dept: INTERNAL MEDICINE | Facility: CLINIC | Age: 74
End: 2024-02-29

## 2024-02-29 VITALS
TEMPERATURE: 98.2 F | RESPIRATION RATE: 16 BRPM | SYSTOLIC BLOOD PRESSURE: 146 MMHG | OXYGEN SATURATION: 97 % | DIASTOLIC BLOOD PRESSURE: 78 MMHG | HEART RATE: 60 BPM

## 2024-02-29 DIAGNOSIS — R39.12 WEAK URINARY STREAM: ICD-10-CM

## 2024-02-29 DIAGNOSIS — Z29.11 NEED FOR VACCINATION AGAINST RESPIRATORY SYNCYTIAL VIRUS: ICD-10-CM

## 2024-02-29 DIAGNOSIS — R41.3 MEMORY IMPAIRMENT: Primary | ICD-10-CM

## 2024-02-29 PROCEDURE — 99214 OFFICE O/P EST MOD 30 MIN: CPT | Performed by: INTERNAL MEDICINE

## 2024-02-29 RX ORDER — RESPIRATORY SYNCYTIAL VIRUS VACCINE 120MCG/0.5
0.5 KIT INTRAMUSCULAR ONCE
Qty: 1 EACH | Refills: 0 | Status: CANCELLED | OUTPATIENT
Start: 2024-02-29 | End: 2024-02-29

## 2024-02-29 NOTE — TELEPHONE ENCOUNTER
Pt calling. States he was seen recently and was given a print out of medications. Pt reports he is having memory issues and is requesting clarification regarding his medications.     States he has a bottle of Folic acid and noticed Folic acid is not on his med list. RN advised that Folic acid was discontinued on 2/26/24 by Dr. Blunt. Pt will remove medication from his pills that are set up.     He noticed that the dose for Metoprolol states 2 tablets per day, and is taking 1 in am and 1 at night. Pill bottle says 24 hour dose, so pt is concerned about a double dose. Pt wanted to confirm with PCP that Metoprolol XL 25mg 1 tab bid is the correct dose for him. Pt mentioned that his BP was 146/80's today.     Routing to PCP to advise.    Lisset Hannon RN  [facility]

## 2024-02-29 NOTE — PROGRESS NOTES
"  Assessment & Plan     (R41.3) Memory impairment  (primary encounter diagnosis)  Comment: I am seeing this patient back only 3 days after the last office visit because patient I think takes things rather literally and when he had put the call in in order to get scheduled for neuropsychiatric tests , the wait time to see [facility] neuro-psychiatrist is over 8 months and next appointment was November 2024 without patient says \" they told me to see you back\" whereas I was trying to explain to the patient that this might have been adequately resolved with nothing more then a telephone call. At any rate, I am re-ordering neuropsychiatric tests but we will need to try [facility] or [facility]. I started with a referral  reordered with [facility] but we will need to call [facility] on patients behalf  Plan: referral to [facility] for memory concerns    (R39.12) Weak urinary stream  Comment: we reviewed his symptoms, and find that we are simply duplicating his care with urology and ultimately after a review of  patients last appointment with Dr. Chand , patient needs to schedule workup with this Dr. As patient says his current prescriptions which include Flomax [ tamsulosin ] and Detrol (Tolterodine Tartrate) are, according to the patient, not helping and so a further follow up with Dr. Chand is the logical next step   Plan: follow up with urology     (Z29.11) Need for vaccination against respiratory syncytial virus  Comment: declines   Plan: declines      Review of the result(s) of each unique test - pending neuropsychiatric tests   23 minutes spent by me on the date of the encounter doing chart review, history and exam, documentation and further activities per the note        Magaly García is a 73 year old, presenting for the following health issues:  No chief complaint on file.        11/3/2023     1:24 PM   Additional " Questions   Roomed by Freya EL LPN   Accompanied by Self     History of Present Illness       Reason for visit:  Memory    He eats 2-3 servings of fruits and vegetables daily.He consumes 0 sweetened beverage(s) daily.He exercises with enough effort to increase his heart rate 30 to 60 minutes per day.  He exercises with enough effort to increase his heart rate 4 days per week. He is missing 1 dose(s) of medications per week.     Patient is back because he website address told his neuropsychiatric tests couldn't be scheduled until November. Generally this need for finding a way to get a sooner appointment should not have escalated to needing an appointment     Leaking when I am peeing especially when I hear water running  Symptoms of over-active bladder   Every 2 hours peeing.  Especially every 2 hours while sleeping, lots of nocturia and had a urinary accident in his myke at night  Low pressure flow, sometimes breakup of the urinary stream, I asked if patient met with a urologist ? He did not remem     Regarding neuropsychiatric tests we will need to help this patient and find him a sooner appointment with either [facility] or [facility]         Review of Systems  Constitutional, HEENT, cardiovascular, pulmonary, gi and gu systems are negative, except as otherwise noted.      Objective    BP (!) 146/78   Pulse 60   Temp 98.2  F (36.8  C) (Temporal)   Resp 16   SpO2 97%   There is no height or weight on file to calculate BMI.  Physical Exam   GENERAL: alert and no distress  EYES: Eyes grossly normal to inspection  MS: no gross musculoskeletal defects noted, no edema  NEURO: Normal strength and tone, mentation intact and speech normal  PSYCH: mentation appears normal, affect normal/bright    Orders Placed This Encounter   Procedures    Adult Neuropsychology  Referral             Signed Electronically by: Holland Blunt MD

## 2024-02-29 NOTE — PATIENT INSTRUCTIONS
We reviewed your ongoing concerns with abnormal urinary flow pattern with still waking up roughly every 2 hours and having some slowing of the urinary stream  . You saw Dr. Robbin Mccarthy urologist with [facility] about these symptoms with an appointment with him in December 2023 he had added Detrol (Tolterodine Tartrate) to the Flomax [ tamsulosin ] and instructed you to make a follow up appointment with [facility] if these medications did not solve your problem - plan SCHEDULE FOLLOW UP APPOINTMENT with Dr. MCCARTHY    About memory issues - our office will do some contacting of [facility] and [facility] to find you a sooner appointment. We will goal for as soon as possible , nothing more then 2-4 weeks    Also see attached medication list and dosing information , etc.    Holland Blunt MD

## 2024-03-01 ENCOUNTER — TELEPHONE (OUTPATIENT)
Dept: INTERNAL MEDICINE | Facility: CLINIC | Age: 74
End: 2024-03-01

## 2024-03-01 NOTE — TELEPHONE ENCOUNTER
Notified patient of response from Dr. Blunt. Note that cardiology, Hilario Meza MD, is currently prescribing, metoprolol. Directed patient to contact cardiologist/office about how to take the metoprolol. Gave him their phone number.  Pt then states that he is taking folic acid and wondering what he is taking this for and if he should still be on this. This was prescribed by his neurologist, Dr. Ruiz. Directed him to call them with this question and gave him neurology's number.    Latesha Escalante RN

## 2024-03-01 NOTE — TELEPHONE ENCOUNTER
Thank you for the update  !     When it is the XL [ extended release ] form of metoprolol it is taken once a day only    Holland Blunt MD

## 2024-03-01 NOTE — TELEPHONE ENCOUNTER
"See below from Dr. Blunt's 2-29-24 appointment:    At any rate, I am re-ordering neuropsychiatric tests but we will need to try [facility] or [facility]. I started with a referral reordered with [facility] but we will need to call [facility] on patient's behalf.  Plan: referral to [facility] for memory concerns.    Referral was faxed to [facility] for neuropsych testing on 2-29-24.    Fax received back from them today stating \"[facility] does not accept outside orders for neuropsych testing\".    Referral printed and faxed to [facility] (ph: [phone], f: [phone]).      Vanesa Rene,     "

## 2024-03-14 ENCOUNTER — LAB (OUTPATIENT)
Dept: LAB | Facility: CLINIC | Age: 74
End: 2024-03-14
Payer: COMMERCIAL

## 2024-03-14 ENCOUNTER — ANTICOAGULATION THERAPY VISIT (OUTPATIENT)
Dept: ANTICOAGULATION | Facility: CLINIC | Age: 74
End: 2024-03-14

## 2024-03-14 DIAGNOSIS — I63.439 CEREBROVASCULAR ACCIDENT (CVA) DUE TO EMBOLISM OF POSTERIOR CEREBRAL ARTERY WITH INFARCTIONS OF BOTH OCCIPITAL LOBES (H): ICD-10-CM

## 2024-03-14 DIAGNOSIS — I63.439 CEREBROVASCULAR ACCIDENT (CVA) DUE TO EMBOLISM OF POSTERIOR CEREBRAL ARTERY WITH INFARCTIONS OF BOTH OCCIPITAL LOBES (H): Primary | ICD-10-CM

## 2024-03-14 LAB — INR BLD: 1.6 (ref 0.9–1.1)

## 2024-03-14 PROCEDURE — 36416 COLLJ CAPILLARY BLOOD SPEC: CPT

## 2024-03-14 PROCEDURE — 85610 PROTHROMBIN TIME: CPT

## 2024-03-14 NOTE — PROGRESS NOTES
ANTICOAGULATION MANAGEMENT     Ricky Brown 73 year old male is on warfarin with subtherapeutic INR result. (Goal INR 2.0-3.0)    Recent labs: (last 7 days)     03/14/24  1249   INR 1.6*       ASSESSMENT     Source(s): Chart Review and Patient/Caregiver Call     Warfarin doses taken: Missed dose(s) may be affecting INR  Diet: No new diet changes identified  Medication/supplement changes: None noted  New illness, injury, or hospitalization: No  Signs or symptoms of bleeding or clotting: No  Previous result: Therapeutic last visit; previously outside of goal range  Additional findings: None       PLAN     Recommended plan for temporary change(s) affecting INR     Dosing Instructions: booster dose then continue your current warfarin dose with next INR in 2 weeks       Summary  As of 3/14/2024      Full warfarin instructions:  3/14: 10 mg; Otherwise 7.5 mg every day   Next INR check:  3/28/2024               Telephone call with Ricky who verbalizes understanding and agrees to plan and who agrees to plan and repeated back plan correctly    Lab visit scheduled    Education provided:   Symptom monitoring: monitoring for clotting signs and symptoms    Plan made per [facility] anticoagulation protocol    Harika Deutsch RN  Anticoagulation Clinic  3/14/2024    _______________________________________________________________________     Anticoagulation Episode Summary       Current INR goal:  2.0-3.0   TTR:  43.5% (6.2 mo)   Target end date:  Indefinite   Send INR reminders to:  ADONAY JAMISON    Indications    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

## 2024-03-28 ENCOUNTER — LAB (OUTPATIENT)
Dept: LAB | Facility: CLINIC | Age: 74
End: 2024-03-28
Payer: COMMERCIAL

## 2024-03-28 ENCOUNTER — ANTICOAGULATION THERAPY VISIT (OUTPATIENT)
Dept: ANTICOAGULATION | Facility: CLINIC | Age: 74
End: 2024-03-28

## 2024-03-28 DIAGNOSIS — I63.439 CEREBROVASCULAR ACCIDENT (CVA) DUE TO EMBOLISM OF POSTERIOR CEREBRAL ARTERY WITH INFARCTIONS OF BOTH OCCIPITAL LOBES (H): Primary | ICD-10-CM

## 2024-03-28 DIAGNOSIS — I63.439 CEREBROVASCULAR ACCIDENT (CVA) DUE TO EMBOLISM OF POSTERIOR CEREBRAL ARTERY WITH INFARCTIONS OF BOTH OCCIPITAL LOBES (H): ICD-10-CM

## 2024-03-28 LAB — INR BLD: 2.3 (ref 0.9–1.1)

## 2024-03-28 PROCEDURE — 85610 PROTHROMBIN TIME: CPT

## 2024-03-28 PROCEDURE — 36416 COLLJ CAPILLARY BLOOD SPEC: CPT

## 2024-03-28 NOTE — PROGRESS NOTES
ANTICOAGULATION MANAGEMENT     Ricky Brown 73 year old male is on warfarin with therapeutic INR result. (Goal INR 2.0-3.0)    Recent labs: (last 7 days)     03/28/24  1710   INR 2.3*       ASSESSMENT     Source(s): Chart Review and Patient/Caregiver Call     Warfarin doses taken: Warfarin taken as instructed  Diet: No new diet changes identified  Medication/supplement changes: None noted  New illness, injury, or hospitalization: No  Signs or symptoms of bleeding or clotting: No  Previous result: Subtherapeutic  Additional findings: None       PLAN     Recommended plan for no diet, medication or health factor changes affecting INR     Dosing Instructions: Continue your current warfarin dose with next INR in 3 weeks       Summary  As of 3/28/2024      Full warfarin instructions:  7.5 mg every day   Next INR check:  4/18/2024               Telephone call with Ricky who verbalizes understanding and agrees to plan    Lab visit scheduled    Education provided:   Goal range and lab monitoring: goal range and significance of current result    Plan made per [facility] anticoagulation protocol    Kendra Jenkins RN  Anticoagulation Clinic  3/28/2024    _______________________________________________________________________     Anticoagulation Episode Summary       Current INR goal:  2.0-3.0   TTR:  43.4% (6.7 mo)   Target end date:  Indefinite   Send INR reminders to:  ADONAY JAMISON    Indications    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 (H) [I63.439]             Comments:               Anticoagulation Care Providers       Provider Role Specialty Phone number    Holland Blunt MD Referring Internal Medicine [phone]

## 2024-04-04 NOTE — TELEPHONE ENCOUNTER
Per staff message from Dr. Giles:    Bam Giles MD Zilbauer, Harika ABDULLAHI, RN  Approved for MAC only    Maikel      Staff message sent to scheduling team to switch the Pt to MAC.     Harika Barry, RN   Endoscopy Procedure Pre Assessment RN        6

## 2024-04-17 ENCOUNTER — OFFICE VISIT (OUTPATIENT)
Dept: FAMILY MEDICINE | Facility: CLINIC | Age: 74
End: 2024-04-17
Payer: COMMERCIAL

## 2024-04-17 VITALS
SYSTOLIC BLOOD PRESSURE: 138 MMHG | DIASTOLIC BLOOD PRESSURE: 66 MMHG | HEIGHT: 67 IN | WEIGHT: 155.2 LBS | RESPIRATION RATE: 14 BRPM | BODY MASS INDEX: 24.36 KG/M2 | TEMPERATURE: 98.3 F | OXYGEN SATURATION: 97 % | HEART RATE: 58 BPM

## 2024-04-17 DIAGNOSIS — N52.8 OTHER MALE ERECTILE DYSFUNCTION: ICD-10-CM

## 2024-04-17 PROCEDURE — 99213 OFFICE O/P EST LOW 20 MIN: CPT | Performed by: STUDENT IN AN ORGANIZED HEALTH CARE EDUCATION/TRAINING PROGRAM

## 2024-04-17 PROCEDURE — G2211 COMPLEX E/M VISIT ADD ON: HCPCS | Performed by: STUDENT IN AN ORGANIZED HEALTH CARE EDUCATION/TRAINING PROGRAM

## 2024-04-17 RX ORDER — SILDENAFIL 50 MG/1
100 TABLET, FILM COATED ORAL DAILY PRN
Qty: 6 TABLET | Refills: 12 | Status: SHIPPED | OUTPATIENT
Start: 2024-04-17 | End: 2024-07-25

## 2024-04-17 NOTE — PROGRESS NOTES
Assessment & Plan     (N52.8) Other male erectile dysfunction  Comment: Chronic, uncontrolled.  Patient requesting refill for Viagra at this time.  Risk and benefits discussed with patient.  Patient encouraged not to use old or  prescriptions  Plan: sildenafil (VIAGRA) 50 MG tablet            Dictation Disclaimer: Some of this Note has been completed with voice-recognition dictation software. Although errors are generally corrected real-time, there is the potential for a rare error to be present in the completed chart.     The longitudinal plan of care for the diagnosis(es)/condition(s) as documented were addressed during this visit. Due to the added complexity in care, I will continue to support Ricky in the subsequent management and with ongoing continuity of care.                    Subjective   Ricky is a 73 year old, presenting for the following health issues:  Refill Request        2024     9:33 AM   Additional Questions   Roomed by Radha     History of Present Illness       Reason for visit:  Viagra    He eats 2-3 servings of fruits and vegetables daily.He consumes 0 sweetened beverage(s) daily.He exercises with enough effort to increase his heart rate 60 or more minutes per day.  He exercises with enough effort to increase his heart rate 7 days per week. He is missing 1 dose(s) of medications per week.     Patient is requesting a refill on his Viagra medication. He stated he got a new girlfriend.     Patient is a 73-year-old male presenting for a prescription refill.  He states that he found a bottle of his old Viagra back dated to  and reports using the remaining tablet.  He states that he recently had a new girlfriend and noticed a little bit of erectile dysfunction and upon use of the Viagra was able to have a full erection.  Patient is requesting refills at this time.  Patient denies any chest pain, nausea, vomiting, shortness of breath, lightheadedness.                 ROS: 10  "point ROS neg other than the symptoms noted above in the HPI.        Objective    /66   Pulse 58   Temp 98.3  F (36.8  C) (Oral)   Resp 14   Ht 1.702 m (5' 7\")   Wt 70.4 kg (155 lb 3.2 oz)   SpO2 97%   BMI 24.31 kg/m    Body mass index is 24.31 kg/m .  Physical Exam   GENERAL: healthy, alert and no distress  EYES: Eyes grossly normal to inspection, PERRL and conjunctivae and sclerae normal  Neck: No visible JVD or lymphadenopathy   RESP: symmetrical rise in chest   CV: No peripheral edema notable   MS: no gross musculoskeletal defects noted  SKIN: no suspicious lesions or rashes  PSYCH: mentation appears normal, affect normal/bright              Signed Electronically by: JENNIFER BOSWELL MD    "

## 2024-04-18 ENCOUNTER — ANTICOAGULATION THERAPY VISIT (OUTPATIENT)
Dept: ANTICOAGULATION | Facility: CLINIC | Age: 74
End: 2024-04-18

## 2024-04-18 ENCOUNTER — LAB (OUTPATIENT)
Dept: LAB | Facility: CLINIC | Age: 74
End: 2024-04-18
Payer: COMMERCIAL

## 2024-04-18 DIAGNOSIS — I63.439 CEREBROVASCULAR ACCIDENT (CVA) DUE TO EMBOLISM OF POSTERIOR CEREBRAL ARTERY WITH INFARCTIONS OF BOTH OCCIPITAL LOBES (H): Primary | ICD-10-CM

## 2024-04-18 DIAGNOSIS — I63.439 CEREBROVASCULAR ACCIDENT (CVA) DUE TO EMBOLISM OF POSTERIOR CEREBRAL ARTERY WITH INFARCTIONS OF BOTH OCCIPITAL LOBES (H): ICD-10-CM

## 2024-04-18 LAB — INR BLD: 1.5 (ref 0.9–1.1)

## 2024-04-18 PROCEDURE — 85610 PROTHROMBIN TIME: CPT

## 2024-04-18 PROCEDURE — 36416 COLLJ CAPILLARY BLOOD SPEC: CPT

## 2024-04-18 NOTE — PROGRESS NOTES
ANTICOAGULATION MANAGEMENT     Ricky Brown 73 year old male is on warfarin with subtherapeutic INR result. (Goal INR 2.0-3.0)    Recent labs: (last 7 days)     04/18/24  1657   INR 1.5*       ASSESSMENT     Source(s): Chart Review and Patient/Caregiver Call     Warfarin doses taken: Missed dose(s) may be affecting INR  Diet: No new diet changes identified  Medication/supplement changes: None noted  New illness, injury, or hospitalization: No  Signs or symptoms of bleeding or clotting: No  Previous result: Therapeutic last visit; previously outside of goal range  Additional findings: None       PLAN     Recommended plan for temporary change(s) affecting INR     Dosing Instructions: booster dose then continue your current warfarin dose with next INR in 1 week       Summary  As of 4/18/2024      Full warfarin instructions:  4/18: 12.5 mg; Otherwise 7.5 mg every day   Next INR check:  4/25/2024               Telephone call with Ricky who verbalizes understanding and agrees to plan    Lab visit scheduled    Education provided:   Goal range and lab monitoring: goal range and significance of current result    Plan made per [facility] anticoagulation protocol    Kendra Jenkins RN  Anticoagulation Clinic  4/18/2024    _______________________________________________________________________     Anticoagulation Episode Summary       Current INR goal:  2.0-3.0   TTR:  42.9% (7.4 mo)   Target end date:  Indefinite   Send INR reminders to:  ADONAY JAMISON    Indications    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 (H) [I63.439]             Comments:               Anticoagulation Care Providers       Provider Role Specialty Phone number    Holland Blunt MD Referring Internal Medicine [phone]

## 2024-04-29 ENCOUNTER — MYC MEDICAL ADVICE (OUTPATIENT)
Dept: ANTICOAGULATION | Facility: CLINIC | Age: 74
End: 2024-04-29
Payer: COMMERCIAL

## 2024-05-06 ENCOUNTER — TELEPHONE (OUTPATIENT)
Dept: ANTICOAGULATION | Facility: CLINIC | Age: 74
End: 2024-05-06
Payer: COMMERCIAL

## 2024-05-06 NOTE — TELEPHONE ENCOUNTER
ANTICOAGULATION     Ricky Brown is overdue for an INR check.     Writer spoke with patient. He stated he is no longer willing to take warfarin and is frustrated  with his care team so is changing doctors. He said he stopped the warfarin already.    Will route to current PCP on file to address this. If patient is no longer willing to take his warfarin, he should be discharged from our services.    Jess Garcia RN

## 2024-05-07 NOTE — TELEPHONE ENCOUNTER
ANTICOAGULATION  MANAGEMENT    Ricky Brown is being discharged from the [facility] Anticoagulation Management Program ([facility]).    Reason for discharge:  patient refusing to take warfarin and PCP advised okay to discharge    Anticoagulation episode resolved, [facility] referral closed, and Standing order discontinued    If patient needs warfarin management in the future, please send a new referral    Jess Garcia RN

## 2024-07-19 ENCOUNTER — OFFICE VISIT (OUTPATIENT)
Dept: FAMILY MEDICINE | Facility: CLINIC | Age: 74
End: 2024-07-19
Payer: COMMERCIAL

## 2024-07-19 VITALS
OXYGEN SATURATION: 97 % | TEMPERATURE: 98.5 F | WEIGHT: 165.3 LBS | RESPIRATION RATE: 24 BRPM | HEART RATE: 68 BPM | HEIGHT: 67 IN | DIASTOLIC BLOOD PRESSURE: 70 MMHG | SYSTOLIC BLOOD PRESSURE: 143 MMHG | BODY MASS INDEX: 25.94 KG/M2

## 2024-07-19 DIAGNOSIS — G62.9 PERIPHERAL POLYNEUROPATHY: ICD-10-CM

## 2024-07-19 DIAGNOSIS — R39.12 WEAK URINARY STREAM: ICD-10-CM

## 2024-07-19 DIAGNOSIS — I42.9 CARDIOMYOPATHY, UNSPECIFIED TYPE (H): ICD-10-CM

## 2024-07-19 DIAGNOSIS — I25.10 CORONARY ARTERY DISEASE DUE TO LIPID RICH PLAQUE: Primary | ICD-10-CM

## 2024-07-19 DIAGNOSIS — I10 ESSENTIAL HYPERTENSION WITH GOAL BLOOD PRESSURE LESS THAN 140/90: ICD-10-CM

## 2024-07-19 DIAGNOSIS — I50.20 HFREF (HEART FAILURE WITH REDUCED EJECTION FRACTION) (H): ICD-10-CM

## 2024-07-19 DIAGNOSIS — E78.5 HYPERLIPIDEMIA LDL GOAL <70: ICD-10-CM

## 2024-07-19 DIAGNOSIS — I25.83 CORONARY ARTERY DISEASE DUE TO LIPID RICH PLAQUE: Primary | ICD-10-CM

## 2024-07-19 DIAGNOSIS — I25.10 CORONARY ARTERY DISEASE INVOLVING NATIVE CORONARY ARTERY OF NATIVE HEART WITHOUT ANGINA PECTORIS: ICD-10-CM

## 2024-07-19 LAB
BASOPHILS # BLD AUTO: 0 10E3/UL (ref 0–0.2)
BASOPHILS NFR BLD AUTO: 0 %
EOSINOPHIL # BLD AUTO: 0.1 10E3/UL (ref 0–0.7)
EOSINOPHIL NFR BLD AUTO: 2 %
ERYTHROCYTE [DISTWIDTH] IN BLOOD BY AUTOMATED COUNT: 12.9 % (ref 10–15)
FOLATE SERPL-MCNC: 32.1 NG/ML (ref 4.6–34.8)
HCT VFR BLD AUTO: 39 % (ref 40–53)
HGB BLD-MCNC: 13.2 G/DL (ref 13.3–17.7)
IMM GRANULOCYTES # BLD: 0 10E3/UL
IMM GRANULOCYTES NFR BLD: 0 %
LYMPHOCYTES # BLD AUTO: 1.3 10E3/UL (ref 0.8–5.3)
LYMPHOCYTES NFR BLD AUTO: 27 %
MCH RBC QN AUTO: 29.7 PG (ref 26.5–33)
MCHC RBC AUTO-ENTMCNC: 33.8 G/DL (ref 31.5–36.5)
MCV RBC AUTO: 88 FL (ref 78–100)
MONOCYTES # BLD AUTO: 0.6 10E3/UL (ref 0–1.3)
MONOCYTES NFR BLD AUTO: 12 %
NEUTROPHILS # BLD AUTO: 2.8 10E3/UL (ref 1.6–8.3)
NEUTROPHILS NFR BLD AUTO: 59 %
PLATELET # BLD AUTO: 234 10E3/UL (ref 150–450)
RBC # BLD AUTO: 4.45 10E6/UL (ref 4.4–5.9)
WBC # BLD AUTO: 4.8 10E3/UL (ref 4–11)

## 2024-07-19 PROCEDURE — 82607 VITAMIN B-12: CPT | Performed by: INTERNAL MEDICINE

## 2024-07-19 PROCEDURE — 36415 COLL VENOUS BLD VENIPUNCTURE: CPT | Performed by: INTERNAL MEDICINE

## 2024-07-19 PROCEDURE — 80053 COMPREHEN METABOLIC PANEL: CPT | Performed by: INTERNAL MEDICINE

## 2024-07-19 PROCEDURE — 99214 OFFICE O/P EST MOD 30 MIN: CPT | Mod: 25 | Performed by: INTERNAL MEDICINE

## 2024-07-19 PROCEDURE — 84443 ASSAY THYROID STIM HORMONE: CPT | Performed by: INTERNAL MEDICINE

## 2024-07-19 PROCEDURE — 85025 COMPLETE CBC W/AUTO DIFF WBC: CPT | Performed by: INTERNAL MEDICINE

## 2024-07-19 PROCEDURE — 82746 ASSAY OF FOLIC ACID SERUM: CPT | Performed by: INTERNAL MEDICINE

## 2024-07-19 PROCEDURE — 80061 LIPID PANEL: CPT | Performed by: INTERNAL MEDICINE

## 2024-07-19 PROCEDURE — G0439 PPPS, SUBSEQ VISIT: HCPCS | Performed by: INTERNAL MEDICINE

## 2024-07-19 RX ORDER — TAMSULOSIN HYDROCHLORIDE 0.4 MG/1
0.8 CAPSULE ORAL DAILY
Qty: 180 CAPSULE | Refills: 4 | Status: SHIPPED | OUTPATIENT
Start: 2024-07-19 | End: 2024-07-25

## 2024-07-19 RX ORDER — METOPROLOL SUCCINATE 25 MG/1
25 TABLET, EXTENDED RELEASE ORAL DAILY
Qty: 90 TABLET | Refills: 2 | Status: SHIPPED | OUTPATIENT
Start: 2024-07-19

## 2024-07-19 RX ORDER — METOPROLOL SUCCINATE 25 MG/1
25 TABLET, EXTENDED RELEASE ORAL 2 TIMES DAILY
Qty: 180 TABLET | Refills: 2 | Status: SHIPPED | OUTPATIENT
Start: 2024-07-19 | End: 2024-07-19

## 2024-07-19 SDOH — HEALTH STABILITY: PHYSICAL HEALTH: ON AVERAGE, HOW MANY DAYS PER WEEK DO YOU ENGAGE IN MODERATE TO STRENUOUS EXERCISE (LIKE A BRISK WALK)?: 2 DAYS

## 2024-07-19 ASSESSMENT — PAIN SCALES - GENERAL: PAINLEVEL: MODERATE PAIN (5)

## 2024-07-19 ASSESSMENT — SOCIAL DETERMINANTS OF HEALTH (SDOH): HOW OFTEN DO YOU GET TOGETHER WITH FRIENDS OR RELATIVES?: MORE THAN THREE TIMES A WEEK

## 2024-07-20 LAB
ALBUMIN SERPL BCG-MCNC: 4.2 G/DL (ref 3.5–5.2)
ALP SERPL-CCNC: 76 U/L (ref 40–150)
ALT SERPL W P-5'-P-CCNC: 12 U/L (ref 0–70)
ANION GAP SERPL CALCULATED.3IONS-SCNC: 10 MMOL/L (ref 7–15)
AST SERPL W P-5'-P-CCNC: 24 U/L (ref 0–45)
BILIRUB SERPL-MCNC: 0.4 MG/DL
BUN SERPL-MCNC: 15.6 MG/DL (ref 8–23)
CALCIUM SERPL-MCNC: 8.9 MG/DL (ref 8.8–10.4)
CHLORIDE SERPL-SCNC: 108 MMOL/L (ref 98–107)
CHOLEST SERPL-MCNC: 156 MG/DL
CREAT SERPL-MCNC: 0.91 MG/DL (ref 0.67–1.17)
EGFRCR SERPLBLD CKD-EPI 2021: 90 ML/MIN/1.73M2
FASTING STATUS PATIENT QL REPORTED: NO
FASTING STATUS PATIENT QL REPORTED: NO
GLUCOSE SERPL-MCNC: 99 MG/DL (ref 70–99)
HCO3 SERPL-SCNC: 26 MMOL/L (ref 22–29)
HDLC SERPL-MCNC: 44 MG/DL
LDLC SERPL CALC-MCNC: 60 MG/DL
NONHDLC SERPL-MCNC: 112 MG/DL
POTASSIUM SERPL-SCNC: 4.2 MMOL/L (ref 3.4–5.3)
PROT SERPL-MCNC: 6.9 G/DL (ref 6.4–8.3)
SODIUM SERPL-SCNC: 144 MMOL/L (ref 135–145)
TRIGL SERPL-MCNC: 259 MG/DL
TSH SERPL DL<=0.005 MIU/L-ACNC: 1.28 UIU/ML (ref 0.3–4.2)
VIT B12 SERPL-MCNC: 662 PG/ML (ref 232–1245)

## 2024-07-25 ENCOUNTER — APPOINTMENT (OUTPATIENT)
Dept: CT IMAGING | Facility: CLINIC | Age: 74
DRG: 287 | End: 2024-07-25
Attending: STUDENT IN AN ORGANIZED HEALTH CARE EDUCATION/TRAINING PROGRAM
Payer: COMMERCIAL

## 2024-07-25 ENCOUNTER — APPOINTMENT (OUTPATIENT)
Dept: GENERAL RADIOLOGY | Facility: CLINIC | Age: 74
DRG: 287 | End: 2024-07-25
Attending: EMERGENCY MEDICINE
Payer: COMMERCIAL

## 2024-07-25 ENCOUNTER — APPOINTMENT (OUTPATIENT)
Dept: CARDIOLOGY | Facility: CLINIC | Age: 74
DRG: 287 | End: 2024-07-25
Attending: STUDENT IN AN ORGANIZED HEALTH CARE EDUCATION/TRAINING PROGRAM
Payer: COMMERCIAL

## 2024-07-25 ENCOUNTER — HOSPITAL ENCOUNTER (INPATIENT)
Facility: CLINIC | Age: 74
LOS: 3 days | Discharge: HOME | DRG: 287 | End: 2024-07-28
Attending: EMERGENCY MEDICINE | Admitting: INTERNAL MEDICINE
Payer: COMMERCIAL

## 2024-07-25 DIAGNOSIS — I10 HYPERTENSION, ESSENTIAL: ICD-10-CM

## 2024-07-25 DIAGNOSIS — R07.9 CHEST PAIN, UNSPECIFIED TYPE: ICD-10-CM

## 2024-07-25 DIAGNOSIS — I25.10 CORONARY ARTERY DISEASE INVOLVING NATIVE CORONARY ARTERY OF NATIVE HEART WITHOUT ANGINA PECTORIS: Primary | Chronic | ICD-10-CM

## 2024-07-25 DIAGNOSIS — I21.4 NSTEMI (NON-ST ELEVATED MYOCARDIAL INFARCTION) (H): ICD-10-CM

## 2024-07-25 DIAGNOSIS — I50.20 HFREF (HEART FAILURE WITH REDUCED EJECTION FRACTION) (H): ICD-10-CM

## 2024-07-25 LAB
ALBUMIN SERPL BCG-MCNC: 3.9 G/DL (ref 3.5–5.2)
ALP SERPL-CCNC: 64 U/L (ref 40–150)
ALT SERPL W P-5'-P-CCNC: <5 U/L (ref 0–70)
ANION GAP SERPL CALCULATED.3IONS-SCNC: 11 MMOL/L (ref 7–15)
AST SERPL W P-5'-P-CCNC: 16 U/L (ref 0–45)
BASOPHILS # BLD AUTO: 0 10E3/UL (ref 0–0.2)
BASOPHILS NFR BLD AUTO: 1 %
BI-PLANE LVEF ECHO: NORMAL
BILIRUB SERPL-MCNC: 0.3 MG/DL
BUN SERPL-MCNC: 10.9 MG/DL (ref 8–23)
CALCIUM SERPL-MCNC: 8.7 MG/DL (ref 8.8–10.4)
CHLORIDE SERPL-SCNC: 108 MMOL/L (ref 98–107)
CHOLEST SERPL-MCNC: 141 MG/DL
CREAT SERPL-MCNC: 0.71 MG/DL (ref 0.67–1.17)
EGFRCR SERPLBLD CKD-EPI 2021: >90 ML/MIN/1.73M2
EOSINOPHIL # BLD AUTO: 0.1 10E3/UL (ref 0–0.7)
EOSINOPHIL NFR BLD AUTO: 2 %
ERYTHROCYTE [DISTWIDTH] IN BLOOD BY AUTOMATED COUNT: 13.3 % (ref 10–15)
GLUCOSE SERPL-MCNC: 103 MG/DL (ref 70–99)
HCO3 SERPL-SCNC: 23 MMOL/L (ref 22–29)
HCT VFR BLD AUTO: 38.8 % (ref 40–53)
HDLC SERPL-MCNC: 47 MG/DL
HGB BLD-MCNC: 12.3 G/DL (ref 13.3–17.7)
HOLD SPECIMEN: NORMAL
IMM GRANULOCYTES # BLD: 0 10E3/UL
IMM GRANULOCYTES NFR BLD: 1 %
LDLC SERPL CALC-MCNC: 71 MG/DL
LVEF ECHO: NORMAL
LYMPHOCYTES # BLD AUTO: 1.4 10E3/UL (ref 0.8–5.3)
LYMPHOCYTES NFR BLD AUTO: 33 %
MCH RBC QN AUTO: 28.7 PG (ref 26.5–33)
MCHC RBC AUTO-ENTMCNC: 31.7 G/DL (ref 31.5–36.5)
MCV RBC AUTO: 91 FL (ref 78–100)
MONOCYTES # BLD AUTO: 0.5 10E3/UL (ref 0–1.3)
MONOCYTES NFR BLD AUTO: 11 %
NEUTROPHILS # BLD AUTO: 2.4 10E3/UL (ref 1.6–8.3)
NEUTROPHILS NFR BLD AUTO: 52 %
NONHDLC SERPL-MCNC: 94 MG/DL
NRBC # BLD AUTO: 0 10E3/UL
NRBC BLD AUTO-RTO: 0 /100
NT-PROBNP SERPL-MCNC: 1943 PG/ML (ref 0–900)
PLATELET # BLD AUTO: 201 10E3/UL (ref 150–450)
POTASSIUM SERPL-SCNC: 3.7 MMOL/L (ref 3.4–5.3)
PROT SERPL-MCNC: 6.3 G/DL (ref 6.4–8.3)
RBC # BLD AUTO: 4.28 10E6/UL (ref 4.4–5.9)
SODIUM SERPL-SCNC: 142 MMOL/L (ref 135–145)
TRIGL SERPL-MCNC: 117 MG/DL
TROPONIN T BLD-MCNC: 0.28 UG/L
TROPONIN T SERPL HS-MCNC: 183 NG/L
TROPONIN T SERPL HS-MCNC: 41 NG/L
TROPONIN T SERPL HS-MCNC: 666 NG/L
TROPONIN T SERPL HS-MCNC: 770 NG/L
TROPONIN T SERPL HS-MCNC: 9 NG/L
TSH SERPL DL<=0.005 MIU/L-ACNC: 0.96 UIU/ML (ref 0.3–4.2)
UFH PPP CHRO-ACNC: 0.36 IU/ML
WBC # BLD AUTO: 4.4 10E3/UL (ref 4–11)

## 2024-07-25 PROCEDURE — 71275 CT ANGIOGRAPHY CHEST: CPT | Mod: 26 | Performed by: RADIOLOGY

## 2024-07-25 PROCEDURE — 71046 X-RAY EXAM CHEST 2 VIEWS: CPT | Mod: 26 | Performed by: RADIOLOGY

## 2024-07-25 PROCEDURE — 99291 CRITICAL CARE FIRST HOUR: CPT | Performed by: STUDENT IN AN ORGANIZED HEALTH CARE EDUCATION/TRAINING PROGRAM

## 2024-07-25 PROCEDURE — 250N000013 HC RX MED GY IP 250 OP 250 PS 637: Performed by: STUDENT IN AN ORGANIZED HEALTH CARE EDUCATION/TRAINING PROGRAM

## 2024-07-25 PROCEDURE — 85520 HEPARIN ASSAY: CPT | Performed by: STUDENT IN AN ORGANIZED HEALTH CARE EDUCATION/TRAINING PROGRAM

## 2024-07-25 PROCEDURE — 84484 ASSAY OF TROPONIN QUANT: CPT | Performed by: STUDENT IN AN ORGANIZED HEALTH CARE EDUCATION/TRAINING PROGRAM

## 2024-07-25 PROCEDURE — 80053 COMPREHEN METABOLIC PANEL: CPT | Performed by: EMERGENCY MEDICINE

## 2024-07-25 PROCEDURE — 36415 COLL VENOUS BLD VENIPUNCTURE: CPT | Performed by: STUDENT IN AN ORGANIZED HEALTH CARE EDUCATION/TRAINING PROGRAM

## 2024-07-25 PROCEDURE — C8929 TTE W OR WO FOL WCON,DOPPLER: HCPCS

## 2024-07-25 PROCEDURE — 250N000013 HC RX MED GY IP 250 OP 250 PS 637: Performed by: NURSE PRACTITIONER

## 2024-07-25 PROCEDURE — 93010 ELECTROCARDIOGRAM REPORT: CPT | Performed by: EMERGENCY MEDICINE

## 2024-07-25 PROCEDURE — 36415 COLL VENOUS BLD VENIPUNCTURE: CPT | Performed by: EMERGENCY MEDICINE

## 2024-07-25 PROCEDURE — 84484 ASSAY OF TROPONIN QUANT: CPT

## 2024-07-25 PROCEDURE — 71046 X-RAY EXAM CHEST 2 VIEWS: CPT

## 2024-07-25 PROCEDURE — 84484 ASSAY OF TROPONIN QUANT: CPT | Performed by: EMERGENCY MEDICINE

## 2024-07-25 PROCEDURE — 99285 EMERGENCY DEPT VISIT HI MDM: CPT | Performed by: EMERGENCY MEDICINE

## 2024-07-25 PROCEDURE — 120N000005 HC R&B MS OVERFLOW UMMC

## 2024-07-25 PROCEDURE — 255N000002 HC RX 255 OP 636: Performed by: STUDENT IN AN ORGANIZED HEALTH CARE EDUCATION/TRAINING PROGRAM

## 2024-07-25 PROCEDURE — 85025 COMPLETE CBC W/AUTO DIFF WBC: CPT | Performed by: EMERGENCY MEDICINE

## 2024-07-25 PROCEDURE — 99291 CRITICAL CARE FIRST HOUR: CPT | Mod: 25 | Performed by: STUDENT IN AN ORGANIZED HEALTH CARE EDUCATION/TRAINING PROGRAM

## 2024-07-25 PROCEDURE — 250N000011 HC RX IP 250 OP 636: Performed by: STUDENT IN AN ORGANIZED HEALTH CARE EDUCATION/TRAINING PROGRAM

## 2024-07-25 PROCEDURE — 82465 ASSAY BLD/SERUM CHOLESTEROL: CPT | Performed by: NURSE PRACTITIONER

## 2024-07-25 PROCEDURE — 250N000009 HC RX 250: Performed by: EMERGENCY MEDICINE

## 2024-07-25 PROCEDURE — 99285 EMERGENCY DEPT VISIT HI MDM: CPT | Mod: 25 | Performed by: EMERGENCY MEDICINE

## 2024-07-25 PROCEDURE — 250N000013 HC RX MED GY IP 250 OP 250 PS 637: Performed by: EMERGENCY MEDICINE

## 2024-07-25 PROCEDURE — 999N000208 ECHOCARDIOGRAM COMPLETE

## 2024-07-25 PROCEDURE — 71275 CT ANGIOGRAPHY CHEST: CPT

## 2024-07-25 PROCEDURE — 84443 ASSAY THYROID STIM HORMONE: CPT | Performed by: NURSE PRACTITIONER

## 2024-07-25 PROCEDURE — 84478 ASSAY OF TRIGLYCERIDES: CPT | Performed by: NURSE PRACTITIONER

## 2024-07-25 PROCEDURE — 99223 1ST HOSP IP/OBS HIGH 75: CPT | Mod: 25 | Performed by: INTERNAL MEDICINE

## 2024-07-25 PROCEDURE — 93005 ELECTROCARDIOGRAM TRACING: CPT | Performed by: EMERGENCY MEDICINE

## 2024-07-25 PROCEDURE — 93306 TTE W/DOPPLER COMPLETE: CPT | Mod: 26 | Performed by: STUDENT IN AN ORGANIZED HEALTH CARE EDUCATION/TRAINING PROGRAM

## 2024-07-25 PROCEDURE — 83880 ASSAY OF NATRIURETIC PEPTIDE: CPT | Performed by: EMERGENCY MEDICINE

## 2024-07-25 RX ORDER — ACETAMINOPHEN 325 MG/1
975 TABLET ORAL ONCE
Status: COMPLETED | OUTPATIENT
Start: 2024-07-25 | End: 2024-07-25

## 2024-07-25 RX ORDER — ASPIRIN 81 MG/1
324 TABLET, CHEWABLE ORAL ONCE
Status: COMPLETED | OUTPATIENT
Start: 2024-07-25 | End: 2024-07-25

## 2024-07-25 RX ORDER — TAMSULOSIN HYDROCHLORIDE 0.4 MG/1
0.8 CAPSULE ORAL EVERY EVENING
Status: DISCONTINUED | OUTPATIENT
Start: 2024-07-25 | End: 2024-07-28 | Stop reason: HOSPADM

## 2024-07-25 RX ORDER — HEPARIN SODIUM 10000 [USP'U]/100ML
0-5000 INJECTION, SOLUTION INTRAVENOUS CONTINUOUS
Status: DISCONTINUED | OUTPATIENT
Start: 2024-07-25 | End: 2024-07-27

## 2024-07-25 RX ORDER — ONDANSETRON 4 MG/1
4 TABLET, ORALLY DISINTEGRATING ORAL EVERY 6 HOURS PRN
Status: DISCONTINUED | OUTPATIENT
Start: 2024-07-25 | End: 2024-07-28 | Stop reason: HOSPADM

## 2024-07-25 RX ORDER — ACETAMINOPHEN 650 MG/1
650 SUPPOSITORY RECTAL EVERY 4 HOURS PRN
Status: DISCONTINUED | OUTPATIENT
Start: 2024-07-25 | End: 2024-07-28 | Stop reason: HOSPADM

## 2024-07-25 RX ORDER — METOPROLOL SUCCINATE 25 MG/1
25 TABLET, EXTENDED RELEASE ORAL EVERY EVENING
Status: DISCONTINUED | OUTPATIENT
Start: 2024-07-25 | End: 2024-07-28 | Stop reason: HOSPADM

## 2024-07-25 RX ORDER — ACETAMINOPHEN 325 MG/1
650 TABLET ORAL EVERY 4 HOURS PRN
Status: DISCONTINUED | OUTPATIENT
Start: 2024-07-25 | End: 2024-07-28 | Stop reason: HOSPADM

## 2024-07-25 RX ORDER — NITROGLYCERIN 0.4 MG/1
0.4 TABLET SUBLINGUAL EVERY 5 MIN PRN
Status: DISCONTINUED | OUTPATIENT
Start: 2024-07-25 | End: 2024-07-28 | Stop reason: HOSPADM

## 2024-07-25 RX ORDER — LIDOCAINE HYDROCHLORIDE 20 MG/ML
10 SOLUTION OROPHARYNGEAL ONCE
Status: COMPLETED | OUTPATIENT
Start: 2024-07-25 | End: 2024-07-25

## 2024-07-25 RX ORDER — ONDANSETRON 2 MG/ML
4 INJECTION INTRAMUSCULAR; INTRAVENOUS EVERY 6 HOURS PRN
Status: DISCONTINUED | OUTPATIENT
Start: 2024-07-25 | End: 2024-07-28 | Stop reason: HOSPADM

## 2024-07-25 RX ORDER — NITROGLYCERIN 0.4 MG/1
0.4 TABLET SUBLINGUAL EVERY 5 MIN PRN
Status: DISCONTINUED | OUTPATIENT
Start: 2024-07-25 | End: 2024-07-25

## 2024-07-25 RX ORDER — LOSARTAN POTASSIUM 25 MG/1
25 TABLET ORAL DAILY
Status: DISCONTINUED | OUTPATIENT
Start: 2024-07-25 | End: 2024-07-26

## 2024-07-25 RX ORDER — MAGNESIUM HYDROXIDE/ALUMINUM HYDROXICE/SIMETHICONE 120; 1200; 1200 MG/30ML; MG/30ML; MG/30ML
30 SUSPENSION ORAL EVERY 4 HOURS PRN
Status: DISCONTINUED | OUTPATIENT
Start: 2024-07-25 | End: 2024-07-28 | Stop reason: HOSPADM

## 2024-07-25 RX ORDER — SILDENAFIL 50 MG/1
50 TABLET, FILM COATED ORAL DAILY PRN
COMMUNITY
End: 2024-08-08

## 2024-07-25 RX ORDER — HYDROMORPHONE HCL IN WATER/PF 6 MG/30 ML
0.2 PATIENT CONTROLLED ANALGESIA SYRINGE INTRAVENOUS
Status: DISCONTINUED | OUTPATIENT
Start: 2024-07-25 | End: 2024-07-28 | Stop reason: HOSPADM

## 2024-07-25 RX ORDER — ASPIRIN 81 MG/1
81 TABLET ORAL DAILY
Status: DISCONTINUED | OUTPATIENT
Start: 2024-07-26 | End: 2024-07-28 | Stop reason: HOSPADM

## 2024-07-25 RX ORDER — ASPIRIN 81 MG/1
324 TABLET, CHEWABLE ORAL ONCE
Status: DISCONTINUED | OUTPATIENT
Start: 2024-07-25 | End: 2024-07-25

## 2024-07-25 RX ORDER — ACETAMINOPHEN 500 MG
1000 TABLET ORAL ONCE
Status: COMPLETED | OUTPATIENT
Start: 2024-07-25 | End: 2024-07-25

## 2024-07-25 RX ORDER — MAGNESIUM HYDROXIDE/ALUMINUM HYDROXICE/SIMETHICONE 120; 1200; 1200 MG/30ML; MG/30ML; MG/30ML
15 SUSPENSION ORAL ONCE
Status: COMPLETED | OUTPATIENT
Start: 2024-07-25 | End: 2024-07-25

## 2024-07-25 RX ORDER — LIDOCAINE 40 MG/G
CREAM TOPICAL
Status: DISCONTINUED | OUTPATIENT
Start: 2024-07-25 | End: 2024-07-28 | Stop reason: HOSPADM

## 2024-07-25 RX ORDER — IOPAMIDOL 755 MG/ML
90 INJECTION, SOLUTION INTRAVASCULAR ONCE
Status: COMPLETED | OUTPATIENT
Start: 2024-07-25 | End: 2024-07-25

## 2024-07-25 RX ORDER — TAMSULOSIN HYDROCHLORIDE 0.4 MG/1
0.8 CAPSULE ORAL EVERY EVENING
COMMUNITY
End: 2024-08-08

## 2024-07-25 RX ADMIN — ALUMINUM HYDROXIDE, MAGNESIUM HYDROXIDE, AND SIMETHICONE 15 ML: 200; 200; 20 SUSPENSION ORAL at 06:31

## 2024-07-25 RX ADMIN — NITROGLYCERIN 0.4 MG: 0.4 TABLET SUBLINGUAL at 11:34

## 2024-07-25 RX ADMIN — TAMSULOSIN HYDROCHLORIDE 0.8 MG: 0.4 CAPSULE ORAL at 19:17

## 2024-07-25 RX ADMIN — ASPIRIN 81 MG CHEWABLE TABLET 324 MG: 81 TABLET CHEWABLE at 11:33

## 2024-07-25 RX ADMIN — ACETAMINOPHEN 1000 MG: 500 TABLET ORAL at 11:33

## 2024-07-25 RX ADMIN — LOSARTAN POTASSIUM 25 MG: 25 TABLET, FILM COATED ORAL at 17:13

## 2024-07-25 RX ADMIN — NITROGLYCERIN 0.4 MG: 0.4 TABLET SUBLINGUAL at 11:59

## 2024-07-25 RX ADMIN — LIDOCAINE HYDROCHLORIDE 10 ML: 20 SOLUTION OROPHARYNGEAL at 06:31

## 2024-07-25 RX ADMIN — IOPAMIDOL 180 ML: 755 INJECTION, SOLUTION INTRAVENOUS at 14:47

## 2024-07-25 RX ADMIN — HEPARIN SODIUM 900 UNITS/HR: 10000 INJECTION, SOLUTION INTRAVENOUS at 15:40

## 2024-07-25 RX ADMIN — HUMAN ALBUMIN MICROSPHERES AND PERFLUTREN 5 ML: 10; .22 INJECTION, SOLUTION INTRAVENOUS at 13:38

## 2024-07-25 RX ADMIN — ACETAMINOPHEN 975 MG: 325 TABLET, FILM COATED ORAL at 07:47

## 2024-07-25 ASSESSMENT — ACTIVITIES OF DAILY LIVING (ADL)
ADLS_ACUITY_SCORE: 36

## 2024-07-25 ASSESSMENT — COLUMBIA-SUICIDE SEVERITY RATING SCALE - C-SSRS
2. HAVE YOU ACTUALLY HAD ANY THOUGHTS OF KILLING YOURSELF IN THE PAST MONTH?: NO
6. HAVE YOU EVER DONE ANYTHING, STARTED TO DO ANYTHING, OR PREPARED TO DO ANYTHING TO END YOUR LIFE?: NO
1. IN THE PAST MONTH, HAVE YOU WISHED YOU WERE DEAD OR WISHED YOU COULD GO TO SLEEP AND NOT WAKE UP?: NO

## 2024-07-25 NOTE — ED PROVIDER NOTES
"ED Provider Note  [facility]      History     Chief Complaint   Patient presents with    Chest Pain     Patient arrives with c/o chest pain starting at approximately 0400. He woke up to go to the bathroom and noticed 5/10 right upper chest pain. Pain does not radiate and has stayed in the same location. Patient reports pain is consistent and nothing makes it better or worse. Patient admits having a \"yellow pepper\" at dinner time and wonders if this may be an aggravating factor.      HPI  Ricky Brown is a 73 year old male who 73-year-old male presents to us with a chief complaint of chest pain.  He was fine when he went to bed.  He woke up to use the bathroom and started to have substernal chest pain that was also somewhat to the right of his chest.  No fevers.  No nausea or vomiting.  No change in pain with exertion.  No previous history of heart attack or stents.  He did have a yellow pepper this evening with dinner which he thinks may have caused his symptoms.    Past Medical History  Past Medical History:   Diagnosis Date    Arthritis     BCC (basal cell carcinoma)     right shoulder     Cardiomyopathy (H)     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 (H) 06/27/2022    Colon adenomas 09/20/2011    Coronary artery disease     Disorder of bursae and tendons in shoulder region 04/18/2014    ED (erectile dysfunction)     Fatty liver     HFrEF (heart failure with reduced ejection fraction) (H)     History of iritis, os 05/27/2019    HTN (hypertension)     Near syncope 02/10/2014    Noncompliance     Nonrheumatic aortic valve insufficiency     Obesity     JOSE JUAN (obstructive sleep apnea)     Partial symptomatic epilepsy with complex partial seizures, not intractable, without status epilepticus (H) 09/08/2022     Past Surgical History:   Procedure Laterality Date    ARTHROSCOPY SHOULDER BICEPS TENODESIS REPAIR  " 02/27/2014    Procedure: ARTHROSCOPY SHOULDER BICEPS TENODESIS REPAIR;;  Surgeon: Michael Hagen MD;  [facility]: [facility]    ARTHROSCOPY SHOULDER ROTATOR CUFF REPAIR  02/27/2014    Procedure: ARTHROSCOPY SHOULDER ROTATOR CUFF REPAIR;  Right Shoulder Arthroscopic Rotator Cuff Repair,  Subacromial Decompression, Biceps Tenodesis, Distal Clavicle Excision   ;  Surgeon: Michael Hagen MD;  Location: [facility]    BIOPSY      COLONOSCOPY  06/12/2018    COLONOSCOPY N/A 10/5/2023    Procedure: Colonoscopy;  Surgeon: Mary Floyd MD;  Location:  GI    CV CORONARY ANGIOGRAM N/A 08/08/2022    Procedure: Coronary Angiogram;  Surgeon: Ida Goddard MD;  Location: [facility] LAB CV    CV LEFT HEART CATH N/A 08/08/2022    Procedure: Left Heart Catheterization;  Surgeon: Ida Goddard MD;  Location: [facility] LAB CV    ENDOSCOPY DRUG INDUCED SLEEP N/A 09/16/2022    Procedure: DRUG INDUCED SLEEP ENDOSCOPY;  Surgeon: Dashawn Tanner MD;  Location: [facility] OR    EXCHANGE INTRAOCULAR LENS IMPLANT  2005    left eye - first, recentered PCL with iris fixation; then IOLX with ACL    EXTRACAPSULAR CATARACT EXTRATION WITH INTRAOCULAR LENS IMPLANT  2005    both eyes (elsewhere)    IMPLANT GENERATOR STIMULATOR (LOCATION) N/A 11/3/2022    Procedure: INSERTION, PULSE GENERATOR, NEUROSTIMULATOR;  Surgeon: Dashawn Tanner MD;  Location: [facility] OR    RELEASE CARPAL TUNNEL Right 08/13/2021    Procedure: RELEASE, RIGHT CARPAL TUNNEL;  Surgeon: Tony Bravo MD;  Location: [facility] OR    RELEASE CARPAL TUNNEL Right 10/26/2022    Procedure: RIGHT REVISION OPEN CARPAL TUNNEL RELEASE, HYPOTHENAR FAT FLAP,;  Surgeon: Vignesh Rhodes MD;  Location: [facility] OR    TRANSPOSITION ULNAR NERVE (ELBOW) Right 10/26/2022    Procedure: RIGHT ULNAR NERVE DECOMPRESSION AT THE ELBOW;  Surgeon: Vignesh Rhodes MD;  Location: [facility] OR    TURBINOPLASTY  12/11/2013    Procedure: TURBINOPLASTY;;  Surgeon: Lisset Parsons MD;   [facility]: [facility]    UVULOPALATOPHARYNGOPLASTY  2013    Procedure: UVULOPALATOPHARYNGOPLASTY;  Uvulopalatopharyngoplasty, Turbiante Reduction;  Surgeon: Lisset Parsons MD;  Location: [facility] OR     metoprolol succinate ER (TOPROL XL) 25 MG 24 hr tablet  sildenafil (VIAGRA) 50 MG tablet  tamsulosin (FLOMAX) 0.4 MG capsule      Allergies   Allergen Reactions    Lisinopril Cough    Perfume Other (See Comments)     Family History  Family History   Problem Relation Age of Onset    Diabetes Father         Old age Diabetes    Cerebrovascular Disease Father     Obesity Father     Heart Disease Father     Coronary Artery Disease Father     Hypertension Father     Lipids Sister     C.A.D. No family hx of     Cancer No family hx of     Glaucoma No family hx of     Macular Degeneration No family hx of     Anesthesia Reaction No family hx of     Deep Vein Thrombosis (DVT) No family hx of      Social History   Social History     Tobacco Use    Smoking status: Former     Current packs/day: 0.00     Average packs/day: 0.5 packs/day for 2.0 years (1.0 ttl pk-yrs)     Types: Cigarettes     Start date: 1994     Quit date: 1996     Years since quittin.6    Smokeless tobacco: Never   Vaping Use    Vaping status: Never Used   Substance Use Topics    Alcohol use: Yes     Alcohol/week: 8.3 standard drinks of alcohol     Comment: Evening Marie    Drug use: No      A medically appropriate review of systems was performed with pertinent positives and negatives noted in the HPI, and all other systems negative.    Physical Exam   BP: (!) 149/72  Pulse: 52  Temp: 98.3  F (36.8  C)  Resp: 16  SpO2: 98 %  Physical Exam  Vitals reviewed.   Constitutional:       General: He is not in acute distress.     Appearance: He is not diaphoretic.   HENT:      Head: Normocephalic and atraumatic.      Right Ear: External ear normal.      Left Ear: External ear normal.      Nose: Nose normal. No rhinorrhea.      Mouth/Throat:       Mouth: Mucous membranes are moist.   Eyes:      General: No scleral icterus.     Extraocular Movements: Extraocular movements intact.      Conjunctiva/sclera: Conjunctivae normal.   Cardiovascular:      Rate and Rhythm: Normal rate and regular rhythm.      Heart sounds: Normal heart sounds.   Pulmonary:      Effort: No respiratory distress.      Breath sounds: Normal breath sounds.   Abdominal:      General: Bowel sounds are normal.      Palpations: Abdomen is soft.      Tenderness: There is no abdominal tenderness.   Musculoskeletal:         General: No deformity. Normal range of motion.      Cervical back: Normal range of motion and neck supple.   Skin:     General: Skin is warm.      Findings: No rash.   Neurological:      Mental Status: Mental status is at baseline.   Psychiatric:         Mood and Affect: Mood normal.         Behavior: Behavior normal.           ED Course, Procedures, & Data      Procedures            EKG Interpretation:      Interpreted by Bam Valdivia DO  Time reviewed: 516  Symptoms at time of EKG: Chest pain  Rhythm: normal sinus   Rate: normal  Axis: normal  Ectopy: none  Conduction: Incomplete left bundle branch block  ST Segments/ T Waves: No ST-T wave changes  Q Waves: none  Comparison to prior: No old EKG available    Clinical Impression: normal EKG       Results for orders placed or performed during the hospital encounter of 07/25/24   EKG 12-lead, tracing only     Status: None (Preliminary result)   Result Value Ref Range    Systolic Blood Pressure  mmHg    Diastolic Blood Pressure  mmHg    Ventricular Rate 53 BPM    Atrial Rate 53 BPM    TX Interval 200 ms    QRS Duration 114 ms     ms    QTc 412 ms    P Axis 56 degrees    R AXIS -18 degrees    T Axis 3 degrees    Interpretation ECG       Sinus bradycardia  Incomplete left bundle branch block  Borderline ECG       Medications   alum & mag hydroxide-simethicone (MAALOX) suspension 15 mL (has no administration in time  range)   lidocaine (viscous) (XYLOCAINE) 2 % solution 10 mL (has no administration in time range)     Labs Ordered and Resulted from Time of ED Arrival to Time of ED Departure - No data to display  XR Chest 2 Views    (Results Pending)            Assessment & Plan    73-year-old male presents to us with chief complaint of chest pain.  Differential includes but not limited to acute coronary syndrome, dysrhythmia, angina, heartburn, musculoskeletal pain.  Vital signs today show hypertension but are otherwise unremarkable.  EKG shows no ST changes.  Patient was given a GI cocktail and labs were drawn as well as x-ray ordered.  GI cocktail did not seem to help with the pain so he was given oxycodone.  He noted he had taken Cialis this evening so nitroglycerin was avoided.  BMP and CBC were unremarkable.  X-ray was clear.  Troponin and BNP are still pending.  Patient care will be signed out to the oncoming physician.  I have reviewed the nursing notes. I have reviewed the findings, diagnosis, plan and need for follow up with the patient.    New Prescriptions    No medications on file     Diagnosis:  Chest pain    Bam Valdivia  [facility] EMERGENCY DEPARTMENT  7/25/2024     Bam Valdivia,   07/26/24 0049

## 2024-07-25 NOTE — ED PROVIDER NOTES
I took signout on the patient from Dr. Valdivia.  This is a 73M with chest pain. No major reported cardiac hx. Atypical pain, woke from sleep. Right chest. No dyspnea. No pleurisy. Ruling out ACS.    Plan:  Follow-up delta trop    11:25 AM severe lab delays affecting results.  Initial high sensitive troponin of 6 and was negative. BNP elevated. XR reassuring. An i-STAT troponin elevated at 0.28.  Full dose aspirin ordered.  Repeat EKG without signs of LEANDRO.  Discussed with cardiology staff Dr. Boothe. Will wait for repeat hs trop before deciding on heparin. I have also ordered echo.    11:49 AM Hs trop 9 -> 41. Rules in for acute myocardial injury. Discussed with Dr. Boothe, requests we put in consult and cardiology team will formally see to determine management plan.    1:15 PM Third troponin, Echo, and Triple Rule Out CT pending.  Cardiology consult pending.    1:46 PM patient may not be able to get coronary artery CT scan due to having taken Cialis in the last 48 hours, tolerating nitroglycerin in the ED.  Will proceed with a double rule out study if unable to image the coronaries.  Echo completed and results pending.    2:51 PM Third trop 180s. Discussed with Dr. Boothe. Will start heparin. Due to bed capacity will try to get admitted to [facility], if no capacity beds will admit locally to [facility] 1 and board.    3:27 PM [facility] is now bed negative with a waitlist. [facility] possible but no cath lab after 4PM, do not think that is safe dispo in case clinical situation changes and urgent cath is needed. Will admit to [facility] 1 under Dr. Boothe.             EKG Interpretation:      Interpreted by Mike Ford MD  Time reviewed: 1110  Symptoms at time of EKG: Chest pain   Rhythm: sinus bradycardia  Rate: 50-60  Axis: Normal  Ectopy: none  Conduction: 1st degree AV block  ST Segments/ T Waves: Non-specific ST-T wave changes  Q Waves: none  Comparison to prior: Unchanged    Clinical Impression: Sinus  bradycardia      --  Subsequent Critical Care Addendum (Modifier -25)  This patient had an Emergency Department evaluation and management performed by  Dr. Valdivia  at an earlier time that the patient did not require critical care. Subsequently, the patient's state changed such that critical care was medically necessary. The critical care provided was separate and distinct from the Emergency Department evaluation and management performed prior.     Based on my evaluation of the patient, Ricky Brown has  NSTEMI , which requires immediate intervention, and therefore he is critically ill.     The care team initiated medication therapy with IV heparin and consulted with Cardiology  to provide stabilization care. Due to the critical nature of this patient, I reassessed nursing observations, vital signs, 12 lead ECG analysis, mental status, neurologic status, and respiratory status multiple times prior to his disposition.     Time also spent performing documentation, discussion with family to obtain medical information for decision making, reviewing test results, discussion with consultants, and coordination of care.     Critical care time (excluding teaching time and procedures): 35 minutes.            Mike Ford MD  07/25/24 [id number]

## 2024-07-25 NOTE — Clinical Note
Per 6C - bed is not ready (although marked on Unit Manager as ready). Will call back when able to transport patient.

## 2024-07-25 NOTE — Clinical Note
dry, intact, no bleeding and no hematoma. 6 fr sheath removed from right radial artery. TR Band applied - 13 ml air in balloon. Hemostasis achieved. Splint applied for safety.

## 2024-07-25 NOTE — MEDICATION SCRIBE - ADMISSION MEDICATION HISTORY
Medication Scribe Admission Medication History    Admission medication history is complete. The information provided in this note is only as accurate as the sources available at the time of the update.    Information Source(s): Patient and CareEverywhere/SureScripts via in-person    Pertinent Information: None    Changes made to PTA medication list:  Added: Vitamin D W/ K, Vitamin A, Vitamin C, Vitamin B-12, Zinc  Deleted: None  Changed: Sildenafil 50 mg (2 tab PRN) --> Sildenafil 50 mg (1 tab PRN)    Allergies reviewed with patient and updates made in EHR: yes    Medication History Completed By: Juana Phillips 7/25/2024 11:52 AM    PTA Med List   Medication Sig Last Dose    Ascorbic Acid (VITAMIN C PO) Take 1 tablet by mouth every evening 7/24/2024 at pm    Cyanocobalamin (VITAMIN B-12 PO) Take 2 capsules by mouth every evening 7/24/2024 at pm    metoprolol succinate ER (TOPROL XL) 25 MG 24 hr tablet Take 1 tablet (25 mg) by mouth daily 7/24/2024 at pm    Multiple Vitamins-Minerals (ZINC PO) Take 1 tablet by mouth every evening 7/24/2024 at pm    sildenafil (VIAGRA) 50 MG tablet Take 50 mg by mouth daily as needed Past Week at unknown    tamsulosin (FLOMAX) 0.4 MG capsule Take 0.8 mg by mouth every evening 7/24/2024 at pm    VITAMIN A PO Take 1 tablet by mouth every evening 7/24/2024 at pm    VITAMIN D-VITAMIN K PO Take 1 tablet by mouth every evening 7/24/2024 at pm

## 2024-07-25 NOTE — ED TRIAGE NOTES
"Patient arrives with c/o chest pain starting at approximately 0400. He woke up to go to the bathroom and noticed 5/10 right upper chest pain. Pain does not radiate and has stayed in the same location. Patient reports pain is consistent and nothing makes it better or worse. Patient admits having a \"yellow pepper\" at dinner time and wonders if this may be an aggravating factor.      Triage Assessment (Adult)       Row Name 07/25/24 0518          Triage Assessment    Airway WDL WDL        Respiratory WDL    Respiratory WDL WDL        Skin Circulation/Temperature WDL    Skin Circulation/Temperature WDL WDL        Cardiac WDL    Cardiac WDL WDL        Peripheral/Neurovascular WDL    Peripheral Neurovascular WDL WDL        Cognitive/Neuro/Behavioral WDL    Cognitive/Neuro/Behavioral WDL WDL                     "

## 2024-07-25 NOTE — H&P
[facility]    Cardiology H/P Note-Cardiology      Date of Admission:  7/25/2024      Assessment & Plan: HVSL   Ricky Brown is a 73 year old male seen on 7/25/2024 for chest pain and troponin elevation. He has history of NICM (EF 45% on echocardiogram 09/2022 with LGE nonischemic fibrosis), moderate aortic insufficiency, CVA of embolic etiology, and JOSE JUAN with Inspire device. He presented with chest pain upon waking early this morning. EKG here was without evidence of acute ischemia, though in the ED his troponin did rise from 9 to 41 to 183, his BNP is also elevated at 1943. He received 324mg asa in the ED and was started on heparin after third troponin resulted.     Echocardiogram reveals EF of 35-40% with mild diffuse hypokinesis of the LV, no significant changes from last echocardiogram 09/2022. Will further assess for ischemic evaluation with coronary angiogram. Patient unfortunately took viagra, and would require a 48 hour washout prior to getting CTA coronary     # NSTEMI  # Nonischemic cardiomyopathy, EF 35-40% (echocardiogram 07/25/2024)  # Mild non-obstructive CAD  # Moderate aortic insufficiency  # Hypertension   # Hyperlipidemia  -Plan for coronary angiogram tomorrow, NPO midnight   -s/p aspirin load yesterday, will start daily aspirin  -Start losartan 25mg every day  -Continue Toprol XL 25mg every day  -Will add jardiance and MRA after catheterization as part of HFrEF GDMT  -Continue to trend troponin to peak  -start heparin       The patient's care was discussed with the Attending Physician, Dr. Boothe .    Lucia Moody  Visiting Medical Student   [facility]    I saw this patient together with medical student/resident, and performed an independent exam at the same time which confirmed findings. I have edited this note as appropriate to reflect our joint assessment/plan. Patient was also seen and  "discussed with attending physician, Dr. Boothe, who is in agreement with above.     Robbin Becker  Cardiology Fellow      Clinically Significant Risk Factors Present on Admission                  # Hypertension: Noted on problem list         # Overweight: Estimated body mass index is 25.89 kg/m  as calculated from the following:    Height as of 7/19/24: 1.702 m (5' 7\").    Weight as of 7/19/24: 75 kg (165 lb 4.8 oz).         ______________________________________________________________________    Chief Complaint   Chest Pain    History is obtained from the patient and chart review.     History of Present Illness   Ricky Brown is a 73 year old male who has NICM, EF 45% from echocardiogram 09/2022, aortic insufficiency, obstructive sleep apnea, and history of CVA among others who presented for chest pain. This began early morning 07/25 when he awoke to use the bathroom. The pain is centrally located and feels more like a pressure. He was previously diagnosed with systolic dysfunction and was on \"heart medications\", but he stopped those a while ago and doesn't give an explanation why.       He does follow with Dr. Sepulveda of cardiology. Last TTE in 09/2022. PET stress testing was done 08/2022 revealing global hypokinesis , normal coronary flow reserve with full results below.     Echocardiogram from 09/27/2022:  Interpretation Summary  1. The left ventricle is normal in size. The visual ejection fraction is  estimated at 45%. There is mild global hypokinesia of the left ventricle.  2. Global peak LV longitudinal strain is averaged at -12%. This suggests  abnormal strain (normal <-18%).  3. The right ventricle is normal in structure, function and size.  4. There is moderate (2+) aortic regurgitation.     Echo 6-29-22 showed EF 35-40%, 1-2+ AI.    Cardiac positron emission tomography 22 August 2022:  Stress Perfusion Ammonia Cardiac PET    No ischemia. Coronary flow reserve is normal in all vascular " distributions. (Concordant with cardiac catheterization 8/8/2022)  At rest there is a mild decrease in perfusion in the septal wall and inferior lateral wall.   This is in a nonvascular distribution. It does appears to correlate with fibrosis demonstrated on MR 7 May 2020 with late gadolinium enhancement in the septal and inferolateralwalls.  Cardiomyopathy - Global hypokinesis. Decreased ejection fraction 36% at rest and 40% at stress.  (Concordant with echocardiogram 29 June 2022)  FDG Cardiac PET - No cardiac FDG uptake to suggest active inflammation.   Cardiac prep was excellent with full myocardial glucose uptake suppression.    Left heart catheterization from 08/08/2022:  Coronary Findings  Diagnostic  Dominance: Right  Left Main   The vessel is large and is angiographically normal.      Left Anterior Descending   The vessel is moderate in size.   Prox LAD to Mid LAD lesion is 20% stenosed.      First Diagonal Branch   The vessel is large and is angiographically normal.      Left Circumflex   The vessel is large.      Right Coronary Artery   Prox RCA to Dist RCA lesion is 20% stenosed.          Past Medical History    Past Medical History:   Diagnosis Date    Arthritis     BCC (basal cell carcinoma)     right shoulder     Cardiomyopathy (H)     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 (H) 06/27/2022    Colon adenomas 09/20/2011    Coronary artery disease     Disorder of bursae and tendons in shoulder region 04/18/2014    ED (erectile dysfunction)     Fatty liver     HFrEF (heart failure with reduced ejection fraction) (H)     History of iritis, os 05/27/2019    HTN (hypertension)     Near syncope 02/10/2014    Noncompliance     Nonrheumatic aortic valve insufficiency     Obesity     JOSE JUAN (obstructive sleep apnea)     Partial symptomatic epilepsy with complex partial seizures, not intractable, without status epilepticus (H) 09/08/2022        Past Surgical History   Past Surgical History:   Procedure Laterality Date    ARTHROSCOPY SHOULDER BICEPS TENODESIS REPAIR  02/27/2014    Procedure: ARTHROSCOPY SHOULDER BICEPS TENODESIS REPAIR;;  Surgeon: Michael Hagen MD;  [facility]    ARTHROSCOPY SHOULDER ROTATOR CUFF REPAIR  02/27/2014    Procedure: ARTHROSCOPY SHOULDER ROTATOR CUFF REPAIR;  Right Shoulder Arthroscopic Rotator Cuff Repair,  Subacromial Decompression, Biceps Tenodesis, Distal Clavicle Excision   ;  Surgeon: Michael Hagen MD;  Location: [facility]    BIOPSY      COLONOSCOPY  06/12/2018    COLONOSCOPY N/A 10/5/2023    Procedure: Colonoscopy;  Surgeon: Mary Floyd MD;  Location:  GI    CV CORONARY ANGIOGRAM N/A 08/08/2022    Procedure: Coronary Angiogram;  Surgeon: Ida Goddard MD;  Location: [facility] CV    CV LEFT HEART CATH N/A 08/08/2022    Procedure: Left Heart Catheterization;  Surgeon: Ida Goddard MD;  Location: [facility] CV    ENDOSCOPY DRUG INDUCED SLEEP N/A 09/16/2022    Procedure: DRUG INDUCED SLEEP ENDOSCOPY;  Surgeon: Dashawn Tanner MD;  Location: [facility] OR    EXCHANGE INTRAOCULAR LENS IMPLANT  2005    left eye - first, recentered PCL with iris fixation; then IOLX with ACL    EXTRACAPSULAR CATARACT EXTRATION WITH INTRAOCULAR LENS IMPLANT  2005    both eyes (elsewhere)    IMPLANT GENERATOR STIMULATOR (LOCATION) N/A 11/3/2022    Procedure: INSERTION, PULSE GENERATOR, NEUROSTIMULATOR;  Surgeon: Dashawn Tanner MD;  Location: [facility] OR    RELEASE CARPAL TUNNEL Right 08/13/2021    Procedure: RELEASE, RIGHT CARPAL TUNNEL;  Surgeon: Tony Bravo MD;  Location: [facility] OR    RELEASE CARPAL TUNNEL Right 10/26/2022    Procedure: RIGHT REVISION OPEN CARPAL TUNNEL RELEASE, HYPOTHENAR FAT FLAP,;  Surgeon: Vignesh Rhodes MD;  Location: [facility]    TRANSPOSITION ULNAR NERVE (ELBOW) Right 10/26/2022    Procedure: RIGHT ULNAR NERVE DECOMPRESSION AT THE ELBOW;  Surgeon: Vignesh Rhodes  MD BRADLY;  Location: [facility]    TURBINOPLASTY  12/11/2013    Procedure: TURBINOPLASTY;;  Surgeon: Lisset Parsons MD;  Location: [facility]    UVULOPALATOPHARYNGOPLASTY  12/11/2013    Procedure: UVULOPALATOPHARYNGOPLASTY;  Uvulopalatopharyngoplasty, Turbiante Reduction;  Surgeon: Lisset Parsons MD;  Location: [facility] OR       Medications   I have reviewed this patient's current medications    Review of Systems    The 10 point Review of Systems is negative other than noted in the HPI or here.     Family and social history in HPI as pertinent.      Physical Exam   Vital Signs: Temp: 98  F (36.7  C) Temp src: Oral BP: 137/62 Pulse: 60   Resp: 20 SpO2: 99 % O2 Device: None (Room air)    Weight: 0 lbs 0 oz    General: Patient is in no distress..  HEENT: No scleral icterus. EOMs intact.   Resp: No increased work of breathing. Lungs are clear b/l. No cyanosis.    Cardio: regular rate and regular rhythm. Regular S1 and S2 without S3 or S4. Holosystolic murmur noted. No JVD. There is no edema.  pulses are intact in radial b/l.   Abd/: Abdomen is soft, nontender, nondistended.   MSK: No gross deformities.   Neuro/Psych: Patient is alert and comfortably conversant.      Medical Decision Making          Data     I have personally reviewed the following data over the past 24 hrs:    4.4  \   12.3 (L)   / 201     142 108 (H) 10.9 /  103 (H)   3.7 23 0.71 \     ALT: <5 AST: 16 AP: 64 TBILI: 0.3   ALB: 3.9 TOT PROTEIN: 6.3 (L) LIPASE: N/A     Trop: 41 (H) BNP: 1,943 (H)       EKG from 07/25 0515:      EKG from 07/25 1110:      CXR 2 Views 07/25/2024:  IMPRESSION: Normal heart size and pulmonary vascularity. Lungs are clear. Degenerative changes right shoulder. Probable old right clavicle fracture. Electronic device projects over the right anterior chest wall with lead tip extending into the right side   of the neck. No definite acute findings.    Echocardiogram from 07/25/2024:  Interpretation Summary  Left ventricular  function is decreased. The ejection fraction is 35-40%  (moderately reduced).Mild diffuse hypokinesis is present.  Global right ventricular function is borderline reduced.  Mild to moderate mitral insufficiency is present.  No pericardial effusion is present.

## 2024-07-25 NOTE — Clinical Note
Called to  RN Sis. All questions answered. All belongings sent with patient, including glasses. Patient transported to  via stretcher with CCL RN.

## 2024-07-25 NOTE — CONSULTS
Discharge Pharmacy Test Claim    Patient's [employer] Medicare Part D plan covers Jardiance, Farxiga and Entresto. Each has an expected monthly copay of $47.    Test Claim Copay   Jardiance 47.00   Farxiga 47.00   Entresto 47.00       Fara Smith  [facility] Liaison  Phone: [phone] Fax: [phone]  Available on Teams & Vocera

## 2024-07-26 LAB
ANION GAP SERPL CALCULATED.3IONS-SCNC: 9 MMOL/L (ref 7–15)
ATRIAL RATE - MUSE: 53 BPM
ATRIAL RATE - MUSE: 56 BPM
BUN SERPL-MCNC: 10.6 MG/DL (ref 8–23)
CALCIUM SERPL-MCNC: 7.8 MG/DL (ref 8.8–10.4)
CHLORIDE SERPL-SCNC: 107 MMOL/L (ref 98–107)
CREAT SERPL-MCNC: 0.71 MG/DL (ref 0.67–1.17)
DIASTOLIC BLOOD PRESSURE - MUSE: NORMAL MMHG
DIASTOLIC BLOOD PRESSURE - MUSE: NORMAL MMHG
EGFRCR SERPLBLD CKD-EPI 2021: >90 ML/MIN/1.73M2
ERYTHROCYTE [DISTWIDTH] IN BLOOD BY AUTOMATED COUNT: 13.4 % (ref 10–15)
GLUCOSE SERPL-MCNC: 89 MG/DL (ref 70–99)
HCO3 SERPL-SCNC: 24 MMOL/L (ref 22–29)
HCT VFR BLD AUTO: 35.9 % (ref 40–53)
HGB BLD-MCNC: 12.2 G/DL (ref 13.3–17.7)
HOLD SPECIMEN: NORMAL
INTERPRETATION ECG - MUSE: NORMAL
INTERPRETATION ECG - MUSE: NORMAL
MCH RBC QN AUTO: 29.8 PG (ref 26.5–33)
MCHC RBC AUTO-ENTMCNC: 34 G/DL (ref 31.5–36.5)
MCV RBC AUTO: 88 FL (ref 78–100)
P AXIS - MUSE: 56 DEGREES
P AXIS - MUSE: 62 DEGREES
PLATELET # BLD AUTO: 205 10E3/UL (ref 150–450)
POTASSIUM SERPL-SCNC: 4 MMOL/L (ref 3.4–5.3)
PR INTERVAL - MUSE: 200 MS
PR INTERVAL - MUSE: 212 MS
QRS DURATION - MUSE: 110 MS
QRS DURATION - MUSE: 114 MS
QT - MUSE: 428 MS
QT - MUSE: 440 MS
QTC - MUSE: 412 MS
QTC - MUSE: 413 MS
R AXIS - MUSE: -18 DEGREES
R AXIS - MUSE: 15 DEGREES
RBC # BLD AUTO: 4.1 10E6/UL (ref 4.4–5.9)
SODIUM SERPL-SCNC: 140 MMOL/L (ref 135–145)
SYSTOLIC BLOOD PRESSURE - MUSE: NORMAL MMHG
SYSTOLIC BLOOD PRESSURE - MUSE: NORMAL MMHG
T AXIS - MUSE: 3 DEGREES
T AXIS - MUSE: 43 DEGREES
TROPONIN T SERPL HS-MCNC: 555 NG/L
UFH PPP CHRO-ACNC: 0.19 IU/ML
UFH PPP CHRO-ACNC: 0.25 IU/ML
UFH PPP CHRO-ACNC: 0.34 IU/ML
VENTRICULAR RATE- MUSE: 53 BPM
VENTRICULAR RATE- MUSE: 56 BPM
WBC # BLD AUTO: 5.4 10E3/UL (ref 4–11)

## 2024-07-26 PROCEDURE — 250N000011 HC RX IP 250 OP 636: Performed by: STUDENT IN AN ORGANIZED HEALTH CARE EDUCATION/TRAINING PROGRAM

## 2024-07-26 PROCEDURE — 120N000005 HC R&B MS OVERFLOW UMMC

## 2024-07-26 PROCEDURE — 99233 SBSQ HOSP IP/OBS HIGH 50: CPT

## 2024-07-26 PROCEDURE — 80048 BASIC METABOLIC PNL TOTAL CA: CPT | Performed by: NURSE PRACTITIONER

## 2024-07-26 PROCEDURE — 36415 COLL VENOUS BLD VENIPUNCTURE: CPT | Performed by: INTERNAL MEDICINE

## 2024-07-26 PROCEDURE — 85014 HEMATOCRIT: CPT | Performed by: NURSE PRACTITIONER

## 2024-07-26 PROCEDURE — 250N000013 HC RX MED GY IP 250 OP 250 PS 637

## 2024-07-26 PROCEDURE — 84484 ASSAY OF TROPONIN QUANT: CPT | Performed by: NURSE PRACTITIONER

## 2024-07-26 PROCEDURE — 36415 COLL VENOUS BLD VENIPUNCTURE: CPT | Performed by: NURSE PRACTITIONER

## 2024-07-26 PROCEDURE — 85520 HEPARIN ASSAY: CPT | Performed by: INTERNAL MEDICINE

## 2024-07-26 PROCEDURE — 250N000013 HC RX MED GY IP 250 OP 250 PS 637: Performed by: NURSE PRACTITIONER

## 2024-07-26 RX ORDER — NALOXONE HYDROCHLORIDE 0.4 MG/ML
0.2 INJECTION, SOLUTION INTRAMUSCULAR; INTRAVENOUS; SUBCUTANEOUS
Status: DISCONTINUED | OUTPATIENT
Start: 2024-07-26 | End: 2024-07-28 | Stop reason: HOSPADM

## 2024-07-26 RX ORDER — NALOXONE HYDROCHLORIDE 0.4 MG/ML
0.4 INJECTION, SOLUTION INTRAMUSCULAR; INTRAVENOUS; SUBCUTANEOUS
Status: DISCONTINUED | OUTPATIENT
Start: 2024-07-26 | End: 2024-07-28 | Stop reason: HOSPADM

## 2024-07-26 RX ORDER — LOSARTAN POTASSIUM 25 MG/1
25 TABLET ORAL ONCE
Status: COMPLETED | OUTPATIENT
Start: 2024-07-26 | End: 2024-07-26

## 2024-07-26 RX ORDER — LOSARTAN POTASSIUM 50 MG/1
50 TABLET ORAL DAILY
Status: DISCONTINUED | OUTPATIENT
Start: 2024-07-27 | End: 2024-07-28 | Stop reason: HOSPADM

## 2024-07-26 RX ADMIN — LOSARTAN POTASSIUM 25 MG: 25 TABLET, FILM COATED ORAL at 08:04

## 2024-07-26 RX ADMIN — LOSARTAN POTASSIUM 25 MG: 25 TABLET, FILM COATED ORAL at 09:44

## 2024-07-26 RX ADMIN — HEPARIN SODIUM 1050 UNITS/HR: 10000 INJECTION, SOLUTION INTRAVENOUS at 10:31

## 2024-07-26 RX ADMIN — ASPIRIN 81 MG: 81 TABLET ORAL at 08:04

## 2024-07-26 RX ADMIN — TAMSULOSIN HYDROCHLORIDE 0.8 MG: 0.4 CAPSULE ORAL at 19:12

## 2024-07-26 RX ADMIN — METOPROLOL SUCCINATE 25 MG: 25 TABLET, EXTENDED RELEASE ORAL at 19:12

## 2024-07-26 ASSESSMENT — ACTIVITIES OF DAILY LIVING (ADL)
ADLS_ACUITY_SCORE: 36

## 2024-07-26 NOTE — PROGRESS NOTES
Interventional Cardiology Progress Note      Assessment & Plan:  Ricky Brown is a 73 year old male admitted on 7/25/2024 for chest pain and troponin elevation. He has history of NICM (EF 45% on echocardiogram 09/2022 with LGE nonischemic fibrosis), moderate aortic insufficiency, CVA of embolic etiology, and JOSE JUAN with Inspire device.     # NSTEMI  # Nonischemic cardiomyopathy, EF 35-40% (echocardiogram 07/25/2024)  # Moderate aortic insufficiency  # Hypertension   # Hyperlipidemia  Patient presented with chest pain upon waking early this morning. EKG here was without evidence of acute ischemia, though in the ED his troponin did rise from 9 to 41 to 183, his BNP is also elevated at 1943. He received 324mg asa in the ED and was started on heparin after third troponin resulted. Echocardiogram reveals EF of 35-40% with mild diffuse hypokinesis of the LV, no significant changes from last echocardiogram 09/2022. Will further assess for ischemic evaluation with coronary angiogram. Patient unfortunately took PDE-5i, and would require a 48 hour washout prior to angiogram.  - Plan for angiogram today pending bed availability  - Increase Losartan to 50mg daily   - Continue Toprol XL 25mg daily   - Plan to initiate SGLT2i and MRA after angiogram for GDMT  - Continue Heparin gtt until angiogram completed      Prophylaxis:  DVT: Heparin gtt    Diet: NPO for cath  Activity: As tolerated   Code Status: Full code   Disposition: 1-2 days pending angiogram     Patient discussed w/ Dr. Eubanks.      Dawn Maxwell, MS, PA-C  [facility] Structural/Interventional Cardiology   Pager [phone]/Vocera      Interval History:  Patient resting comfortably in bed on exam. He denies any pain or dyspnea. We had an in-depth conversation regarding the importance of managing blood pressure as patient does not feel his pressure is too high, though admits it frequently runs in 150s-160s if he does not do his breathing exercises.       Most recent vital  signs:  BP (!) 157/66   Pulse 53   Temp 97.7  F (36.5  C) (Oral)   Resp 16   Wt 79.3 kg (174 lb 12.8 oz)   SpO2 99%   BMI 27.38 kg/m    Temp:  [97.7  F (36.5  C)-98  F (36.7  C)] 97.7  F (36.5  C)  Pulse:  [48-62] 53  Resp:  [16-20] 16  BP: (137-157)/(56-75) 157/66  SpO2:  [97 %-99 %] 99 %  Wt Readings from Last 2 Encounters:   07/25/24 79.3 kg (174 lb 12.8 oz)   07/19/24 75 kg (165 lb 4.8 oz)       Intake/Output Summary (Last 24 hours) at 7/26/2024 0710  Last data filed at 7/26/2024 0600  Gross per 24 hour   Intake 480 ml   Output 200 ml   Net 280 ml       Physical exam:  General: In bed, in NAD  HEENT: EOMI, PERRLA, no scleral icterus or injection  CARDIAC: RRR, no m/r/g appreciated.   RESP: CTAB, no wheezes, rhonchi or crackles appreciated.  GI: NABS, NT/ND, no guarding or rebound  EXTREMITIES: NO LE edema  SKIN: No acute lesions appreciated  NEURO: Alert and oriented X3, no focal neurological deficits noted      Labs (Past three days):  CBC  Recent Labs   Lab 07/26/24  0631 07/25/24  0616 07/19/24  1603   WBC 5.4 4.4 4.8   RBC 4.10* 4.28* 4.45   HGB 12.2* 12.3* 13.2*   HCT 35.9* 38.8* 39.0*   MCV 88 91 88   MCH 29.8 28.7 29.7   MCHC 34.0 31.7 33.8   RDW 13.4 13.3 12.9    201 234     BMP  Recent Labs   Lab 07/25/24  0616 07/19/24  1603    144   POTASSIUM 3.7 4.2   CHLORIDE 108* 108*   CO2 23 26   ANIONGAP 11 10   * 99   BUN 10.9 15.6   CR 0.71 0.91   GFRESTIMATED >90 90   CATHERINE 8.7* 8.9     Troponins:   Lab Results   Component Value Date    CTROPT 555 (HH) 07/26/2024    CTROPT 666 (HH) 07/25/2024    CTROPT 770 (HH) 07/25/2024    CTROPT 183 (HH) 07/25/2024    CTROPT 41 (H) 07/25/2024        INRNo lab results found in last 7 days.  Liver panel  Recent Labs   Lab 07/25/24  0616 07/19/24  1603   PROTTOTAL 6.3* 6.9   ALBUMIN 3.9 4.2   BILITOTAL 0.3 0.4   ALKPHOS 64 76   AST 16 24   ALT <5 12       Imaging/procedure results:  EKG 12 Lead:       ECHO 7/25/24:  Interpretation Summary  Left  ventricular function is decreased. The ejection fraction is 35-40%  (moderately reduced).Mild diffuse hypokinesis is present.  Global right ventricular function is borderline reduced.  Mild to moderate mitral insufficiency is present.  No pericardial effusion is present.  ______________________________________________________________________________  Left Ventricle  Left ventricular size is normal. Left ventricular wall thickness is normal.  Biplane LVEF is 38%. Left ventricular diastolic function is abnormal. Left  ventricular function is decreased. The ejection fraction is 35-40% (moderately  reduced). The Ejection Fraction was calculated using Bi-plane contrast. Mild  diffuse hypokinesis is present.     Right Ventricle  The right ventricle is normal size. Global right ventricular function is  borderline reduced.     Atria  Both atria appear normal.     Mitral Valve  Mild to moderate mitral insufficiency is present.     Aortic Valve  Trace aortic insufficiency is present.     Tricuspid Valve  Trace tricuspid insufficiency is present. The peak velocity of the tricuspid  regurgitant jet is not obtainable. Pulmonary artery systolic pressure cannot  be assessed.     Pulmonic Valve  The pulmonic valve is normal. Trace pulmonic insufficiency is present.     Vessels  The aorta root is normal. The thoracic aorta is normal. IVC diameter and  respiratory changes fall into an intermediate range suggesting an RA pressure  of 8 mmHg.     Pericardium  No pericardial effusion is present.     Miscellaneous  No significant valvular abnormalities present.     Compared to Previous Study  There has been no change.  ______________________________________________________________________________  MMode/2D Measurements & Calculations  IVSd: 0.98 cm  LVIDd: 5.4 cm  LVIDs: 4.0 cm  LVPWd: 1.0 cm  FS: 26.6 %  LV mass(C)d: 207.4 grams  LV mass(C)dI: 111.3 grams/m2  Ao root diam: 3.2 cm  asc Aorta Diam: 3.1 cm  LVOT diam: 2.4 cm  LVOT area:  4.3 cm2  Ao root diam index Ht(cm/m): 1.9  Ao root diam index BSA (cm/m2): 1.7  Asc Ao diam index BSA (cm/m2): 1.7  Asc Ao diam index Ht(cm/m): 1.8  EF Biplane: 38.6 %     LA Volume (BP): 43.1 ml  LA Volume Index (BP): 23.2 ml/m2  RWT: 0.38     Doppler Measurements & Calculations  MV E max evgeny: 35.6 cm/sec  MV A max evgeny: 74.4 cm/sec  MV E/A: 0.48  MV dec time: 0.33 sec  PI end-d evgeny: 139.6 cm/sec  E/E' av.8  Lateral E/e': 4.1     Medial E/e': 7.4       CT PE 24:  IMPRESSION:   1.  No pulmonary embolism. No thoracic aortic dissection or aneurysm.  2.  Contrast reflux into the hepatic veins with dilated main pulmonary  artery suggestive of pulmonary hypertension.  3.  Small bilateral pulmonary nodules as above. If there is a risk for  lung malignancy, recommend follow-up in 6-12 months.    Medical Decision Making       60 MINUTES SPENT BY ME on the date of service doing chart review, history, exam, documentation & further activities per the note.

## 2024-07-27 LAB
ABO/RH(D): NORMAL
ACT BLD: 165 SECONDS (ref 74–150)
ANION GAP SERPL CALCULATED.3IONS-SCNC: 9 MMOL/L (ref 7–15)
ANTIBODY SCREEN: NEGATIVE
BUN SERPL-MCNC: 13.8 MG/DL (ref 8–23)
CALCIUM SERPL-MCNC: 8.4 MG/DL (ref 8.8–10.4)
CHLORIDE SERPL-SCNC: 107 MMOL/L (ref 98–107)
CREAT SERPL-MCNC: 0.78 MG/DL (ref 0.67–1.17)
EGFRCR SERPLBLD CKD-EPI 2021: >90 ML/MIN/1.73M2
ERYTHROCYTE [DISTWIDTH] IN BLOOD BY AUTOMATED COUNT: 13.3 % (ref 10–15)
GLUCOSE SERPL-MCNC: 89 MG/DL (ref 70–99)
HCO3 SERPL-SCNC: 25 MMOL/L (ref 22–29)
HCT VFR BLD AUTO: 38 % (ref 40–53)
HGB BLD-MCNC: 12.8 G/DL (ref 13.3–17.7)
MCH RBC QN AUTO: 29.6 PG (ref 26.5–33)
MCHC RBC AUTO-ENTMCNC: 33.7 G/DL (ref 31.5–36.5)
MCV RBC AUTO: 88 FL (ref 78–100)
PLATELET # BLD AUTO: 221 10E3/UL (ref 150–450)
POTASSIUM SERPL-SCNC: 3.9 MMOL/L (ref 3.4–5.3)
RBC # BLD AUTO: 4.32 10E6/UL (ref 4.4–5.9)
SODIUM SERPL-SCNC: 141 MMOL/L (ref 135–145)
SPECIMEN EXPIRATION DATE: NORMAL
UFH PPP CHRO-ACNC: 0.38 IU/ML
WBC # BLD AUTO: 4.5 10E3/UL (ref 4–11)

## 2024-07-27 PROCEDURE — 120N000005 HC R&B MS OVERFLOW UMMC

## 2024-07-27 PROCEDURE — 85520 HEPARIN ASSAY: CPT | Performed by: INTERNAL MEDICINE

## 2024-07-27 PROCEDURE — 99152 MOD SED SAME PHYS/QHP 5/>YRS: CPT | Performed by: INTERNAL MEDICINE

## 2024-07-27 PROCEDURE — 80048 BASIC METABOLIC PNL TOTAL CA: CPT | Performed by: NURSE PRACTITIONER

## 2024-07-27 PROCEDURE — 250N000013 HC RX MED GY IP 250 OP 250 PS 637

## 2024-07-27 PROCEDURE — C1887 CATHETER, GUIDING: HCPCS | Performed by: INTERNAL MEDICINE

## 2024-07-27 PROCEDURE — 250N000011 HC RX IP 250 OP 636: Performed by: STUDENT IN AN ORGANIZED HEALTH CARE EDUCATION/TRAINING PROGRAM

## 2024-07-27 PROCEDURE — C1894 INTRO/SHEATH, NON-LASER: HCPCS | Performed by: INTERNAL MEDICINE

## 2024-07-27 PROCEDURE — 250N000011 HC RX IP 250 OP 636: Performed by: INTERNAL MEDICINE

## 2024-07-27 PROCEDURE — 86900 BLOOD TYPING SEROLOGIC ABO: CPT | Performed by: INTERNAL MEDICINE

## 2024-07-27 PROCEDURE — 272N000001 HC OR GENERAL SUPPLY STERILE: Performed by: INTERNAL MEDICINE

## 2024-07-27 PROCEDURE — 36415 COLL VENOUS BLD VENIPUNCTURE: CPT | Performed by: NURSE PRACTITIONER

## 2024-07-27 PROCEDURE — 99152 MOD SED SAME PHYS/QHP 5/>YRS: CPT | Mod: GC | Performed by: INTERNAL MEDICINE

## 2024-07-27 PROCEDURE — 93454 CORONARY ARTERY ANGIO S&I: CPT | Performed by: INTERNAL MEDICINE

## 2024-07-27 PROCEDURE — 85347 COAGULATION TIME ACTIVATED: CPT

## 2024-07-27 PROCEDURE — 93454 CORONARY ARTERY ANGIO S&I: CPT | Mod: 26 | Performed by: INTERNAL MEDICINE

## 2024-07-27 PROCEDURE — B2111ZZ FLUOROSCOPY OF MULTIPLE CORONARY ARTERIES USING LOW OSMOLAR CONTRAST: ICD-10-PCS | Performed by: INTERNAL MEDICINE

## 2024-07-27 PROCEDURE — 250N000009 HC RX 250: Performed by: INTERNAL MEDICINE

## 2024-07-27 PROCEDURE — 99232 SBSQ HOSP IP/OBS MODERATE 35: CPT | Mod: 25

## 2024-07-27 PROCEDURE — 250N000013 HC RX MED GY IP 250 OP 250 PS 637: Performed by: NURSE PRACTITIONER

## 2024-07-27 PROCEDURE — 85027 COMPLETE CBC AUTOMATED: CPT | Performed by: NURSE PRACTITIONER

## 2024-07-27 RX ORDER — POLYETHYLENE GLYCOL 3350 17 G/17G
17 POWDER, FOR SOLUTION ORAL DAILY PRN
Status: DISCONTINUED | OUTPATIENT
Start: 2024-07-27 | End: 2024-07-27

## 2024-07-27 RX ORDER — MAGNESIUM SULFATE HEPTAHYDRATE 40 MG/ML
2 INJECTION, SOLUTION INTRAVENOUS DAILY PRN
Status: DISCONTINUED | OUTPATIENT
Start: 2024-07-27 | End: 2024-07-27

## 2024-07-27 RX ORDER — OXYCODONE HYDROCHLORIDE 5 MG/1
5 TABLET ORAL EVERY 4 HOURS PRN
Status: DISCONTINUED | OUTPATIENT
Start: 2024-07-27 | End: 2024-07-28 | Stop reason: HOSPADM

## 2024-07-27 RX ORDER — FENTANYL CITRATE 50 UG/ML
25 INJECTION, SOLUTION INTRAMUSCULAR; INTRAVENOUS
Status: DISCONTINUED | OUTPATIENT
Start: 2024-07-27 | End: 2024-07-28 | Stop reason: HOSPADM

## 2024-07-27 RX ORDER — HEPARIN SODIUM 1000 [USP'U]/ML
INJECTION, SOLUTION INTRAVENOUS; SUBCUTANEOUS
Status: DISCONTINUED | OUTPATIENT
Start: 2024-07-27 | End: 2024-07-27 | Stop reason: HOSPADM

## 2024-07-27 RX ORDER — POTASSIUM CHLORIDE 29.8 MG/ML
20 INJECTION INTRAVENOUS
Status: DISCONTINUED | OUTPATIENT
Start: 2024-07-27 | End: 2024-07-27

## 2024-07-27 RX ORDER — ACETAMINOPHEN 650 MG/1
650 SUPPOSITORY RECTAL EVERY 6 HOURS PRN
Status: DISCONTINUED | OUTPATIENT
Start: 2024-07-27 | End: 2024-07-27

## 2024-07-27 RX ORDER — SPIRONOLACTONE 25 MG
12.5 TABLET ORAL DAILY
Status: DISCONTINUED | OUTPATIENT
Start: 2024-07-27 | End: 2024-07-28 | Stop reason: HOSPADM

## 2024-07-27 RX ORDER — NICOTINE POLACRILEX 4 MG
15-30 LOZENGE BUCCAL
Status: DISCONTINUED | OUTPATIENT
Start: 2024-07-27 | End: 2024-07-27

## 2024-07-27 RX ORDER — LIDOCAINE 40 MG/G
CREAM TOPICAL
Status: DISCONTINUED | OUTPATIENT
Start: 2024-07-27 | End: 2024-07-27

## 2024-07-27 RX ORDER — CHLORHEXIDINE GLUCONATE ORAL RINSE 1.2 MG/ML
15 SOLUTION DENTAL EVERY 12 HOURS
Status: DISCONTINUED | OUTPATIENT
Start: 2024-07-27 | End: 2024-07-27

## 2024-07-27 RX ORDER — NALOXONE HYDROCHLORIDE 0.4 MG/ML
0.4 INJECTION, SOLUTION INTRAMUSCULAR; INTRAVENOUS; SUBCUTANEOUS
Status: DISCONTINUED | OUTPATIENT
Start: 2024-07-27 | End: 2024-07-27

## 2024-07-27 RX ORDER — NALOXONE HYDROCHLORIDE 0.4 MG/ML
0.2 INJECTION, SOLUTION INTRAMUSCULAR; INTRAVENOUS; SUBCUTANEOUS
Status: DISCONTINUED | OUTPATIENT
Start: 2024-07-27 | End: 2024-07-27

## 2024-07-27 RX ORDER — ACETAMINOPHEN 325 MG/1
650 TABLET ORAL EVERY 6 HOURS PRN
Status: DISCONTINUED | OUTPATIENT
Start: 2024-07-27 | End: 2024-07-27

## 2024-07-27 RX ORDER — DEXTROSE MONOHYDRATE 25 G/50ML
25-50 INJECTION, SOLUTION INTRAVENOUS
Status: DISCONTINUED | OUTPATIENT
Start: 2024-07-27 | End: 2024-07-27

## 2024-07-27 RX ORDER — SODIUM CHLORIDE 9 MG/ML
75 INJECTION, SOLUTION INTRAVENOUS CONTINUOUS
Status: ACTIVE | OUTPATIENT
Start: 2024-07-27 | End: 2024-07-27

## 2024-07-27 RX ORDER — ATROPINE SULFATE 0.1 MG/ML
0.5 INJECTION INTRAVENOUS
Status: ACTIVE | OUTPATIENT
Start: 2024-07-27 | End: 2024-07-27

## 2024-07-27 RX ORDER — OXYCODONE HYDROCHLORIDE 10 MG/1
10 TABLET ORAL EVERY 4 HOURS PRN
Status: DISCONTINUED | OUTPATIENT
Start: 2024-07-27 | End: 2024-07-28 | Stop reason: HOSPADM

## 2024-07-27 RX ORDER — CEFTRIAXONE 2 G/1
2 INJECTION, POWDER, FOR SOLUTION INTRAMUSCULAR; INTRAVENOUS EVERY 24 HOURS
Status: DISCONTINUED | OUTPATIENT
Start: 2024-07-27 | End: 2024-07-27

## 2024-07-27 RX ORDER — DEXTROSE MONOHYDRATE 100 MG/ML
INJECTION, SOLUTION INTRAVENOUS CONTINUOUS PRN
Status: DISCONTINUED | OUTPATIENT
Start: 2024-07-27 | End: 2024-07-27

## 2024-07-27 RX ORDER — FLUMAZENIL 0.1 MG/ML
0.2 INJECTION, SOLUTION INTRAVENOUS
Status: ACTIVE | OUTPATIENT
Start: 2024-07-27 | End: 2024-07-27

## 2024-07-27 RX ORDER — MAGNESIUM SULFATE HEPTAHYDRATE 40 MG/ML
4 INJECTION, SOLUTION INTRAVENOUS EVERY 4 HOURS PRN
Status: DISCONTINUED | OUTPATIENT
Start: 2024-07-27 | End: 2024-07-27

## 2024-07-27 RX ORDER — IOPAMIDOL 755 MG/ML
INJECTION, SOLUTION INTRAVASCULAR
Status: DISCONTINUED | OUTPATIENT
Start: 2024-07-27 | End: 2024-07-27 | Stop reason: HOSPADM

## 2024-07-27 RX ORDER — FENTANYL CITRATE 50 UG/ML
INJECTION, SOLUTION INTRAMUSCULAR; INTRAVENOUS
Status: DISCONTINUED | OUTPATIENT
Start: 2024-07-27 | End: 2024-07-27 | Stop reason: HOSPADM

## 2024-07-27 RX ORDER — AMOXICILLIN 250 MG
1-2 CAPSULE ORAL 2 TIMES DAILY PRN
Status: DISCONTINUED | OUTPATIENT
Start: 2024-07-29 | End: 2024-07-27

## 2024-07-27 RX ADMIN — LOSARTAN POTASSIUM 50 MG: 50 TABLET, FILM COATED ORAL at 07:50

## 2024-07-27 RX ADMIN — EMPAGLIFLOZIN 10 MG: 10 TABLET, FILM COATED ORAL at 14:11

## 2024-07-27 RX ADMIN — ASPIRIN 81 MG: 81 TABLET ORAL at 07:50

## 2024-07-27 RX ADMIN — SPIRONOLACTONE 12.5 MG: 25 TABLET, FILM COATED ORAL at 14:11

## 2024-07-27 RX ADMIN — TAMSULOSIN HYDROCHLORIDE 0.8 MG: 0.4 CAPSULE ORAL at 20:44

## 2024-07-27 RX ADMIN — HEPARIN SODIUM 10.5 UNITS/HR: 10000 INJECTION, SOLUTION INTRAVENOUS at 10:20

## 2024-07-27 RX ADMIN — METOPROLOL SUCCINATE 25 MG: 25 TABLET, EXTENDED RELEASE ORAL at 20:44

## 2024-07-27 ASSESSMENT — ACTIVITIES OF DAILY LIVING (ADL)
ADLS_ACUITY_SCORE: 36
ADLS_ACUITY_SCORE: 30
ADLS_ACUITY_SCORE: 28
ADLS_ACUITY_SCORE: 30
ADLS_ACUITY_SCORE: 28
ADLS_ACUITY_SCORE: 30
ADLS_ACUITY_SCORE: 36
ADLS_ACUITY_SCORE: 28
ADLS_ACUITY_SCORE: 36
ADLS_ACUITY_SCORE: 30
ADLS_ACUITY_SCORE: 36
ADLS_ACUITY_SCORE: 30
ADLS_ACUITY_SCORE: 36
ADLS_ACUITY_SCORE: 36

## 2024-07-27 NOTE — PROVIDER NOTIFICATION
Provider notified regarding patients question about discharge. [facility] plans to discharge tomorrow. Plan communicated to patient.

## 2024-07-27 NOTE — DISCHARGE SUMMARY
[address] [address]  p: [phone]  Discharge Summary: Cardiology Service    Ricky Brown MRN# [id number]   YOB: 1950 Age: 73 year old       Admission Date: 7/25/2024  Discharge Date: 07/28/24    Discharge Diagnoses:  # Elevated Troponin, likely 2/2 increased demand   # Nonischemic cardiomyopathy, EF 35-40% (echocardiogram 07/25/2024)  # Moderate aortic insufficiency  # Hypertension   # Hyperlipidemia    Brief HPI:  Ricky Brown is a 73 year old male admitted on 7/25/2024 for chest pain and troponin elevation. He has history of NICM (EF 45% on echocardiogram 09/2022 with LGE nonischemic fibrosis), moderate aortic insufficiency, CVA of embolic etiology, and JOSE JUAN with Inspire device.     Hospital Course by Diagnosis:  # Elevated Troponin likely 2/2 increased demand  # Nonischemic cardiomyopathy, EF 35-40% (echocardiogram 07/25/2024)  # Moderate aortic insufficiency  # Hypertension   # Hyperlipidemia  Patient presented with chest pain upon waking early this morning. EKG here was without evidence of acute ischemia, though in the ED his troponin did rise from 9 to 41 to 183, his BNP is also elevated at 1943. He received 324mg asa in the ED and was started on heparin after third troponin resulted. Echocardiogram reveals EF of 35-40% with mild diffuse hypokinesis of the LV, no significant changes from last echocardiogram 09/2022. Will further assess for ischemic evaluation with coronary angiogram. Patient unfortunately took PDE-5i, and would require a 48 hour washout prior to angiogram.  - Coronary Angiogram showing only mild, non-obstructive CAD  - Avoid nitrates due to patient's frequent use of PDE5i  - Continue Losartan 50 mg daily   - Continue Toprol XL 25 mg daily   - Continue Spironolactone 12.5 mg daily   - Continue Jardiance 10mg daily   - Follow up with general cardiology ANTONIO in 2 weeks for post cath  visit      Pertinent Procedures:  1. Coronary Angiogram      Consults:  None    Medication Changes:  See Medication List Below    Discharge medications:   Current Discharge Medication List        START taking these medications    Details   aspirin 81 MG EC tablet Take 1 tablet (81 mg) by mouth daily  Qty: 90 tablet, Refills: 3    Associated Diagnoses: Coronary artery disease involving native coronary artery of native heart without angina pectoris      empagliflozin (JARDIANCE) 10 MG TABS tablet Take 1 tablet (10 mg) by mouth daily  Qty: 90 tablet, Refills: 1    Associated Diagnoses: HFrEF (heart failure with reduced ejection fraction) (H)      losartan (COZAAR) 50 MG tablet Take 1 tablet (50 mg) by mouth daily  Qty: 90 tablet, Refills: 3    Associated Diagnoses: HFrEF (heart failure with reduced ejection fraction) (H)      spironolactone (ALDACTONE) 25 MG tablet Take 0.5 tablets (12.5 mg) by mouth daily  Qty: 45 tablet, Refills: 3    Associated Diagnoses: HFrEF (heart failure with reduced ejection fraction) (H)           CONTINUE these medications which have NOT CHANGED    Details   Ascorbic Acid (VITAMIN C PO) Take 1 tablet by mouth every evening      Cyanocobalamin (VITAMIN B-12 PO) Take 2 capsules by mouth every evening      metoprolol succinate ER (TOPROL XL) 25 MG 24 hr tablet Take 1 tablet (25 mg) by mouth daily  Qty: 90 tablet, Refills: 2    Associated Diagnoses: HFrEF (heart failure with reduced ejection fraction) (H); Coronary artery disease involving native coronary artery of native heart without angina pectoris; Essential hypertension with goal blood pressure less than 140/90      Multiple Vitamins-Minerals (ZINC PO) Take 1 tablet by mouth every evening      sildenafil (VIAGRA) 50 MG tablet Take 50 mg by mouth daily as needed      tamsulosin (FLOMAX) 0.4 MG capsule Take 0.8 mg by mouth every evening      VITAMIN A PO Take 1 tablet by mouth every evening      VITAMIN D-VITAMIN K PO Take 1 tablet by mouth  every evening             Follow-up:  General Cardiology ANTONIO in 2 weeks     Labs or imaging requiring follow-up after discharge:  [facility]    Code status:  Full Code    Condition on discharge  Temp:  [97.7  F (36.5  C)-98.6  F (37  C)] 98  F (36.7  C)  Pulse:  [46-70] 64  Resp:  [12-20] 18  BP: (124-156)/() 133/59  SpO2:  [94 %-100 %] 99 %  General: In bed, in NAD  HEENT: EOMI, PERRLA, no scleral icterus or injection  CARDIAC: RRR, no m/r/g appreciated.   RESP: CTAB, no wheezes, rhonchi or crackles appreciated.  GI: NABS, NT/ND, no guarding or rebound  EXTREMITIES: NO LE edema  SKIN: No acute lesions appreciated  NEURO: Alert and oriented X3, no focal neurological deficits noted    Imaging with results:  EKG 12 Lead:     Coronary Angiogram 7/27/24:     Prox LAD to Mid LAD lesion is 20% stenosed.    Prox RCA to Mid RCA lesion is 20% stenosed.     1.Insignificant coronary artery disease         Plan    1.Continue secondary CAD risk factor modification  2.Management of nonischemic cardiomyopathy by the primary team     Recommendations    Medications:  - Risk factor management for atherosclerosis.     Coronary Findings    Diagnostic  Dominance: Right  Left Main   The vessel is large.      Left Anterior Descending   The vessel is moderate in size.   Prox LAD to Mid LAD lesion is 20% stenosed.      First Diagonal Branch   The vessel is large and is angiographically normal.      Left Circumflex   The vessel is large.      Right Coronary Artery   Prox RCA to Mid RCA lesion is 20% stenosed.         Intervention     No interventions have been documented.     ECHO 7/25/24:  Interpretation Summary  Left ventricular function is decreased. The ejection fraction is 35-40%  (moderately reduced).Mild diffuse hypokinesis is present.  Global right ventricular function is borderline reduced.  Mild to moderate mitral insufficiency is present.  No pericardial effusion is  present.  ______________________________________________________________________________  Left Ventricle  Left ventricular size is normal. Left ventricular wall thickness is normal.  Biplane LVEF is 38%. Left ventricular diastolic function is abnormal. Left  ventricular function is decreased. The ejection fraction is 35-40% (moderately  reduced). The Ejection Fraction was calculated using Bi-plane contrast. Mild  diffuse hypokinesis is present.     Right Ventricle  The right ventricle is normal size. Global right ventricular function is  borderline reduced.     Atria  Both atria appear normal.     Mitral Valve  Mild to moderate mitral insufficiency is present.     Aortic Valve  Trace aortic insufficiency is present.     Tricuspid Valve  Trace tricuspid insufficiency is present. The peak velocity of the tricuspid  regurgitant jet is not obtainable. Pulmonary artery systolic pressure cannot  be assessed.     Pulmonic Valve  The pulmonic valve is normal. Trace pulmonic insufficiency is present.     Vessels  The aorta root is normal. The thoracic aorta is normal. IVC diameter and  respiratory changes fall into an intermediate range suggesting an RA pressure  of 8 mmHg.     Pericardium  No pericardial effusion is present.     Miscellaneous  No significant valvular abnormalities present.     Compared to Previous Study  There has been no change.  ______________________________________________________________________________  MMode/2D Measurements & Calculations  IVSd: 0.98 cm  LVIDd: 5.4 cm  LVIDs: 4.0 cm  LVPWd: 1.0 cm  FS: 26.6 %  LV mass(C)d: 207.4 grams  LV mass(C)dI: 111.3 grams/m2  Ao root diam: 3.2 cm  asc Aorta Diam: 3.1 cm  LVOT diam: 2.4 cm  LVOT area: 4.3 cm2  Ao root diam index Ht(cm/m): 1.9  Ao root diam index BSA (cm/m2): 1.7  Asc Ao diam index BSA (cm/m2): 1.7  Asc Ao diam index Ht(cm/m): 1.8  EF Biplane: 38.6 %     LA Volume (BP): 43.1 ml  LA Volume Index (BP): 23.2 ml/m2  RWT: 0.38     Doppler Measurements  & Calculations  MV E max evgeny: 35.6 cm/sec  MV A max evgeny: 74.4 cm/sec  MV E/A: 0.48  MV dec time: 0.33 sec  PI end-d evgeny: 139.6 cm/sec  E/E' av.8  Lateral E/e': 4.1     Medial E/e': 7.4        CT PE 24:  IMPRESSION:   1.  No pulmonary embolism. No thoracic aortic dissection or aneurysm.  2.  Contrast reflux into the hepatic veins with dilated main pulmonary  artery suggestive of pulmonary hypertension.  3.  Small bilateral pulmonary nodules as above. If there is a risk for  lung malignancy, recommend follow-up in 6-12 months.    Patient Care Team:  Holland Blunt MD as PCP - General (Internal Medicine)  Lisset Cedillo RN as Specialty Care Coordinator (Cardiology)  Teresa Morris NP as Nurse Practitioner (Nurse Practitioner - Gerontology)  Carroll Ortiz MD as MD (Cardiology)  Seema Olsen MD as MD (Clinical Cardiac Electrophysiology)  Holland Blunt MD as Assigned PCP  Nathen Ruiz MD as MD (Neurology)  Nathen Ruiz MD as MD (Neurology)  Dashawn Tanner MD as MD (Otolaryngology)  Holland Blunt MD as Referring Physician (Internal Medicine)  Hilario Meza MD as Assigned Heart and Vascular Provider  Vignesh Rhodes MD as MD (Surgery)  Mickie Pfeiffer MD as MD (Physical Medicine and Rehabilitation)  Robbin Chand MD as MD (Urology)  Yves Fontana MD as Assigned Neuroscience Provider  Robbin Chand MD as Assigned Surgical Provider    Dawn Maxwell, MS, PAROSE MARIE  [facility] Cardiology  Patient discussed with staff cardiologist, Dr. Eubanks, who agrees with the above documentation and plan. Documentation represents joint decision making.     Time Spent on this Encounter   I, Dawn Maxwell PA-C, personally saw the patient today and spent greater than 30 minutes discharging this patient.      Physician Attestation   I have reviewed and discussed with the advanced practice provider their history, physical and plan for Ricky Brown. I did not participate in a  shared visit by interviewing or examining the patient and this should be billed as an advanced practice provider only visit.    Alex Eubanks MD  Interventional Cardiology

## 2024-07-27 NOTE — PROGRESS NOTES
Patient admitted to: 6C  Admitted from: cath lab  Arrived by: irasema  Reason for admission: angiogram  Patient accompanied by: ROBERT  Belongings: remain with patient at bedside. Cell phone, glasses, plastic bag of belongings.  Teaching: Oriented to unit, pain assessment plan, treatment plan  Skin double check completed by:   Freya Reyes RN and Sis Marrufo RN    Findings  TR band in place over right radial  Red skin irritation over left side of chest where tape was previously  3. Blanchable redness on gluteal cleft    Interventions: encouraged mobility after bedrest complete

## 2024-07-27 NOTE — PROGRESS NOTES
Neuro: Alert & Oriented X4. Right pupil larger than left pupil. Baseline r/t past eye surgery. Numbness and tingling in R upper extremity at baseline. All other neuros intact.  Cardiac: sinus shelley 45-60 bpm with rare ectopy  Respiratory: Satting greater than 90% on room air. Pt has implanted device for sleep apnea. Fine crackles auscultated in bilateral lower lung fields. Upper lung fields clear.  GI/: LBM 7/26. Voiding spontaneously using bathroom, needs reminders to save output.  Diet/appetite: Regular diet, completed 100% of dinner.  Activity:  up ad libby  Pain: Denies  Skin: See 2 RN skin assessment  LDA's: PIV saline locked  Updates: TR band removed at 1600, arm board still in place to remind pt of wrist precautions per patient preference.   Plan: Update CSI with changes. Plan to discharge tomorrow

## 2024-07-27 NOTE — PROGRESS NOTES
Consenting/Education for Cardiology Procedure: Possible percutaneous intervention and Coronary angiogram    Patient understands we would like to perform the listed procedure(s) due to NSTEMI.    The patient understands the following:     The procedure was described to the patient in detail.    Moderate sedation is required for this procedure and the risks, benefits and alternatives to moderate sedation were discussed. Patient also understands risks and complications of the procedure which include but are not limited to bruising/swelling around the incision site, infection, bleeding, allergic reaction to local anesthetic, air embolism, arterial puncture, stroke, heart attack, need for emergency surgery, death.    Patient verbalized understanding of risks and benefits and has elected to proceed with the procedure or procedures listed above.    Dawn Maxwell PA-C  Cardiology

## 2024-07-27 NOTE — PROGRESS NOTES
Interventional Cardiology Progress Note      Assessment & Plan:  Ricky Brown is a 73 year old male admitted on 7/25/2024 for chest pain and troponin elevation. He has history of NICM (EF 45% on echocardiogram 09/2022 with LGE nonischemic fibrosis), moderate aortic insufficiency, CVA of embolic etiology, and JOSE JUAN with Inspire device.      # NSTEMI  # Nonischemic cardiomyopathy, EF 35-40% (echocardiogram 07/25/2024)  # Moderate aortic insufficiency  # Hypertension   # Hyperlipidemia  Patient presented with chest pain upon waking early this morning. EKG here was without evidence of acute ischemia, though in the ED his troponin did rise from 9 to 41 to 183, his BNP is also elevated at 1943. He received 324mg asa in the ED and was started on heparin after third troponin resulted. Echocardiogram reveals EF of 35-40% with mild diffuse hypokinesis of the LV, no significant changes from last echocardiogram 09/2022. Will further assess for ischemic evaluation with coronary angiogram. Patient unfortunately took PDE-5i, and would require a 48 hour washout prior to angiogram.  - Plan for angiogram today pending bed availability  - Continue Losartan 50 mg daily   - Continue Toprol XL 25 mg daily   - Start Spironolactone 12.5 mg daily   - Plan to initiate SGLT2i after angiogram  - Continue Heparin gtt until angiogram      Prophylaxis:  DVT: Heparin gtt     Diet: NPO for cath then low sat fat   Activity: As tolerated   Code Status: Full code   Disposition: 1-2 days pending angiogram      Patient discussed w/ Dr. Eubanks.      Dawn Maxwell, MS, PA-C  [facility] Structural/Interventional Cardiology   Pager [phone]/Yanet      Interval History:  Patient pleasant on exam this morning, but expresses frustration at being in the ED and still waiting for his angiogram. He understands he needed to wait for his PDE5i to get out of his system. Remains slightly hypertensive today.       Most recent vital signs:  BP (!) 149/67   Pulse 58    Temp 98.1  F (36.7  C) (Oral)   Resp 18   Wt 79.3 kg (174 lb 12.8 oz)   SpO2 94%   BMI 27.38 kg/m    Temp:  [97.8  F (36.6  C)-98.1  F (36.7  C)] 98.1  F (36.7  C)  Pulse:  [56-63] 58  Resp:  [16-18] 18  BP: (116-166)/(61-75) 149/67  SpO2:  [94 %-98 %] 94 %  Wt Readings from Last 2 Encounters:   07/25/24 79.3 kg (174 lb 12.8 oz)   07/19/24 75 kg (165 lb 4.8 oz)       Intake/Output Summary (Last 24 hours) at 7/27/2024 1042  Last data filed at 7/27/2024 0646  Gross per 24 hour   Intake 693.81 ml   Output --   Net 693.81 ml       Physical exam:  General: In bed, in NAD  HEENT: EOMI, PERRLA, no scleral icterus or injection  CARDIAC: RRR, no m/r/g appreciated.   RESP: CTAB, no wheezes, rhonchi or crackles appreciated.  GI: NABS, NT/ND, no guarding or rebound  EXTREMITIES: NO LE edema  SKIN: No acute lesions appreciated  NEURO: Alert and oriented X3, no focal neurological deficits noted    Labs (Past three days):  CBC  Recent Labs   Lab 07/27/24  0559 07/26/24  0631 07/25/24  0616   WBC 4.5 5.4 4.4   RBC 4.32* 4.10* 4.28*   HGB 12.8* 12.2* 12.3*   HCT 38.0* 35.9* 38.8*   MCV 88 88 91   MCH 29.6 29.8 28.7   MCHC 33.7 34.0 31.7   RDW 13.3 13.4 13.3    205 201     BMP  Recent Labs   Lab 07/27/24  0559 07/26/24  0631 07/25/24  0616    140 142   POTASSIUM 3.9 4.0 3.7   CHLORIDE 107 107 108*   CO2 25 24 23   ANIONGAP 9 9 11   GLC 89 89 103*   BUN 13.8 10.6 10.9   CR 0.78 0.71 0.71   GFRESTIMATED >90 >90 >90   CATHERINE 8.4* 7.8* 8.7*     Troponins:   Lab Results   Component Value Date    CTROPT 555 (HH) 07/26/2024    CTROPT 666 (HH) 07/25/2024    CTROPT 770 (HH) 07/25/2024    CTROPT 183 (HH) 07/25/2024    CTROPT 41 (H) 07/25/2024        INRNo lab results found in last 7 days.  Liver panel  Recent Labs   Lab 07/25/24  0616   PROTTOTAL 6.3*   ALBUMIN 3.9   BILITOTAL 0.3   ALKPHOS 64   AST 16   ALT <5       Imaging/procedure results:  EKG 12 Lead:        ECHO 7/25/24:  Interpretation Summary  Left ventricular function  is decreased. The ejection fraction is 35-40%  (moderately reduced).Mild diffuse hypokinesis is present.  Global right ventricular function is borderline reduced.  Mild to moderate mitral insufficiency is present.  No pericardial effusion is present.  ______________________________________________________________________________  Left Ventricle  Left ventricular size is normal. Left ventricular wall thickness is normal.  Biplane LVEF is 38%. Left ventricular diastolic function is abnormal. Left  ventricular function is decreased. The ejection fraction is 35-40% (moderately  reduced). The Ejection Fraction was calculated using Bi-plane contrast. Mild  diffuse hypokinesis is present.     Right Ventricle  The right ventricle is normal size. Global right ventricular function is  borderline reduced.     Atria  Both atria appear normal.     Mitral Valve  Mild to moderate mitral insufficiency is present.     Aortic Valve  Trace aortic insufficiency is present.     Tricuspid Valve  Trace tricuspid insufficiency is present. The peak velocity of the tricuspid  regurgitant jet is not obtainable. Pulmonary artery systolic pressure cannot  be assessed.     Pulmonic Valve  The pulmonic valve is normal. Trace pulmonic insufficiency is present.     Vessels  The aorta root is normal. The thoracic aorta is normal. IVC diameter and  respiratory changes fall into an intermediate range suggesting an RA pressure  of 8 mmHg.     Pericardium  No pericardial effusion is present.     Miscellaneous  No significant valvular abnormalities present.     Compared to Previous Study  There has been no change.  ______________________________________________________________________________  MMode/2D Measurements & Calculations  IVSd: 0.98 cm  LVIDd: 5.4 cm  LVIDs: 4.0 cm  LVPWd: 1.0 cm  FS: 26.6 %  LV mass(C)d: 207.4 grams  LV mass(C)dI: 111.3 grams/m2  Ao root diam: 3.2 cm  asc Aorta Diam: 3.1 cm  LVOT diam: 2.4 cm  LVOT area: 4.3 cm2  Ao root diam  index Ht(cm/m): 1.9  Ao root diam index BSA (cm/m2): 1.7  Asc Ao diam index BSA (cm/m2): 1.7  Asc Ao diam index Ht(cm/m): 1.8  EF Biplane: 38.6 %     LA Volume (BP): 43.1 ml  LA Volume Index (BP): 23.2 ml/m2  RWT: 0.38     Doppler Measurements & Calculations  MV E max evgeny: 35.6 cm/sec  MV A max evgeny: 74.4 cm/sec  MV E/A: 0.48  MV dec time: 0.33 sec  PI end-d evgeny: 139.6 cm/sec  E/E' av.8  Lateral E/e': 4.1     Medial E/e': 7.4        CT PE 24:  IMPRESSION:   1.  No pulmonary embolism. No thoracic aortic dissection or aneurysm.  2.  Contrast reflux into the hepatic veins with dilated main pulmonary  artery suggestive of pulmonary hypertension.  3.  Small bilateral pulmonary nodules as above. If there is a risk for  lung malignancy, recommend follow-up in 6-12 months.    Medical Decision Making       45 MINUTES SPENT BY ME on the date of service doing chart review, history, exam, documentation & further activities per the note.

## 2024-07-28 VITALS
RESPIRATION RATE: 18 BRPM | TEMPERATURE: 98 F | WEIGHT: 162.6 LBS | DIASTOLIC BLOOD PRESSURE: 59 MMHG | OXYGEN SATURATION: 99 % | BODY MASS INDEX: 25.47 KG/M2 | SYSTOLIC BLOOD PRESSURE: 133 MMHG | HEART RATE: 64 BPM

## 2024-07-28 LAB
ANION GAP SERPL CALCULATED.3IONS-SCNC: 12 MMOL/L (ref 7–15)
BUN SERPL-MCNC: 13.8 MG/DL (ref 8–23)
CALCIUM SERPL-MCNC: 8.8 MG/DL (ref 8.8–10.4)
CHLORIDE SERPL-SCNC: 104 MMOL/L (ref 98–107)
CREAT SERPL-MCNC: 0.84 MG/DL (ref 0.67–1.17)
EGFRCR SERPLBLD CKD-EPI 2021: >90 ML/MIN/1.73M2
ERYTHROCYTE [DISTWIDTH] IN BLOOD BY AUTOMATED COUNT: 13.2 % (ref 10–15)
GLUCOSE SERPL-MCNC: 77 MG/DL (ref 70–99)
HCO3 SERPL-SCNC: 23 MMOL/L (ref 22–29)
HCT VFR BLD AUTO: 39.3 % (ref 40–53)
HGB BLD-MCNC: 13.6 G/DL (ref 13.3–17.7)
MAGNESIUM SERPL-MCNC: 2.1 MG/DL (ref 1.7–2.3)
MCH RBC QN AUTO: 30.2 PG (ref 26.5–33)
MCHC RBC AUTO-ENTMCNC: 34.6 G/DL (ref 31.5–36.5)
MCV RBC AUTO: 87 FL (ref 78–100)
PLATELET # BLD AUTO: 254 10E3/UL (ref 150–450)
POTASSIUM SERPL-SCNC: 3.8 MMOL/L (ref 3.4–5.3)
RBC # BLD AUTO: 4.5 10E6/UL (ref 4.4–5.9)
SODIUM SERPL-SCNC: 139 MMOL/L (ref 135–145)
WBC # BLD AUTO: 4.6 10E3/UL (ref 4–11)

## 2024-07-28 PROCEDURE — 83735 ASSAY OF MAGNESIUM: CPT | Performed by: INTERNAL MEDICINE

## 2024-07-28 PROCEDURE — 250N000013 HC RX MED GY IP 250 OP 250 PS 637: Performed by: NURSE PRACTITIONER

## 2024-07-28 PROCEDURE — 80048 BASIC METABOLIC PNL TOTAL CA: CPT | Performed by: NURSE PRACTITIONER

## 2024-07-28 PROCEDURE — 85027 COMPLETE CBC AUTOMATED: CPT | Performed by: NURSE PRACTITIONER

## 2024-07-28 PROCEDURE — 99239 HOSP IP/OBS DSCHRG MGMT >30: CPT

## 2024-07-28 PROCEDURE — 36415 COLL VENOUS BLD VENIPUNCTURE: CPT | Performed by: NURSE PRACTITIONER

## 2024-07-28 PROCEDURE — 250N000013 HC RX MED GY IP 250 OP 250 PS 637

## 2024-07-28 RX ORDER — ASPIRIN 81 MG/1
81 TABLET ORAL DAILY
Qty: 90 TABLET | Refills: 3 | Status: SHIPPED | OUTPATIENT
Start: 2024-07-28

## 2024-07-28 RX ORDER — LOSARTAN POTASSIUM 50 MG/1
50 TABLET ORAL DAILY
Qty: 90 TABLET | Refills: 3 | Status: SHIPPED | OUTPATIENT
Start: 2024-07-28

## 2024-07-28 RX ORDER — SPIRONOLACTONE 25 MG/1
12.5 TABLET ORAL DAILY
Qty: 45 TABLET | Refills: 3 | Status: SHIPPED | OUTPATIENT
Start: 2024-07-28

## 2024-07-28 RX ADMIN — SPIRONOLACTONE 12.5 MG: 25 TABLET, FILM COATED ORAL at 08:55

## 2024-07-28 RX ADMIN — EMPAGLIFLOZIN 10 MG: 10 TABLET, FILM COATED ORAL at 08:55

## 2024-07-28 RX ADMIN — ASPIRIN 81 MG: 81 TABLET ORAL at 08:55

## 2024-07-28 RX ADMIN — LOSARTAN POTASSIUM 50 MG: 50 TABLET, FILM COATED ORAL at 08:55

## 2024-07-28 ASSESSMENT — ACTIVITIES OF DAILY LIVING (ADL)
ADLS_ACUITY_SCORE: 30

## 2024-07-28 NOTE — PLAN OF CARE
Problem: Adult Inpatient Plan of Care  Goal: Plan of Care Review  Description: The Plan of Care Review/Shift note should be completed every shift.  The Outcome Evaluation is a brief statement about your assessment that the patient is improving, declining, or no change.  This information will be displayed automatically on your shift  note.  Outcome: Adequate for Care Transition   Goal Outcome Evaluation:       Ready for discharge to home      Discharged to: home  Via: family transport  Belongings: returned to patient  Teaching: AVS reviewed and questions answered  Clinic appointment: follow up reviewed  Tele and IV: discontinued per orders

## 2024-07-28 NOTE — PLAN OF CARE
D: Admitted for chest pain and elevated trop. PMH of NICM (EF 45%), moderate aortic insufficiency, embolic CVA, and JOSE JUAN with Inspire device.     A:   Neuro: A/Ox4. Forgetful, needs frequent reminders to minimize use of RUE. Pleasant and cooperative. Denies headache, dizziness, and lightheadedness.  Cardiac/Tele: SB/SR with BBB, 40's-70's. Frequent PVC's with activity, occasionally trigeminal. Denies chest pain and palpitations. Afebrile.  Respiratory: Sats >90% on room air. Denies SOB.  GI/: Continent. Urinal at bedside. Adequate urine output. Last BM 7/26. Denies nausea.  Diet/Appetite: Regular diet.  Skin: R radial site- WDL.   LDAs: L & R PIV- SL.  Activity: Up ad libby  Pain: Denies     P: Discharge home today. Continue to monitor and notify CSI with changes.    Shift: [id number]

## 2024-07-29 ENCOUNTER — PATIENT OUTREACH (OUTPATIENT)
Dept: CARE COORDINATION | Facility: CLINIC | Age: 74
End: 2024-07-29
Payer: COMMERCIAL

## 2024-07-29 ENCOUNTER — TELEPHONE (OUTPATIENT)
Dept: CARDIOLOGY | Facility: CLINIC | Age: 74
End: 2024-07-29
Payer: COMMERCIAL

## 2024-07-29 NOTE — PROGRESS NOTES
[facility]: Transitions of Care Outreach  Chief Complaint   Patient presents with    Clinic Care Coordination - Post Hospital       Most Recent Admission Date: 7/25/2024   Most Recent Admission Diagnosis: NSTEMI (non-ST elevated myocardial infarction) (H) - I21.4     Most Recent Discharge Date: 7/28/2024   Most Recent Discharge Diagnosis: NSTEMI (non-ST elevated myocardial infarction) (H) - I21.4  Coronary artery disease involving native coronary artery of native heart without angina pectoris - I25.10  HFrEF (heart failure with reduced ejection fraction) (H) - I50.20     Transitions of Care Assessment    Discharge Assessment  How are you doing now that you are home?: I am doing good.  How are your symptoms? (Red Flag symptoms escalate to triage hotline per guidelines): Improved  Do you know how to contact your clinic care team if you have future questions or changes to your health status? : Yes  Does the patient have their discharge instructions? : Yes  Does the patient have questions regarding their discharge instructions? : No  Were you started on any new medications or were there changes to any of your previous medications? : Yes  Does the patient have all of their medications?: Yes  Do you have questions regarding any of your medications? : No    Post-op ([facility] CTA Only)  If the patient had a surgery or procedure, do they have any questions for a nurse?: No         [facility] Explained and offered Care Coordination support to eligible patients: Yes    Patient accepted? No    Follow up Plan     Discharge Follow-Up  Discharge follow up appointment scheduled in alignment with recommended follow up timeframe or Transitions of Risk Category? (Low = within 30 days; Moderate= within 14 days; High= within 7 days): No (Pt and his wife will call PCP to schedule follow up)  Patient's follow up appointment not scheduled: Patient declined scheduling support. Education on the importance of transitions of care  follow up. Provided scheduling phone number.    No future appointments.    Outpatient Plan as outlined on AVS reviewed with patient.    For any urgent concerns, please contact our 24 hour nurse triage line: [phone] ([phone])       ELODIA Gillespie  [phone]  [facility]

## 2024-07-29 NOTE — TELEPHONE ENCOUNTER
Per hospital discharge note - Follow up with general cardiology ANTONIO in 2 weeks for post cath visit     Lvm for patient to call back to schedule. Please assist him if he calls back. Myc sent as well.

## 2024-08-05 ENCOUNTER — TELEPHONE (OUTPATIENT)
Dept: CARDIOLOGY | Facility: CLINIC | Age: 74
End: 2024-08-05
Payer: COMMERCIAL

## 2024-08-05 NOTE — CONFIDENTIAL NOTE
Prox LAD to Mid LAD lesion is 20% stenosed.    Prox RCA to Mid RCA lesion is 20% stenosed.     1.Insignificant coronary artery disease    Right radial artery used for access

## 2024-08-07 NOTE — TELEPHONE ENCOUNTER
Post-Angiogram Discharge Call    How are you feeling since you got home? Do you understand your results?    Prox LAD to Mid LAD lesion is 20% stenosed.    Prox RCA to Mid RCA lesion is 20% stenosed.     1.Insignificant coronary artery disease     Yes - Results discussed    How is your groin/wrist/incision site? Can you please describe it?  Right radial artery is flat and dry, no bruising    Do you have any questions regarding your restrictions and when to resume normal activity?  No    Do you have the anti-platelet medication you were prescribed?  N/A    Any questions about the other medications you were prescribed?  N/A    Has cardiac rehab reached out to you?  N/A - PCTA not performed or not indicated    Do you have any other questions about the discharge instructions?  No    Has a follow up appointment been made within 1-2 weeks?  Yes, following up with Dr. Odom tomorrow post angiogram

## 2024-08-08 ENCOUNTER — OFFICE VISIT (OUTPATIENT)
Dept: FAMILY MEDICINE | Facility: CLINIC | Age: 74
End: 2024-08-08
Payer: COMMERCIAL

## 2024-08-08 VITALS
DIASTOLIC BLOOD PRESSURE: 60 MMHG | BODY MASS INDEX: 25.65 KG/M2 | HEART RATE: 67 BPM | WEIGHT: 159.6 LBS | TEMPERATURE: 98.3 F | SYSTOLIC BLOOD PRESSURE: 100 MMHG | RESPIRATION RATE: 16 BRPM | HEIGHT: 66 IN | OXYGEN SATURATION: 96 %

## 2024-08-08 DIAGNOSIS — I63.439 CEREBROVASCULAR ACCIDENT (CVA) DUE TO EMBOLISM OF POSTERIOR CEREBRAL ARTERY WITH INFARCTIONS OF BOTH OCCIPITAL LOBES (H): ICD-10-CM

## 2024-08-08 DIAGNOSIS — I50.20 HFREF (HEART FAILURE WITH REDUCED EJECTION FRACTION) (H): Primary | ICD-10-CM

## 2024-08-08 DIAGNOSIS — R39.12 WEAK URINARY STREAM: ICD-10-CM

## 2024-08-08 DIAGNOSIS — I69.919 COGNITIVE DEFICITS AS LATE EFFECT OF CEREBROVASCULAR DISEASE: ICD-10-CM

## 2024-08-08 DIAGNOSIS — E78.5 HYPERLIPIDEMIA LDL GOAL <70: Chronic | ICD-10-CM

## 2024-08-08 DIAGNOSIS — I21.4 NSTEMI (NON-ST ELEVATED MYOCARDIAL INFARCTION) (H): ICD-10-CM

## 2024-08-08 LAB
ALBUMIN SERPL BCG-MCNC: 4.4 G/DL (ref 3.5–5.2)
ALP SERPL-CCNC: 67 U/L (ref 40–150)
ALT SERPL W P-5'-P-CCNC: 15 U/L (ref 0–70)
ANION GAP SERPL CALCULATED.3IONS-SCNC: 10 MMOL/L (ref 7–15)
AST SERPL W P-5'-P-CCNC: 24 U/L (ref 0–45)
BILIRUB SERPL-MCNC: 0.5 MG/DL
BUN SERPL-MCNC: 25 MG/DL (ref 8–23)
CALCIUM SERPL-MCNC: 9.4 MG/DL (ref 8.8–10.4)
CHLORIDE SERPL-SCNC: 105 MMOL/L (ref 98–107)
CREAT SERPL-MCNC: 0.98 MG/DL (ref 0.67–1.17)
EGFRCR SERPLBLD CKD-EPI 2021: 82 ML/MIN/1.73M2
GLUCOSE SERPL-MCNC: 92 MG/DL (ref 70–99)
HCO3 SERPL-SCNC: 25 MMOL/L (ref 22–29)
MAGNESIUM SERPL-MCNC: 2.2 MG/DL (ref 1.7–2.3)
POTASSIUM SERPL-SCNC: 4.3 MMOL/L (ref 3.4–5.3)
PROT SERPL-MCNC: 7.2 G/DL (ref 6.4–8.3)
SODIUM SERPL-SCNC: 140 MMOL/L (ref 135–145)

## 2024-08-08 PROCEDURE — 83735 ASSAY OF MAGNESIUM: CPT | Performed by: INTERNAL MEDICINE

## 2024-08-08 PROCEDURE — 99495 TRANSJ CARE MGMT MOD F2F 14D: CPT | Performed by: INTERNAL MEDICINE

## 2024-08-08 PROCEDURE — 80053 COMPREHEN METABOLIC PANEL: CPT | Performed by: INTERNAL MEDICINE

## 2024-08-08 PROCEDURE — 36415 COLL VENOUS BLD VENIPUNCTURE: CPT | Performed by: INTERNAL MEDICINE

## 2024-08-08 RX ORDER — SILODOSIN 4 MG/1
4 CAPSULE ORAL DAILY
Qty: 90 CAPSULE | Refills: 3 | Status: SHIPPED | OUTPATIENT
Start: 2024-08-08

## 2024-08-08 NOTE — PROGRESS NOTES
Assessment & Plan   Problem List Items Addressed This Visit       Hyperlipidemia LDL goal <70 (Chronic)    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 (H)    HFrEF (heart failure with reduced ejection fraction) (H) - Primary    Relevant Medications    silodosin (RAPAFLO) 4 MG CAPS capsule    Other Relevant Orders    Comprehensive metabolic panel (BMP + Alb, Alk Phos, ALT, AST, Total. Bili, TP) (Completed)    Magnesium (Completed)    NSTEMI (non-ST elevated myocardial infarction) (H)    Weak urinary stream     Other Visit Diagnoses       Cognitive deficits as late effect of cerebrovascular disease        Relevant Orders    Occupational Therapy  Referral                MED REC REQUIRED  Post Medication Reconciliation Status: discharge medications reconciled, continue medications without change  Work on weight loss  Regular exercise      Magaly García is a 73 year old, presenting for the following health issues:  Hospital F/U        8/8/2024     1:44 PM   Additional Questions   Roomed by Ramya     [facility]        Hospital Follow-up Visit:    Hospital/Nursing Home/IP Rehab Facility: [facility]  Date of Admission: 7/25/2024  Date of Discharge: 07/28/2024  Reason(s) for Admission: Cardiac Issues  Was the patient in the ICU or did the patient experience delirium during hospitalization?  No  Do you have any other stressors you would like to discuss with your provider? No    Problems taking medications regularly:  None  Medication changes since discharge: None  Problems adhering to non-medication therapy:  None  Vit d 5000 international unit(s)   Flomax - antihistamine .  Flomax enlargement .    Summary of hospitalization:  [facility] discharge summary reviewed  Diagnostic Tests/Treatments reviewed.  Follow up needed: none  Other Healthcare Providers Involved in Patient s Care:      "    None  Update since discharge: improved.         Plan of care communicated with patient                 Review of Systems  Constitutional, HEENT, cardiovascular, pulmonary, gi and gu systems are negative, except as otherwise noted.      Objective    /60   Pulse 67   Temp 98.3  F (36.8  C) (Temporal)   Resp 16   Ht 1.68 m (5' 6.14\")   Wt 72.4 kg (159 lb 9.6 oz)   SpO2 96%   BMI 25.65 kg/m    Body mass index is 25.65 kg/m .  Physical Exam   GENERAL: alert and no distress  EYES: Eyes grossly normal to inspection, PERRL and conjunctivae and sclerae normal  HENT: ear canals and TM's normal, nose and mouth without ulcers or lesions  NECK: no adenopathy, no asymmetry, masses, or scars  RESP: lungs clear to auscultation - no rales, rhonchi or wheezes  CV: regular rate and rhythm, normal S1 S2, no S3 or S4, no murmur, click or rub, no peripheral edema  ABDOMEN: soft, nontender, no hepatosplenomegaly, no masses and bowel sounds normal  MS: no gross musculoskeletal defects noted, no edema  SKIN: no suspicious lesions or rashes  NEURO: Normal strength and tone, mentation intact and speech normal  BACK: no CVA tenderness, no paralumbar tenderness  PSYCH: mentation appears normal, affect normal/bright  LYMPH: no cervical, supraclavicular, axillary, or inguinal adenopathy    Results for orders placed or performed in visit on 08/08/24   Comprehensive metabolic panel (BMP + Alb, Alk Phos, ALT, AST, Total. Bili, TP)     Status: Abnormal   Result Value Ref Range    Sodium 140 135 - 145 mmol/L    Potassium 4.3 3.4 - 5.3 mmol/L    Carbon Dioxide (CO2) 25 22 - 29 mmol/L    Anion Gap 10 7 - 15 mmol/L    Urea Nitrogen 25.0 (H) 8.0 - 23.0 mg/dL    Creatinine 0.98 0.67 - 1.17 mg/dL    GFR Estimate 82 >60 mL/min/1.73m2    Calcium 9.4 8.8 - 10.4 mg/dL    Chloride 105 98 - 107 mmol/L    Glucose 92 70 - 99 mg/dL    Alkaline Phosphatase 67 40 - 150 U/L    AST 24 0 - 45 U/L    ALT 15 0 - 70 U/L    Protein Total 7.2 6.4 - 8.3 g/dL "    Albumin 4.4 3.5 - 5.2 g/dL    Bilirubin Total 0.5 <=1.2 mg/dL   Magnesium     Status: Normal   Result Value Ref Range    Magnesium 2.2 1.7 - 2.3 mg/dL           Signed Electronically by: Anant Santo MD

## 2024-08-14 ENCOUNTER — OFFICE VISIT (OUTPATIENT)
Dept: FAMILY MEDICINE | Facility: CLINIC | Age: 74
End: 2024-08-14
Payer: COMMERCIAL

## 2024-08-14 VITALS
RESPIRATION RATE: 18 BRPM | SYSTOLIC BLOOD PRESSURE: 120 MMHG | HEART RATE: 62 BPM | DIASTOLIC BLOOD PRESSURE: 62 MMHG | BODY MASS INDEX: 25.92 KG/M2 | TEMPERATURE: 98.7 F | HEIGHT: 66 IN | OXYGEN SATURATION: 97 % | WEIGHT: 161.3 LBS

## 2024-08-14 DIAGNOSIS — Z98.890 S/P CARPAL TUNNEL RELEASE: ICD-10-CM

## 2024-08-14 DIAGNOSIS — I50.20 HFREF (HEART FAILURE WITH REDUCED EJECTION FRACTION) (H): ICD-10-CM

## 2024-08-14 DIAGNOSIS — I42.9 CARDIOMYOPATHY, UNSPECIFIED TYPE (H): ICD-10-CM

## 2024-08-14 DIAGNOSIS — G40.209 PARTIAL SYMPTOMATIC EPILEPSY WITH COMPLEX PARTIAL SEIZURES, NOT INTRACTABLE, WITHOUT STATUS EPILEPTICUS (H): ICD-10-CM

## 2024-08-14 DIAGNOSIS — I25.10 CORONARY ARTERY DISEASE DUE TO LIPID RICH PLAQUE: ICD-10-CM

## 2024-08-14 DIAGNOSIS — I25.83 CORONARY ARTERY DISEASE DUE TO LIPID RICH PLAQUE: ICD-10-CM

## 2024-08-14 DIAGNOSIS — I25.10 CORONARY ARTERY DISEASE INVOLVING NATIVE CORONARY ARTERY OF NATIVE HEART WITHOUT ANGINA PECTORIS: ICD-10-CM

## 2024-08-14 DIAGNOSIS — G56.01 RIGHT CARPAL TUNNEL SYNDROME: ICD-10-CM

## 2024-08-14 DIAGNOSIS — F10.11 HISTORY OF ALCOHOL ABUSE: ICD-10-CM

## 2024-08-14 DIAGNOSIS — I21.4 NSTEMI (NON-ST ELEVATED MYOCARDIAL INFARCTION) (H): ICD-10-CM

## 2024-08-14 DIAGNOSIS — I69.919 COGNITIVE DEFICITS AS LATE EFFECT OF CEREBROVASCULAR DISEASE: Primary | ICD-10-CM

## 2024-08-14 PROCEDURE — G2211 COMPLEX E/M VISIT ADD ON: HCPCS | Performed by: STUDENT IN AN ORGANIZED HEALTH CARE EDUCATION/TRAINING PROGRAM

## 2024-08-14 PROCEDURE — 99214 OFFICE O/P EST MOD 30 MIN: CPT | Performed by: STUDENT IN AN ORGANIZED HEALTH CARE EDUCATION/TRAINING PROGRAM

## 2024-08-14 NOTE — PATIENT INSTRUCTIONS
[facility] Driving Assessment -- call to schedule     To schedule an appointment at any of our locations please call [phone], or if you would like more information about the service you can email us at [email or web address].    [email or web address]

## 2024-08-14 NOTE — PROGRESS NOTES
Assessment & Plan     Cognitive deficits as late effect of cerebrovascular disease  Reviewed neurology records from [place] and [facility].   Had neuropsych testing completed that was relatively unchanged in 2023 from 2019. MRI brain and EEG testing 2022. He appear relatively forgetful on exam today with questions regarding his medical history, however this is my first encounter with the patient. He is here to obtain a driving clearance for the [facility] otherwise he will lose his license on 8/21/2024. He has a repeat cognitive evaluation scheduled on 8/20/2024 with OT. I encouraged him to follow up with Dr Ruiz as well. I discussed that given my lack of knowledge of the patient, pending cognitive evaluation and untreated seizures, I would not recommend signing the 's license clearance  today. Recommend he scheduled a driving assessment at [facility].     Partial symptomatic epilepsy with complex partial seizures, not intractable, without status epilepticus (H)   He appears to have failed to follow up with Neurology, Dr Ruiz. Last appt March 2023 and he was stable on keppra. He is no longer on keppra but unsure how long ago this was stopped. Patient and his significant other deny having any recent seizures. They will call to     Right carpal tunnel syndrome  S/P carpal tunnel release  Reviewed recent EMG consistent with persistent carpal tunnel. He had underwent carpal tunnel release followed by revision of carpal tunnel release with persistent symptoms. His PCP had referred him to neuro at [facility] and he has an upcoming appt     History of alcohol abuse   He endorses rare alcohol use in the past 6 months. This may be a contributing factor to his neuropathy, cognitive impairment, etc.     Coronary artery disease involving native coronary artery of native heart without angina pectoris  Cardiomyopathy, unspecified type (H)  Coronary artery disease due to lipid rich plaqu  HFrEF (heart failure with reduced  ejection fraction) (H)  NSTEMI  Most recent ECHO 7/25/2024 showed EF of 35-40%.   Underwent cardiac catheterization in July 2024 due to NSTEMI with CAD not requiring PCI at that time. He has non-ischemic cardiomyopathy.   Referred for post-cath follow up with cardiology.   On asa, statin, metoprolol. Losartan. Jardiance, spironolactone.           Subjective   Ricky is a 73 year old, presenting for the following health issues:  Establish Care        8/8/2024     1:44 PM   Additional Questions   Roomed by Tia         8/14/2024     3:28 PM   Patient Reported Additional Medications   Patient reports taking the following new medications none     History of Present Illness       Reason for visit:  Establish care    He eats 2-3 servings of fruits and vegetables daily.He consumes 1 sweetened beverage(s) daily.He exercises with enough effort to increase his heart rate 30 to 60 minutes per day.  He exercises with enough effort to increase his heart rate 6 days per week. He is missing 1 dose(s) of medications per week.  He is not taking prescribed medications regularly due to remembering to take.       Establish care       DMV-  Possibility for him to continue driving.  He is trying to maintain a 's license. He received a letter from [employer] stating they'll cancel  his 's license on August 21, 2024.   Scheduled with OT for a cognitive assessment on 8/20/24.   Scheduled with [facility] on Oct 2, 2024.     All in the last 1.5 years he has gotten a divorce and life has changed a lot.   Now lives with his significant other, Wendy, most of the time. They've been together for the last 5 months. Her household Is more busy as she does foster care for people with special needs.     Endorses more forgetfulness opal with names.   Denies getting lost, leaving stove top on.    Hx of CVA in 2022. On eliquis s/p CVA.   He doesn't recall a hx of seizures.      Hx of alcohol abuse per chart review. Pt admits that Used to drink 2  drinks in evening (old fashions). Since February 2022, hasn't had many drinks (<5). In remote past had alcohol abuse.             Review of Systems  Constitutional, HEENT, cardiovascular, pulmonary, gi and gu systems are negative, except as otherwise noted.      Objective    Pulse 62   Temp 98.7  F (37.1  C) (Oral)   Wt 73.2 kg (161 lb 4.8 oz)   SpO2 97%   BMI 25.92 kg/m    Body mass index is 25.92 kg/m .  Physical Exam   GENERAL: alert and no distress  EYES: Eyes grossly normal to inspection, PERRL and conjunctivae and sclerae normal  HENT: ear canals and TM's normal, nose and mouth without ulcers or lesions  NECK: no adenopathy, no asymmetry, masses, or scars  RESP: lungs clear to auscultation - no rales, rhonchi or wheezes  CV: regular rate and rhythm, normal S1 S2, no S3 or S4, no murmur, click or rub, no peripheral edema  ABDOMEN: soft, nontender, no hepatosplenomegaly, no masses and bowel sounds normal  MS: no gross musculoskeletal defects noted, no edema  NEURO: Normal strength and tone, mentation intact and speech normal, CN II-XII intact  PSYCH: mentation appears abnormal, forgetful at times, affect normal/bright            Signed Electronically by: Melony Hopkins DO

## 2024-08-15 ENCOUNTER — TELEPHONE (OUTPATIENT)
Dept: NEUROLOGY | Facility: CLINIC | Age: 74
End: 2024-08-15
Payer: COMMERCIAL

## 2024-08-15 NOTE — TELEPHONE ENCOUNTER
[facility] Call Center    Phone Message    May a detailed message be left on voicemail: yes     Reason for Call:Patient is scheduled for 1/2/24 on the waitlist for Dr. Mays.  Patient said he will lose his license if he is not seen ASAP.  Patient requests a call back to change providers  Writer communicated all other providers are also booked out.  Patient's significant other Cecille requests a call back to discuss.    Writer does not see consent to communicate with Cecille. Patient was present for this call.    Action Taken: [facility]    Travel Screening: Not Applicable     Date of Service:

## 2024-08-15 NOTE — TELEPHONE ENCOUNTER
Unable to speak with Deya as no ctc on file  Called Ricky and [facility]  Kyung Ángel could see him tomorrow 8/16/24 at 10am (spot held for 1 hour). I did let him know he would need to call back and confirm that or give us permission to speak with Cecille Yang CMA on 8/15/2024 at 11:31 AM  [facility]

## 2024-08-15 NOTE — TELEPHONE ENCOUNTER
Removed held spot for 8/16/24. Will wait for patient to call back to discuss time options  Amy Yang CMA on 8/15/2024 at 1:20 PM  [facility]

## 2024-08-20 ENCOUNTER — THERAPY VISIT (OUTPATIENT)
Dept: OCCUPATIONAL THERAPY | Facility: CLINIC | Age: 74
End: 2024-08-20
Attending: INTERNAL MEDICINE
Payer: COMMERCIAL

## 2024-08-20 DIAGNOSIS — I69.919 COGNITIVE DEFICITS AS LATE EFFECT OF CEREBROVASCULAR DISEASE: ICD-10-CM

## 2024-08-20 PROCEDURE — 96125 COGNITIVE TEST BY HC PRO: CPT | Mod: GO

## 2024-08-20 PROCEDURE — 97165 OT EVAL LOW COMPLEX 30 MIN: CPT | Mod: GO

## 2024-08-20 PROCEDURE — 97535 SELF CARE MNGMENT TRAINING: CPT | Mod: GO

## 2024-08-20 NOTE — PROGRESS NOTES
"OCCUPATIONAL THERAPY EVALUATION  Type of Visit: Evaluation     Fall Risk Screen:  Fall screen completed by: OT  Have you fallen 2 or more times in the past year?: Yes  Have you fallen and had an injury in the past year?: No  Is patient a fall risk?: Department fall risk interventions implemented    Subjective      Presenting condition or subjective complaint: memory  Date of onset: 08/08/24 (order date)    Relevant medical history:     Dates & types of surgery: hand and elbow surgeries heart and stroke    Prior diagnostic imaging/testing results: MRI; CT scan; X-ray; EMG     Prior therapy history for the same diagnosis, illness or injury:        Occupational Profile: Patient is a 73 year old male who was referred to outpatient occupational therapy at the request of Dr. Santo for administration of The Cognitive Performance Test (CPT). He presented to session accompanied by his signifcant other, who was present and contributed to history. Per chart review, past medical history of JOSE JUAN, cardiomyopathy, HTN, HLD, CAD, alcohol abuse,  CVA due to embolism of posterior cerebral artery with infarctions of both occipital lobes  with noted cognitive deficits in 2022,   partial symptomatic epilepsy with complex partial seizures, not intractable, without status epilepticus, right carpal tunnel syndrome, s/p carpal tunnel release. He states his main concern is forgetting names. He is trying to maintain a 's license. He received a letter from [facility] stating they'll cancel  his 's license on August 21, 2024. He remains independent in ADL/IADL's and requires occasional reminders for medications. Per office visit with family practice with Melony Hopkins,   8/14/24: \"Had neuropsych testing completed that was relatively unchanged in 2023 from 2019. MRI brain and EEG testing 2022. He appear relatively forgetful on exam today with questions regarding his medical history, however this is my first encounter with the " "patient. He is here to obtain a driving clearance for the [employer] otherwise he will lose his license on 8/21/2024. He has a repeat cognitive evaluation scheduled on 8/20/2024 with OT. I encouraged him to follow up with Dr Ruiz as well. I discussed that given my lack of knowledge of the patient, pending cognitive evaluation and untreated seizures, I would not recommend signing the 's license clearance  today. Recommend he scheduled a driving assessment at [facility].\"     Prior Level of Function  Transfers: Independent  Ambulation: Independent  ADL: Independent  IADL: Driving, Finances, Housekeeping, Laundry, Medication management, yardwork    Living Environment  Social support: With a significant other or spouse  Lives with significant other but has two homes in [facility] (significant other has foster children with special needs)  Type of home: House; 2-story; Basement   Stairs to enter the home: No       Ramp: No   Stairs inside the home: Yes 13 Is there a railing: Yes     Help at home: None  Equipment owned:       Employment: Yes    Hobbies/Interests: build things repair mechanical stuff    Patient goals for therapy: remember things like names    Pain assessment: Pain denied     Objective   Cognitive Status Examination  Orientation: Oriented to person, place and time   Level of Consciousness: Alert  Follows Commands and Answers Questions: 100% of the time     Client s perception of memory/ thinking or functional difficulties: His main concern is forgetting names. He denies issues with memory or cognition impacting daily functioning or independence. His signficant other has created a binder for him to help with remembering providers and keeping documents organized. He writes things down and uses a calendar    MoCA on 3/7/23 with Neurology: 26/30 (WNL)    CPT performed this date: 5.5/5.6; CPT Level 5.6 - see separate note for details  Summary of functional cognitive " status:   Normal functioning (absence of cognitive-functional disability).  Independent in managing personal affairs, monitors and directs own behavior.  Uses complex information to carry out daily activities with safety and accuracy.    Proficient with instrumental activities of daily living (IADL) and learning new activity.  Problems are anticipated, errors are avoided, and consequences of actions are considered.      VISUAL SKILLS  Visual Acuity: Wears glasses    POSTURE: WFL  RANGE OF MOTION: UE AROM WFL       ADL/Mobility/Physical Functioning: Independent in self-cares and ambulates without an assistive device.   INSTRUMENTAL ACTIVITIES OF DAILY LIVING (IADL):   Home maintenance activities: Independent  Meal preparation and grocery shopping: He primarily uses the microwave and meals are prepared by staff or significant other. No issues leaving stove/oven on  Finances and paying bills: He manages his bills independently and use autopay.  Medication management: He sets up own medications, occasionally requires cue for medications   Driving and transportation: He continues to drive and denies issues including getting lost or accidents. He uses maps in unfamiliar areas. His significant other has ridden with him and she has no concerns. He states he received a letter from [facility] stating they'll cancel his 's license August 21, 2024. He was referred to [facility] for a formal driving assessment, which they are setting up.   Leisure and routine activities: building, fixing      Assessment & Plan   CLINICAL IMPRESSIONS  Medical Diagnosis: Cognitive deficits as late effect of cerebrovascular disease (I69.919)    Treatment Diagnosis: cognitive complaints    Impression/Assessment: Patient is a 73 year old male who was referred to outpatient occupational therapy at the request of Dr. Santo for administration of The Cognitive Performance Test (CPT).  CPT performed this date and results revealed a score of 5.5/5.6  - CPT Level 5.6 - see separate note for details.     Clinical Decision Making (Complexity):  Assessment of Occupational Performance: 1-3 Performance Deficits  Occupational Performance Limitations: memory (ie: names)  Clinical Decision Making (Complexity): Low complexity    PLAN OF CARE  Treatment Interventions:  Interventions: Self-Care/Home Management, Therapeutic Activity    Long Term Goals   OT Goal 1  Goal Identifier: CPT  Goal Description: Pt and family will verbalize understanding of resutls of CPT and verbalize 3 cognitive compensation techniques she/they can incorporate to aid in daily functioning with ADL/IADL.  Target Date: 08/20/24  Date Met: 08/20/24      Frequency of Treatment: 1 visit (eval + tx)  Duration of Treatment: 1 visit (eval + tx)     Recommended Referrals to Other Professionals:  he has been referred to [facility] for driving assessment by Melony Hopkins, DO  Education Assessment: Learner/Method: Patient;Significant Other;Listening     Risks and benefits of evaluation/treatment have been explained.   Patient/Family/caregiver agrees with Plan of Care.     Evaluation Time:    OT Tawana Low Complexity Minutes (36033): 15    Signing Clinician: Sariah Paulson, OTR/L, CNS, CSRS        [facility]                                                                                   OUTPATIENT OCCUPATIONAL THERAPY      PLAN OF TREATMENT FOR OUTPATIENT REHABILITATION   Patient's Last Name, First Name, Ricky Basilio YOB: 1950   Provider's Name   [facility]   Medical Record No.  [id number]     Onset Date: 08/08/24 (order date) Start of Care Date: 08/20/24     Medical Diagnosis:  Cognitive deficits as late effect of cerebrovascular disease (I69.919)      OT Treatment Diagnosis:  cognitive complaints Plan of Treatment  Frequency/Duration:1 visit (eval + tx)/1 visit (eval + tx)    Certification date from  08/20/24   To 08/20/24        See note for plan of treatment details and functional goals     Sariah Paulson, OTR/L, CNS, CSRS                         I CERTIFY THE NEED FOR THESE SERVICES FURNISHED UNDER        THIS PLAN OF TREATMENT AND WHILE UNDER MY CARE     (Physician attestation of this document indicates review and certification of the therapy plan).              Referring Provider:  Anant Santo    Initial Assessment  See Epic Evaluation- 08/20/24

## 2024-08-20 NOTE — PROGRESS NOTES
Cognitive Performance Test    SUMMARY OF TEST:    The Cognitive Performance Test (CPT) is a standardized performance-based assessment to measure working memory/executive function processing capacities that underlie functional performance. Subtasks include common basic and instrumental activities of daily living (ADL/IADL) which are rated based on the manner in which patients respond to task demands of varying complexity. The total CPT score describes a level of functioning that indicates how information is processed, implications for functional activities, potential safety risks and a recommended level of supervision or assist based on cognitive function. The highest total score on this test is in the range of 5.6 to 5.8.    DATE OF TESTIN2024    Ordering Provider: Anant Santo MD    Order date:24    Order Diagnosis:Cognitive deficits as late effect of cerebrovascular disease [I69.919]    Occupational Profile (including diagnosis and medical history): Patient is a 73 year old male who was referred to outpatient occupational therapy at the request of Dr. Santo for administration of The Cognitive Performance Test (CPT). He presented to session accompanied by his signifcant other, who was present and contributed to history. 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  He states his main concern is forgetting names. He is trying to maintain a 's license. He received a letter from [facility] stating they'll cancel  his 's license on 2024. He remains independent in ADL/IADL's and requires occasional reminders for medications. Per office visit  "with family practice with Melony Hopkins, DO  8/14/24: \"Had neuropsych testing completed that was relatively unchanged in 2023 from 2019. MRI brain and EEG testing 2022. He appear relatively forgetful on exam today with questions regarding his medical history, however this is my first encounter with the patient. He is here to obtain a driving clearance for the [facility] otherwise he will lose his license on 8/21/2024. He has a repeat cognitive evaluation scheduled on 8/20/2024 with OT. I encouraged him to follow up with Dr Ruiz as well. I discussed that given my lack of knowledge of the patient, pending cognitive evaluation and untreated seizures, I would not recommend signing the 's license clearance  today. Recommend he scheduled a driving assessment at [facility].\"    Previous cognitive screens completed in OT or by Physician and score: MoCA 3/7/23 with Neurology: 26/30    Functional History Interview:    Informants: Pt and significant other     Client s perception of memory/ thinking or functional difficulties: His main concern is forgetting names. He denies issues with memory or cognition impacting daily functioning or independence. His signficant other has created a binder for him to help with remembering providers and keeping documents organized. He writes things down and uses a calendar     Living situation: Lives with significant other but has two homes in [place] (significant other has foster children with special needs)     Home maintenance activities: Independent     Meal preparation and grocery shopping: He primarily uses the microwave and meals are prepared by staff or significant other. No issues leaving stove/oven on     Finances and paying bills: He manages his bills independently and use autopay.     Medication management: He sets up own medications, occasionally requires cue for medications     Driving and transportation: He continues to drive and denies issues " including getting lost or accidents. He uses maps in unfamiliar areas. His significant other has ridden with him and she has no concerns. He states he received a letter from [facility] stating they'll cancel his 's license August 21, 2024. He was referred to [facility] for a formal driving assessment, which they are setting up.      Leisure and routine activities: building, fixing     ADL/Mobility/Physical Functioning: Independent in self-cares and ambulates without an assistive device.     Fall Risk Screen:   Has the patient fallen 2 or more times in the last year? Yes      Has the patient fallen and had an injury in the past year? No       Timed Up and Go Score: NT    Is the patient a fall risk? Yes, department fall risk interventions implemented      RESULTS OF TESTING:                                                                                         CPT Subtest Results    MEDBOX: 6/6 SHOP/GLOVES: 5/6 PHONE: 6/6   WASH:  5/5 TRAVEL: 6/6 TOAST: 5/5   DRESS: NT/5   TOTAL CPT SCORE:  33/34     Average CPT Score  5.5/5.6    INTERPRETATION OF TEST RESULTS:    Based on the Cognitive Performance Test, this patient scored at CPT Level 5.6.  See CPT Levels reference below.    Summary of functional cognitive status:   Normal functioning (absence of cognitive-functional disability).  Independent in managing personal affairs, monitors and directs own behavior.  Uses complex information to carry out daily activities with safety and accuracy.    Proficient with instrumental activities of daily living (IADL) and learning new activity.  Problems are anticipated, errors are avoided, and consequences of actions are considered.      Factors affecting performance:  No additional problems noted    Recommendations:    No changes to supervision or task set up, no functional safety concerns identified in the context of I/ADL tasks. Client instruction in memory compensation strategies including: pay attention, make mental  "connections, repeating/rehearsing, using external cues/reminders  such as post-its and calenders.                                               TIME ADMINISTERING TEST: 30    TIME FOR INTERPRETATION AND PREPARATION OF REPORT: 25    TOTAL TIME: 55      CPT Levels Reference:    Patient's Average CPT Score:  5.6                                                                                                                                                  Individual scores range along a continuum as outlined below.  In addition to cognitive status, other factors may affect safety in a home environment.  Please refer to specific recommendations for this patient.    __x_5.6-5.8  Normal functioning (absence of cognitive-functional disability).  Independent in managing personal affairs, monitors and directs own behavior.  Uses complex information to carry out daily activities with safety and accuracy.    Proficient with instrumental activities of daily living (IADL) and learning new activity.  Problems are anticipated, errors are avoided, and consequences of actions are considered.      ___5.0   Mild cognitive-functional disability; deficits in working memory and executive thought processes. Difficulty using complex information. Problems may be observed with recent memory, judgment, reasoning and planning ahead. May be impulsive or have difficulty anticipating consequences.  Safety:  May require assistance to plan ahead; or to manage complex medication schedules, appointments or finances.  Hazardous activities may need to be monitored or limited.  ADL:  Mild functional decline.  Able to complete basic self-care and routine household tasks.  May have difficulty with complex daily tasks such as reading, writing, meal preparation, shopping or driving.   Learns through hands on teaching. Self-centered behavior or difficulty considering the needs of others may be seen related to trouble seeing the  whole picture\". Can appear " disorganized or uninhibited.    ___4.5  Mild to moderate cognitive-functional disability. Significant deficits in working memory and executive thought processes. Judgment, reasoning and planning show obvious impairment.  Distractible with inability to shift attention/actions given competing stimuli.  Difficulty with problem solving and managing details. Complex daily tasks performed with inconsistency, difficulty, or error.     Safety:  Medications should be monitored, stove use may require supervision, and driving ability may be affected.  Impaired safety awareness with inability to anticipate potential problems.  May not recognize or respond to emergent situations. Requires frequent check-in support.   ADL:  Mild difficulty with simple everyday self-care tasks. Benefits from structured, routine activity.  Will likely need reminders to complete tasks outside of the routine. Requires assistance with planning and IADL tasks like shopping and finances. Learns concrete tasks through repetition, but performance may not generalize. Tends to be impulsive with poor insight. Self centered behavior or inability to consider the needs of others is common.    ___4.0  Moderate cognitive-functional disability; abstract to concrete thought processes. Working memory and executive function impairments are obvious. Difficulty with planning and problem solving.  Behavior is goal-directed, but unable to follow multi-step directions, is easily distracted, and may not recognize mistakes.  Inability to anticipate hazards or understand precautions.  Safety:  Recommend 24-hour supervision for safety. Supervision needed for medication management and for hazardous activities. May not be able to follow a restricted diet. Can get lost in unfamiliar surroundings. Generally, persons functioning at level 4 should not be driving.   ADL:  Some decline in quality or frequency of ADL.  [place]olo enhanced by use of a routine, simple concrete  directions, and caregiver set-up of needed items. Complex tasks such as money or home management typically requires assistance.  Relies heavily on vision to guide behavior; will ignore objects/hazards not in plain sight and can be distracted by irrelevant objects. Often has poor insight.  Able to carry out social conversation and may verbally  cover  for deficits leading caregivers to believe they are capable of functioning independently.       ___3.5  Moderate cognitive-functional disability; increased cues needed for task completion. Aware of concrete task steps but needs prompting or cues to initiate and complete simple tasks. Attention span is limited, simple directions may need to be repeated, and re-focus to a topic or task may be required.  Safety:  24-hour supervision required for safety and for assistance with daily tasks. Assistance required with medications, and access to medication should be limited. Meals, nutrition and dietary restrictions need to be monitored.  All hazardous activities should be restricted or supervised. Should not drive. Prone to wandering and can become lost.  ADL:  Moderate functional decline. Familiar tasks usually requires set-up of supplies and directions to complete steps. May need objects handed to them for task initiation. Function best with a set schedule in familiar surroundings with familiar people. All complex tasks must be done by others. Vocabulary is diminished and speech often unfocused.

## 2024-08-22 ENCOUNTER — VIRTUAL VISIT (OUTPATIENT)
Dept: FAMILY MEDICINE | Facility: CLINIC | Age: 74
End: 2024-08-22
Payer: COMMERCIAL

## 2024-08-22 DIAGNOSIS — I69.919 COGNITIVE DEFICITS AS LATE EFFECT OF CEREBROVASCULAR DISEASE: Primary | ICD-10-CM

## 2024-08-22 DIAGNOSIS — G40.209 PARTIAL SYMPTOMATIC EPILEPSY WITH COMPLEX PARTIAL SEIZURES, NOT INTRACTABLE, WITHOUT STATUS EPILEPTICUS (H): ICD-10-CM

## 2024-08-22 PROCEDURE — 99442 PR PHYSICIAN TELEPHONE EVALUATION 11-20 MIN: CPT | Mod: 93 | Performed by: STUDENT IN AN ORGANIZED HEALTH CARE EDUCATION/TRAINING PROGRAM

## 2024-08-22 RX ORDER — LEVETIRACETAM 500 MG/1
500 TABLET ORAL 2 TIMES DAILY
Qty: 60 TABLET | Refills: 4 | Status: SHIPPED | OUTPATIENT
Start: 2024-08-22

## 2024-08-22 NOTE — LETTER
August 22, 2024      Ricky Brown  [address] [place] [address]        To Whom It May Concern:    This letter is in regards to Ricky Brown's 's license. Following further evaluation in our office and with a thorough cognitive assessment (completed on August 20, 2024), he has been deemed appropriate to continue driving and his license can be reinstated from a medical standpoint. We will continue to monitor his medical and cognitive functioning in relation to his driving ability. If you have any questions please contact our office.          Sincerely,        Melony Hopkins, DO

## 2024-08-22 NOTE — PROGRESS NOTES
Ricky is a 73 year old who is being evaluated via a billable telephone visit.    What phone number would you like to be contacted at? [phone]  How would you like to obtain your AVS? Kerry  Originating Location (pt. Location): Home    Distant Location (provider location):  On-site    Assessment & Plan     Cognitive deficits as late effect of cerebrovascular disease  Partial symptomatic epilepsy with complex partial seizures, not intractable, without status epilepticus (H)  Reviewed recent OT cognitive assessment with normal functioning. Based on cognitive screening he would be ok to continue driving. He has not had a seizure in at least 5 months. Next neurology appt is in 5 months; will contact neurologist to see if he should be restarted on keppra. Did discuss that he may need to complete the [facility] driving assessment in the future pending his memory      Addendum:   Neurology recommends restarting keppra.   DO Magaly Hopkins   Ricky is a 73 year old, presenting for the following health issues:  No chief complaint on file.        8/22/2024     2:05 PM   Additional Questions   Roomed by Argelia   Accompanied by none         8/22/2024     2:05 PM   Patient Reported Additional Medications   Patient reports taking the following new medications Cetrizine 10 mg, cialis 5mg , saw palmetto      History of Present Illness       Reason for visit:  Drivers lis   He is taking medications regularly.       Discuss driving and getting his license reinstated     Had OT cognitive assessment. He missed one thing but corrected it within a few seconds. Was found to have normal functioning (absence of cognitive functional disability).     Called to schedule with [facility] for 's assessment but they are booked out and they discussed since his cognitive screening was normal they wanted him to discuss with his provider if this was necessary     His partner states he doesn't use phone when driving. Has  GPS system in car.   His partner notes he does remember everything but hasn't become an issues.     He hasn't had a seizure in at least 5 months or more.               Review of Systems  Constitutional, HEENT, cardiovascular, pulmonary, gi and gu systems are negative, except as otherwise noted.      Objective           Vitals:  No vitals were obtained today due to virtual visit.    Physical Exam   General: Alert and no distress //Respiratory: No audible wheeze, cough, or shortness of breath // Psychiatric:  Appropriate affect, tone, and pace of words            Phone call duration: 12 minutes  Signed Electronically by: Melony Hopkins DO

## 2024-12-06 ENCOUNTER — TELEPHONE (OUTPATIENT)
Dept: FAMILY MEDICINE | Facility: CLINIC | Age: 74
End: 2024-12-06
Payer: COMMERCIAL

## 2024-12-06 DIAGNOSIS — G56.11 MEDIAN NEUROPATHY AT ELBOW, RIGHT: ICD-10-CM

## 2024-12-06 DIAGNOSIS — G56.21 ULNAR NEUROPATHY OF RIGHT UPPER EXTREMITY: Primary | ICD-10-CM

## 2024-12-09 NOTE — TELEPHONE ENCOUNTER
Spoke with pt informing him the referral was made and they will call him to set up appointment.  Carol Alexander MA

## 2024-12-27 ENCOUNTER — TRANSFERRED RECORDS (OUTPATIENT)
Dept: HEALTH INFORMATION MANAGEMENT | Facility: CLINIC | Age: 74
End: 2024-12-27
Payer: COMMERCIAL

## 2024-12-30 PROBLEM — G47.61 PERIODIC LIMB MOVEMENT DISORDER: Status: ACTIVE | Noted: 2023-03-06

## 2025-01-07 ENCOUNTER — TELEPHONE (OUTPATIENT)
Dept: FAMILY MEDICINE | Facility: CLINIC | Age: 75
End: 2025-01-07
Payer: COMMERCIAL

## 2025-01-07 ENCOUNTER — TELEPHONE (OUTPATIENT)
Dept: URGENT CARE | Facility: URGENT CARE | Age: 75
End: 2025-01-07

## 2025-01-07 ENCOUNTER — ANCILLARY PROCEDURE (OUTPATIENT)
Dept: GENERAL RADIOLOGY | Facility: CLINIC | Age: 75
End: 2025-01-07
Attending: PHYSICIAN ASSISTANT
Payer: COMMERCIAL

## 2025-01-07 ENCOUNTER — TELEPHONE (OUTPATIENT)
Dept: FAMILY MEDICINE | Facility: CLINIC | Age: 75
End: 2025-01-07

## 2025-01-07 ENCOUNTER — OFFICE VISIT (OUTPATIENT)
Dept: URGENT CARE | Facility: URGENT CARE | Age: 75
End: 2025-01-07
Payer: COMMERCIAL

## 2025-01-07 VITALS
RESPIRATION RATE: 18 BRPM | HEART RATE: 91 BPM | OXYGEN SATURATION: 96 % | TEMPERATURE: 97.7 F | DIASTOLIC BLOOD PRESSURE: 67 MMHG | SYSTOLIC BLOOD PRESSURE: 101 MMHG

## 2025-01-07 DIAGNOSIS — R91.8 PULMONARY NODULES: ICD-10-CM

## 2025-01-07 DIAGNOSIS — R05.1 ACUTE COUGH: ICD-10-CM

## 2025-01-07 DIAGNOSIS — J02.9 SORE THROAT: ICD-10-CM

## 2025-01-07 DIAGNOSIS — J06.9 VIRAL UPPER RESPIRATORY ILLNESS: Primary | ICD-10-CM

## 2025-01-07 DIAGNOSIS — J06.9 VIRAL UPPER RESPIRATORY ILLNESS: ICD-10-CM

## 2025-01-07 LAB
DEPRECATED S PYO AG THROAT QL EIA: NEGATIVE
FLUAV AG SPEC QL IA: NEGATIVE
FLUBV AG SPEC QL IA: NEGATIVE
S PYO DNA THROAT QL NAA+PROBE: NOT DETECTED

## 2025-01-07 PROCEDURE — 87635 SARS-COV-2 COVID-19 AMP PRB: CPT | Performed by: PHYSICIAN ASSISTANT

## 2025-01-07 PROCEDURE — 71046 X-RAY EXAM CHEST 2 VIEWS: CPT | Mod: TC | Performed by: RADIOLOGY

## 2025-01-07 PROCEDURE — 87804 INFLUENZA ASSAY W/OPTIC: CPT | Performed by: PHYSICIAN ASSISTANT

## 2025-01-07 PROCEDURE — 87651 STREP A DNA AMP PROBE: CPT | Performed by: PHYSICIAN ASSISTANT

## 2025-01-07 PROCEDURE — 99214 OFFICE O/P EST MOD 30 MIN: CPT | Performed by: PHYSICIAN ASSISTANT

## 2025-01-07 RX ORDER — FLUTICASONE PROPIONATE 50 MCG
1 SPRAY, SUSPENSION (ML) NASAL DAILY
Qty: 16 G | Refills: 0 | Status: SHIPPED | OUTPATIENT
Start: 2025-01-07

## 2025-01-07 ASSESSMENT — PAIN SCALES - GENERAL: PAINLEVEL_OUTOF10: SEVERE PAIN (6)

## 2025-01-07 NOTE — TELEPHONE ENCOUNTER
Patients SO Stacie calling, No consent on file.    Patient asked her to call and request appointment due to sinus congestion and cough.    No appointments available today .    Patient has not done a home Covid test so will do a test and if positive will callback if they want paxlovid treatment.    If negative they may go to .    Christine M Klisch, RN

## 2025-01-07 NOTE — TELEPHONE ENCOUNTER
Patient called back asking if someone tried to call him.  Patient was seen in Urgent Care today.  Patient does not have all his labs back yet and denies having left anything in Urgent Care.      Routing to Urgent Care support staff if they had tried to reach out.      Kristina Kjellberg, MSN, RN

## 2025-01-07 NOTE — PROGRESS NOTES
Chief Complaint   Patient presents with    Cough     Cough, congestion, sore throat - sx started 3 days ago        Results for orders placed or performed in visit on 01/07/25   Streptococcus A Rapid Screen w/Reflex to PCR - Clinic Collect     Status: Normal    Specimen: Throat; Swab   Result Value Ref Range    Group A Strep antigen Negative Negative   Influenza A & B Antigen - Clinic Collect     Status: Normal    Specimen: Nose; Swab   Result Value Ref Range    Influenza A antigen Negative Negative    Influenza B antigen Negative Negative    Narrative    Test results must be correlated with clinical data. If necessary, results should be confirmed by a molecular assay or viral culture.       Results for orders placed or performed in visit on 01/07/25   Streptococcus A Rapid Screen w/Reflex to PCR - Clinic Collect     Status: Normal    Specimen: Throat; Swab   Result Value Ref Range    Group A Strep antigen Negative Negative   Influenza A & B Antigen - Clinic Collect     Status: Normal    Specimen: Nose; Swab   Result Value Ref Range    Influenza A antigen Negative Negative    Influenza B antigen Negative Negative    Narrative    Test results must be correlated with clinical data. If necessary, results should be confirmed by a molecular assay or viral culture.               ASSESSMENT:     ICD-10-CM    1. Viral upper respiratory illness  J06.9 XR Chest 2 Views     fluticasone (FLONASE) 50 MCG/ACT nasal spray      2. Acute cough  R05.1 Streptococcus A Rapid Screen w/Reflex to PCR - Clinic Collect     COVID-19 Virus (Coronavirus) by PCR Nose     Influenza A & B Antigen - Clinic Collect     Group A Streptococcus PCR Throat Swab     XR Chest 2 Views     fluticasone (FLONASE) 50 MCG/ACT nasal spray      3. Sore throat  J02.9 Streptococcus A Rapid Screen w/Reflex to PCR - Clinic Collect     COVID-19 Virus (Coronavirus) by PCR Nose     Influenza A & B Antigen - Clinic Collect     Group A Streptococcus PCR Throat Swab      XR Chest 2 Views     fluticasone (FLONASE) 50 MCG/ACT nasal spray      4. Pulmonary nodules  R91.8 XR Chest 2 Views     fluticasone (FLONASE) 50 MCG/ACT nasal spray            PLAN:Viral resp infection. Flonase. CXR pending. Further strep and covid test pending.  I have discussed clinical findings with patient.  Side effects of medications discussed.  Symptomatic care is discussed.  I have discussed the possibility of  worsening symptoms and indication to RTC or go to the ER if they occur.  All questions are answered, patient indicates understanding of these issues and is in agreement with plan.   Patient care instructions are discussed/given at the end of visit.   Lots of rest and fluids.      Natividad Goldberg PA-C      SUBJECTIVE:    75 yo male with 3 days of cough, nasal congestion, ST, joint aches. No fever. H/O pulmonary nodules    Allergies   Allergen Reactions    Lisinopril Cough    Perfume Other (See Comments)       Past Medical History:   Diagnosis Date    Arthritis     BCC (basal cell carcinoma)     right shoulder     Cardiomyopathy (H)     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 (H) 06/27/2022    Colon adenomas 09/20/2011    Coronary artery disease     Disorder of bursae and tendons in shoulder region 04/18/2014    ED (erectile dysfunction)     Fatty liver     HFrEF (heart failure with reduced ejection fraction) (H)     History of iritis, 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/2019    HTN (hypertension)     Near syncope 02/10/2014    Noncompliance     Nonrheumatic aortic valve insufficiency     Obesity     JOSE JUAN (obstructive sleep apnea)     Partial symptomatic epilepsy with complex partial seizures, not intractable, without status epilepticus (H) 09/08/2022       Current Outpatient Medications   Medication Sig Dispense Refill    Ascorbic Acid (VITAMIN C PO) Take 1 tablet by mouth every evening      aspirin 81 MG EC tablet Take 1 tablet (81 mg) by mouth daily  90 tablet 3    Cyanocobalamin (VITAMIN B-12 PO) Take 2 capsules by mouth every evening      empagliflozin (JARDIANCE) 10 MG TABS tablet Take 1 tablet (10 mg) by mouth daily 90 tablet 1    levETIRAcetam (KEPPRA) 500 MG tablet Take 1 tablet (500 mg) by mouth 2 times daily. 60 tablet 4    losartan (COZAAR) 50 MG tablet Take 1 tablet (50 mg) by mouth daily 90 tablet 3    metoprolol succinate ER (TOPROL XL) 25 MG 24 hr tablet Take 1 tablet (25 mg) by mouth daily 90 tablet 2    Multiple Vitamins-Minerals (ZINC PO) Take 1 tablet by mouth every evening      silodosin (RAPAFLO) 4 MG CAPS capsule Take 1 capsule (4 mg) by mouth daily 90 capsule 3    spironolactone (ALDACTONE) 25 MG tablet Take 0.5 tablets (12.5 mg) by mouth daily 45 tablet 3    VITAMIN A PO Take 1 tablet by mouth every evening      VITAMIN D-VITAMIN K PO Take 1 tablet by mouth every evening       No current facility-administered medications for this visit.     Facility-Administered Medications Ordered in Other Visits   Medication Dose Route Frequency Provider Last Rate Last Admin    sodium chloride (PF) 0.9% PF flush 10 mL  10 mL Intravenous Once Catrachita Beltran MD           Social History     Tobacco Use    Smoking status: Former     Current packs/day: 0.00     Average packs/day: 0.5 packs/day for 2.0 years (1.0 ttl pk-yrs)     Types: Cigarettes     Start date: 1994     Quit date: 1996     Years since quittin.0     Passive exposure: Past    Smokeless tobacco: Never   Substance Use Topics    Alcohol use: Yes     Alcohol/week: 8.3 standard drinks of alcohol     Comment: Evening Marie       ROS:  CONSTITUTIONAL: Negative for fatigue or fever.  EYES: Negative for eye problems.  ENT: As above.  RESP: As above.  CV: Negative for chest pains.  GI: Negative for vomiting.  MUSCULOSKELETAL:  Negative for significant muscle or joint pains.  NEUROLOGIC: Negative for headaches.  SKIN: Negative for rash.  PSYCH: Normal mentation for age.    OBJECTIVE:  BP  101/67 (BP Location: Left arm, Patient Position: Sitting, Cuff Size: Adult Large)   Pulse 91   Temp 97.7  F (36.5  C) (Oral)   Resp 18   SpO2 96%   GENERAL APPEARANCE: Healthy, alert and no distress.  EYES:Conjunctiva/sclera clear.  EARS: No cerumen.   Ear canals w/o erythema.  TM's intact w/o erythema.    NOSE/MOUTH: Nose without ulcers, erythema or lesions.  SINUSES: No maxillary sinus tenderness.  THROAT: No erythema w/o tonsillar enlargement . No exudates.  NECK: Supple, nontender, no lymphadenopathy.  RESP: Lungs clear to auscultation - no rales, rhonchi or wheezes  CV: Regular rate and rhythm, normal S1 S2, no murmur noted.  NEURO: Awake, alert    SKIN: No rashes      Natividad Goldberg PA-C

## 2025-01-07 NOTE — TELEPHONE ENCOUNTER
Per Natividad Goldberg PA-C, provider attempted x2 to reach patient via telephone to go over x-ray results. Voicemail left.     If patient returns call, ok to inform patient that x-ray is negative per radiologist read, per Natividad Goldberg PA-C.     Dandre Pereyra LPN

## 2025-01-08 LAB — SARS-COV-2 RNA RESP QL NAA+PROBE: POSITIVE

## 2025-01-29 ENCOUNTER — OFFICE VISIT (OUTPATIENT)
Dept: FAMILY MEDICINE | Facility: CLINIC | Age: 75
End: 2025-01-29
Payer: COMMERCIAL

## 2025-01-29 VITALS
HEIGHT: 66 IN | TEMPERATURE: 100.1 F | WEIGHT: 162.5 LBS | RESPIRATION RATE: 20 BRPM | BODY MASS INDEX: 26.12 KG/M2 | DIASTOLIC BLOOD PRESSURE: 66 MMHG | HEART RATE: 83 BPM | OXYGEN SATURATION: 98 % | SYSTOLIC BLOOD PRESSURE: 106 MMHG

## 2025-01-29 DIAGNOSIS — I10 ESSENTIAL HYPERTENSION WITH GOAL BLOOD PRESSURE LESS THAN 140/90: Primary | ICD-10-CM

## 2025-01-29 DIAGNOSIS — I50.20 HFREF (HEART FAILURE WITH REDUCED EJECTION FRACTION) (H): ICD-10-CM

## 2025-01-29 DIAGNOSIS — J18.9 PNEUMONIA OF LEFT LOWER LOBE DUE TO INFECTIOUS ORGANISM: ICD-10-CM

## 2025-01-29 PROCEDURE — 99213 OFFICE O/P EST LOW 20 MIN: CPT | Performed by: INTERNAL MEDICINE

## 2025-01-29 RX ORDER — SILODOSIN 4 MG/1
4 CAPSULE ORAL DAILY
Qty: 90 CAPSULE | Refills: 3 | Status: SHIPPED | OUTPATIENT
Start: 2025-01-29

## 2025-01-29 ASSESSMENT — COPD QUESTIONNAIRES: COPD: 0

## 2025-01-29 ASSESSMENT — ENCOUNTER SYMPTOMS
RHINORRHEA: 1
EYE REDNESS: 0
WHEEZING: 1
SORE THROAT: 0
COUGH: 1
SHORTNESS OF BREATH: 1
CHILLS: 1
SWEATS: 0
HEADACHES: 1
MYALGIAS: 0
WEIGHT LOSS: 0

## 2025-01-29 ASSESSMENT — PAIN SCALES - GENERAL: PAINLEVEL_OUTOF10: NO PAIN (0)

## 2025-01-29 ASSESSMENT — LIFESTYLE VARIABLES: SMOKING_STATUS: 0

## 2025-01-29 NOTE — PROGRESS NOTES
"  Assessment & Plan   Problem List Items Addressed This Visit       Essential hypertension with goal blood pressure less than 140/90 - Primary (Chronic)    Relevant Orders    REVIEW OF HEALTH MAINTENANCE PROTOCOL ORDERS (Completed)    HFrEF (heart failure with reduced ejection fraction) (H)    Relevant Medications    silodosin (RAPAFLO) 4 MG CAPS capsule     Other Visit Diagnoses       Pneumonia of left lower lobe due to infectious organism        Relevant Medications    amoxicillin-clavulanate (AUGMENTIN) 875-125 MG tablet                  BMI  Estimated body mass index is 26.15 kg/m  as calculated from the following:    Height as of this encounter: 1.679 m (5' 6.1\").    Weight as of this encounter: 73.7 kg (162 lb 8 oz).   Weight management plan: Discussed healthy diet and exercise guidelines    Work on weight loss  Regular exercise      Magaly García is a 74 year old, presenting for the following health issues:  Cough        1/29/2025     4:45 PM   Additional Questions   Roomed by Shanon   Accompanied by Friend-Stacie     History of Present Illness       Back Pain:  He presents for follow up of back pain. Patient's back pain is a new problem.    Original cause of back pain: other  First noticed back pain: yesterday  Patient feels back pain: rarelyLocation of back pain:  Right middle of back and left middle of back  Description of back pain: cramping, dull ache and gnawing  Back pain spreads: nowhere    Since patient first noticed back pain, pain is: gradually worsening  Does back pain interfere with his job:  Yes  On a scale of 1-10 (10 being the worst), patient describes pain as:  6  What makes back pain worse: bending, coughing, certain positions, lying down, sitting and standing   Acupuncture: not tried  Acetaminophen: not helpful  Activity or exercise: not helpful  Chiropractor:  Not tried  Heat: not tried  Massage: not tried  Muscle relaxants: not tried  NSAIDS: not helpful  Opioids: not tried  Physical " "Therapy: not tried  Rest: not helpful  Steroid Injection: not tried  Stretching: not helpful  Surgery: not tried  TENS unit: helpful  Topical pain relievers: not tried  Other healthcare providers patient is seeing for back pain: Chiropractor    Headaches:   Since the patient's last clinic visit, headaches are: no change  The patient is getting headaches:  Rarely  He is able to do normal daily activities when he has a migraine.  The patient is taking the following rescue/relief medications:  Tylenol   Patient states \"I get some relief\" from the rescue/relief medications.   The patient is taking the following medications to prevent migraines:  No medications to prevent migraines  In the past 4 weeks, the patient has gone to an Urgent Care or Emergency Room 0 times times due to headaches.    Reason for visit:  Cough pain  Symptom onset:  1-3 days ago   He is taking medications regularly.     Covid 2 quick   Long acting yes   Then took the pills  paxlovid     1/7/2025 cxr - cough since then pain in the chest   Balance poor as well   Hard to walking fever a few days ago       100.1 --- not checking the blood sugar   Small volumes in urine   Starts leaking from time to time                   Review of Systems  Constitutional, HEENT, cardiovascular, pulmonary, gi and gu systems are negative, except as otherwise noted.      Objective    /66 (BP Location: Right arm, Patient Position: Sitting, Cuff Size: Adult Regular)   Pulse 83   Temp 100.1  F (37.8  C) (Temporal)   Resp 20   Ht 1.679 m (5' 6.1\")   Wt 73.7 kg (162 lb 8 oz)   SpO2 98%   BMI 26.15 kg/m    Body mass index is 26.15 kg/m .  Physical Exam   GENERAL: alert and no distress  EYES: Eyes grossly normal to inspection, PERRL and conjunctivae and sclerae normal  HENT: ear canals and TM's normal, nose and mouth without ulcers or lesions  NECK: no adenopathy, no asymmetry, masses, or scars  RESP: left lower lobe rales clearly heard   No wheeze  CV: regular " rate and rhythm, normal S1 S2, no S3 or S4, no murmur, click or rub, no peripheral edema  ABDOMEN: soft, nontender, no hepatosplenomegaly, no masses and bowel sounds normal  MS: no gross musculoskeletal defects noted, no edema    No results found for any visits on 01/29/25.        Signed Electronically by: Anant Santo MD

## 2025-01-30 ENCOUNTER — APPOINTMENT (OUTPATIENT)
Dept: CT IMAGING | Facility: CLINIC | Age: 75
End: 2025-01-30
Attending: EMERGENCY MEDICINE
Payer: COMMERCIAL

## 2025-01-30 ENCOUNTER — HOSPITAL ENCOUNTER (EMERGENCY)
Facility: CLINIC | Age: 75
End: 2025-01-30
Attending: EMERGENCY MEDICINE
Payer: COMMERCIAL

## 2025-01-30 VITALS
TEMPERATURE: 97.4 F | HEART RATE: 80 BPM | DIASTOLIC BLOOD PRESSURE: 52 MMHG | RESPIRATION RATE: 18 BRPM | OXYGEN SATURATION: 97 % | SYSTOLIC BLOOD PRESSURE: 97 MMHG

## 2025-01-30 DIAGNOSIS — Z86.16 HISTORY OF COVID-19: ICD-10-CM

## 2025-01-30 DIAGNOSIS — R53.1 GENERALIZED WEAKNESS: ICD-10-CM

## 2025-01-30 DIAGNOSIS — E86.0 DEHYDRATION: ICD-10-CM

## 2025-01-30 LAB
ALBUMIN SERPL BCG-MCNC: 4 G/DL (ref 3.5–5.2)
ALBUMIN UR-MCNC: 20 MG/DL
ALP SERPL-CCNC: 61 U/L (ref 40–150)
ALT SERPL W P-5'-P-CCNC: 11 U/L (ref 0–70)
ANION GAP SERPL CALCULATED.3IONS-SCNC: 12 MMOL/L (ref 7–15)
APPEARANCE UR: CLEAR
AST SERPL W P-5'-P-CCNC: 23 U/L (ref 0–45)
ATRIAL RATE - MUSE: 71 BPM
BASE EXCESS BLDV CALC-SCNC: -5 MMOL/L (ref -3–3)
BASOPHILS # BLD AUTO: 0 10E3/UL (ref 0–0.2)
BASOPHILS NFR BLD AUTO: 1 %
BILIRUB SERPL-MCNC: 0.3 MG/DL
BILIRUB UR QL STRIP: NEGATIVE
BUN SERPL-MCNC: 29.5 MG/DL (ref 8–23)
CALCIUM SERPL-MCNC: 9.4 MG/DL (ref 8.8–10.4)
CHLORIDE SERPL-SCNC: 104 MMOL/L (ref 98–107)
COLOR UR AUTO: YELLOW
CREAT SERPL-MCNC: 1 MG/DL (ref 0.67–1.17)
DIASTOLIC BLOOD PRESSURE - MUSE: NORMAL MMHG
EGFRCR SERPLBLD CKD-EPI 2021: 80 ML/MIN/1.73M2
EOSINOPHIL # BLD AUTO: 0.1 10E3/UL (ref 0–0.7)
EOSINOPHIL NFR BLD AUTO: 2 %
ERYTHROCYTE [DISTWIDTH] IN BLOOD BY AUTOMATED COUNT: 13.8 % (ref 10–15)
GLUCOSE SERPL-MCNC: 94 MG/DL (ref 70–99)
GLUCOSE UR STRIP-MCNC: >=1000 MG/DL
HCO3 BLDV-SCNC: 22 MMOL/L (ref 21–28)
HCO3 SERPL-SCNC: 23 MMOL/L (ref 22–29)
HCT VFR BLD AUTO: 43.7 % (ref 40–53)
HGB BLD-MCNC: 14.2 G/DL (ref 13.3–17.7)
HGB UR QL STRIP: NEGATIVE
IMM GRANULOCYTES # BLD: 0 10E3/UL
IMM GRANULOCYTES NFR BLD: 1 %
INR PPP: 1.11 (ref 0.85–1.15)
INTERPRETATION ECG - MUSE: NORMAL
KETONES UR STRIP-MCNC: ABNORMAL MG/DL
LACTATE BLD-SCNC: 1.1 MMOL/L
LEUKOCYTE ESTERASE UR QL STRIP: NEGATIVE
LYMPHOCYTES # BLD AUTO: 1.2 10E3/UL (ref 0.8–5.3)
LYMPHOCYTES NFR BLD AUTO: 29 %
MCH RBC QN AUTO: 29.2 PG (ref 26.5–33)
MCHC RBC AUTO-ENTMCNC: 32.5 G/DL (ref 31.5–36.5)
MCV RBC AUTO: 90 FL (ref 78–100)
MONOCYTES # BLD AUTO: 0.7 10E3/UL (ref 0–1.3)
MONOCYTES NFR BLD AUTO: 16 %
MUCOUS THREADS #/AREA URNS LPF: PRESENT /LPF
NEUTROPHILS # BLD AUTO: 2.1 10E3/UL (ref 1.6–8.3)
NEUTROPHILS NFR BLD AUTO: 52 %
NITRATE UR QL: NEGATIVE
NRBC # BLD AUTO: 0 10E3/UL
NRBC BLD AUTO-RTO: 0 /100
NT-PROBNP SERPL-MCNC: 522 PG/ML (ref 0–900)
P AXIS - MUSE: 58 DEGREES
PCO2 BLDV: 52 MM HG (ref 40–50)
PH BLDV: 7.25 [PH] (ref 7.32–7.43)
PH UR STRIP: 5.5 [PH] (ref 5–7)
PLATELET # BLD AUTO: 216 10E3/UL (ref 150–450)
PO2 BLDV: 41 MM HG (ref 25–47)
POTASSIUM SERPL-SCNC: 4 MMOL/L (ref 3.4–5.3)
PR INTERVAL - MUSE: 184 MS
PROCALCITONIN SERPL IA-MCNC: 0.05 NG/ML
PROT SERPL-MCNC: 7.2 G/DL (ref 6.4–8.3)
QRS DURATION - MUSE: 104 MS
QT - MUSE: 400 MS
QTC - MUSE: 434 MS
R AXIS - MUSE: -32 DEGREES
RBC # BLD AUTO: 4.87 10E6/UL (ref 4.4–5.9)
RBC URINE: <1 /HPF
SAO2 % BLDV: 67 % (ref 70–75)
SODIUM SERPL-SCNC: 139 MMOL/L (ref 135–145)
SP GR UR STRIP: 1.02 (ref 1–1.03)
SYSTOLIC BLOOD PRESSURE - MUSE: NORMAL MMHG
T AXIS - MUSE: 47 DEGREES
TROPONIN T SERPL HS-MCNC: 8 NG/L
TROPONIN T SERPL HS-MCNC: 8 NG/L
UROBILINOGEN UR STRIP-MCNC: NORMAL MG/DL
VENTRICULAR RATE- MUSE: 71 BPM
WBC # BLD AUTO: 4.1 10E3/UL (ref 4–11)
WBC URINE: <1 /HPF

## 2025-01-30 PROCEDURE — 84484 ASSAY OF TROPONIN QUANT: CPT | Performed by: EMERGENCY MEDICINE

## 2025-01-30 PROCEDURE — 84145 PROCALCITONIN (PCT): CPT | Performed by: EMERGENCY MEDICINE

## 2025-01-30 PROCEDURE — 87040 BLOOD CULTURE FOR BACTERIA: CPT | Performed by: EMERGENCY MEDICINE

## 2025-01-30 PROCEDURE — 93010 ELECTROCARDIOGRAM REPORT: CPT | Performed by: EMERGENCY MEDICINE

## 2025-01-30 PROCEDURE — 84295 ASSAY OF SERUM SODIUM: CPT | Performed by: EMERGENCY MEDICINE

## 2025-01-30 PROCEDURE — 81003 URINALYSIS AUTO W/O SCOPE: CPT | Performed by: EMERGENCY MEDICINE

## 2025-01-30 PROCEDURE — 83880 ASSAY OF NATRIURETIC PEPTIDE: CPT | Performed by: EMERGENCY MEDICINE

## 2025-01-30 PROCEDURE — 71275 CT ANGIOGRAPHY CHEST: CPT

## 2025-01-30 PROCEDURE — 71275 CT ANGIOGRAPHY CHEST: CPT | Mod: 26 | Performed by: RADIOLOGY

## 2025-01-30 PROCEDURE — 85610 PROTHROMBIN TIME: CPT | Performed by: EMERGENCY MEDICINE

## 2025-01-30 PROCEDURE — 82565 ASSAY OF CREATININE: CPT | Performed by: EMERGENCY MEDICINE

## 2025-01-30 PROCEDURE — 93005 ELECTROCARDIOGRAM TRACING: CPT | Performed by: EMERGENCY MEDICINE

## 2025-01-30 PROCEDURE — 99285 EMERGENCY DEPT VISIT HI MDM: CPT | Performed by: EMERGENCY MEDICINE

## 2025-01-30 PROCEDURE — 99285 EMERGENCY DEPT VISIT HI MDM: CPT | Mod: 25 | Performed by: EMERGENCY MEDICINE

## 2025-01-30 PROCEDURE — 85048 AUTOMATED LEUKOCYTE COUNT: CPT | Performed by: EMERGENCY MEDICINE

## 2025-01-30 PROCEDURE — 250N000011 HC RX IP 250 OP 636: Performed by: EMERGENCY MEDICINE

## 2025-01-30 PROCEDURE — 82803 BLOOD GASES ANY COMBINATION: CPT

## 2025-01-30 PROCEDURE — 36415 COLL VENOUS BLD VENIPUNCTURE: CPT | Performed by: EMERGENCY MEDICINE

## 2025-01-30 PROCEDURE — 85004 AUTOMATED DIFF WBC COUNT: CPT | Performed by: EMERGENCY MEDICINE

## 2025-01-30 PROCEDURE — 83605 ASSAY OF LACTIC ACID: CPT

## 2025-01-30 RX ORDER — IOPAMIDOL 755 MG/ML
58 INJECTION, SOLUTION INTRAVASCULAR ONCE
Status: COMPLETED | OUTPATIENT
Start: 2025-01-30 | End: 2025-01-30

## 2025-01-30 RX ADMIN — IOPAMIDOL 58 ML: 755 INJECTION, SOLUTION INTRAVENOUS at 20:06

## 2025-01-30 ASSESSMENT — ACTIVITIES OF DAILY LIVING (ADL)
ADLS_ACUITY_SCORE: 50

## 2025-01-30 ASSESSMENT — COLUMBIA-SUICIDE SEVERITY RATING SCALE - C-SSRS
6. HAVE YOU EVER DONE ANYTHING, STARTED TO DO ANYTHING, OR PREPARED TO DO ANYTHING TO END YOUR LIFE?: NO
1. IN THE PAST MONTH, HAVE YOU WISHED YOU WERE DEAD OR WISHED YOU COULD GO TO SLEEP AND NOT WAKE UP?: NO
2. HAVE YOU ACTUALLY HAD ANY THOUGHTS OF KILLING YOURSELF IN THE PAST MONTH?: NO

## 2025-01-30 NOTE — ED TRIAGE NOTES
Pt ambulatory to triage with c/o generalized weakness. Pt states that he has had flu like symptoms x1 month. Diagnosed with pneumonia yesterday and prescribed ABX. Here today with increased weakness and chest pain.      Triage Assessment (Adult)       Row Name 01/30/25 3906          Triage Assessment    Airway WDL WDL        Respiratory WDL    Respiratory WDL X;rhythm/pattern;cough     Rhythm/Pattern, Respiratory shortness of breath     Cough Frequency frequent        Skin Circulation/Temperature WDL    Skin Circulation/Temperature WDL WDL        Cardiac WDL    Cardiac WDL X;chest pain        Chest Pain Assessment    Chest Pain Location midsternal        Peripheral/Neurovascular WDL    Peripheral Neurovascular WDL WDL        Cognitive/Neuro/Behavioral WDL    Cognitive/Neuro/Behavioral WDL WDL

## 2025-01-30 NOTE — ED PROVIDER NOTES
"    [facility] EMERGENCY DEPARTMENT ([facility])    1/30/25       ED PROVIDER NOTE       History     Chief Complaint   Patient presents with    Generalized Weakness     The history is provided by the patient and medical records.     Ricky Brown is a 74 year old male with history of hypertension, nonischemic cardiomyopathy, heart failure (EF 45% on echocardiogram 09/2022 with LGE nonischemic fibrosis), pulmonary hypertension, moderate aortic insufficiency, embolic CVA, JOSE JUAN s/p hypoglossal nerve stimulator placement, NSTEMI, left lower lobe pneumonia on Augmentin who presents the emergency department with generalized weakness.    CARDIAC CATHETERIZATION 7/27/24    Prox LAD to Mid LAD lesion is 20% stenosed.    Prox RCA to Mid RCA lesion is 20% stenosed.    {History Review Selection (Optional):290218}  {ROS Selection (Optional):272778}    Physical Exam   BP: 97/52  Pulse: 80  Temp: 97.4  F (36.3  C)  Resp: 18  SpO2: 97 %    BP Readings from Last 6 Encounters:   01/30/25 97/52   01/29/25 106/66   01/07/25 101/67   08/14/24 120/62   08/08/24 100/60   07/28/24 133/59     Wt Readings from Last 10 Encounters:   01/29/25 73.7 kg (162 lb 8 oz)   08/14/24 73.2 kg (161 lb 4.8 oz)   08/08/24 72.4 kg (159 lb 9.6 oz)   07/28/24 73.8 kg (162 lb 9.6 oz)   07/19/24 75 kg (165 lb 4.8 oz)   04/17/24 70.4 kg (155 lb 3.2 oz)   02/05/24 71.7 kg (158 lb)   12/12/23 73.2 kg (161 lb 6.4 oz)   11/03/23 73.4 kg (161 lb 14.4 oz)   08/08/23 75.3 kg (166 lb)       Physical Exam  ***    ED Course, Procedures, & Data      Procedures       {ED Course Selections (Optional):092397}  {ED Sepsis CMS Documentation (Optional):976149::\" \"}       No results found for any visits on 01/30/25.  Medications - No data to display  Labs Ordered and Resulted from Time of ED Arrival to Time of ED Departure - No data to display  No orders to display          {Critical Care Performed?:278927}    Assessment & Plan    ***    I have reviewed the nursing " notes. I have reviewed the findings, diagnosis, plan and need for follow up with the patient.    New Prescriptions    No medications on file       Final diagnoses:   None       ***  [facility] EMERGENCY DEPARTMENT  1/30/2025

## 2025-01-31 VITALS
DIASTOLIC BLOOD PRESSURE: 61 MMHG | TEMPERATURE: 98.5 F | RESPIRATION RATE: 16 BRPM | SYSTOLIC BLOOD PRESSURE: 108 MMHG | HEART RATE: 81 BPM | OXYGEN SATURATION: 96 %

## 2025-01-31 PROCEDURE — 258N000003 HC RX IP 258 OP 636: Performed by: EMERGENCY MEDICINE

## 2025-01-31 PROCEDURE — 96360 HYDRATION IV INFUSION INIT: CPT | Performed by: EMERGENCY MEDICINE

## 2025-01-31 RX ADMIN — SODIUM CHLORIDE 250 ML: 9 INJECTION, SOLUTION INTRAVENOUS at 00:03

## 2025-01-31 NOTE — ED PROVIDER NOTES
History     Chief Complaint   Patient presents with    Generalized Weakness     [facility]  Ricky Brown is a 74 year old male who has a PMH notable for ***  74 year old male with history of hypertension, nonischemic cardiomyopathy, heart failure (EF 45% on echocardiogram 09/2022 with LGE nonischemic fibrosis), pulmonary hypertension, moderate aortic insufficiency, embolic CVA, JOSE JUAN s/p hypoglossal nerve stimulator placement, NSTEMI, left lower lobe pneumonia on Augmentin who presents the emergency department with generalized weakness.     CARDIAC CATHETERIZATION 7/27/24    Prox LAD to Mid LAD lesion is 20% stenosed.    Prox RCA to Mid RCA lesion is 20% stenosed.    RECENT HISTORY REVIEW:      CURRENT PRESENTATION:      No other new symptoms or concerns at this time.  Full ROS completed without any additional findings.      {Past History:926821}          Review of Systems  A complete review of systems was performed with pertinent positives and negatives noted in the HPI, and all other systems negative.  ***  Physical Exam   BP: 97/52  Pulse: 80  Temp: 97.4  F (36.3  C)  Resp: 18  SpO2: 97 %      Physical Exam  CONSTITUTIONAL: Awake and alert. Non-toxic appearance. No acute distress.   HENT:   - Head: Normocephalic and atraumatic.   - Ears: External ear grossly normal.   - Nose: Nose normal. No rhinorrhea. No epistaxis.   - Mouth/Throat: MMM  EYES: Conjunctivae and lids are normal. No scleral icterus.   NECK: Normal range of motion and phonation normal. Neck supple.  No tracheal deviation, no stridor. No edema or erythema noted.  CARDIOVASCULAR: Normal rate, regular rhythm and no appreciable abnormal heart sounds.  PULMONARY/CHEST: Normal work of breathing. No accessory muscle usage or stridor. No respiratory distress.  No appreciable abnormal breath sounds.  ABDOMEN: Soft, non-distended. No tenderness. No peritoneal findings, no rigidity, rebound or guarding.  No palpable masses or abnormal pulsatility  "appreciated.  MUSCULOSKELETAL: Extremities warm and seemingly well perfused.   NEUROLOGIC: Awake, alert. Not disoriented. No seizure activity. GCS 15  SKIN: Skin is warm and dry. No diaphoresis. No pallor. No rash/acute appearing skin changes noted.  PSYCHIATRIC: Normal mood and affect. Speech and behavior normal. Thought processes linear. Cognition and memory are normal. No SI/HI reported.    ED Course        Procedures  ----------------------------------------------------------------------------------------------------------------------              EKG Interpretation:      Interpreted by Dawn Ocasio MD  Time reviewed: 1710  Symptoms at time of EKG: Bilateral lower chest pain following recent COVID infection, generalized weakness/fatigue  Rhythm: Sinus  Rate: Normal  Axis: Left axis deviation  Ectopy: None  Conduction:  ms,  ms  ST Segments/ T Waves: No nilo ST elevation appreciated  Comparison to prior: Compared to EKG from 25 July 2024, heart rate is increased from the 50s now up to 71 bpm.  The morphology of the conducted beats appears relatively similar.  No longer having.  For first-degree AV block on current ECG    Clinical Impression: Sinus, heart rate normalized from prior EKG, no longer meeting criteria for first-degree AV block, otherwise appears generally unchanged from prior          ----------------------------------------------------------------------------------------------------------------------  {ED Sepsis CMS Documentation (Optional):694546::\" \"}  ----------------------------------------------------------------------------------------------------------------------  {Critical Care Performed?:035951}    Assessments & Plan (with Medical Decision Making)     IMPRESSION:   74 year old male w/ PMH notable for ***     Clinically, patient appears *** . Vitals ***. Otherwise on examination, ***    Ddx includes, but not limited to, ***     PLAN:   - ***  - ***  - Risks/benefits of " pursuing imaging reviewed and accepted.   - Dispo pending ED Course    RESULTS:  Labs:   - ***   Urine:   - ***  Imaging: Written preliminary reports reviewed:  - CXR: ***  - CT A/P: ***  Results/reports reviewed w/ patient who expresses understanding of findings and F/U recommendations.    INTERVENTIONS:   - ***  - ***    RE-EVALUATION:  - The patient's symptoms were  ***  - Pt otherwise *** continues to do well here in the ED, no acute issues or apparent concerning changes in vitals or clinical appearance.    DISCUSSIONS:  - w/ ***: ***   - w/ ***: They have seen and evaluated the patient here in the ED. ***   - w/ IM: Reviewed patient/presentation, current state of workup/any pending studies. They will admit for further evaluation/management, F/U pending studies as needed, coordinate w/ consulting services as needed. No additional requests/recommendations for workup/management for in the ED at this time. ***   - w/ Patient: I have reviewed the available findings, plan, need for close follow up, strict return/safety instructions with the patient. ***     DISPOSITION/PLANNING:  IMPRESSION:   - ***  DISPOSITION:  - ***  FOLLOW-UP:   - ***  PENDING:   - ***  OTHER RECOMMENDATIONS:   - Conservative symptom management, strict return instructions  - ***  Rx:   - ***      ______________________________________________________________________          Dawn Ocasio MD  1/30/2025   [facility] EMERGENCY DEPARTMENT     (FLONASE) 50 MCG/ACT nasal spray  levETIRAcetam (KEPPRA) 500 MG tablet  losartan (COZAAR) 50 MG tablet  metoprolol succinate ER (TOPROL XL) 25 MG 24 hr tablet  Multiple Vitamins-Minerals (ZINC PO)  silodosin (RAPAFLO) 4 MG CAPS capsule  spironolactone (ALDACTONE) 25 MG tablet  VITAMIN A PO  VITAMIN D-VITAMIN K PO      Allergies   Allergen Reactions    Lisinopril Cough    Perfume Other (See Comments)     Family History  Family History   Problem Relation Age of Onset    Diabetes Father         Old age Diabetes    Cerebrovascular Disease Father     Obesity Father     Heart Disease Father     Coronary Artery Disease Father     Hypertension Father     Lipids Sister     C.A.D. No family hx of     Cancer No family hx of     Glaucoma No family hx of     Macular Degeneration No family hx of     Anesthesia Reaction No family hx of     Deep Vein Thrombosis (DVT) No family hx of      Social History   Social History     Tobacco Use    Smoking status: Former     Current packs/day: 0.00     Average packs/day: 0.5 packs/day for 2.0 years (1.0 ttl pk-yrs)     Types: Cigarettes     Start date: 1994     Quit date: 1996     Years since quittin.1     Passive exposure: Past    Smokeless tobacco: Never   Vaping Use    Vaping status: Never Used   Substance Use Topics    Alcohol use: Yes     Alcohol/week: 8.3 standard drinks of alcohol     Comment: Evening Marie    Drug use: No              Review of Systems  A complete review of systems was performed with pertinent positives and negatives noted in the HPI, and all other systems negative.    Physical Exam   BP: 97/52  Pulse: 80  Temp: 97.4  F (36.3  C)  Resp: 18  SpO2: 97 %      Physical Exam  CONSTITUTIONAL: Awake and alert. Non-toxic appearance. No acute distress.   HENT:   - Head: Normocephalic and atraumatic.   - Ears: External ear grossly normal.   - Nose: Nose normal. No rhinorrhea. No epistaxis.   - Mouth/Throat: MMM  EYES: Conjunctivae and lids are normal. No  scleral icterus.   NECK: Normal range of motion and phonation normal. Neck supple.  No tracheal deviation, no stridor. No edema or erythema noted.  CARDIOVASCULAR: Normal rate, regular rhythm and no appreciable abnormal heart sounds.  PULMONARY/CHEST: Normal work of breathing. No accessory muscle usage or stridor. No respiratory distress.  Perhaps slightly crackle left base, but seems to improve w/ repeated inspiration.   ABDOMEN: Soft, non-distended. No tenderness. No peritoneal findings, no rigidity, rebound or guarding.  No palpable masses or abnormal pulsatility appreciated.  MUSCULOSKELETAL: Extremities warm and seemingly well perfused.   NEUROLOGIC: Awake, alert. Not disoriented. No seizure activity. GCS 15  SKIN: Skin is warm and dry. No diaphoresis. No pallor. No rash/acute appearing skin changes noted.  PSYCHIATRIC: Normal mood and affect. Speech and behavior normal. Thought processes linear. Cognition and memory are normal. No SI/HI reported.    ED Course            EKG Interpretation:      Interpreted by Dawn Ocasio MD  Time reviewed: 1710  Symptoms at time of EKG: Bilateral lower chest pain following recent COVID infection, generalized weakness/fatigue  Rhythm: Sinus  Rate: Normal  Axis: Left axis deviation  Ectopy: None  Conduction:  ms,  ms  ST Segments/ T Waves: No nilo ST elevation appreciated  Comparison to prior: Compared to EKG from 25 July 2024, heart rate is increased from the 50s now up to 71 bpm.  The morphology of the conducted beats appears relatively similar.  No longer having.  For first-degree AV block on current ECG    Clinical Impression: Sinus, heart rate normalized from prior EKG, no longer meeting criteria for first-degree AV block, otherwise appears generally unchanged from prior      ----------------------------------------------------------------------------------------------------------------------  Critical care was not performed.     Medical Decision  Making  The patient's presentation was of moderate to high complexity.     The patient's evaluation involved:  review of external note(s) from 1 sources (see separate area of note for details)  ordering and/or review of 3+ test(s) in this encounter (see separate area of note for details)    The patient's management necessitated moderate to high risk (prescription drug management including medications given in the ED).  Offered obs admission, but patient declines and after R/B/A would like to go home and will see PCP this week, get labs rechecked, etc.   Assessments & Plan (with Medical Decision Making)     IMPRESSION:   74 year old male w/ PMH notable for prior NSTEMI, NICM, HFrEF (45%, 9/2022), HTN, pulmonary HTN, moderate aortic insufficiency, embolic CVA, JOSE JUAN s/p hypoglossal nerve stimulator placement, relatively recent COVID-19 infection (few weeks ago), recent clinical concern for left lower lobe pneumonia (started on Augmentin) in outpatient setting, et al., who presents the emergency department with in concern for multiple symptoms, ongoing generalized fatigue and generalized weakness, following relatively recent COVID-19 infection.    Clinically, patient appears nontoxic, NAD. BP slightly low on arrival. Perhaps faint crackle left base appears to be clearing w/ repeated inspiration. Normal work of breathing at rest.     Ddx includes, but not limited to, Sx's related to prolonged COVID presentation/sequelae or long-covid, secondary infection, dehydration, ACS or HF exacerbation, PE (COVID could increase risk of such), et al.     PLAN:   - Screening ECG, labs, imaging  - Risks/benefits of pursuing imaging reviewed and accepted.   - Dispo pending ED Course    RESULTS:  Labs:   - Trop negative x 2 (normalized from prior NSTEMI / elevation back in July)  -  (improved from previous)  - normal lactate   - Elevated BUN to Cr ratio  - No leukocytosis, Hgb normal  - Cultures negative  Urine:   - No UTI, some  "glucose and trace ketones and protein  Imaging: Written preliminary reports reviewed:  - CT Chest:   \"1.  No pulmonary embolism.  2.  Normal caliber aorta without dissection.  3.  No evidence for pneumonia or pleural fluid.  4.  Mild bronchiectasis with bronchial wall thickening, increased since prior.\"    Results/reports reviewed w/ patient who expresses understanding of findings and F/U recommendations.    INTERVENTIONS:   - IVF    RE-EVALUATION:  - The patient's symptoms were much improved after the IVF. He is feeling well and would like to go home.   - BP also improved  - Pt otherwise continues to do well here in the ED, no acute issues or apparent concerning changes in vitals or clinical appearance.    DISCUSSIONS:  - w/ Patient: I have reviewed the available findings, options, offered obs admission, etc., but after review of R/B/A, pt would like to be discharged. That said he does agree to see PCP (or one of their partners) as soon as can (ideally later today for recheck/ensuring doing well given pt did not want to stay in hospital). Reviewed that next best plan, need for close follow up, strict return/safety instructions with the patient and his loved one/guest  Always welcome in ED, recommend return w/ any new/worsening symptoms, any concerns.They expressed understanding and agreement with this plan. All questions answered to the best of our ability at this time.       DISPOSITION/PLANNING:  IMPRESSION:   - Generalized weakness, fatigue (w/o new neuro sx's, and no LOC or traumas/falls)  - Recent COVID-19 infection (~ 3 weeks ago)  - Poor PO intake (w/ elevated BUN to Cr ratio)  - outpatient clinical concern for possible PNA on exam, though imaging appears benign today.   DISPOSITION:  - PCP recheck / Follow-up  FOLLOW-UP:   - PCP  OTHER RECOMMENDATIONS:   - Conservative symptom management, strict return instructions      ______________________________________________________________________          Dawn " MD Ninfa  1/30/2025   [facility] EMERGENCY DEPARTMENT       Dawn Ocasio MD  02/06/25 0018

## 2025-01-31 NOTE — DISCHARGE INSTRUCTIONS
TODAY'S VISIT:  We are glad you are feeling better, but you did have some concerning symptoms and abnormal labs.   - We understand that you do not want to be admitted at this time (though remember you can always change your mind and we strongly encourage you to return to the emergency department with any new or worsening symptoms or concerns).  That said, if you would like to be discharged at this time, then we think that you should be seen in clinic for a recheck appointment as soon as possible (have placed a follow-up appointment for you for this).  Please call them in the morning as we would ideally like you seen later today for a recheck appointment to make sure you are still doing well.  - You should discuss all imaging/radiology tests and laboratory tests that were performed during this visit with your usual providers to ensure you continue to improve and do not need any further evaluation, testing or management.   - Please call your Primary Care team to discuss and arrange a follow-up appointment.   Immediately return to the nearest Emergency Department with any new or worsening symptoms or concerns.    FOLLOW-UP:  Please make an appointment to follow up with:  - Your Primary Care Provider (would like you to be seen later today, Monday at the latest). We have placed a referral order for you for this.   - If you do not have a primary care provider, you can be seen in follow-up and establish care with one of our providers by calling of the the clinics below:  --- [facility] (phone: [phone])  --- [facility] (phone: [phone])   - Have your provider review the results from today's visit with you again to make sure no further follow-up or additional testing is needed based on those results.     PRESCRIPTIONS / MEDICATIONS:  - Notify a medical provider of any abnormal reactions to any medications.    OTHER INSTRUCTIONS:  - Do your best to stay hydrated.    RETURN  TO THE EMERGENCY DEPARTMENT  Return to the Emergency Department immediately for any new or worsening symptoms or any concerns.     Remember that you can always come back to the Emergency Department if you are not able to see your regular doctor in the amount of time listed above, if you get any new symptoms, or if there is anything that worries you.

## 2025-02-04 LAB
BACTERIA BLD CULT: NO GROWTH
BACTERIA BLD CULT: NO GROWTH

## 2025-03-12 NOTE — TELEPHONE ENCOUNTER
Refilled 90 day supply.  Patient needs to establish with a new provider within the next 3 months    N/A

## 2025-03-16 DIAGNOSIS — G40.209 PARTIAL SYMPTOMATIC EPILEPSY WITH COMPLEX PARTIAL SEIZURES, NOT INTRACTABLE, WITHOUT STATUS EPILEPTICUS (H): ICD-10-CM

## 2025-03-18 RX ORDER — LEVETIRACETAM 500 MG/1
500 TABLET ORAL 2 TIMES DAILY
Qty: 60 TABLET | Refills: 0 | Status: SHIPPED | OUTPATIENT
Start: 2025-03-18

## 2025-03-27 NOTE — TELEPHONE ENCOUNTER
"Requested Prescriptions   Pending Prescriptions Disp Refills     atorvastatin (LIPITOR) 10 MG tablet [Pharmacy Med Name: Atorvastatin Calcium Oral Tablet 10 MG] 90 tablet 0     Sig: TAKE ONE TABLET BY MOUTH ONE TIME DAILY       Statins Protocol Failed - 12/5/2020 12:05 PM        Failed - LDL on file in past 12 months     Recent Labs   Lab Test 07/23/19  0853   LDL 16             Passed - No abnormal creatine kinase in past 12 months     No lab results found.             Passed - Recent (12 mo) or future (30 days) visit within the authorizing provider's specialty     Patient has had an office visit with the authorizing provider or a provider within the authorizing providers department within the previous 12 mos or has a future within next 30 days. See \"Patient Info\" tab in [employer], or \"Choose Columns\" in Meds & Orders section of the refill encounter.              Passed - Medication is active on med list        Passed - Patient is age 18 or older           losartan (COZAAR) 50 MG tablet [Pharmacy Med Name: Losartan Potassium Oral Tablet 50 MG] 90 tablet 0     Sig: TAKE ONE TABLET BY MOUTH ONE TIME DAILY       Angiotensin-II Receptors Passed - 12/5/2020 12:05 PM        Passed - Last blood pressure under 140/90 in past 12 months     BP Readings from Last 3 Encounters:   09/14/20 134/87   09/08/20 133/85   05/07/20 138/77                 Passed - Recent (12 mo) or future (30 days) visit within the authorizing provider's specialty     Patient has had an office visit with the authorizing provider or a provider within the authorizing providers department within the previous 12 mos or has a future within next 30 days. See \"Patient Info\" tab in [employer], or \"Choose Columns\" in Meds & Orders section of the refill encounter.              Passed - Medication is active on med list        Passed - Patient is age 18 or older        Passed - Normal serum creatinine on file in past 12 months     Recent Labs   Lab Test " PA for Methylphenidate HCl 5MG tablets  SUBMITTED to [facility]    via    [x]CMM-KEY: [id number]  []Surescripts-Case ID #   []Availity-Auth ID # NDC #   []Faxed to plan   []Other website   []Phone call Case ID #     []PA sent as URGENT    All office notes, labs and other pertaining documents and studies sent. Clinical questions answered. Awaiting determination from insurance company.     Turnaround time for your insurance to make a decision on your Prior Authorization can take 7-21 business days.                "09/22/20  1349   CR 1.08       Ok to refill medication if creatinine is low          Passed - Normal serum potassium on file in past 12 months     Recent Labs   Lab Test 09/22/20  1349   POTASSIUM 4.2                       metoprolol succinate ER (TOPROL-XL) 50 MG 24 hr tablet [Pharmacy Med Name: Metoprolol Succinate ER Oral Tablet Extended Release 24 Hour 50 MG] 90 tablet 0     Sig: Take 1 tablet by mouth daily       Beta-Blockers Protocol Passed - 12/5/2020 12:05 PM        Passed - Blood pressure under 140/90 in past 12 months     BP Readings from Last 3 Encounters:   09/14/20 134/87   09/08/20 133/85   05/07/20 138/77                 Passed - Patient is age 6 or older        Passed - Recent (12 mo) or future (30 days) visit within the authorizing provider's specialty     Patient has had an office visit with the authorizing provider or a provider within the authorizing providers department within the previous 12 mos or has a future within next 30 days. See \"Patient Info\" tab in inbasket, or \"Choose Columns\" in Meds & Orders section of the refill encounter.              Passed - Medication is active on med list           "

## 2025-04-16 ENCOUNTER — TRANSFERRED RECORDS (OUTPATIENT)
Dept: HEALTH INFORMATION MANAGEMENT | Facility: CLINIC | Age: 75
End: 2025-04-16
Payer: COMMERCIAL

## 2025-04-28 DIAGNOSIS — G40.209 PARTIAL SYMPTOMATIC EPILEPSY WITH COMPLEX PARTIAL SEIZURES, NOT INTRACTABLE, WITHOUT STATUS EPILEPTICUS (H): ICD-10-CM

## 2025-04-29 RX ORDER — LEVETIRACETAM 500 MG/1
500 TABLET ORAL 2 TIMES DAILY
Qty: 60 TABLET | Refills: 0 | Status: SHIPPED | OUTPATIENT
Start: 2025-04-29

## 2025-05-14 DIAGNOSIS — I25.10 CORONARY ARTERY DISEASE INVOLVING NATIVE CORONARY ARTERY OF NATIVE HEART WITHOUT ANGINA PECTORIS: ICD-10-CM

## 2025-05-14 DIAGNOSIS — I50.20 HFREF (HEART FAILURE WITH REDUCED EJECTION FRACTION) (H): ICD-10-CM

## 2025-05-14 DIAGNOSIS — I10 ESSENTIAL HYPERTENSION WITH GOAL BLOOD PRESSURE LESS THAN 140/90: ICD-10-CM

## 2025-05-14 RX ORDER — METOPROLOL SUCCINATE 25 MG/1
25 TABLET, EXTENDED RELEASE ORAL DAILY
Qty: 90 TABLET | Refills: 1 | Status: SHIPPED | OUTPATIENT
Start: 2025-05-14

## 2025-05-22 NOTE — TELEPHONE ENCOUNTER
DIAGNOSIS:    APPOINTMENT DATE: 05/30/2025   NOTES STATUS DETAILS   OFFICE NOTE from referring provider Internal 05/22/2025 -   Anant Santo MD  Internal Medicine - Pediatrics   OFFICE NOTE from other specialist Internal 04/04/2025 -    Nathen Ruiz MD  Neurology   OPERATIVE REPORT Internal  Care Everywhere 10/26/2022 -   1. Right revision open carpal tunnel release  2. Right hypothenar fat flap, adjacent tissue transfer (<10 cm squared)  3. Right in situ ulnar nerve decompression at the elbow.    08/13/2021 - Right carpal tunnel release.    02/27/2014 -   1.  Right shoulder arthroscopic rotator cuff repair.     2.  Right shoulder arthroscopic distal clavicle excision.     3.  Right shoulder arthroscopic biceps tenodesis.   4.  Right shoulder arthroscopic subacromial decompression with bony acromioplasty.     11/07/2007 -   1. Examination under anesthesia.  2.Diagnostic arthroscopy.  3.Arthroscopic subacromial decompression with anterior acromioplasty. 4.Mini open rotator cuff repair.   EMG (for Spine) Internal 04/04/2025   (IMAGES & REPORTS) Internal

## 2025-05-23 PROBLEM — G89.29 CHRONIC BILATERAL LOW BACK PAIN WITHOUT SCIATICA: Status: ACTIVE | Noted: 2025-05-23

## 2025-05-23 PROBLEM — M54.50 CHRONIC BILATERAL LOW BACK PAIN WITHOUT SCIATICA: Status: ACTIVE | Noted: 2025-05-23

## 2025-05-28 ENCOUNTER — RESULTS FOLLOW-UP (OUTPATIENT)
Dept: FAMILY MEDICINE | Facility: CLINIC | Age: 75
End: 2025-05-28

## 2025-05-29 ENCOUNTER — THERAPY VISIT (OUTPATIENT)
Age: 75
End: 2025-05-29
Payer: COMMERCIAL

## 2025-05-29 DIAGNOSIS — G89.29 CHRONIC BILATERAL LOW BACK PAIN WITHOUT SCIATICA: Primary | ICD-10-CM

## 2025-05-29 DIAGNOSIS — M54.50 CHRONIC BILATERAL LOW BACK PAIN WITHOUT SCIATICA: Primary | ICD-10-CM

## 2025-05-30 ENCOUNTER — OFFICE VISIT (OUTPATIENT)
Dept: ORTHOPEDICS | Facility: CLINIC | Age: 75
End: 2025-05-30
Payer: COMMERCIAL

## 2025-05-30 ENCOUNTER — PRE VISIT (OUTPATIENT)
Dept: ORTHOPEDICS | Facility: CLINIC | Age: 75
End: 2025-05-30

## 2025-05-30 VITALS — HEIGHT: 66 IN | BODY MASS INDEX: 30.22 KG/M2 | WEIGHT: 188 LBS

## 2025-05-30 DIAGNOSIS — G56.11 MEDIAN NEUROPATHY AT ELBOW, RIGHT: ICD-10-CM

## 2025-05-30 DIAGNOSIS — G56.21 ULNAR NEUROPATHY OF RIGHT UPPER EXTREMITY: ICD-10-CM

## 2025-05-30 PROCEDURE — 99214 OFFICE O/P EST MOD 30 MIN: CPT | Performed by: STUDENT IN AN ORGANIZED HEALTH CARE EDUCATION/TRAINING PROGRAM

## 2025-05-30 RX ORDER — METHYLPREDNISOLONE 4 MG/1
TABLET ORAL
Qty: 21 TABLET | Refills: 0 | Status: SHIPPED | OUTPATIENT
Start: 2025-05-30

## 2025-05-30 NOTE — NURSING NOTE
"Reason For Visit:   Chief Complaint   Patient presents with    Consult     Right ulnar neuropathy       Primary MD: Anant Santo  Ref. MD: Tanja    Age: 74 year old    ?  No      Ht 1.676 m (5' 6\")   Wt 85.3 kg (188 lb)   BMI 30.34 kg/m        Pain Assessment  Patient Currently in Pain: Yes  Primary Pain Location: Arm (Right)  Pain Descriptors: Numbness, Constant    Hand Dominance Evaluation  Hand Dominance: Left          QuickDASH Assessment      7/5/2022     3:16 PM   QuickDASH Main   1. Open a tight or new jar Severe difficulty   2. Do heavy household chores (e.g., wash walls, floors) Moderate difficulty   3. Carry a shopping bag or briefcase Moderate difficulty   4. Wash your back Unable   5. Use a knife to cut food Mild difficulty   6. Recreational activities in which you take some force or impact through your arm, shoulder or hand (e.g., golf, hammering, tennis, etc.) Mild difficulty   7. During the past week, to what extent has your arm, shoulder or hand problem interfered with your normal social activities with family, friends, neighbours or groups Slightly   8. During the past week, were you limited in your work or other regular daily activities as a result of your arm, shoulder or hand problem Moderately limited   9. Arm, shoulder or hand pain Unable to answer   10.Tingling (pins and needles) in your arm,shoulder or hand Severe   11. During the past week, how much difficulty have you had sleeping because of the pain in your arm, shoulder or hand Mild difficulty   Quickdash Ability Score 43.18          Current Outpatient Medications   Medication Sig Dispense Refill    Ascorbic Acid (VITAMIN C PO) Take 1 tablet by mouth every evening      aspirin 81 MG EC tablet Take 1 tablet (81 mg) by mouth daily 90 tablet 3    Cyanocobalamin (VITAMIN B-12 PO) Take 2 capsules by mouth every evening      empagliflozin (JARDIANCE) 10 MG TABS tablet Take 1 tablet (10 mg) by mouth daily 90 tablet 1    " fluticasone (FLONASE) 50 MCG/ACT nasal spray Spray 1 spray into both nostrils daily. 16 g 0    folic acid (FOLVITE) 400 MCG tablet Take 400 mcg by mouth daily.      levETIRAcetam (KEPPRA) 500 MG tablet Take 1 tablet (500 mg) by mouth 2 times daily. 60 tablet 0    loratadine (CLARITIN REDITABS) 10 MG ODT Take 10 mg by mouth daily.      losartan (COZAAR) 50 MG tablet Take 1 tablet (50 mg) by mouth daily 90 tablet 3    metoprolol succinate ER (TOPROL XL) 25 MG 24 hr tablet Take 1 tablet (25 mg) by mouth daily (Patient not taking: Reported on 5/22/2025) 90 tablet 1    Multiple Vitamins-Minerals (ZINC PO) Take 1 tablet by mouth every evening      pyridOXINE (VITAMIN  B-6) 25 MG tablet Take 100 mg by mouth daily.      saw palmetto 450 MG CAPS capsule Take 450 mg by mouth daily.      silodosin (RAPAFLO) 4 MG CAPS capsule Take 1 capsule (4 mg) by mouth daily. (Patient not taking: Reported on 5/22/2025) 90 capsule 3    spironolactone (ALDACTONE) 25 MG tablet Take 0.5 tablets (12.5 mg) by mouth daily 45 tablet 3    tadalafil (CIALIS) 5 MG tablet Take 5 mg by mouth every 24 hours.      tamsulosin (FLOMAX) 0.4 MG capsule Take 0.4 mg by mouth 2 times daily.      VITAMIN A PO Take 1 tablet by mouth every evening      VITAMIN D-VITAMIN K PO Take 1 tablet by mouth every evening      Zinc 30 MG CAPS Take by mouth.         Allergies   Allergen Reactions    Lisinopril Cough    Perfume Other (See Comments)       Nicole Jones, ATC

## 2025-05-30 NOTE — LETTER
5/30/2025      Ricky Brown  [address]  [facility] [address]      Dear Colleague,    Thank you for referring your patient, Ricky Brown, to the [facility] [place]. Please see a copy of my visit note below.    Ortho Hand    Returns 3+ years after R revision open CTR, R in situ ulnar nerve decompression at the elbow. He continues to have more numbness in the R hand. Has trouble feeling his finger even when placing the hand in his pockets. He had another nerve study and reviewed this with the patient.     NCS/EMG in 2022:  R median APB motor latency of 5.1 msec  R ulnar CV at elbow of 38 m/s    NCS/EMG 4/4/2025:  R median APB motor latency of 4.1 msec  R ulnar CV at elbow of 46 m/s    On exam, well-healed R palm and elbow scars with no residual swelling or hypertrophic scarring. Can make a full R active composite fist. R thumb opposition is intact with no thenar atrophy. R intrinsic strength is intact with no atrophy. R thumb radial and ulnar tip static two-point discrimination (S2PD) of 7 mm. R IF radial S2PD of 6 mm and ulnar of 7 mm. R MF radial S2PD of 6 mm and ulnar of 7 mm. R RF radial S2PD of 6 mm and ulnar of 5 mm. R SF radial S2PD of 8 mm and ulnar of 7 mm. Mild tenderness in the R volar forearm with tenderness on passive digital stretches.     A/P: 74M 3+ years s/p R rev open CTR, R cubital tunnel release    -Discussed options for management. Since the patient has ongoing symptoms in the setting of slightly improved nerve study, we will investigate further with right upper extremity ultrasound. Explained that it may be that there is ongoing compression point or nerve kinking. Additionally, recommended patient perform passive finger extension exercises, volar forearm massage and we can prescribe a Medrol dosepak to see if swelling around the tendons is contributing to residual compression on the nerves.   -Return to clinic after RUE US  -A total of 30  minutes was devoted to review of chart, direct face-to-face patient counseling and documentation during and on the day of this encounter, exclusive of any procedure performed.    Vignesh Rhodes MD, PhD, FACS      Again, thank you for allowing me to participate in the care of your patient.        Sincerely,        Vignesh Rhodes MD    Electronically signed

## 2025-05-31 NOTE — PROGRESS NOTES
Ortho Hand    Returns 3+ years after R revision open CTR, R in situ ulnar nerve decompression at the elbow. He continues to have more numbness in the R hand. Has trouble feeling his finger even when placing the hand in his pockets. He had another nerve study and reviewed this with the patient.     NCS/EMG in 2022:  R median APB motor latency of 5.1 msec  R ulnar CV at elbow of 38 m/s    NCS/EMG 4/4/2025:  R median APB motor latency of 4.1 msec  R ulnar CV at elbow of 46 m/s    On exam, well-healed R palm and elbow scars with no residual swelling or hypertrophic scarring. Can make a full R active composite fist. R thumb opposition is intact with no thenar atrophy. R intrinsic strength is intact with no atrophy. R thumb radial and ulnar tip static two-point discrimination (S2PD) of 7 mm. R IF radial S2PD of 6 mm and ulnar of 7 mm. R MF radial S2PD of 6 mm and ulnar of 7 mm. R RF radial S2PD of 6 mm and ulnar of 5 mm. R SF radial S2PD of 8 mm and ulnar of 7 mm. Mild tenderness in the R volar forearm with tenderness on passive digital stretches.     A/P: 74M 3+ years s/p R rev open CTR, R cubital tunnel release    -Discussed options for management. Since the patient has ongoing symptoms in the setting of slightly improved nerve study, we will investigate further with right upper extremity ultrasound. Explained that it may be that there is ongoing compression point or nerve kinking. Additionally, recommended patient perform passive finger extension exercises, volar forearm massage and we can prescribe a Medrol dosepak to see if swelling around the tendons is contributing to residual compression on the nerves.   -Return to clinic after RUE US  -A total of 30 minutes was devoted to review of chart, direct face-to-face patient counseling and documentation during and on the day of this encounter, exclusive of any procedure performed.    Vignesh Rhodes MD, PhD, FACS

## 2025-06-07 PROBLEM — R79.89 ELEVATED LFTS: Status: RESOLVED | Noted: 2021-05-03 | Resolved: 2025-06-07

## 2025-06-07 PROBLEM — G56.01 RIGHT CARPAL TUNNEL SYNDROME: Status: RESOLVED | Noted: 2021-07-13 | Resolved: 2025-06-07

## 2025-06-07 PROBLEM — Z98.890 S/P CARPAL TUNNEL RELEASE: Status: RESOLVED | Noted: 2022-02-28 | Resolved: 2025-06-07

## 2025-06-07 PROBLEM — M79.609: Status: RESOLVED | Noted: 2022-02-28 | Resolved: 2025-06-07

## 2025-06-07 PROBLEM — Z86.73 HISTORY OF CVA (CEREBROVASCULAR ACCIDENT): Status: ACTIVE | Noted: 2022-06-27

## 2025-06-07 PROBLEM — R39.12 WEAK URINARY STREAM: Status: RESOLVED | Noted: 2023-12-21 | Resolved: 2025-06-07

## 2025-06-07 PROBLEM — I21.4 NSTEMI (NON-ST ELEVATED MYOCARDIAL INFARCTION) (H): Status: RESOLVED | Noted: 2024-07-25 | Resolved: 2025-06-07

## 2025-06-07 PROBLEM — Z86.73 HISTORY OF CVA (CEREBROVASCULAR ACCIDENT): Status: RESOLVED | Noted: 2022-06-27 | Resolved: 2025-06-07

## 2025-06-09 ENCOUNTER — THERAPY VISIT (OUTPATIENT)
Age: 75
End: 2025-06-09
Payer: COMMERCIAL

## 2025-06-09 DIAGNOSIS — G89.29 CHRONIC BILATERAL LOW BACK PAIN WITHOUT SCIATICA: Primary | ICD-10-CM

## 2025-06-09 DIAGNOSIS — M54.50 CHRONIC BILATERAL LOW BACK PAIN WITHOUT SCIATICA: Primary | ICD-10-CM

## 2025-06-09 DIAGNOSIS — G40.209 PARTIAL SYMPTOMATIC EPILEPSY WITH COMPLEX PARTIAL SEIZURES, NOT INTRACTABLE, WITHOUT STATUS EPILEPTICUS (H): ICD-10-CM

## 2025-06-09 PROCEDURE — 97110 THERAPEUTIC EXERCISES: CPT | Mod: GP

## 2025-06-09 RX ORDER — LEVETIRACETAM 500 MG/1
500 TABLET ORAL 2 TIMES DAILY
Qty: 60 TABLET | Refills: 0 | Status: SHIPPED | OUTPATIENT
Start: 2025-06-09

## 2025-06-09 NOTE — PROGRESS NOTES
"   06/09/25 0500   Appointment Info   Signing clinician's name / credentials Parag Swan DPT   Total/Authorized Visits E&T   Visits Used 3   Medical Diagnosis Chronic bilateral low back pain without sciatica   PT Tx Diagnosis Back and neck pain, bilateral   Progress Note/Certification   Start of Care Date 05/23/25   Onset of illness/injury or Date of Surgery 05/23/23   Therapy Frequency 1x/week   Predicted Duration 10 weeks   Certification date from 05/23/25   Certification date to 08/01/25   Progress Note Due Date 08/01/25   Progress Note Completed Date 05/23/25   PT Goal 1   Goal Identifier Pain   Goal Description Patient will report ability to perform ADLs with back pain 3/10 or less.   Rationale to maximize safety and independence with performance of ADLs and functional tasks   Goal Progress Goal met.   Target Date 07/18/25   Date Met 06/09/25   PT Goal 2   Goal Identifier Function   Goal Description Patient will demonstrate ability to  20 pounds from the floor with back pain 2/10 or less.   Rationale to maximize safety and independence with performance of ADLs and functional tasks;to maximize safety and independence within the home;to maximize safety and independence with self cares   Goal Progress Goal met.   Target Date 08/01/25   Date Met 06/09/25   Subjective Report   Subjective Report Reports he continues to note a lot of \"crunching\" when turning his head. Has been using mirror more than full turning. 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   Therapeutic Procedure/Exercise   Therapeutic Procedures: strength, endurance, ROM, flexibility minutes (03804) 23   Ther Proc 1 Education   Ther Proc 1 - Details Provided patient education on importance of maintaining consistent HEP and end-range neck movements. Discussed that intermittent joint noises (crunches) are not a sign of harm, but usually benign noises. Discussed " "that consistnet movement is important for joint health and that it will likely decrease the frequency of joint sounds.   PTRx Ther Proc 1 Cervical retraction   PTRx Ther Proc 1 - Details 1x8x5\" holds; demonstration and verbal cues for technique   PTRx Ther Proc 2 Upper trapezius stretch   PTRx Ther Proc 2 - Details 2x30\" per side; demonstration and verbal cues for technique   PTRx Ther Proc 3 Levator scapulae stretch   PTRx Ther Proc 3 - Details 2x30\" per side; demonstration and verbal cues for technique   PTRx Ther Proc 4 Deep neck flexor endurance   PTRx Ther Proc 4 - Details verbal and visual review   Skilled Intervention Education, review of HEP, addition of LS stretch.   Patient Response/Progress Tolerates well; notes continued improvement.   Education   Learner/Method Patient;Significant Other;Listening;Reading;Demonstration;No Barriers to Learning   Plan   Home program PTRx (Printed)   Updates to plan of care Review and progression of HEP, discharge planning.   Plan for next session Discharged from PT.   Total Session Time   Timed Code Treatment Minutes 23   Total Treatment Time (sum of timed and untimed services) 23         DISCHARGE  Reason for Discharge: Patient has met all goals.    Discharge Plan: Patient to continue home program.    Referring Provider:  Anant Santo    "

## 2025-06-19 ENCOUNTER — PATIENT OUTREACH (OUTPATIENT)
Dept: CARE COORDINATION | Facility: CLINIC | Age: 75
End: 2025-06-19
Payer: COMMERCIAL

## 2025-07-03 ENCOUNTER — PATIENT OUTREACH (OUTPATIENT)
Dept: CARE COORDINATION | Facility: CLINIC | Age: 75
End: 2025-07-03
Payer: COMMERCIAL

## 2025-07-30 DIAGNOSIS — I50.20 HFREF (HEART FAILURE WITH REDUCED EJECTION FRACTION) (H): ICD-10-CM

## 2025-07-30 RX ORDER — LOSARTAN POTASSIUM 50 MG/1
50 TABLET ORAL DAILY
Qty: 100 TABLET | Refills: 2 | Status: SHIPPED | OUTPATIENT
Start: 2025-07-30

## 2025-07-30 NOTE — TELEPHONE ENCOUNTER
Patient is overdue to fill losartan. Per our records, last filled 4/15/25 for 90 day supply and is 15 days late to fill. Current prescription .     Patient has [facility] coverage and with this insurance plan, the patient is eligible to receive certain prescriptions as a 100-day supply at the 90-day supply cost.      New prescription needed given insufficient refills remaining so pended for prescriber review and signature.       Thank you!    Nicole Jordan, PharmD, [facility]  Population Health Pharmacist  [phone]

## 2025-08-14 ENCOUNTER — OFFICE VISIT (OUTPATIENT)
Dept: NEUROLOGY | Facility: CLINIC | Age: 75
End: 2025-08-14
Attending: STUDENT IN AN ORGANIZED HEALTH CARE EDUCATION/TRAINING PROGRAM
Payer: COMMERCIAL

## 2025-08-14 DIAGNOSIS — R29.898 OTHER SYMPTOMS AND SIGNS INVOLVING THE MUSCULOSKELETAL SYSTEM: ICD-10-CM

## 2025-08-14 DIAGNOSIS — G56.11 MEDIAN NEUROPATHY AT ELBOW, RIGHT: ICD-10-CM

## 2025-08-14 DIAGNOSIS — G56.21 ULNAR NEUROPATHY OF RIGHT UPPER EXTREMITY: ICD-10-CM

## 2025-08-14 DIAGNOSIS — G56.01 CARPAL TUNNEL SYNDROME OF RIGHT WRIST: Primary | ICD-10-CM

## 2025-08-19 ENCOUNTER — OFFICE VISIT (OUTPATIENT)
Dept: ORTHOPEDICS | Facility: CLINIC | Age: 75
End: 2025-08-19
Attending: STUDENT IN AN ORGANIZED HEALTH CARE EDUCATION/TRAINING PROGRAM
Payer: COMMERCIAL

## 2025-08-19 ENCOUNTER — TELEPHONE (OUTPATIENT)
Dept: ORTHOPEDICS | Facility: CLINIC | Age: 75
End: 2025-08-19

## 2025-08-19 ENCOUNTER — HOSPITAL ENCOUNTER (OUTPATIENT)
Facility: CLINIC | Age: 75
End: 2025-08-19
Attending: STUDENT IN AN ORGANIZED HEALTH CARE EDUCATION/TRAINING PROGRAM
Payer: COMMERCIAL

## 2025-08-19 DIAGNOSIS — G56.21 ULNAR NEUROPATHY AT ELBOW, RIGHT: Primary | ICD-10-CM

## 2025-08-19 PROCEDURE — 99214 OFFICE O/P EST MOD 30 MIN: CPT | Performed by: STUDENT IN AN ORGANIZED HEALTH CARE EDUCATION/TRAINING PROGRAM

## 2025-08-19 RX ORDER — CEFAZOLIN SODIUM 2 G/50ML
2 SOLUTION INTRAVENOUS
OUTPATIENT
Start: 2025-08-19

## 2025-08-19 RX ORDER — CEFAZOLIN SODIUM 2 G/50ML
2 SOLUTION INTRAVENOUS SEE ADMIN INSTRUCTIONS
OUTPATIENT
Start: 2025-08-19

## 2025-08-20 ENCOUNTER — TELEPHONE (OUTPATIENT)
Dept: ORTHOPEDICS | Facility: CLINIC | Age: 75
End: 2025-08-20
Payer: COMMERCIAL

## 2025-08-22 ENCOUNTER — ANESTHESIA EVENT (OUTPATIENT)
Dept: SURGERY | Facility: CLINIC | Age: 75
End: 2025-08-22
Payer: COMMERCIAL

## 2025-08-24 ENCOUNTER — HEALTH MAINTENANCE LETTER (OUTPATIENT)
Age: 75
End: 2025-08-24

## 2025-08-28 ENCOUNTER — HOSPITAL ENCOUNTER (OUTPATIENT)
Facility: CLINIC | Age: 75
Discharge: HOME | End: 2025-08-28
Attending: STUDENT IN AN ORGANIZED HEALTH CARE EDUCATION/TRAINING PROGRAM | Admitting: STUDENT IN AN ORGANIZED HEALTH CARE EDUCATION/TRAINING PROGRAM
Payer: COMMERCIAL

## 2025-08-28 ENCOUNTER — ANESTHESIA (OUTPATIENT)
Dept: SURGERY | Facility: CLINIC | Age: 75
End: 2025-08-28
Payer: COMMERCIAL

## 2025-08-28 PROCEDURE — 258N000003 HC RX IP 258 OP 636: Performed by: NURSE ANESTHETIST, CERTIFIED REGISTERED

## 2025-08-28 PROCEDURE — 250N000011 HC RX IP 250 OP 636: Performed by: STUDENT IN AN ORGANIZED HEALTH CARE EDUCATION/TRAINING PROGRAM

## 2025-08-28 PROCEDURE — 250N000009 HC RX 250: Performed by: NURSE ANESTHETIST, CERTIFIED REGISTERED

## 2025-08-28 PROCEDURE — 250N000011 HC RX IP 250 OP 636: Performed by: NURSE ANESTHETIST, CERTIFIED REGISTERED

## 2025-08-28 PROCEDURE — 250N000009 HC RX 250: Performed by: ANESTHESIOLOGY

## 2025-08-28 RX ORDER — SODIUM CHLORIDE, SODIUM LACTATE, POTASSIUM CHLORIDE, CALCIUM CHLORIDE 600; 310; 30; 20 MG/100ML; MG/100ML; MG/100ML; MG/100ML
INJECTION, SOLUTION INTRAVENOUS CONTINUOUS PRN
Status: DISCONTINUED | OUTPATIENT
Start: 2025-08-28 | End: 2025-08-28

## 2025-08-28 RX ORDER — PROPOFOL 10 MG/ML
INJECTION, EMULSION INTRAVENOUS CONTINUOUS PRN
Status: DISCONTINUED | OUTPATIENT
Start: 2025-08-28 | End: 2025-08-28

## 2025-08-28 RX ORDER — FENTANYL CITRATE 50 UG/ML
INJECTION, SOLUTION INTRAMUSCULAR; INTRAVENOUS PRN
Status: DISCONTINUED | OUTPATIENT
Start: 2025-08-28 | End: 2025-08-28

## 2025-08-28 RX ORDER — EPHEDRINE SULFATE 50 MG/ML
INJECTION, SOLUTION INTRAMUSCULAR; INTRAVENOUS; SUBCUTANEOUS PRN
Status: DISCONTINUED | OUTPATIENT
Start: 2025-08-28 | End: 2025-08-28

## 2025-08-28 RX ORDER — BUPIVACAINE HCL/EPINEPHRINE 0.5-1:200K
VIAL (ML) INJECTION
Status: COMPLETED | OUTPATIENT
Start: 2025-08-28 | End: 2025-08-28

## 2025-08-28 RX ORDER — PROPOFOL 10 MG/ML
INJECTION, EMULSION INTRAVENOUS PRN
Status: DISCONTINUED | OUTPATIENT
Start: 2025-08-28 | End: 2025-08-28

## 2025-08-28 RX ORDER — ONDANSETRON 2 MG/ML
INJECTION INTRAMUSCULAR; INTRAVENOUS PRN
Status: DISCONTINUED | OUTPATIENT
Start: 2025-08-28 | End: 2025-08-28

## 2025-08-28 RX ORDER — LIDOCAINE HYDROCHLORIDE 20 MG/ML
INJECTION, SOLUTION INFILTRATION; PERINEURAL PRN
Status: DISCONTINUED | OUTPATIENT
Start: 2025-08-28 | End: 2025-08-28

## 2025-08-28 RX ADMIN — SODIUM CHLORIDE, SODIUM LACTATE, POTASSIUM CHLORIDE, AND CALCIUM CHLORIDE: .6; .31; .03; .02 INJECTION, SOLUTION INTRAVENOUS at 14:53

## 2025-08-28 RX ADMIN — EPHEDRINE SULFATE 5 MG: 5 INJECTION INTRAVENOUS at 16:04

## 2025-08-28 RX ADMIN — PROPOFOL 50 MG: 10 INJECTION, EMULSION INTRAVENOUS at 14:53

## 2025-08-28 RX ADMIN — Medication 2 G: at 15:01

## 2025-08-28 RX ADMIN — EPHEDRINE SULFATE 5 MG: 5 INJECTION INTRAVENOUS at 16:18

## 2025-08-28 RX ADMIN — EPHEDRINE SULFATE 5 MG: 5 INJECTION INTRAVENOUS at 15:33

## 2025-08-28 RX ADMIN — PROPOFOL 100 MCG/KG/MIN: 10 INJECTION, EMULSION INTRAVENOUS at 14:52

## 2025-08-28 RX ADMIN — DEXMEDETOMIDINE HYDROCHLORIDE 12 MCG: 100 INJECTION, SOLUTION INTRAVENOUS at 15:12

## 2025-08-28 RX ADMIN — BUPIVACAINE HYDROCHLORIDE AND EPINEPHRINE BITARTRATE 30 ML: 5; .005 INJECTION, SOLUTION PERINEURAL at 13:54

## 2025-08-28 RX ADMIN — ONDANSETRON 4 MG: 2 INJECTION INTRAMUSCULAR; INTRAVENOUS at 14:53

## 2025-08-28 RX ADMIN — LIDOCAINE HYDROCHLORIDE 60 MG: 20 INJECTION, SOLUTION INFILTRATION; PERINEURAL at 14:53

## 2025-08-28 RX ADMIN — FENTANYL CITRATE 25 MCG: 50 INJECTION INTRAMUSCULAR; INTRAVENOUS at 15:04

## 2025-08-28 ASSESSMENT — ENCOUNTER SYMPTOMS
ORTHOPNEA: 0
SEIZURES: 1

## 2025-08-28 ASSESSMENT — ACTIVITIES OF DAILY LIVING (ADL)
ADLS_ACUITY_SCORE: 47

## 2025-08-28 ASSESSMENT — LIFESTYLE VARIABLES: TOBACCO_USE: 1

## 2025-09-17 ENCOUNTER — PRE VISIT (OUTPATIENT)
Dept: SURGERY | Facility: CLINIC | Age: 75
End: 2025-09-17

## (undated) DEVICE — SOL NACL 0.9% IRRIG 1000ML BOTTLE 2F7124

## (undated) DEVICE — ESU ELEC NDL 1" COATED/INSULATED E1465

## (undated) DEVICE — SHTH INTRO 0.021IN ID 6FR DIA

## (undated) DEVICE — ESU CORD BIPOLAR GREEN 10-4000

## (undated) DEVICE — SU VICRYL 3-0 SH 27" J316H

## (undated) DEVICE — IOM STANDARD SUPPLY FEE

## (undated) DEVICE — BNDG ELASTIC 3"X5YDS UNSTERILE 6611-30

## (undated) DEVICE — ELECTRODE ADULT PACING MULTI P-211-M1

## (undated) DEVICE — SU MONOCRYL 4-0 PS-2 18" UND Y496G

## (undated) DEVICE — ESU PENCIL SMOKE EVAC W/ROCKER SWITCH 0703-047-000

## (undated) DEVICE — DRAPE U SPLIT 74X120" 29440

## (undated) DEVICE — SYR 10ML FINGER CONTROL W/O NDL 309695

## (undated) DEVICE — DRAPE MICRO ZEISS MD 48"X120CM

## (undated) DEVICE — VESSEL LOOPS RED MINI 31145710

## (undated) DEVICE — SOL WATER IRRIG 1000ML BOTTLE 2F7114

## (undated) DEVICE — SLEEVE TR BAND RADIAL COMPRESSION DEVICE 24CM TRB24-REG

## (undated) DEVICE — GLOVE PROTEXIS W/NEU-THERA 8.0  2D73TE80

## (undated) DEVICE — CUSTOM PACK CORONARY SAN5BCRHEA

## (undated) DEVICE — BNDG ELASTIC 4" DBL LENGTH UNSTERILE 6611-14

## (undated) DEVICE — CATH DIAG 4FR JR 5.0 538423

## (undated) DEVICE — BLADE LARYNGOSCOPE AMBU ASCOPE 4 510-001-000

## (undated) DEVICE — KIT HAND CONTROL ACIST 014644 AR-P54

## (undated) DEVICE — SU MONOCRYL 3-0 PS-2 27" Y427H

## (undated) DEVICE — KIT HAND CONTROL ACIST 016795

## (undated) DEVICE — SOL WATER IRRIG 1000ML BOTTLE 07139-09

## (undated) DEVICE — MANIFOLD KIT ANGIO AUTOMATED 014613

## (undated) DEVICE — NDL 19GA 1.5"

## (undated) DEVICE — TUBING SUCTION 12"X1/4" N612

## (undated) DEVICE — PACK HAND WRIST SOP15HWFSP

## (undated) DEVICE — CLOSURE SYS SKIN PREMIERPRO EXOFIN FUSION 4X22CM STRL 3472

## (undated) DEVICE — ESU ELEC BLADE 2.75" COATED/INSULATED E1455

## (undated) DEVICE — DRSG KERLIX 4 1/2"X4YDS ROLL 6715

## (undated) DEVICE — GLOVE PROTEXIS W/NEU-THERA 7.5  2D73TE75

## (undated) DEVICE — GLOVE PROTEXIS POWDER FREE 7.5 ORTHOPEDIC 2D73ET75

## (undated) DEVICE — CAST PADDING 4" UNSTERILE 9044

## (undated) DEVICE — DRAPE IOBAN LG .375X23.5" 6648EZ

## (undated) DEVICE — PREP SKIN SCRUB TRAY 4461A

## (undated) DEVICE — CONTROL APPLIANCE SLEEP REMOTE INSPIRE 2580

## (undated) DEVICE — Device

## (undated) DEVICE — ADH SKIN CLOSURE PREMIERPRO EXOFIN 1.0ML 3470

## (undated) DEVICE — SU VICRYL 3-0 SH 27" UND J416H

## (undated) DEVICE — COVER NEOPROBE SOFTFLEX 5X96" W/BANDS 20-PC596

## (undated) DEVICE — SU SILK 3-0 TIE 18" SA64H

## (undated) DEVICE — GUIDEWIRE STARTER .035INX150CM

## (undated) DEVICE — GOWN XLG DISP 9545

## (undated) DEVICE — BLADE KNIFE SURG 15 371115

## (undated) DEVICE — CATH ANGIO INFINITI JL3.5 4FRX100CM 538418

## (undated) DEVICE — DECANTER VIAL 2006S

## (undated) DEVICE — INTRO MICRO MINI STICK 4FR STD NITINOL

## (undated) DEVICE — SHEATH GLIDE RADIAL 4FR 25CM 0.021

## (undated) DEVICE — GLOVE PROTEXIS BLUE W/NEU-THERA 7.5  2D73EB75

## (undated) DEVICE — NDL 25GA 1.5" 305127

## (undated) DEVICE — SU SILK 3-0 SH 30" K832H

## (undated) DEVICE — DRAPE POUCH INSTRUMENT 3 POCKET 1018L

## (undated) DEVICE — CAST PLASTER SPLINT 4X15" 7394

## (undated) DEVICE — TONGUE DEPRESSOR STERILE 25-705-ALL

## (undated) DEVICE — PACK LAPAROTOMY CUSTOM LAKES

## (undated) DEVICE — PACK HEART LEFT CUSTOM

## (undated) DEVICE — SLEEVE TR BAND RADIAL COMPRESSION DEVICE 29CM XX-RF06L

## (undated) DEVICE — DRAPE STERI TOWEL SM 1000

## (undated) DEVICE — PREP CHLORAPREP 26ML TINTED ORANGE  260815

## (undated) DEVICE — TUNNELING TOOL AND OBTURATOR

## (undated) DEVICE — NDL ANGIOCATH 20GA 1.25" 4056

## (undated) DEVICE — SOL NACL 0.9% IRRIG 1000ML BOTTLE 07138-09

## (undated) DEVICE — SU SILK 2-0 SH 30" K833H

## (undated) DEVICE — SYR ANGIOGRAPHY MULTIUSE KIT ACIST 014612

## (undated) DEVICE — SPONGE KITTNER 31001010

## (undated) DEVICE — DRSG STERI STRIP 1/2X4" R1547

## (undated) DEVICE — IOM CASE FLAT FEE

## (undated) DEVICE — SU ETHILON 4-0 FS-2 18" 662H

## (undated) DEVICE — CAST PADDING 2" STERILE 9062S

## (undated) DEVICE — 0.035IN X 260CM INQWIRE DIAGNOSTIC GUIDEWIRE, FIXED-CORE PTFE COATED, 3MM J-TIP

## (undated) DEVICE — SUCTION CANISTER MEDIVAC LINER 1500ML W/LID 65651-515

## (undated) DEVICE — VESSEL LOOPS BLUE MAXI

## (undated) RX ORDER — DEXAMETHASONE SODIUM PHOSPHATE 4 MG/ML
INJECTION, SOLUTION INTRA-ARTICULAR; INTRALESIONAL; INTRAMUSCULAR; INTRAVENOUS; SOFT TISSUE
Status: DISPENSED
Start: 2022-11-03

## (undated) RX ORDER — FENTANYL CITRATE 50 UG/ML
INJECTION, SOLUTION INTRAMUSCULAR; INTRAVENOUS
Status: DISPENSED
Start: 2022-08-08

## (undated) RX ORDER — HEPARIN SODIUM 1000 [USP'U]/ML
INJECTION, SOLUTION INTRAVENOUS; SUBCUTANEOUS
Status: DISPENSED
Start: 2024-07-27

## (undated) RX ORDER — GLYCOPYRROLATE 0.2 MG/ML
INJECTION, SOLUTION INTRAMUSCULAR; INTRAVENOUS
Status: DISPENSED
Start: 2022-11-03

## (undated) RX ORDER — BUPIVACAINE HYDROCHLORIDE 2.5 MG/ML
INJECTION, SOLUTION EPIDURAL; INFILTRATION; INTRACAUDAL
Status: DISPENSED
Start: 2022-10-26

## (undated) RX ORDER — ONDANSETRON 2 MG/ML
INJECTION INTRAMUSCULAR; INTRAVENOUS
Status: DISPENSED
Start: 2024-07-27

## (undated) RX ORDER — FENTANYL CITRATE 50 UG/ML
INJECTION, SOLUTION INTRAMUSCULAR; INTRAVENOUS
Status: DISPENSED
Start: 2024-07-27

## (undated) RX ORDER — ONDANSETRON 2 MG/ML
INJECTION INTRAMUSCULAR; INTRAVENOUS
Status: DISPENSED
Start: 2022-11-03

## (undated) RX ORDER — CEFAZOLIN SODIUM 1 G/3ML
INJECTION, POWDER, FOR SOLUTION INTRAMUSCULAR; INTRAVENOUS
Status: DISPENSED
Start: 2022-11-03

## (undated) RX ORDER — DIAZEPAM 5 MG
TABLET ORAL
Status: DISPENSED
Start: 2022-08-08

## (undated) RX ORDER — MAGNESIUM SULFATE HEPTAHYDRATE 40 MG/ML
INJECTION, SOLUTION INTRAVENOUS
Status: DISPENSED
Start: 2022-11-03

## (undated) RX ORDER — FENTANYL CITRATE 50 UG/ML
INJECTION, SOLUTION INTRAMUSCULAR; INTRAVENOUS
Status: DISPENSED
Start: 2023-10-05

## (undated) RX ORDER — ACETAMINOPHEN 325 MG/1
TABLET ORAL
Status: DISPENSED
Start: 2021-08-13

## (undated) RX ORDER — FENTANYL CITRATE 50 UG/ML
INJECTION, SOLUTION INTRAMUSCULAR; INTRAVENOUS
Status: DISPENSED
Start: 2022-11-03

## (undated) RX ORDER — FENTANYL CITRATE 50 UG/ML
INJECTION, SOLUTION INTRAMUSCULAR; INTRAVENOUS
Status: DISPENSED
Start: 2021-08-13

## (undated) RX ORDER — DEXMEDETOMIDINE HYDROCHLORIDE 100 UG/ML
INJECTION, SOLUTION INTRAVENOUS
Status: DISPENSED
Start: 2022-11-03

## (undated) RX ORDER — PHENYLEPHRINE HYDROCHLORIDE 10 MG/ML
INJECTION INTRAVENOUS
Status: DISPENSED
Start: 2022-11-03

## (undated) RX ORDER — LIDOCAINE HYDROCHLORIDE AND EPINEPHRINE 10; 10 MG/ML; UG/ML
INJECTION, SOLUTION INFILTRATION; PERINEURAL
Status: DISPENSED
Start: 2022-11-03

## (undated) RX ORDER — LIDOCAINE HYDROCHLORIDE 10 MG/ML
INJECTION, SOLUTION EPIDURAL; INFILTRATION; INTRACAUDAL; PERINEURAL
Status: DISPENSED
Start: 2022-11-03

## (undated) RX ORDER — LIDOCAINE HYDROCHLORIDE 10 MG/ML
INJECTION, SOLUTION EPIDURAL; INFILTRATION; INTRACAUDAL; PERINEURAL
Status: DISPENSED
Start: 2022-10-26

## (undated) RX ORDER — REGADENOSON 0.08 MG/ML
INJECTION, SOLUTION INTRAVENOUS
Status: DISPENSED
Start: 2020-05-07

## (undated) RX ORDER — ACETAMINOPHEN 325 MG/1
TABLET ORAL
Status: DISPENSED
Start: 2022-11-03

## (undated) RX ORDER — CEFAZOLIN SODIUM 1 G/3ML
INJECTION, POWDER, FOR SOLUTION INTRAMUSCULAR; INTRAVENOUS
Status: DISPENSED
Start: 2022-10-26

## (undated) RX ORDER — OXYCODONE HYDROCHLORIDE 5 MG/1
TABLET ORAL
Status: DISPENSED
Start: 2022-11-03

## (undated) RX ORDER — ONDANSETRON 2 MG/ML
INJECTION INTRAMUSCULAR; INTRAVENOUS
Status: DISPENSED
Start: 2021-08-13

## (undated) RX ORDER — DIPHENHYDRAMINE HYDROCHLORIDE 50 MG/ML
INJECTION INTRAMUSCULAR; INTRAVENOUS
Status: DISPENSED
Start: 2024-07-27

## (undated) RX ORDER — NITROGLYCERIN 5 MG/ML
VIAL (ML) INTRAVENOUS
Status: DISPENSED
Start: 2024-07-27

## (undated) RX ORDER — FENTANYL CITRATE-0.9 % NACL/PF 10 MCG/ML
PLASTIC BAG, INJECTION (ML) INTRAVENOUS
Status: DISPENSED
Start: 2022-11-03